# Patient Record
Sex: MALE | Race: WHITE | NOT HISPANIC OR LATINO | Employment: OTHER | URBAN - METROPOLITAN AREA
[De-identification: names, ages, dates, MRNs, and addresses within clinical notes are randomized per-mention and may not be internally consistent; named-entity substitution may affect disease eponyms.]

---

## 2020-02-25 ENCOUNTER — HOSPITAL ENCOUNTER (EMERGENCY)
Facility: HOSPITAL | Age: 65
Discharge: HOME | End: 2020-02-25
Attending: INTERNAL MEDICINE
Payer: MEDICARE

## 2020-02-25 VITALS
WEIGHT: 185 LBS | RESPIRATION RATE: 22 BRPM | SYSTOLIC BLOOD PRESSURE: 208 MMHG | BODY MASS INDEX: 29.73 KG/M2 | DIASTOLIC BLOOD PRESSURE: 103 MMHG | HEART RATE: 84 BPM | OXYGEN SATURATION: 99 % | HEIGHT: 66 IN

## 2020-02-25 DIAGNOSIS — E16.2 HYPOGLYCEMIA: Primary | ICD-10-CM

## 2020-02-25 LAB
POCT GLUCOSE: 111 MG/DL (ref 70–110)
POCT GLUCOSE: 121 MG/DL (ref 70–110)
POCT GLUCOSE: 38 MG/DL (ref 70–110)

## 2020-02-25 PROCEDURE — 99284 EMERGENCY DEPT VISIT MOD MDM: CPT | Mod: 25

## 2020-02-25 PROCEDURE — 25000003 PHARM REV CODE 250: Performed by: INTERNAL MEDICINE

## 2020-02-25 PROCEDURE — 82962 GLUCOSE BLOOD TEST: CPT | Mod: 91

## 2020-02-25 PROCEDURE — 96374 THER/PROPH/DIAG INJ IV PUSH: CPT

## 2020-02-25 RX ORDER — DEXTROSE MONOHYDRATE 25 G/50ML
INJECTION, SOLUTION INTRAVENOUS
Status: DISCONTINUED
Start: 2020-02-25 | End: 2020-02-25 | Stop reason: HOSPADM

## 2020-02-25 RX ORDER — DEXTROSE 50 % IN WATER (D50W) INTRAVENOUS SYRINGE
25
Status: COMPLETED | OUTPATIENT
Start: 2020-02-25 | End: 2020-02-25

## 2020-02-25 RX ADMIN — DEXTROSE MONOHYDRATE 25 G: 25 INJECTION, SOLUTION INTRAVENOUS at 02:02

## 2020-02-25 NOTE — ED NOTES
Patient awakens easily following dextrose injection. Patient is reoriented. Relayes that he has not been having an appetite recently but has continued to take his insulin.

## 2020-02-25 NOTE — DISCHARGE INSTRUCTIONS
Diabetes and Your Child: Low Blood Sugar     If your child has low blood sugar, give him a fast-acting sugar, such as a glass of nonfat milk.   Low blood sugar (hypoglycemia) happens when there is too little glucose (sugar) in your childs blood. It can be caused by skipping meals or snacks, eating too little food, or taking too much insulin or diabetes medicine. A lot of physical activity can also cause low blood sugar, even hours later. In severe cases, low blood sugar can lead to seizures or passing out.  How to recognize a low  Everyones symptoms are different. Your child may feel dizzy, weak, hungry, headachy, or shaky. Your child may seem cranky or confused. Severe hypoglycemia can cause seizures. If lows happen very often over time, your child may no longer be able to sense them. Encourage your child to recognize his or her symptoms and tell you about them right away.  What to do  Actions to take include the following:  · Stay calm so you can better help your child.  · Check your childs blood sugar to make sure that it is low. If youre not able to check, treat for low blood sugar anyway.  · Give your child 15 to 20 grams of fast-acting sugar such as 3 to 4 glucose tablets, a glass of nonfat milk, or 4 ounces (½ cup) of juice or regular soda. Diet soda will not help at all. Chocolate, cookies, and other fatty sweets will not work as quickly.  · If possible, recheck blood sugar in 15 minutes. If it is still low, give your child another 15 to 20 grams of fast-acting sugar.  · Once your childs blood sugar is normal, give your child a snack or meal to eat.  · If your childs blood sugar does not go back up, call your healthcare provider or take your child to the emergency room.  How to prevent low blood sugar  Recommendations to help your child:  · Be sure your child eats meals and snacks on time, and eats before exercising.  · Have your child carry fast-acting sugar.  · Dont inject insulin near a muscle  thats going to be exercised.  · Check your childs blood sugar often, especially after exercise and at bedtime.  Tips  Other recommendations include the following:  · Keep fast-acting sugars handy.  · Check blood sugar often, especially after activity and before bed.  Glucagon injections  For severe low blood sugar, or if your child can not tolerate oral glucose because they are vomiting, too sluggish to swallow or unconscious, your child may need a glucagon injection. This is a medicine that mobilizes the glucose already stored in your child's body. Ask your child's healthcare provider about glucagon emergency kits for home and school.    Date Last Reviewed: 7/1/2016 © 2000-2017 [employer]. [address] [place] [address], [place] [address]. All rights reserved. This information is not intended as a substitute for professional medical care. Always follow your healthcare professional's instructions.          Hypoglycemia, Oral Diabetic Medicine  You have been treated for low blood sugar (hypoglycemia) caused by your diabetes medicine. Oral diabetes medicines include:  · Glyburide  · Glipizide  · Acarbose  · Metformin  · Pioglitazone  · Sitagliptin  · Canigliflozin  · Colesevelam  Low blood sugar can occur when you continue to take your usual dose of diabetes medicine but you skip meals or dont eat the amount of food that your body is used to. It can also result from taking too much diabetes medicine.  Other factors that can lower blood sugar while taking diabetes medicine include intense exercise, strong emotions, alcohol use, tobacco, caffeine, and certain medicines. These medicines include:  · Aspirin  · Haloperidol  · Propoxyphene  · Chlorpromazine  · Propranolol  · Disopyramide  · Lisinopril (and other angiotensin-converting enzyme inhibitors)  In addition, some over-the-counter cough and cold products contain alcohol, which can affect your blood sugar levels.  A class of medicines called beta-blockers  is used for high blood pressure, rapid heart rate, and other conditions. Beta-blockers may prevent the early symptoms of low blood sugar. If you are taking a beta-blocker, you might not realize that your blood sugar is getting low. If you take a beta-blocker, talk to your healthcare provider about switching to a different class. The beta-blocker class includes:  · Propranolol  · Atenolol  · Metoprolol  · Nadolol  · Labetalol  · Carvedilol  Home care  · During the next 24 hours rest and eat frequent small meals. This will help prevent the return of low blood sugar.  · Learn the signals your body gives as your blood sugar drops (see below).  If symptoms of hypoglycemia return  · Keep a source of fast-acting sugar with you. At the first sign of low blood sugar, eat or drink 15 to 20 grams of fast-acting sugar. Examples include:  ¨ 3 to 4 glucose tablets (found at most drugstores)  ¨ 4 ounces of regular soda  ¨ 4 ounces of fruit juice  ¨ 2 tablespoons of raisins  ¨ 1 tablespoon of honey  · For other sources, check the sugar content on the nutrition label to figure out how much you need to eat or drink to get at least 15 grams of sugar.  · Check your blood sugar 15 minutes after treating yourself. If it is still low, take another 15 to 20 grams of fast-acting sugar. Test again in 15 minutes. If it remains low, call your healthcare provider or go to an emergency room.  · Once blood sugar returns to normal, eat a snack or meal to keep your blood sugar in a safe range.  In the future, if you are unable to eat your normal amount due to illness or vomiting, stop taking your diabetes medicine and contact your healthcare provider.  Wear a medical alert bracelet or necklace, or carry a card in your wallet explaining that you have diabetes. If you have a severe hypoglycemic reaction and cannot give this information, it will help medical personnel provide proper care.  Follow-up care  Follow up with your healthcare provider, or as  advised.  If you have the equipment to monitor your blood sugar, do so at least twice a day--before breakfast and before dinner. Do this for the next 5 days. See your healthcare provider during the next week to review these records. This will help determine if you need an adjustment in your diabetes medicine.  For more information about diabetes, contact the American Diabetes Association, at www.diabetes.org.  When to seek medical advice  Call your healthcare provider right away if any of these symptoms of low blood sugar occur.  · Fatigue  · Headache  · Shakes  · Excess sweating  · Hunger  · Feeling anxious or restless  · Vision changes  · Drowsiness  · Weakness  · Confusion  · Personality changes  · Seizure or loss of consciousness  Date Last Reviewed: 6/1/2016  © 0532-0563 [employer]. [address] [place] [address], [place] [address]. All rights reserved. This information is not intended as a substitute for professional medical care. Always follow your healthcare professional's instructions.

## 2020-02-25 NOTE — ED TRIAGE NOTES
Pt here per [facility] with low bx 38 mg/dl combative on arrival to ed security called and is standing by on arrival

## 2020-02-26 NOTE — ED PROVIDER NOTES
Encounter Date: 2/25/2020       History     Chief Complaint   Patient presents with    Hypoglycemia     Patient brought in with altered consciousness.  He adds severe hypoglycemia.  The blood glucose was 30.  [facility] was unable to achieve an IV.  Arrived in the ED.  IV was obtained he was given D50W.  He probably became alert and awake and symptoms resolved.        Review of patient's allergies indicates:  No Known Allergies  Past Medical History:   Diagnosis Date    Diabetes mellitus      No past surgical history on file.  No family history on file.  Social History     Tobacco Use    Smoking status: Not on file   Substance Use Topics    Alcohol use: Not on file    Drug use: Not on file     Review of Systems   Constitutional: Negative for fever.   HENT: Negative for sore throat.    Respiratory: Negative for shortness of breath.    Cardiovascular: Negative for chest pain.   Gastrointestinal: Negative for nausea.   Genitourinary: Negative for dysuria.   Musculoskeletal: Negative for back pain.   Skin: Negative for rash.   Neurological: Negative for weakness.   Hematological: Does not bruise/bleed easily.   All other systems reviewed and are negative.      Physical Exam     Initial Vitals [02/25/20 1432]   BP Pulse Resp Temp SpO2   (!) 200/102 84 16 -- 99 %      MAP       --         Physical Exam    Nursing note and vitals reviewed.  Constitutional: Vital signs are normal. He appears well-developed and well-nourished. He is active and cooperative.   HENT:   Head: Normocephalic and atraumatic.   Eyes: Conjunctivae and lids are normal. Lids are everted and swept, no foreign bodies found.   Neck: Trachea normal, normal range of motion and full passive range of motion without pain. Neck supple.   Cardiovascular: Normal rate, regular rhythm, S1 normal, S2 normal, normal heart sounds, intact distal pulses and normal pulses.  No extrasystoles are present.    Abdominal: Soft. Normal appearance and bowel sounds are normal.    Musculoskeletal: Normal range of motion.   Neurological: He is alert. He has normal reflexes. GCS eye subscore is 4. GCS verbal subscore is 5. GCS motor subscore is 6.   Skin: Skin is warm, dry and intact. Capillary refill takes less than 2 seconds.   Psychiatric: He has a normal mood and affect. His speech is normal and behavior is normal. Cognition and memory are normal.         ED Course   Procedures  Labs Reviewed   POCT GLUCOSE - Abnormal; Notable for the following components:       Result Value    POCT Glucose 38 (*)     All other components within normal limits   POCT GLUCOSE - Abnormal; Notable for the following components:    POCT Glucose 111 (*)     All other components within normal limits   POCT GLUCOSE - Abnormal; Notable for the following components:    POCT Glucose 121 (*)     All other components within normal limits          Imaging Results    None          Medical Decision Making:   Clinical Tests:   Lab Tests: Ordered and Reviewed  The following lab test(s) were unremarkable: CBC and CMP       <> Summary of Lab: Laboratory studies were unremarkable after D50 given  ED Management:  Patient brought in with altered mental consciousness after he was found to be hypoglycemic.  IV was started was given D50 and promptly had resolution of his abnormal findings.  Patient was alert back to baseline.  Family was encountered and discussed observation over the next several hours.  Discharged with family.                                 Clinical Impression:       ICD-10-CM ICD-9-CM   1. Hypoglycemia E16.2 251.2         Disposition:   Disposition: Discharged  Condition: Stable     ED Disposition Condition    Discharge Stable        ED Prescriptions     None        Follow-up Information    None                                    Jeanmarie Etienne MD  02/26/20 0758

## 2020-08-11 ENCOUNTER — LAB VISIT (OUTPATIENT)
Dept: LAB | Facility: HOSPITAL | Age: 65
End: 2020-08-11
Attending: INTERNAL MEDICINE
Payer: MEDICARE

## 2020-08-11 DIAGNOSIS — N18.30 CHRONIC KIDNEY DISEASE, STAGE III (MODERATE): Primary | ICD-10-CM

## 2020-08-11 LAB
ALBUMIN SERPL BCP-MCNC: 3.7 G/DL (ref 3.5–5.2)
ANION GAP SERPL CALC-SCNC: 11 MMOL/L (ref 8–16)
BACTERIA #/AREA URNS HPF: NORMAL /HPF
BILIRUB UR QL STRIP: NEGATIVE
BUN SERPL-MCNC: 22 MG/DL (ref 8–23)
CALCIUM SERPL-MCNC: 8.3 MG/DL (ref 8.7–10.5)
CHLORIDE SERPL-SCNC: 107 MMOL/L (ref 95–110)
CLARITY UR: CLEAR
CO2 SERPL-SCNC: 19 MMOL/L (ref 23–29)
COLOR UR: YELLOW
CREAT SERPL-MCNC: 1.9 MG/DL (ref 0.5–1.4)
CREAT UR-MCNC: 136 MG/DL (ref 23–375)
ERYTHROCYTE [DISTWIDTH] IN BLOOD BY AUTOMATED COUNT: 14 % (ref 11.5–14.5)
EST. GFR  (AFRICAN AMERICAN): 42.7 ML/MIN/1.73 M^2
EST. GFR  (NON AFRICAN AMERICAN): 37 ML/MIN/1.73 M^2
GLUCOSE SERPL-MCNC: 169 MG/DL (ref 70–110)
GLUCOSE UR QL STRIP: NEGATIVE
HCT VFR BLD AUTO: 35.8 % (ref 40–54)
HGB BLD-MCNC: 12 G/DL (ref 14–18)
HGB UR QL STRIP: ABNORMAL
HYALINE CASTS #/AREA URNS LPF: NORMAL /LPF
KETONES UR QL STRIP: NEGATIVE
LEUKOCYTE ESTERASE UR QL STRIP: NEGATIVE
MCH RBC QN AUTO: 27 PG (ref 27–31)
MCHC RBC AUTO-ENTMCNC: 33.5 G/DL (ref 32–36)
MCV RBC AUTO: 81 FL (ref 82–98)
MICROSCOPIC COMMENT: NORMAL
NITRITE UR QL STRIP: NEGATIVE
PH UR STRIP: 6 [PH] (ref 5–8)
PHOSPHATE SERPL-MCNC: 2.9 MG/DL (ref 2.7–4.5)
PLATELET # BLD AUTO: 254 K/UL (ref 150–350)
PMV BLD AUTO: 10 FL (ref 9.2–12.9)
POTASSIUM SERPL-SCNC: 3.5 MMOL/L (ref 3.5–5.1)
PROT UR QL STRIP: ABNORMAL
PROT UR-MCNC: 162 MG/DL (ref 0–15)
PROT/CREAT UR: 1.19 MG/G{CREAT} (ref 0–0.2)
RBC # BLD AUTO: 4.44 M/UL (ref 4.6–6.2)
RBC #/AREA URNS HPF: 1 /HPF (ref 0–4)
SODIUM SERPL-SCNC: 137 MMOL/L (ref 136–145)
SP GR UR STRIP: 1.02 (ref 1–1.03)
SQUAMOUS #/AREA URNS HPF: 1 /HPF
URN SPEC COLLECT METH UR: ABNORMAL
UROBILINOGEN UR STRIP-ACNC: NEGATIVE EU/DL
WBC # BLD AUTO: 4.39 K/UL (ref 3.9–12.7)
WBC #/AREA URNS HPF: 1 /HPF (ref 0–5)

## 2020-08-11 PROCEDURE — 36415 COLL VENOUS BLD VENIPUNCTURE: CPT

## 2020-08-11 PROCEDURE — 81000 URINALYSIS NONAUTO W/SCOPE: CPT

## 2020-08-11 PROCEDURE — 84156 ASSAY OF PROTEIN URINE: CPT

## 2020-08-11 PROCEDURE — 85027 COMPLETE CBC AUTOMATED: CPT

## 2020-08-11 PROCEDURE — 80069 RENAL FUNCTION PANEL: CPT

## 2020-09-02 DIAGNOSIS — Z13.6 ENCOUNTER FOR SCREENING FOR VASCULAR DISEASE: ICD-10-CM

## 2020-09-02 DIAGNOSIS — I69.30 SEQUELAE OF CEREBRAL INFARCTION: Primary | ICD-10-CM

## 2021-04-12 ENCOUNTER — LAB VISIT (OUTPATIENT)
Dept: LAB | Facility: HOSPITAL | Age: 66
End: 2021-04-12
Attending: NURSE PRACTITIONER
Payer: MEDICARE

## 2021-04-12 DIAGNOSIS — E11.9 DIABETES MELLITUS: Primary | ICD-10-CM

## 2021-04-12 DIAGNOSIS — M89.8X9 METABOLIC BONE DISEASE: ICD-10-CM

## 2021-04-12 DIAGNOSIS — N18.30 CKD (CHRONIC KIDNEY DISEASE), STAGE III: ICD-10-CM

## 2021-04-12 DIAGNOSIS — M10.9 GOUT: ICD-10-CM

## 2021-04-12 LAB
25(OH)D3+25(OH)D2 SERPL-MCNC: 33 NG/ML (ref 30–96)
ALBUMIN SERPL BCP-MCNC: 3.3 G/DL (ref 3.5–5.2)
ANION GAP SERPL CALC-SCNC: 12 MMOL/L (ref 8–16)
BACTERIA #/AREA URNS HPF: NORMAL /HPF
BILIRUB UR QL STRIP: NEGATIVE
BUN SERPL-MCNC: 27 MG/DL (ref 8–23)
CALCIUM SERPL-MCNC: 8.1 MG/DL (ref 8.7–10.5)
CHLORIDE SERPL-SCNC: 110 MMOL/L (ref 95–110)
CLARITY UR: CLEAR
CO2 SERPL-SCNC: 23 MMOL/L (ref 23–29)
COLOR UR: YELLOW
CREAT SERPL-MCNC: 2.2 MG/DL (ref 0.5–1.4)
CREAT UR-MCNC: 78 MG/DL (ref 23–375)
ERYTHROCYTE [DISTWIDTH] IN BLOOD BY AUTOMATED COUNT: 13.9 % (ref 11.5–14.5)
EST. GFR  (AFRICAN AMERICAN): 35.5 ML/MIN/1.73 M^2
EST. GFR  (NON AFRICAN AMERICAN): 30.7 ML/MIN/1.73 M^2
ESTIMATED AVG GLUCOSE: 177 MG/DL (ref 68–131)
GLUCOSE SERPL-MCNC: 148 MG/DL (ref 70–110)
GLUCOSE UR QL STRIP: NEGATIVE
HBA1C MFR BLD: 7.8 % (ref 4.5–6.2)
HCT VFR BLD AUTO: 37.6 % (ref 40–54)
HGB BLD-MCNC: 12.5 G/DL (ref 14–18)
HGB UR QL STRIP: ABNORMAL
HYALINE CASTS #/AREA URNS LPF: 0 /LPF
KETONES UR QL STRIP: NEGATIVE
LEUKOCYTE ESTERASE UR QL STRIP: NEGATIVE
MCH RBC QN AUTO: 26.8 PG (ref 27–31)
MCHC RBC AUTO-ENTMCNC: 33.2 G/DL (ref 32–36)
MCV RBC AUTO: 81 FL (ref 82–98)
MICROSCOPIC COMMENT: NORMAL
NITRITE UR QL STRIP: NEGATIVE
PH UR STRIP: 6 [PH] (ref 5–8)
PHOSPHATE SERPL-MCNC: 3.3 MG/DL (ref 2.7–4.5)
PLATELET # BLD AUTO: 339 K/UL (ref 150–450)
PMV BLD AUTO: 10.1 FL (ref 9.2–12.9)
POTASSIUM SERPL-SCNC: 3.5 MMOL/L (ref 3.5–5.1)
PROT UR QL STRIP: ABNORMAL
PROT UR-MCNC: 193 MG/DL (ref 0–15)
PROT/CREAT UR: 2.47 MG/G{CREAT} (ref 0–0.2)
PTH-INTACT SERPL-MCNC: 273 PG/ML (ref 9–77)
RBC # BLD AUTO: 4.66 M/UL (ref 4.6–6.2)
RBC #/AREA URNS HPF: 3 /HPF (ref 0–4)
SODIUM SERPL-SCNC: 145 MMOL/L (ref 136–145)
SP GR UR STRIP: 1.02 (ref 1–1.03)
URATE SERPL-MCNC: 8.5 MG/DL (ref 3.4–7)
URN SPEC COLLECT METH UR: ABNORMAL
UROBILINOGEN UR STRIP-ACNC: NEGATIVE EU/DL
WBC # BLD AUTO: 4.62 K/UL (ref 3.9–12.7)
WBC #/AREA URNS HPF: 3 /HPF (ref 0–5)

## 2021-04-12 PROCEDURE — 83036 HEMOGLOBIN GLYCOSYLATED A1C: CPT | Performed by: NURSE PRACTITIONER

## 2021-04-12 PROCEDURE — 36415 COLL VENOUS BLD VENIPUNCTURE: CPT | Performed by: NURSE PRACTITIONER

## 2021-04-12 PROCEDURE — 80069 RENAL FUNCTION PANEL: CPT | Performed by: NURSE PRACTITIONER

## 2021-04-12 PROCEDURE — 82570 ASSAY OF URINE CREATININE: CPT | Performed by: NURSE PRACTITIONER

## 2021-04-12 PROCEDURE — 81000 URINALYSIS NONAUTO W/SCOPE: CPT | Performed by: NURSE PRACTITIONER

## 2021-04-12 PROCEDURE — 85027 COMPLETE CBC AUTOMATED: CPT | Performed by: NURSE PRACTITIONER

## 2021-04-12 PROCEDURE — 83970 ASSAY OF PARATHORMONE: CPT | Performed by: NURSE PRACTITIONER

## 2021-04-12 PROCEDURE — 84550 ASSAY OF BLOOD/URIC ACID: CPT | Performed by: NURSE PRACTITIONER

## 2021-04-12 PROCEDURE — 82306 VITAMIN D 25 HYDROXY: CPT | Performed by: NURSE PRACTITIONER

## 2022-01-02 ENCOUNTER — HOSPITAL ENCOUNTER (INPATIENT)
Facility: HOSPITAL | Age: 67
LOS: 3 days | Discharge: HOME HEALTH | DRG: 291 | End: 2022-01-07
Attending: EMERGENCY MEDICINE | Admitting: FAMILY MEDICINE
Payer: MEDICARE

## 2022-01-02 DIAGNOSIS — N18.32 TYPE 2 DIABETES MELLITUS WITH STAGE 3B CHRONIC KIDNEY DISEASE, WITHOUT LONG-TERM CURRENT USE OF INSULIN: ICD-10-CM

## 2022-01-02 DIAGNOSIS — R07.9 CHEST PAIN: ICD-10-CM

## 2022-01-02 DIAGNOSIS — I48.91 ATRIAL FIBRILLATION: ICD-10-CM

## 2022-01-02 DIAGNOSIS — I50.9 NEW ONSET OF CONGESTIVE HEART FAILURE: ICD-10-CM

## 2022-01-02 DIAGNOSIS — E11.22 TYPE 2 DIABETES MELLITUS WITH STAGE 3B CHRONIC KIDNEY DISEASE, WITHOUT LONG-TERM CURRENT USE OF INSULIN: ICD-10-CM

## 2022-01-02 DIAGNOSIS — M79.89 LEG SWELLING: ICD-10-CM

## 2022-01-02 DIAGNOSIS — N18.9 CHRONIC RENAL IMPAIRMENT, UNSPECIFIED CKD STAGE: Primary | ICD-10-CM

## 2022-01-02 DIAGNOSIS — R06.02 SOB (SHORTNESS OF BREATH): ICD-10-CM

## 2022-01-02 DIAGNOSIS — I50.9 CONGESTIVE HEART FAILURE, UNSPECIFIED HF CHRONICITY, UNSPECIFIED HEART FAILURE TYPE: ICD-10-CM

## 2022-01-02 PROBLEM — Z86.73 H/O: CVA (CEREBROVASCULAR ACCIDENT): Status: ACTIVE | Noted: 2022-01-02

## 2022-01-02 PROBLEM — I25.10 CORONARY ARTERY DISEASE INVOLVING NATIVE CORONARY ARTERY OF NATIVE HEART: Status: ACTIVE | Noted: 2022-01-02

## 2022-01-02 PROBLEM — E78.5 HYPERLIPIDEMIA: Status: ACTIVE | Noted: 2022-01-02

## 2022-01-02 PROBLEM — I16.0 HYPERTENSIVE URGENCY: Status: ACTIVE | Noted: 2022-01-02

## 2022-01-02 LAB
ALBUMIN SERPL BCP-MCNC: 2.8 G/DL (ref 3.5–5.2)
ALP SERPL-CCNC: 94 U/L (ref 55–135)
ALT SERPL W/O P-5'-P-CCNC: 14 U/L (ref 10–44)
ANION GAP SERPL CALC-SCNC: 18 MMOL/L (ref 8–16)
AST SERPL-CCNC: 17 U/L (ref 10–40)
BASOPHILS # BLD AUTO: 0.02 K/UL (ref 0–0.2)
BASOPHILS NFR BLD: 0.4 % (ref 0–1.9)
BILIRUB SERPL-MCNC: 0.6 MG/DL (ref 0.1–1)
BNP SERPL-MCNC: 1467 PG/ML (ref 0–99)
BUN SERPL-MCNC: 16 MG/DL (ref 8–23)
CALCIUM SERPL-MCNC: 8.3 MG/DL (ref 8.7–10.5)
CHLORIDE SERPL-SCNC: 103 MMOL/L (ref 95–110)
CO2 SERPL-SCNC: 19 MMOL/L (ref 23–29)
CREAT SERPL-MCNC: 2.2 MG/DL (ref 0.5–1.4)
DIFFERENTIAL METHOD: ABNORMAL
EOSINOPHIL # BLD AUTO: 0.1 K/UL (ref 0–0.5)
EOSINOPHIL NFR BLD: 1.1 % (ref 0–8)
ERYTHROCYTE [DISTWIDTH] IN BLOOD BY AUTOMATED COUNT: 14 % (ref 11.5–14.5)
EST. GFR  (AFRICAN AMERICAN): 35.3 ML/MIN/1.73 M^2
EST. GFR  (NON AFRICAN AMERICAN): 30.5 ML/MIN/1.73 M^2
GLUCOSE SERPL-MCNC: 332 MG/DL (ref 70–110)
HCT VFR BLD AUTO: 36.8 % (ref 40–54)
HGB BLD-MCNC: 12.2 G/DL (ref 14–18)
IMM GRANULOCYTES # BLD AUTO: 0.01 K/UL (ref 0–0.04)
IMM GRANULOCYTES NFR BLD AUTO: 0.2 % (ref 0–0.5)
INR PPP: 1 (ref 0.8–1.2)
LYMPHOCYTES # BLD AUTO: 1 K/UL (ref 1–4.8)
LYMPHOCYTES NFR BLD: 17.3 % (ref 18–48)
MAGNESIUM SERPL-MCNC: 1.5 MG/DL (ref 1.6–2.6)
MCH RBC QN AUTO: 27.3 PG (ref 27–31)
MCHC RBC AUTO-ENTMCNC: 33.2 G/DL (ref 32–36)
MCV RBC AUTO: 82 FL (ref 82–98)
MONOCYTES # BLD AUTO: 0.5 K/UL (ref 0.3–1)
MONOCYTES NFR BLD: 9.2 % (ref 4–15)
NEUTROPHILS # BLD AUTO: 4 K/UL (ref 1.8–7.7)
NEUTROPHILS NFR BLD: 71.8 % (ref 38–73)
NRBC BLD-RTO: 0 /100 WBC
PLATELET # BLD AUTO: 305 K/UL (ref 150–450)
PMV BLD AUTO: 10.5 FL (ref 9.2–12.9)
POCT GLUCOSE: 311 MG/DL (ref 70–110)
POTASSIUM SERPL-SCNC: 3.5 MMOL/L (ref 3.5–5.1)
PROT SERPL-MCNC: 6.6 G/DL (ref 6–8.4)
PROTHROMBIN TIME: 10.6 SEC (ref 9–12.5)
RBC # BLD AUTO: 4.47 M/UL (ref 4.6–6.2)
SARS-COV-2 RDRP RESP QL NAA+PROBE: NEGATIVE
SODIUM SERPL-SCNC: 140 MMOL/L (ref 136–145)
TROPONIN I SERPL DL<=0.01 NG/ML-MCNC: 0.09 NG/ML (ref 0–0.03)
TROPONIN I SERPL DL<=0.01 NG/ML-MCNC: 0.12 NG/ML (ref 0–0.03)
WBC # BLD AUTO: 5.56 K/UL (ref 3.9–12.7)

## 2022-01-02 PROCEDURE — 85610 PROTHROMBIN TIME: CPT | Performed by: EMERGENCY MEDICINE

## 2022-01-02 PROCEDURE — G0378 HOSPITAL OBSERVATION PER HR: HCPCS

## 2022-01-02 PROCEDURE — 63600175 PHARM REV CODE 636 W HCPCS: Performed by: FAMILY MEDICINE

## 2022-01-02 PROCEDURE — 25000003 PHARM REV CODE 250: Performed by: FAMILY MEDICINE

## 2022-01-02 PROCEDURE — 84484 ASSAY OF TROPONIN QUANT: CPT | Mod: 91 | Performed by: EMERGENCY MEDICINE

## 2022-01-02 PROCEDURE — 71045 XR CHEST AP PORTABLE: ICD-10-PCS | Mod: 26,,, | Performed by: RADIOLOGY

## 2022-01-02 PROCEDURE — 27000221 HC OXYGEN, UP TO 24 HOURS

## 2022-01-02 PROCEDURE — 94761 N-INVAS EAR/PLS OXIMETRY MLT: CPT

## 2022-01-02 PROCEDURE — 99285 EMERGENCY DEPT VISIT HI MDM: CPT | Mod: 25

## 2022-01-02 PROCEDURE — 83880 ASSAY OF NATRIURETIC PEPTIDE: CPT | Performed by: EMERGENCY MEDICINE

## 2022-01-02 PROCEDURE — 71045 X-RAY EXAM CHEST 1 VIEW: CPT | Mod: 26,,, | Performed by: RADIOLOGY

## 2022-01-02 PROCEDURE — U0002 COVID-19 LAB TEST NON-CDC: HCPCS | Performed by: EMERGENCY MEDICINE

## 2022-01-02 PROCEDURE — 71045 X-RAY EXAM CHEST 1 VIEW: CPT | Mod: TC,FY

## 2022-01-02 PROCEDURE — 93010 EKG 12-LEAD: ICD-10-PCS | Mod: ,,, | Performed by: INTERNAL MEDICINE

## 2022-01-02 PROCEDURE — 96375 TX/PRO/DX INJ NEW DRUG ADDON: CPT

## 2022-01-02 PROCEDURE — 93005 ELECTROCARDIOGRAM TRACING: CPT

## 2022-01-02 PROCEDURE — 96372 THER/PROPH/DIAG INJ SC/IM: CPT | Mod: 59

## 2022-01-02 PROCEDURE — 99220 PR INITIAL OBSERVATION CARE,LEVL III: ICD-10-PCS | Mod: ,,, | Performed by: FAMILY MEDICINE

## 2022-01-02 PROCEDURE — 99220 PR INITIAL OBSERVATION CARE,LEVL III: CPT | Mod: ,,, | Performed by: FAMILY MEDICINE

## 2022-01-02 PROCEDURE — 93010 ELECTROCARDIOGRAM REPORT: CPT | Mod: ,,, | Performed by: INTERNAL MEDICINE

## 2022-01-02 PROCEDURE — 83735 ASSAY OF MAGNESIUM: CPT | Performed by: EMERGENCY MEDICINE

## 2022-01-02 PROCEDURE — 85025 COMPLETE CBC W/AUTO DIFF WBC: CPT | Performed by: EMERGENCY MEDICINE

## 2022-01-02 PROCEDURE — 63600175 PHARM REV CODE 636 W HCPCS: Performed by: EMERGENCY MEDICINE

## 2022-01-02 PROCEDURE — 80053 COMPREHEN METABOLIC PANEL: CPT | Performed by: EMERGENCY MEDICINE

## 2022-01-02 PROCEDURE — 84484 ASSAY OF TROPONIN QUANT: CPT | Performed by: FAMILY MEDICINE

## 2022-01-02 RX ORDER — ONDANSETRON 2 MG/ML
4 INJECTION INTRAMUSCULAR; INTRAVENOUS EVERY 8 HOURS PRN
Status: DISCONTINUED | OUTPATIENT
Start: 2022-01-02 | End: 2022-01-07 | Stop reason: HOSPADM

## 2022-01-02 RX ORDER — ASPIRIN 81 MG/1
81 TABLET ORAL DAILY
Status: DISCONTINUED | OUTPATIENT
Start: 2022-01-03 | End: 2022-01-07 | Stop reason: HOSPADM

## 2022-01-02 RX ORDER — CLOPIDOGREL BISULFATE 75 MG/1
75 TABLET ORAL DAILY
Status: ON HOLD | COMMUNITY
Start: 2021-11-10 | End: 2022-06-03

## 2022-01-02 RX ORDER — HYDRALAZINE HYDROCHLORIDE 20 MG/ML
10 INJECTION INTRAMUSCULAR; INTRAVENOUS EVERY 8 HOURS PRN
Status: DISCONTINUED | OUTPATIENT
Start: 2022-01-02 | End: 2022-01-07 | Stop reason: HOSPADM

## 2022-01-02 RX ORDER — CLOPIDOGREL BISULFATE 75 MG/1
75 TABLET ORAL DAILY
Status: DISCONTINUED | OUTPATIENT
Start: 2022-01-03 | End: 2022-01-07 | Stop reason: HOSPADM

## 2022-01-02 RX ORDER — IBUPROFEN 200 MG
16 TABLET ORAL
Status: DISCONTINUED | OUTPATIENT
Start: 2022-01-02 | End: 2022-01-07 | Stop reason: HOSPADM

## 2022-01-02 RX ORDER — GLUCAGON 1 MG
1 KIT INJECTION
Status: DISCONTINUED | OUTPATIENT
Start: 2022-01-02 | End: 2022-01-07 | Stop reason: HOSPADM

## 2022-01-02 RX ORDER — ATORVASTATIN CALCIUM 40 MG/1
40 TABLET, FILM COATED ORAL NIGHTLY
Status: DISCONTINUED | OUTPATIENT
Start: 2022-01-02 | End: 2022-01-07 | Stop reason: HOSPADM

## 2022-01-02 RX ORDER — LANOLIN ALCOHOL/MO/W.PET/CERES
800 CREAM (GRAM) TOPICAL
Status: DISCONTINUED | OUTPATIENT
Start: 2022-01-02 | End: 2022-01-07 | Stop reason: HOSPADM

## 2022-01-02 RX ORDER — AMLODIPINE BESYLATE 10 MG/1
10 TABLET ORAL
Status: ON HOLD | COMMUNITY
Start: 2021-07-27 | End: 2022-06-03 | Stop reason: HOSPADM

## 2022-01-02 RX ORDER — NALOXONE HCL 0.4 MG/ML
0.02 VIAL (ML) INJECTION
Status: DISCONTINUED | OUTPATIENT
Start: 2022-01-02 | End: 2022-01-07 | Stop reason: HOSPADM

## 2022-01-02 RX ORDER — INSULIN ASPART 100 [IU]/ML
0-5 INJECTION, SOLUTION INTRAVENOUS; SUBCUTANEOUS
Status: DISCONTINUED | OUTPATIENT
Start: 2022-01-02 | End: 2022-01-07 | Stop reason: HOSPADM

## 2022-01-02 RX ORDER — SODIUM,POTASSIUM PHOSPHATES 280-250MG
2 POWDER IN PACKET (EA) ORAL
Status: DISCONTINUED | OUTPATIENT
Start: 2022-01-02 | End: 2022-01-07 | Stop reason: HOSPADM

## 2022-01-02 RX ORDER — CLOPIDOGREL BISULFATE 75 MG/1
TABLET ORAL
Status: ON HOLD | COMMUNITY
Start: 2021-08-24 | End: 2022-01-07 | Stop reason: HOSPADM

## 2022-01-02 RX ORDER — HEPARIN SODIUM 5000 [USP'U]/ML
5000 INJECTION, SOLUTION INTRAVENOUS; SUBCUTANEOUS EVERY 8 HOURS
Status: DISCONTINUED | OUTPATIENT
Start: 2022-01-02 | End: 2022-01-07 | Stop reason: HOSPADM

## 2022-01-02 RX ORDER — HYDROCODONE BITARTRATE AND ACETAMINOPHEN 5; 325 MG/1; MG/1
1 TABLET ORAL EVERY 6 HOURS PRN
Status: DISCONTINUED | OUTPATIENT
Start: 2022-01-02 | End: 2022-01-07 | Stop reason: HOSPADM

## 2022-01-02 RX ORDER — HYDROCHLOROTHIAZIDE 25 MG/1
25 TABLET ORAL
Status: ON HOLD | COMMUNITY
Start: 2021-06-22 | End: 2022-01-07 | Stop reason: HOSPADM

## 2022-01-02 RX ORDER — IPRATROPIUM BROMIDE AND ALBUTEROL SULFATE 2.5; .5 MG/3ML; MG/3ML
3 SOLUTION RESPIRATORY (INHALATION) EVERY 6 HOURS PRN
Status: DISCONTINUED | OUTPATIENT
Start: 2022-01-02 | End: 2022-01-07 | Stop reason: HOSPADM

## 2022-01-02 RX ORDER — ATORVASTATIN CALCIUM 40 MG/1
TABLET, FILM COATED ORAL
Status: ON HOLD | COMMUNITY
Start: 2021-06-22 | End: 2022-06-03 | Stop reason: HOSPADM

## 2022-01-02 RX ORDER — FUROSEMIDE 10 MG/ML
80 INJECTION INTRAMUSCULAR; INTRAVENOUS
Status: COMPLETED | OUTPATIENT
Start: 2022-01-02 | End: 2022-01-02

## 2022-01-02 RX ORDER — SODIUM CHLORIDE 0.9 % (FLUSH) 0.9 %
10 SYRINGE (ML) INJECTION EVERY 8 HOURS PRN
Status: DISCONTINUED | OUTPATIENT
Start: 2022-01-02 | End: 2022-01-07 | Stop reason: HOSPADM

## 2022-01-02 RX ORDER — FUROSEMIDE 10 MG/ML
40 INJECTION INTRAMUSCULAR; INTRAVENOUS DAILY
Status: DISCONTINUED | OUTPATIENT
Start: 2022-01-03 | End: 2022-01-03

## 2022-01-02 RX ORDER — ALLOPURINOL 100 MG/1
TABLET ORAL
Status: ON HOLD | COMMUNITY
Start: 2021-09-07 | End: 2022-06-03

## 2022-01-02 RX ORDER — INSULIN ASPART 100 [IU]/ML
INJECTION, SUSPENSION SUBCUTANEOUS
Status: ON HOLD | COMMUNITY
Start: 2021-10-04 | End: 2022-06-03

## 2022-01-02 RX ORDER — ACETAMINOPHEN 325 MG/1
650 TABLET ORAL EVERY 4 HOURS PRN
Status: DISCONTINUED | OUTPATIENT
Start: 2022-01-02 | End: 2022-01-07 | Stop reason: HOSPADM

## 2022-01-02 RX ORDER — POLYETHYLENE GLYCOL 3350 17 G/17G
17 POWDER, FOR SOLUTION ORAL DAILY PRN
Status: DISCONTINUED | OUTPATIENT
Start: 2022-01-02 | End: 2022-01-07 | Stop reason: HOSPADM

## 2022-01-02 RX ORDER — PROMETHAZINE HYDROCHLORIDE 12.5 MG/1
25 TABLET ORAL EVERY 6 HOURS PRN
Status: DISCONTINUED | OUTPATIENT
Start: 2022-01-02 | End: 2022-01-07 | Stop reason: HOSPADM

## 2022-01-02 RX ORDER — IBUPROFEN 200 MG
24 TABLET ORAL
Status: DISCONTINUED | OUTPATIENT
Start: 2022-01-02 | End: 2022-01-07 | Stop reason: HOSPADM

## 2022-01-02 RX ADMIN — ATORVASTATIN CALCIUM 40 MG: 40 TABLET, FILM COATED ORAL at 08:01

## 2022-01-02 RX ADMIN — INSULIN ASPART 2 UNITS: 100 INJECTION, SOLUTION INTRAVENOUS; SUBCUTANEOUS at 08:01

## 2022-01-02 RX ADMIN — FUROSEMIDE 80 MG: 10 INJECTION, SOLUTION INTRAMUSCULAR; INTRAVENOUS at 05:01

## 2022-01-02 RX ADMIN — HEPARIN SODIUM 5000 UNITS: 5000 INJECTION, SOLUTION INTRAVENOUS; SUBCUTANEOUS at 09:01

## 2022-01-02 RX ADMIN — NITROGLYCERIN 2 INCH: 20 OINTMENT TOPICAL at 06:01

## 2022-01-02 NOTE — ASSESSMENT & PLAN NOTE
Patient is identified as having previously undiagnosed CHF heart failure that is Acute. CHF is currently uncontrolled due to Continued edema of extremities, Dyspnea not returned to baseline after 2 doses of IV diuretic, Rales/crackles on pulmonary exam and Pulmonary edema/pleural effusion on CXR.   IV lasix 80 mg in ER  Admit with the following:  Monitor on telemetry.   Monitor strict Is&Os and daily weights  Place on fluid restriction of 1.5 L.   BNP reviewed- and noted below   Recent Labs   Lab 01/02/22  1622   BNP 1,467*     Echo ordered   IV diuresis  Monitor on continuous pulse ox and telemetry and use supplemental oxygen to maintain sats >94%  Consult Cardiology when available - 24 hours - otherwise if needed transfer for Cardiology consult  Trend troponin given h/o CAD though no chest pain  Strict I/Os, daily weights

## 2022-01-02 NOTE — ASSESSMENT & PLAN NOTE
Elevated sCr however appears to be at patient's baseline  Continue to monitor daily BMP   Expect slight increase with IV lasix

## 2022-01-02 NOTE — ASSESSMENT & PLAN NOTE
Uncontrolled blood pressure   With subsequent pulmonary edema/CHF - LV function may be preserved  Check echo  Blood pressure lowering with goal of 25% of max in 24 hours  IV lasix should improve BP  Would recommend some combination of ACEi, BB, aldactone  Hold ACEi initially due to MERCEDES  Given nitro paste in ER

## 2022-01-02 NOTE — ASSESSMENT & PLAN NOTE
Patient's FSGs are uncontrolled due to hyperglycemia on current medication regimen.  Last A1c reviewed-   Lab Results   Component Value Date    HGBA1C 7.8 (H) 04/12/2021     Most recent fingerstick glucose reviewed- No results for input(s): POCTGLUCOSE in the last 24 hours.  Current correctional scale  Low  Maintain anti-hyperglycemic dose as follows-   Antihyperglycemics (From admission, onward)            None        Hold Oral hypoglycemics while patient is in the hospital.

## 2022-01-02 NOTE — ED PROVIDER NOTES
Encounter Date: 1/2/2022       History     Chief Complaint   Patient presents with    Shortness of Breath     Since last night. Pt states he took albuterol inhaler a few times at home. [facility] administered 1 breathing treatment PTA with some relief. Denies pain. 96% RA sitting, 91% RA lying.       Patient comes in by ambulance for evaluation of shortness of breath.  He started feeling bad last night.  Patient has used his albuterol inhaler at home with no improvement.  He notes severe shortness of breath.  He was found to be somewhat hypoxic on the scene; he does not use oxygen at home.  No history of chest pain.  No vomiting or diarrhea.  No fever at home.  Symptoms are severe, constant, no exacerbating or alleviating factors.        Review of patient's allergies indicates:  No Known Allergies  Past Medical History:   Diagnosis Date    Diabetes mellitus      No past surgical history on file.  No family history on file.     Review of Systems   Constitutional: Negative for fever.   HENT: Negative for sore throat.    Respiratory: Positive for shortness of breath.    Cardiovascular: Negative for chest pain.   Gastrointestinal: Negative for diarrhea, nausea and vomiting.   All other systems reviewed and are negative.      Physical Exam     Initial Vitals [01/02/22 1609]   BP Pulse Resp Temp SpO2   (!) 185/112 (!) 114 (!) 28 97.5 °F (36.4 °C) (!) 91 %      MAP       --         Physical Exam    Nursing note and vitals reviewed.  Constitutional: He appears well-developed and well-nourished. He appears distressed.   HENT:   Head: Normocephalic and atraumatic.   Right Ear: External ear normal.   Left Ear: External ear normal.   Nose: Nose normal.   Mouth/Throat: Oropharynx is clear and moist.   Eyes: Conjunctivae and EOM are normal. Pupils are equal, round, and reactive to light.   Neck: Neck supple.   Normal range of motion.  Cardiovascular:   Tachycardic rate with a regular rhythm   Pulmonary/Chest: He is in respiratory  distress. He has rales.   Patient is tachypneic.  Lung sounds quite congested with rales bilaterally.   Abdominal: Abdomen is soft. He exhibits no distension. There is no abdominal tenderness.   Musculoskeletal:         General: Edema present. Normal range of motion.      Cervical back: Normal range of motion and neck supple.     Neurological: He is alert and oriented to person, place, and time. No cranial nerve deficit.   Skin: Skin is warm and dry.   Psychiatric: He has a normal mood and affect. Thought content normal.         ED Course   Procedures  Labs Reviewed   CBC W/ AUTO DIFFERENTIAL - Abnormal; Notable for the following components:       Result Value    RBC 4.47 (*)     Hemoglobin 12.2 (*)     Hematocrit 36.8 (*)     Lymph % 17.3 (*)     All other components within normal limits   COMPREHENSIVE METABOLIC PANEL - Abnormal; Notable for the following components:    CO2 19 (*)     Glucose 332 (*)     Creatinine 2.2 (*)     Calcium 8.3 (*)     Albumin 2.8 (*)     Anion Gap 18 (*)     eGFR if  35.3 (*)     eGFR if non  30.5 (*)     All other components within normal limits   TROPONIN I - Abnormal; Notable for the following components:    Troponin I 0.090 (*)     All other components within normal limits   B-TYPE NATRIURETIC PEPTIDE - Abnormal; Notable for the following components:    BNP 1,467 (*)     All other components within normal limits   PROTIME-INR   SARS-COV-2 RNA AMPLIFICATION, QUAL    Narrative:     Is the patient symptomatic?->Yes     EKG Readings: (Independently Interpreted)   Sinus rhythm, rate of 112, LVH with strain noted, no ST elevation       Imaging Results          X-Ray Chest AP Portable (Final result)  Result time 01/02/22 17:15:05    Final result by Bhavna Bryant MD (01/02/22 17:15:05)                 Impression:      Interval development of mild bilateral right more so than left lung infiltrate and probable small bilateral pleural effusions suggesting  CHF      Electronically signed by: Bhavna Bryant MD  Date:    01/02/2022  Time:    17:15             Narrative:    EXAMINATION:  XR CHEST AP PORTABLE    CLINICAL HISTORY:  sob;    TECHNIQUE:  Single frontal view of the chest was performed.    COMPARISON:  06/09/2011    FINDINGS:  Interval development of bilateral infiltrates right slightly more so than left in the perihilar and basilar region.  No pleural effusion.  Stable cardiomediastinal silhouette.  Appearance suggest mild CHF with pneumonia include the differential                                 Medications   nitroGLYCERIN 2% TD oint ointment 2 inch (has no administration in time range)   furosemide injection 80 mg (80 mg Intravenous Given 1/2/22 8406)     Medical Decision Making:   Initial Assessment:   Patient presents with shortness of breath.  He is quite volume overloaded.  This appears to be a new problem for him.  He is doing well on nasal by prong oxygen; he does not need BiPAP.  I have started him on nitroglycerin paste to try to bring his blood pressure down and offload him a little bit.  I have given him IV Lasix.  Case discussed with the hospitalist.  He will be admitted to telemetry for continued treatment.  Patient and family are aware of the results of the workup the plan for admission and they are agreeable.                      Clinical Impression:   Final diagnoses:  [R06.02] SOB (shortness of breath)  [I50.9] Congestive heart failure, unspecified HF chronicity, unspecified heart failure type (Primary)  [N18.9] Chronic renal impairment, unspecified CKD stage          ED Disposition Condition    Admit               Noam Krishnamurthy MD  01/02/22 7264

## 2022-01-03 PROBLEM — R79.89 TROPONIN LEVEL ELEVATED: Status: ACTIVE | Noted: 2022-01-03

## 2022-01-03 LAB
ALBUMIN SERPL BCP-MCNC: 2.7 G/DL (ref 3.5–5.2)
ALP SERPL-CCNC: 91 U/L (ref 55–135)
ALT SERPL W/O P-5'-P-CCNC: 14 U/L (ref 10–44)
ANION GAP SERPL CALC-SCNC: 16 MMOL/L (ref 8–16)
AST SERPL-CCNC: 15 U/L (ref 10–40)
BASOPHILS # BLD AUTO: 0.03 K/UL (ref 0–0.2)
BASOPHILS NFR BLD: 0.5 % (ref 0–1.9)
BILIRUB SERPL-MCNC: 0.8 MG/DL (ref 0.1–1)
BUN SERPL-MCNC: 15 MG/DL (ref 8–23)
CALCIUM SERPL-MCNC: 8.4 MG/DL (ref 8.7–10.5)
CHLORIDE SERPL-SCNC: 104 MMOL/L (ref 95–110)
CO2 SERPL-SCNC: 21 MMOL/L (ref 23–29)
CREAT SERPL-MCNC: 2.2 MG/DL (ref 0.5–1.4)
DIFFERENTIAL METHOD: ABNORMAL
EOSINOPHIL # BLD AUTO: 0.1 K/UL (ref 0–0.5)
EOSINOPHIL NFR BLD: 2.1 % (ref 0–8)
ERYTHROCYTE [DISTWIDTH] IN BLOOD BY AUTOMATED COUNT: 13.7 % (ref 11.5–14.5)
EST. GFR  (AFRICAN AMERICAN): 35.3 ML/MIN/1.73 M^2
EST. GFR  (NON AFRICAN AMERICAN): 30.5 ML/MIN/1.73 M^2
GLUCOSE SERPL-MCNC: 265 MG/DL (ref 70–110)
HCT VFR BLD AUTO: 38.2 % (ref 40–54)
HGB BLD-MCNC: 12.7 G/DL (ref 14–18)
IMM GRANULOCYTES # BLD AUTO: 0.02 K/UL (ref 0–0.04)
IMM GRANULOCYTES NFR BLD AUTO: 0.3 % (ref 0–0.5)
LYMPHOCYTES # BLD AUTO: 1.8 K/UL (ref 1–4.8)
LYMPHOCYTES NFR BLD: 30.8 % (ref 18–48)
MAGNESIUM SERPL-MCNC: 1.6 MG/DL (ref 1.6–2.6)
MCH RBC QN AUTO: 27 PG (ref 27–31)
MCHC RBC AUTO-ENTMCNC: 33.2 G/DL (ref 32–36)
MCV RBC AUTO: 81 FL (ref 82–98)
MONOCYTES # BLD AUTO: 0.6 K/UL (ref 0.3–1)
MONOCYTES NFR BLD: 10.3 % (ref 4–15)
NEUTROPHILS # BLD AUTO: 3.2 K/UL (ref 1.8–7.7)
NEUTROPHILS NFR BLD: 56 % (ref 38–73)
NRBC BLD-RTO: 0 /100 WBC
PHOSPHATE SERPL-MCNC: 3.3 MG/DL (ref 2.7–4.5)
PLATELET # BLD AUTO: 281 K/UL (ref 150–450)
PMV BLD AUTO: 10.6 FL (ref 9.2–12.9)
POCT GLUCOSE: 176 MG/DL (ref 70–110)
POCT GLUCOSE: 237 MG/DL (ref 70–110)
POCT GLUCOSE: 273 MG/DL (ref 70–110)
POCT GLUCOSE: 308 MG/DL (ref 70–110)
POTASSIUM SERPL-SCNC: 3.6 MMOL/L (ref 3.5–5.1)
PROT SERPL-MCNC: 6.5 G/DL (ref 6–8.4)
RBC # BLD AUTO: 4.7 M/UL (ref 4.6–6.2)
SODIUM SERPL-SCNC: 141 MMOL/L (ref 136–145)
TROPONIN I SERPL DL<=0.01 NG/ML-MCNC: 0.11 NG/ML (ref 0–0.03)
WBC # BLD AUTO: 5.75 K/UL (ref 3.9–12.7)

## 2022-01-03 PROCEDURE — 80053 COMPREHEN METABOLIC PANEL: CPT | Performed by: FAMILY MEDICINE

## 2022-01-03 PROCEDURE — 94761 N-INVAS EAR/PLS OXIMETRY MLT: CPT

## 2022-01-03 PROCEDURE — 84484 ASSAY OF TROPONIN QUANT: CPT | Performed by: HOSPITALIST

## 2022-01-03 PROCEDURE — 84100 ASSAY OF PHOSPHORUS: CPT | Performed by: FAMILY MEDICINE

## 2022-01-03 PROCEDURE — 96376 TX/PRO/DX INJ SAME DRUG ADON: CPT

## 2022-01-03 PROCEDURE — 36415 COLL VENOUS BLD VENIPUNCTURE: CPT | Performed by: FAMILY MEDICINE

## 2022-01-03 PROCEDURE — 27000221 HC OXYGEN, UP TO 24 HOURS

## 2022-01-03 PROCEDURE — 63600175 PHARM REV CODE 636 W HCPCS: Performed by: FAMILY MEDICINE

## 2022-01-03 PROCEDURE — 96372 THER/PROPH/DIAG INJ SC/IM: CPT | Mod: 59

## 2022-01-03 PROCEDURE — 63600175 PHARM REV CODE 636 W HCPCS: Performed by: HOSPITALIST

## 2022-01-03 PROCEDURE — G0378 HOSPITAL OBSERVATION PER HR: HCPCS

## 2022-01-03 PROCEDURE — 83735 ASSAY OF MAGNESIUM: CPT | Performed by: FAMILY MEDICINE

## 2022-01-03 PROCEDURE — 25000003 PHARM REV CODE 250: Performed by: FAMILY MEDICINE

## 2022-01-03 PROCEDURE — 85025 COMPLETE CBC W/AUTO DIFF WBC: CPT | Performed by: FAMILY MEDICINE

## 2022-01-03 RX ORDER — FUROSEMIDE 10 MG/ML
20 INJECTION INTRAMUSCULAR; INTRAVENOUS EVERY 8 HOURS
Status: DISCONTINUED | OUTPATIENT
Start: 2022-01-03 | End: 2022-01-07 | Stop reason: HOSPADM

## 2022-01-03 RX ADMIN — INSULIN ASPART 3 UNITS: 100 INJECTION, SOLUTION INTRAVENOUS; SUBCUTANEOUS at 08:01

## 2022-01-03 RX ADMIN — FUROSEMIDE 20 MG: 10 INJECTION, SOLUTION INTRAMUSCULAR; INTRAVENOUS at 02:01

## 2022-01-03 RX ADMIN — FUROSEMIDE 20 MG: 10 INJECTION, SOLUTION INTRAMUSCULAR; INTRAVENOUS at 09:01

## 2022-01-03 RX ADMIN — HEPARIN SODIUM 5000 UNITS: 5000 INJECTION, SOLUTION INTRAVENOUS; SUBCUTANEOUS at 05:01

## 2022-01-03 RX ADMIN — ATORVASTATIN CALCIUM 40 MG: 40 TABLET, FILM COATED ORAL at 09:01

## 2022-01-03 RX ADMIN — FUROSEMIDE 40 MG: 10 INJECTION, SOLUTION INTRAMUSCULAR; INTRAVENOUS at 08:01

## 2022-01-03 RX ADMIN — INSULIN ASPART 4 UNITS: 100 INJECTION, SOLUTION INTRAVENOUS; SUBCUTANEOUS at 11:01

## 2022-01-03 RX ADMIN — HEPARIN SODIUM 5000 UNITS: 5000 INJECTION, SOLUTION INTRAVENOUS; SUBCUTANEOUS at 02:01

## 2022-01-03 RX ADMIN — HEPARIN SODIUM 5000 UNITS: 5000 INJECTION, SOLUTION INTRAVENOUS; SUBCUTANEOUS at 09:01

## 2022-01-03 RX ADMIN — INSULIN ASPART 1 UNITS: 100 INJECTION, SOLUTION INTRAVENOUS; SUBCUTANEOUS at 09:01

## 2022-01-03 RX ADMIN — ASPIRIN 81 MG: 81 TABLET, DELAYED RELEASE ORAL at 08:01

## 2022-01-03 RX ADMIN — CLOPIDOGREL 75 MG: 75 TABLET, FILM COATED ORAL at 08:01

## 2022-01-03 NOTE — PLAN OF CARE
01/03/22 1320   FISH Message   Medicare Outpatient and Observation Notification regarding financial responsibility Given to patient/caregiver;Explained to patient/caregiver;Signed/date by patient/caregiver   Date FISH was signed 01/03/22   Time FISH was signed 1320

## 2022-01-03 NOTE — ASSESSMENT & PLAN NOTE
This is probably secondary to his congestive heart failure and chronic renal failure.  No chest, neck or jaw pain.

## 2022-01-03 NOTE — ASSESSMENT & PLAN NOTE
Patient reports prior abnormal stress test and was supposed to have stents placed potentially however did not follow up  Repeat EKG and troponin  Telemetry monitoring  Remains without chest pain/jaw pain/neck pain/indigestion or atypical symptoms

## 2022-01-03 NOTE — NURSING
PT TROPONIN 0.090 AT 1622 THEN 2043 TROP LEVEL 0.125. NOTIFIED DR KRISTINE PASTOR AWAITING ON ORDERS. PT DENIES CHEST PAIN.

## 2022-01-03 NOTE — ASSESSMENT & PLAN NOTE
Elevated sCr however appears to be at patient's baseline  Continue to monitor daily BMP   Expect slight increase with IV lasix   Follow BUN creatinine closely

## 2022-01-03 NOTE — PLAN OF CARE
01/03/22 1320   Discharge Assessment   Assessment Type Discharge Planning Assessment   Confirmed/corrected address, phone number and insurance Yes   Confirmed Demographics Contacted registration to update   Source of Information patient   When was your last doctors appointment?   (several months ago)   Does patient/caregiver understand observation status Yes   Communicated STAR with patient/caregiver Date not available/Unable to determine   Reason For Admission shortness of breath   Lives With child(mónica), adult   Do you expect to return to your current living situation? Yes   Do you have help at home or someone to help you manage your care at home? Yes   Who are your caregiver(s) and their phone number(s)? Jefry kathleen [phone]   Prior to hospitilization cognitive status: Alert/Oriented   Current cognitive status: Alert/Oriented   Walking or Climbing Stairs Difficulty none   Dressing/Bathing Difficulty none   Home Layout Able to live on 1st floor   Equipment Currently Used at Home none   Readmission within 30 days? No   Patient currently being followed by outpatient case management? No   Do you currently have service(s) that help you manage your care at home? No   Do you take prescription medications? Yes   Do you have prescription coverage? Yes   Coverage [employer]   Do you have any problems affording any of your prescribed medications? No   Is the patient taking medications as prescribed? yes   Who is going to help you get home at discharge? his brother   How do you get to doctors appointments? family or friend will provide   Are you on dialysis? No   Do you take coumadin? No   Discharge Plan A Home Health   DME Needed Upon Discharge  nebulizer   Discharge Plan discussed with: Patient;Adult children   Discharge Barriers Identified None   Relationship/Environment   Name(s) of Who Lives With Patient Jefry kathleen [phone]   Patient lives at home with his 2 sons. States they all rent a 3bedroom home.  He is independent at home. He owns a nebulizer but states it is missing parts so unable to use it. He has had it about 6yrs. Will see about getting him a new one. He has problems getting to doctor appointments not in town right now as his vehicle is broken. Would like to get established with cardiology & primary care in [facility]. Will assist with appointments. He is interested in having home health when he is discharged. Has no preference for company. Will let him know who is taking [employer]. Denies any other needs at this time.

## 2022-01-03 NOTE — PLAN OF CARE
Problem: Adult Inpatient Plan of Care  Goal: Plan of Care Review  Outcome: Ongoing, Progressing     Problem: Adult Inpatient Plan of Care  Goal: Patient-Specific Goal (Individualized)  Outcome: Ongoing, Progressing     Problem: Adult Inpatient Plan of Care  Goal: Absence of Hospital-Acquired Illness or Injury  Outcome: Ongoing, Progressing     Problem: Adult Inpatient Plan of Care  Goal: Optimal Comfort and Wellbeing  Outcome: Ongoing, Progressing     Problem: Adult Inpatient Plan of Care  Goal: Readiness for Transition of Care  Outcome: Ongoing, Progressing     Problem: Diabetes Comorbidity  Goal: Blood Glucose Level Within Targeted Range  Outcome: Ongoing, Progressing     Problem: Fall Injury Risk  Goal: Absence of Fall and Fall-Related Injury  Outcome: Ongoing, Progressing

## 2022-01-03 NOTE — SUBJECTIVE & OBJECTIVE
Interval History:  Complaints of shortness of breath mainly on exertion and swelling of lower extremities.  He denies chest pain, palpitations, nausea, vomiting or diarrhea.    Review of Systems   Constitutional: Negative.    HENT: Negative.    Eyes: Negative.    Respiratory: Positive for shortness of breath.    Cardiovascular: Positive for leg swelling. Negative for chest pain and palpitations.   Gastrointestinal: Negative.    Endocrine: Negative.    Genitourinary: Negative.    Musculoskeletal: Negative.    Skin: Negative.    Allergic/Immunologic: Negative.    Neurological: Negative.    Hematological: Negative.    Psychiatric/Behavioral: Negative.      Objective:     Vital Signs (Most Recent):  Temp: 96.8 °F (36 °C) (01/03/22 0732)  Pulse: 92 (01/03/22 0820)  Resp: 18 (01/03/22 0820)  BP: (!) 163/94 (01/03/22 0732)  SpO2: 99 % (01/03/22 0820) Vital Signs (24h Range):  Temp:  [96.5 °F (35.8 °C)-98.6 °F (37 °C)] 96.8 °F (36 °C)  Pulse:  [] 92  Resp:  [18-30] 18  SpO2:  [91 %-100 %] 99 %  BP: (156-185)/() 163/94     Weight: 97.3 kg (214 lb 8.1 oz)  Body mass index is 33.6 kg/m².    Intake/Output Summary (Last 24 hours) at 1/3/2022 1002  Last data filed at 1/3/2022 0800  Gross per 24 hour   Intake 1000 ml   Output 830 ml   Net 170 ml      Physical Exam  Constitutional:       Appearance: He is obese.   HENT:      Head: Normocephalic and atraumatic.      Right Ear: External ear normal.      Left Ear: External ear normal.      Nose: Nose normal.   Eyes:      Extraocular Movements: Extraocular movements intact.   Cardiovascular:      Rate and Rhythm: Normal rate. Rhythm irregular.      Heart sounds: No murmur heard.  No friction rub. Gallop present.    Pulmonary:      Breath sounds: Rales present.      Comments: Bilateral rales up to the mid lung fields  Abdominal:      General: Bowel sounds are normal.      Palpations: Abdomen is soft.      Comments: Obese   Musculoskeletal:      Cervical back: Normal range of  motion and neck supple.      Right lower leg: Edema present.      Left lower leg: Edema present.   Neurological:      General: No focal deficit present.      Mental Status: He is alert and oriented to person, place, and time.   Psychiatric:         Mood and Affect: Mood normal.         Thought Content: Thought content normal.         Significant Labs: All pertinent labs within the past 24 hours have been reviewed.    Significant Imaging: I have reviewed all pertinent imaging results/findings within the past 24 hours.

## 2022-01-03 NOTE — H&P
[facility] Medicine  History & Physical    Patient Name: Marshall Flor  MRN: [id number]  Patient Class: OP- Observation  Admission Date: 1/2/2022  Attending Physician: Brea Daniels MD   Primary Care Provider: Dorie Quan MD         Patient information was obtained from patient, relative(s) and ER records.     Subjective:     Principal Problem:New onset of congestive heart failure    Chief Complaint:   Chief Complaint   Patient presents with    Shortness of Breath     Since last night. Pt states he took albuterol inhaler a few times at home. [facility] administered 1 breathing treatment PTA with some relief. Denies pain. 96% RA sitting, 91% RA lying.          HPI: 65 yo male with past medical history of diabetes, hypertension, dyslipidemia, CAD, history of stroke presents to the ER with complaints of worsening shortness of breath over the last 3 days.  States that he walks frequently with his son who is present in the room and contributes to the history and has no problems until the last 3 days when he noticed increased difficulty walking due to shortness of breath and feeling fatigued.  Also noticed significant increase in swelling in his legs.  He has diabetes and takes insulin at home but does not check his sugar.  Does not check his blood pressure at home and is unsure what his normal range is.  He denies any associated chest pain, nausea, vomiting, diaphoresis but does report feeling very tired and sometimes dizzy.  Denies any new onset numbness or tingling, speech changes or confusion and son confirms this.  In the ER, he was hypertensive in the 180s systolic, BNP elevated to 1467. Chest x-ray with findings consistent with congestive heart failure.  Patient denies a personal history of congestive heart failure but does report that he was seen by cardiologist about a year ago and was told he had an abnormal stress test and needed stents.  Has not seen the doctor since then.  He does continue  to take aspirin and Plavix for stroke several years ago.  Due to findings of new onset CHF versus hypertensive emergency/urgency, hospital team called for admission.        Past Medical History:   Diagnosis Date    Diabetes mellitus        No past surgical history on file.    Review of patient's allergies indicates:  No Known Allergies    No current facility-administered medications on file prior to encounter.     Current Outpatient Medications on File Prior to Encounter   Medication Sig    allopurinoL (ZYLOPRIM) 100 MG tablet   = 2 tab, Oral, Daily, # 60 tab, 5 Refill(s), Pharmacy: [id number], 167, cm, 07/27/21 8:24:00 CDT, Height/Length Measured, 91.5, kg, 12/15/20 11:18:00 CST, Weight Dosing    amLODIPine (NORVASC) 10 MG tablet 10 mg.    atorvastatin (LIPITOR) 40 MG tablet   = 1 tab, Oral, Daily, # 90 tab, 1 Refill(s), Soft Stop, Pharmacy: [id number], 167, cm, 04/07/21 14:01:00 CDT, Height/Length Measured, 91.5, kg, 12/15/20 11:18:00 CST, Weight Dosing    clopidogreL (PLAVIX) 75 mg tablet   = 1 tab, Oral, Daily, Needs appointment, # 30 tab, 0 Refill(s), Maintenance, Pharmacy: [id number], H/O: CVA (cerebrovascular accident), 167, cm, 07/27/21 8:24:00 CDT, Height/Length Measured, 91.5, kg, 12/15/20 11:18:00 CST, Weight Dosing    hydroCHLOROthiazide (HYDRODIURIL) 25 MG tablet 25 mg.    linaGLIPtin (TRADJENTA) 5 mg Tab tablet 5 mg.    clopidogreL (PLAVIX) 75 mg tablet Take 75 mg by mouth once daily.    NOVOLOG MIX 70-30FLEXPEN U-100 100 unit/mL (70-30) InPn pen Inject into the skin.     Family History    Heart disease and stroke in Father       Tobacco Use    Smoking status: Not on file    Smokeless tobacco: Not on file   Substance and Sexual Activity    Alcohol use: Not on file    Drug use: Not on file    Sexual activity: Not on file     Review of Systems   Constitutional: Positive for fatigue. Negative for diaphoresis and fever.   HENT: Negative.    Eyes: Negative for  visual disturbance.   Respiratory: Positive for shortness of breath. Negative for wheezing.    Cardiovascular: Positive for leg swelling. Negative for chest pain and palpitations.   Gastrointestinal: Positive for abdominal distention. Negative for abdominal pain, nausea and vomiting.   Endocrine: Negative.    Genitourinary: Negative.    Musculoskeletal: Negative for back pain and neck pain.   Skin: Negative.    Allergic/Immunologic: Negative.    Neurological: Positive for weakness.   Hematological: Negative.    Psychiatric/Behavioral: Negative.      Objective:     Vital Signs (Most Recent):  Temp: 97.5 °F (36.4 °C) (01/02/22 1609)  Pulse: 96 (01/02/22 1832)  Resp: 19 (01/02/22 1832)  BP: (!) 173/112 (01/02/22 1747)  SpO2: 100 % (01/02/22 1806) Vital Signs (24h Range):  Temp:  [97.5 °F (36.4 °C)] 97.5 °F (36.4 °C)  Pulse:  [] 96  Resp:  [18-30] 19  SpO2:  [91 %-100 %] 100 %  BP: (173-185)/(112) 173/112     Weight: 98.9 kg (218 lb)  Body mass index is 34.14 kg/m².    Physical Exam  Vitals reviewed.   Constitutional:       General: He is not in acute distress.     Appearance: He is obese. He is not toxic-appearing or diaphoretic.      Comments: Nontoxic in appearance but does appear uncomfortable   HENT:      Head: Normocephalic and atraumatic.      Right Ear: External ear normal.      Left Ear: External ear normal.      Nose: Nose normal.      Mouth/Throat:      Comments: Poor dentition  Eyes:      General: No scleral icterus.     Conjunctiva/sclera: Conjunctivae normal.   Cardiovascular:      Rate and Rhythm: Normal rate and regular rhythm.      Pulses: Normal pulses.      Heart sounds: Murmur heard.   No friction rub.   Pulmonary:      Breath sounds: Rales present. No wheezing or rhonchi.      Comments: No tachypnea, nonlabored breathing but does get tired and dyspneic with minimal exertion/conversation. Poor air movement in the bases with bibasilar crackles  Abdominal:      General: Bowel sounds are normal.  There is distension.      Palpations: Abdomen is soft.      Tenderness: There is no abdominal tenderness.   Musculoskeletal:      Cervical back: Normal range of motion. No rigidity.      Right lower leg: Edema present.      Left lower leg: Edema present.      Comments: >3+ pitting edema b/l LE to the mid thigh   Skin:     General: Skin is warm and dry.      Coloration: Skin is not pale.      Findings: No erythema.   Neurological:      Mental Status: Mental status is at baseline.      Comments: Appropriate but conversation/speech is slow. Also rubbing his left hand and shaking it and feeling it as if it is numb however both he and his son who is present in the room state this is normal for him after his prior stroke   Psychiatric:         Mood and Affect: Mood normal.             Significant Labs:   All pertinent labs within the past 24 hours have been reviewed.  CBC:   Recent Labs   Lab 01/02/22  1622   WBC 5.56   HGB 12.2*   HCT 36.8*        CMP:   Recent Labs   Lab 01/02/22  1622      K 3.5      CO2 19*   *   BUN 16   CREATININE 2.2*   CALCIUM 8.3*   PROT 6.6   ALBUMIN 2.8*   BILITOT 0.6   ALKPHOS 94   AST 17   ALT 14   ANIONGAP 18*   EGFRNONAA 30.5*     Cardiac Markers:   Recent Labs   Lab 01/02/22  1622   BNP 1,467*     Troponin:   Recent Labs   Lab 01/02/22  1622   TROPONINI 0.090*       Significant Imaging: I have reviewed all pertinent imaging results/findings within the past 24 hours.   X-Ray Chest AP Portable   Final Result      Interval development of mild bilateral right more so than left lung infiltrate and probable small bilateral pleural effusions suggesting CHF         Electronically signed by: Bhavna Bryant MD   Date:    01/02/2022   Time:    17:15            Assessment/Plan:     * New onset of congestive heart failure  Patient is identified as having previously undiagnosed CHF heart failure that is Acute. CHF is currently uncontrolled due to Continued edema of extremities,  Dyspnea not returned to baseline after 2 doses of IV diuretic, Rales/crackles on pulmonary exam and Pulmonary edema/pleural effusion on CXR.   IV lasix 80 mg in ER  Admit with the following:  Monitor on telemetry.   Monitor strict Is&Os and daily weights  Place on fluid restriction of 1.5 L.   BNP reviewed- and noted below   Recent Labs   Lab 01/02/22  1622   BNP 1,467*     Echo ordered   IV diuresis  Monitor on continuous pulse ox and telemetry and use supplemental oxygen to maintain sats >94%  Consult Cardiology when available - 24 hours - otherwise if needed transfer for Cardiology consult  Trend troponin given h/o CAD though no chest pain  Strict I/Os, daily weights          Coronary artery disease involving native coronary artery of native heart  Patient reports prior abnormal stress test and was supposed to have stents placed potentially however did not follow up  Repeat EKG and troponin  Telemetry monitoring  Remains without chest pain/jaw pain/neck pain/indigestion or atypical symptoms      H/O: CVA (cerebrovascular accident)  Previously on ASA/Plavix  Resume on admission      Hyperlipidemia  Has prescription for lipitor in history but has not been taking  Resume on admission      Hypertensive urgency  Uncontrolled blood pressure   With subsequent pulmonary edema/CHF - LV function may be preserved  Check echo  Blood pressure lowering with goal of 25% of max in 24 hours  IV lasix should improve BP  Would recommend some combination of ACEi, BB, aldactone  Hold ACEi initially due to MERCEDES  Given nitro paste in ER      Type 2 diabetes mellitus with stage 3b chronic kidney disease, without long-term current use of insulin  Patient's FSGs are uncontrolled due to hyperglycemia on current medication regimen.  Last A1c reviewed-   Lab Results   Component Value Date    HGBA1C 7.8 (H) 04/12/2021     Most recent fingerstick glucose reviewed- No results for input(s): POCTGLUCOSE in the last 24 hours.  Current correctional  scale  Low  Maintain anti-hyperglycemic dose as follows-   Antihyperglycemics (From admission, onward)            None        Hold Oral hypoglycemics while patient is in the hospital.        Stage 3b chronic kidney disease  Elevated sCr however appears to be at patient's baseline  Continue to monitor daily BMP   Expect slight increase with IV lasix           VTE Risk Mitigation (From admission, onward)    None             Brea Daniels MD  Department of Hospital Medicine   [facility]

## 2022-01-03 NOTE — PLAN OF CARE
01/03/22 1256   Discharge Assessment   Assessment Type Discharge Planning Assessment   Confirmed/corrected address, phone number and insurance Yes   Confirmed Demographics Correct on Facesheet   Source of Information patient;family   When was your last doctors appointment?   (seen in ER a couple of days ago; hasn't been to a doctor since a kid)   Does patient/caregiver understand observation status Yes   Communicated STAR with patient/caregiver Date not available/Unable to determine   Reason For Admission tooth abscess   Lives With parent(s);sibling(s)   Do you expect to return to your current living situation? Yes   Do you have help at home or someone to help you manage your care at home? Yes   Who are your caregiver(s) and their phone number(s)? Sommer Fofana mother [phone]   Prior to hospitilization cognitive status: Alert/Oriented   Current cognitive status: Alert/Oriented   Walking or Climbing Stairs Difficulty none   Dressing/Bathing Difficulty none   Home Accessibility stairs to enter home   Number of Stairs, Main Entrance three   Home Layout Able to live on 1st floor   Equipment Currently Used at Home none   Readmission within 30 days? No   Patient currently being followed by outpatient case management? No   Do you currently have service(s) that help you manage your care at home? No   Do you take prescription medications? No   Do you have prescription coverage? No   Do you have any problems affording any of your prescribed medications? TBD   Is the patient taking medications as prescribed?   (N/A)   Who is going to help you get home at discharge? Sommer Fofana mother [phone]   How do you get to doctors appointments? car, drives self   Are you on dialysis? No   Do you take coumadin? No   Discharge Plan A Home with family   DME Needed Upon Discharge  none   Discharge Plan discussed with: Patient;Parent(s)   Name(s) and Number(s) Sommer Fofana mother [phone]   Discharge Barriers Identified  Unisured   Relationship/Environment   Name(s) of Who Lives With Patient Sommer Fofana mother [phone]   Patient lives at home with his mom & stepdad & his brother. He is independent at home. He works full time. He has not been to a primary care doctor since he was a child. He does not have insurance at this time. Will get him established with [facility] in [place] for medical & dentist in [place]. Will provide him with voucher for prescriptions. Also provided him with Jerrod's number in case he needs help with his bill. Denies any other needs at this time. Will continue to follow.

## 2022-01-03 NOTE — SUBJECTIVE & OBJECTIVE
Past Medical History:   Diagnosis Date    Diabetes mellitus        No past surgical history on file.    Review of patient's allergies indicates:  No Known Allergies    No current facility-administered medications on file prior to encounter.     Current Outpatient Medications on File Prior to Encounter   Medication Sig    allopurinoL (ZYLOPRIM) 100 MG tablet   = 2 tab, Oral, Daily, # 60 tab, 5 Refill(s), Pharmacy: [id number], 167, cm, 07/27/21 8:24:00 CDT, Height/Length Measured, 91.5, kg, 12/15/20 11:18:00 CST, Weight Dosing    amLODIPine (NORVASC) 10 MG tablet 10 mg.    atorvastatin (LIPITOR) 40 MG tablet   = 1 tab, Oral, Daily, # 90 tab, 1 Refill(s), Soft Stop, Pharmacy: [facility], 167, cm, 04/07/21 14:01:00 CDT, Height/Length Measured, 91.5, kg, 12/15/20 11:18:00 CST, Weight Dosing    clopidogreL (PLAVIX) 75 mg tablet   = 1 tab, Oral, Daily, Needs appointment, # 30 tab, 0 Refill(s), Maintenance, Pharmacy: [facility], H/O: CVA (cerebrovascular accident), 167, cm, 07/27/21 8:24:00 CDT, Height/Length Measured, 91.5, kg, 12/15/20 11:18:00 CST, Weight Dosing    hydroCHLOROthiazide (HYDRODIURIL) 25 MG tablet 25 mg.    linaGLIPtin (TRADJENTA) 5 mg Tab tablet 5 mg.    clopidogreL (PLAVIX) 75 mg tablet Take 75 mg by mouth once daily.    NOVOLOG MIX 70-30FLEXPEN U-100 100 unit/mL (70-30) InPn pen Inject into the skin.     Family History    Heart disease and stroke in Father       Tobacco Use    Smoking status: Not on file    Smokeless tobacco: Not on file   Substance and Sexual Activity    Alcohol use: Not on file    Drug use: Not on file    Sexual activity: Not on file     Review of Systems   Constitutional: Positive for fatigue. Negative for diaphoresis and fever.   HENT: Negative.    Eyes: Negative for visual disturbance.   Respiratory: Positive for shortness of breath. Negative for wheezing.    Cardiovascular: Positive for leg swelling. Negative for chest pain and  palpitations.   Gastrointestinal: Positive for abdominal distention. Negative for abdominal pain, nausea and vomiting.   Endocrine: Negative.    Genitourinary: Negative.    Musculoskeletal: Negative for back pain and neck pain.   Skin: Negative.    Allergic/Immunologic: Negative.    Neurological: Positive for weakness.   Hematological: Negative.    Psychiatric/Behavioral: Negative.      Objective:     Vital Signs (Most Recent):  Temp: 97.5 °F (36.4 °C) (01/02/22 1609)  Pulse: 96 (01/02/22 1832)  Resp: 19 (01/02/22 1832)  BP: (!) 173/112 (01/02/22 1747)  SpO2: 100 % (01/02/22 1806) Vital Signs (24h Range):  Temp:  [97.5 °F (36.4 °C)] 97.5 °F (36.4 °C)  Pulse:  [] 96  Resp:  [18-30] 19  SpO2:  [91 %-100 %] 100 %  BP: (173-185)/(112) 173/112     Weight: 98.9 kg (218 lb)  Body mass index is 34.14 kg/m².    Physical Exam  Vitals reviewed.   Constitutional:       General: He is not in acute distress.     Appearance: He is obese. He is not toxic-appearing or diaphoretic.      Comments: Nontoxic in appearance but does appear uncomfortable   HENT:      Head: Normocephalic and atraumatic.      Right Ear: External ear normal.      Left Ear: External ear normal.      Nose: Nose normal.      Mouth/Throat:      Comments: Poor dentition  Eyes:      General: No scleral icterus.     Conjunctiva/sclera: Conjunctivae normal.   Cardiovascular:      Rate and Rhythm: Normal rate and regular rhythm.      Pulses: Normal pulses.      Heart sounds: Murmur heard.   No friction rub.   Pulmonary:      Breath sounds: Rales present. No wheezing or rhonchi.      Comments: No tachypnea, nonlabored breathing but does get tired and dyspneic with minimal exertion/conversation. Poor air movement in the bases with bibasilar crackles  Abdominal:      General: Bowel sounds are normal. There is distension.      Palpations: Abdomen is soft.      Tenderness: There is no abdominal tenderness.   Musculoskeletal:      Cervical back: Normal range of  motion. No rigidity.      Right lower leg: Edema present.      Left lower leg: Edema present.      Comments: >3+ pitting edema b/l LE to the mid thigh   Skin:     General: Skin is warm and dry.      Coloration: Skin is not pale.      Findings: No erythema.   Neurological:      Mental Status: Mental status is at baseline.      Comments: Appropriate but conversation/speech is slow. Also rubbing his left hand and shaking it and feeling it as if it is numb however both he and his son who is present in the room state this is normal for him after his prior stroke   Psychiatric:         Mood and Affect: Mood normal.             Significant Labs:   All pertinent labs within the past 24 hours have been reviewed.  CBC:   Recent Labs   Lab 01/02/22  1622   WBC 5.56   HGB 12.2*   HCT 36.8*        CMP:   Recent Labs   Lab 01/02/22  1622      K 3.5      CO2 19*   *   BUN 16   CREATININE 2.2*   CALCIUM 8.3*   PROT 6.6   ALBUMIN 2.8*   BILITOT 0.6   ALKPHOS 94   AST 17   ALT 14   ANIONGAP 18*   EGFRNONAA 30.5*     Cardiac Markers:   Recent Labs   Lab 01/02/22  1622   BNP 1,467*     Troponin:   Recent Labs   Lab 01/02/22  1622   TROPONINI 0.090*       Significant Imaging: I have reviewed all pertinent imaging results/findings within the past 24 hours.   X-Ray Chest AP Portable   Final Result      Interval development of mild bilateral right more so than left lung infiltrate and probable small bilateral pleural effusions suggesting CHF         Electronically signed by: Bhavna Bryant MD   Date:    01/02/2022   Time:    17:15           Average

## 2022-01-03 NOTE — PROGRESS NOTES
[facility] Medicine  Progress Note    Patient Name: Marshall Flor  MRN: [id number]  Patient Class: OP- Observation   Admission Date: 1/2/2022  Length of Stay: 0 days  Attending Physician: Brea Daniels MD  Primary Care Provider: Dorie Quan MD        Subjective:     Principal Problem:New onset of congestive heart failure        HPI:  67 yo male with past medical history of diabetes, hypertension, dyslipidemia, CAD, history of stroke presents to the ER with complaints of worsening shortness of breath over the last 3 days.  States that he walks frequently with his son who is present in the room and contributes to the history and has no problems until the last 3 days when he noticed increased difficulty walking due to shortness of breath and feeling fatigued.  Also noticed significant increase in swelling in his legs.  He has diabetes and takes insulin at home but does not check his sugar.  Does not check his blood pressure at home and is unsure what his normal range is.  He denies any associated chest pain, nausea, vomiting, diaphoresis but does report feeling very tired and sometimes dizzy.  Denies any new onset numbness or tingling, speech changes or confusion and son confirms this.  In the ER, he was hypertensive in the 180s systolic, BNP elevated to 1467. Chest x-ray with findings consistent with congestive heart failure.  Patient denies a personal history of congestive heart failure but does report that he was seen by cardiologist about a year ago and was told he had an abnormal stress test and needed stents.  Has not seen the doctor since then.  He does continue to take aspirin and Plavix for stroke several years ago.  Due to findings of new onset CHF versus hypertensive emergency/urgency, hospital team called for admission.        Overview/Hospital Course:  No notes on file    Interval History:  Complaints of shortness of breath mainly on exertion and swelling of lower extremities.  He  denies chest pain, palpitations, nausea, vomiting or diarrhea.    Review of Systems   Constitutional: Negative.    HENT: Negative.    Eyes: Negative.    Respiratory: Positive for shortness of breath.    Cardiovascular: Positive for leg swelling. Negative for chest pain and palpitations.   Gastrointestinal: Negative.    Endocrine: Negative.    Genitourinary: Negative.    Musculoskeletal: Negative.    Skin: Negative.    Allergic/Immunologic: Negative.    Neurological: Negative.    Hematological: Negative.    Psychiatric/Behavioral: Negative.      Objective:     Vital Signs (Most Recent):  Temp: 96.8 °F (36 °C) (01/03/22 0732)  Pulse: 92 (01/03/22 0820)  Resp: 18 (01/03/22 0820)  BP: (!) 163/94 (01/03/22 0732)  SpO2: 99 % (01/03/22 0820) Vital Signs (24h Range):  Temp:  [96.5 °F (35.8 °C)-98.6 °F (37 °C)] 96.8 °F (36 °C)  Pulse:  [] 92  Resp:  [18-30] 18  SpO2:  [91 %-100 %] 99 %  BP: (156-185)/() 163/94     Weight: 97.3 kg (214 lb 8.1 oz)  Body mass index is 33.6 kg/m².    Intake/Output Summary (Last 24 hours) at 1/3/2022 1002  Last data filed at 1/3/2022 0800  Gross per 24 hour   Intake 1000 ml   Output 830 ml   Net 170 ml      Physical Exam  Constitutional:       Appearance: He is obese.   HENT:      Head: Normocephalic and atraumatic.      Right Ear: External ear normal.      Left Ear: External ear normal.      Nose: Nose normal.   Eyes:      Extraocular Movements: Extraocular movements intact.   Cardiovascular:      Rate and Rhythm: Normal rate. Rhythm irregular.      Heart sounds: No murmur heard.  No friction rub. Gallop present.    Pulmonary:      Breath sounds: Rales present.      Comments: Bilateral rales up to the mid lung fields  Abdominal:      General: Bowel sounds are normal.      Palpations: Abdomen is soft.      Comments: Obese   Musculoskeletal:      Cervical back: Normal range of motion and neck supple.      Right lower leg: Edema present.      Left lower leg: Edema present.    Neurological:      General: No focal deficit present.      Mental Status: He is alert and oriented to person, place, and time.   Psychiatric:         Mood and Affect: Mood normal.         Thought Content: Thought content normal.         Significant Labs: All pertinent labs within the past 24 hours have been reviewed.    Significant Imaging: I have reviewed all pertinent imaging results/findings within the past 24 hours.      Assessment/Plan:      * New onset of congestive heart failure  Patient is identified as having previously undiagnosed CHF heart failure that is Acute. CHF is currently uncontrolled due to Continued edema of extremities, Dyspnea not returned to baseline after 2 doses of IV diuretic, Rales/crackles on pulmonary exam and Pulmonary edema/pleural effusion on CXR.   IV lasix 80 mg in ER  Admit with the following:  Monitor on telemetry.   Monitor strict Is&Os and daily weights  Place on fluid restriction of 1.5 L.   BNP reviewed- and noted below   Recent Labs   Lab 01/02/22  1622   BNP 1,467*     Echo ordered   IV diuresis  Monitor on continuous pulse ox and telemetry and use supplemental oxygen to maintain sats >94%  Consult Cardiology when available - 24 hours - otherwise if needed transfer for Cardiology consult  Trend troponin given h/o CAD though no chest pain  Strict I/Os, daily weights  Change Lasix to 20 mg every 8 hours.  Follow BUN and creatinine closely.          Troponin level elevated  This is probably secondary to his congestive heart failure and chronic renal failure.  No chest, neck or jaw pain.      Coronary artery disease involving native coronary artery of native heart  Patient reports prior abnormal stress test and was supposed to have stents placed potentially however did not follow up  Repeat EKG and troponin  Telemetry monitoring  Remains without chest pain/jaw pain/neck pain/indigestion or atypical symptoms      H/O: CVA (cerebrovascular accident)  Previously on  ASA/Plavix  Resume on admission      Hyperlipidemia  Has prescription for lipitor in history but has not been taking  Resume on admission      Hypertensive urgency  Uncontrolled blood pressure   With subsequent pulmonary edema/CHF - LV function may be preserved  Check echo  Blood pressure lowering with goal of 25% of max in 24 hours  IV lasix should improve BP  Would recommend some combination of ACEi, BB, aldactone  Hold ACEi initially due to MERCEDES  Given nitro paste in ER      Type 2 diabetes mellitus with stage 3b chronic kidney disease, without long-term current use of insulin  Patient's FSGs are uncontrolled due to hyperglycemia on current medication regimen.  Last A1c reviewed-   Lab Results   Component Value Date    HGBA1C 7.8 (H) 04/12/2021     Most recent fingerstick glucose reviewed- No results for input(s): POCTGLUCOSE in the last 24 hours.  Current correctional scale  Low  Maintain anti-hyperglycemic dose as follows-   Antihyperglycemics (From admission, onward)            None        Hold Oral hypoglycemics while patient is in the hospital.        Stage 3b chronic kidney disease  Elevated sCr however appears to be at patient's baseline  Continue to monitor daily BMP   Expect slight increase with IV lasix   Follow BUN creatinine closely          VTE Risk Mitigation (From admission, onward)         Ordered     heparin (porcine) injection 5,000 Units  Every 8 hours         01/02/22 1917     IP VTE HIGH RISK PATIENT  Once         01/02/22 1917     Place sequential compression device  Until discontinued         01/02/22 1917                Discharge Planning   STAR:      Code Status: Full Code   Is the patient medically ready for discharge?:     Reason for patient still in hospital (select all that apply): Treatment                     Matt Jiménez MD  Department of Hospital Medicine   [facility]

## 2022-01-03 NOTE — ASSESSMENT & PLAN NOTE
Patient is identified as having previously undiagnosed CHF heart failure that is Acute. CHF is currently uncontrolled due to Continued edema of extremities, Dyspnea not returned to baseline after 2 doses of IV diuretic, Rales/crackles on pulmonary exam and Pulmonary edema/pleural effusion on CXR.   IV lasix 80 mg in ER  Admit with the following:  Monitor on telemetry.   Monitor strict Is&Os and daily weights  Place on fluid restriction of 1.5 L.   BNP reviewed- and noted below   Recent Labs   Lab 01/02/22  1622   BNP 1,467*     Echo ordered   IV diuresis  Monitor on continuous pulse ox and telemetry and use supplemental oxygen to maintain sats >94%  Consult Cardiology when available - 24 hours - otherwise if needed transfer for Cardiology consult  Trend troponin given h/o CAD though no chest pain  Strict I/Os, daily weights  Change Lasix to 20 mg every 8 hours.  Follow BUN and creatinine closely.

## 2022-01-03 NOTE — HPI
67 yo male with past medical history of diabetes, hypertension, dyslipidemia, CAD, history of stroke presents to the ER with complaints of worsening shortness of breath over the last 3 days.  States that he walks frequently with his son who is present in the room and contributes to the history and has no problems until the last 3 days when he noticed increased difficulty walking due to shortness of breath and feeling fatigued.  Also noticed significant increase in swelling in his legs.  He has diabetes and takes insulin at home but does not check his sugar.  Does not check his blood pressure at home and is unsure what his normal range is.  He denies any associated chest pain, nausea, vomiting, diaphoresis but does report feeling very tired and sometimes dizzy.  Denies any new onset numbness or tingling, speech changes or confusion and son confirms this.  In the ER, he was hypertensive in the 180s systolic, BNP elevated to 1467. Chest x-ray with findings consistent with congestive heart failure.  Patient denies a personal history of congestive heart failure but does report that he was seen by cardiologist about a year ago and was told he had an abnormal stress test and needed stents.  Has not seen the doctor since then.  He does continue to take aspirin and Plavix for stroke several years ago.  Due to findings of new onset CHF versus hypertensive emergency/urgency, hospital team called for admission.

## 2022-01-03 NOTE — NURSING
Pt mag level 1.5 per labs. Prn order for 800mg po times 2 doses if cr less than 1.4. cr 2.2. notified dr jacob horta awaiting on instructions.

## 2022-01-04 LAB
ALBUMIN SERPL BCP-MCNC: 2.5 G/DL (ref 3.5–5.2)
ALP SERPL-CCNC: 80 U/L (ref 55–135)
ALT SERPL W/O P-5'-P-CCNC: 12 U/L (ref 10–44)
ANION GAP SERPL CALC-SCNC: 16 MMOL/L (ref 8–16)
AST SERPL-CCNC: 13 U/L (ref 10–40)
BASOPHILS # BLD AUTO: 0.02 K/UL (ref 0–0.2)
BASOPHILS NFR BLD: 0.5 % (ref 0–1.9)
BILIRUB SERPL-MCNC: 0.8 MG/DL (ref 0.1–1)
BNP SERPL-MCNC: 850 PG/ML (ref 0–99)
BUN SERPL-MCNC: 22 MG/DL (ref 8–23)
CALCIUM SERPL-MCNC: 8.3 MG/DL (ref 8.7–10.5)
CHLORIDE SERPL-SCNC: 103 MMOL/L (ref 95–110)
CO2 SERPL-SCNC: 21 MMOL/L (ref 23–29)
CREAT SERPL-MCNC: 2.2 MG/DL (ref 0.5–1.4)
DIFFERENTIAL METHOD: ABNORMAL
EOSINOPHIL # BLD AUTO: 0.2 K/UL (ref 0–0.5)
EOSINOPHIL NFR BLD: 3.6 % (ref 0–8)
ERYTHROCYTE [DISTWIDTH] IN BLOOD BY AUTOMATED COUNT: 13.7 % (ref 11.5–14.5)
EST. GFR  (AFRICAN AMERICAN): 35.3 ML/MIN/1.73 M^2
EST. GFR  (NON AFRICAN AMERICAN): 30.5 ML/MIN/1.73 M^2
GLUCOSE SERPL-MCNC: 212 MG/DL (ref 70–110)
HCT VFR BLD AUTO: 36.5 % (ref 40–54)
HGB BLD-MCNC: 12.1 G/DL (ref 14–18)
IMM GRANULOCYTES # BLD AUTO: 0.01 K/UL (ref 0–0.04)
IMM GRANULOCYTES NFR BLD AUTO: 0.2 % (ref 0–0.5)
LYMPHOCYTES # BLD AUTO: 1.2 K/UL (ref 1–4.8)
LYMPHOCYTES NFR BLD: 27.6 % (ref 18–48)
MAGNESIUM SERPL-MCNC: 1.5 MG/DL (ref 1.6–2.6)
MCH RBC QN AUTO: 26.7 PG (ref 27–31)
MCHC RBC AUTO-ENTMCNC: 33.2 G/DL (ref 32–36)
MCV RBC AUTO: 81 FL (ref 82–98)
MONOCYTES # BLD AUTO: 0.5 K/UL (ref 0.3–1)
MONOCYTES NFR BLD: 12.2 % (ref 4–15)
NEUTROPHILS # BLD AUTO: 2.3 K/UL (ref 1.8–7.7)
NEUTROPHILS NFR BLD: 55.9 % (ref 38–73)
NRBC BLD-RTO: 0 /100 WBC
PHOSPHATE SERPL-MCNC: 3.7 MG/DL (ref 2.7–4.5)
PLATELET # BLD AUTO: 253 K/UL (ref 150–450)
PMV BLD AUTO: 11.2 FL (ref 9.2–12.9)
POCT GLUCOSE: 182 MG/DL (ref 70–110)
POCT GLUCOSE: 225 MG/DL (ref 70–110)
POCT GLUCOSE: 230 MG/DL (ref 70–110)
POCT GLUCOSE: 283 MG/DL (ref 70–110)
POTASSIUM SERPL-SCNC: 3.3 MMOL/L (ref 3.5–5.1)
PROT SERPL-MCNC: 6 G/DL (ref 6–8.4)
RBC # BLD AUTO: 4.53 M/UL (ref 4.6–6.2)
SODIUM SERPL-SCNC: 140 MMOL/L (ref 136–145)
WBC # BLD AUTO: 4.17 K/UL (ref 3.9–12.7)

## 2022-01-04 PROCEDURE — 93010 EKG 12-LEAD: ICD-10-PCS | Mod: ,,, | Performed by: INTERNAL MEDICINE

## 2022-01-04 PROCEDURE — 27000221 HC OXYGEN, UP TO 24 HOURS

## 2022-01-04 PROCEDURE — 99233 PR SUBSEQUENT HOSPITAL CARE,LEVL III: ICD-10-PCS | Mod: ,,, | Performed by: FAMILY MEDICINE

## 2022-01-04 PROCEDURE — 63600175 PHARM REV CODE 636 W HCPCS: Performed by: HOSPITALIST

## 2022-01-04 PROCEDURE — 80053 COMPREHEN METABOLIC PANEL: CPT | Performed by: FAMILY MEDICINE

## 2022-01-04 PROCEDURE — 96372 THER/PROPH/DIAG INJ SC/IM: CPT | Mod: 59

## 2022-01-04 PROCEDURE — 83735 ASSAY OF MAGNESIUM: CPT | Performed by: FAMILY MEDICINE

## 2022-01-04 PROCEDURE — 99233 SBSQ HOSP IP/OBS HIGH 50: CPT | Mod: ,,, | Performed by: FAMILY MEDICINE

## 2022-01-04 PROCEDURE — 85025 COMPLETE CBC W/AUTO DIFF WBC: CPT | Performed by: FAMILY MEDICINE

## 2022-01-04 PROCEDURE — 96375 TX/PRO/DX INJ NEW DRUG ADDON: CPT

## 2022-01-04 PROCEDURE — 36415 COLL VENOUS BLD VENIPUNCTURE: CPT | Performed by: FAMILY MEDICINE

## 2022-01-04 PROCEDURE — 93010 ELECTROCARDIOGRAM REPORT: CPT | Mod: ,,, | Performed by: INTERNAL MEDICINE

## 2022-01-04 PROCEDURE — 84100 ASSAY OF PHOSPHORUS: CPT | Performed by: FAMILY MEDICINE

## 2022-01-04 PROCEDURE — 96376 TX/PRO/DX INJ SAME DRUG ADON: CPT

## 2022-01-04 PROCEDURE — 96365 THER/PROPH/DIAG IV INF INIT: CPT

## 2022-01-04 PROCEDURE — 25000003 PHARM REV CODE 250: Performed by: FAMILY MEDICINE

## 2022-01-04 PROCEDURE — 93005 ELECTROCARDIOGRAM TRACING: CPT

## 2022-01-04 PROCEDURE — 21400001 HC TELEMETRY ROOM

## 2022-01-04 PROCEDURE — 94761 N-INVAS EAR/PLS OXIMETRY MLT: CPT

## 2022-01-04 PROCEDURE — 25000003 PHARM REV CODE 250: Performed by: INTERNAL MEDICINE

## 2022-01-04 PROCEDURE — 63600175 PHARM REV CODE 636 W HCPCS: Performed by: FAMILY MEDICINE

## 2022-01-04 PROCEDURE — 83880 ASSAY OF NATRIURETIC PEPTIDE: CPT | Performed by: FAMILY MEDICINE

## 2022-01-04 RX ORDER — HYDRALAZINE HYDROCHLORIDE 25 MG/1
25 TABLET, FILM COATED ORAL EVERY 8 HOURS
Status: DISCONTINUED | OUTPATIENT
Start: 2022-01-04 | End: 2022-01-07 | Stop reason: HOSPADM

## 2022-01-04 RX ORDER — POTASSIUM CHLORIDE 20 MEQ/1
40 TABLET, EXTENDED RELEASE ORAL ONCE
Status: COMPLETED | OUTPATIENT
Start: 2022-01-04 | End: 2022-01-04

## 2022-01-04 RX ORDER — SODIUM CHLORIDE 9 MG/ML
INJECTION, SOLUTION INTRAVENOUS
Status: DISCONTINUED | OUTPATIENT
Start: 2022-01-04 | End: 2022-01-07 | Stop reason: HOSPADM

## 2022-01-04 RX ORDER — MAGNESIUM SULFATE 1 G/100ML
1 INJECTION INTRAVENOUS ONCE
Status: COMPLETED | OUTPATIENT
Start: 2022-01-04 | End: 2022-01-04

## 2022-01-04 RX ADMIN — HEPARIN SODIUM 5000 UNITS: 5000 INJECTION, SOLUTION INTRAVENOUS; SUBCUTANEOUS at 09:01

## 2022-01-04 RX ADMIN — FUROSEMIDE 20 MG: 10 INJECTION, SOLUTION INTRAMUSCULAR; INTRAVENOUS at 05:01

## 2022-01-04 RX ADMIN — SODIUM CHLORIDE: 0.9 INJECTION, SOLUTION INTRAVENOUS at 10:01

## 2022-01-04 RX ADMIN — INSULIN ASPART 2 UNITS: 100 INJECTION, SOLUTION INTRAVENOUS; SUBCUTANEOUS at 08:01

## 2022-01-04 RX ADMIN — HYDRALAZINE HYDROCHLORIDE 25 MG: 25 TABLET, FILM COATED ORAL at 09:01

## 2022-01-04 RX ADMIN — MAGNESIUM SULFATE 1 G: 1 INJECTION INTRAVENOUS at 10:01

## 2022-01-04 RX ADMIN — INSULIN ASPART 3 UNITS: 100 INJECTION, SOLUTION INTRAVENOUS; SUBCUTANEOUS at 11:01

## 2022-01-04 RX ADMIN — FUROSEMIDE 20 MG: 10 INJECTION, SOLUTION INTRAMUSCULAR; INTRAVENOUS at 02:01

## 2022-01-04 RX ADMIN — CLOPIDOGREL 75 MG: 75 TABLET, FILM COATED ORAL at 08:01

## 2022-01-04 RX ADMIN — HEPARIN SODIUM 5000 UNITS: 5000 INJECTION, SOLUTION INTRAVENOUS; SUBCUTANEOUS at 05:01

## 2022-01-04 RX ADMIN — POTASSIUM CHLORIDE 40 MEQ: 1500 TABLET, EXTENDED RELEASE ORAL at 08:01

## 2022-01-04 RX ADMIN — ASPIRIN 81 MG: 81 TABLET, DELAYED RELEASE ORAL at 08:01

## 2022-01-04 RX ADMIN — FUROSEMIDE 20 MG: 10 INJECTION, SOLUTION INTRAMUSCULAR; INTRAVENOUS at 09:01

## 2022-01-04 RX ADMIN — INSULIN ASPART 2 UNITS: 100 INJECTION, SOLUTION INTRAVENOUS; SUBCUTANEOUS at 04:01

## 2022-01-04 RX ADMIN — HEPARIN SODIUM 5000 UNITS: 5000 INJECTION, SOLUTION INTRAVENOUS; SUBCUTANEOUS at 02:01

## 2022-01-04 RX ADMIN — ATORVASTATIN CALCIUM 40 MG: 40 TABLET, FILM COATED ORAL at 08:01

## 2022-01-04 RX ADMIN — HYDRALAZINE HYDROCHLORIDE 10 MG: 20 INJECTION, SOLUTION INTRAMUSCULAR; INTRAVENOUS at 11:01

## 2022-01-04 NOTE — ASSESSMENT & PLAN NOTE
Patient reports prior abnormal stress test and was supposed to have stents placed potentially however did not follow up  Repeat EKG and troponin - trended up then down, EKG this AM with t wave inversion  Telemetry monitoring  Remains without chest pain/jaw pain/neck pain/indigestion or atypical symptoms

## 2022-01-04 NOTE — ASSESSMENT & PLAN NOTE
This is probably secondary to his congestive heart failure and chronic renal failure.  No chest, neck or jaw pain.  T wave inversion evident on EKG  Cardiology consulted  Patient is a poor historian but does report that he was supposed to have stents placed last year but never did

## 2022-01-04 NOTE — PLAN OF CARE
Problem: Adult Inpatient Plan of Care  Goal: Plan of Care Review  Outcome: Ongoing, Progressing  Flowsheets (Taken 1/4/2022 0112)  Plan of Care Reviewed With: patient  Goal: Patient-Specific Goal (Individualized)  Outcome: Ongoing, Progressing  Goal: Absence of Hospital-Acquired Illness or Injury  Outcome: Ongoing, Progressing  Intervention: Identify and Manage Fall Risk  Flowsheets (Taken 1/4/2022 0112)  Safety Promotion/Fall Prevention:   side rails raised x 2   instructed to call staff for mobility  Intervention: Prevent Skin Injury  Flowsheets (Taken 1/4/2022 0112)  Body Position: (SITTING UP IN CHAIR) other (see comments)  Skin Protection:   adhesive use limited   tubing/devices free from skin contact  Intervention: Prevent and Manage VTE (Venous Thromboembolism) Risk  Flowsheets (Taken 1/4/2022 0112)  Activity Management: Ambulated in room - L4  VTE Prevention/Management: ambulation promoted  Range of Motion: active ROM (range of motion) encouraged  Goal: Optimal Comfort and Wellbeing  Outcome: Ongoing, Progressing  Intervention: Monitor Pain and Promote Comfort  Flowsheets (Taken 1/4/2022 0112)  Pain Management Interventions: position adjusted  Intervention: Provide Person-Centered Care  Flowsheets (Taken 1/4/2022 0112)  Trust Relationship/Rapport:   care explained   choices provided  Goal: Readiness for Transition of Care  Outcome: Ongoing, Progressing  Intervention: Mutually Develop Transition Plan  Flowsheets (Taken 1/4/2022 0112)  Do you expect to return to your current living situation?: Yes  Do you have help at home or someone to help you manage your care at home?: Yes  Readmission within 30 days?: No  Do you currently have service(s) that help you manage your care at home?: No     Problem: Diabetes Comorbidity  Goal: Blood Glucose Level Within Targeted Range  Outcome: Ongoing, Progressing  Intervention: Monitor and Manage Glycemia  Flowsheets (Taken 1/4/2022 0112)  Glycemic Management: blood glucose  monitored     Problem: Fall Injury Risk  Goal: Absence of Fall and Fall-Related Injury  Outcome: Ongoing, Progressing  Intervention: Identify and Manage Contributors  Flowsheets (Taken 1/4/2022 0112)  Self-Care Promotion:   independence encouraged   BADL personal objects within reach  Medication Review/Management: medications reviewed  Intervention: Promote Injury-Free Environment  Flowsheets (Taken 1/4/2022 0112)  Safety Promotion/Fall Prevention:   side rails raised x 2   instructed to call staff for mobility     Problem: Adjustment to Illness (Heart Failure)  Goal: Optimal Coping  Outcome: Ongoing, Progressing  Intervention: Support Psychosocial Response  Flowsheets (Taken 1/4/2022 0112)  Supportive Measures: active listening utilized  Family/Support System Care:   caregiver stress acknowledged   involvement promoted     Problem: Cardiac Output Decreased (Heart Failure)  Goal: Optimal Cardiac Output  Outcome: Ongoing, Progressing  Intervention: Optimize Cardiac Output  Flowsheets (Taken 1/4/2022 0112)  Environmental Support: calm environment promoted     Problem: Dysrhythmia (Heart Failure)  Goal: Stable Heart Rate and Rhythm  Outcome: Ongoing, Progressing     Problem: Fluid Imbalance (Heart Failure)  Goal: Fluid Balance  Outcome: Ongoing, Progressing  Intervention: Monitor and Manage Fluid Balance  Flowsheets (Taken 1/4/2022 0112)  Fluid/Electrolyte Management: oral rehydration therapy initiated     Problem: Functional Ability Impaired (Heart Failure)  Goal: Optimal Functional Ability  Outcome: Ongoing, Progressing  Intervention: Optimize Functional Ability  Flowsheets (Taken 1/4/2022 0112)  Self-Care Promotion:   independence encouraged   BADL personal objects within reach  Activity Management: Ambulated in room - L4     Problem: Oral Intake Inadequate (Heart Failure)  Goal: Optimal Nutrition Intake  Outcome: Ongoing, Progressing  Intervention: Promote and Optimize Nutrition Intake  Flowsheets (Taken 1/4/2022  0112)  Oral Nutrition Promotion: rest periods promoted     Problem: Respiratory Compromise (Heart Failure)  Goal: Effective Oxygenation and Ventilation  Outcome: Ongoing, Progressing  Intervention: Promote Airway Secretion Clearance  Flowsheets (Taken 1/4/2022 0112)  Breathing Techniques/Airway Clearance: deep/controlled cough encouraged  Cough And Deep Breathing: done independently per patient  Intervention: Optimize Oxygenation and Ventilation  Flowsheets (Taken 1/4/2022 0112)  Airway/Ventilation Management: airway patency maintained     Problem: Sleep Disordered Breathing (Heart Failure)  Goal: Effective Breathing Pattern During Sleep  Outcome: Ongoing, Progressing   POC reviewed at bedside. Questions and concerns addressed. VSS. Placed bed in low and locked position. Call light within reach. Side rails up x2. Instructed to call for any needs. Verbalized understanding of all instructions. Frequent rounds.

## 2022-01-04 NOTE — PLAN OF CARE
01/04/22 1704   Post-Acute Status   Post-Acute Authorization Home Health   Home Health Status Referrals Sent   Discharge Delays None known at this time   Discharge Plan   Discharge Plan A Home Health   Preference form signed for [facility] since they are in network. Referral sent to them. Will continue to follow for needs.

## 2022-01-04 NOTE — PLAN OF CARE
01/04/22 1618   Discharge Reassessment   Assessment Type Discharge Planning Reassessment   Did the patient's condition or plan change since previous assessment? No   Discharge Plan discussed with: Patient   Communicated STAR with patient/caregiver Date not available/Unable to determine   Discharge Plan A Home with family;Home Health   DME Needed Upon Discharge  nebulizer   Discharge Barriers Identified None   Post-Acute Status   Post-Acute Authorization Home Health   Discharge Delays None known at this time   Patient sitting up in chair. Still using oxygen. Denies any needs at this time. Will continue to follow.

## 2022-01-04 NOTE — ASSESSMENT & PLAN NOTE
Patient's FSGs are uncontrolled due to hyperglycemia on current medication regimen.  Last A1c reviewed-   Lab Results   Component Value Date    HGBA1C 7.8 (H) 04/12/2021     Most recent fingerstick glucose reviewed-   Recent Labs   Lab 01/03/22  1643 01/03/22  2128 01/04/22  0735 01/04/22  1111   POCTGLUCOSE 176* 237* 225* 283*     Current correctional scale  Low  Maintain anti-hyperglycemic dose as follows-   Antihyperglycemics (From admission, onward)            Start     Stop Route Frequency Ordered    01/02/22 1917  insulin aspart U-100 pen 0-5 Units         -- SubQ Before meals & nightly PRN 01/02/22 1917        Hold Oral hypoglycemics while patient is in the hospital.

## 2022-01-04 NOTE — SUBJECTIVE & OBJECTIVE
Interval History: poor historian, at baseline but difficult to focus, with memory/cognition issues    Review of Systems   Unable to perform ROS: Other   Patient focused on finding his shoes, does not have any complaints  Objective:     Vital Signs (Most Recent):  Temp: 97.2 °F (36.2 °C) (01/04/22 1114)  Pulse: 87 (01/04/22 1202)  Resp: 16 (01/04/22 1114)  BP: (!) 183/92 (01/04/22 1202)  SpO2: 96 % (01/04/22 1114) Vital Signs (24h Range):  Temp:  [97.1 °F (36.2 °C)-98.2 °F (36.8 °C)] 97.2 °F (36.2 °C)  Pulse:  [] 87  Resp:  [16-20] 16  SpO2:  [89 %-100 %] 96 %  BP: (144-184)/() 183/92     Weight: 97.1 kg (214 lb)  Body mass index is 33.52 kg/m².    Intake/Output Summary (Last 24 hours) at 1/4/2022 1210  Last data filed at 1/3/2022 1700  Gross per 24 hour   Intake 360 ml   Output 400 ml   Net -40 ml      Physical Exam  Constitutional:       Appearance: He is obese.   HENT:      Head: Normocephalic and atraumatic.      Right Ear: External ear normal.      Left Ear: External ear normal.      Nose: Nose normal.      Mouth/Throat:      Mouth: Mucous membranes are moist.   Eyes:      Conjunctiva/sclera: Conjunctivae normal.   Cardiovascular:      Rate and Rhythm: Normal rate and regular rhythm.      Heart sounds: No murmur heard.  No friction rub. Gallop present.    Pulmonary:      Breath sounds: Rales present.      Comments: Bibasilar rales  Abdominal:      General: Bowel sounds are normal.      Palpations: Abdomen is soft.      Comments: Obese   Musculoskeletal:      Cervical back: Normal range of motion and neck supple.      Right lower leg: Edema present.      Left lower leg: Edema present.      Comments: 3+ b/l LE   Skin:     General: Skin is warm and dry.   Neurological:      General: No focal deficit present.      Mental Status: He is alert. Mental status is at baseline.      Comments: Poor cognition but this is apparently baseline   Psychiatric:         Mood and Affect: Mood normal.         Significant  Labs:   All pertinent labs within the past 24 hours have been reviewed.  CBC:   Recent Labs   Lab 01/02/22  1622 01/03/22  0834 01/04/22  0718   WBC 5.56 5.75 4.17   HGB 12.2* 12.7* 12.1*   HCT 36.8* 38.2* 36.5*    281 253     CMP:   Recent Labs   Lab 01/02/22  1622 01/03/22  0834 01/04/22  0718    141 140   K 3.5 3.6 3.3*    104 103   CO2 19* 21* 21*   * 265* 212*   BUN 16 15 22   CREATININE 2.2* 2.2* 2.2*   CALCIUM 8.3* 8.4* 8.3*   PROT 6.6 6.5 6.0   ALBUMIN 2.8* 2.7* 2.5*   BILITOT 0.6 0.8 0.8   ALKPHOS 94 91 80   AST 17 15 13   ALT 14 14 12   ANIONGAP 18* 16 16   EGFRNONAA 30.5* 30.5* 30.5*     Cardiac Markers:   Recent Labs   Lab 01/04/22  0719   *       Significant Imaging: I have reviewed all pertinent imaging results/findings within the past 24 hours.   X-Ray Chest AP Portable   Final Result      Interval development of mild bilateral right more so than left lung infiltrate and probable small bilateral pleural effusions suggesting CHF         Electronically signed by: Bhavna Bryant MD   Date:    01/02/2022   Time:    17:15

## 2022-01-04 NOTE — NURSING
"Patient AAOX4, respirations even and unlabored. No distress noted. Patient removed telemetry box and pulse ox monitor.  Patient educated that we are monitoring him closely and he needs to keep the telemetry box and pulse ox on. Patient refused bed alarm, MD and charge nurse notified educated patient on falls and safety and verbalizes understanding states "oh I am not going to fall". MD at bedside requesting the patient gets into a gown and the patient denies states "oh no I don't want to wear a gown", as the patient continues to put his shoes on. Call light within reach, patient sitting up in the chair, wheels yaya, patient verbalizes he will use the call light. Will continue to monitor the patient.   "

## 2022-01-04 NOTE — PROGRESS NOTES
[facility] Medicine  Progress Note    Patient Name: Marshall Flor  MRN: [id number]  Patient Class: OP- Observation   Admission Date: 1/2/2022  Length of Stay: 0 days  Attending Physician: Brea Daniels MD  Primary Care Provider: Dorie Quan MD        Subjective:     Principal Problem:New onset of congestive heart failure        HPI:  67 yo male with past medical history of diabetes, hypertension, dyslipidemia, CAD, history of stroke presents to the ER with complaints of worsening shortness of breath over the last 3 days.  States that he walks frequently with his son who is present in the room and contributes to the history and has no problems until the last 3 days when he noticed increased difficulty walking due to shortness of breath and feeling fatigued.  Also noticed significant increase in swelling in his legs.  He has diabetes and takes insulin at home but does not check his sugar.  Does not check his blood pressure at home and is unsure what his normal range is.  He denies any associated chest pain, nausea, vomiting, diaphoresis but does report feeling very tired and sometimes dizzy.  Denies any new onset numbness or tingling, speech changes or confusion and son confirms this.  In the ER, he was hypertensive in the 180s systolic, BNP elevated to 1467. Chest x-ray with findings consistent with congestive heart failure.  Patient denies a personal history of congestive heart failure but does report that he was seen by cardiologist about a year ago and was told he had an abnormal stress test and needed stents.  Has not seen the doctor since then.  He does continue to take aspirin and Plavix for stroke several years ago.  Due to findings of new onset CHF versus hypertensive emergency/urgency, hospital team called for admission.        Overview/Hospital Course:  No notes on file    Interval History: poor historian, at baseline but difficult to focus, with memory/cognition issues    Review  of Systems   Unable to perform ROS: Other   Patient focused on finding his shoes, does not have any complaints  Objective:     Vital Signs (Most Recent):  Temp: 97.2 °F (36.2 °C) (01/04/22 1114)  Pulse: 87 (01/04/22 1202)  Resp: 16 (01/04/22 1114)  BP: (!) 183/92 (01/04/22 1202)  SpO2: 96 % (01/04/22 1114) Vital Signs (24h Range):  Temp:  [97.1 °F (36.2 °C)-98.2 °F (36.8 °C)] 97.2 °F (36.2 °C)  Pulse:  [] 87  Resp:  [16-20] 16  SpO2:  [89 %-100 %] 96 %  BP: (144-184)/() 183/92     Weight: 97.1 kg (214 lb)  Body mass index is 33.52 kg/m².    Intake/Output Summary (Last 24 hours) at 1/4/2022 1210  Last data filed at 1/3/2022 1700  Gross per 24 hour   Intake 360 ml   Output 400 ml   Net -40 ml      Physical Exam  Constitutional:       Appearance: He is obese.   HENT:      Head: Normocephalic and atraumatic.      Right Ear: External ear normal.      Left Ear: External ear normal.      Nose: Nose normal.      Mouth/Throat:      Mouth: Mucous membranes are moist.   Eyes:      Conjunctiva/sclera: Conjunctivae normal.   Cardiovascular:      Rate and Rhythm: Normal rate and regular rhythm.      Heart sounds: No murmur heard.  No friction rub. Gallop present.    Pulmonary:      Breath sounds: Rales present.      Comments: Bibasilar rales  Abdominal:      General: Bowel sounds are normal.      Palpations: Abdomen is soft.      Comments: Obese   Musculoskeletal:      Cervical back: Normal range of motion and neck supple.      Right lower leg: Edema present.      Left lower leg: Edema present.      Comments: 3+ b/l LE   Skin:     General: Skin is warm and dry.   Neurological:      General: No focal deficit present.      Mental Status: He is alert. Mental status is at baseline.      Comments: Poor cognition but this is apparently baseline   Psychiatric:         Mood and Affect: Mood normal.         Significant Labs:   All pertinent labs within the past 24 hours have been reviewed.  CBC:   Recent Labs   Lab  01/02/22  1622 01/03/22  0834 01/04/22  0718   WBC 5.56 5.75 4.17   HGB 12.2* 12.7* 12.1*   HCT 36.8* 38.2* 36.5*    281 253     CMP:   Recent Labs   Lab 01/02/22  1622 01/03/22  0834 01/04/22  0718    141 140   K 3.5 3.6 3.3*    104 103   CO2 19* 21* 21*   * 265* 212*   BUN 16 15 22   CREATININE 2.2* 2.2* 2.2*   CALCIUM 8.3* 8.4* 8.3*   PROT 6.6 6.5 6.0   ALBUMIN 2.8* 2.7* 2.5*   BILITOT 0.6 0.8 0.8   ALKPHOS 94 91 80   AST 17 15 13   ALT 14 14 12   ANIONGAP 18* 16 16   EGFRNONAA 30.5* 30.5* 30.5*     Cardiac Markers:   Recent Labs   Lab 01/04/22 0719   *       Significant Imaging: I have reviewed all pertinent imaging results/findings within the past 24 hours.   X-Ray Chest AP Portable   Final Result      Interval development of mild bilateral right more so than left lung infiltrate and probable small bilateral pleural effusions suggesting CHF         Electronically signed by: Bhavna Bryant MD   Date:    01/02/2022   Time:    17:15              Assessment/Plan:      * New onset of congestive heart failure  Patient is identified as having previously undiagnosed CHF heart failure that is Acute. CHF is currently uncontrolled due to Continued edema of extremities, Dyspnea not returned to baseline after 2 doses of IV diuretic, Rales/crackles on pulmonary exam and Pulmonary edema/pleural effusion on CXR.   IV lasix 80 mg in ER  Admit with the following:  Monitor on telemetry.   Monitor strict Is&Os and daily weights  Place on fluid restriction of 1.5 L.   BNP reviewed- and noted below   Recent Labs   Lab 01/04/22 0719   *     Echo ordered   Monitor on continuous pulse ox and telemetry and use supplemental oxygen to maintain sats >94%  Consult Cardiology  Trend troponin given h/o CAD though no chest pain - trended up slightly then back down  Strict I/Os, daily weights  Changed Lasix to IV 20 mg every 8 hours.  BNP trending down from admission but still with significant  peripheral edema/volume overload          Troponin level elevated  This is probably secondary to his congestive heart failure and chronic renal failure.  No chest, neck or jaw pain.  T wave inversion evident on EKG  Cardiology consulted  Patient is a poor historian but does report that he was supposed to have stents placed last year but never did      Coronary artery disease involving native coronary artery of native heart  Patient reports prior abnormal stress test and was supposed to have stents placed potentially however did not follow up  Repeat EKG and troponin - trended up then down, EKG this AM with t wave inversion  Telemetry monitoring  Remains without chest pain/jaw pain/neck pain/indigestion or atypical symptoms      H/O: CVA (cerebrovascular accident)  Previously on ASA/Plavix  Resume on admission      Hyperlipidemia  Has prescription for lipitor in history but has not been taking  Resume on admission      Hypertensive urgency  Uncontrolled blood pressure   With subsequent pulmonary edema/CHF - LV function may be preserved  Check echo  Blood pressure lowering with goal of 25% of max in 24 hours  IV lasix should improve BP  Would recommend some combination of ACEi, BB, aldactone  Hold ACEi initially due to MERCEDES  Given nitro paste in ER        Type 2 diabetes mellitus with stage 3b chronic kidney disease, without long-term current use of insulin  Patient's FSGs are uncontrolled due to hyperglycemia on current medication regimen.  Last A1c reviewed-   Lab Results   Component Value Date    HGBA1C 7.8 (H) 04/12/2021     Most recent fingerstick glucose reviewed-   Recent Labs   Lab 01/03/22  1643 01/03/22  2128 01/04/22  0735 01/04/22  1111   POCTGLUCOSE 176* 237* 225* 283*     Current correctional scale  Low  Maintain anti-hyperglycemic dose as follows-   Antihyperglycemics (From admission, onward)            Start     Stop Route Frequency Ordered    01/02/22 1917  insulin aspart U-100 pen 0-5 Units         --  SubQ Before meals & nightly PRN 01/02/22 1917        Hold Oral hypoglycemics while patient is in the hospital.        Stage 3b chronic kidney disease  Elevated sCr however appears to be at patient's baseline  Continue to monitor daily BMP   Expect slight increase with IV lasix though has tolerated well  Follow BUN creatinine closely          VTE Risk Mitigation (From admission, onward)         Ordered     heparin (porcine) injection 5,000 Units  Every 8 hours         01/02/22 1917     IP VTE HIGH RISK PATIENT  Once         01/02/22 1917     Place sequential compression device  Until discontinued         01/02/22 1917                Discharge Planning   STAR:      Code Status: Full Code   Is the patient medically ready for discharge?:     Reason for patient still in hospital (select all that apply): Treatment  Discharge Plan A: Home Health                  Brea Daniels MD  Department of Hospital Medicine   [facility]

## 2022-01-04 NOTE — ASSESSMENT & PLAN NOTE
Elevated sCr however appears to be at patient's baseline  Continue to monitor daily BMP   Expect slight increase with IV lasix though has tolerated well  Follow BUN creatinine closely

## 2022-01-04 NOTE — NURSING
"Patient states "oh I am good on my feet now, I don't need any help walking." son at bedside with the patient. Call light within reach, wheels on chair locked. Educated patient to please call for assistance when needed. Will continue to monitor the patient.   "

## 2022-01-04 NOTE — PLAN OF CARE
01/04/22 1700   Medicare Message   Important Message from Medicare regarding Discharge Appeal Rights Given to patient/caregiver;Explained to patient/caregiver;Signed/date by patient/caregiver   Date IMM was signed 01/04/22   Time IMM was signed 1700

## 2022-01-04 NOTE — ASSESSMENT & PLAN NOTE
Patient is identified as having previously undiagnosed CHF heart failure that is Acute. CHF is currently uncontrolled due to Continued edema of extremities, Dyspnea not returned to baseline after 2 doses of IV diuretic, Rales/crackles on pulmonary exam and Pulmonary edema/pleural effusion on CXR.   IV lasix 80 mg in ER  Admit with the following:  Monitor on telemetry.   Monitor strict Is&Os and daily weights  Place on fluid restriction of 1.5 L.   BNP reviewed- and noted below   Recent Labs   Lab 01/04/22  0719   *     Echo ordered   Monitor on continuous pulse ox and telemetry and use supplemental oxygen to maintain sats >94%  Consult Cardiology  Trend troponin given h/o CAD though no chest pain - trended up slightly then back down  Strict I/Os, daily weights  Changed Lasix to IV 20 mg every 8 hours.  BNP trending down from admission but still with significant peripheral edema/volume overload

## 2022-01-05 LAB
ALBUMIN SERPL BCP-MCNC: 2.4 G/DL (ref 3.5–5.2)
ALP SERPL-CCNC: 75 U/L (ref 55–135)
ALT SERPL W/O P-5'-P-CCNC: 10 U/L (ref 10–44)
ANION GAP SERPL CALC-SCNC: 17 MMOL/L (ref 8–16)
AST SERPL-CCNC: 15 U/L (ref 10–40)
BASOPHILS # BLD AUTO: 0.02 K/UL (ref 0–0.2)
BASOPHILS NFR BLD: 0.5 % (ref 0–1.9)
BILIRUB SERPL-MCNC: 0.7 MG/DL (ref 0.1–1)
BUN SERPL-MCNC: 23 MG/DL (ref 8–23)
CALCIUM SERPL-MCNC: 8.3 MG/DL (ref 8.7–10.5)
CHLORIDE SERPL-SCNC: 103 MMOL/L (ref 95–110)
CO2 SERPL-SCNC: 20 MMOL/L (ref 23–29)
CREAT SERPL-MCNC: 2.2 MG/DL (ref 0.5–1.4)
DIFFERENTIAL METHOD: ABNORMAL
EOSINOPHIL # BLD AUTO: 0.1 K/UL (ref 0–0.5)
EOSINOPHIL NFR BLD: 3.1 % (ref 0–8)
ERYTHROCYTE [DISTWIDTH] IN BLOOD BY AUTOMATED COUNT: 13.5 % (ref 11.5–14.5)
EST. GFR  (AFRICAN AMERICAN): 35.3 ML/MIN/1.73 M^2
EST. GFR  (NON AFRICAN AMERICAN): 30.5 ML/MIN/1.73 M^2
GLUCOSE SERPL-MCNC: 175 MG/DL (ref 70–110)
HCT VFR BLD AUTO: 36.9 % (ref 40–54)
HGB BLD-MCNC: 12.3 G/DL (ref 14–18)
IMM GRANULOCYTES # BLD AUTO: 0.01 K/UL (ref 0–0.04)
IMM GRANULOCYTES NFR BLD AUTO: 0.3 % (ref 0–0.5)
LYMPHOCYTES # BLD AUTO: 1.2 K/UL (ref 1–4.8)
LYMPHOCYTES NFR BLD: 31.9 % (ref 18–48)
MAGNESIUM SERPL-MCNC: 1.6 MG/DL (ref 1.6–2.6)
MCH RBC QN AUTO: 26.6 PG (ref 27–31)
MCHC RBC AUTO-ENTMCNC: 33.3 G/DL (ref 32–36)
MCV RBC AUTO: 80 FL (ref 82–98)
MONOCYTES # BLD AUTO: 0.4 K/UL (ref 0.3–1)
MONOCYTES NFR BLD: 11.3 % (ref 4–15)
NEUTROPHILS # BLD AUTO: 2.1 K/UL (ref 1.8–7.7)
NEUTROPHILS NFR BLD: 52.9 % (ref 38–73)
NRBC BLD-RTO: 0 /100 WBC
PHOSPHATE SERPL-MCNC: 3.7 MG/DL (ref 2.7–4.5)
PLATELET # BLD AUTO: 254 K/UL (ref 150–450)
PMV BLD AUTO: 11.2 FL (ref 9.2–12.9)
POCT GLUCOSE: 188 MG/DL (ref 70–110)
POCT GLUCOSE: 232 MG/DL (ref 70–110)
POCT GLUCOSE: 247 MG/DL (ref 70–110)
POCT GLUCOSE: 285 MG/DL (ref 70–110)
POTASSIUM SERPL-SCNC: 3.5 MMOL/L (ref 3.5–5.1)
PROT SERPL-MCNC: 5.9 G/DL (ref 6–8.4)
RBC # BLD AUTO: 4.63 M/UL (ref 4.6–6.2)
SODIUM SERPL-SCNC: 140 MMOL/L (ref 136–145)
WBC # BLD AUTO: 3.89 K/UL (ref 3.9–12.7)

## 2022-01-05 PROCEDURE — 93005 ELECTROCARDIOGRAM TRACING: CPT

## 2022-01-05 PROCEDURE — 25000003 PHARM REV CODE 250: Performed by: FAMILY MEDICINE

## 2022-01-05 PROCEDURE — 25000003 PHARM REV CODE 250: Performed by: INTERNAL MEDICINE

## 2022-01-05 PROCEDURE — 27000221 HC OXYGEN, UP TO 24 HOURS

## 2022-01-05 PROCEDURE — 99233 PR SUBSEQUENT HOSPITAL CARE,LEVL III: ICD-10-PCS | Mod: ,,, | Performed by: FAMILY MEDICINE

## 2022-01-05 PROCEDURE — 36415 COLL VENOUS BLD VENIPUNCTURE: CPT | Performed by: FAMILY MEDICINE

## 2022-01-05 PROCEDURE — 63600175 PHARM REV CODE 636 W HCPCS: Performed by: HOSPITALIST

## 2022-01-05 PROCEDURE — 21400001 HC TELEMETRY ROOM

## 2022-01-05 PROCEDURE — 85025 COMPLETE CBC W/AUTO DIFF WBC: CPT | Performed by: FAMILY MEDICINE

## 2022-01-05 PROCEDURE — 63600175 PHARM REV CODE 636 W HCPCS: Performed by: INTERNAL MEDICINE

## 2022-01-05 PROCEDURE — 99233 SBSQ HOSP IP/OBS HIGH 50: CPT | Mod: ,,, | Performed by: FAMILY MEDICINE

## 2022-01-05 PROCEDURE — 80053 COMPREHEN METABOLIC PANEL: CPT | Performed by: FAMILY MEDICINE

## 2022-01-05 PROCEDURE — 94761 N-INVAS EAR/PLS OXIMETRY MLT: CPT

## 2022-01-05 PROCEDURE — 84100 ASSAY OF PHOSPHORUS: CPT | Performed by: FAMILY MEDICINE

## 2022-01-05 PROCEDURE — 63600175 PHARM REV CODE 636 W HCPCS: Performed by: FAMILY MEDICINE

## 2022-01-05 PROCEDURE — 83735 ASSAY OF MAGNESIUM: CPT | Performed by: FAMILY MEDICINE

## 2022-01-05 RX ORDER — DOBUTAMINE HYDROCHLORIDE 400 MG/100ML
2 INJECTION INTRAVENOUS CONTINUOUS
Status: DISPENSED | OUTPATIENT
Start: 2022-01-05 | End: 2022-01-05

## 2022-01-05 RX ORDER — FUROSEMIDE 10 MG/ML
20 INJECTION INTRAMUSCULAR; INTRAVENOUS ONCE
Status: COMPLETED | OUTPATIENT
Start: 2022-01-05 | End: 2022-01-05

## 2022-01-05 RX ORDER — POTASSIUM CHLORIDE 20 MEQ/1
40 TABLET, EXTENDED RELEASE ORAL ONCE
Status: COMPLETED | OUTPATIENT
Start: 2022-01-05 | End: 2022-01-05

## 2022-01-05 RX ADMIN — INSULIN ASPART 1 UNITS: 100 INJECTION, SOLUTION INTRAVENOUS; SUBCUTANEOUS at 10:01

## 2022-01-05 RX ADMIN — FUROSEMIDE 20 MG: 10 INJECTION, SOLUTION INTRAMUSCULAR; INTRAVENOUS at 01:01

## 2022-01-05 RX ADMIN — POTASSIUM CHLORIDE 40 MEQ: 1500 TABLET, EXTENDED RELEASE ORAL at 01:01

## 2022-01-05 RX ADMIN — HEPARIN SODIUM 5000 UNITS: 5000 INJECTION, SOLUTION INTRAVENOUS; SUBCUTANEOUS at 01:01

## 2022-01-05 RX ADMIN — ASPIRIN 81 MG: 81 TABLET, DELAYED RELEASE ORAL at 08:01

## 2022-01-05 RX ADMIN — INSULIN ASPART 2 UNITS: 100 INJECTION, SOLUTION INTRAVENOUS; SUBCUTANEOUS at 11:01

## 2022-01-05 RX ADMIN — HEPARIN SODIUM 5000 UNITS: 5000 INJECTION, SOLUTION INTRAVENOUS; SUBCUTANEOUS at 05:01

## 2022-01-05 RX ADMIN — CLOPIDOGREL 75 MG: 75 TABLET, FILM COATED ORAL at 08:01

## 2022-01-05 RX ADMIN — FUROSEMIDE 20 MG: 10 INJECTION, SOLUTION INTRAMUSCULAR; INTRAVENOUS at 09:01

## 2022-01-05 RX ADMIN — HYDRALAZINE HYDROCHLORIDE 25 MG: 25 TABLET, FILM COATED ORAL at 09:01

## 2022-01-05 RX ADMIN — DOBUTAMINE HYDROCHLORIDE 2 MCG/KG/MIN: 400 INJECTION INTRAVENOUS at 03:01

## 2022-01-05 RX ADMIN — ATORVASTATIN CALCIUM 40 MG: 40 TABLET, FILM COATED ORAL at 09:01

## 2022-01-05 RX ADMIN — HYDRALAZINE HYDROCHLORIDE 25 MG: 25 TABLET, FILM COATED ORAL at 05:01

## 2022-01-05 RX ADMIN — FUROSEMIDE 20 MG: 10 INJECTION, SOLUTION INTRAMUSCULAR; INTRAVENOUS at 05:01

## 2022-01-05 RX ADMIN — HYDRALAZINE HYDROCHLORIDE 25 MG: 25 TABLET, FILM COATED ORAL at 01:01

## 2022-01-05 RX ADMIN — HEPARIN SODIUM 5000 UNITS: 5000 INJECTION, SOLUTION INTRAVENOUS; SUBCUTANEOUS at 09:01

## 2022-01-05 RX ADMIN — INSULIN ASPART 2 UNITS: 100 INJECTION, SOLUTION INTRAVENOUS; SUBCUTANEOUS at 05:01

## 2022-01-05 NOTE — CONSULTS
CARDIOLOGY CONSULTATION    Patient Name:  Marshall Flor    :  1955    Medical Record:  [id number]    DATE OF SERVICE: 2022    ATTENDING PHYSICIAN: Brea Daniels MD    REASON FOR CONSULT:     HISTORY OF PRESENT ILLNESS  The patient is a 66 y.o. y/o male who is hospitalized for New onset of congestive heart failure.  Admitted after several days of increasing shortness of breath and leg swelling blood pressure is elevated in ER to 180 states he did not take blood pressure medication that day has a history of diabetes and hypertension states she had abnormal stress test approximately 1 years ago but did not have cardiac catheterization done states he had a CVA approximately 2016 describes weakness at that time with normal Plavix cardiology consult for CHF       PAST MEDICAL HISTORY  Past Medical History:   Diagnosis Date    CHF (congestive heart failure)     Diabetes mellitus     Hypertension     Stroke        PAST SURGICAL HISTORY  Past Surgical History:   Procedure Laterality Date    EYE SURGERY      LEFT EYE       SOCIAL HISTORY         FAMILY HISTORY  No family history on file.      ALLERGIES   Review of patient's allergies indicates:  No Known Allergies    HOME  MEDICATIONS  Prior to Admission medications    Medication Sig Start Date End Date Taking? Authorizing Provider   allopurinoL (ZYLOPRIM) 100 MG tablet   = 2 tab, Oral, Daily, # 60 tab, 5 Refill(s), Pharmacy: [id number], 167, cm, 21 8:24:00 CDT, Height/Length Measured, 91.5, kg, 12/15/20 11:18:00 CST, Weight Dosing 21  Yes Historical Provider   amLODIPine (NORVASC) 10 MG tablet 10 mg. 21 Yes Historical Provider   atorvastatin (LIPITOR) 40 MG tablet   = 1 tab, Oral, Daily, # 90 tab, 1 Refill(s), Soft Stop, Pharmacy: [id number], 167, cm, 21 14:01:00 CDT, Height/Length Measured, 91.5, kg, 12/15/20 11:18:00 CST, Weight Dosing 21  Yes Historical Provider   clopidogreL (PLAVIX) 75 mg  tablet   = 1 tab, Oral, Daily, Needs appointment, # 30 tab, 0 Refill(s), Maintenance, Pharmacy: [id number], H/O: CVA (cerebrovascular accident), 167, cm, 07/27/21 8:24:00 CDT, Height/Length Measured, 91.5, kg, 12/15/20 11:18:00 CST, Weight Dosing 8/24/21  Yes Historical Provider   clopidogreL (PLAVIX) 75 mg tablet Take 75 mg by mouth once daily. 11/10/21  Yes Historical Provider   hydroCHLOROthiazide (HYDRODIURIL) 25 MG tablet 25 mg. 6/22/21  Yes Historical Provider   linaGLIPtin (TRADJENTA) 5 mg Tab tablet 5 mg. 6/24/21  Yes Historical Provider   NOVOLOG MIX 70-30FLEXPEN U-100 100 unit/mL (70-30) InPn pen Inject into the skin. 10/4/21  Yes Historical Provider     CURRENT MEDICATIONS   aspirin  81 mg Oral Daily    atorvastatin  40 mg Oral QHS    clopidogreL  75 mg Oral Daily    furosemide (LASIX) injection  20 mg Intravenous Q8H    heparin (porcine)  5,000 Units Subcutaneous Q8H    hydrALAZINE  25 mg Oral Q8H     sodium chloride 0.9%, acetaminophen, albuterol-ipratropium, dextrose 50%, dextrose 50%, glucagon (human recombinant), glucose, glucose, hydrALAZINE, HYDROcodone-acetaminophen, insulin aspart U-100, magnesium oxide, magnesium oxide, naloxone, ondansetron, polyethylene glycol, potassium, sodium phosphates, potassium, sodium phosphates, potassium, sodium phosphates, promethazine, sodium chloride 0.9%, trazodone      REVIEW OF SYSTEMS  General: no weight loss, no fever, no chills, no anorexia  Skin: no rash  Eyes: no blurry vision  Ears/Nose/Throat: no nasal congestion, no sore throat  Respiratory: no cough, + dyspnea, no wheezing  Cardiovascular: no chest pain, + ankle swelling  Gastrointestinal: no nausea, no vomiting, no diarrhea, no constipation, no abdominal pain. No melena, no bright red blood per rectum  Genitourinary: no dysuria, no hematuria  Musculoskeletal: no joint pain, no myalgia, no muscle weakness  Neurologic: no seizures, no tremors, no fainting  Hematologic/Lymphatic: no  abnormal bleeding, no abnormal bruising  Endocrine: no heat or cold intolerance, no polydipsia, no polyuria  Psychiatric: no anxiety, no depression  Allergy/Immunology: no seasonal allergies, no joint stiffness in morning      PHYSICAL EXAM  Vital Signs:  Temp:  [96.9 °F (36.1 °C)-97.9 °F (36.6 °C)] 96.9 °F (36.1 °C)  Pulse:  [] 94  Resp:  [16-19] 18  SpO2:  [89 %-100 %] 100 %  BP: (159-184)/() 165/94  Constitutional:  Healthy, no distress  HENT:  Normocephalic, Atraumatic, Bilateral external ears normal, Oropharynx moist, No oral exudates, No tenderness, neck supple.  Eyes:  PERRL, EOMI, Conjunctiva normal, No discharge.   Lymphatic:  No cervical or supraclavicular lymphadenopathy noted.   Cardiovascular:  Normal heart rate, Normal rhythm, , No rubs, S3 2/6 systolic ejection murmur left sternal border   trace to 1+ edema  Respiratory:  Normal breath sounds, No respiratory distress, No wheezing, No rhonchi, + bilateral bases rales, No chest tenderness.   GI:  Bowel sounds normal, Soft, No tenderness, No rebound or guarding, No masses.   Integument:  Warm, Dry, No erythema, No rash.   Musculoskeletal/Extremities:  Intact distal pulses, No edema, No tenderness, No cyanosis, No clubbing. Good range of motion in all major joints.   Neurologic:  Alert & oriented x 3, cranial nerves grossly intact, No focal deficits.    EKG sinus rhythm nonspecific T-wave change    LABS    CBC  Recent Labs   Lab 01/02/22  1622 01/03/22  0834 01/04/22  0718   WBC 5.56 5.75 4.17   RBC 4.47* 4.70 4.53*   HGB 12.2* 12.7* 12.1*   HCT 36.8* 38.2* 36.5*   MCV 82 81* 81*   MCH 27.3 27.0 26.7*   MCHC 33.2 33.2 33.2   RDW 14.0 13.7 13.7   MPV 10.5 10.6 11.2    281 253       Chemistry  Recent Labs   Lab 01/02/22  1622 01/03/22  0834 01/04/22  0718    141 140   K 3.5 3.6 3.3*    104 103   CO2 19* 21* 21*   BUN 16 15 22   CREATININE 2.2* 2.2* 2.2*   * 265* 212*   PROT 6.6 6.5 6.0   CALCIUM 8.3* 8.4* 8.3*   BILITOT  0.6 0.8 0.8   AST 17 15 13   ALT 14 14 12       ABG  No results for input(s): PHART, ZMH4GFR, PO2ART, YRR9WMN, F2DYDZAA, BEART, F2ZYJYQK in the last 168 hours.    IMAGING STUDIES & OTHER STUDIES  Reviewed          ASSESSMENT  Active Hospital Problems    Diagnosis  POA    *New onset of congestive heart failure [I50.9]  Yes    Troponin level elevated [R77.8]  Yes    Stage 3b chronic kidney disease [N18.32]  Yes    Type 2 diabetes mellitus with stage 3b chronic kidney disease, without long-term current use of insulin [E11.22, N18.32]  Yes    Hypertensive urgency [I16.0]  Yes    Hyperlipidemia [E78.5]  Yes    H/O: CVA (cerebrovascular accident) [Z86.73]  Not Applicable    Coronary artery disease involving native coronary artery of native heart [I25.10]  Yes      Resolved Hospital Problems   No resolved problems to display.       PLAN      Congestive heart failure   Status post shortness of breath leg swelling x3 days  BNP 14 67 improved to 850  SubQ heparin five thousand Q 8    States abnormal stress 1 year ago  Catheterization not done    History of CVA  Describesd as weakness  2016  Plavix    Hypertension  Monitor blood pressure    Diabetes  Treated medicine    Hypokalemia  K 3.3 1/4    Renal failure  Creatinine 2.2  Monitor renal function    Hypomagnesemia  Magnesium 1.5  Given magnesium  Monitor magnesium      Plan  Echo evaluation LV function valves CHF  Venous leg swelling shortness of breath  May need dobutamine with renal failure      The plan was discussed with the patient and/or family.    Thank you for the Consult.    Electronically signed by: Rickey Tong, 1/4/2022 7:59 PM     Time spent on counseling/coordination of care:   Total time spent with patient:

## 2022-01-05 NOTE — NURSING
Educated importance of using the urinal for accurate measurement of urine output. Verbalizes understanding.

## 2022-01-05 NOTE — ASSESSMENT & PLAN NOTE
Patient's FSGs are uncontrolled due to hyperglycemia on current medication regimen.  Last A1c reviewed-   Lab Results   Component Value Date    HGBA1C 7.8 (H) 04/12/2021     Most recent fingerstick glucose reviewed-   Recent Labs   Lab 01/04/22  1633 01/04/22  2101 01/05/22  0719 01/05/22  1112   POCTGLUCOSE 230* 182* 188* 232*     Current correctional scale  Low  Maintain anti-hyperglycemic dose as follows-   Antihyperglycemics (From admission, onward)            Start     Stop Route Frequency Ordered    01/02/22 1917  insulin aspart U-100 pen 0-5 Units         -- SubQ Before meals & nightly PRN 01/02/22 1917        Hold Oral hypoglycemics while patient is in the hospital.

## 2022-01-05 NOTE — PROGRESS NOTES
"CARDIOLOGY PROGRESS NOTE    Patient Name:  Marshall Flor    :  1955    Medical Record:  [id number]    DATE OF SERVICE  2022        SUBJECTIVE  The patient is being seen for follow-up continued shortness of breath and leg swelling unchanged      REVIEW OF SYSTEMS  General: No chills, No fever, No fatigue  Eyes: No vision changes, No drainage from eyes  ENT: No nasal congestion, no sore throat  Respiratory: + shortness of breath, No cough  Cardiac: No chest pain, palpitations, dyspnea, dizziness, claudication, or syncopal episodes  GI: No abdominal pain, no nausea, no vomiting  : No dysuria  Musculoskeletal: No joint pain + leg swelling  Neuro: No weakness, dizziness, vision changes       OBJECTIVE          PHYSICAL EXAM  Vital Signs:  BP (!) 171/84 (BP Location: Left arm, Patient Position: Sitting)   Pulse 76   Temp 97.8 °F (36.6 °C) (Oral)   Resp 17   Ht 5' 7" (1.702 m)   Wt 96.1 kg (211 lb 13.8 oz)   SpO2 98%   BMI 33.18 kg/m²   Temp:  [96.6 °F (35.9 °C)-98 °F (36.7 °C)] 97.8 °F (36.6 °C)  Pulse:  [] 76  Resp:  [15-19] 17  SpO2:  [96 %-100 %] 98 %  BP: (145-179)/() 171/84      General:   Eyes: Anicteric sclera. Moist conjunctiva. Vision grossly intact.  HENMT: Normocephalic.  Moist mucous membranes. No obvious ulcerations.   Neck: Trachea midline.   Cardiovascular: Heart regular rate and rhythm. S1, S2, S3 2/6 systolic ejection murmur left sternal border  Peripheral pulses palpable. 1+ peripheral edema.   Respiratory: Lungs rales bilateral bases. No increased work of breathing.  Gastrointestinal: Bowel sounds active in all 4 quadrants. Soft, nontender, nondistended.   Genitourinary: Urine clear yellow  Musculoskeletal: Moves all extremities with equal strength.   Skin: Color normal for ethnicity. Skin warm and dry with good turgor. Capillary refill < 3 seconds.   Neurologic: Oriented x 3.  Speech fluent, follows commands.  Psychiatric:  Awake and alert, normal affect mood " good.      LABS    CBC  Recent Labs   Lab 01/03/22  0834 01/04/22  0718 01/05/22  0634   WBC 5.75 4.17 3.89*   RBC 4.70 4.53* 4.63   HGB 12.7* 12.1* 12.3*   HCT 38.2* 36.5* 36.9*   MCV 81* 81* 80*   MCH 27.0 26.7* 26.6*   MCHC 33.2 33.2 33.3   RDW 13.7 13.7 13.5   MPV 10.6 11.2 11.2    253 254       Chemistry  Recent Labs   Lab 01/03/22  0834 01/04/22  0718 01/05/22  0633    140 140   K 3.6 3.3* 3.5    103 103   CO2 21* 21* 20*   BUN 15 22 23   CREATININE 2.2* 2.2* 2.2*   * 212* 175*   PROT 6.5 6.0 5.9*   CALCIUM 8.4* 8.3* 8.3*   BILITOT 0.8 0.8 0.7   AST 15 13 15   ALT 14 12 10       ABG  No results for input(s): PHART, OVD7NIJ, PO2ART, KEM3VGO, L8NZNMET, BEART, T4MHYBMV in the last 168 hours.      MICROBIOLOGY  Reviewed    IMAGING & OTHER STUDIES  Reviewed      Intake/Output last 3 shifts:  I/O last 3 completed shifts:  In: 100.8 [I.V.:100.8]  Out: 500 [Urine:500]    Scheduled meds:   aspirin  81 mg Oral Daily    atorvastatin  40 mg Oral QHS    clopidogreL  75 mg Oral Daily    furosemide (LASIX) injection  20 mg Intravenous Q8H    heparin (porcine)  5,000 Units Subcutaneous Q8H    hydrALAZINE  25 mg Oral Q8H       PRN Meds:  sodium chloride 0.9%, acetaminophen, albuterol-ipratropium, dextrose 50%, dextrose 50%, glucagon (human recombinant), glucose, glucose, hydrALAZINE, HYDROcodone-acetaminophen, insulin aspart U-100, magnesium oxide, magnesium oxide, naloxone, ondansetron, polyethylene glycol, potassium, sodium phosphates, potassium, sodium phosphates, potassium, sodium phosphates, promethazine, sodium chloride 0.9%, trazodone    Continuous Infusions:      Dietary Orders:  [unfilled]     Admit weight: Weight: 98.9 kg (218 lb)   Today weight: Weight: 96.1 kg (211 lb 13.8 oz)      Length of stay in days: 1      ASSESSMENT    Active Hospital Problems    Diagnosis  POA    *New onset of congestive heart failure [I50.9]  Yes    Troponin level elevated [R77.8]  Yes    Stage 3b chronic  kidney disease [N18.32]  Yes    Type 2 diabetes mellitus with stage 3b chronic kidney disease, without long-term current use of insulin [E11.22, N18.32]  Yes    Hypertensive urgency [I16.0]  Yes    Hyperlipidemia [E78.5]  Yes    H/O: CVA (cerebrovascular accident) [Z86.73]  Not Applicable    Coronary artery disease involving native coronary artery of native heart [I25.10]  Yes      Resolved Hospital Problems   No resolved problems to display.          PLAN      Congestive heart failure   Status post shortness of breath leg swelling x3 days  BNP 1467 improved to 850  Heparin 5000 subQ q.8 hours  States abnormal stress 1 year ago  Catheterization not done     History of CVA  Describesd as weakness  2016  Plavix     Hypertension  Monitor blood pressure     Diabetes  Treated medicine     Hypokalemia  K 3.5 1/5     Renal failure  Creatinine 2.2  Monitor renal function     Hypomagnesemia  Magnesium 1.6 1/5  Monitor magnesium        Plan  KCL 40 mg x 1  Dobutamine 2 mics x6 hours  Lasix 20 mg additional x1  CBC BMP BNP in a.m.  Echo evaluation LV function valves CHF  Venous leg swelling shortness of breath     Electronically signed by: Rickey Tong, 1/5/2022 12:50 PM

## 2022-01-05 NOTE — ASSESSMENT & PLAN NOTE
Uncontrolled blood pressure   With subsequent pulmonary edema/CHF   Echo ordered  Would recommend some combination of ACEi, BB, aldactone if renal function stabilized  Hold ACEi initially due to MERCEDES  Given nitro paste in ER  Increase hydralazine  Additional dose of lasix today  Start dobutamine infusion

## 2022-01-05 NOTE — PROGRESS NOTES
[facility] Medicine  Progress Note    Patient Name: Marshall Flor  MRN: [id number]  Patient Class: IP- Inpatient   Admission Date: 1/2/2022  Length of Stay: 1 days  Attending Physician: Brea Daniels MD  Primary Care Provider: Dorie Quan MD        Subjective:     Principal Problem:New onset of congestive heart failure        HPI:  67 yo male with past medical history of diabetes, hypertension, dyslipidemia, CAD, history of stroke presents to the ER with complaints of worsening shortness of breath over the last 3 days.  States that he walks frequently with his son who is present in the room and contributes to the history and has no problems until the last 3 days when he noticed increased difficulty walking due to shortness of breath and feeling fatigued.  Also noticed significant increase in swelling in his legs.  He has diabetes and takes insulin at home but does not check his sugar.  Does not check his blood pressure at home and is unsure what his normal range is.  He denies any associated chest pain, nausea, vomiting, diaphoresis but does report feeling very tired and sometimes dizzy.  Denies any new onset numbness or tingling, speech changes or confusion and son confirms this.  In the ER, he was hypertensive in the 180s systolic, BNP elevated to 1467. Chest x-ray with findings consistent with congestive heart failure.  Patient denies a personal history of congestive heart failure but does report that he was seen by cardiologist about a year ago and was told he had an abnormal stress test and needed stents.  Has not seen the doctor since then.  He does continue to take aspirin and Plavix for stroke several years ago.  Due to findings of new onset CHF versus hypertensive emergency/urgency, hospital team called for admission.        Overview/Hospital Course:  No notes on file    Interval History: no acute events, no complaints    Review of Systems   Unable to perform ROS: Other    Constitutional: Negative for fatigue and fever.   Respiratory: Negative for shortness of breath.    Cardiovascular: Negative for chest pain.   Neurological: Negative.      Objective:     Vital Signs (Most Recent):  Temp: 97.8 °F (36.6 °C) (01/05/22 1114)  Pulse: 76 (01/05/22 1114)  Resp: 17 (01/05/22 1114)  BP: (!) 171/84 (01/05/22 1114)  SpO2: 98 % (01/05/22 1114) Vital Signs (24h Range):  Temp:  [96.6 °F (35.9 °C)-98 °F (36.7 °C)] 97.8 °F (36.6 °C)  Pulse:  [] 76  Resp:  [15-19] 17  SpO2:  [96 %-100 %] 98 %  BP: (145-179)/() 171/84     Weight: 94.9 kg (209 lb 3.5 oz)  Body mass index is 32.77 kg/m².    Intake/Output Summary (Last 24 hours) at 1/5/2022 1503  Last data filed at 1/5/2022 0421  Gross per 24 hour   Intake 0 ml   Output 300 ml   Net -300 ml      Physical Exam  Constitutional:       Appearance: He is obese.   HENT:      Head: Normocephalic and atraumatic.      Right Ear: External ear normal.      Left Ear: External ear normal.      Nose: Nose normal.      Mouth/Throat:      Mouth: Mucous membranes are moist.   Eyes:      Conjunctiva/sclera: Conjunctivae normal.   Cardiovascular:      Rate and Rhythm: Normal rate and regular rhythm.      Heart sounds: No murmur heard.  No friction rub. Gallop present.    Pulmonary:      Breath sounds: Rales present.      Comments: Bibasilar rales persist  Abdominal:      General: Bowel sounds are normal.      Palpations: Abdomen is soft.      Comments: Obese   Musculoskeletal:      Cervical back: Normal range of motion and neck supple.      Right lower leg: Edema present.      Left lower leg: Edema present.      Comments: 3+ b/l LE unchanged from prior exam   Skin:     General: Skin is warm and dry.   Neurological:      General: No focal deficit present.      Mental Status: He is alert. Mental status is at baseline.      Comments: Poor insight but at baseline per family   Psychiatric:         Mood and Affect: Mood normal.         Significant Labs:   All  pertinent labs within the past 24 hours have been reviewed.  CBC:   Recent Labs   Lab 01/04/22  0718 01/05/22  0634   WBC 4.17 3.89*   HGB 12.1* 12.3*   HCT 36.5* 36.9*    254     CMP:   Recent Labs   Lab 01/04/22  0718 01/05/22  0633    140   K 3.3* 3.5    103   CO2 21* 20*   * 175*   BUN 22 23   CREATININE 2.2* 2.2*   CALCIUM 8.3* 8.3*   PROT 6.0 5.9*   ALBUMIN 2.5* 2.4*   BILITOT 0.8 0.7   ALKPHOS 80 75   AST 13 15   ALT 12 10   ANIONGAP 16 17*   EGFRNONAA 30.5* 30.5*     Cardiac Markers:   Recent Labs   Lab 01/04/22 0719   *       Significant Imaging: I have reviewed all pertinent imaging results/findings within the past 24 hours.   X-Ray Chest AP Portable   Final Result      Interval development of mild bilateral right more so than left lung infiltrate and probable small bilateral pleural effusions suggesting CHF         Electronically signed by: Bhavna Bryant MD   Date:    01/02/2022   Time:    17:15              Assessment/Plan:      * New onset of congestive heart failure  Patient is identified as having previously undiagnosed CHF heart failure that is Acute. CHF is currently uncontrolled due to Continued edema of extremities, Dyspnea not returned to baseline after 2 doses of IV diuretic, Rales/crackles on pulmonary exam and Pulmonary edema/pleural effusion on CXR.   IV lasix 80 mg in ER  Admit with the following:  Monitor on telemetry.   Monitor strict Is&Os and daily weights  Place on fluid restriction of 1.5 L.   BNP reviewed- and noted below   Recent Labs   Lab 01/04/22 0719   *     Echo ordered   Monitor on continuous pulse ox and telemetry and use supplemental oxygen to maintain sats >94%  Consult Cardiology, appreciate their recommendations  Trend troponin given h/o CAD  Strict I/Os, daily weights  Additional dose of lasix today  BNP trending down from admission but still with significant peripheral edema/volume overload  Dobutrex infusion  1/5          Troponin level elevated  This is probably secondary to his congestive heart failure and chronic renal failure.  Cardiology consulted  Patient is a poor historian but does report that he was supposed to have stents placed last year but never did      Coronary artery disease involving native coronary artery of native heart  Patient reports prior abnormal stress test and was supposed to have stents placed potentially however did not follow up  Repeat EKG and troponin   Telemetry monitoring  Remains without chest pain/jaw pain/neck pain/indigestion or atypical symptoms  Cardiology consulted      H/O: CVA (cerebrovascular accident)  Previously on ASA/Plavix  Resume on admission      Hyperlipidemia  Has prescription for lipitor in history but has not been taking  Resume on admission      Hypertensive urgency  Uncontrolled blood pressure   With subsequent pulmonary edema/CHF   Echo ordered  Would recommend some combination of ACEi, BB, aldactone if renal function stabilized  Hold ACEi initially due to MERCEDES  Given nitro paste in ER  Increase hydralazine  Additional dose of lasix today  Start dobutamine infusion        Type 2 diabetes mellitus with stage 3b chronic kidney disease, without long-term current use of insulin  Patient's FSGs are uncontrolled due to hyperglycemia on current medication regimen.  Last A1c reviewed-   Lab Results   Component Value Date    HGBA1C 7.8 (H) 04/12/2021     Most recent fingerstick glucose reviewed-   Recent Labs   Lab 01/04/22  1633 01/04/22  2101 01/05/22  0719 01/05/22  1112   POCTGLUCOSE 230* 182* 188* 232*     Current correctional scale  Low  Maintain anti-hyperglycemic dose as follows-   Antihyperglycemics (From admission, onward)            Start     Stop Route Frequency Ordered    01/02/22 1917  insulin aspart U-100 pen 0-5 Units         -- SubQ Before meals & nightly PRN 01/02/22 1917        Hold Oral hypoglycemics while patient is in the hospital.        Stage 3b  chronic kidney disease  Elevated sCr however appears to be at patient's baseline  Continue to monitor daily BMP   Expect slight increase with IV lasix though has tolerated well  Follow BUN creatinine closely          VTE Risk Mitigation (From admission, onward)         Ordered     heparin (porcine) injection 5,000 Units  Every 8 hours         01/02/22 1917     IP VTE HIGH RISK PATIENT  Once         01/02/22 1917     Place sequential compression device  Until discontinued         01/02/22 1917                Discharge Planning   STAR:      Code Status: Full Code   Is the patient medically ready for discharge?:     Reason for patient still in hospital (select all that apply): Treatment  Discharge Plan A: (P) Home Health   Discharge Delays: (P) None known at this time              Brea Daniels MD  Department of Hospital Medicine   [facility]

## 2022-01-05 NOTE — NURSING
Plan of care reviewed with pt. TAMERA throughout shift. Cardiac diet with 1.5L fluid restriction. BS monitored and insulin given per MAR. Continue to Monitor Labs. VSS. Continuous telemetry monitor maintained. Safety maintained. Will continue to monitor. No complaints at this time.

## 2022-01-05 NOTE — ASSESSMENT & PLAN NOTE
Patient reports prior abnormal stress test and was supposed to have stents placed potentially however did not follow up  Repeat EKG and troponin   Telemetry monitoring  Remains without chest pain/jaw pain/neck pain/indigestion or atypical symptoms  Cardiology consulted

## 2022-01-05 NOTE — ASSESSMENT & PLAN NOTE
This is probably secondary to his congestive heart failure and chronic renal failure.  Cardiology consulted  Patient is a poor historian but does report that he was supposed to have stents placed last year but never did

## 2022-01-05 NOTE — PLAN OF CARE
Problem: Adult Inpatient Plan of Care  Goal: Plan of Care Review  Outcome: Ongoing, Progressing  Flowsheets (Taken 1/5/2022 0239)  Plan of Care Reviewed With: patient  Goal: Patient-Specific Goal (Individualized)  Outcome: Ongoing, Progressing  Goal: Absence of Hospital-Acquired Illness or Injury  Outcome: Ongoing, Progressing  Intervention: Identify and Manage Fall Risk  Flowsheets (Taken 1/5/2022 0239)  Safety Promotion/Fall Prevention:   bed alarm set   Fall Risk reviewed with patient/family   Fall Risk signage in place   nonskid shoes/socks when out of bed   pulse ox   instructed to call staff for mobility   side rails raised x 2  Intervention: Prevent Skin Injury  Flowsheets (Taken 1/5/2022 0239)  Body Position:   side-lying   right  Skin Protection:   adhesive use limited   tubing/devices free from skin contact  Intervention: Prevent and Manage VTE (Venous Thromboembolism) Risk  Flowsheets (Taken 1/5/2022 0239)  Activity Management: Ambulated to bathroom - L4  VTE Prevention/Management: ambulation promoted  Range of Motion: active ROM (range of motion) encouraged  Intervention: Prevent Infection  Flowsheets (Taken 1/5/2022 0239)  Infection Prevention: hand hygiene promoted  Goal: Optimal Comfort and Wellbeing  Outcome: Ongoing, Progressing  Intervention: Monitor Pain and Promote Comfort  Flowsheets (Taken 1/5/2022 0239)  Pain Management Interventions:   pillow support provided   quiet environment facilitated   position adjusted  Intervention: Provide Person-Centered Care  Flowsheets (Taken 1/5/2022 0239)  Trust Relationship/Rapport:   care explained   choices provided  Goal: Readiness for Transition of Care  Outcome: Ongoing, Progressing     Problem: Diabetes Comorbidity  Goal: Blood Glucose Level Within Targeted Range  Outcome: Ongoing, Progressing  Intervention: Monitor and Manage Glycemia  Flowsheets (Taken 1/5/2022 0239)  Glycemic Management: blood glucose monitored     Problem: Fall Injury Risk  Goal:  Absence of Fall and Fall-Related Injury  Outcome: Ongoing, Progressing  Intervention: Identify and Manage Contributors  Flowsheets (Taken 1/5/2022 0239)  Self-Care Promotion: independence encouraged  Medication Review/Management: medications reviewed  Intervention: Promote Injury-Free Environment  Flowsheets (Taken 1/5/2022 0239)  Safety Promotion/Fall Prevention:   bed alarm set   Fall Risk reviewed with patient/family   Fall Risk signage in place   nonskid shoes/socks when out of bed   pulse ox   instructed to call staff for mobility   side rails raised x 2     Problem: Adjustment to Illness (Heart Failure)  Goal: Optimal Coping  Outcome: Ongoing, Progressing  Intervention: Support Psychosocial Response  Flowsheets (Taken 1/5/2022 0239)  Supportive Measures: active listening utilized  Family/Support System Care: involvement promoted     Problem: Cardiac Output Decreased (Heart Failure)  Goal: Optimal Cardiac Output  Outcome: Ongoing, Progressing  Intervention: Optimize Cardiac Output  Flowsheets (Taken 1/5/2022 0239)  Environmental Support: calm environment promoted     Problem: Dysrhythmia (Heart Failure)  Goal: Stable Heart Rate and Rhythm  Outcome: Ongoing, Progressing     Problem: Fluid Imbalance (Heart Failure)  Goal: Fluid Balance  Outcome: Ongoing, Progressing  Intervention: Monitor and Manage Fluid Balance  Flowsheets (Taken 1/5/2022 0239)  Fluid/Electrolyte Management:   fluids provided   fluids restricted     Problem: Functional Ability Impaired (Heart Failure)  Goal: Optimal Functional Ability  Outcome: Ongoing, Progressing  Intervention: Optimize Functional Ability  Flowsheets (Taken 1/5/2022 0239)  Self-Care Promotion: independence encouraged  Activity Management: Ambulated to bathroom - L4     Problem: Oral Intake Inadequate (Heart Failure)  Goal: Optimal Nutrition Intake  Outcome: Ongoing, Progressing  Intervention: Promote and Optimize Nutrition Intake  Flowsheets (Taken 1/5/2022 0239)  Oral  Nutrition Promotion:   rest periods promoted   social interaction promoted     Problem: Respiratory Compromise (Heart Failure)  Goal: Effective Oxygenation and Ventilation  Outcome: Ongoing, Progressing  Intervention: Promote Airway Secretion Clearance  Flowsheets (Taken 1/5/2022 0239)  Breathing Techniques/Airway Clearance: deep/controlled cough encouraged  Cough And Deep Breathing: done independently per patient  Intervention: Optimize Oxygenation and Ventilation  Flowsheets (Taken 1/5/2022 0239)  Airway/Ventilation Management: airway patency maintained     Problem: Sleep Disordered Breathing (Heart Failure)  Goal: Effective Breathing Pattern During Sleep  Outcome: Ongoing, Progressing   POC reviewed at bedside. Questions and concerns addressed. VSS. Placed bed in low and locked position. Call light within reach. Side rails up x2. Instructed to call for any needs. Verbalized understanding of all instructions. Frequent rounds.

## 2022-01-05 NOTE — ASSESSMENT & PLAN NOTE
Patient is identified as having previously undiagnosed CHF heart failure that is Acute. CHF is currently uncontrolled due to Continued edema of extremities, Dyspnea not returned to baseline after 2 doses of IV diuretic, Rales/crackles on pulmonary exam and Pulmonary edema/pleural effusion on CXR.   IV lasix 80 mg in ER  Admit with the following:  Monitor on telemetry.   Monitor strict Is&Os and daily weights  Place on fluid restriction of 1.5 L.   BNP reviewed- and noted below   Recent Labs   Lab 01/04/22  0719   *     Echo ordered   Monitor on continuous pulse ox and telemetry and use supplemental oxygen to maintain sats >94%  Consult Cardiology, appreciate their recommendations  Trend troponin given h/o CAD  Strict I/Os, daily weights  Additional dose of lasix today  BNP trending down from admission but still with significant peripheral edema/volume overload  Dobutrex infusion 1/5

## 2022-01-05 NOTE — SUBJECTIVE & OBJECTIVE
Interval History: no acute events, no complaints    Review of Systems   Unable to perform ROS: Other   Constitutional: Negative for fatigue and fever.   Respiratory: Negative for shortness of breath.    Cardiovascular: Negative for chest pain.   Neurological: Negative.      Objective:     Vital Signs (Most Recent):  Temp: 97.8 °F (36.6 °C) (01/05/22 1114)  Pulse: 76 (01/05/22 1114)  Resp: 17 (01/05/22 1114)  BP: (!) 171/84 (01/05/22 1114)  SpO2: 98 % (01/05/22 1114) Vital Signs (24h Range):  Temp:  [96.6 °F (35.9 °C)-98 °F (36.7 °C)] 97.8 °F (36.6 °C)  Pulse:  [] 76  Resp:  [15-19] 17  SpO2:  [96 %-100 %] 98 %  BP: (145-179)/() 171/84     Weight: 94.9 kg (209 lb 3.5 oz)  Body mass index is 32.77 kg/m².    Intake/Output Summary (Last 24 hours) at 1/5/2022 1503  Last data filed at 1/5/2022 0421  Gross per 24 hour   Intake 0 ml   Output 300 ml   Net -300 ml      Physical Exam  Constitutional:       Appearance: He is obese.   HENT:      Head: Normocephalic and atraumatic.      Right Ear: External ear normal.      Left Ear: External ear normal.      Nose: Nose normal.      Mouth/Throat:      Mouth: Mucous membranes are moist.   Eyes:      Conjunctiva/sclera: Conjunctivae normal.   Cardiovascular:      Rate and Rhythm: Normal rate and regular rhythm.      Heart sounds: No murmur heard.  No friction rub. Gallop present.    Pulmonary:      Breath sounds: Rales present.      Comments: Bibasilar rales persist  Abdominal:      General: Bowel sounds are normal.      Palpations: Abdomen is soft.      Comments: Obese   Musculoskeletal:      Cervical back: Normal range of motion and neck supple.      Right lower leg: Edema present.      Left lower leg: Edema present.      Comments: 3+ b/l LE unchanged from prior exam   Skin:     General: Skin is warm and dry.   Neurological:      General: No focal deficit present.      Mental Status: He is alert. Mental status is at baseline.      Comments: Poor insight but at baseline  per family   Psychiatric:         Mood and Affect: Mood normal.         Significant Labs:   All pertinent labs within the past 24 hours have been reviewed.  CBC:   Recent Labs   Lab 01/04/22  0718 01/05/22  0634   WBC 4.17 3.89*   HGB 12.1* 12.3*   HCT 36.5* 36.9*    254     CMP:   Recent Labs   Lab 01/04/22  0718 01/05/22  0633    140   K 3.3* 3.5    103   CO2 21* 20*   * 175*   BUN 22 23   CREATININE 2.2* 2.2*   CALCIUM 8.3* 8.3*   PROT 6.0 5.9*   ALBUMIN 2.5* 2.4*   BILITOT 0.8 0.7   ALKPHOS 80 75   AST 13 15   ALT 12 10   ANIONGAP 16 17*   EGFRNONAA 30.5* 30.5*     Cardiac Markers:   Recent Labs   Lab 01/04/22  0719   *       Significant Imaging: I have reviewed all pertinent imaging results/findings within the past 24 hours.   X-Ray Chest AP Portable   Final Result      Interval development of mild bilateral right more so than left lung infiltrate and probable small bilateral pleural effusions suggesting CHF         Electronically signed by: Bhavna Bryant MD   Date:    01/02/2022   Time:    17:15

## 2022-01-06 LAB
ANION GAP SERPL CALC-SCNC: 15 MMOL/L (ref 8–16)
ANION GAP SERPL CALC-SCNC: 15 MMOL/L (ref 8–16)
AORTIC ROOT ANNULUS: 3.09 CM
AORTIC VALVE CUSP SEPERATION: 1.78 CM
AV INDEX (PROSTH): 0.65
AV MEAN GRADIENT: 4 MMHG
AV PEAK GRADIENT: 4 MMHG
AV VALVE AREA: 1.91 CM2
AV VELOCITY RATIO: 0.63
BASOPHILS # BLD AUTO: 0.02 K/UL (ref 0–0.2)
BASOPHILS # BLD AUTO: 0.02 K/UL (ref 0–0.2)
BASOPHILS NFR BLD: 0.6 % (ref 0–1.9)
BASOPHILS NFR BLD: 0.6 % (ref 0–1.9)
BNP SERPL-MCNC: 579 PG/ML (ref 0–99)
BSA FOR ECHO PROCEDURE: 2.14 M2
BUN SERPL-MCNC: 25 MG/DL (ref 8–23)
BUN SERPL-MCNC: 25 MG/DL (ref 8–23)
CALCIUM SERPL-MCNC: 8.8 MG/DL (ref 8.7–10.5)
CALCIUM SERPL-MCNC: 8.8 MG/DL (ref 8.7–10.5)
CHLORIDE SERPL-SCNC: 103 MMOL/L (ref 95–110)
CHLORIDE SERPL-SCNC: 103 MMOL/L (ref 95–110)
CO2 SERPL-SCNC: 22 MMOL/L (ref 23–29)
CO2 SERPL-SCNC: 22 MMOL/L (ref 23–29)
CREAT SERPL-MCNC: 2.1 MG/DL (ref 0.5–1.4)
CREAT SERPL-MCNC: 2.1 MG/DL (ref 0.5–1.4)
CV ECHO LV RWT: 0.43 CM
DIFFERENTIAL METHOD: ABNORMAL
DIFFERENTIAL METHOD: ABNORMAL
DOP CALC AO PEAK VEL: 1.05 M/S
DOP CALC AO VTI: 16.54 CM
DOP CALC LVOT AREA: 2.9 CM2
DOP CALC LVOT DIAMETER: 1.93 CM
DOP CALC LVOT PEAK VEL: 0.66 M/S
DOP CALC LVOT STROKE VOLUME: 31.64 CM3
DOP CALC MV VTI: 21.42 CM
DOP CALCLVOT PEAK VEL VTI: 10.82 CM
E WAVE DECELERATION TIME: 196.96 MSEC
E/A RATIO: 3.38
E/E' RATIO: 19.64 M/S
ECHO LV POSTERIOR WALL: 1.19 CM (ref 0.6–1.1)
EJECTION FRACTION: 39 %
EOSINOPHIL # BLD AUTO: 0.1 K/UL (ref 0–0.5)
EOSINOPHIL # BLD AUTO: 0.1 K/UL (ref 0–0.5)
EOSINOPHIL NFR BLD: 3.9 % (ref 0–8)
EOSINOPHIL NFR BLD: 3.9 % (ref 0–8)
ERYTHROCYTE [DISTWIDTH] IN BLOOD BY AUTOMATED COUNT: 13.3 % (ref 11.5–14.5)
ERYTHROCYTE [DISTWIDTH] IN BLOOD BY AUTOMATED COUNT: 13.3 % (ref 11.5–14.5)
EST. GFR  (AFRICAN AMERICAN): 37.3 ML/MIN/1.73 M^2
EST. GFR  (AFRICAN AMERICAN): 37.3 ML/MIN/1.73 M^2
EST. GFR  (NON AFRICAN AMERICAN): 32.3 ML/MIN/1.73 M^2
EST. GFR  (NON AFRICAN AMERICAN): 32.3 ML/MIN/1.73 M^2
FRACTIONAL SHORTENING: 14 % (ref 28–44)
GLUCOSE SERPL-MCNC: 201 MG/DL (ref 70–110)
GLUCOSE SERPL-MCNC: 201 MG/DL (ref 70–110)
HCT VFR BLD AUTO: 33.6 % (ref 40–54)
HCT VFR BLD AUTO: 33.6 % (ref 40–54)
HGB BLD-MCNC: 11.5 G/DL (ref 14–18)
HGB BLD-MCNC: 11.5 G/DL (ref 14–18)
IMM GRANULOCYTES # BLD AUTO: 0 K/UL (ref 0–0.04)
IMM GRANULOCYTES # BLD AUTO: 0 K/UL (ref 0–0.04)
IMM GRANULOCYTES NFR BLD AUTO: 0 % (ref 0–0.5)
IMM GRANULOCYTES NFR BLD AUTO: 0 % (ref 0–0.5)
INTERVENTRICULAR SEPTUM: 1.29 CM (ref 0.6–1.1)
IVRT: 22.84 MSEC
LA MAJOR: 4.83 CM
LA MINOR: 4.11 CM
LEFT ATRIUM SIZE: 4.56 CM
LEFT ATRIUM VOLUME INDEX MOD: 13.4 ML/M2
LEFT ATRIUM VOLUME MOD: 27.8 CM3
LEFT INTERNAL DIMENSION IN SYSTOLE: 4.81 CM (ref 2.1–4)
LEFT VENTRICLE DIASTOLIC VOLUME INDEX: 73.37 ML/M2
LEFT VENTRICLE DIASTOLIC VOLUME: 152.61 ML
LEFT VENTRICLE MASS INDEX: 140 G/M2
LEFT VENTRICLE SYSTOLIC VOLUME INDEX: 51.9 ML/M2
LEFT VENTRICLE SYSTOLIC VOLUME: 107.98 ML
LEFT VENTRICULAR INTERNAL DIMENSION IN DIASTOLE: 5.58 CM (ref 3.5–6)
LEFT VENTRICULAR MASS: 291.69 G
LV LATERAL E/E' RATIO: 15.43 M/S
LV SEPTAL E/E' RATIO: 27 M/S
LYMPHOCYTES # BLD AUTO: 1 K/UL (ref 1–4.8)
LYMPHOCYTES # BLD AUTO: 1 K/UL (ref 1–4.8)
LYMPHOCYTES NFR BLD: 28 % (ref 18–48)
LYMPHOCYTES NFR BLD: 28 % (ref 18–48)
MAGNESIUM SERPL-MCNC: 1.5 MG/DL (ref 1.6–2.6)
MCH RBC QN AUTO: 26.9 PG (ref 27–31)
MCH RBC QN AUTO: 26.9 PG (ref 27–31)
MCHC RBC AUTO-ENTMCNC: 34.2 G/DL (ref 32–36)
MCHC RBC AUTO-ENTMCNC: 34.2 G/DL (ref 32–36)
MCV RBC AUTO: 79 FL (ref 82–98)
MCV RBC AUTO: 79 FL (ref 82–98)
MONOCYTES # BLD AUTO: 0.4 K/UL (ref 0.3–1)
MONOCYTES # BLD AUTO: 0.4 K/UL (ref 0.3–1)
MONOCYTES NFR BLD: 11.9 % (ref 4–15)
MONOCYTES NFR BLD: 11.9 % (ref 4–15)
MV MEAN GRADIENT: 1 MMHG
MV PEAK A VEL: 0.32 M/S
MV PEAK E VEL: 1.08 M/S
MV PEAK GRADIENT: 4 MMHG
MV STENOSIS PRESSURE HALF TIME: 57.12 MS
MV VALVE AREA BY CONTINUITY EQUATION: 1.48 CM2
MV VALVE AREA P 1/2 METHOD: 3.85 CM2
NEUTROPHILS # BLD AUTO: 2 K/UL (ref 1.8–7.7)
NEUTROPHILS # BLD AUTO: 2 K/UL (ref 1.8–7.7)
NEUTROPHILS NFR BLD: 55.6 % (ref 38–73)
NEUTROPHILS NFR BLD: 55.6 % (ref 38–73)
NRBC BLD-RTO: 0 /100 WBC
NRBC BLD-RTO: 0 /100 WBC
PISA MRMAX VEL: 0.04 M/S
PISA TR MAX VEL: 2.54 M/S
PLATELET # BLD AUTO: 264 K/UL (ref 150–450)
PLATELET # BLD AUTO: 264 K/UL (ref 150–450)
PMV BLD AUTO: 10.9 FL (ref 9.2–12.9)
PMV BLD AUTO: 10.9 FL (ref 9.2–12.9)
POCT GLUCOSE: 200 MG/DL (ref 70–110)
POCT GLUCOSE: 232 MG/DL (ref 70–110)
POCT GLUCOSE: 235 MG/DL (ref 70–110)
POTASSIUM SERPL-SCNC: 3.4 MMOL/L (ref 3.5–5.1)
POTASSIUM SERPL-SCNC: 3.4 MMOL/L (ref 3.5–5.1)
PV MEAN GRADIENT: 2 MMHG
PV PEAK VELOCITY: 0.86 CM/S
RA MAJOR: 4.96 CM
RA WIDTH: 2.9 CM
RBC # BLD AUTO: 4.27 M/UL (ref 4.6–6.2)
RBC # BLD AUTO: 4.27 M/UL (ref 4.6–6.2)
RIGHT VENTRICULAR END-DIASTOLIC DIMENSION: 3.17 CM
SODIUM SERPL-SCNC: 140 MMOL/L (ref 136–145)
SODIUM SERPL-SCNC: 140 MMOL/L (ref 136–145)
TDI LATERAL: 0.07 M/S
TDI SEPTAL: 0.04 M/S
TDI: 0.06 M/S
TR MAX PG: 26 MMHG
TRICUSPID ANNULAR PLANE SYSTOLIC EXCURSION: 1.12 CM
WBC # BLD AUTO: 3.61 K/UL (ref 3.9–12.7)
WBC # BLD AUTO: 3.61 K/UL (ref 3.9–12.7)

## 2022-01-06 PROCEDURE — 21400001 HC TELEMETRY ROOM

## 2022-01-06 PROCEDURE — 83735 ASSAY OF MAGNESIUM: CPT | Performed by: FAMILY MEDICINE

## 2022-01-06 PROCEDURE — 85025 COMPLETE CBC W/AUTO DIFF WBC: CPT | Performed by: FAMILY MEDICINE

## 2022-01-06 PROCEDURE — 83880 ASSAY OF NATRIURETIC PEPTIDE: CPT | Performed by: INTERNAL MEDICINE

## 2022-01-06 PROCEDURE — 25000003 PHARM REV CODE 250: Performed by: INTERNAL MEDICINE

## 2022-01-06 PROCEDURE — 36415 COLL VENOUS BLD VENIPUNCTURE: CPT | Performed by: FAMILY MEDICINE

## 2022-01-06 PROCEDURE — 63600175 PHARM REV CODE 636 W HCPCS: Performed by: INTERNAL MEDICINE

## 2022-01-06 PROCEDURE — 63600175 PHARM REV CODE 636 W HCPCS: Performed by: HOSPITALIST

## 2022-01-06 PROCEDURE — 94761 N-INVAS EAR/PLS OXIMETRY MLT: CPT

## 2022-01-06 PROCEDURE — 80048 BASIC METABOLIC PNL TOTAL CA: CPT | Performed by: FAMILY MEDICINE

## 2022-01-06 PROCEDURE — 25000003 PHARM REV CODE 250: Performed by: FAMILY MEDICINE

## 2022-01-06 PROCEDURE — 63600175 PHARM REV CODE 636 W HCPCS: Performed by: FAMILY MEDICINE

## 2022-01-06 RX ORDER — DOBUTAMINE HYDROCHLORIDE 400 MG/100ML
2.5 INJECTION INTRAVENOUS CONTINUOUS
Status: DISCONTINUED | OUTPATIENT
Start: 2022-01-06 | End: 2022-01-07

## 2022-01-06 RX ORDER — FUROSEMIDE 10 MG/ML
20 INJECTION INTRAMUSCULAR; INTRAVENOUS ONCE
Status: COMPLETED | OUTPATIENT
Start: 2022-01-06 | End: 2022-01-06

## 2022-01-06 RX ORDER — POTASSIUM CHLORIDE 20 MEQ/1
40 TABLET, EXTENDED RELEASE ORAL EVERY 6 HOURS
Status: COMPLETED | OUTPATIENT
Start: 2022-01-06 | End: 2022-01-06

## 2022-01-06 RX ORDER — MAGNESIUM SULFATE HEPTAHYDRATE 40 MG/ML
2 INJECTION, SOLUTION INTRAVENOUS ONCE
Status: COMPLETED | OUTPATIENT
Start: 2022-01-06 | End: 2022-01-06

## 2022-01-06 RX ADMIN — FUROSEMIDE 20 MG: 10 INJECTION, SOLUTION INTRAMUSCULAR; INTRAVENOUS at 05:01

## 2022-01-06 RX ADMIN — HYDRALAZINE HYDROCHLORIDE 25 MG: 25 TABLET, FILM COATED ORAL at 05:01

## 2022-01-06 RX ADMIN — HYDRALAZINE HYDROCHLORIDE 25 MG: 25 TABLET, FILM COATED ORAL at 09:01

## 2022-01-06 RX ADMIN — INSULIN ASPART 2 UNITS: 100 INJECTION, SOLUTION INTRAVENOUS; SUBCUTANEOUS at 11:01

## 2022-01-06 RX ADMIN — HYDRALAZINE HYDROCHLORIDE 25 MG: 25 TABLET, FILM COATED ORAL at 03:01

## 2022-01-06 RX ADMIN — HEPARIN SODIUM 5000 UNITS: 5000 INJECTION, SOLUTION INTRAVENOUS; SUBCUTANEOUS at 03:01

## 2022-01-06 RX ADMIN — MAGNESIUM SULFATE IN WATER 2 G: 40 INJECTION, SOLUTION INTRAVENOUS at 02:01

## 2022-01-06 RX ADMIN — HEPARIN SODIUM 5000 UNITS: 5000 INJECTION, SOLUTION INTRAVENOUS; SUBCUTANEOUS at 09:01

## 2022-01-06 RX ADMIN — DOBUTAMINE IN DEXTROSE 2.5 MCG/KG/MIN: 400 INJECTION, SOLUTION INTRAVENOUS at 02:01

## 2022-01-06 RX ADMIN — INSULIN ASPART 2 UNITS: 100 INJECTION, SOLUTION INTRAVENOUS; SUBCUTANEOUS at 04:01

## 2022-01-06 RX ADMIN — CLOPIDOGREL 75 MG: 75 TABLET, FILM COATED ORAL at 09:01

## 2022-01-06 RX ADMIN — POTASSIUM CHLORIDE 40 MEQ: 1500 TABLET, EXTENDED RELEASE ORAL at 03:01

## 2022-01-06 RX ADMIN — FUROSEMIDE 20 MG: 10 INJECTION, SOLUTION INTRAMUSCULAR; INTRAVENOUS at 03:01

## 2022-01-06 RX ADMIN — FUROSEMIDE 20 MG: 10 INJECTION, SOLUTION INTRAMUSCULAR; INTRAVENOUS at 09:01

## 2022-01-06 RX ADMIN — HEPARIN SODIUM 5000 UNITS: 5000 INJECTION, SOLUTION INTRAVENOUS; SUBCUTANEOUS at 05:01

## 2022-01-06 RX ADMIN — INSULIN ASPART 1 UNITS: 100 INJECTION, SOLUTION INTRAVENOUS; SUBCUTANEOUS at 09:01

## 2022-01-06 RX ADMIN — ASPIRIN 81 MG: 81 TABLET, DELAYED RELEASE ORAL at 09:01

## 2022-01-06 RX ADMIN — ATORVASTATIN CALCIUM 40 MG: 40 TABLET, FILM COATED ORAL at 09:01

## 2022-01-06 RX ADMIN — POTASSIUM CHLORIDE 40 MEQ: 1500 TABLET, EXTENDED RELEASE ORAL at 09:01

## 2022-01-06 NOTE — ASSESSMENT & PLAN NOTE
Patient's FSGs are uncontrolled due to hyperglycemia on current medication regimen.  Last A1c reviewed-   Lab Results   Component Value Date    HGBA1C 7.8 (H) 04/12/2021     Most recent fingerstick glucose reviewed-   Recent Labs   Lab 01/05/22  1607 01/05/22  2200   POCTGLUCOSE 247* 285*     Current correctional scale  Low  Maintain anti-hyperglycemic dose as follows-   Antihyperglycemics (From admission, onward)            Start     Stop Route Frequency Ordered    01/02/22 1917  insulin aspart U-100 pen 0-5 Units         -- SubQ Before meals & nightly PRN 01/02/22 1917        Hold Oral hypoglycemics while patient is in the hospital.

## 2022-01-06 NOTE — ASSESSMENT & PLAN NOTE
Patient is identified as having previously undiagnosed CHF heart failure that is Acute. CHF is currently uncontrolled due to Continued edema of extremities, Dyspnea not returned to baseline after 2 doses of IV diuretic, Rales/crackles on pulmonary exam and Pulmonary edema/pleural effusion on CXR.   IV lasix 80 mg in ER  Admit with the following:  Monitor on telemetry.   Monitor strict Is&Os and daily weights  Place on fluid restriction of 1.5 L.   BNP reviewed- and noted below   Recent Labs   Lab 01/06/22  0622   *     Echo ordered   Monitor on continuous pulse ox and telemetry and use supplemental oxygen to maintain sats >94%  Consult Cardiology, appreciate their recommendations  Trend troponin given h/o CAD  Strict I/Os, daily weights  Additional dose of lasix today  BNP trending down from admission but still with significant peripheral edema/volume overload  Dobutrex infusion 1/5

## 2022-01-06 NOTE — NURSING
Plan of care reviewed with pt. TAMERA throughout shift. Cardiac, diabetic diet. BS monitored and insulin given per MAR. Continue to Monitor Labs. VSS. Continuous telemetry monitor and pulse ox maintained. Safety maintained. Will continue to monitor. No complaints at this time.

## 2022-01-06 NOTE — PLAN OF CARE
Problem: Adult Inpatient Plan of Care  Goal: Plan of Care Review  Outcome: Ongoing, Progressing  Goal: Patient-Specific Goal (Individualized)  Outcome: Ongoing, Progressing  Goal: Absence of Hospital-Acquired Illness or Injury  Outcome: Ongoing, Progressing  Goal: Optimal Comfort and Wellbeing  Outcome: Ongoing, Progressing  Goal: Readiness for Transition of Care  Outcome: Ongoing, Progressing     Problem: Diabetes Comorbidity  Goal: Blood Glucose Level Within Targeted Range  Outcome: Ongoing, Progressing     Problem: Fall Injury Risk  Goal: Absence of Fall and Fall-Related Injury  Outcome: Ongoing, Progressing     Problem: Adjustment to Illness (Heart Failure)  Goal: Optimal Coping  Outcome: Ongoing, Progressing

## 2022-01-06 NOTE — SUBJECTIVE & OBJECTIVE
Interval History:  Has swelling of the lower extremities and shortness of breath when he moves around.  Denies chest pain, palpitations, nausea, vomiting or diarrhea.  He has an occasional cough which is nonproductive.    Review of Systems   Constitutional: Negative.    HENT: Negative.    Eyes: Negative.    Respiratory: Positive for cough and shortness of breath.    Cardiovascular: Negative.    Gastrointestinal: Negative.    Endocrine: Negative.    Genitourinary: Negative.    Musculoskeletal: Negative.    Skin: Negative.    Allergic/Immunologic: Negative.    Neurological: Negative.    Psychiatric/Behavioral: Negative.      Objective:     Vital Signs (Most Recent):  Temp: 98.7 °F (37.1 °C) (01/06/22 1152)  Pulse: 89 (01/06/22 1152)  Resp: 15 (01/06/22 1152)  BP: (!) 168/77 (01/06/22 1152)  SpO2: 98 % (01/06/22 1152) Vital Signs (24h Range):  Temp:  [95.6 °F (35.3 °C)-98.7 °F (37.1 °C)] 98.7 °F (37.1 °C)  Pulse:  [85-96] 89  Resp:  [14-18] 15  SpO2:  [95 %-98 %] 98 %  BP: (136-177)/(72-97) 168/77     Weight: 94.9 kg (209 lb 3.5 oz)  Body mass index is 32.77 kg/m².    Intake/Output Summary (Last 24 hours) at 1/6/2022 1528  Last data filed at 1/6/2022 1300  Gross per 24 hour   Intake 1398 ml   Output 1150 ml   Net 248 ml      Physical Exam  Constitutional:       Appearance: Normal appearance.   HENT:      Head: Normocephalic.      Right Ear: External ear normal.      Left Ear: External ear normal.      Nose: Nose normal.      Mouth/Throat:      Mouth: Mucous membranes are moist.   Eyes:      Extraocular Movements: Extraocular movements intact.   Cardiovascular:      Rate and Rhythm: Normal rate and regular rhythm.      Pulses: Normal pulses.      Heart sounds: Gallop present.       Comments: S4 present  Pulmonary:      Breath sounds: Rales present.   Abdominal:      General: Bowel sounds are normal.      Palpations: Abdomen is soft.      Comments: Obese   Musculoskeletal:         General: Normal range of motion.       Cervical back: Normal range of motion and neck supple.      Right lower leg: Edema present.      Left lower leg: Edema present.   Skin:     General: Skin is warm.      Capillary Refill: Capillary refill takes 2 to 3 seconds.   Neurological:      General: No focal deficit present.      Mental Status: He is alert and oriented to person, place, and time. Mental status is at baseline.   Psychiatric:         Mood and Affect: Mood normal.         Behavior: Behavior normal.         Significant Labs: All pertinent labs within the past 24 hours have been reviewed.    Significant Imaging: I have reviewed all pertinent imaging results/findings within the past 24 hours.

## 2022-01-06 NOTE — PROGRESS NOTES
"CARDIOLOGY PROGRESS NOTE    Patient Name:  Marshall Flor    :  1955    Medical Record:  [id number]    DATE OF SERVICE  2022        SUBJECTIVE  The patient is being seen for follow-up decreased shortness of breath and leg swelling      REVIEW OF SYSTEMS  General: No chills, No fever, No fatigue  Eyes: No vision changes, No drainage from eyes  ENT: No nasal congestion, no sore throat  Respiratory: + shortness of breath, No cough  Cardiac: No chest pain, palpitations, dyspnea, dizziness, claudication, or syncopal episodes  GI: No abdominal pain, no nausea, no vomiting  : No dysuria  Musculoskeletal: No joint pain  Neuro: No weakness, dizziness, vision changes       OBJECTIVE          PHYSICAL EXAM  Vital Signs:  BP (!) 168/77   Pulse 89   Temp 98.7 °F (37.1 °C)   Resp 15   Ht 5' 7" (1.702 m)   Wt 94.9 kg (209 lb 3.5 oz)   SpO2 98%   BMI 32.77 kg/m²   Temp:  [95.6 °F (35.3 °C)-98.7 °F (37.1 °C)] 98.7 °F (37.1 °C)  Pulse:  [85-96] 89  Resp:  [14-18] 15  SpO2:  [95 %-98 %] 98 %  BP: (136-177)/(72-97) 168/77      General:   Eyes: Anicteric sclera. Moist conjunctiva. Vision grossly intact.  HENMT: Normocephalic.  Moist mucous membranes. No obvious ulcerations.   Neck: Trachea midline.   Cardiovascular: Heart regular rate and rhythm. S1, S2, S3 2/6 systolic ejection murmur left sternal border.  Peripheral pulses palpable. 1-2+ peripheral edema.   Respiratory: Lungs clear to auscultation bilaterally. No increased work of breathing.  Gastrointestinal: Bowel sounds active in all 4 quadrants. Soft, nontender, nondistended.   Genitourinary: Urine clear yellow  Musculoskeletal: Moves all extremities with equal strength.   Skin: Color normal for ethnicity. Skin warm and dry with good turgor. Capillary refill < 3 seconds.   Neurologic: Oriented x 3.  Speech fluent, follows commands.  Psychiatric:  Awake and alert, normal affect mood good.      LABS    CBC  Recent Labs   Lab 22  0718 22  0634 " 01/06/22 0622   WBC 4.17 3.89* 3.61*  3.61*   RBC 4.53* 4.63 4.27*  4.27*   HGB 12.1* 12.3* 11.5*  11.5*   HCT 36.5* 36.9* 33.6*  33.6*   MCV 81* 80* 79*  79*   MCH 26.7* 26.6* 26.9*  26.9*   MCHC 33.2 33.3 34.2  34.2   RDW 13.7 13.5 13.3  13.3   MPV 11.2 11.2 10.9  10.9    254 264  264       Chemistry  Recent Labs   Lab 01/03/22  0834 01/03/22  0834 01/04/22  0718 01/05/22 0633 01/06/22 0622      < > 140 140 140  140   K 3.6   < > 3.3* 3.5 3.4*  3.4*      < > 103 103 103  103   CO2 21*   < > 21* 20* 22*  22*   BUN 15   < > 22 23 25*  25*   CREATININE 2.2*   < > 2.2* 2.2* 2.1*  2.1*   *   < > 212* 175* 201*  201*   PROT 6.5  --  6.0 5.9*  --    CALCIUM 8.4*   < > 8.3* 8.3* 8.8  8.8   BILITOT 0.8  --  0.8 0.7  --    AST 15  --  13 15  --    ALT 14  --  12 10  --     < > = values in this interval not displayed.       ABG  No results for input(s): PHART, TLV2CON, PO2ART, OLJ6XOR, P0LUNIUL, BEART, V0PRBVEM in the last 168 hours.      MICROBIOLOGY  Reviewed    IMAGING & OTHER STUDIES  Reviewed      Intake/Output last 3 shifts:  I/O last 3 completed shifts:  In: 1638 [P.O.:1620; I.V.:18]  Out: 1450 [Urine:1450]    Scheduled meds:   aspirin  81 mg Oral Daily    atorvastatin  40 mg Oral QHS    clopidogreL  75 mg Oral Daily    furosemide (LASIX) injection  20 mg Intravenous Q8H    heparin (porcine)  5,000 Units Subcutaneous Q8H    hydrALAZINE  25 mg Oral Q8H       PRN Meds:  sodium chloride 0.9%, acetaminophen, albuterol-ipratropium, dextrose 50%, dextrose 50%, glucagon (human recombinant), glucose, glucose, hydrALAZINE, HYDROcodone-acetaminophen, insulin aspart U-100, magnesium oxide, magnesium oxide, naloxone, ondansetron, polyethylene glycol, potassium, sodium phosphates, potassium, sodium phosphates, potassium, sodium phosphates, promethazine, sodium chloride 0.9%, trazodone    Continuous Infusions:      Dietary Orders:  [unfilled]     Admit weight: Weight: 98.9 kg (218  lb)   Today weight: Weight: 94.9 kg (209 lb 3.5 oz)      Length of stay in days: 2      ASSESSMENT    Active Hospital Problems    Diagnosis  POA    *New onset of congestive heart failure [I50.9]  Yes    Troponin level elevated [R77.8]  Yes    Stage 3b chronic kidney disease [N18.32]  Yes    Type 2 diabetes mellitus with stage 3b chronic kidney disease, without long-term current use of insulin [E11.22, N18.32]  Yes    Hypertensive urgency [I16.0]  Yes    Hyperlipidemia [E78.5]  Yes    H/O: CVA (cerebrovascular accident) [Z86.73]  Not Applicable    Coronary artery disease involving native coronary artery of native heart [I25.10]  Yes      Resolved Hospital Problems   No resolved problems to display.          PLAN      Congestive heart failure   Status post shortness of breath leg swelling x3 days  BNP 1467 improved to 850  Heparin 5000 subQ q.8 hours  States abnormal stress 1 year ago  Catheterization not done     History of CVA  Describesd as weakness  2016  Plavix     Hypertension  Monitor blood pressure     Diabetes  Treated medicine     Hypokalemia  K 3.4 1/5     Renal failure  Creatinine 2.1  1/6  Monitor renal function     Hypomagnesemia  Magnesium 1.6 1/5  Monitor magnesium        Plan  KCL 40 mg x2   Dobutamine 2 mics x6 hours  Lasix 20 mg additional x1   BMP Mg  in a.m.  Echo evaluation LV function valves CHF  Venous leg swelling shortness of breath          Electronically signed by: Rickey Tong, 1/6/2022 12:44 PM

## 2022-01-06 NOTE — PROGRESS NOTES
[facility] Medicine  Progress Note    Patient Name: Marshall Flor  MRN: [id number]  Patient Class: IP- Inpatient   Admission Date: 1/2/2022  Length of Stay: 2 days  Attending Physician: Brea Daniels MD  Primary Care Provider: Dorie Quan MD        Subjective:     Principal Problem:New onset of congestive heart failure        HPI:  65 yo male with past medical history of diabetes, hypertension, dyslipidemia, CAD, history of stroke presents to the ER with complaints of worsening shortness of breath over the last 3 days.  States that he walks frequently with his son who is present in the room and contributes to the history and has no problems until the last 3 days when he noticed increased difficulty walking due to shortness of breath and feeling fatigued.  Also noticed significant increase in swelling in his legs.  He has diabetes and takes insulin at home but does not check his sugar.  Does not check his blood pressure at home and is unsure what his normal range is.  He denies any associated chest pain, nausea, vomiting, diaphoresis but does report feeling very tired and sometimes dizzy.  Denies any new onset numbness or tingling, speech changes or confusion and son confirms this.  In the ER, he was hypertensive in the 180s systolic, BNP elevated to 1467. Chest x-ray with findings consistent with congestive heart failure.  Patient denies a personal history of congestive heart failure but does report that he was seen by cardiologist about a year ago and was told he had an abnormal stress test and needed stents.  Has not seen the doctor since then.  He does continue to take aspirin and Plavix for stroke several years ago.  Due to findings of new onset CHF versus hypertensive emergency/urgency, hospital team called for admission.        Overview/Hospital Course:  No notes on file    Interval History:  Has swelling of the lower extremities and shortness of breath when he moves around.  Denies  chest pain, palpitations, nausea, vomiting or diarrhea.  He has an occasional cough which is nonproductive.    Review of Systems   Constitutional: Negative.    HENT: Negative.    Eyes: Negative.    Respiratory: Positive for cough and shortness of breath.    Cardiovascular: Negative.    Gastrointestinal: Negative.    Endocrine: Negative.    Genitourinary: Negative.    Musculoskeletal: Negative.    Skin: Negative.    Allergic/Immunologic: Negative.    Neurological: Negative.    Psychiatric/Behavioral: Negative.      Objective:     Vital Signs (Most Recent):  Temp: 98.7 °F (37.1 °C) (01/06/22 1152)  Pulse: 89 (01/06/22 1152)  Resp: 15 (01/06/22 1152)  BP: (!) 168/77 (01/06/22 1152)  SpO2: 98 % (01/06/22 1152) Vital Signs (24h Range):  Temp:  [95.6 °F (35.3 °C)-98.7 °F (37.1 °C)] 98.7 °F (37.1 °C)  Pulse:  [85-96] 89  Resp:  [14-18] 15  SpO2:  [95 %-98 %] 98 %  BP: (136-177)/(72-97) 168/77     Weight: 94.9 kg (209 lb 3.5 oz)  Body mass index is 32.77 kg/m².    Intake/Output Summary (Last 24 hours) at 1/6/2022 1528  Last data filed at 1/6/2022 1300  Gross per 24 hour   Intake 1398 ml   Output 1150 ml   Net 248 ml      Physical Exam  Constitutional:       Appearance: Normal appearance.   HENT:      Head: Normocephalic.      Right Ear: External ear normal.      Left Ear: External ear normal.      Nose: Nose normal.      Mouth/Throat:      Mouth: Mucous membranes are moist.   Eyes:      Extraocular Movements: Extraocular movements intact.   Cardiovascular:      Rate and Rhythm: Normal rate and regular rhythm.      Pulses: Normal pulses.      Heart sounds: Gallop present.       Comments: S4 present  Pulmonary:      Breath sounds: Rales present.   Abdominal:      General: Bowel sounds are normal.      Palpations: Abdomen is soft.      Comments: Obese   Musculoskeletal:         General: Normal range of motion.      Cervical back: Normal range of motion and neck supple.      Right lower leg: Edema present.      Left lower leg:  Edema present.   Skin:     General: Skin is warm.      Capillary Refill: Capillary refill takes 2 to 3 seconds.   Neurological:      General: No focal deficit present.      Mental Status: He is alert and oriented to person, place, and time. Mental status is at baseline.   Psychiatric:         Mood and Affect: Mood normal.         Behavior: Behavior normal.         Significant Labs: All pertinent labs within the past 24 hours have been reviewed.    Significant Imaging: I have reviewed all pertinent imaging results/findings within the past 24 hours.      Assessment/Plan:      * New onset of congestive heart failure  Patient is identified as having previously undiagnosed CHF heart failure that is Acute. CHF is currently uncontrolled due to Continued edema of extremities, Dyspnea not returned to baseline after 2 doses of IV diuretic, Rales/crackles on pulmonary exam and Pulmonary edema/pleural effusion on CXR.   IV lasix 80 mg in ER  Admit with the following:  Monitor on telemetry.   Monitor strict Is&Os and daily weights  Place on fluid restriction of 1.5 L.   BNP reviewed- and noted below   Recent Labs   Lab 01/06/22  0622   *     Echo ordered   Monitor on continuous pulse ox and telemetry and use supplemental oxygen to maintain sats >94%  Consult Cardiology, appreciate their recommendations  Trend troponin given h/o CAD  Strict I/Os, daily weights  Additional dose of lasix today  BNP trending down from admission but still with significant peripheral edema/volume overload  Dobutrex infusion 1/5          Troponin level elevated  This is probably secondary to his congestive heart failure and chronic renal failure.  Cardiology consulted  Patient is a poor historian but does report that he was supposed to have stents placed last year but never did      Coronary artery disease involving native coronary artery of native heart  Patient reports prior abnormal stress test and was supposed to have stents placed  potentially however did not follow up  Repeat EKG and troponin   Telemetry monitoring  Remains without chest pain/jaw pain/neck pain/indigestion or atypical symptoms  Cardiology consulted      H/O: CVA (cerebrovascular accident)  Previously on ASA/Plavix  Resume on admission      Hyperlipidemia  Has prescription for lipitor in history but has not been taking  Resume on admission      Hypertensive urgency  Uncontrolled blood pressure   With subsequent pulmonary edema/CHF   Echo ordered  Would recommend some combination of ACEi, BB, aldactone if renal function stabilized  Hold ACEi initially due to MERCEDES  Given nitro paste in ER  Increase hydralazine  Additional dose of lasix today  Start dobutamine infusion        Type 2 diabetes mellitus with stage 3b chronic kidney disease, without long-term current use of insulin  Patient's FSGs are uncontrolled due to hyperglycemia on current medication regimen.  Last A1c reviewed-   Lab Results   Component Value Date    HGBA1C 7.8 (H) 04/12/2021     Most recent fingerstick glucose reviewed-   Recent Labs   Lab 01/05/22  1607 01/05/22  2200   POCTGLUCOSE 247* 285*     Current correctional scale  Low  Maintain anti-hyperglycemic dose as follows-   Antihyperglycemics (From admission, onward)            Start     Stop Route Frequency Ordered    01/02/22 1917  insulin aspart U-100 pen 0-5 Units         -- SubQ Before meals & nightly PRN 01/02/22 1917        Hold Oral hypoglycemics while patient is in the hospital.        Stage 3b chronic kidney disease  Elevated sCr however appears to be at patient's baseline  Continue to monitor daily BMP   Expect slight increase with IV lasix though has tolerated well  Follow BUN creatinine closely          VTE Risk Mitigation (From admission, onward)         Ordered     heparin (porcine) injection 5,000 Units  Every 8 hours         01/02/22 1917     IP VTE HIGH RISK PATIENT  Once         01/02/22 1917     Place sequential compression device  Until  discontinued         01/02/22 1917                Discharge Planning   STAR:      Code Status: Full Code   Is the patient medically ready for discharge?:     Reason for patient still in hospital (select all that apply): Treatment  Discharge Plan A: (P) Home Health   Discharge Delays: (P) None known at this time              Matt Jiménez MD  Department of Hospital Medicine   [facility]

## 2022-01-06 NOTE — PHYSICIAN QUERY
PT Name: Marshall Flor  MR #: [id number]     DOCUMENTATION CLARIFICATION     CDS/: Maria M Walker RN             Contact information: [email or web address]  This form is a permanent document in the medical record.    Query Date: January 6, 2022    By submitting this query, we are merely seeking further clarification of documentation.  Please utilize your independent clinical judgment when addressing the question(s) below.    The Medical Record contains the following:   Indicator Supporting Clinical Findings Location in Medical Record    Kidney (Renal) Insufficiency     x Kidney (Renal) Failure/  Injury Would recommend some combination of ACEi, BB, aldactone  Hold ACEi initially due to MERCEDES    Stage 3b chronic kidney disease  Elevated sCr however appears to be at patient's baseline  Continue to monitor daily BMP  Expect slight increase with IV lasix   H&P 1/2       Hospital Medicine      H&P 1/2       Hospital Medicine    Nephrotoxic Agents     x BUN/Creatinine           GFR BUN 16 15 22 23 25 (H)   Cr 2.2 (H) 2.2 (H) 2.2 (H) 2.2 (H) 2.1 (H)   GFR  30.5 (A) 30.5 (A) 30.5 (A) 30.5 (A) 32.3 (A)        4/12/2021 09:52   BUN 27 (H)   Cr 2.2 (H)   GFR  30.7 (A)       Lab 1/2- 1/6            Previous Encounter 4/12/21    Urine: Casts         Eosinophils      Dehydration      Nausea/Vomiting      Dialysis/CRRT      Treatment      Other         [facility] approved diagnostic criteria for acute kidney injury is based on KDIGO criteria:    An increase in serum creatinine > 0.3mg/dl within 48 hours  OR  Increase in serum creatinine to > 1.5x baseline, which is known or presumed to have occurred within the prior 7 days  OR  Urine volume <0.5 ml/kg/hr for 6 hours       The clinical guidelines noted above are only a system guideline. It does not replace the providers clinical judgment.     Provider, please clarify the renal diagnosis or diagnoses associated with above clinical findings.     [ x   ]  Acute Kidney  Failure/Injury on Chronic Kidney Disease (CKD) stage 3b - (Moderately to severely decreased eGFR 30-44)     [    ] Chronic Kidney Disease (CKD) stage 3b -( Moderately to severely decreased eGFR 30-44) only, without MERCEDES     [    ] Other (please specify): _______________________________     [  ] Clinically Undetermined         Please document in your progress notes daily for the duration of treatment until resolved and include in your discharge summary.    References:   [employer] Clinical Practice Guideline for Acute Kidney Injury. (2012, March). Retrieved October 21, 2020, from https://kdigo.org/wp-content/uploads/2016/10/DCRQU-1881-TWC-Guideline-English.pdf    LEAH Ortiz MD, TAMMY Cooley MD, & UNRULY Mittal MD. (1960). Renal medullary necrosis [Abstract]. The American Journal of Medicine, 29(1), 132-156. Doi:https://www.sciencedirect.com/science/article/abs/pii/6171266748776899    UNRULY Elliott MD, & ALYSSIA Mosquera MD, MS. (2020, June 18). Definition and staging of chronic kidney disease in adults ([id number] TAMMY Iglesias MD, ScD & [id number] KHALIDA Caceres MD, MSc, Eds.). Retrieved October 21, 2020, from https://[email or web address]/contents/definition-and-staging-of-chronic-kidney-disease-in-adults?search=ckd%20staging&source=search_result&selectedTitle=1~150&usage_type=default&display_rank=1     JUANCARLOS Callahan MD, FACP. (2015, Lucy 15). Acute kidney injury revisited. Retrieved October 21, 2020, from https://acphospitalist.org/archives/2015/06/coding-acute-kidney-injury.htm    MELISSA Leigh MD. (2019, July). Renal Cortical Necrosis. Retrieved October 21, 2020, from [email or web address]-disorders/renal-cortical-necrosis    Form No. [id number]

## 2022-01-07 VITALS
DIASTOLIC BLOOD PRESSURE: 85 MMHG | OXYGEN SATURATION: 99 % | WEIGHT: 203.5 LBS | RESPIRATION RATE: 17 BRPM | BODY MASS INDEX: 31.94 KG/M2 | HEART RATE: 92 BPM | HEIGHT: 67 IN | SYSTOLIC BLOOD PRESSURE: 155 MMHG | TEMPERATURE: 97 F

## 2022-01-07 PROBLEM — I16.0 HYPERTENSIVE URGENCY: Status: RESOLVED | Noted: 2022-01-02 | Resolved: 2022-01-07

## 2022-01-07 LAB
ANION GAP SERPL CALC-SCNC: 17 MMOL/L (ref 8–16)
BUN SERPL-MCNC: 24 MG/DL (ref 8–23)
CALCIUM SERPL-MCNC: 8.8 MG/DL (ref 8.7–10.5)
CHLORIDE SERPL-SCNC: 106 MMOL/L (ref 95–110)
CO2 SERPL-SCNC: 17 MMOL/L (ref 23–29)
CREAT SERPL-MCNC: 2.2 MG/DL (ref 0.5–1.4)
EST. GFR  (AFRICAN AMERICAN): 35.3 ML/MIN/1.73 M^2
EST. GFR  (NON AFRICAN AMERICAN): 30.5 ML/MIN/1.73 M^2
GLUCOSE SERPL-MCNC: 193 MG/DL (ref 70–110)
MAGNESIUM SERPL-MCNC: 1.8 MG/DL (ref 1.6–2.6)
POCT GLUCOSE: 171 MG/DL (ref 70–110)
POCT GLUCOSE: 186 MG/DL (ref 70–110)
POTASSIUM SERPL-SCNC: 4.1 MMOL/L (ref 3.5–5.1)
SODIUM SERPL-SCNC: 140 MMOL/L (ref 136–145)

## 2022-01-07 PROCEDURE — 83735 ASSAY OF MAGNESIUM: CPT | Performed by: INTERNAL MEDICINE

## 2022-01-07 PROCEDURE — 94761 N-INVAS EAR/PLS OXIMETRY MLT: CPT

## 2022-01-07 PROCEDURE — 63600175 PHARM REV CODE 636 W HCPCS: Performed by: HOSPITALIST

## 2022-01-07 PROCEDURE — 25000003 PHARM REV CODE 250: Performed by: FAMILY MEDICINE

## 2022-01-07 PROCEDURE — 36415 COLL VENOUS BLD VENIPUNCTURE: CPT | Performed by: INTERNAL MEDICINE

## 2022-01-07 PROCEDURE — 80048 BASIC METABOLIC PNL TOTAL CA: CPT | Performed by: INTERNAL MEDICINE

## 2022-01-07 PROCEDURE — 63600175 PHARM REV CODE 636 W HCPCS: Performed by: FAMILY MEDICINE

## 2022-01-07 RX ORDER — SODIUM BICARBONATE 650 MG/1
650 TABLET ORAL 2 TIMES DAILY
Status: DISCONTINUED | OUTPATIENT
Start: 2022-01-07 | End: 2022-01-07 | Stop reason: HOSPADM

## 2022-01-07 RX ORDER — LISINOPRIL 2.5 MG/1
2.5 TABLET ORAL DAILY
Qty: 90 TABLET | Refills: 3 | Status: ON HOLD | OUTPATIENT
Start: 2022-01-07 | End: 2022-02-02 | Stop reason: HOSPADM

## 2022-01-07 RX ORDER — SODIUM BICARBONATE 650 MG/1
650 TABLET ORAL 2 TIMES DAILY
Qty: 60 TABLET | Refills: 11 | Status: ON HOLD | OUTPATIENT
Start: 2022-01-07 | End: 2022-06-03 | Stop reason: HOSPADM

## 2022-01-07 RX ORDER — FUROSEMIDE 40 MG/1
20 TABLET ORAL 2 TIMES DAILY
Qty: 30 TABLET | Refills: 11 | Status: ON HOLD | OUTPATIENT
Start: 2022-01-07 | End: 2022-02-02 | Stop reason: SDUPTHER

## 2022-01-07 RX ORDER — ASPIRIN 81 MG/1
81 TABLET ORAL DAILY
Qty: 30 TABLET | Refills: 3 | Status: ON HOLD | OUTPATIENT
Start: 2022-01-08 | End: 2022-06-03

## 2022-01-07 RX ADMIN — CLOPIDOGREL 75 MG: 75 TABLET, FILM COATED ORAL at 08:01

## 2022-01-07 RX ADMIN — HEPARIN SODIUM 5000 UNITS: 5000 INJECTION, SOLUTION INTRAVENOUS; SUBCUTANEOUS at 06:01

## 2022-01-07 RX ADMIN — ASPIRIN 81 MG: 81 TABLET, DELAYED RELEASE ORAL at 08:01

## 2022-01-07 RX ADMIN — HYDRALAZINE HYDROCHLORIDE 25 MG: 25 TABLET, FILM COATED ORAL at 06:01

## 2022-01-07 RX ADMIN — FUROSEMIDE 20 MG: 10 INJECTION, SOLUTION INTRAMUSCULAR; INTRAVENOUS at 06:01

## 2022-01-07 NOTE — PHYSICIAN QUERY
PT Name: Marshall Flor  MR #: [id number]     DOCUMENTATION CLARIFICATION     CDS/: Maria M Walker RN              Contact information: [email or web address]  This form is a permanent document in the medical record.     Query Date: January 7, 2022    By submitting this query, we are merely seeking further clarification of documentation.  Please utilize your independent clinical judgment when addressing the question(s) below.    The Medical Record contains the following   Indicators Supporting Clinical Findings Location in Medical Record   x Heart Failure documented New onset of congestive heart failure  Patient is identified as having previously undiagnosed CHF heart failure that is Acute. CHF is currently uncontrolled due to Continued edema of extremities, Dyspnea not returned to baseline after 2 doses of IV diuretic, Rales/crackles on pulmonary exam and Pulmonary edema/pleural effusion on CXR.   IV lasix 80 mg in ER   Progress Note 1/6       Hospital Medicine   x BNP BNP 1,467 (H) 850 (H) 579 (H)       Lab 1/2, 1/4, 1/6   x EF/Echo · The left ventricle is mildly enlarged with mild concentric hypertrophy and moderately decreased systolic function.  · The estimated ejection fraction is 39%.  · Mild right ventricular enlargement.  · Moderate left atrial enlargement.  · Moderate mitral regurgitation.  · Mild pulmonic regurgitation.  · Small pericardial effusion.     Echo 1/3   x Radiology findings Interval development of mild bilateral right more so than left lung infiltrate and probable small bilateral pleural effusions suggesting CHF    CXR 1/2   x Subjective/Objective Respiratory Conditions presents to the ER with complaints of worsening shortness of breath over the last 3 days.  Progress Note 1/6       Hospital Medicine    Recent/Current MI      Heart Transplant, LVAD     x Edema, JVD Has swelling of the lower extremities and shortness of breath when he moves around. Progress Note 1/6       Hospital Medicine     Ascites     x Diuretics/Meds Furosemide injection MAR  1/2- present      Other Treatment      Other       Heart failure is a clinical diagnosis which includes symptomatic fluid retention, elevated intracardiac pressures, and/or the inability of the heart to deliver adequate blood flow.     Heart Failure with reduced Ejection Fraction (HFrEF) or Systolic Heart Failure (loses ability to contract normally, EF is <40%)     Heart Failure with preserved Ejection Fraction (HFpEF) or Diastolic Heart Failure (stiff ventricles, does not relax properly, EF is >50%)      Heart Failure with Combined Systolic and Diastolic Failure (stiff ventricles, does not relax properly and EF is <50%)     Mid-range or mildly reduced ejection fraction (HFmrEF) is classified as systolic heart failure.   Common clues to acute exacerbation:  Rapidly progressive symptoms (w/in 2 weeks of presentation), using IV diuretics, using supplemental O2, pulmonary edema on Xray, new or worsening pleural effusion, +JVD or other signs of volume overload, MI w/in 4 weeks, and/or BNP >500  The clinical guidelines noted are only system guidelines, and do not replace the providers clinical judgment.    Provider, please specify the Congestive Heart Failure diagnosis associated with the above clinical findings.    [ x  ]  Acute Systolic Heart Failure (HFrEF or HFmrEF) - new diagnosis     [   ]  Other (please specify): ___________________________________     [  ]  Clinically Undetermined       Please document in your progress notes daily for the duration of treatment until resolved and include in your discharge summary.    References:  American Heart Association editorial staff. (2017, May). Ejection Fraction Heart Failure Measurement. American Heart Association.  https://www.heart.org/en/health-topics/heart-failure/diagnosing-heart-failure/ejection-fraction-heart-failure-measurement#:~:text=Ejection%20fraction%20(EF)%20is%20a,pushed%20out%20with%20each%20heartbeat  LEONARDO Medina (2020, December 15). Heart failure with preserved ejection fraction: Clinical manifestations and diagnosis. idiagte. https://www.[employer].Termii webtech limited/contents/heart-failure-with-preserved-ejection-fraction-clinical-manifestations-and-diagnosis.  ICD-10-CM/PCS Coding Clinic Third Quarter ICD-10, Effective with discharges: September 8, 2020 [facility] § Heart failure with mid-range or mildly reduced ejection fraction (2020).  Form No. 93935

## 2022-01-07 NOTE — PLAN OF CARE
01/07/22 0815   Medicare Message   Important Message from Medicare regarding Discharge Appeal Rights Explained to patient/caregiver;Given to patient/caregiver;Signed/date by patient/caregiver   Date IMM was signed 01/07/22   Time IMM was signed 0815

## 2022-01-07 NOTE — NURSING
Patient AAOX4, respirations even and unlabored. Discharge instructions reviewed with the patient and son and verbalizes understanding. New prescriptions reviewed with the patient and verbalizes understanding. Patient transported to personal vehicle via wheel chair to be discharged home with family. All personal belongings sent with the patient. Patient denies any chest pains.

## 2022-01-07 NOTE — DISCHARGE SUMMARY
[facility] Medicine  Discharge Summary      Patient Name: Marshall Flor  MRN: [id number]  Admission Date: 1/2/2022  Hospital Length of Stay: 3 days  Discharge Date and Time:  01/07/2022 1:42 PM  Attending Physician: Brea Daniels MD   Discharging Provider: Matt Jiménez MD  Primary Care Provider: Dorie Quan MD    Admitting diagnosis:    1. New onset congestive heart failure    2. Hypertensive urgency    Final diagnosis:     1. New onset acute congestive heart failure    2. Hypertensive urgency    3. Elevated troponin    4. Coronary artery disease    5. Type 2 diabetes mellitus    6. Chronic kidney disease    7. Hyperlipidemia    Reason for hospitalization:  To treat new onset congestive heart failure with renal disease.        HPI:  66-year-old diabetic male with a history of hypertension, coronary artery disease, type 2 diabetes in chronic renal failure started having shortness of breath 3 days prior to admission.  He also noticed some swelling of his lower extremities.  He was seen and evaluated in emergency room and was noted to have a BNP of 1467.  His chest x-ray was consistent with congestive heart failure.  His systolic blood pressure was in the high 180s.  He had no previous history of congestive heart failure.  Hospitalist service was asked to admit and treat but new onset congestive heart failure.  Patient also had elevated troponin but no chest pain    * No surgery found *      Hospital Course:  He placed on telemetry.  His congestive heart failure was treated with IV Lasix.  Cardiology consult was not obtained.  His echocardiogram reveal ejection pressure about 38%.  His felt that the patient had new onset acute systolic congestive heart failure.  Care was taken not to over diurese this patient because of renal failure and his creatinine was monitor closely.  He did not have any chest pain during his hospital stay.  Every night any acute changes on his EKG.  It was felt  that his elevation in troponin was secondary to renal disease and congestive heart failure.  He continue to improve want to be discharged.  He had less swelling of lower extremities and he could walk around without being short of breath.  He was seen by the cardiologist yesterday recommended discharged today if no deterioration his cardiac status    Consults:   Consults (From admission, onward)        Status Ordering Provider     Inpatient consult to Cardiology  Once        Provider:  MD Lida Laird JESSICA L.          Final Active Diagnoses:    Diagnosis Date Noted POA    Troponin level elevated [R77.8] 01/03/2022 Yes    New onset of congestive heart failure [I50.9] 01/02/2022 Yes    Stage 3b chronic kidney disease [N18.32] 01/02/2022 Yes    Type 2 diabetes mellitus with stage 3b chronic kidney disease, without long-term current use of insulin [E11.22, N18.32] 01/02/2022 Yes    Hyperlipidemia [E78.5] 01/02/2022 Yes    H/O: CVA (cerebrovascular accident) [Z86.73] 01/02/2022 Not Applicable    Coronary artery disease involving native coronary artery of native heart [I25.10] 01/02/2022 Yes      Problems Resolved During this Admission:    Diagnosis Date Noted Date Resolved POA    PRINCIPAL PROBLEM:  Hypertensive urgency [I16.0] 01/02/2022 01/07/2022 Yes      Discharged Condition: fair    [place] [facility] [place]    Follow Up:   Follow-up Information     Allie Valdez NP for Dr Quan. Go on 1/13/2022.    Why: appointment Thursday Jan 13th at 3:30pm for hospital follow up  Contact information:  [address] [place] [address]  [phone]           Dr Rickey Tong. Go on 1/20/2022.    Why: appointment Thursday Jan 20th at 2:45pm for cardiology follow up  Contact information:  [address]  [place] [address] [phone]                     Patient Instructions:      HOME HEALTH ORDERS   Order Comments: Subsequent Home Health Orders    Current Medications:  Current  Facility-Administered Medications:  0.9%  NaCl infusion, , Intravenous, PRN, Brea Daniels MD, Stopped at 01/04/22 1202  acetaminophen tablet 650 mg, 650 mg, Oral, Q4H PRN, Brea Daniels MD  albuterol-ipratropium 2.5 mg-0.5 mg/3 mL nebulizer solution 3 mL, 3 mL, Nebulization, Q6H PRN, Brea Daniels MD  aspirin EC tablet 81 mg, 81 mg, Oral, Daily, Brea Daniels MD, 81 mg at 01/07/22 0807  atorvastatin tablet 40 mg, 40 mg, Oral, QHS, Brea Daniels MD, 40 mg at 01/06/22 2134  clopidogreL tablet 75 mg, 75 mg, Oral, Daily, Brea Daniels MD, 75 mg at 01/07/22 0807  dextrose 50% injection 12.5 g, 12.5 g, Intravenous, PRN, Brea Daniels MD  dextrose 50% injection 25 g, 25 g, Intravenous, PRN, Brea Daniels MD  furosemide injection 20 mg, 20 mg, Intravenous, Q8H, Matt Jiménez MD, 20 mg at 01/07/22 0602  glucagon (human recombinant) injection 1 mg, 1 mg, Intramuscular, PRN, Brea Daniels MD  glucose chewable tablet 16 g, 16 g, Oral, PRN, Brea Daniels MD  glucose chewable tablet 24 g, 24 g, Oral, PRN, Brea Daniels MD  heparin (porcine) injection 5,000 Units, 5,000 Units, Subcutaneous, Q8H, Brea Daniels MD, 5,000 Units at 01/07/22 0602  hydrALAZINE injection 10 mg, 10 mg, Intravenous, Q8H PRN, Mono Henriquez MD, 10 mg at 01/04/22 1126  hydrALAZINE tablet 25 mg, 25 mg, Oral, Q8H, Brea Daniels MD, 25 mg at 01/07/22 0602  HYDROcodone-acetaminophen 5-325 mg per tablet 1 tablet, 1 tablet, Oral, Q6H PRN, Brea Daniels MD  insulin aspart U-100 pen 0-5 Units, 0-5 Units, Subcutaneous, QID (AC + HS) PRN, Brea Daniels MD, 1 Units at 01/06/22 2138  magnesium oxide tablet 800 mg, 800 mg, Oral, PRN, Brea Daniels MD  magnesium oxide tablet 800 mg, 800 mg, Oral, PRN, Brea Daniels MD  naloxone 0.4 mg/mL injection 0.02 mg, 0.02 mg, Intravenous, PRN, Brea Daniels MD  ondansetron injection 4 mg, 4 mg, Intravenous, Q8H PRN,  Brea Daniels MD  polyethylene glycol packet 17 g, 17 g, Oral, Daily PRN, Brea Daniels MD  potassium, sodium phosphates 280-160-250 mg packet 2 packet, 2 packet, Oral, PRN, Brea Daniels MD  potassium, sodium phosphates 280-160-250 mg packet 2 packet, 2 packet, Oral, PRN, Brea Daniels MD  potassium, sodium phosphates 280-160-250 mg packet 2 packet, 2 packet, Oral, PRN, Brea Daniels MD  promethazine tablet 25 mg, 25 mg, Oral, Q6H PRN, Brea Daniels MD  sodium bicarbonate tablet 650 mg, 650 mg, Oral, BID, Matt Jiménez MD  sodium chloride 0.9% flush 10 mL, 10 mL, Intravenous, Q8H PRN, Brea Daniels MD  trazodone split tablet 25 mg, 25 mg, Oral, Nightly PRN, Brea Daniels MD        Nursing:   Diabetic Care:   SN to perform and educate Diabetic management with blood glucose monitoring:, Fingerstick blood sugar a.m. and p.m., and Report CBG < 60 or > 350 to physician.    Diet:   cardiac diet and diabetic diet 1800 calorie    Activities:   activity as tolerated    Labs:  SN to perform labs:  CBC: Biweekly; 4 week(s) and BMP: Biweekly; 4week(s) and Report Lab results to PCP.    Referrals/ Consults  Physical Therapy to evaluate and treat. Evaluate for home safety and equipment needs; Establish/upgrade home exercise program. Perform / instruct on therapeutic exercises, gait training, transfer training, and Range of Motion.  Occupational Therapy to evaluate and treat. Evaluate home environment for safety and equipment needs. Perform/Instruct on transfers, ADL training, ROM, and therapeutic exercises.  Aide to provide assistance with personal care, ADLs, and vital signs.    Home Health Aide:  Nursing Three times weekly, Physical Therapy Three times weekly, Occupational Therapy Three times weekly, and Home Health Aide Three times weekly     Order Specific Question Answer Comments   What Home Health Agency is the patient currently using? Other/External       Medications:  Reconciled Home Medications:      Medication List      START taking these medications    aspirin 81 MG EC tablet  Commonly known as: ECOTRIN  Take 1 tablet (81 mg total) by mouth once daily.  Start taking on: January 8, 2022     furosemide 40 MG tablet  Commonly known as: LASIX  Take 0.5 tablets (20 mg total) by mouth 2 (two) times daily.     lisinopriL 2.5 MG tablet  Commonly known as: PRINIVIL,ZESTRIL  Take 1 tablet (2.5 mg total) by mouth once daily.     sodium bicarbonate 650 MG tablet  Take 1 tablet (650 mg total) by mouth 2 (two) times daily.        CHANGE how you take these medications    clopidogreL 75 mg tablet  Commonly known as: PLAVIX  Take 75 mg by mouth once daily.  What changed: Another medication with the same name was removed. Continue taking this medication, and follow the directions you see here.        CONTINUE taking these medications    allopurinoL 100 MG tablet  Commonly known as: ZYLOPRIM  = 2 tab, Oral, Daily, # 60 tab, 5 Refill(s), Pharmacy: [id number], 167, cm, 07/27/21 8:24:00 CDT, Height/Length Measured, 91.5, kg, 12/15/20 11:18:00 CST, Weight Dosing     amLODIPine 10 MG tablet  Commonly known as: NORVASC  10 mg.     atorvastatin 40 MG tablet  Commonly known as: LIPITOR  = 1 tab, Oral, Daily, # 90 tab, 1 Refill(s), Soft Stop, Pharmacy: [id number], 167, cm, 04/07/21 14:01:00 CDT, Height/Length Measured, 91.5, kg, 12/15/20 11:18:00 CST, Weight Dosing     linaGLIPtin 5 mg Tab tablet  Commonly known as: TRADJENTA  5 mg.     NovoLOG Mix 70-30FlexPen U-100 100 unit/mL (70-30) Inpn pen  Generic drug: insulin aspart protamine-insulin aspart  Inject into the skin.        STOP taking these medications    hydroCHLOROthiazide 25 MG tablet  Commonly known as: HYDRODIURIL            Significant Diagnostic Studies: Radiology: X-Ray: CXR: X-Ray Chest 1 View (CXR): No results found for this visit on 01/02/22.    Pending Diagnostic Studies:     Procedure Component  Value Units Date/Time    EKG 12-lead [[id number]] Collected: 01/05/22 1343    Order Status: Sent Lab Status: In process Updated: 01/05/22 1522    Narrative:      Test Reason : I48.91,    Vent. Rate : 082 BPM     Atrial Rate : 082 BPM     P-R Int : 148 ms          QRS Dur : 092 ms      QT Int : 470 ms       P-R-T Axes : 051 053 207 degrees     QTc Int : 549 ms    Normal sinus rhythm  Possible Left atrial enlargement  LVH with repolarization abnormality  Prolonged QT  Abnormal ECG  When compared with ECG of 04-JAN-2022 10:18,  No significant change was found    Referred By: [email or web address]   SELF           Confirmed By:         Indwelling Lines/Drains at time of discharge:   Lines/Drains/Airways     None                 Time spent on the discharge of patient: 23 minutes         Matt Jiménez MD  Department of Hospital Medicine  [facility]

## 2022-01-07 NOTE — PLAN OF CARE
Problem: Adult Inpatient Plan of Care  Goal: Plan of Care Review  Outcome: Met     Problem: Adult Inpatient Plan of Care  Goal: Patient-Specific Goal (Individualized)  Outcome: Met     Problem: Adult Inpatient Plan of Care  Goal: Absence of Hospital-Acquired Illness or Injury  Outcome: Met     Problem: Adult Inpatient Plan of Care  Goal: Optimal Comfort and Wellbeing  Outcome: Met     Problem: Adult Inpatient Plan of Care  Goal: Readiness for Transition of Care  Outcome: Met     Problem: Diabetes Comorbidity  Goal: Blood Glucose Level Within Targeted Range  Outcome: Met

## 2022-01-07 NOTE — NURSING
Patient AAOX4, respirations even and unlabored. No distress noted. Patient refused bed alarm, educated on falls and safety and verbalizes understanding. MD and charge nurse notified. Call light within reach, bed in lowest position, wheels locked. Patient denies any needs at this time.

## 2022-01-07 NOTE — PLAN OF CARE
01/07/22 1400   Final Note   Assessment Type Final Discharge Note   Anticipated Discharge Disposition Home-Health   What phone number can be called within the next 1-3 days to see how you are doing after discharge? [phone]   Hospital Resources/Appts/Education Provided Appointments scheduled and added to AVS   Post-Acute Status   Post-Acute Authorization Home Health   Home Health Status Set-up Complete/Auth obtained   Verbal & written follow up appointments with Dr Carter & Dr Tong provided to patient. Home Health services set up with [facility]. They will see patient tomorrow. Demonstrated understanding by verbal feedback. Denies any other needs at this time.

## 2022-01-24 ENCOUNTER — HOSPITAL ENCOUNTER (INPATIENT)
Facility: HOSPITAL | Age: 67
LOS: 9 days | Discharge: HOME HEALTH | DRG: 304 | End: 2022-02-02
Attending: FAMILY MEDICINE | Admitting: HOSPITALIST
Payer: MEDICARE

## 2022-01-24 DIAGNOSIS — I50.9 CHF (CONGESTIVE HEART FAILURE), NYHA CLASS I: ICD-10-CM

## 2022-01-24 DIAGNOSIS — I16.9 HYPERTENSIVE CRISIS: Primary | ICD-10-CM

## 2022-01-24 DIAGNOSIS — N18.9 CHRONIC KIDNEY DISEASE, UNSPECIFIED CKD STAGE: ICD-10-CM

## 2022-01-24 DIAGNOSIS — E16.2 HYPOGLYCEMIA: ICD-10-CM

## 2022-01-24 DIAGNOSIS — R07.9 CHEST PAIN: ICD-10-CM

## 2022-01-24 DIAGNOSIS — U07.1 COVID-19 VIRUS INFECTION: ICD-10-CM

## 2022-01-24 DIAGNOSIS — I48.91 ATRIAL FIBRILLATION: ICD-10-CM

## 2022-01-24 DIAGNOSIS — R06.02 SOB (SHORTNESS OF BREATH): ICD-10-CM

## 2022-01-24 DIAGNOSIS — R06.03 RESPIRATORY DISTRESS: ICD-10-CM

## 2022-01-24 LAB
ALBUMIN SERPL BCP-MCNC: 3.3 G/DL (ref 3.5–5.2)
ALP SERPL-CCNC: 121 U/L (ref 55–135)
ALT SERPL W/O P-5'-P-CCNC: 14 U/L (ref 10–44)
ANION GAP SERPL CALC-SCNC: 20 MMOL/L (ref 8–16)
AST SERPL-CCNC: 16 U/L (ref 10–40)
BACTERIA #/AREA URNS HPF: NORMAL /HPF
BASOPHILS # BLD AUTO: 0.01 K/UL (ref 0–0.2)
BASOPHILS NFR BLD: 0.2 % (ref 0–1.9)
BILIRUB SERPL-MCNC: 0.6 MG/DL (ref 0.1–1)
BILIRUB UR QL STRIP: NEGATIVE
BUN SERPL-MCNC: 13 MG/DL (ref 8–23)
CALCIUM SERPL-MCNC: 9.2 MG/DL (ref 8.7–10.5)
CHLORIDE SERPL-SCNC: 105 MMOL/L (ref 95–110)
CLARITY UR: CLEAR
CO2 SERPL-SCNC: 17 MMOL/L (ref 23–29)
COLOR UR: YELLOW
CREAT SERPL-MCNC: 2 MG/DL (ref 0.5–1.4)
DIFFERENTIAL METHOD: ABNORMAL
EOSINOPHIL # BLD AUTO: 0 K/UL (ref 0–0.5)
EOSINOPHIL NFR BLD: 0.6 % (ref 0–8)
ERYTHROCYTE [DISTWIDTH] IN BLOOD BY AUTOMATED COUNT: 14.4 % (ref 11.5–14.5)
EST. GFR  (AFRICAN AMERICAN): 39.6 ML/MIN/1.73 M^2
EST. GFR  (NON AFRICAN AMERICAN): 34.3 ML/MIN/1.73 M^2
GLUCOSE SERPL-MCNC: 399 MG/DL (ref 70–110)
GLUCOSE UR QL STRIP: NEGATIVE
HCT VFR BLD AUTO: 44.3 % (ref 40–54)
HGB BLD-MCNC: 14.1 G/DL (ref 14–18)
HGB UR QL STRIP: ABNORMAL
HYALINE CASTS #/AREA URNS LPF: 0 /LPF
IMM GRANULOCYTES # BLD AUTO: 0.02 K/UL (ref 0–0.04)
IMM GRANULOCYTES NFR BLD AUTO: 0.4 % (ref 0–0.5)
KETONES UR QL STRIP: NEGATIVE
LEUKOCYTE ESTERASE UR QL STRIP: NEGATIVE
LYMPHOCYTES # BLD AUTO: 0.9 K/UL (ref 1–4.8)
LYMPHOCYTES NFR BLD: 17 % (ref 18–48)
MCH RBC QN AUTO: 25.8 PG (ref 27–31)
MCHC RBC AUTO-ENTMCNC: 31.8 G/DL (ref 32–36)
MCV RBC AUTO: 81 FL (ref 82–98)
MICROSCOPIC COMMENT: NORMAL
MONOCYTES # BLD AUTO: 0.3 K/UL (ref 0.3–1)
MONOCYTES NFR BLD: 6.3 % (ref 4–15)
NEUTROPHILS # BLD AUTO: 4.1 K/UL (ref 1.8–7.7)
NEUTROPHILS NFR BLD: 75.5 % (ref 38–73)
NITRITE UR QL STRIP: NEGATIVE
NRBC BLD-RTO: 0 /100 WBC
PH UR STRIP: 7 [PH] (ref 5–8)
PLATELET # BLD AUTO: 443 K/UL (ref 150–450)
PMV BLD AUTO: 10.3 FL (ref 9.2–12.9)
POCT GLUCOSE: 115 MG/DL (ref 70–110)
POCT GLUCOSE: 150 MG/DL (ref 70–110)
POCT GLUCOSE: 201 MG/DL (ref 70–110)
POCT GLUCOSE: 65 MG/DL (ref 70–110)
POTASSIUM SERPL-SCNC: 3.4 MMOL/L (ref 3.5–5.1)
PROT SERPL-MCNC: 8.6 G/DL (ref 6–8.4)
PROT UR QL STRIP: ABNORMAL
RBC # BLD AUTO: 5.46 M/UL (ref 4.6–6.2)
RBC #/AREA URNS HPF: 1 /HPF (ref 0–4)
SARS-COV-2 RDRP RESP QL NAA+PROBE: NEGATIVE
SODIUM SERPL-SCNC: 142 MMOL/L (ref 136–145)
SP GR UR STRIP: 1.02 (ref 1–1.03)
SQUAMOUS #/AREA URNS HPF: 0 /HPF
TROPONIN I SERPL DL<=0.01 NG/ML-MCNC: 0.08 NG/ML (ref 0–0.03)
TROPONIN I SERPL DL<=0.01 NG/ML-MCNC: 0.08 NG/ML (ref 0–0.03)
URN SPEC COLLECT METH UR: ABNORMAL
UROBILINOGEN UR STRIP-ACNC: NEGATIVE EU/DL
WBC # BLD AUTO: 5.36 K/UL (ref 3.9–12.7)
WBC #/AREA URNS HPF: 0 /HPF (ref 0–5)

## 2022-01-24 PROCEDURE — 71045 X-RAY EXAM CHEST 1 VIEW: CPT | Mod: 26,,, | Performed by: RADIOLOGY

## 2022-01-24 PROCEDURE — 80053 COMPREHEN METABOLIC PANEL: CPT | Performed by: FAMILY MEDICINE

## 2022-01-24 PROCEDURE — 94640 AIRWAY INHALATION TREATMENT: CPT

## 2022-01-24 PROCEDURE — 99223 1ST HOSP IP/OBS HIGH 75: CPT | Mod: AI,,, | Performed by: HOSPITALIST

## 2022-01-24 PROCEDURE — 99223 PR INITIAL HOSPITAL CARE,LEVL III: ICD-10-PCS | Mod: AI,,, | Performed by: HOSPITALIST

## 2022-01-24 PROCEDURE — 93010 ELECTROCARDIOGRAM REPORT: CPT | Mod: ,,, | Performed by: INTERNAL MEDICINE

## 2022-01-24 PROCEDURE — 93005 ELECTROCARDIOGRAM TRACING: CPT

## 2022-01-24 PROCEDURE — 27000221 HC OXYGEN, UP TO 24 HOURS

## 2022-01-24 PROCEDURE — 36415 COLL VENOUS BLD VENIPUNCTURE: CPT | Performed by: FAMILY MEDICINE

## 2022-01-24 PROCEDURE — 99900031 HC PATIENT EDUCATION (STAT)

## 2022-01-24 PROCEDURE — 96375 TX/PRO/DX INJ NEW DRUG ADDON: CPT

## 2022-01-24 PROCEDURE — 94660 CPAP INITIATION&MGMT: CPT

## 2022-01-24 PROCEDURE — 85025 COMPLETE CBC W/AUTO DIFF WBC: CPT | Performed by: FAMILY MEDICINE

## 2022-01-24 PROCEDURE — 63600175 PHARM REV CODE 636 W HCPCS: Performed by: FAMILY MEDICINE

## 2022-01-24 PROCEDURE — 93010 EKG 12-LEAD: ICD-10-PCS | Mod: ,,, | Performed by: INTERNAL MEDICINE

## 2022-01-24 PROCEDURE — 94760 N-INVAS EAR/PLS OXIMETRY 1: CPT

## 2022-01-24 PROCEDURE — 25000003 PHARM REV CODE 250: Performed by: HOSPITALIST

## 2022-01-24 PROCEDURE — 99285 EMERGENCY DEPT VISIT HI MDM: CPT | Mod: 25

## 2022-01-24 PROCEDURE — 71045 XR CHEST AP PORTABLE: ICD-10-PCS | Mod: 26,,, | Performed by: RADIOLOGY

## 2022-01-24 PROCEDURE — 84484 ASSAY OF TROPONIN QUANT: CPT | Performed by: HOSPITALIST

## 2022-01-24 PROCEDURE — 25000242 PHARM REV CODE 250 ALT 637 W/ HCPCS

## 2022-01-24 PROCEDURE — 63600175 PHARM REV CODE 636 W HCPCS: Performed by: HOSPITALIST

## 2022-01-24 PROCEDURE — 81000 URINALYSIS NONAUTO W/SCOPE: CPT | Performed by: HOSPITALIST

## 2022-01-24 PROCEDURE — 96374 THER/PROPH/DIAG INJ IV PUSH: CPT

## 2022-01-24 PROCEDURE — 36415 COLL VENOUS BLD VENIPUNCTURE: CPT | Performed by: HOSPITALIST

## 2022-01-24 PROCEDURE — U0002 COVID-19 LAB TEST NON-CDC: HCPCS | Performed by: FAMILY MEDICINE

## 2022-01-24 PROCEDURE — 20000000 HC ICU ROOM

## 2022-01-24 PROCEDURE — 71045 X-RAY EXAM CHEST 1 VIEW: CPT | Mod: TC,FY

## 2022-01-24 PROCEDURE — 25000003 PHARM REV CODE 250: Performed by: FAMILY MEDICINE

## 2022-01-24 PROCEDURE — 27000190 HC CPAP FULL FACE MASK W/VALVE

## 2022-01-24 PROCEDURE — 82962 GLUCOSE BLOOD TEST: CPT

## 2022-01-24 PROCEDURE — 25000242 PHARM REV CODE 250 ALT 637 W/ HCPCS: Performed by: FAMILY MEDICINE

## 2022-01-24 PROCEDURE — 99900035 HC TECH TIME PER 15 MIN (STAT)

## 2022-01-24 RX ORDER — CLONIDINE HYDROCHLORIDE 0.1 MG/1
0.2 TABLET ORAL 3 TIMES DAILY
Status: DISCONTINUED | OUTPATIENT
Start: 2022-01-24 | End: 2022-02-02 | Stop reason: HOSPADM

## 2022-01-24 RX ORDER — SODIUM BICARBONATE 650 MG/1
650 TABLET ORAL 2 TIMES DAILY
Status: DISCONTINUED | OUTPATIENT
Start: 2022-01-24 | End: 2022-02-02 | Stop reason: HOSPADM

## 2022-01-24 RX ORDER — SODIUM CHLORIDE 9 MG/ML
INJECTION, SOLUTION INTRAVENOUS
Status: DISCONTINUED | OUTPATIENT
Start: 2022-01-24 | End: 2022-02-02 | Stop reason: HOSPADM

## 2022-01-24 RX ORDER — CLOPIDOGREL BISULFATE 75 MG/1
75 TABLET ORAL DAILY
Status: DISCONTINUED | OUTPATIENT
Start: 2022-01-25 | End: 2022-02-02 | Stop reason: HOSPADM

## 2022-01-24 RX ORDER — GLUCAGON 1 MG
1 KIT INJECTION
Status: DISCONTINUED | OUTPATIENT
Start: 2022-01-24 | End: 2022-02-02 | Stop reason: HOSPADM

## 2022-01-24 RX ORDER — IPRATROPIUM BROMIDE AND ALBUTEROL SULFATE 2.5; .5 MG/3ML; MG/3ML
3 SOLUTION RESPIRATORY (INHALATION)
Status: COMPLETED | OUTPATIENT
Start: 2022-01-24 | End: 2022-01-24

## 2022-01-24 RX ORDER — NITROGLYCERIN 0.4 MG/1
0.4 TABLET SUBLINGUAL
Status: COMPLETED | OUTPATIENT
Start: 2022-01-24 | End: 2022-01-24

## 2022-01-24 RX ORDER — NITROGLYCERIN 20 MG/100ML
5 INJECTION INTRAVENOUS CONTINUOUS
Status: DISCONTINUED | OUTPATIENT
Start: 2022-01-24 | End: 2022-01-25

## 2022-01-24 RX ORDER — HYDROCODONE BITARTRATE AND ACETAMINOPHEN 5; 325 MG/1; MG/1
1 TABLET ORAL EVERY 6 HOURS PRN
Status: DISCONTINUED | OUTPATIENT
Start: 2022-01-24 | End: 2022-02-02 | Stop reason: HOSPADM

## 2022-01-24 RX ORDER — ATORVASTATIN CALCIUM 10 MG/1
20 TABLET, FILM COATED ORAL NIGHTLY
Status: DISCONTINUED | OUTPATIENT
Start: 2022-01-24 | End: 2022-02-02 | Stop reason: HOSPADM

## 2022-01-24 RX ORDER — HYDRALAZINE HYDROCHLORIDE 20 MG/ML
10 INJECTION INTRAMUSCULAR; INTRAVENOUS EVERY 8 HOURS PRN
Status: DISCONTINUED | OUTPATIENT
Start: 2022-01-24 | End: 2022-02-02 | Stop reason: HOSPADM

## 2022-01-24 RX ORDER — CLOPIDOGREL BISULFATE 75 MG/1
75 TABLET ORAL DAILY
Status: DISCONTINUED | OUTPATIENT
Start: 2022-01-25 | End: 2022-01-24 | Stop reason: SDUPTHER

## 2022-01-24 RX ORDER — ASPIRIN 81 MG/1
81 TABLET ORAL DAILY
Status: DISCONTINUED | OUTPATIENT
Start: 2022-01-25 | End: 2022-01-26

## 2022-01-24 RX ORDER — HEPARIN SODIUM 5000 [USP'U]/ML
5000 INJECTION, SOLUTION INTRAVENOUS; SUBCUTANEOUS EVERY 8 HOURS
Status: DISCONTINUED | OUTPATIENT
Start: 2022-01-24 | End: 2022-01-24

## 2022-01-24 RX ORDER — NITROGLYCERIN 0.4 MG/1
TABLET SUBLINGUAL
Status: COMPLETED
Start: 2022-01-24 | End: 2022-01-24

## 2022-01-24 RX ORDER — FUROSEMIDE 10 MG/ML
60 INJECTION INTRAMUSCULAR; INTRAVENOUS
Status: COMPLETED | OUTPATIENT
Start: 2022-01-24 | End: 2022-01-24

## 2022-01-24 RX ORDER — HEPARIN SODIUM 5000 [USP'U]/ML
5000 INJECTION, SOLUTION INTRAVENOUS; SUBCUTANEOUS EVERY 8 HOURS
Status: DISCONTINUED | OUTPATIENT
Start: 2022-01-24 | End: 2022-02-02 | Stop reason: HOSPADM

## 2022-01-24 RX ORDER — LANOLIN ALCOHOL/MO/W.PET/CERES
800 CREAM (GRAM) TOPICAL
Status: DISCONTINUED | OUTPATIENT
Start: 2022-01-24 | End: 2022-02-02 | Stop reason: HOSPADM

## 2022-01-24 RX ORDER — SODIUM,POTASSIUM PHOSPHATES 280-250MG
2 POWDER IN PACKET (EA) ORAL
Status: DISCONTINUED | OUTPATIENT
Start: 2022-01-24 | End: 2022-02-02 | Stop reason: HOSPADM

## 2022-01-24 RX ORDER — FUROSEMIDE 20 MG/1
20 TABLET ORAL 2 TIMES DAILY
Status: DISCONTINUED | OUTPATIENT
Start: 2022-01-24 | End: 2022-01-24

## 2022-01-24 RX ORDER — POLYETHYLENE GLYCOL 3350 17 G/17G
17 POWDER, FOR SOLUTION ORAL DAILY PRN
Status: DISCONTINUED | OUTPATIENT
Start: 2022-01-24 | End: 2022-02-02 | Stop reason: HOSPADM

## 2022-01-24 RX ORDER — SODIUM CHLORIDE 0.9 % (FLUSH) 0.9 %
10 SYRINGE (ML) INJECTION EVERY 12 HOURS PRN
Status: DISCONTINUED | OUTPATIENT
Start: 2022-01-24 | End: 2022-02-02 | Stop reason: HOSPADM

## 2022-01-24 RX ORDER — DEXTROSE 50 % IN WATER (D50W) INTRAVENOUS SYRINGE
25
Status: COMPLETED | OUTPATIENT
Start: 2022-01-24 | End: 2022-01-24

## 2022-01-24 RX ORDER — SODIUM BICARBONATE 650 MG/1
650 TABLET ORAL 2 TIMES DAILY
Status: DISCONTINUED | OUTPATIENT
Start: 2022-01-24 | End: 2022-01-24 | Stop reason: SDUPTHER

## 2022-01-24 RX ORDER — LISINOPRIL 2.5 MG/1
2.5 TABLET ORAL DAILY
Status: DISCONTINUED | OUTPATIENT
Start: 2022-01-25 | End: 2022-01-26

## 2022-01-24 RX ORDER — PROMETHAZINE HYDROCHLORIDE 12.5 MG/1
25 TABLET ORAL EVERY 6 HOURS PRN
Status: DISCONTINUED | OUTPATIENT
Start: 2022-01-24 | End: 2022-02-02 | Stop reason: HOSPADM

## 2022-01-24 RX ORDER — NALOXONE HCL 0.4 MG/ML
0.02 VIAL (ML) INJECTION
Status: DISCONTINUED | OUTPATIENT
Start: 2022-01-24 | End: 2022-01-24 | Stop reason: SDUPTHER

## 2022-01-24 RX ORDER — HYDRALAZINE HYDROCHLORIDE 25 MG/1
25 TABLET, FILM COATED ORAL EVERY 8 HOURS
Status: DISCONTINUED | OUTPATIENT
Start: 2022-01-24 | End: 2022-01-30

## 2022-01-24 RX ORDER — ATORVASTATIN CALCIUM 40 MG/1
40 TABLET, FILM COATED ORAL NIGHTLY
Status: DISCONTINUED | OUTPATIENT
Start: 2022-01-24 | End: 2022-01-24

## 2022-01-24 RX ORDER — IBUPROFEN 200 MG
16 TABLET ORAL
Status: DISCONTINUED | OUTPATIENT
Start: 2022-01-24 | End: 2022-02-02 | Stop reason: HOSPADM

## 2022-01-24 RX ORDER — SODIUM CHLORIDE 0.9 % (FLUSH) 0.9 %
10 SYRINGE (ML) INJECTION
Status: DISCONTINUED | OUTPATIENT
Start: 2022-01-24 | End: 2022-01-26

## 2022-01-24 RX ORDER — ACETAMINOPHEN 325 MG/1
650 TABLET ORAL EVERY 4 HOURS PRN
Status: DISCONTINUED | OUTPATIENT
Start: 2022-01-24 | End: 2022-02-02 | Stop reason: HOSPADM

## 2022-01-24 RX ORDER — SODIUM CHLORIDE 0.9 % (FLUSH) 0.9 %
10 SYRINGE (ML) INJECTION EVERY 8 HOURS PRN
Status: DISCONTINUED | OUTPATIENT
Start: 2022-01-24 | End: 2022-01-26

## 2022-01-24 RX ORDER — AMLODIPINE BESYLATE 5 MG/1
10 TABLET ORAL DAILY
Status: DISCONTINUED | OUTPATIENT
Start: 2022-01-25 | End: 2022-02-02 | Stop reason: HOSPADM

## 2022-01-24 RX ORDER — IBUPROFEN 200 MG
24 TABLET ORAL
Status: DISCONTINUED | OUTPATIENT
Start: 2022-01-24 | End: 2022-02-02 | Stop reason: HOSPADM

## 2022-01-24 RX ORDER — INSULIN ASPART 100 [IU]/ML
0-5 INJECTION, SOLUTION INTRAVENOUS; SUBCUTANEOUS
Status: DISCONTINUED | OUTPATIENT
Start: 2022-01-24 | End: 2022-02-02 | Stop reason: HOSPADM

## 2022-01-24 RX ORDER — IPRATROPIUM BROMIDE AND ALBUTEROL SULFATE 2.5; .5 MG/3ML; MG/3ML
3 SOLUTION RESPIRATORY (INHALATION) EVERY 6 HOURS PRN
Status: DISCONTINUED | OUTPATIENT
Start: 2022-01-24 | End: 2022-02-02 | Stop reason: HOSPADM

## 2022-01-24 RX ORDER — HYDRALAZINE HYDROCHLORIDE 20 MG/ML
10 INJECTION INTRAMUSCULAR; INTRAVENOUS
Status: COMPLETED | OUTPATIENT
Start: 2022-01-24 | End: 2022-01-24

## 2022-01-24 RX ORDER — NALOXONE HCL 0.4 MG/ML
0.02 VIAL (ML) INJECTION
Status: DISCONTINUED | OUTPATIENT
Start: 2022-01-24 | End: 2022-02-02 | Stop reason: HOSPADM

## 2022-01-24 RX ORDER — ONDANSETRON 2 MG/ML
4 INJECTION INTRAMUSCULAR; INTRAVENOUS EVERY 8 HOURS PRN
Status: DISCONTINUED | OUTPATIENT
Start: 2022-01-24 | End: 2022-02-02 | Stop reason: HOSPADM

## 2022-01-24 RX ORDER — FUROSEMIDE 10 MG/ML
20 INJECTION INTRAMUSCULAR; INTRAVENOUS EVERY 8 HOURS
Status: DISCONTINUED | OUTPATIENT
Start: 2022-01-24 | End: 2022-01-26

## 2022-01-24 RX ADMIN — CLONIDINE HYDROCHLORIDE 0.2 MG: 0.1 TABLET ORAL at 08:01

## 2022-01-24 RX ADMIN — FUROSEMIDE 20 MG: 10 INJECTION, SOLUTION INTRAVENOUS at 10:01

## 2022-01-24 RX ADMIN — HEPARIN SODIUM 5000 UNITS: 5000 INJECTION, SOLUTION INTRAVENOUS; SUBCUTANEOUS at 10:01

## 2022-01-24 RX ADMIN — HYDRALAZINE HYDROCHLORIDE 25 MG: 25 TABLET, FILM COATED ORAL at 10:01

## 2022-01-24 RX ADMIN — NITROGLYCERIN 0.4 MG: 0.4 TABLET SUBLINGUAL at 04:01

## 2022-01-24 RX ADMIN — PROMETHAZINE HYDROCHLORIDE 25 MG: 12.5 TABLET ORAL at 08:01

## 2022-01-24 RX ADMIN — IPRATROPIUM BROMIDE AND ALBUTEROL SULFATE 3 ML: 2.5; .5 SOLUTION RESPIRATORY (INHALATION) at 04:01

## 2022-01-24 RX ADMIN — SODIUM CHLORIDE 10 ML/HR: 0.9 INJECTION, SOLUTION INTRAVENOUS at 10:01

## 2022-01-24 RX ADMIN — ATORVASTATIN CALCIUM 20 MG: 10 TABLET, FILM COATED ORAL at 08:01

## 2022-01-24 RX ADMIN — DEXTROSE MONOHYDRATE 25 G: 500 INJECTION PARENTERAL at 03:01

## 2022-01-24 RX ADMIN — HYDRALAZINE HYDROCHLORIDE 10 MG: 20 INJECTION INTRAMUSCULAR; INTRAVENOUS at 03:01

## 2022-01-24 RX ADMIN — NITROGLYCERIN 5 MCG/MIN: 20 INJECTION INTRAVENOUS at 05:01

## 2022-01-24 RX ADMIN — FUROSEMIDE 60 MG: 10 INJECTION, SOLUTION INTRAVENOUS at 04:01

## 2022-01-25 PROBLEM — I16.9 HYPERTENSIVE CRISIS: Status: RESOLVED | Noted: 2022-01-02 | Resolved: 2022-01-25

## 2022-01-25 PROBLEM — I10 ESSENTIAL HYPERTENSION: Status: ACTIVE | Noted: 2022-01-25

## 2022-01-25 PROBLEM — I50.23 CHF (CONGESTIVE HEART FAILURE), NYHA CLASS I, ACUTE ON CHRONIC, SYSTOLIC: Status: ACTIVE | Noted: 2022-01-25

## 2022-01-25 LAB
ALBUMIN SERPL BCP-MCNC: 2.3 G/DL (ref 3.5–5.2)
ALP SERPL-CCNC: 79 U/L (ref 55–135)
ALT SERPL W/O P-5'-P-CCNC: 11 U/L (ref 10–44)
ANION GAP SERPL CALC-SCNC: 15 MMOL/L (ref 8–16)
AST SERPL-CCNC: 9 U/L (ref 10–40)
BASOPHILS # BLD AUTO: 0.02 K/UL (ref 0–0.2)
BASOPHILS NFR BLD: 0.5 % (ref 0–1.9)
BILIRUB SERPL-MCNC: 0.6 MG/DL (ref 0.1–1)
BUN SERPL-MCNC: 14 MG/DL (ref 8–23)
CALCIUM SERPL-MCNC: 7.8 MG/DL (ref 8.7–10.5)
CHLORIDE SERPL-SCNC: 107 MMOL/L (ref 95–110)
CK SERPL-CCNC: 67 U/L (ref 20–200)
CO2 SERPL-SCNC: 22 MMOL/L (ref 23–29)
CREAT SERPL-MCNC: 1.9 MG/DL (ref 0.5–1.4)
DIFFERENTIAL METHOD: ABNORMAL
EOSINOPHIL # BLD AUTO: 0 K/UL (ref 0–0.5)
EOSINOPHIL NFR BLD: 0.9 % (ref 0–8)
ERYTHROCYTE [DISTWIDTH] IN BLOOD BY AUTOMATED COUNT: 14.2 % (ref 11.5–14.5)
EST. GFR  (AFRICAN AMERICAN): 42.1 ML/MIN/1.73 M^2
EST. GFR  (NON AFRICAN AMERICAN): 36.4 ML/MIN/1.73 M^2
GLUCOSE SERPL-MCNC: 69 MG/DL (ref 70–110)
HCT VFR BLD AUTO: 33.1 % (ref 40–54)
HGB BLD-MCNC: 10.7 G/DL (ref 14–18)
IMM GRANULOCYTES # BLD AUTO: 0.01 K/UL (ref 0–0.04)
IMM GRANULOCYTES NFR BLD AUTO: 0.2 % (ref 0–0.5)
LYMPHOCYTES # BLD AUTO: 1 K/UL (ref 1–4.8)
LYMPHOCYTES NFR BLD: 22.6 % (ref 18–48)
MCH RBC QN AUTO: 25.7 PG (ref 27–31)
MCHC RBC AUTO-ENTMCNC: 32.3 G/DL (ref 32–36)
MCV RBC AUTO: 79 FL (ref 82–98)
MONOCYTES # BLD AUTO: 0.4 K/UL (ref 0.3–1)
MONOCYTES NFR BLD: 8.4 % (ref 4–15)
NEUTROPHILS # BLD AUTO: 3 K/UL (ref 1.8–7.7)
NEUTROPHILS NFR BLD: 67.4 % (ref 38–73)
NRBC BLD-RTO: 0 /100 WBC
PLATELET # BLD AUTO: 343 K/UL (ref 150–450)
PMV BLD AUTO: 9.9 FL (ref 9.2–12.9)
POCT GLUCOSE: 129 MG/DL (ref 70–110)
POCT GLUCOSE: 37 MG/DL (ref 70–110)
POTASSIUM SERPL-SCNC: 3.2 MMOL/L (ref 3.5–5.1)
PROT SERPL-MCNC: 5.8 G/DL (ref 6–8.4)
RBC # BLD AUTO: 4.17 M/UL (ref 4.6–6.2)
SODIUM SERPL-SCNC: 144 MMOL/L (ref 136–145)
WBC # BLD AUTO: 4.38 K/UL (ref 3.9–12.7)

## 2022-01-25 PROCEDURE — 36415 COLL VENOUS BLD VENIPUNCTURE: CPT | Performed by: HOSPITALIST

## 2022-01-25 PROCEDURE — 21400001 HC TELEMETRY ROOM

## 2022-01-25 PROCEDURE — 93010 EKG 12-LEAD: ICD-10-PCS | Mod: ,,, | Performed by: INTERNAL MEDICINE

## 2022-01-25 PROCEDURE — 25000003 PHARM REV CODE 250: Performed by: HOSPITALIST

## 2022-01-25 PROCEDURE — 93005 ELECTROCARDIOGRAM TRACING: CPT

## 2022-01-25 PROCEDURE — 63600175 PHARM REV CODE 636 W HCPCS: Performed by: HOSPITALIST

## 2022-01-25 PROCEDURE — 27000221 HC OXYGEN, UP TO 24 HOURS

## 2022-01-25 PROCEDURE — 85025 COMPLETE CBC W/AUTO DIFF WBC: CPT | Performed by: HOSPITALIST

## 2022-01-25 PROCEDURE — 99233 PR SUBSEQUENT HOSPITAL CARE,LEVL III: ICD-10-PCS | Mod: ,,, | Performed by: HOSPITALIST

## 2022-01-25 PROCEDURE — 94761 N-INVAS EAR/PLS OXIMETRY MLT: CPT

## 2022-01-25 PROCEDURE — 25000003 PHARM REV CODE 250: Performed by: INTERNAL MEDICINE

## 2022-01-25 PROCEDURE — 99233 SBSQ HOSP IP/OBS HIGH 50: CPT | Mod: ,,, | Performed by: HOSPITALIST

## 2022-01-25 PROCEDURE — 36415 COLL VENOUS BLD VENIPUNCTURE: CPT | Performed by: INTERNAL MEDICINE

## 2022-01-25 PROCEDURE — 99900035 HC TECH TIME PER 15 MIN (STAT)

## 2022-01-25 PROCEDURE — 93010 ELECTROCARDIOGRAM REPORT: CPT | Mod: ,,, | Performed by: INTERNAL MEDICINE

## 2022-01-25 PROCEDURE — 80053 COMPREHEN METABOLIC PANEL: CPT | Performed by: HOSPITALIST

## 2022-01-25 PROCEDURE — 82550 ASSAY OF CK (CPK): CPT | Performed by: INTERNAL MEDICINE

## 2022-01-25 PROCEDURE — 94660 CPAP INITIATION&MGMT: CPT

## 2022-01-25 RX ORDER — POTASSIUM CHLORIDE 20 MEQ/1
40 TABLET, EXTENDED RELEASE ORAL ONCE
Status: COMPLETED | OUTPATIENT
Start: 2022-01-25 | End: 2022-01-25

## 2022-01-25 RX ADMIN — HYDRALAZINE HYDROCHLORIDE 25 MG: 25 TABLET, FILM COATED ORAL at 05:01

## 2022-01-25 RX ADMIN — FUROSEMIDE 20 MG: 10 INJECTION, SOLUTION INTRAVENOUS at 02:01

## 2022-01-25 RX ADMIN — HYDRALAZINE HYDROCHLORIDE 25 MG: 25 TABLET, FILM COATED ORAL at 02:01

## 2022-01-25 RX ADMIN — CLONIDINE HYDROCHLORIDE 0.2 MG: 0.1 TABLET ORAL at 08:01

## 2022-01-25 RX ADMIN — HEPARIN SODIUM 5000 UNITS: 5000 INJECTION, SOLUTION INTRAVENOUS; SUBCUTANEOUS at 09:01

## 2022-01-25 RX ADMIN — CLOPIDOGREL 75 MG: 75 TABLET, FILM COATED ORAL at 08:01

## 2022-01-25 RX ADMIN — FUROSEMIDE 20 MG: 10 INJECTION, SOLUTION INTRAVENOUS at 09:01

## 2022-01-25 RX ADMIN — HEPARIN SODIUM 5000 UNITS: 5000 INJECTION, SOLUTION INTRAVENOUS; SUBCUTANEOUS at 05:01

## 2022-01-25 RX ADMIN — SODIUM BICARBONATE 650 MG TABLET 650 MG: at 09:01

## 2022-01-25 RX ADMIN — DEXTROSE MONOHYDRATE 25 G: 25 INJECTION, SOLUTION INTRAVENOUS at 03:01

## 2022-01-25 RX ADMIN — HEPARIN SODIUM 5000 UNITS: 5000 INJECTION, SOLUTION INTRAVENOUS; SUBCUTANEOUS at 02:01

## 2022-01-25 RX ADMIN — ASPIRIN 81 MG: 81 TABLET, DELAYED RELEASE ORAL at 08:01

## 2022-01-25 RX ADMIN — FUROSEMIDE 20 MG: 10 INJECTION, SOLUTION INTRAVENOUS at 05:01

## 2022-01-25 RX ADMIN — POTASSIUM CHLORIDE 40 MEQ: 1500 TABLET, EXTENDED RELEASE ORAL at 02:01

## 2022-01-25 RX ADMIN — HYDRALAZINE HYDROCHLORIDE 25 MG: 25 TABLET, FILM COATED ORAL at 09:01

## 2022-01-25 RX ADMIN — CLONIDINE HYDROCHLORIDE 0.2 MG: 0.1 TABLET ORAL at 09:01

## 2022-01-25 RX ADMIN — AMLODIPINE BESYLATE 10 MG: 5 TABLET ORAL at 08:01

## 2022-01-25 RX ADMIN — ATORVASTATIN CALCIUM 20 MG: 10 TABLET, FILM COATED ORAL at 09:01

## 2022-01-25 RX ADMIN — CLONIDINE HYDROCHLORIDE 0.2 MG: 0.1 TABLET ORAL at 02:01

## 2022-01-25 RX ADMIN — LISINOPRIL 2.5 MG: 2.5 TABLET ORAL at 08:01

## 2022-01-25 RX ADMIN — SODIUM BICARBONATE 650 MG TABLET 650 MG: at 08:01

## 2022-01-25 NOTE — PROGRESS NOTES
[facility] Medicine  Progress Note    Patient Name: Marshall Flor  MRN: [id number]  Patient Class: IP- Inpatient   Admission Date: 1/24/2022  Length of Stay: 1 days  Attending Physician: Matt Jiménez MD  Primary Care Provider: Dorie Quan MD        Subjective:     Principal Problem:<principal problem not specified>        HPI:  [age] year old diabetic hypertensive male with a history of congestive heart failure and renal disease was found unconscious on the floor at home.  EMS checked his blood sugar and was 31. He was given an amp of D50 and his blood sugar came up to 68. His blood sugar was recheck again and range from 75 to 77. He developed acute pulmonary edema in emergency room was given Lasix.  He became hypertensive and had to be placed on a nitroglycerin drip for his blood pressure.  He continued to be short of breath was placed on BiPAP.  He denies chest pain, nausea, vomiting , headaches, blurred or double vision.      Overview/Hospital Course:  No notes on file    Interval History:  S SHORT OF BREATH THIS MORNING.  HIS NITROGLYCERIN DRIP HAS BEEN DISCONTINUED.  HE HAS BEEN TAKEN OFF BIPAP.  DENIES CHEST PAIN, NAUSEA, VOMITING, COUGH ,FEVER OR CHILLS.  HE HAD AN ECHOCARDIOGRAM IN January,20 22 THAT SHOWED EJECTION FRACTION ABOUT 39%.    Review of Systems   Constitutional: Negative.    HENT: Negative.    Eyes: Negative.    Respiratory: Positive for shortness of breath. Negative for cough and wheezing.    Cardiovascular: Negative.    Gastrointestinal: Negative.    Endocrine: Negative.    Genitourinary: Negative.    Musculoskeletal: Negative.    Skin: Negative.    Allergic/Immunologic: Negative.    Neurological: Negative.    Hematological: Negative.    Psychiatric/Behavioral: Negative.      Objective:     Vital Signs (Most Recent):  Temp: 98.7 °F (37.1 °C) (01/25/22 0720)  Pulse: 80 (01/25/22 0800)  Resp: 16 (01/25/22 0800)  BP: 137/82 (01/25/22 0800)  SpO2: 100 %  (01/25/22 0800) Vital Signs (24h Range):  Temp:  [98 °F (36.7 °C)-98.7 °F (37.1 °C)] 98.7 °F (37.1 °C)  Pulse:  [] 80  Resp:  [14-41] 16  SpO2:  [94 %-100 %] 100 %  BP: (105-218)/() 137/82     Weight: 95.1 kg (209 lb 10.5 oz)  Body mass index is 32.84 kg/m².    Intake/Output Summary (Last 24 hours) at 1/25/2022 0838  Last data filed at 1/25/2022 0720  Gross per 24 hour   Intake 73.5 ml   Output 1600 ml   Net -1526.5 ml      Physical Exam  Constitutional:       Appearance: He is obese.   HENT:      Head: Normocephalic and atraumatic.      Nose: Nose normal.      Mouth/Throat:      Mouth: Mucous membranes are moist.   Eyes:      Extraocular Movements: Extraocular movements intact.   Cardiovascular:      Rate and Rhythm: Normal rate. Rhythm irregular.      Pulses: Normal pulses.      Heart sounds: Gallop (S4 AT THE APEX) present.    Pulmonary:      Effort: Pulmonary effort is normal.      Breath sounds: Normal breath sounds.   Abdominal:      General: Bowel sounds are normal.      Palpations: Abdomen is soft.      Comments: OBESE   Musculoskeletal:         General: Normal range of motion.      Cervical back: Normal range of motion.      Right lower leg: Edema ( 2+ PITTING EDEMA BOTH LOWER EXTREMITIES) present.      Left lower leg: Edema present.   Skin:     Capillary Refill: Capillary refill takes 2 to 3 seconds.   Neurological:      General: No focal deficit present.      Mental Status: He is alert.   Psychiatric:         Mood and Affect: Mood normal.         Significant Labs: All pertinent labs within the past 24 hours have been reviewed.    Significant Imaging: I have reviewed all pertinent imaging results/findings within the past 24 hours.      Assessment/Plan:      Hypertensive crisis  Admit to ICU  Continue nitroglycerin drip  Norvasc  Apresoline  Lasix  Catapres  Troponin  Echocardiogram  Cardiology consult  Off NTG drip  Continue p.o. meds  Echocardiogram done in January showed ejection fraction  39%  Off BiPAP  Transfer to the medical floor        VTE Risk Mitigation (From admission, onward)         Ordered     heparin (porcine) injection 5,000 Units  Every 8 hours         01/24/22 1822     IP VTE HIGH RISK PATIENT  Once         01/24/22 1822     Place sequential compression device  Until discontinued         01/24/22 1822     IP VTE HIGH RISK PATIENT  Once         01/24/22 1822     Place sequential compression device  Until discontinued         01/24/22 1822     Place sequential compression device  Until discontinued         01/24/22 1822                Discharge Planning   STAR:      Code Status: Full Code   Is the patient medically ready for discharge?:     Reason for patient still in hospital (select all that apply): Treatment                     Matt Jiménez MD  Department of Hospital Medicine   [facility] - [facility]ensive [facility] [place]

## 2022-01-25 NOTE — ASSESSMENT & PLAN NOTE
Admit to ICU  Continue nitroglycerin drip  Norvasc  Apresoline  Lasix  Catapres  Troponin  Echocardiogram  Cardiology consult

## 2022-01-25 NOTE — PLAN OF CARE
01/25/22 1209   Medicare Message   Important Message from Medicare regarding Discharge Appeal Rights Given to patient/caregiver;Explained to patient/caregiver;Signed/date by patient/caregiver   Date IMM was signed 01/25/22   Time IMM was signed 1208

## 2022-01-25 NOTE — ED PROVIDER NOTES
Encounter Date: 1/24/2022       History     Chief Complaint   Patient presents with    Hypoglycemia     [facility] arrive at residence, pt son called 911 when he went to go check on his father and found him unresponsive, lying on floor. [facility] initial CBG 31 and CBG 5, administered amp of D50, 15 min later CBG 68, 15 min after that 77. Upon arrival to ED CBG 75.      56 year old male presents per EMS after being found down the floor unresponsive with a low blood sugar EMS noted the CBG to be 31 was given D50 in the field with some improvement to the mid 60s some improvement of his mental status past medical history is positive for CHF diabetes hypertension chronic kidney disease and previous CVA, the patient denies any history of stroke he has had an no nausea vomiting states he has been eating normally has been followed in the past by Nephrology in [facility], he is equivocal as to whether he is taking blood pressure meds any has no known exposure to  COVID case recently        Review of patient's allergies indicates:  No Known Allergies  Past Medical History:   Diagnosis Date    CHF (congestive heart failure)     Diabetes mellitus     Hypertension     Stroke      Past Surgical History:   Procedure Laterality Date    EYE SURGERY      LEFT EYE     No family history on file.     Review of Systems   Constitutional: Positive for fatigue. Negative for fever.   HENT: Negative for sore throat.    Respiratory: Positive for cough and shortness of breath.    Cardiovascular: Negative for chest pain.   Gastrointestinal: Negative for nausea.   Genitourinary: Negative for dysuria.   Musculoskeletal: Negative for back pain.   Skin: Negative for rash.   Neurological: Negative for weakness.   Hematological: Does not bruise/bleed easily.   Psychiatric/Behavioral: The patient is nervous/anxious.        Physical Exam     Initial Vitals   BP Pulse Resp Temp SpO2   01/24/22 1428 01/24/22 1428 01/24/22 1428 01/24/22 1500 01/24/22  1428   (!) 218/137 109 19 98 °F (36.7 °C) 96 %      MAP       --                Physical Exam    Nursing note and vitals reviewed.  Constitutional: He appears well-developed and well-nourished. He is not diaphoretic. No distress.   HENT:   Head: Normocephalic and atraumatic.   Nose: Nose normal.   Mouth/Throat: Oropharynx is clear and moist. No oropharyngeal exudate.   Eyes: EOM are normal.   Neck: Neck supple. No tracheal deviation present.   Normal range of motion.  Cardiovascular: Normal rate and regular rhythm.   No murmur heard.  Pulmonary/Chest: Breath sounds normal. No stridor. He is in respiratory distress. He has no rales.   Abdominal: Abdomen is soft. He exhibits no distension and no mass. There is no abdominal tenderness. There is no rebound.   Musculoskeletal:         General: No edema. Normal range of motion.      Cervical back: Normal range of motion and neck supple.     Lymphadenopathy:     He has no cervical adenopathy.   Neurological: He is alert and oriented to person, place, and time. He has normal strength.   Skin: Skin is warm and dry. Capillary refill takes less than 2 seconds. No pallor.   Psychiatric:   Patient appears anxious         ED Course   Procedures  Labs Reviewed   CBC W/ AUTO DIFFERENTIAL - Abnormal; Notable for the following components:       Result Value    MCV 81 (*)     MCH 25.8 (*)     MCHC 31.8 (*)     Lymph # 0.9 (*)     Gran % 75.5 (*)     Lymph % 17.0 (*)     All other components within normal limits   COMPREHENSIVE METABOLIC PANEL - Abnormal; Notable for the following components:    Potassium 3.4 (*)     CO2 17 (*)     Glucose 399 (*)     Creatinine 2.0 (*)     Total Protein 8.6 (*)     Albumin 3.3 (*)     Anion Gap 20 (*)     eGFR if  39.6 (*)     eGFR if non  34.3 (*)     All other components within normal limits   POCT GLUCOSE - Abnormal; Notable for the following components:    POCT Glucose 65 (*)     All other components within normal  limits   SARS-COV-2 RNA AMPLIFICATION, QUAL    Narrative:     Is the patient symptomatic?->No   B-TYPE NATRIURETIC PEPTIDE   POCT GLUCOSE MONITORING CONTINUOUS     EKG Readings: (Independently Interpreted)   Initial Reading: No STEMI. Rhythm: Normal Sinus Rhythm. ST Segments: Non-Specific ST Segment Depression. ST Segment Depression: V3, V4, V5 and V6.       Imaging Results           X-Ray Chest AP Portable (Final result)  Result time 01/24/22 15:12:44    Final result by Darnell Ascencio MD (01/24/22 15:12:44)                 Impression:      Findings consistent with congestive heart failure.    Close interval follow-up is recommended.    This report was flagged in Epic as abnormal.      Electronically signed by: Darnell Ascencio  Date:    01/24/2022  Time:    15:12             Narrative:    EXAMINATION:  XR CHEST AP PORTABLE    CLINICAL HISTORY:  sob;    TECHNIQUE:  Single frontal view of the chest was performed.    COMPARISON:  01/02/2022.    FINDINGS:  There are diffuse bilateral interstitial infiltrates suspicious for pulmonary edema.  Questionable small bilateral pleural effusions.    Heart size is enlarged.  Calcified aortic plaque.  Trachea midline.    Bony thorax intact.                                 Medications   nitroGLYCERIN 50 mg in dextrose 5 % 250 mL infusion (TITRATING) (5 mcg/min Intravenous New Bag 1/24/22 1748)   dextrose 50 % in water (D50W) injection 25 g (25 g Intravenous Given 1/24/22 1531)   hydrALAZINE injection 10 mg (10 mg Intravenous Given 1/24/22 1546)   furosemide injection 60 mg (60 mg Intravenous Given 1/24/22 1616)   nitroGLYCERIN SL tablet 0.4 mg (0.4 mg Sublingual Given 1/24/22 1620)   albuterol-ipratropium 2.5 mg-0.5 mg/3 mL nebulizer solution 3 mL (3 mLs Nebulization Given 1/24/22 1608)     Medical Decision Making:   ED Management:  Patient initially improved in the ED then seemed to worsen becoming more agitated and breathless he was placed on BiPAP and given Lasix and attempt  at blood pressure control with Apresoline IV, overall the patient improved however his persistently elevated blood pressure required IV nitroglycerin  Case was discussed with Dr. Parsons, the plan is to place the patient in ICU bed as he is in a more critical state than a another patient here in the ED who should be able to be managed safely on the floor with supplemental oxygen, this was our last available ICU bed             ED Course as of 01/26/22 0225 Mon Jan 24, 2022 1841 X-Ray Chest AP Portable(!) [PW]   1841 SARS-CoV-2 RNA, Amplification, Qual: Negative  Patient has been admitted by Dr. CRUZ to ICU for decompensated congestive heart failure.  He also has a hypertensive emergency with malignant hypertension.  He is on a nitroglycerin drip and has been accepted by Dr. Jiménez. [PW]      ED Course User Index  [PW] Fabián Parsons MD           The evaluation, management and treatment of this patient involved Critical Care services  amounting to  110  minutes of direct involvement.  This time was exclusive of any billable procedures.  Clinical Impression:   Final diagnoses:  [R06.02] SOB (shortness of breath)  [I16.9] Hypertensive crisis (Primary)  [R06.03] Respiratory distress  [E16.2] Hypoglycemia  [N18.9] Chronic kidney disease, unspecified CKD stage          ED Disposition Condition    Admit               Eric Cruz MD  01/24/22 1814       Eric Cruz MD  01/26/22 0225

## 2022-01-25 NOTE — ED PROVIDER NOTES
Encounter Date: 1/24/2022       History     Chief Complaint   Patient presents with    Hypoglycemia     [facility] arrive at residence, pt son called 911 when he went to go check on his father and found him unresponsive, lying on floor. [facility] initial CBG 31 and CBG 5, administered amp of D50, 15 min later CBG 68, 15 min after that 77. Upon arrival to ED CBG 75.      HPI  Review of patient's allergies indicates:  No Known Allergies  Past Medical History:   Diagnosis Date    CHF (congestive heart failure)     Diabetes mellitus     Hypertension     Stroke      Past Surgical History:   Procedure Laterality Date    EYE SURGERY      LEFT EYE     History reviewed. No pertinent family history.     Review of Systems    Physical Exam     Initial Vitals   BP Pulse Resp Temp SpO2   01/24/22 1428 01/24/22 1428 01/24/22 1428 01/24/22 1500 01/24/22 1428   (!) 218/137 109 19 98 °F (36.7 °C) 96 %      MAP       --                Physical Exam    ED Course   Procedures  Labs Reviewed   CBC W/ AUTO DIFFERENTIAL - Abnormal; Notable for the following components:       Result Value    MCV 81 (*)     MCH 25.8 (*)     MCHC 31.8 (*)     Lymph # 0.9 (*)     Gran % 75.5 (*)     Lymph % 17.0 (*)     All other components within normal limits   COMPREHENSIVE METABOLIC PANEL - Abnormal; Notable for the following components:    Potassium 3.4 (*)     CO2 17 (*)     Glucose 399 (*)     Creatinine 2.0 (*)     Total Protein 8.6 (*)     Albumin 3.3 (*)     Anion Gap 20 (*)     eGFR if  39.6 (*)     eGFR if non  34.3 (*)     All other components within normal limits   TROPONIN I - Abnormal; Notable for the following components:    Troponin I 0.082 (*)     All other components within normal limits   TROPONIN I - Abnormal; Notable for the following components:    Troponin I 0.082 (*)     All other components within normal limits   POCT GLUCOSE - Abnormal; Notable for the following components:    POCT Glucose 65 (*)     All  other components within normal limits   POCT GLUCOSE - Abnormal; Notable for the following components:    POCT Glucose 201 (*)     All other components within normal limits   POCT GLUCOSE - Abnormal; Notable for the following components:    POCT Glucose 150 (*)     All other components within normal limits   SARS-COV-2 RNA AMPLIFICATION, QUAL    Narrative:     Is the patient symptomatic?->No   B-TYPE NATRIURETIC PEPTIDE   POCT GLUCOSE MONITORING CONTINUOUS          Imaging Results           X-Ray Chest AP Portable (Final result)  Result time 01/24/22 15:12:44    Final result by Darnell Ascencio MD (01/24/22 15:12:44)                 Impression:      Findings consistent with congestive heart failure.    Close interval follow-up is recommended.    This report was flagged in Epic as abnormal.      Electronically signed by: Darnell Ascencio  Date:    01/24/2022  Time:    15:12             Narrative:    EXAMINATION:  XR CHEST AP PORTABLE    CLINICAL HISTORY:  sob;    TECHNIQUE:  Single frontal view of the chest was performed.    COMPARISON:  01/02/2022.    FINDINGS:  There are diffuse bilateral interstitial infiltrates suspicious for pulmonary edema.  Questionable small bilateral pleural effusions.    Heart size is enlarged.  Calcified aortic plaque.  Trachea midline.    Bony thorax intact.                                 Medications   nitroGLYCERIN 50 mg in dextrose 5 % 250 mL infusion (TITRATING) (0 mcg/min Intravenous Stopped 1/24/22 2200)   sodium chloride 0.9% flush 10 mL (has no administration in time range)   aspirin EC tablet 81 mg (has no administration in time range)   hydrALAZINE tablet 25 mg (25 mg Oral Given 1/24/22 2239)   furosemide injection 20 mg (20 mg Intravenous Given 1/24/22 2241)   sodium bicarbonate tablet 650 mg (650 mg Oral Not Given 1/24/22 2100)   0.9%  NaCl infusion (10 mL/hr Intravenous New Bag 1/24/22 2258)   acetaminophen tablet 650 mg (has no administration in time range)    albuterol-ipratropium 2.5 mg-0.5 mg/3 mL nebulizer solution 3 mL (has no administration in time range)   dextrose 50% injection 12.5 g (has no administration in time range)   dextrose 50% injection 25 g (has no administration in time range)   glucagon (human recombinant) injection 1 mg (has no administration in time range)   glucose chewable tablet 16 g (has no administration in time range)   glucose chewable tablet 24 g (has no administration in time range)   hydrALAZINE injection 10 mg (has no administration in time range)   HYDROcodone-acetaminophen 5-325 mg per tablet 1 tablet (has no administration in time range)   insulin aspart U-100 pen 0-5 Units (has no administration in time range)   magnesium oxide tablet 800 mg (has no administration in time range)   magnesium oxide tablet 800 mg (has no administration in time range)   naloxone 0.4 mg/mL injection 0.02 mg (has no administration in time range)   ondansetron injection 4 mg (has no administration in time range)   polyethylene glycol packet 17 g (has no administration in time range)   potassium, sodium phosphates 280-160-250 mg packet 2 packet (has no administration in time range)   potassium, sodium phosphates 280-160-250 mg packet 2 packet (has no administration in time range)   potassium, sodium phosphates 280-160-250 mg packet 2 packet (has no administration in time range)   promethazine tablet 25 mg (25 mg Oral Given 1/24/22 2038)   sodium chloride 0.9% flush 10 mL (has no administration in time range)   trazodone split tablet 25 mg (has no administration in time range)   amLODIPine tablet 10 mg (has no administration in time range)   atorvastatin tablet 20 mg (20 mg Oral Given 1/24/22 2039)   clopidogreL tablet 75 mg (has no administration in time range)   lisinopriL tablet 2.5 mg (has no administration in time range)   sodium chloride 0.9% flush 10 mL (has no administration in time range)   dextrose 50% injection 12.5 g (has no administration in  time range)   dextrose 50% injection 25 g (25 g Intravenous Given 1/25/22 0326)   glucagon (human recombinant) injection 1 mg (has no administration in time range)   heparin (porcine) injection 5,000 Units (has no administration in time range)   cloNIDine tablet 0.2 mg (0.2 mg Oral Given 1/24/22 2039)   dextrose 50 % in water (D50W) injection 25 g (25 g Intravenous Given 1/24/22 1531)   hydrALAZINE injection 10 mg (10 mg Intravenous Given 1/24/22 1546)   furosemide injection 60 mg (60 mg Intravenous Given 1/24/22 1616)   nitroGLYCERIN SL tablet 0.4 mg (0.4 mg Sublingual Given 1/24/22 1620)   albuterol-ipratropium 2.5 mg-0.5 mg/3 mL nebulizer solution 3 mL (3 mLs Nebulization Given 1/24/22 1608)                 ED Course as of 01/25/22 0347 Mon Jan 24, 2022   1841 X-Ray Chest AP Portable(!) [PW]   1841 SARS-CoV-2 RNA, Amplification, Qual: Negative  Patient has been admitted by Dr. UNGER to ICU for decompensated congestive heart failure.  He also has a hypertensive emergency with malignant hypertension.  He is on a nitroglycerin drip and has been accepted by Dr. Jiménez. [PW]      ED Course User Index  [PW] Fabián Parsons MD             Clinical Impression:   Final diagnoses:  [R06.02] SOB (shortness of breath)  [I16.9] Hypertensive crisis (Primary)  [R06.03] Respiratory distress  [E16.2] Hypoglycemia  [N18.9] Chronic kidney disease, unspecified CKD stage          ED Disposition Condition    Admit               Fabián Parsons MD  01/25/22 4867

## 2022-01-25 NOTE — PLAN OF CARE
01/25/22 1218   Medicare Message   Important Message from Medicare regarding Discharge Appeal Rights Given to patient/caregiver;Explained to patient/caregiver;Signed/date by patient/caregiver   Date IMM was signed 01/25/22   Time IMM was signed 1206

## 2022-01-25 NOTE — SUBJECTIVE & OBJECTIVE
Past Medical History:   Diagnosis Date    CHF (congestive heart failure)     Diabetes mellitus     Hypertension     Stroke        Past Surgical History:   Procedure Laterality Date    EYE SURGERY      LEFT EYE       Review of patient's allergies indicates:  No Known Allergies    No current facility-administered medications on file prior to encounter.     Current Outpatient Medications on File Prior to Encounter   Medication Sig    allopurinoL (ZYLOPRIM) 100 MG tablet   = 2 tab, Oral, Daily, # 60 tab, 5 Refill(s), Pharmacy: [id number], 167, cm, 07/27/21 8:24:00 CDT, Height/Length Measured, 91.5, kg, 12/15/20 11:18:00 CST, Weight Dosing    amLODIPine (NORVASC) 10 MG tablet 10 mg.    aspirin (ECOTRIN) 81 MG EC tablet Take 1 tablet (81 mg total) by mouth once daily.    atorvastatin (LIPITOR) 40 MG tablet   = 1 tab, Oral, Daily, # 90 tab, 1 Refill(s), Soft Stop, Pharmacy: [id number], 167, cm, 04/07/21 14:01:00 CDT, Height/Length Measured, 91.5, kg, 12/15/20 11:18:00 CST, Weight Dosing    clopidogreL (PLAVIX) 75 mg tablet Take 75 mg by mouth once daily.    furosemide (LASIX) 40 MG tablet Take 0.5 tablets (20 mg total) by mouth 2 (two) times daily.    linaGLIPtin (TRADJENTA) 5 mg Tab tablet 5 mg.    lisinopriL (PRINIVIL,ZESTRIL) 2.5 MG tablet Take 1 tablet (2.5 mg total) by mouth once daily.    NOVOLOG MIX 70-30FLEXPEN U-100 100 unit/mL (70-30) InPn pen Inject into the skin.    sodium bicarbonate 650 MG tablet Take 1 tablet (650 mg total) by mouth 2 (two) times daily.     Family History    None       Tobacco Use    Smoking status: Not on file    Smokeless tobacco: Not on file   Substance and Sexual Activity    Alcohol use: Not on file    Drug use: Not on file    Sexual activity: Not on file     Review of Systems   Constitutional: Negative.    HENT: Negative.    Eyes: Negative.    Respiratory: Positive for shortness of breath.    Cardiovascular: Positive for leg swelling. Negative  for chest pain and palpitations.   Gastrointestinal: Negative.    Endocrine: Negative.    Genitourinary: Negative.    Musculoskeletal: Negative.    Skin: Negative.    Allergic/Immunologic: Negative.    Neurological: Negative.    Hematological: Negative.    Psychiatric/Behavioral: Negative.      Objective:     Vital Signs (Most Recent):  Temp: 98 °F (36.7 °C) (01/24/22 1500)  Pulse: 109 (01/24/22 1824)  Resp: (!) 28 (01/24/22 1824)  BP: (!) 202/117 (01/24/22 1824)  SpO2: 100 % (01/24/22 1824) Vital Signs (24h Range):  Temp:  [98 °F (36.7 °C)] 98 °F (36.7 °C)  Pulse:  [104-123] 109  Resp:  [19-41] 28  SpO2:  [94 %-100 %] 100 %  BP: (191-218)/(107-147) 202/117     Weight: 100.7 kg (222 lb)  Body mass index is 34.77 kg/m².    Physical Exam  Vitals and nursing note reviewed.   Constitutional:       Appearance: Normal appearance.   HENT:      Head: Normocephalic and atraumatic.      Right Ear: External ear normal.      Left Ear: External ear normal.      Nose: Nose normal.      Mouth/Throat:      Mouth: Mucous membranes are moist.   Eyes:      Extraocular Movements: Extraocular movements intact.   Cardiovascular:      Rate and Rhythm: Normal rate. Rhythm irregular.      Pulses: Normal pulses.      Heart sounds: Gallop (S 4 at apex) present.    Pulmonary:      Effort: Respiratory distress present.      Breath sounds: Rales present.   Abdominal:      General: Bowel sounds are normal.      Palpations: Abdomen is soft.      Comments: Obese   Musculoskeletal:      Cervical back: Normal range of motion and neck supple.      Right lower leg: Edema present.      Left lower leg: Edema present.   Skin:     General: Skin is warm.      Capillary Refill: Capillary refill takes 2 to 3 seconds.   Neurological:      General: No focal deficit present.      Mental Status: He is alert and oriented to person, place, and time.   Psychiatric:         Mood and Affect: Mood normal.         Behavior: Behavior normal.             Significant Labs:  All pertinent labs within the past 24 hours have been reviewed.    Significant Imaging: I have reviewed all pertinent imaging results/findings within the past 24 hours.

## 2022-01-25 NOTE — HPI
[age] year old diabetic hypertensive male with a history of congestive heart failure and renal disease was found unconscious on the floor at home.  EMS checked his blood sugar and was 31. He was given an amp of D50 and his blood sugar came up to 68. His blood sugar was recheck again and range from 75 to 77. He developed acute pulmonary edema in emergency room was given Lasix.  He became hypertensive and had to be placed on a nitroglycerin drip for his blood pressure.  He continued to be short of breath was placed on BiPAP.  He denies chest pain, nausea, vomiting , headaches, blurred or double vision.

## 2022-01-25 NOTE — PLAN OF CARE
01/25/22 1210   Discharge Assessment   Assessment Type Discharge Planning Assessment   Confirmed/corrected address, phone number and insurance Yes   Confirmed Demographics Correct on Facesheet   Source of Information patient   When was your last doctors appointment? 01/17/22   Communicated STAR with patient/caregiver Date not available/Unable to determine   Reason For Admission low blood sugar and high blood pressure   Lives With child(mónica), adult   Facility Arrived From: Home   Do you expect to return to your current living situation? Yes   Do you have help at home or someone to help you manage your care at home? Yes   Who are your caregiver(s) and their phone number(s)? Sons : Zaid Flor [phone] and Jefry Flor [phone]   Prior to hospitilization cognitive status: Alert/Oriented   Current cognitive status: Alert/Oriented   Walking or Climbing Stairs Difficulty none   Dressing/Bathing Difficulty none   Home Accessibility wheelchair accessible   Home Layout Able to live on 1st floor   Equipment Currently Used at Home nebulizer   Readmission within 30 days? Yes   Patient currently being followed by outpatient case management? No   Do you currently have service(s) that help you manage your care at home? Yes   Name and Contact number of agency [facility], but could not remember the name   Is the pt/caregiver preference to resume services with current agency Yes   Do you take prescription medications? Yes   Do you have prescription coverage? Yes   Coverage Medicaid   Do you have any problems affording any of your prescribed medications? TBD   If yes, what medications? He does not normally have issues with affording medications   Is the patient taking medications as prescribed? yes   Who is going to help you get home at discharge? Son Zaid Flor [phone]   How do you get to doctors appointments? family or friend will provide;health plan transportation   Are you on dialysis? No   Do you take coumadin?  No   Discharge Plan A Home with family   DME Needed Upon Discharge  none   Discharge Plan discussed with: Patient;Adult children   Discharge Barriers Identified None   Relationship/Environment   Name(s) of Who Lives With Patient Zaid Flor [phone] and Jefry Flor [phone]     Patient awake, alert and very pleasant with son at bedside during assessment. Patient lives at home with two sons: Zaid Flor [phone] and Jefry Flor [phone]. Patient states he has a nurse that comes to visit but doesn't know the name of the company, will find out. Patient states the only DME he has at home is a nebulizer.  Patient denies any other needs at this time. Case Management will continue to follow for further needs.

## 2022-01-25 NOTE — ASSESSMENT & PLAN NOTE
Admit to ICU  Continue nitroglycerin drip  Norvasc  Apresoline  Lasix  Catapres  Troponin  Echocardiogram  Cardiology consult  Off NTG drip  Continue p.o. meds  Echocardiogram done in January showed ejection fraction 39%  Off BiPAP  Transfer to the medical floor

## 2022-01-25 NOTE — CONSULTS
CARDIOLOGY CONSULTATION    Patient Name:  Marshall Flor    :  1955    Medical Record:  [id number]    DATE OF SERVICE: 2022    ATTENDING PHYSICIAN: Matt Jiménez MD    REASON FOR CONSULT:     HISTORY OF PRESENT ILLNESS  The patient is a 66 y.o. y/o male who is hospitalized for found down increased blood pressure 218/137 EKG DECREASED ST INFERIOR LATERAL had not taken some of his medication concern for glucose 30 EF 39% noted  no shortness of breath previous CVA in 2016 has been on Plavix blood pressure improved with starting home medication now transfer from ICU to floor no chest pain or shortness of breath cardiology consult for hypertension cardiomyopathy and recent CHF      PAST MEDICAL HISTORY  Past Medical History:   Diagnosis Date    CHF (congestive heart failure)     Diabetes mellitus     Hypertension     Stroke        PAST SURGICAL HISTORY  Past Surgical History:   Procedure Laterality Date    EYE SURGERY      LEFT EYE       SOCIAL HISTORY         FAMILY HISTORY  History reviewed. No pertinent family history.      ALLERGIES   Review of patient's allergies indicates:  No Known Allergies    HOME  MEDICATIONS  Prior to Admission medications    Medication Sig Start Date End Date Taking? Authorizing Provider   allopurinoL (ZYLOPRIM) 100 MG tablet   = 2 tab, Oral, Daily, # 60 tab, 5 Refill(s), Pharmacy: [id number], 167, cm, 21 8:24:00 CDT, Height/Length Measured, 91.5, kg, 12/15/20 11:18:00 CST, Weight Dosing 21   Historical Provider   amLODIPine (NORVASC) 10 MG tablet 10 mg. 21  Historical Provider   aspirin (ECOTRIN) 81 MG EC tablet Take 1 tablet (81 mg total) by mouth once daily. 22  Matt Jiménez MD   atorvastatin (LIPITOR) 40 MG tablet   = 1 tab, Oral, Daily, # 90 tab, 1 Refill(s), Soft Stop, Pharmacy: [id number], 167, cm, 21 14:01:00 CDT, Height/Length Measured, 91.5, kg, 12/15/20 11:18:00 CST, Weight Dosing 21    Historical Provider   clopidogreL (PLAVIX) 75 mg tablet Take 75 mg by mouth once daily. 11/10/21   Historical Provider   furosemide (LASIX) 40 MG tablet Take 0.5 tablets (20 mg total) by mouth 2 (two) times daily. 1/7/22 1/7/23  Matt Jiménez MD   linaGLIPtin (TRADJENTA) 5 mg Tab tablet 5 mg. 6/24/21   Historical Provider   lisinopriL (PRINIVIL,ZESTRIL) 2.5 MG tablet Take 1 tablet (2.5 mg total) by mouth once daily. 1/7/22 1/7/23  Matt Jiménez MD   NOVOLOG MIX 70-30FLEXPEN U-100 100 unit/mL (70-30) InPn pen Inject into the skin. 10/4/21   Historical Provider   sodium bicarbonate 650 MG tablet Take 1 tablet (650 mg total) by mouth 2 (two) times daily. 1/7/22 1/7/23  Matt Jiménez MD     CURRENT MEDICATIONS   amLODIPine  10 mg Oral Daily    aspirin  81 mg Oral Daily    atorvastatin  20 mg Oral QHS    cloNIDine  0.2 mg Oral TID    clopidogreL  75 mg Oral Daily    furosemide (LASIX) injection  20 mg Intravenous Q8H    heparin (porcine)  5,000 Units Subcutaneous Q8H    hydrALAZINE  25 mg Oral Q8H    lisinopriL  2.5 mg Oral Daily    sodium bicarbonate  650 mg Oral BID     sodium chloride 0.9%, acetaminophen, albuterol-ipratropium, dextrose 50%, dextrose 50%, dextrose 50%, dextrose 50%, glucagon (human recombinant), glucagon (human recombinant), glucose, glucose, hydrALAZINE, HYDROcodone-acetaminophen, insulin aspart U-100, magnesium oxide, magnesium oxide, naloxone, ondansetron, polyethylene glycol, potassium, sodium phosphates, potassium, sodium phosphates, potassium, sodium phosphates, promethazine, sodium chloride 0.9%, sodium chloride 0.9%, sodium chloride 0.9%, trazodone      REVIEW OF SYSTEMS  General: no weight loss, no fever, no chills, no anorexia  Skin: no rash  Eyes: no blurry vision  Ears/Nose/Throat: no nasal congestion, no sore throat  Respiratory: no cough, no dyspnea, no wheezing  Cardiovascular: no chest pain, no ankle swelling  Gastrointestinal: no nausea, no vomiting, no  diarrhea, no constipation, no abdominal pain. No melena, no bright red blood per rectum  Genitourinary: no dysuria, no hematuria  Musculoskeletal: no joint pain, no myalgia, no muscle weakness  Neurologic: no seizures, no tremors, no fainting  Hematologic/Lymphatic: no abnormal bleeding, no abnormal bruising  Endocrine: no heat or cold intolerance, no polydipsia, no polyuria  Psychiatric: no anxiety, no depression  Allergy/Immunology: no seasonal allergies, no joint stiffness in morning      PHYSICAL EXAM  Vital Signs:  Temp:  [98 °F (36.7 °C)-98.7 °F (37.1 °C)] 98.7 °F (37.1 °C)  Pulse:  [] 80  Resp:  [14-41] 20  SpO2:  [94 %-100 %] 100 %  BP: (105-218)/() 130/82  Constitutional:  Healthy, no distress  HENT:  Normocephalic, Atraumatic, Bilateral external ears normal, Oropharynx moist, No oral exudates, No tenderness, neck supple.  Eyes:  PERRL, EOMI, Conjunctiva normal, No discharge.   Lymphatic:  No cervical or supraclavicular lymphadenopathy noted.   Cardiovascular:  Normal heart rate, Normal rhythm, No murmurs, No rubs, No gallops.   Respiratory:  Normal breath sounds, No respiratory distress, No wheezing, No rhonchi, No rales, No chest tenderness.   GI:  Bowel sounds normal, Soft, No tenderness, No rebound or guarding, No masses.   Integument:  Warm, Dry, No erythema, No rash.   Musculoskeletal/Extremities:  Intact distal pulses, No edema, No tenderness, No cyanosis, No clubbing. Good range of motion in all major joints.   Neurologic:  Alert & oriented x 3, cranial nerves grossly intact, No focal deficits.    LABS    CBC  Recent Labs   Lab 01/24/22  1631 01/25/22  0809   WBC 5.36 4.38   RBC 5.46 4.17*   HGB 14.1 10.7*   HCT 44.3 33.1*   MCV 81* 79*   MCH 25.8* 25.7*   MCHC 31.8* 32.3   RDW 14.4 14.2   MPV 10.3 9.9    343       Chemistry  Recent Labs   Lab 01/24/22  1631 01/25/22  0809    144   K 3.4* 3.2*    107   CO2 17* 22*   BUN 13 14   CREATININE 2.0* 1.9*   * 69*    PROT 8.6* 5.8*   CALCIUM 9.2 7.8*   BILITOT 0.6 0.6   AST 16 9*   ALT 14 11       ABG  No results for input(s): PHART, ZRI4AOT, PO2ART, FOS6AGU, K1AWEPGX, BEART, R4KMKIQE in the last 168 hours.    IMAGING STUDIES & OTHER STUDIES  Reviewed          ASSESSMENT  Active Hospital Problems    Diagnosis  POA    CHF (congestive heart failure), NYHA class I, acute on chronic, systolic [I50.23]  Yes    Essential hypertension [I10]  Yes      Resolved Hospital Problems    Diagnosis Date Resolved POA    Hypertensive crisis [I16.9] 01/25/2022 Yes       PLAN     Hypertensive urgency  Status post 218/137  Transient nitro drip  130/80 on home medications  History noncompliance      Chronic  HFrEF   No shortness of breath  BNP 1467 improved to 850 1/21  EKG DECREASED ST INFERIOR LATERAL  Heparin 5000 subQ q.8 hours  States abnormal stress 1 year ago  Catheterization not done     History of CVA  No weakness  Describesd as weakness  2016  Plavix  Venous no DVT        Diabetes  Status post decreased glucose  Treated medicine     Hypokalemia  K 3.4 1/25     Renal failure  Creatinine 1.9   1/25  Monitor renal function     Hypomagnesemia  Magnesium 1.6 1/5  Monitor magnesium        Plan  KCL 40 mg x 1  CK  CBC BMP BNP in a.m.    The plan was discussed with the patient and/or family.    Thank you for the Consult.    Electronically signed by: Rickey Tong, 1/25/2022 12:46 PM     Time spent on counseling/coordination of care:   Total time spent with patient:

## 2022-01-25 NOTE — NURSING
PT RECEIVED FROM ICU ON BED/O2NC IN PLACE @ 2L/M/HOB^x35 DEGREES/PT IS AAOx3/CALM AND COOPERATIVE WITH STAFF. SKIN IS WARM/DRY/INTACT/BLE'S EDEMATOUS. PT EXPLAINED TV CONTROLS/CALL LIGHT FOR ASSIST. RN INSTRUCTED PT NOT TO GET OUT OF BED UNLESS STAFF IS PRESENT/PT VERBALIZED UNDERSTANDING. SON @ BEDSIDE/LUNCH TRAY SERVED.

## 2022-01-25 NOTE — H&P
[facility] Medicine  History & Physical    Patient Name: Marshall Flor  MRN: [id number]  Patient Class: IP- Inpatient  Admission Date: 1/24/2022  Attending Physician: Matt Jmiénez MD  Primary Care Provider: Dorie Quan MD           Patient information was obtained from patient and ER records.     Subjective:     Principal Problem:<principal problem not specified>    Chief Complaint:   Chief Complaint   Patient presents with    Hypoglycemia     [facility] arrive at residence, pt son called 911 when he went to go check on his father and found him unresponsive, lying on floor. [facility] initial CBG 31 and CBG 5, administered amp of D50, 15 min later CBG 68, 15 min after that 77. Upon arrival to ED CBG 75.         HPI: [age] year old diabetic hypertensive male with a history of congestive heart failure and renal disease was found unconscious on the floor at home.  EMS checked his blood sugar and was 31. He was given an amp of D50 and his blood sugar came up to 68. His blood sugar was recheck again and range from 75 to 77. He developed acute pulmonary edema in emergency room was given Lasix.  He became hypertensive and had to be placed on a nitroglycerin drip for his blood pressure.  He continued to be short of breath was placed on BiPAP.  He denies chest pain, nausea, vomiting , headaches, blurred or double vision.      Past Medical History:   Diagnosis Date    CHF (congestive heart failure)     Diabetes mellitus     Hypertension     Stroke        Past Surgical History:   Procedure Laterality Date    EYE SURGERY      LEFT EYE       Review of patient's allergies indicates:  No Known Allergies    No current facility-administered medications on file prior to encounter.     Current Outpatient Medications on File Prior to Encounter   Medication Sig    allopurinoL (ZYLOPRIM) 100 MG tablet   = 2 tab, Oral, Daily, # 60 tab, 5 Refill(s), Pharmacy: [id number], 147, cm, 07/27/21 8:24:00  CDT, Height/Length Measured, 91.5, kg, 12/15/20 11:18:00 CST, Weight Dosing    amLODIPine (NORVASC) 10 MG tablet 10 mg.    aspirin (ECOTRIN) 81 MG EC tablet Take 1 tablet (81 mg total) by mouth once daily.    atorvastatin (LIPITOR) 40 MG tablet   = 1 tab, Oral, Daily, # 90 tab, 1 Refill(s), Soft Stop, Pharmacy: [id number], 167, cm, 04/07/21 14:01:00 CDT, Height/Length Measured, 91.5, kg, 12/15/20 11:18:00 CST, Weight Dosing    clopidogreL (PLAVIX) 75 mg tablet Take 75 mg by mouth once daily.    furosemide (LASIX) 40 MG tablet Take 0.5 tablets (20 mg total) by mouth 2 (two) times daily.    linaGLIPtin (TRADJENTA) 5 mg Tab tablet 5 mg.    lisinopriL (PRINIVIL,ZESTRIL) 2.5 MG tablet Take 1 tablet (2.5 mg total) by mouth once daily.    NOVOLOG MIX 70-30FLEXPEN U-100 100 unit/mL (70-30) InPn pen Inject into the skin.    sodium bicarbonate 650 MG tablet Take 1 tablet (650 mg total) by mouth 2 (two) times daily.     Family History    None       Tobacco Use    Smoking status: Not on file    Smokeless tobacco: Not on file   Substance and Sexual Activity    Alcohol use: Not on file    Drug use: Not on file    Sexual activity: Not on file     Review of Systems   Constitutional: Negative.    HENT: Negative.    Eyes: Negative.    Respiratory: Positive for shortness of breath.    Cardiovascular: Positive for leg swelling. Negative for chest pain and palpitations.   Gastrointestinal: Negative.    Endocrine: Negative.    Genitourinary: Negative.    Musculoskeletal: Negative.    Skin: Negative.    Allergic/Immunologic: Negative.    Neurological: Negative.    Hematological: Negative.    Psychiatric/Behavioral: Negative.      Objective:     Vital Signs (Most Recent):  Temp: 98 °F (36.7 °C) (01/24/22 1500)  Pulse: 109 (01/24/22 1824)  Resp: (!) 28 (01/24/22 1824)  BP: (!) 202/117 (01/24/22 1824)  SpO2: 100 % (01/24/22 1824) Vital Signs (24h Range):  Temp:  [98 °F (36.7 °C)] 98 °F (36.7 °C)  Pulse:  [104-123]  109  Resp:  [19-41] 28  SpO2:  [94 %-100 %] 100 %  BP: (191-218)/(107-147) 202/117     Weight: 100.7 kg (222 lb)  Body mass index is 34.77 kg/m².    Physical Exam  Vitals and nursing note reviewed.   Constitutional:       Appearance: Normal appearance.   HENT:      Head: Normocephalic and atraumatic.      Right Ear: External ear normal.      Left Ear: External ear normal.      Nose: Nose normal.      Mouth/Throat:      Mouth: Mucous membranes are moist.   Eyes:      Extraocular Movements: Extraocular movements intact.   Cardiovascular:      Rate and Rhythm: Normal rate. Rhythm irregular.      Pulses: Normal pulses.      Heart sounds: Gallop (S 4 at apex) present.    Pulmonary:      Effort: Respiratory distress present.      Breath sounds: Rales present.   Abdominal:      General: Bowel sounds are normal.      Palpations: Abdomen is soft.      Comments: Obese   Musculoskeletal:      Cervical back: Normal range of motion and neck supple.      Right lower leg: Edema present.      Left lower leg: Edema present.   Skin:     General: Skin is warm.      Capillary Refill: Capillary refill takes 2 to 3 seconds.   Neurological:      General: No focal deficit present.      Mental Status: He is alert and oriented to person, place, and time.   Psychiatric:         Mood and Affect: Mood normal.         Behavior: Behavior normal.             Significant Labs: All pertinent labs within the past 24 hours have been reviewed.    Significant Imaging: I have reviewed all pertinent imaging results/findings within the past 24 hours.    Assessment/Plan:     Hypertensive crisis  Admit to ICU  Continue nitroglycerin drip  Norvasc  Apresoline  Lasix  Catapres  Troponin  Echocardiogram  Cardiology consult        VTE Risk Mitigation (From admission, onward)         Ordered     heparin (porcine) injection 5,000 Units  Every 8 hours         01/24/22 1822     heparin (porcine) injection 5,000 Units  Every 8 hours         01/24/22 1822     IP VTE  HIGH RISK PATIENT  Once         01/24/22 1822     Place sequential compression device  Until discontinued         01/24/22 1822     IP VTE HIGH RISK PATIENT  Once         01/24/22 1822     Place sequential compression device  Until discontinued         01/24/22 1822     Place sequential compression device  Until discontinued         01/24/22 1822                   Matt Jiménez MD  Department of Hospital Medicine   [place] - Emergency Dept

## 2022-01-25 NOTE — NURSING
Pt received from ER . Connected to cardiac monitor, cont pulse ox, B/P cuff placed. Pt placed on Bi pap. Reassurance provided, oriented to room and poc. Call light with in reach.

## 2022-01-25 NOTE — SUBJECTIVE & OBJECTIVE
Interval History:  S SHORT OF BREATH THIS MORNING.  HIS NITROGLYCERIN DRIP HAS BEEN DISCONTINUED.  HE HAS BEEN TAKEN OFF BIPAP.  DENIES CHEST PAIN, NAUSEA, VOMITING, COUGH ,FEVER OR CHILLS.  HE HAD AN ECHOCARDIOGRAM IN January,20 22 THAT SHOWED EJECTION FRACTION ABOUT 39%.    Review of Systems   Constitutional: Negative.    HENT: Negative.    Eyes: Negative.    Respiratory: Positive for shortness of breath. Negative for cough and wheezing.    Cardiovascular: Negative.    Gastrointestinal: Negative.    Endocrine: Negative.    Genitourinary: Negative.    Musculoskeletal: Negative.    Skin: Negative.    Allergic/Immunologic: Negative.    Neurological: Negative.    Hematological: Negative.    Psychiatric/Behavioral: Negative.      Objective:     Vital Signs (Most Recent):  Temp: 98.7 °F (37.1 °C) (01/25/22 0720)  Pulse: 80 (01/25/22 0800)  Resp: 16 (01/25/22 0800)  BP: 137/82 (01/25/22 0800)  SpO2: 100 % (01/25/22 0800) Vital Signs (24h Range):  Temp:  [98 °F (36.7 °C)-98.7 °F (37.1 °C)] 98.7 °F (37.1 °C)  Pulse:  [] 80  Resp:  [14-41] 16  SpO2:  [94 %-100 %] 100 %  BP: (105-218)/() 137/82     Weight: 95.1 kg (209 lb 10.5 oz)  Body mass index is 32.84 kg/m².    Intake/Output Summary (Last 24 hours) at 1/25/2022 0838  Last data filed at 1/25/2022 0720  Gross per 24 hour   Intake 73.5 ml   Output 1600 ml   Net -1526.5 ml      Physical Exam  Constitutional:       Appearance: He is obese.   HENT:      Head: Normocephalic and atraumatic.      Nose: Nose normal.      Mouth/Throat:      Mouth: Mucous membranes are moist.   Eyes:      Extraocular Movements: Extraocular movements intact.   Cardiovascular:      Rate and Rhythm: Normal rate. Rhythm irregular.      Pulses: Normal pulses.      Heart sounds: Gallop (S4 AT THE APEX) present.    Pulmonary:      Effort: Pulmonary effort is normal.      Breath sounds: Normal breath sounds.   Abdominal:      General: Bowel sounds are normal.      Palpations: Abdomen is soft.       Comments: OBESE   Musculoskeletal:         General: Normal range of motion.      Cervical back: Normal range of motion.      Right lower leg: Edema ( 2+ PITTING EDEMA BOTH LOWER EXTREMITIES) present.      Left lower leg: Edema present.   Skin:     Capillary Refill: Capillary refill takes 2 to 3 seconds.   Neurological:      General: No focal deficit present.      Mental Status: He is alert.   Psychiatric:         Mood and Affect: Mood normal.         Significant Labs: All pertinent labs within the past 24 hours have been reviewed.    Significant Imaging: I have reviewed all pertinent imaging results/findings within the past 24 hours.

## 2022-01-25 NOTE — PLAN OF CARE
Problem: Adult Inpatient Plan of Care  Goal: Plan of Care Review  Outcome: Ongoing, Progressing     Problem: Adult Inpatient Plan of Care  Goal: Absence of Hospital-Acquired Illness or Injury  Outcome: Ongoing, Progressing     Problem: Diabetes Comorbidity  Goal: Blood Glucose Level Within Targeted Range  Outcome: Ongoing, Progressing     Problem: Infection  Goal: Absence of Infection Signs and Symptoms  Outcome: Ongoing, Progressing     Problem: Hypertension Acute  Goal: Blood Pressure Within Desired Range  Outcome: Ongoing, Progressing     Problem: Adjustment to Illness (Heart Failure)  Goal: Optimal Coping  Outcome: Ongoing, Progressing     Problem: Respiratory Compromise (Heart Failure)  Goal: Effective Oxygenation and Ventilation  Outcome: Ongoing, Progressing     Problem: Pain (Chronic Kidney Disease)  Goal: Acceptable Pain Control  Outcome: Ongoing, Progressing     Problem: Mood Impairment (Anxiety Signs/Symptoms)  Goal: Improved Mood Symptoms (Anxiety Signs/Symptoms)  Outcome: Ongoing, Progressing

## 2022-01-26 LAB
ALBUMIN SERPL BCP-MCNC: 2.3 G/DL (ref 3.5–5.2)
ALP SERPL-CCNC: 93 U/L (ref 55–135)
ALT SERPL W/O P-5'-P-CCNC: 9 U/L (ref 10–44)
ANION GAP SERPL CALC-SCNC: 13 MMOL/L (ref 8–16)
ANION GAP SERPL CALC-SCNC: 13 MMOL/L (ref 8–16)
AST SERPL-CCNC: 8 U/L (ref 10–40)
BACTERIA #/AREA URNS HPF: ABNORMAL /HPF
BASOPHILS # BLD AUTO: 0.01 K/UL (ref 0–0.2)
BASOPHILS # BLD AUTO: 0.01 K/UL (ref 0–0.2)
BASOPHILS NFR BLD: 0.3 % (ref 0–1.9)
BASOPHILS NFR BLD: 0.3 % (ref 0–1.9)
BILIRUB SERPL-MCNC: 0.5 MG/DL (ref 0.1–1)
BILIRUB UR QL STRIP: NEGATIVE
BNP SERPL-MCNC: 821 PG/ML (ref 0–99)
BUN SERPL-MCNC: 20 MG/DL (ref 8–23)
BUN SERPL-MCNC: 20 MG/DL (ref 8–23)
CALCIUM SERPL-MCNC: 7.9 MG/DL (ref 8.7–10.5)
CALCIUM SERPL-MCNC: 7.9 MG/DL (ref 8.7–10.5)
CHLORIDE SERPL-SCNC: 105 MMOL/L (ref 95–110)
CHLORIDE SERPL-SCNC: 105 MMOL/L (ref 95–110)
CLARITY UR: ABNORMAL
CO2 SERPL-SCNC: 22 MMOL/L (ref 23–29)
CO2 SERPL-SCNC: 22 MMOL/L (ref 23–29)
COLOR UR: YELLOW
CREAT SERPL-MCNC: 2.6 MG/DL (ref 0.5–1.4)
CREAT SERPL-MCNC: 2.6 MG/DL (ref 0.5–1.4)
DIFFERENTIAL METHOD: ABNORMAL
DIFFERENTIAL METHOD: ABNORMAL
EOSINOPHIL # BLD AUTO: 0 K/UL (ref 0–0.5)
EOSINOPHIL # BLD AUTO: 0 K/UL (ref 0–0.5)
EOSINOPHIL NFR BLD: 1 % (ref 0–8)
EOSINOPHIL NFR BLD: 1 % (ref 0–8)
ERYTHROCYTE [DISTWIDTH] IN BLOOD BY AUTOMATED COUNT: 14.2 % (ref 11.5–14.5)
ERYTHROCYTE [DISTWIDTH] IN BLOOD BY AUTOMATED COUNT: 14.2 % (ref 11.5–14.5)
EST. GFR  (AFRICAN AMERICAN): 28.8 ML/MIN/1.73 M^2
EST. GFR  (AFRICAN AMERICAN): 28.8 ML/MIN/1.73 M^2
EST. GFR  (NON AFRICAN AMERICAN): 24.9 ML/MIN/1.73 M^2
EST. GFR  (NON AFRICAN AMERICAN): 24.9 ML/MIN/1.73 M^2
GLUCOSE SERPL-MCNC: 183 MG/DL (ref 70–110)
GLUCOSE SERPL-MCNC: 183 MG/DL (ref 70–110)
GLUCOSE UR QL STRIP: NEGATIVE
HCT VFR BLD AUTO: 31.3 % (ref 40–54)
HCT VFR BLD AUTO: 31.3 % (ref 40–54)
HGB BLD-MCNC: 10.4 G/DL (ref 14–18)
HGB BLD-MCNC: 10.4 G/DL (ref 14–18)
HGB UR QL STRIP: ABNORMAL
HYALINE CASTS #/AREA URNS LPF: 3 /LPF
IMM GRANULOCYTES # BLD AUTO: 0.01 K/UL (ref 0–0.04)
IMM GRANULOCYTES # BLD AUTO: 0.01 K/UL (ref 0–0.04)
IMM GRANULOCYTES NFR BLD AUTO: 0.3 % (ref 0–0.5)
IMM GRANULOCYTES NFR BLD AUTO: 0.3 % (ref 0–0.5)
KETONES UR QL STRIP: NEGATIVE
LEUKOCYTE ESTERASE UR QL STRIP: ABNORMAL
LYMPHOCYTES # BLD AUTO: 0.7 K/UL (ref 1–4.8)
LYMPHOCYTES # BLD AUTO: 0.7 K/UL (ref 1–4.8)
LYMPHOCYTES NFR BLD: 24.1 % (ref 18–48)
LYMPHOCYTES NFR BLD: 24.1 % (ref 18–48)
MAGNESIUM SERPL-MCNC: 1.4 MG/DL (ref 1.6–2.6)
MCH RBC QN AUTO: 26.1 PG (ref 27–31)
MCH RBC QN AUTO: 26.1 PG (ref 27–31)
MCHC RBC AUTO-ENTMCNC: 33.2 G/DL (ref 32–36)
MCHC RBC AUTO-ENTMCNC: 33.2 G/DL (ref 32–36)
MCV RBC AUTO: 79 FL (ref 82–98)
MCV RBC AUTO: 79 FL (ref 82–98)
MICROSCOPIC COMMENT: ABNORMAL
MONOCYTES # BLD AUTO: 0.4 K/UL (ref 0.3–1)
MONOCYTES # BLD AUTO: 0.4 K/UL (ref 0.3–1)
MONOCYTES NFR BLD: 13.1 % (ref 4–15)
MONOCYTES NFR BLD: 13.1 % (ref 4–15)
NEUTROPHILS # BLD AUTO: 1.8 K/UL (ref 1.8–7.7)
NEUTROPHILS # BLD AUTO: 1.8 K/UL (ref 1.8–7.7)
NEUTROPHILS NFR BLD: 61.2 % (ref 38–73)
NEUTROPHILS NFR BLD: 61.2 % (ref 38–73)
NITRITE UR QL STRIP: NEGATIVE
NRBC BLD-RTO: 0 /100 WBC
NRBC BLD-RTO: 0 /100 WBC
PH UR STRIP: 5 [PH] (ref 5–8)
PLATELET # BLD AUTO: 259 K/UL (ref 150–450)
PLATELET # BLD AUTO: 259 K/UL (ref 150–450)
PMV BLD AUTO: 10.2 FL (ref 9.2–12.9)
PMV BLD AUTO: 10.2 FL (ref 9.2–12.9)
POCT GLUCOSE: 177 MG/DL (ref 70–110)
POCT GLUCOSE: 200 MG/DL (ref 70–110)
POCT GLUCOSE: 202 MG/DL (ref 70–110)
POCT GLUCOSE: 204 MG/DL (ref 70–110)
POCT GLUCOSE: 206 MG/DL (ref 70–110)
POTASSIUM SERPL-SCNC: 3.7 MMOL/L (ref 3.5–5.1)
POTASSIUM SERPL-SCNC: 3.7 MMOL/L (ref 3.5–5.1)
PROT SERPL-MCNC: 5.7 G/DL (ref 6–8.4)
PROT UR QL STRIP: ABNORMAL
RBC # BLD AUTO: 3.98 M/UL (ref 4.6–6.2)
RBC # BLD AUTO: 3.98 M/UL (ref 4.6–6.2)
RBC #/AREA URNS HPF: 33 /HPF (ref 0–4)
SODIUM SERPL-SCNC: 140 MMOL/L (ref 136–145)
SODIUM SERPL-SCNC: 140 MMOL/L (ref 136–145)
SP GR UR STRIP: 1.02 (ref 1–1.03)
SQUAMOUS #/AREA URNS HPF: 1 /HPF
URN SPEC COLLECT METH UR: ABNORMAL
UROBILINOGEN UR STRIP-ACNC: 1 EU/DL
WBC # BLD AUTO: 2.9 K/UL (ref 3.9–12.7)
WBC # BLD AUTO: 2.9 K/UL (ref 3.9–12.7)
WBC #/AREA URNS HPF: 55 /HPF (ref 0–5)

## 2022-01-26 PROCEDURE — 25000003 PHARM REV CODE 250: Performed by: HOSPITALIST

## 2022-01-26 PROCEDURE — 87088 URINE BACTERIA CULTURE: CPT | Performed by: HOSPITALIST

## 2022-01-26 PROCEDURE — 85025 COMPLETE CBC W/AUTO DIFF WBC: CPT | Performed by: HOSPITALIST

## 2022-01-26 PROCEDURE — 83880 ASSAY OF NATRIURETIC PEPTIDE: CPT | Performed by: HOSPITALIST

## 2022-01-26 PROCEDURE — 21400001 HC TELEMETRY ROOM

## 2022-01-26 PROCEDURE — 99232 PR SUBSEQUENT HOSPITAL CARE,LEVL II: ICD-10-PCS | Mod: ,,, | Performed by: HOSPITALIST

## 2022-01-26 PROCEDURE — 27000221 HC OXYGEN, UP TO 24 HOURS

## 2022-01-26 PROCEDURE — 87086 URINE CULTURE/COLONY COUNT: CPT | Performed by: HOSPITALIST

## 2022-01-26 PROCEDURE — 87186 SC STD MICRODIL/AGAR DIL: CPT | Performed by: HOSPITALIST

## 2022-01-26 PROCEDURE — 80053 COMPREHEN METABOLIC PANEL: CPT | Performed by: HOSPITALIST

## 2022-01-26 PROCEDURE — 83735 ASSAY OF MAGNESIUM: CPT | Performed by: HOSPITALIST

## 2022-01-26 PROCEDURE — 99900035 HC TECH TIME PER 15 MIN (STAT)

## 2022-01-26 PROCEDURE — 63600175 PHARM REV CODE 636 W HCPCS: Performed by: HOSPITALIST

## 2022-01-26 PROCEDURE — 87077 CULTURE AEROBIC IDENTIFY: CPT | Performed by: HOSPITALIST

## 2022-01-26 PROCEDURE — 25000003 PHARM REV CODE 250: Performed by: INTERNAL MEDICINE

## 2022-01-26 PROCEDURE — 87040 BLOOD CULTURE FOR BACTERIA: CPT | Mod: 59 | Performed by: HOSPITALIST

## 2022-01-26 PROCEDURE — 99232 SBSQ HOSP IP/OBS MODERATE 35: CPT | Mod: ,,, | Performed by: HOSPITALIST

## 2022-01-26 PROCEDURE — 81000 URINALYSIS NONAUTO W/SCOPE: CPT | Performed by: HOSPITALIST

## 2022-01-26 PROCEDURE — 94761 N-INVAS EAR/PLS OXIMETRY MLT: CPT

## 2022-01-26 PROCEDURE — 36415 COLL VENOUS BLD VENIPUNCTURE: CPT | Performed by: HOSPITALIST

## 2022-01-26 RX ORDER — FAMOTIDINE 20 MG/1
20 TABLET, FILM COATED ORAL DAILY
Status: DISCONTINUED | OUTPATIENT
Start: 2022-01-26 | End: 2022-02-02 | Stop reason: HOSPADM

## 2022-01-26 RX ORDER — MAGNESIUM SULFATE 1 G/100ML
1 INJECTION INTRAVENOUS
Status: COMPLETED | OUTPATIENT
Start: 2022-01-26 | End: 2022-01-26

## 2022-01-26 RX ORDER — MAGNESIUM SULFATE HEPTAHYDRATE 40 MG/ML
2 INJECTION, SOLUTION INTRAVENOUS ONCE
Status: DISCONTINUED | OUTPATIENT
Start: 2022-01-26 | End: 2022-01-26 | Stop reason: CLARIF

## 2022-01-26 RX ADMIN — INSULIN ASPART 2 UNITS: 100 INJECTION, SOLUTION INTRAVENOUS; SUBCUTANEOUS at 07:01

## 2022-01-26 RX ADMIN — FAMOTIDINE 20 MG: 20 TABLET ORAL at 03:01

## 2022-01-26 RX ADMIN — HYDRALAZINE HYDROCHLORIDE 25 MG: 25 TABLET, FILM COATED ORAL at 03:01

## 2022-01-26 RX ADMIN — LISINOPRIL 2.5 MG: 2.5 TABLET ORAL at 08:01

## 2022-01-26 RX ADMIN — MAGNESIUM SULFATE 1 G: 1 INJECTION INTRAVENOUS at 04:01

## 2022-01-26 RX ADMIN — HEPARIN SODIUM 5000 UNITS: 5000 INJECTION, SOLUTION INTRAVENOUS; SUBCUTANEOUS at 05:01

## 2022-01-26 RX ADMIN — TRAZODONE HYDROCHLORIDE 25 MG: 50 TABLET ORAL at 10:01

## 2022-01-26 RX ADMIN — PROMETHAZINE HYDROCHLORIDE 25 MG: 12.5 TABLET ORAL at 11:01

## 2022-01-26 RX ADMIN — ASPIRIN 81 MG: 81 TABLET, DELAYED RELEASE ORAL at 08:01

## 2022-01-26 RX ADMIN — MAGNESIUM SULFATE 1 G: 1 INJECTION INTRAVENOUS at 05:01

## 2022-01-26 RX ADMIN — CLONIDINE HYDROCHLORIDE 0.2 MG: 0.1 TABLET ORAL at 08:01

## 2022-01-26 RX ADMIN — INSULIN ASPART 2 UNITS: 100 INJECTION, SOLUTION INTRAVENOUS; SUBCUTANEOUS at 12:01

## 2022-01-26 RX ADMIN — ATORVASTATIN CALCIUM 20 MG: 10 TABLET, FILM COATED ORAL at 10:01

## 2022-01-26 RX ADMIN — CLONIDINE HYDROCHLORIDE 0.2 MG: 0.1 TABLET ORAL at 03:01

## 2022-01-26 RX ADMIN — INSULIN ASPART 2 UNITS: 100 INJECTION, SOLUTION INTRAVENOUS; SUBCUTANEOUS at 04:01

## 2022-01-26 RX ADMIN — CLOPIDOGREL 75 MG: 75 TABLET, FILM COATED ORAL at 08:01

## 2022-01-26 RX ADMIN — HEPARIN SODIUM 5000 UNITS: 5000 INJECTION, SOLUTION INTRAVENOUS; SUBCUTANEOUS at 03:01

## 2022-01-26 RX ADMIN — AMLODIPINE BESYLATE 10 MG: 5 TABLET ORAL at 08:01

## 2022-01-26 RX ADMIN — HYDRALAZINE HYDROCHLORIDE 25 MG: 25 TABLET, FILM COATED ORAL at 11:01

## 2022-01-26 RX ADMIN — SODIUM BICARBONATE 650 MG TABLET 650 MG: at 08:01

## 2022-01-26 RX ADMIN — HYDRALAZINE HYDROCHLORIDE 25 MG: 25 TABLET, FILM COATED ORAL at 05:01

## 2022-01-26 RX ADMIN — ACETAMINOPHEN 650 MG: 325 TABLET ORAL at 01:01

## 2022-01-26 RX ADMIN — HEPARIN SODIUM 5000 UNITS: 5000 INJECTION, SOLUTION INTRAVENOUS; SUBCUTANEOUS at 11:01

## 2022-01-26 RX ADMIN — CLONIDINE HYDROCHLORIDE 0.2 MG: 0.1 TABLET ORAL at 10:01

## 2022-01-26 RX ADMIN — FUROSEMIDE 20 MG: 10 INJECTION, SOLUTION INTRAVENOUS at 05:01

## 2022-01-26 RX ADMIN — SODIUM CHLORIDE: 0.9 INJECTION, SOLUTION INTRAVENOUS at 04:01

## 2022-01-26 NOTE — PLAN OF CARE
Problem: Adult Inpatient Plan of Care  Goal: Plan of Care Review  Outcome: Ongoing, Progressing  Goal: Patient-Specific Goal (Individualized)  Outcome: Ongoing, Progressing  Goal: Absence of Hospital-Acquired Illness or Injury  Outcome: Ongoing, Progressing  Goal: Optimal Comfort and Wellbeing  Outcome: Ongoing, Progressing  Goal: Readiness for Transition of Care  Outcome: Ongoing, Progressing     Problem: Diabetes Comorbidity  Goal: Blood Glucose Level Within Targeted Range  Outcome: Ongoing, Progressing     Problem: Skin Injury Risk Increased  Goal: Skin Health and Integrity  Outcome: Ongoing, Progressing     Problem: Infection  Goal: Absence of Infection Signs and Symptoms  Outcome: Ongoing, Progressing     Problem: Hypertension Acute  Goal: Blood Pressure Within Desired Range  Outcome: Ongoing, Progressing

## 2022-01-26 NOTE — PLAN OF CARE
Problem: Adult Inpatient Plan of Care  Goal: Plan of Care Review  Outcome: Ongoing, Progressing     Problem: Adult Inpatient Plan of Care  Goal: Patient-Specific Goal (Individualized)  Outcome: Ongoing, Progressing     Problem: Adult Inpatient Plan of Care  Goal: Absence of Hospital-Acquired Illness or Injury  Outcome: Ongoing, Progressing     Problem: Adult Inpatient Plan of Care  Goal: Optimal Comfort and Wellbeing  Outcome: Ongoing, Progressing     Problem: Adult Inpatient Plan of Care  Goal: Readiness for Transition of Care  Outcome: Ongoing, Progressing     Problem: Diabetes Comorbidity  Goal: Blood Glucose Level Within Targeted Range  Outcome: Ongoing, Progressing     Problem: Skin Injury Risk Increased  Goal: Skin Health and Integrity  Outcome: Ongoing, Progressing     Problem: Infection  Goal: Absence of Infection Signs and Symptoms  Outcome: Ongoing, Progressing

## 2022-01-26 NOTE — NURSING
PT HAS NOT VOIDED SINCE ON REMOVED AT 1100 THIS MORNING. PT HAS HAD 3 CUPS OF WATER AND A GLASS OF TEA SINCE ARRIVING ON [facility]. BLADDER AREA PALPATED/PT DENIES DISCOMFORT OR FEELING LIKE HE HAS TO URINATE AT THIS TIME. BLADDER SCAN DONE, 181CC AND 185CC  WAS FIRST TWO RESULTS. PT DENIES HAVING ANY PROBLEMS PRIOR IN HIS LIFE WITH VOIDING. URINAL IS AT BEDSIDE. VIANEY FIGUEREDO RN, WAS IN ROOM DURING BLADDER SCAN. PT INSTRUCTED TO CALL STAFF FOR ASSISTANCE AS NEEDED WHEN HE DOES FEEL THE URGE TO VOID.

## 2022-01-26 NOTE — PLAN OF CARE
"   01/26/22 1300   Discharge Reassessment   Assessment Type Discharge Planning Reassessment   Did the patient's condition or plan change since previous assessment? No   Discharge Plan discussed with: Patient   Communicated STAR with patient/caregiver Date not available/Unable to determine   Discharge Plan A Home with family;Home Health   DME Needed Upon Discharge  none   Discharge Barriers Identified None   Post-Acute Status   Discharge Delays None known at this time   Patient reports that he is "feeling much better" than he was upon admission.  Discharge plan remains for patient to return home with family.  Patient still unsure of name of home health agency.  TAYE checked local agency and found that patient is active is [facility] ([phone]).  Shania confirmed this and was advised of patient's hospitalization.  Shania standing by to resume services for patient upon his discharge and she will need new home health order.    TAYE will continue to follow.  "

## 2022-01-26 NOTE — PROGRESS NOTES
"CARDIOLOGY PROGRESS NOTE    Patient Name:  Marshall Flor    :  1955    Medical Record:  [id number]    DATE OF SERVICE  2022        SUBJECTIVE  The patient is being seen for follow-up occasional shortness of breath and leg swelling blood pressure improved      REVIEW OF SYSTEMS  General: No chills, No fever, No fatigue  Eyes: No vision changes, No drainage from eyes  ENT: No nasal congestion, no sore throat  Respiratory: + shortness of breath, No cough  Cardiac: No chest pain, palpitations, dyspnea, dizziness, claudication, or syncopal episodes  GI: No abdominal pain, no nausea, no vomiting  : No dysuria  Musculoskeletal: No joint pain  Neuro: No weakness, dizziness, vision changes       OBJECTIVE          PHYSICAL EXAM  Vital Signs:  /80 (BP Location: Right arm, Patient Position: Lying)   Pulse 77   Temp 98.1 °F (36.7 °C) (Oral)   Resp 18   Ht 5' 7" (1.702 m)   Wt 96.8 kg (213 lb 6.5 oz)   SpO2 100%   BMI 33.42 kg/m²   Temp:  [97.7 °F (36.5 °C)-101.3 °F (38.5 °C)] 98.1 °F (36.7 °C)  Pulse:  [] 77  Resp:  [18-20] 18  SpO2:  [97 %-100 %] 100 %  BP: (114-143)/(60-84) 124/80      General:   Eyes: Anicteric sclera. Moist conjunctiva. Vision grossly intact.  HENMT: Normocephalic.  Moist mucous membranes. No obvious ulcerations.   Neck: Trachea midline.   Cardiovascular: Heart regular rate and rhythm. S1, S2, S3 2/6 systolic ejection murmur left sternal border.  Peripheral pulses palpable.  Trace to 1+ bilateral lower extremity peripheral edema.   Respiratory: Lungs clear to auscultation bilaterally. No increased work of breathing.  Gastrointestinal: Bowel sounds active in all 4 quadrants. Soft, nontender, nondistended.   Genitourinary: Urine clear yellow  Musculoskeletal: Moves all extremities with equal strength.   Skin: Color normal for ethnicity. Skin warm and dry with good turgor. Capillary refill < 3 seconds.   Neurologic: Oriented x 3.  Speech fluent, follows commands.  Psychiatric:  " Awake and alert, normal affect mood good.      LABS    CBC  Recent Labs   Lab 01/24/22  1631 01/25/22  0809 01/26/22  0733   WBC 5.36 4.38 2.90*  2.90*   RBC 5.46 4.17* 3.98*  3.98*   HGB 14.1 10.7* 10.4*  10.4*   HCT 44.3 33.1* 31.3*  31.3*   MCV 81* 79* 79*  79*   MCH 25.8* 25.7* 26.1*  26.1*   MCHC 31.8* 32.3 33.2  33.2   RDW 14.4 14.2 14.2  14.2   MPV 10.3 9.9 10.2  10.2    343 259  259       Chemistry  Recent Labs   Lab 01/24/22  1631 01/25/22  0809 01/26/22  0733    144 140  140   K 3.4* 3.2* 3.7  3.7    107 105  105   CO2 17* 22* 22*  22*   BUN 13 14 20  20   CREATININE 2.0* 1.9* 2.6*  2.6*   * 69* 183*  183*   PROT 8.6* 5.8* 5.7*   CALCIUM 9.2 7.8* 7.9*  7.9*   BILITOT 0.6 0.6 0.5   AST 16 9* 8*   ALT 14 11 9*       ABG  No results for input(s): PHART, MMN6VZA, PO2ART, MKT1XVN, N5SAOERD, BEART, F7HOWPMK in the last 168 hours.      MICROBIOLOGY  Reviewed    IMAGING & OTHER STUDIES  Reviewed      Intake/Output last 3 shifts:  I/O last 3 completed shifts:  In: 1166.3 [P.O.:1080; I.V.:86.3]  Out: 2250 [Urine:2250]    Scheduled meds:   amLODIPine  10 mg Oral Daily    aspirin  81 mg Oral Daily    atorvastatin  20 mg Oral QHS    cloNIDine  0.2 mg Oral TID    clopidogreL  75 mg Oral Daily    furosemide (LASIX) injection  20 mg Intravenous Q8H    heparin (porcine)  5,000 Units Subcutaneous Q8H    hydrALAZINE  25 mg Oral Q8H    lisinopriL  2.5 mg Oral Daily    sodium bicarbonate  650 mg Oral BID       PRN Meds:  sodium chloride 0.9%, acetaminophen, albuterol-ipratropium, dextrose 50%, dextrose 50%, dextrose 50%, dextrose 50%, glucagon (human recombinant), glucagon (human recombinant), glucose, glucose, hydrALAZINE, HYDROcodone-acetaminophen, insulin aspart U-100, magnesium oxide, magnesium oxide, naloxone, ondansetron, polyethylene glycol, potassium, sodium phosphates, potassium, sodium phosphates, potassium, sodium phosphates, promethazine, sodium chloride 0.9%,  trazodone    Continuous Infusions:      Dietary Orders:  [unfilled]     Admit weight: Weight: 100.7 kg (222 lb)   Today weight: Weight: 96.8 kg (213 lb 6.5 oz)      Length of stay in days: 2      ASSESSMENT    Active Hospital Problems    Diagnosis  POA    CHF (congestive heart failure), NYHA class I, acute on chronic, systolic [I50.23]  Yes    Essential hypertension [I10]  Yes      Resolved Hospital Problems    Diagnosis Date Resolved POA    Hypertensive crisis [I16.9] 01/25/2022 Yes          PLAN    Hypertensive urgency  Status post 218/137  Transient nitro drip  130/80 on home medications  History noncompliance        Chronic  HFrEF   No shortness of breath  BNP 1467 improved to 850 1/21  EKG DECREASED ST INFERIOR LATERAL  Heparin 5000 subQ q.8 hours  States abnormal stress 1 year ago  Catheterization not done     History of CVA  No weakness  Describesd as weakness  2016  Plavix  Venous no DVT   CK 67 1/25        Diabetes  Status post decreased glucose  Treated medicine     Hypokalemia  K 3.7 1/26    Hypo magnesium  Magnesium 1.4 1/26     Renal failure  Creatinine2.6   1/26  Monitor renal function     Hypomagnesemia  Magnesium 1.6 1/5  Monitor magnesium    Anemia  Hemoglobin 10.4         Plan  KCL 20 mg x 1  Renal ultrasound renal failure  DC Lasix  DC lisinopril  DC ASA  Pepcid 20 mg p.o. daily  CBC BMP Mg in a.m.  Mag sulfate 2 g IV     Electronically signed by: Rickey Tong, 1/26/2022 1:19 PM

## 2022-01-27 LAB
ALBUMIN SERPL BCP-MCNC: 2.3 G/DL (ref 3.5–5.2)
ALP SERPL-CCNC: 66 U/L (ref 55–135)
ALT SERPL W/O P-5'-P-CCNC: 10 U/L (ref 10–44)
ANION GAP SERPL CALC-SCNC: 15 MMOL/L (ref 8–16)
AST SERPL-CCNC: 11 U/L (ref 10–40)
BASOPHILS # BLD AUTO: 0.01 K/UL (ref 0–0.2)
BASOPHILS NFR BLD: 0.3 % (ref 0–1.9)
BILIRUB SERPL-MCNC: 0.4 MG/DL (ref 0.1–1)
BUN SERPL-MCNC: 31 MG/DL (ref 8–23)
CALCIUM SERPL-MCNC: 7.8 MG/DL (ref 8.7–10.5)
CHLORIDE SERPL-SCNC: 104 MMOL/L (ref 95–110)
CO2 SERPL-SCNC: 19 MMOL/L (ref 23–29)
CREAT SERPL-MCNC: 3.2 MG/DL (ref 0.5–1.4)
DIFFERENTIAL METHOD: ABNORMAL
EOSINOPHIL # BLD AUTO: 0 K/UL (ref 0–0.5)
EOSINOPHIL NFR BLD: 0.3 % (ref 0–8)
ERYTHROCYTE [DISTWIDTH] IN BLOOD BY AUTOMATED COUNT: 14.2 % (ref 11.5–14.5)
EST. GFR  (AFRICAN AMERICAN): 22.4 ML/MIN/1.73 M^2
EST. GFR  (NON AFRICAN AMERICAN): 19.4 ML/MIN/1.73 M^2
GLUCOSE SERPL-MCNC: 147 MG/DL (ref 70–110)
HCT VFR BLD AUTO: 29.4 % (ref 40–54)
HGB BLD-MCNC: 9.8 G/DL (ref 14–18)
IMM GRANULOCYTES # BLD AUTO: 0 K/UL (ref 0–0.04)
IMM GRANULOCYTES NFR BLD AUTO: 0 % (ref 0–0.5)
LYMPHOCYTES # BLD AUTO: 1 K/UL (ref 1–4.8)
LYMPHOCYTES NFR BLD: 33.2 % (ref 18–48)
MAGNESIUM SERPL-MCNC: 1.8 MG/DL (ref 1.6–2.6)
MCH RBC QN AUTO: 26.1 PG (ref 27–31)
MCHC RBC AUTO-ENTMCNC: 33.3 G/DL (ref 32–36)
MCV RBC AUTO: 78 FL (ref 82–98)
MONOCYTES # BLD AUTO: 0.5 K/UL (ref 0.3–1)
MONOCYTES NFR BLD: 15.5 % (ref 4–15)
NEUTROPHILS # BLD AUTO: 1.5 K/UL (ref 1.8–7.7)
NEUTROPHILS NFR BLD: 50.7 % (ref 38–73)
NRBC BLD-RTO: 0 /100 WBC
PLATELET # BLD AUTO: 224 K/UL (ref 150–450)
PMV BLD AUTO: 10 FL (ref 9.2–12.9)
POCT GLUCOSE: 147 MG/DL (ref 70–110)
POCT GLUCOSE: 163 MG/DL (ref 70–110)
POCT GLUCOSE: 164 MG/DL (ref 70–110)
POCT GLUCOSE: 234 MG/DL (ref 70–110)
POTASSIUM SERPL-SCNC: 3.6 MMOL/L (ref 3.5–5.1)
PROT SERPL-MCNC: 5.6 G/DL (ref 6–8.4)
RBC # BLD AUTO: 3.76 M/UL (ref 4.6–6.2)
SODIUM SERPL-SCNC: 138 MMOL/L (ref 136–145)
WBC # BLD AUTO: 3.04 K/UL (ref 3.9–12.7)

## 2022-01-27 PROCEDURE — 99232 PR SUBSEQUENT HOSPITAL CARE,LEVL II: ICD-10-PCS | Mod: ,,, | Performed by: HOSPITALIST

## 2022-01-27 PROCEDURE — 27000221 HC OXYGEN, UP TO 24 HOURS

## 2022-01-27 PROCEDURE — 83735 ASSAY OF MAGNESIUM: CPT | Performed by: INTERNAL MEDICINE

## 2022-01-27 PROCEDURE — 25000003 PHARM REV CODE 250: Performed by: INTERNAL MEDICINE

## 2022-01-27 PROCEDURE — 99900035 HC TECH TIME PER 15 MIN (STAT)

## 2022-01-27 PROCEDURE — 25000003 PHARM REV CODE 250: Performed by: HOSPITALIST

## 2022-01-27 PROCEDURE — 99232 SBSQ HOSP IP/OBS MODERATE 35: CPT | Mod: ,,, | Performed by: HOSPITALIST

## 2022-01-27 PROCEDURE — 36415 COLL VENOUS BLD VENIPUNCTURE: CPT | Performed by: HOSPITALIST

## 2022-01-27 PROCEDURE — 94761 N-INVAS EAR/PLS OXIMETRY MLT: CPT

## 2022-01-27 PROCEDURE — 63600175 PHARM REV CODE 636 W HCPCS: Performed by: HOSPITALIST

## 2022-01-27 PROCEDURE — 85025 COMPLETE CBC W/AUTO DIFF WBC: CPT | Performed by: INTERNAL MEDICINE

## 2022-01-27 PROCEDURE — 80053 COMPREHEN METABOLIC PANEL: CPT | Performed by: HOSPITALIST

## 2022-01-27 PROCEDURE — 21400001 HC TELEMETRY ROOM

## 2022-01-27 RX ORDER — POTASSIUM CHLORIDE 20 MEQ/1
20 TABLET, EXTENDED RELEASE ORAL ONCE
Status: COMPLETED | OUTPATIENT
Start: 2022-01-27 | End: 2022-01-27

## 2022-01-27 RX ORDER — DOBUTAMINE HYDROCHLORIDE 400 MG/100ML
2 INJECTION INTRAVENOUS CONTINUOUS
Status: DISCONTINUED | OUTPATIENT
Start: 2022-01-27 | End: 2022-01-27 | Stop reason: CLARIF

## 2022-01-27 RX ORDER — DOBUTAMINE HYDROCHLORIDE 400 MG/100ML
1 INJECTION, SOLUTION INTRAVENOUS CONTINUOUS
Status: DISCONTINUED | OUTPATIENT
Start: 2022-01-27 | End: 2022-02-02

## 2022-01-27 RX ADMIN — HEPARIN SODIUM 5000 UNITS: 5000 INJECTION, SOLUTION INTRAVENOUS; SUBCUTANEOUS at 07:01

## 2022-01-27 RX ADMIN — HYDRALAZINE HYDROCHLORIDE 25 MG: 25 TABLET, FILM COATED ORAL at 07:01

## 2022-01-27 RX ADMIN — POTASSIUM CHLORIDE 20 MEQ: 1500 TABLET, EXTENDED RELEASE ORAL at 03:01

## 2022-01-27 RX ADMIN — SODIUM BICARBONATE 650 MG TABLET 650 MG: at 10:01

## 2022-01-27 RX ADMIN — SODIUM BICARBONATE 650 MG TABLET 650 MG: at 08:01

## 2022-01-27 RX ADMIN — AMLODIPINE BESYLATE 10 MG: 5 TABLET ORAL at 08:01

## 2022-01-27 RX ADMIN — CLONIDINE HYDROCHLORIDE 0.2 MG: 0.1 TABLET ORAL at 08:01

## 2022-01-27 RX ADMIN — ATORVASTATIN CALCIUM 20 MG: 10 TABLET, FILM COATED ORAL at 10:01

## 2022-01-27 RX ADMIN — CLONIDINE HYDROCHLORIDE 0.2 MG: 0.1 TABLET ORAL at 03:01

## 2022-01-27 RX ADMIN — HEPARIN SODIUM 5000 UNITS: 5000 INJECTION, SOLUTION INTRAVENOUS; SUBCUTANEOUS at 03:01

## 2022-01-27 RX ADMIN — CLOPIDOGREL 75 MG: 75 TABLET, FILM COATED ORAL at 08:01

## 2022-01-27 RX ADMIN — CLONIDINE HYDROCHLORIDE 0.2 MG: 0.1 TABLET ORAL at 10:01

## 2022-01-27 RX ADMIN — HYDRALAZINE HYDROCHLORIDE 25 MG: 25 TABLET, FILM COATED ORAL at 03:01

## 2022-01-27 RX ADMIN — DOBUTAMINE IN DEXTROSE 2 MCG/KG/MIN: 200 INJECTION, SOLUTION INTRAVENOUS at 02:01

## 2022-01-27 RX ADMIN — FAMOTIDINE 20 MG: 20 TABLET ORAL at 08:01

## 2022-01-27 RX ADMIN — HEPARIN SODIUM 5000 UNITS: 5000 INJECTION, SOLUTION INTRAVENOUS; SUBCUTANEOUS at 10:01

## 2022-01-27 RX ADMIN — HYDRALAZINE HYDROCHLORIDE 25 MG: 25 TABLET, FILM COATED ORAL at 10:01

## 2022-01-27 RX ADMIN — TRAZODONE HYDROCHLORIDE 25 MG: 50 TABLET ORAL at 10:01

## 2022-01-27 NOTE — NURSING
Colmenares inserted using sterile technique per MD order. Second nurse present. Patient tolerated well. 5ML of urine return. Will continue to monitor the patient. Call light within reach, bed in lowest position, wheels locked, bed alarm on and audible. Patient denies any needs at this time.

## 2022-01-27 NOTE — ASSESSMENT & PLAN NOTE
Patient is identified as having Systolic (HFrEF) heart failure that is Acute on chronic. CHF is currently controlled. Latest ECHO performed and demonstrates- Results for orders placed during the hospital encounter of 01/02/22    Echo    Interpretation Summary  · The left ventricle is mildly enlarged with mild concentric hypertrophy and moderately decreased systolic function.  · The estimated ejection fraction is 39%.  · Mild right ventricular enlargement.  · Moderate left atrial enlargement.  · Moderate mitral regurgitation.  · Mild pulmonic regurgitation.  · Small pericardial effusion.  . Continue Furosemide and monitor clinical status closely. Monitor on telemetry. Patient is on CHF pathway.  Monitor strict Is&Os and daily weights.  Place on fluid restriction of 1.5 L. Continue to stress to patient importance of self efficacy and  on diet for CHF. Last BNP reviewed- and noted below   Recent Labs   Lab 01/26/22  0733   *   .

## 2022-01-27 NOTE — PROGRESS NOTES
[facility]  Progress Note    Patient Name: Marshall Flor  MRN: [id number]  Patient Class: IP- Inpatient   Admission Date: 1/24/2022  Length of Stay: 2 days  Attending Physician: Matt Jiménez MD  Primary Care Provider: Dorie Quan MD        Subjective:     Principal Problem:Essential hypertension        HPI:  [age] year old diabetic hypertensive male with a history of congestive heart failure and renal disease was found unconscious on the floor at home.  EMS checked his blood sugar and was 31. He was given an amp of D50 and his blood sugar came up to 68. His blood sugar was recheck again and range from 75 to 77. He developed acute pulmonary edema in emergency room was given Lasix.  He became hypertensive and had to be placed on a nitroglycerin drip for his blood pressure.  He continued to be short of breath was placed on BiPAP.  He denies chest pain, nausea, vomiting , headaches, blurred or double vision.      Overview/Hospital Course:  No notes on file    Interval History:  He has less swelling of his legs.  His shortness has improved.  He denies chest pain palpitations or cough.    Review of Systems   Constitutional: Negative.    HENT: Negative.    Eyes: Negative.    Respiratory: Positive for shortness of breath.    Cardiovascular: Negative.    Gastrointestinal: Negative.    Endocrine: Negative.    Genitourinary: Negative.    Musculoskeletal: Negative.    Skin: Negative.    Hematological: Negative.    Psychiatric/Behavioral: Negative.      Objective:     Vital Signs (Most Recent):  Temp: 98.7 °F (37.1 °C) (01/26/22 1510)  Pulse: 82 (01/26/22 1510)  Resp: 20 (01/26/22 1510)  BP: 120/75 (01/26/22 1510)  SpO2: 100 % (01/26/22 1510) Vital Signs (24h Range):  Temp:  [97.7 °F (36.5 °C)-101.3 °F (38.5 °C)] 98.7 °F (37.1 °C)  Pulse:  [] 82  Resp:  [18-20] 20  SpO2:  [97 %-100 %] 100 %  BP: (114-124)/(60-80) 120/75     Weight: 96.8 kg (213 lb 6.5 oz)  Body mass index is 33.42  kg/m².    Intake/Output Summary (Last 24 hours) at 1/26/2022 1934  Last data filed at 1/26/2022 1755  Gross per 24 hour   Intake 1120.52 ml   Output 725 ml   Net 395.52 ml      Physical Exam  Constitutional:       Appearance: Normal appearance.   HENT:      Head: Normocephalic and atraumatic.      Nose: Nose normal.      Mouth/Throat:      Mouth: Mucous membranes are moist.   Cardiovascular:      Rate and Rhythm: Normal rate and regular rhythm.      Pulses: Normal pulses.   Pulmonary:      Effort: Pulmonary effort is normal.      Breath sounds: Rales present.   Abdominal:      General: Abdomen is flat.   Musculoskeletal:         General: Swelling present.      Cervical back: Normal range of motion and neck supple.      Right lower leg: Edema present.      Left lower leg: Edema present.   Skin:     General: Skin is warm.      Capillary Refill: Capillary refill takes 2 to 3 seconds.   Neurological:      General: No focal deficit present.      Mental Status: He is alert.   Psychiatric:         Mood and Affect: Mood normal.         Significant Labs: All pertinent labs within the past 24 hours have been reviewed.    Significant Imaging: I have reviewed all pertinent imaging results/findings within the past 24 hours.      Assessment/Plan:      * Essential hypertension  As above      CHF (congestive heart failure), NYHA class I, acute on chronic, systolic  Patient is identified as having Systolic (HFrEF) heart failure that is Acute on chronic. CHF is currently controlled. Latest ECHO performed and demonstrates- Results for orders placed during the hospital encounter of 01/02/22    Echo    Interpretation Summary  · The left ventricle is mildly enlarged with mild concentric hypertrophy and moderately decreased systolic function.  · The estimated ejection fraction is 39%.  · Mild right ventricular enlargement.  · Moderate left atrial enlargement.  · Moderate mitral regurgitation.  · Mild pulmonic regurgitation.  · Small  pericardial effusion.  . Continue Furosemide and monitor clinical status closely. Monitor on telemetry. Patient is on CHF pathway.  Monitor strict Is&Os and daily weights.  Place on fluid restriction of 1.5 L. Continue to stress to patient importance of self efficacy and  on diet for CHF. Last BNP reviewed- and noted below   Recent Labs   Lab 01/26/22  0733   *   .      Hypertensive urgency  Admit to ICU  Continue nitroglycerin drip  Norvasc  Apresoline  Lasix  Catapres  Troponin  Echocardiogram  Cardiology consult  Off NTG drip  Continue p.o. meds  Echocardiogram done in January showed ejection fraction 39%  Off BiPAP  Transfer to the medical floor        VTE Risk Mitigation (From admission, onward)         Ordered     heparin (porcine) injection 5,000 Units  Every 8 hours         01/24/22 1822     IP VTE HIGH RISK PATIENT  Once         01/24/22 1822     Place sequential compression device  Until discontinued         01/24/22 1822                Discharge Planning   STAR:      Code Status: Full Code   Is the patient medically ready for discharge?:     Reason for patient still in hospital (select all that apply): Treatment  Discharge Plan A: Home with family,Home Health   Discharge Delays: None known at this time              Matt Jiménez MD  Department of Hospital Medicine   [facility]

## 2022-01-27 NOTE — PROGRESS NOTES
"CARDIOLOGY PROGRESS NOTE    Patient Name:  Marshall Flor    :  1955    Medical Record:  [id number]    DATE OF SERVICE  2022        SUBJECTIVE  The patient is being seen for follow-up complain of constipation no chest pain or shortness of breath increased creatinine 3.2      REVIEW OF SYSTEMS  General: No chills, No fever, No fatigue  Eyes: No vision changes, No drainage from eyes  ENT: No nasal congestion, no sore throat  Respiratory: No shortness of breath, No cough  Cardiac: No chest pain, palpitations, dyspnea, dizziness, claudication, or syncopal episodes  GI: No abdominal pain, no nausea, no vomiting  : No dysuria  Musculoskeletal: No joint pain  Neuro: No weakness, dizziness, vision changes       OBJECTIVE          PHYSICAL EXAM  Vital Signs:  BP (!) 105/49 (BP Location: Right arm, Patient Position: Lying)   Pulse 61   Temp 98.1 °F (36.7 °C) (Oral)   Resp 18   Ht 5' 7" (1.702 m)   Wt 97.2 kg (214 lb 4.6 oz)   SpO2 (!) 93%   BMI 33.56 kg/m²   Temp:  [98.1 °F (36.7 °C)-99.5 °F (37.5 °C)] 98.1 °F (36.7 °C)  Pulse:  [61-82] 61  Resp:  [18-20] 18  SpO2:  [93 %-100 %] 93 %  BP: (105-130)/(49-75) 105/49      General:   Eyes: Anicteric sclera. Moist conjunctiva. Vision grossly intact.  HENMT: Normocephalic.  Moist mucous membranes. No obvious ulcerations.   Neck: Trachea midline.   Cardiovascular: Heart regular rate and rhythm. S1, S2, S3 2/6 systolic ejection murmur left sternal border Peripheral pulses palpable. No peripheral edema.   Respiratory: Lungs clear to auscultation bilaterally. No increased work of breathing.  Gastrointestinal: Bowel sounds active in all 4 quadrants. Soft, nontender, nondistended.   Genitourinary: Urine clear yellow  Musculoskeletal: Moves all extremities with equal strength.   Skin: Color normal for ethnicity. Skin warm and dry with good turgor. Capillary refill < 3 seconds.   Neurologic: Oriented x 3.  Speech fluent, follows commands.  Psychiatric:  Awake and alert, " normal affect mood good.      LABS    CBC  Recent Labs   Lab 01/25/22  0809 01/26/22  0733 01/27/22  0745   WBC 4.38 2.90*  2.90* 3.04*   RBC 4.17* 3.98*  3.98* 3.76*   HGB 10.7* 10.4*  10.4* 9.8*   HCT 33.1* 31.3*  31.3* 29.4*   MCV 79* 79*  79* 78*   MCH 25.7* 26.1*  26.1* 26.1*   MCHC 32.3 33.2  33.2 33.3   RDW 14.2 14.2  14.2 14.2   MPV 9.9 10.2  10.2 10.0    259  259 224       Chemistry  Recent Labs   Lab 01/25/22  0809 01/26/22  0733 01/27/22  0745    140  140 138   K 3.2* 3.7  3.7 3.6    105  105 104   CO2 22* 22*  22* 19*   BUN 14 20  20 31*   CREATININE 1.9* 2.6*  2.6* 3.2*   GLU 69* 183*  183* 147*   PROT 5.8* 5.7* 5.6*   CALCIUM 7.8* 7.9*  7.9* 7.8*   BILITOT 0.6 0.5 0.4   AST 9* 8* 11   ALT 11 9* 10       ABG  No results for input(s): PHART, RWP7GSF, PO2ART, AOJ0PLU, C3SLJUMJ, BEART, Z0MFROAY in the last 168 hours.      MICROBIOLOGY  Reviewed    IMAGING & OTHER STUDIES  Reviewed      Intake/Output last 3 shifts:  I/O last 3 completed shifts:  In: 1133.3 [P.O.:1020; I.V.:113.3]  Out: 925 [Urine:925]    Scheduled meds:   amLODIPine  10 mg Oral Daily    atorvastatin  20 mg Oral QHS    cloNIDine  0.2 mg Oral TID    clopidogreL  75 mg Oral Daily    famotidine  20 mg Oral Daily    heparin (porcine)  5,000 Units Subcutaneous Q8H    hydrALAZINE  25 mg Oral Q8H    sodium bicarbonate  650 mg Oral BID       PRN Meds:  sodium chloride 0.9%, acetaminophen, albuterol-ipratropium, dextrose 50%, dextrose 50%, dextrose 50%, dextrose 50%, glucagon (human recombinant), glucagon (human recombinant), glucose, glucose, hydrALAZINE, HYDROcodone-acetaminophen, insulin aspart U-100, magnesium oxide, magnesium oxide, naloxone, ondansetron, polyethylene glycol, potassium, sodium phosphates, potassium, sodium phosphates, potassium, sodium phosphates, promethazine, sodium chloride 0.9%, trazodone    Continuous Infusions:      Dietary Orders:  [unfilled]     Admit weight: Weight: 100.7 kg  (222 lb)   Today weight: Weight: 97.2 kg (214 lb 4.6 oz)      Length of stay in days: 3      ASSESSMENT    Active Hospital Problems    Diagnosis  POA    *Essential hypertension [I10]  Yes    CHF (congestive heart failure), NYHA class I, acute on chronic, systolic [I50.23]  Yes    Hypertensive urgency [I16.0]  Unknown      Resolved Hospital Problems   No resolved problems to display.          PLAN    Hypertensive urgency  Status post 218/137  Transient nitro drip  130/80 on home medications  History noncompliance        Chronic  HFrEF   No shortness of breath  BNP 1467 improved to 850 1/21  EKG DECREASED ST INFERIOR LATERAL  Heparin 5000 subQ q.8 hours  States abnormal stress 1 year ago  Catheterization not done     History of CVA  No weakness  Describesd as weakness  2016  Plavix  Venous no DVT   CK 67 1/25        Diabetes  Status post decreased glucose  Treated medicine     Hypokalemia  K 3.7 1/26     Hypo magnesium  Magnesium 1.4 1/26     Acute Renal failure  Creatinine 3.2   1/27  Renal ultrasound no hydronephrosis  Monitor renal function  Off Lasix  Off lisinopril     Hypomagnesemia  Magnesium 1.6 1/5  Monitor magnesium     Anemia  Hemoglobin 9.8 1/27  Pepcid 20 mg p.o. daily    Plan  Hemoccult stool  Colmenares catheter  Dobutamine 2 mics per kg per minute  KCL 20 mg x 1          Plan  KCL 20 mg x 1            Electronically signed by: Rickey Tong, 1/27/2022 1:10 PM

## 2022-01-28 LAB
ANION GAP SERPL CALC-SCNC: 13 MMOL/L (ref 8–16)
BACTERIA UR CULT: ABNORMAL
BASOPHILS # BLD AUTO: 0.01 K/UL (ref 0–0.2)
BASOPHILS NFR BLD: 0.4 % (ref 0–1.9)
BNP SERPL-MCNC: 497 PG/ML (ref 0–99)
BUN SERPL-MCNC: 34 MG/DL (ref 8–23)
CALCIUM SERPL-MCNC: 7.7 MG/DL (ref 8.7–10.5)
CHLORIDE SERPL-SCNC: 104 MMOL/L (ref 95–110)
CO2 SERPL-SCNC: 21 MMOL/L (ref 23–29)
CREAT SERPL-MCNC: 2.8 MG/DL (ref 0.5–1.4)
DIFFERENTIAL METHOD: ABNORMAL
EOSINOPHIL # BLD AUTO: 0.1 K/UL (ref 0–0.5)
EOSINOPHIL NFR BLD: 2 % (ref 0–8)
ERYTHROCYTE [DISTWIDTH] IN BLOOD BY AUTOMATED COUNT: 14.1 % (ref 11.5–14.5)
EST. GFR  (AFRICAN AMERICAN): 26.4 ML/MIN/1.73 M^2
EST. GFR  (NON AFRICAN AMERICAN): 22.8 ML/MIN/1.73 M^2
GLUCOSE SERPL-MCNC: 171 MG/DL (ref 70–110)
HCT VFR BLD AUTO: 30 % (ref 40–54)
HGB BLD-MCNC: 9.9 G/DL (ref 14–18)
IMM GRANULOCYTES # BLD AUTO: 0.01 K/UL (ref 0–0.04)
IMM GRANULOCYTES NFR BLD AUTO: 0.4 % (ref 0–0.5)
LYMPHOCYTES # BLD AUTO: 0.7 K/UL (ref 1–4.8)
LYMPHOCYTES NFR BLD: 28.5 % (ref 18–48)
MCH RBC QN AUTO: 25.8 PG (ref 27–31)
MCHC RBC AUTO-ENTMCNC: 33 G/DL (ref 32–36)
MCV RBC AUTO: 78 FL (ref 82–98)
MONOCYTES # BLD AUTO: 0.3 K/UL (ref 0.3–1)
MONOCYTES NFR BLD: 11.4 % (ref 4–15)
NEUTROPHILS # BLD AUTO: 1.4 K/UL (ref 1.8–7.7)
NEUTROPHILS NFR BLD: 57.3 % (ref 38–73)
NRBC BLD-RTO: 0 /100 WBC
PLATELET # BLD AUTO: 233 K/UL (ref 150–450)
PMV BLD AUTO: 9.7 FL (ref 9.2–12.9)
POCT GLUCOSE: 164 MG/DL (ref 70–110)
POCT GLUCOSE: 179 MG/DL (ref 70–110)
POCT GLUCOSE: 209 MG/DL (ref 70–110)
POCT GLUCOSE: 211 MG/DL (ref 70–110)
POTASSIUM SERPL-SCNC: 3.6 MMOL/L (ref 3.5–5.1)
RBC # BLD AUTO: 3.84 M/UL (ref 4.6–6.2)
SODIUM SERPL-SCNC: 138 MMOL/L (ref 136–145)
WBC # BLD AUTO: 2.46 K/UL (ref 3.9–12.7)

## 2022-01-28 PROCEDURE — 63600175 PHARM REV CODE 636 W HCPCS: Performed by: HOSPITALIST

## 2022-01-28 PROCEDURE — 94760 N-INVAS EAR/PLS OXIMETRY 1: CPT

## 2022-01-28 PROCEDURE — 99233 SBSQ HOSP IP/OBS HIGH 50: CPT | Mod: ,,, | Performed by: FAMILY MEDICINE

## 2022-01-28 PROCEDURE — 83880 ASSAY OF NATRIURETIC PEPTIDE: CPT | Performed by: INTERNAL MEDICINE

## 2022-01-28 PROCEDURE — 36415 COLL VENOUS BLD VENIPUNCTURE: CPT | Performed by: INTERNAL MEDICINE

## 2022-01-28 PROCEDURE — 27000221 HC OXYGEN, UP TO 24 HOURS

## 2022-01-28 PROCEDURE — 25000003 PHARM REV CODE 250: Performed by: HOSPITALIST

## 2022-01-28 PROCEDURE — 85025 COMPLETE CBC W/AUTO DIFF WBC: CPT | Performed by: INTERNAL MEDICINE

## 2022-01-28 PROCEDURE — 21400001 HC TELEMETRY ROOM

## 2022-01-28 PROCEDURE — 99233 PR SUBSEQUENT HOSPITAL CARE,LEVL III: ICD-10-PCS | Mod: ,,, | Performed by: FAMILY MEDICINE

## 2022-01-28 PROCEDURE — 25000003 PHARM REV CODE 250: Performed by: INTERNAL MEDICINE

## 2022-01-28 PROCEDURE — 80048 BASIC METABOLIC PNL TOTAL CA: CPT | Performed by: INTERNAL MEDICINE

## 2022-01-28 RX ORDER — POTASSIUM CHLORIDE 20 MEQ/1
20 TABLET, EXTENDED RELEASE ORAL ONCE
Status: COMPLETED | OUTPATIENT
Start: 2022-01-28 | End: 2022-01-28

## 2022-01-28 RX ADMIN — FAMOTIDINE 20 MG: 20 TABLET ORAL at 08:01

## 2022-01-28 RX ADMIN — HYDRALAZINE HYDROCHLORIDE 25 MG: 25 TABLET, FILM COATED ORAL at 08:01

## 2022-01-28 RX ADMIN — CLOPIDOGREL 75 MG: 75 TABLET, FILM COATED ORAL at 09:01

## 2022-01-28 RX ADMIN — HYDRALAZINE HYDROCHLORIDE 25 MG: 25 TABLET, FILM COATED ORAL at 03:01

## 2022-01-28 RX ADMIN — CLONIDINE HYDROCHLORIDE 0.2 MG: 0.1 TABLET ORAL at 09:01

## 2022-01-28 RX ADMIN — CLONIDINE HYDROCHLORIDE 0.2 MG: 0.1 TABLET ORAL at 03:01

## 2022-01-28 RX ADMIN — SODIUM BICARBONATE 650 MG TABLET 650 MG: at 08:01

## 2022-01-28 RX ADMIN — SODIUM BICARBONATE 650 MG TABLET 650 MG: at 09:01

## 2022-01-28 RX ADMIN — ATORVASTATIN CALCIUM 20 MG: 10 TABLET, FILM COATED ORAL at 08:01

## 2022-01-28 RX ADMIN — HYDRALAZINE HYDROCHLORIDE 25 MG: 25 TABLET, FILM COATED ORAL at 05:01

## 2022-01-28 RX ADMIN — CLONIDINE HYDROCHLORIDE 0.2 MG: 0.1 TABLET ORAL at 08:01

## 2022-01-28 RX ADMIN — POTASSIUM CHLORIDE 20 MEQ: 1500 TABLET, EXTENDED RELEASE ORAL at 08:01

## 2022-01-28 RX ADMIN — HEPARIN SODIUM 5000 UNITS: 5000 INJECTION, SOLUTION INTRAVENOUS; SUBCUTANEOUS at 05:01

## 2022-01-28 RX ADMIN — AMLODIPINE BESYLATE 10 MG: 5 TABLET ORAL at 09:01

## 2022-01-28 RX ADMIN — HEPARIN SODIUM 5000 UNITS: 5000 INJECTION, SOLUTION INTRAVENOUS; SUBCUTANEOUS at 08:01

## 2022-01-28 RX ADMIN — HEPARIN SODIUM 5000 UNITS: 5000 INJECTION, SOLUTION INTRAVENOUS; SUBCUTANEOUS at 03:01

## 2022-01-28 NOTE — ASSESSMENT & PLAN NOTE
Acute on chronic kidney disease  Slight worsening in sCr initially likely due to CHF exacerbation/intravascular depletion   Improving with dobutamine  Renal ultrasound with medical renal disease

## 2022-01-28 NOTE — PROGRESS NOTES
[facility] Medicine  Progress Note    Patient Name: Marshall Flor  MRN: [id number]  Patient Class: IP- Inpatient   Admission Date: 1/24/2022  Length of Stay: 3 days  Attending Physician: Matt Jiménez MD  Primary Care Provider: Dorie Quan MD        Subjective:     Principal Problem:Essential hypertension        HPI:  [age] year old diabetic hypertensive male with a history of congestive heart failure and renal disease was found unconscious on the floor at home.  EMS checked his blood sugar and was 31. He was given an amp of D50 and his blood sugar came up to 68. His blood sugar was recheck again and range from 75 to 77. He developed acute pulmonary edema in emergency room was given Lasix.  He became hypertensive and had to be placed on a nitroglycerin drip for his blood pressure.  He continued to be short of breath was placed on BiPAP.  He denies chest pain, nausea, vomiting , headaches, blurred or double vision.      Overview/Hospital Course:  No notes on file    Interval History:  Less shortness of breath but he still has some swelling of his lower extremities.  Denies chest pain, palpitations, headaches and dizziness.  His BUN creatinine have gone up and he has been started on a dobutamine drip.  He knows that he he may be discharged tomorrow instead of today       Review of Systems   Constitutional: Negative.    HENT: Negative.    Eyes: Negative.    Respiratory: Positive for shortness of breath.    Cardiovascular: Negative.    Gastrointestinal: Negative.    Endocrine: Negative.    Genitourinary: Negative.    Musculoskeletal: Negative.    Neurological: Negative.    Hematological: Negative.    Psychiatric/Behavioral: Negative.      Objective:     Vital Signs (Most Recent):  Temp: 97.5 °F (36.4 °C) (01/27/22 1501)  Pulse: 75 (01/27/22 1501)  Resp: 17 (01/27/22 1501)  BP: (!) 163/77 (01/27/22 1501)  SpO2: 100 % (01/27/22 1501) Vital Signs (24h Range):  Temp:  [97.5 °F (36.4 °C)-99.5  °F (37.5 °C)] 97.5 °F (36.4 °C)  Pulse:  [61-78] 75  Resp:  [17-18] 17  SpO2:  [93 %-100 %] 100 %  BP: (105-163)/(49-77) 163/77     Weight: 97.2 kg (214 lb 4.6 oz)  Body mass index is 33.56 kg/m².    Intake/Output Summary (Last 24 hours) at 1/27/2022 1830  Last data filed at 1/27/2022 1200  Gross per 24 hour   Intake 998.75 ml   Output 200 ml   Net 798.75 ml      Physical Exam  Constitutional:       Appearance: Normal appearance.   HENT:      Head: Normocephalic and atraumatic.      Nose: Nose normal.      Mouth/Throat:      Mouth: Mucous membranes are moist.   Eyes:      Extraocular Movements: Extraocular movements intact.   Cardiovascular:      Rate and Rhythm: Normal rate and regular rhythm.      Pulses: Normal pulses.      Heart sounds: Normal heart sounds.   Pulmonary:      Effort: Pulmonary effort is normal.      Breath sounds: Rales present.   Abdominal:      General: Abdomen is flat.      Palpations: Abdomen is soft.   Musculoskeletal:      Cervical back: Normal range of motion and neck supple.      Right lower leg: Edema present.      Left lower leg: Edema present.   Skin:     Capillary Refill: Capillary refill takes 2 to 3 seconds.   Neurological:      General: No focal deficit present.      Mental Status: He is alert.   Psychiatric:         Behavior: Behavior normal.         Significant Labs: All pertinent labs within the past 24 hours have been reviewed.    Significant Imaging: I have reviewed all pertinent imaging results/findings within the past 24 hours.      Assessment/Plan:      * Essential hypertension  As above      CHF (congestive heart failure), NYHA class I, acute on chronic, systolic  Patient is identified as having Systolic (HFrEF) heart failure that is Acute on chronic. CHF is currently controlled. Latest ECHO performed and demonstrates- Results for orders placed during the hospital encounter of 01/02/22    Echo    Interpretation Summary  · The left ventricle is mildly enlarged with mild  concentric hypertrophy and moderately decreased systolic function.  · The estimated ejection fraction is 39%.  · Mild right ventricular enlargement.  · Moderate left atrial enlargement.  · Moderate mitral regurgitation.  · Mild pulmonic regurgitation.  · Small pericardial effusion.  . Continue Furosemide and monitor clinical status closely. Monitor on telemetry. Patient is on CHF pathway.  Monitor strict Is&Os and daily weights.  Place on fluid restriction of 1.5 L. Continue to stress to patient importance of self efficacy and  on diet for CHF. Last BNP reviewed- and noted below   Recent Labs   Lab 01/26/22  0733   *   .  Dobutamine drip  Continue management by cardiologist      Hypertensive urgency  Admit to ICU  Continue nitroglycerin drip  Norvasc  Apresoline  Lasix  Catapres  Troponin  Echocardiogram  Cardiology consult  Off NTG drip  Continue p.o. meds  Echocardiogram done in January showed ejection fraction 39%  Off BiPAP  Transfer to the medical floor        VTE Risk Mitigation (From admission, onward)         Ordered     heparin (porcine) injection 5,000 Units  Every 8 hours         01/24/22 1822     IP VTE HIGH RISK PATIENT  Once         01/24/22 1822     Place sequential compression device  Until discontinued         01/24/22 1822                Discharge Planning   STAR:      Code Status: Full Code   Is the patient medically ready for discharge?:     Reason for patient still in hospital (select all that apply): Treatment  Discharge Plan A: Home with family,Home Health   Discharge Delays: None known at this time              Matt Jiménez MD  Department of Hospital Medicine   [facility]

## 2022-01-28 NOTE — ASSESSMENT & PLAN NOTE
Admitted to ICU, now transferred to floor  Started and now weaned off nitroglycerin drip  Norvasc  Apresoline  Lasix  Catapres  Troponin  Echocardiogram as above  Cardiology consulted  Off BiPAP  Transferred to the medical floor

## 2022-01-28 NOTE — ASSESSMENT & PLAN NOTE
Patient is identified as having Systolic (HFrEF) heart failure that is Acute on chronic. CHF is currently controlled. Latest ECHO performed and demonstrates- Results for orders placed during the hospital encounter of 01/02/22    Echo    Interpretation Summary  · The left ventricle is mildly enlarged with mild concentric hypertrophy and moderately decreased systolic function.  · The estimated ejection fraction is 39%.  · Mild right ventricular enlargement.  · Moderate left atrial enlargement.  · Moderate mitral regurgitation.  · Mild pulmonic regurgitation.  · Small pericardial effusion.  . Continue Furosemide and monitor clinical status closely. Monitor on telemetry. Patient is on CHF pathway.  Monitor strict Is&Os and daily weights.  Place on fluid restriction of 1.5 L. Continue to stress to patient importance of self efficacy and  on diet for CHF. Last BNP reviewed- and noted below   Recent Labs   Lab 01/26/22  0733   *   .  Dobutamine drip  Continue management by cardiologist

## 2022-01-28 NOTE — PLAN OF CARE
Problem: Adult Inpatient Plan of Care  Goal: Plan of Care Review  Outcome: Ongoing, Progressing     Problem: Adult Inpatient Plan of Care  Goal: Patient-Specific Goal (Individualized)  Outcome: Ongoing, Progressing     Problem: Diabetes Comorbidity  Goal: Blood Glucose Level Within Targeted Range  Outcome: Ongoing, Progressing     Problem: Infection  Goal: Absence of Infection Signs and Symptoms  Outcome: Ongoing, Progressing     Problem: Hypertension Acute  Goal: Blood Pressure Within Desired Range  Outcome: Ongoing, Progressing     Problem: Cardiac Output Decreased (Heart Failure)  Goal: Optimal Cardiac Output  Outcome: Ongoing, Progressing     Problem: Respiratory Compromise (Heart Failure)  Goal: Effective Oxygenation and Ventilation  Outcome: Ongoing, Progressing     Problem: Functional Ability Impaired (Heart Failure)  Goal: Optimal Functional Ability  Outcome: Ongoing, Progressing

## 2022-01-28 NOTE — SUBJECTIVE & OBJECTIVE
Interval History:  Less shortness of breath but he still has some swelling of his lower extremities.  Denies chest pain, palpitations, headaches and dizziness.  His BUN creatinine have gone up and he has been started on a dobutamine drip.  He knows that he he may be discharged tomorrow instead of today       Review of Systems   Constitutional: Negative.    HENT: Negative.    Eyes: Negative.    Respiratory: Positive for shortness of breath.    Cardiovascular: Negative.    Gastrointestinal: Negative.    Endocrine: Negative.    Genitourinary: Negative.    Musculoskeletal: Negative.    Neurological: Negative.    Hematological: Negative.    Psychiatric/Behavioral: Negative.      Objective:     Vital Signs (Most Recent):  Temp: 97.5 °F (36.4 °C) (01/27/22 1501)  Pulse: 75 (01/27/22 1501)  Resp: 17 (01/27/22 1501)  BP: (!) 163/77 (01/27/22 1501)  SpO2: 100 % (01/27/22 1501) Vital Signs (24h Range):  Temp:  [97.5 °F (36.4 °C)-99.5 °F (37.5 °C)] 97.5 °F (36.4 °C)  Pulse:  [61-78] 75  Resp:  [17-18] 17  SpO2:  [93 %-100 %] 100 %  BP: (105-163)/(49-77) 163/77     Weight: 97.2 kg (214 lb 4.6 oz)  Body mass index is 33.56 kg/m².    Intake/Output Summary (Last 24 hours) at 1/27/2022 1830  Last data filed at 1/27/2022 1200  Gross per 24 hour   Intake 998.75 ml   Output 200 ml   Net 798.75 ml      Physical Exam  Constitutional:       Appearance: Normal appearance.   HENT:      Head: Normocephalic and atraumatic.      Nose: Nose normal.      Mouth/Throat:      Mouth: Mucous membranes are moist.   Eyes:      Extraocular Movements: Extraocular movements intact.   Cardiovascular:      Rate and Rhythm: Normal rate and regular rhythm.      Pulses: Normal pulses.      Heart sounds: Normal heart sounds.   Pulmonary:      Effort: Pulmonary effort is normal.      Breath sounds: Rales present.   Abdominal:      General: Abdomen is flat.      Palpations: Abdomen is soft.   Musculoskeletal:      Cervical back: Normal range of motion and neck  supple.      Right lower leg: Edema present.      Left lower leg: Edema present.   Skin:     Capillary Refill: Capillary refill takes 2 to 3 seconds.   Neurological:      General: No focal deficit present.      Mental Status: He is alert.   Psychiatric:         Behavior: Behavior normal.         Significant Labs: All pertinent labs within the past 24 hours have been reviewed.    Significant Imaging: I have reviewed all pertinent imaging results/findings within the past 24 hours.

## 2022-01-28 NOTE — ASSESSMENT & PLAN NOTE
Patient is identified as having Systolic (HFrEF) heart failure that is Acute on chronic. CHF is currently controlled. Latest ECHO performed and demonstrates- Results for orders placed during the hospital encounter of 01/02/22    Echo    Interpretation Summary  · The left ventricle is mildly enlarged with mild concentric hypertrophy and moderately decreased systolic function.  · The estimated ejection fraction is 39%.  · Mild right ventricular enlargement.  · Moderate left atrial enlargement.  · Moderate mitral regurgitation.  · Mild pulmonic regurgitation.  · Small pericardial effusion.  . Continue Furosemide and monitor clinical status closely. Monitor on telemetry. Patient is on CHF pathway.  Monitor strict Is&Os and daily weights.  Place on fluid restriction of 1.5 L. Continue to stress to patient importance of self efficacy and  on diet for CHF. Last BNP reviewed- and noted below   Recent Labs   Lab 01/28/22  0717   *   .  Continuing dobutamine drip  Improving clinically but will still need to continue the gtt. Renal function is improving.   Continue management by cardiologist

## 2022-01-28 NOTE — SUBJECTIVE & OBJECTIVE
Interval History: still with lower extremity edema, improved breathing    Review of Systems   Constitutional: Negative for fatigue.   HENT: Negative.    Eyes: Negative.    Respiratory: Positive for shortness of breath.    Cardiovascular: Positive for leg swelling.   Gastrointestinal: Negative.    Endocrine: Negative.    Genitourinary: Negative.    Musculoskeletal: Negative.    Neurological: Negative.    Hematological: Negative.    Psychiatric/Behavioral: Negative.      Objective:     Vital Signs (Most Recent):  Temp: 97.2 °F (36.2 °C) (01/28/22 1148)  Pulse: 75 (01/28/22 1148)  Resp: 20 (01/28/22 1148)  BP: (!) 140/67 (01/28/22 1148)  SpO2: 98 % (01/28/22 1148) Vital Signs (24h Range):  Temp:  [97.2 °F (36.2 °C)-98.4 °F (36.9 °C)] 97.2 °F (36.2 °C)  Pulse:  [68-88] 75  Resp:  [16-20] 20  SpO2:  [96 %-100 %] 98 %  BP: (137-163)/(67-77) 140/67     Weight: 97.1 kg (214 lb 1.1 oz)  Body mass index is 33.53 kg/m².    Intake/Output Summary (Last 24 hours) at 1/28/2022 1256  Last data filed at 1/28/2022 0539  Gross per 24 hour   Intake 415.76 ml   Output 700 ml   Net -284.24 ml      Physical Exam  Constitutional:       General: He is not in acute distress.     Appearance: He is not toxic-appearing or diaphoretic.   HENT:      Head: Normocephalic and atraumatic.      Right Ear: External ear normal.      Left Ear: External ear normal.      Nose: Nose normal.      Mouth/Throat:      Mouth: Mucous membranes are moist.   Eyes:      Conjunctiva/sclera: Conjunctivae normal.   Cardiovascular:      Rate and Rhythm: Normal rate and regular rhythm.      Pulses: Normal pulses.      Heart sounds: Murmur heard.       Pulmonary:      Effort: No respiratory distress.      Breath sounds: Rales present.      Comments: Poor air movement in the bases, no wheezes, no rhonchi  Abdominal:      General: Abdomen is flat.      Palpations: Abdomen is soft.   Musculoskeletal:      Cervical back: Normal range of motion and neck supple.      Right  lower leg: Edema present.      Left lower leg: Edema present.      Comments: 3+ pitting edema on the left, 2+ on the right   Neurological:      Mental Status: He is alert. Mental status is at baseline.   Psychiatric:         Behavior: Behavior normal.         Significant Labs:   All pertinent labs within the past 24 hours have been reviewed.  CBC:   Recent Labs   Lab 01/27/22 0745 01/28/22  0717   WBC 3.04* 2.46*   HGB 9.8* 9.9*   HCT 29.4* 30.0*    233     CMP:   Recent Labs   Lab 01/27/22 0745 01/28/22  0717    138   K 3.6 3.6    104   CO2 19* 21*   * 171*   BUN 31* 34*   CREATININE 3.2* 2.8*   CALCIUM 7.8* 7.7*   PROT 5.6*  --    ALBUMIN 2.3*  --    BILITOT 0.4  --    ALKPHOS 66  --    AST 11  --    ALT 10  --    ANIONGAP 15 13   EGFRNONAA 19.4* 22.8*     Cardiac Markers:   Recent Labs   Lab 01/28/22 0717   *       Significant Imaging: I have reviewed all pertinent imaging results/findings within the past 24 hours.   US Kidney   Final Result      No hydronephrosis.  Elevated resistive indices may indicate medical renal disease.      Cyst at the upper pole of the left kidney, which may be slightly complex.  The patient is unable to have a contrast enhanced CT or MRI for further evaluation, due to abnormal renal function studies; therefore, follow-up ultrasound is recommended in 6 months to document stability.         Electronically signed by: Chari Weaver   Date:    01/26/2022   Time:    16:48      X-Ray Chest AP Portable   Final Result   Abnormal      Findings consistent with congestive heart failure.      Close interval follow-up is recommended.      This report was flagged in Epic as abnormal.         Electronically signed by: Darnell Ascencio   Date:    01/24/2022   Time:    15:12

## 2022-01-28 NOTE — PROGRESS NOTES
[facility] Medicine  Progress Note    Patient Name: Marshall Flor  MRN: [id number]  Patient Class: IP- Inpatient   Admission Date: 1/24/2022  Length of Stay: 4 days  Attending Physician: Matt Jiménez MD  Primary Care Provider: Dorie Quan MD        Subjective:     Principal Problem:Essential hypertension        HPI:  [age] year old diabetic hypertensive male with a history of congestive heart failure and renal disease was found unconscious on the floor at home.  EMS checked his blood sugar and was 31. He was given an amp of D50 and his blood sugar came up to 68. His blood sugar was recheck again and range from 75 to 77. He developed acute pulmonary edema in emergency room was given Lasix.  He became hypertensive and had to be placed on a nitroglycerin drip for his blood pressure.  He continued to be short of breath was placed on BiPAP.  He denies chest pain, nausea, vomiting , headaches, blurred or double vision.      Overview/Hospital Course:  No notes on file    Interval History: still with lower extremity edema, improved breathing    Review of Systems   Constitutional: Negative for fatigue.   HENT: Negative.    Eyes: Negative.    Respiratory: Positive for shortness of breath.    Cardiovascular: Positive for leg swelling.   Gastrointestinal: Negative.    Endocrine: Negative.    Genitourinary: Negative.    Musculoskeletal: Negative.    Neurological: Negative.    Hematological: Negative.    Psychiatric/Behavioral: Negative.      Objective:     Vital Signs (Most Recent):  Temp: 97.2 °F (36.2 °C) (01/28/22 1148)  Pulse: 75 (01/28/22 1148)  Resp: 20 (01/28/22 1148)  BP: (!) 140/67 (01/28/22 1148)  SpO2: 98 % (01/28/22 1148) Vital Signs (24h Range):  Temp:  [97.2 °F (36.2 °C)-98.4 °F (36.9 °C)] 97.2 °F (36.2 °C)  Pulse:  [68-88] 75  Resp:  [16-20] 20  SpO2:  [96 %-100 %] 98 %  BP: (137-163)/(67-77) 140/67     Weight: 97.1 kg (214 lb 1.1 oz)  Body mass index is 33.53  kg/m².    Intake/Output Summary (Last 24 hours) at 1/28/2022 1256  Last data filed at 1/28/2022 0539  Gross per 24 hour   Intake 415.76 ml   Output 700 ml   Net -284.24 ml      Physical Exam  Constitutional:       General: He is not in acute distress.     Appearance: He is not toxic-appearing or diaphoretic.   HENT:      Head: Normocephalic and atraumatic.      Right Ear: External ear normal.      Left Ear: External ear normal.      Nose: Nose normal.      Mouth/Throat:      Mouth: Mucous membranes are moist.   Eyes:      Conjunctiva/sclera: Conjunctivae normal.   Cardiovascular:      Rate and Rhythm: Normal rate and regular rhythm.      Pulses: Normal pulses.      Heart sounds: Murmur heard.       Pulmonary:      Effort: No respiratory distress.      Breath sounds: Rales present.      Comments: Poor air movement in the bases, no wheezes, no rhonchi  Abdominal:      General: Abdomen is flat.      Palpations: Abdomen is soft.   Musculoskeletal:      Cervical back: Normal range of motion and neck supple.      Right lower leg: Edema present.      Left lower leg: Edema present.      Comments: 3+ pitting edema on the left, 2+ on the right   Neurological:      Mental Status: He is alert. Mental status is at baseline.   Psychiatric:         Behavior: Behavior normal.         Significant Labs:   All pertinent labs within the past 24 hours have been reviewed.  CBC:   Recent Labs   Lab 01/27/22  0745 01/28/22  0717   WBC 3.04* 2.46*   HGB 9.8* 9.9*   HCT 29.4* 30.0*    233     CMP:   Recent Labs   Lab 01/27/22  0745 01/28/22  0717    138   K 3.6 3.6    104   CO2 19* 21*   * 171*   BUN 31* 34*   CREATININE 3.2* 2.8*   CALCIUM 7.8* 7.7*   PROT 5.6*  --    ALBUMIN 2.3*  --    BILITOT 0.4  --    ALKPHOS 66  --    AST 11  --    ALT 10  --    ANIONGAP 15 13   EGFRNONAA 19.4* 22.8*     Cardiac Markers:   Recent Labs   Lab 01/28/22  0717   *       Significant Imaging: I have reviewed all pertinent  imaging results/findings within the past 24 hours.   US Kidney   Final Result      No hydronephrosis.  Elevated resistive indices may indicate medical renal disease.      Cyst at the upper pole of the left kidney, which may be slightly complex.  The patient is unable to have a contrast enhanced CT or MRI for further evaluation, due to abnormal renal function studies; therefore, follow-up ultrasound is recommended in 6 months to document stability.         Electronically signed by: Chari Weaver   Date:    01/26/2022   Time:    16:48      X-Ray Chest AP Portable   Final Result   Abnormal      Findings consistent with congestive heart failure.      Close interval follow-up is recommended.      This report was flagged in Epic as abnormal.         Electronically signed by: Darnell Ascencio   Date:    01/24/2022   Time:    15:12              Assessment/Plan:      * Essential hypertension  As above      CHF (congestive heart failure), NYHA class I, acute on chronic, systolic  Patient is identified as having Systolic (HFrEF) heart failure that is Acute on chronic. CHF is currently controlled. Latest ECHO performed and demonstrates- Results for orders placed during the hospital encounter of 01/02/22    Echo    Interpretation Summary  · The left ventricle is mildly enlarged with mild concentric hypertrophy and moderately decreased systolic function.  · The estimated ejection fraction is 39%.  · Mild right ventricular enlargement.  · Moderate left atrial enlargement.  · Moderate mitral regurgitation.  · Mild pulmonic regurgitation.  · Small pericardial effusion.  . Continue Furosemide and monitor clinical status closely. Monitor on telemetry. Patient is on CHF pathway.  Monitor strict Is&Os and daily weights.  Place on fluid restriction of 1.5 L. Continue to stress to patient importance of self efficacy and  on diet for CHF. Last BNP reviewed- and noted below   Recent Labs   Lab 01/28/22  0657   *    .  Continuing dobutamine drip  Improving clinically but will still need to continue the gtt. Renal function is improving.   Continue management by cardiologist      Hypertensive urgency  Admitted to ICU, now transferred to floor  Started and now weaned off nitroglycerin drip  Norvasc  Apresoline  Lasix  Catapres  Troponin  Echocardiogram as above  Cardiology consulted  Off BiPAP  Transferred to the medical floor      Stage 3b chronic kidney disease  Acute on chronic kidney disease  Slight worsening in sCr initially likely due to CHF exacerbation/intravascular depletion   Improving with dobutamine  Renal ultrasound with medical renal disease      VTE Risk Mitigation (From admission, onward)         Ordered     heparin (porcine) injection 5,000 Units  Every 8 hours         01/24/22 1822     IP VTE HIGH RISK PATIENT  Once         01/24/22 1822     Place sequential compression device  Until discontinued         01/24/22 1822                Discharge Planning   STAR:      Code Status: Full Code   Is the patient medically ready for discharge?:     Reason for patient still in hospital (select all that apply): Treatment  Discharge Plan A: Home with family,Home Health   Discharge Delays: None known at this time              Brea Daniels MD  Department of Hospital Medicine   [facility]

## 2022-01-29 PROBLEM — N30.00 ACUTE CYSTITIS WITHOUT HEMATURIA: Status: ACTIVE | Noted: 2022-01-29

## 2022-01-29 LAB
ANION GAP SERPL CALC-SCNC: 14 MMOL/L (ref 8–16)
BASOPHILS # BLD AUTO: 0.02 K/UL (ref 0–0.2)
BASOPHILS NFR BLD: 0.8 % (ref 0–1.9)
BUN SERPL-MCNC: 31 MG/DL (ref 8–23)
CALCIUM SERPL-MCNC: 7.8 MG/DL (ref 8.7–10.5)
CHLORIDE SERPL-SCNC: 104 MMOL/L (ref 95–110)
CO2 SERPL-SCNC: 21 MMOL/L (ref 23–29)
CREAT SERPL-MCNC: 2.5 MG/DL (ref 0.5–1.4)
DIFFERENTIAL METHOD: ABNORMAL
EOSINOPHIL # BLD AUTO: 0 K/UL (ref 0–0.5)
EOSINOPHIL NFR BLD: 1.6 % (ref 0–8)
ERYTHROCYTE [DISTWIDTH] IN BLOOD BY AUTOMATED COUNT: 14.4 % (ref 11.5–14.5)
EST. GFR  (AFRICAN AMERICAN): 30.2 ML/MIN/1.73 M^2
EST. GFR  (NON AFRICAN AMERICAN): 26.2 ML/MIN/1.73 M^2
GLUCOSE SERPL-MCNC: 168 MG/DL (ref 70–110)
HCT VFR BLD AUTO: 32.4 % (ref 40–54)
HGB BLD-MCNC: 10.4 G/DL (ref 14–18)
IMM GRANULOCYTES # BLD AUTO: 0 K/UL (ref 0–0.04)
IMM GRANULOCYTES NFR BLD AUTO: 0 % (ref 0–0.5)
LYMPHOCYTES # BLD AUTO: 0.5 K/UL (ref 1–4.8)
LYMPHOCYTES NFR BLD: 20.2 % (ref 18–48)
MCH RBC QN AUTO: 25.5 PG (ref 27–31)
MCHC RBC AUTO-ENTMCNC: 32.1 G/DL (ref 32–36)
MCV RBC AUTO: 79 FL (ref 82–98)
MONOCYTES # BLD AUTO: 0.2 K/UL (ref 0.3–1)
MONOCYTES NFR BLD: 8.9 % (ref 4–15)
NEUTROPHILS # BLD AUTO: 1.7 K/UL (ref 1.8–7.7)
NEUTROPHILS NFR BLD: 68.5 % (ref 38–73)
NRBC BLD-RTO: 0 /100 WBC
PLATELET # BLD AUTO: 234 K/UL (ref 150–450)
PMV BLD AUTO: 10.1 FL (ref 9.2–12.9)
POCT GLUCOSE: 160 MG/DL (ref 70–110)
POCT GLUCOSE: 172 MG/DL (ref 70–110)
POCT GLUCOSE: 221 MG/DL (ref 70–110)
POCT GLUCOSE: 255 MG/DL (ref 70–110)
POTASSIUM SERPL-SCNC: 3.8 MMOL/L (ref 3.5–5.1)
RBC # BLD AUTO: 4.08 M/UL (ref 4.6–6.2)
SODIUM SERPL-SCNC: 139 MMOL/L (ref 136–145)
WBC # BLD AUTO: 2.47 K/UL (ref 3.9–12.7)

## 2022-01-29 PROCEDURE — 99233 SBSQ HOSP IP/OBS HIGH 50: CPT | Mod: ,,, | Performed by: FAMILY MEDICINE

## 2022-01-29 PROCEDURE — 63600175 PHARM REV CODE 636 W HCPCS: Performed by: HOSPITALIST

## 2022-01-29 PROCEDURE — 94760 N-INVAS EAR/PLS OXIMETRY 1: CPT

## 2022-01-29 PROCEDURE — 25000003 PHARM REV CODE 250: Performed by: HOSPITALIST

## 2022-01-29 PROCEDURE — 63600175 PHARM REV CODE 636 W HCPCS: Performed by: FAMILY MEDICINE

## 2022-01-29 PROCEDURE — 99900035 HC TECH TIME PER 15 MIN (STAT)

## 2022-01-29 PROCEDURE — 21400001 HC TELEMETRY ROOM

## 2022-01-29 PROCEDURE — 99233 PR SUBSEQUENT HOSPITAL CARE,LEVL III: ICD-10-PCS | Mod: ,,, | Performed by: FAMILY MEDICINE

## 2022-01-29 PROCEDURE — 25000003 PHARM REV CODE 250: Performed by: INTERNAL MEDICINE

## 2022-01-29 PROCEDURE — 36415 COLL VENOUS BLD VENIPUNCTURE: CPT | Performed by: HOSPITALIST

## 2022-01-29 PROCEDURE — 85025 COMPLETE CBC W/AUTO DIFF WBC: CPT | Performed by: HOSPITALIST

## 2022-01-29 PROCEDURE — 80048 BASIC METABOLIC PNL TOTAL CA: CPT | Performed by: HOSPITALIST

## 2022-01-29 RX ORDER — HYDRALAZINE HYDROCHLORIDE 25 MG/1
25 TABLET, FILM COATED ORAL ONCE
Status: COMPLETED | OUTPATIENT
Start: 2022-01-29 | End: 2022-01-29

## 2022-01-29 RX ADMIN — DOBUTAMINE IN DEXTROSE 2 MCG/KG/MIN: 200 INJECTION, SOLUTION INTRAVENOUS at 03:01

## 2022-01-29 RX ADMIN — HEPARIN SODIUM 5000 UNITS: 5000 INJECTION, SOLUTION INTRAVENOUS; SUBCUTANEOUS at 09:01

## 2022-01-29 RX ADMIN — CLONIDINE HYDROCHLORIDE 0.2 MG: 0.1 TABLET ORAL at 03:01

## 2022-01-29 RX ADMIN — SODIUM BICARBONATE 650 MG TABLET 650 MG: at 09:01

## 2022-01-29 RX ADMIN — HYDRALAZINE HYDROCHLORIDE 25 MG: 25 TABLET, FILM COATED ORAL at 09:01

## 2022-01-29 RX ADMIN — HYDRALAZINE HYDROCHLORIDE 25 MG: 25 TABLET, FILM COATED ORAL at 12:01

## 2022-01-29 RX ADMIN — ATORVASTATIN CALCIUM 20 MG: 10 TABLET, FILM COATED ORAL at 09:01

## 2022-01-29 RX ADMIN — AMLODIPINE BESYLATE 10 MG: 5 TABLET ORAL at 09:01

## 2022-01-29 RX ADMIN — HEPARIN SODIUM 5000 UNITS: 5000 INJECTION, SOLUTION INTRAVENOUS; SUBCUTANEOUS at 05:01

## 2022-01-29 RX ADMIN — CLONIDINE HYDROCHLORIDE 0.2 MG: 0.1 TABLET ORAL at 09:01

## 2022-01-29 RX ADMIN — FAMOTIDINE 20 MG: 20 TABLET ORAL at 09:01

## 2022-01-29 RX ADMIN — INSULIN ASPART 2 UNITS: 100 INJECTION, SOLUTION INTRAVENOUS; SUBCUTANEOUS at 09:01

## 2022-01-29 RX ADMIN — CLOPIDOGREL 75 MG: 75 TABLET, FILM COATED ORAL at 09:01

## 2022-01-29 RX ADMIN — CEFTRIAXONE 1 G: 1 INJECTION, SOLUTION INTRAVENOUS at 09:01

## 2022-01-29 RX ADMIN — HYDRALAZINE HYDROCHLORIDE 25 MG: 25 TABLET, FILM COATED ORAL at 05:01

## 2022-01-29 RX ADMIN — HEPARIN SODIUM 5000 UNITS: 5000 INJECTION, SOLUTION INTRAVENOUS; SUBCUTANEOUS at 03:01

## 2022-01-29 RX ADMIN — HYDRALAZINE HYDROCHLORIDE 25 MG: 25 TABLET, FILM COATED ORAL at 03:01

## 2022-01-29 RX ADMIN — INSULIN ASPART 3 UNITS: 100 INJECTION, SOLUTION INTRAVENOUS; SUBCUTANEOUS at 12:01

## 2022-01-29 NOTE — PLAN OF CARE
"   01/29/22 1209   Discharge Reassessment   Assessment Type Discharge Planning Reassessment   Did the patient's condition or plan change since previous assessment? No   Discharge Plan discussed with: Patient   Communicated STAR with patient/caregiver Date not available/Unable to determine   Discharge Plan A Home with family;Home Health   DME Needed Upon Discharge  none   Why the patient remains in the hospital Requires continued medical care   Post-Acute Status   Discharge Delays None known at this time   SW met with patient to reassess for potential discharge planning needs.  Patient stated that he was feeling "a little better".  He is no longer on oxygen.  Per patient, "I'm okay if I have to stay.  I'd rather get all the way better than to have to turn around and come back here."      Patient's plan continues to be to return home with sons.  [facility] services are active and patient would like to resume these services at discharge.  No other needs identified at this time.    SW will continue to follow.     "

## 2022-01-29 NOTE — PROGRESS NOTES
"CARDIOLOGY PROGRESS NOTE    Patient Name:  Marshall Flor    :  1955    Medical Record:  [id number]    DATE OF SERVICE  2022        SUBJECTIVE  The patient is being seen for follow-up out of bed in chair no chest pain or shortness of breath      REVIEW OF SYSTEMS  General: No chills, No fever, No fatigue  Eyes: No vision changes, No drainage from eyes  ENT: No nasal congestion, no sore throat  Respiratory: No shortness of breath, No cough  Cardiac: No chest pain, palpitations, dyspnea, dizziness, claudication, or syncopal episodes  GI: No abdominal pain, no nausea, no vomiting  : No dysuria  Musculoskeletal: No joint pain  Neuro: No weakness, dizziness, vision changes       OBJECTIVE          PHYSICAL EXAM  Vital Signs:  BP (!) 158/74 (BP Location: Right arm, Patient Position: Sitting)   Pulse 86   Temp 98.1 °F (36.7 °C) (Oral)   Resp 18   Ht 5' 7" (1.702 m)   Wt 97.1 kg (214 lb 1.1 oz)   SpO2 95%   BMI 33.53 kg/m²   Temp:  [96.7 °F (35.9 °C)-98.1 °F (36.7 °C)] 98.1 °F (36.7 °C)  Pulse:  [73-88] 86  Resp:  [16-20] 18  SpO2:  [92 %-99 %] 95 %  BP: (137-166)/(67-78) 158/74      General:   Eyes: Anicteric sclera. Moist conjunctiva. Vision grossly intact.  HENMT: Normocephalic.  Moist mucous membranes. No obvious ulcerations.   Neck: Trachea midline.   Cardiovascular: Heart regular rate and rhythm. S1, S2 S3 2/6 systolic ejection murmur left sternal border.  Peripheral pulses palpable. No peripheral edema.   Respiratory: Lungs clear to auscultation bilaterally. No increased work of breathing.  Gastrointestinal: Bowel sounds active in all 4 quadrants. Soft, nontender, nondistended.   Genitourinary: Urine clear yellow  Musculoskeletal: Moves all extremities with equal strength.   Skin: Color normal for ethnicity. Skin warm and dry with good turgor. Capillary refill < 3 seconds.   Neurologic: Oriented x 3.  Speech fluent, follows commands.  Psychiatric:  Awake and alert, normal affect mood " good.      LABS    CBC  Recent Labs   Lab 01/27/22  0745 01/28/22  0717 01/29/22  0625   WBC 3.04* 2.46* 2.47*   RBC 3.76* 3.84* 4.08*   HGB 9.8* 9.9* 10.4*   HCT 29.4* 30.0* 32.4*   MCV 78* 78* 79*   MCH 26.1* 25.8* 25.5*   MCHC 33.3 33.0 32.1   RDW 14.2 14.1 14.4   MPV 10.0 9.7 10.1    233 234       Chemistry  Recent Labs   Lab 01/25/22  0809 01/25/22  0809 01/26/22  0733 01/26/22  0733 01/27/22  0745 01/28/22  0717 01/29/22  0625      < > 140  140   < > 138 138 139   K 3.2*   < > 3.7  3.7   < > 3.6 3.6 3.8      < > 105  105   < > 104 104 104   CO2 22*   < > 22*  22*   < > 19* 21* 21*   BUN 14   < > 20  20   < > 31* 34* 31*   CREATININE 1.9*   < > 2.6*  2.6*   < > 3.2* 2.8* 2.5*   GLU 69*   < > 183*  183*   < > 147* 171* 168*   PROT 5.8*  --  5.7*  --  5.6*  --   --    CALCIUM 7.8*   < > 7.9*  7.9*   < > 7.8* 7.7* 7.8*   BILITOT 0.6  --  0.5  --  0.4  --   --    AST 9*  --  8*  --  11  --   --    ALT 11  --  9*  --  10  --   --     < > = values in this interval not displayed.       ABG  No results for input(s): PHART, ECZ0MSZ, PO2ART, RNW6OIA, A3FJHMJH, BEART, I2KMLQYZ in the last 168 hours.      MICROBIOLOGY  Reviewed    IMAGING & OTHER STUDIES  Reviewed      Intake/Output last 3 shifts:  I/O last 3 completed shifts:  In: 415.8 [P.O.:340; I.V.:75.8]  Out: 1550 [Urine:1550]    Scheduled meds:   amLODIPine  10 mg Oral Daily    atorvastatin  20 mg Oral QHS    cefTRIAXone (ROCEPHIN) IVPB  1 g Intravenous Q24H    cloNIDine  0.2 mg Oral TID    clopidogreL  75 mg Oral Daily    famotidine  20 mg Oral Daily    heparin (porcine)  5,000 Units Subcutaneous Q8H    hydrALAZINE  25 mg Oral Q8H    sodium bicarbonate  650 mg Oral BID       PRN Meds:  sodium chloride 0.9%, acetaminophen, albuterol-ipratropium, dextrose 50%, dextrose 50%, dextrose 50%, dextrose 50%, glucagon (human recombinant), glucagon (human recombinant), glucose, glucose, hydrALAZINE, HYDROcodone-acetaminophen, insulin aspart  U-100, magnesium oxide, magnesium oxide, naloxone, ondansetron, polyethylene glycol, potassium, sodium phosphates, potassium, sodium phosphates, potassium, sodium phosphates, promethazine, sodium chloride 0.9%, trazodone    Continuous Infusions:   DOBUTamine 2 mcg/kg/min (01/29/22 0325)       Dietary Orders:  [unfilled]     Admit weight: Weight: 100.7 kg (222 lb)   Today weight: Weight: 97.1 kg (214 lb 1.1 oz)      Length of stay in days: 5      ASSESSMENT    Active Hospital Problems    Diagnosis  POA    *Essential hypertension [I10]  Yes    CHF (congestive heart failure), NYHA class I, acute on chronic, systolic [I50.23]  Yes    Hypertensive urgency [I16.0]  Yes    Stage 3b chronic kidney disease [N18.32]  Yes      Resolved Hospital Problems   No resolved problems to display.          PLAN      Hypertensive urgency  Status post 218/137  Transient nitro drip  130/80 on home medications  History noncompliance        Chronic  HFrEF   No shortness of breath  BNP 1467 improved to 850 1/21  EKG DECREASED ST INFERIOR LATERAL  Dobutamine 2 milligram/kilogram per minute  Heparin 5000 subQ q.8 hours  States abnormal stress 1 year ago  Catheterization not done     History of CVA  No weakness  Describesd as weakness  2016  Plavix  Venous no DVT   CK 67 1/25        Diabetes  Status post decreased glucose  Treated medicine     Hypokalemia  K 3.8 1/29     Hypo magnesium  Magnesium 1.4 1/26     Acute Renal failure  Creatinine 2.5    1/29 improved  Renal ultrasound no hydronephrosis  Monitor renal function  Off Lasix  Off lisinopril     Hypomagnesemia  Magnesium 1.8 1/27  Monitor magnesium     Anemia  Hemoglobin 10.4  1/29 improved  Pepcid 20 mg p.o. daily  Hemoccult stool     Plan   BMP BNP in a.m.  Hydralazine 25 mg x 1 today additional  Continue dobutamine closely monitor renal function                Electronically signed by: Rickey Tong, 1/29/2022 11:19 AM

## 2022-01-29 NOTE — PLAN OF CARE
Problem: Adult Inpatient Plan of Care  Goal: Plan of Care Review  Outcome: Ongoing, Progressing  Goal: Patient-Specific Goal (Individualized)  Outcome: Ongoing, Progressing  Goal: Absence of Hospital-Acquired Illness or Injury  Outcome: Ongoing, Progressing  Goal: Optimal Comfort and Wellbeing  Outcome: Ongoing, Progressing  Goal: Readiness for Transition of Care  Outcome: Ongoing, Progressing     Problem: Diabetes Comorbidity  Goal: Blood Glucose Level Within Targeted Range  Outcome: Ongoing, Progressing     Problem: Skin Injury Risk Increased  Goal: Skin Health and Integrity  Outcome: Ongoing, Progressing     Problem: Infection  Goal: Absence of Infection Signs and Symptoms  Outcome: Ongoing, Progressing     Problem: Hypertension Acute  Goal: Blood Pressure Within Desired Range  Outcome: Ongoing, Progressing     Problem: Adjustment to Illness (Heart Failure)  Goal: Optimal Coping  Outcome: Ongoing, Progressing     Problem: Cardiac Output Decreased (Heart Failure)  Goal: Optimal Cardiac Output  Outcome: Ongoing, Progressing     Problem: Dysrhythmia (Heart Failure)  Goal: Stable Heart Rate and Rhythm  Outcome: Ongoing, Progressing     Problem: Fluid Imbalance (Heart Failure)  Goal: Fluid Balance  Outcome: Ongoing, Progressing     Problem: Functional Ability Impaired (Heart Failure)  Goal: Optimal Functional Ability  Outcome: Ongoing, Progressing     Problem: Oral Intake Inadequate (Heart Failure)  Goal: Optimal Nutrition Intake  Outcome: Ongoing, Progressing     Problem: Respiratory Compromise (Heart Failure)  Goal: Effective Oxygenation and Ventilation  Outcome: Ongoing, Progressing     Problem: Sleep Disordered Breathing (Heart Failure)  Goal: Effective Breathing Pattern During Sleep  Outcome: Ongoing, Progressing     Problem: Adjustment to Illness (Chronic Kidney Disease)  Goal: Optimal Coping with Chronic Illness  Outcome: Ongoing, Progressing     Problem: Electrolyte Imbalance (Chronic Kidney Disease)  Goal:  Electrolyte Balance  Outcome: Ongoing, Progressing     Problem: Fluid Volume Excess (Chronic Kidney Disease)  Goal: Fluid Balance  Outcome: Ongoing, Progressing     Problem: Functional Decline (Chronic Kidney Disease)  Goal: Optimal Functional Ability  Outcome: Ongoing, Progressing     Problem: Hematologic Alteration (Chronic Kidney Disease)  Goal: Absence of Anemia Signs and Symptoms  Outcome: Ongoing, Progressing     Problem: Oral Intake Inadequate (Chronic Kidney Disease)  Goal: Optimal Oral Intake  Outcome: Ongoing, Progressing     Problem: Pain (Chronic Kidney Disease)  Goal: Acceptable Pain Control  Outcome: Ongoing, Progressing     Problem: Activity and Energy Impairment (Anxiety Signs/Symptoms)  Goal: Optimized Energy Level (Anxiety Signs/Symptoms)  Outcome: Ongoing, Progressing     Problem: Cognitive Impairment (Anxiety Signs/Symptoms)  Goal: Optimized Cognitive Function (Anxiety Signs/Symptoms)  Outcome: Ongoing, Progressing     Problem: Mood Impairment (Anxiety Signs/Symptoms)  Goal: Improved Mood Symptoms (Anxiety Signs/Symptoms)  Outcome: Ongoing, Progressing     Problem: Sleep Impairment (Anxiety Signs/Symptoms)  Goal: Improved Sleep (Anxiety Signs/Symptoms)  Outcome: Ongoing, Progressing     Problem: Social, Occupational or Functional Impairment (Anxiety Signs/Symptoms)  Goal: Enhanced Social, Occupational or Functional Skills (Anxiety Signs/Symptoms)  Outcome: Ongoing, Progressing     Problem: Somatic Disturbance (Anxiety Signs/Symptoms)  Goal: Improved Somatic Symptoms (Anxiety Signs/Symptoms)  Outcome: Ongoing, Progressing     Problem: Fall Injury Risk  Goal: Absence of Fall and Fall-Related Injury  Outcome: Ongoing, Progressing     Problem: Renal Function Impairment (Chronic Kidney Disease)  Goal: Minimize Renal Failure Effects  Outcome: Ongoing, Not Progressing

## 2022-01-29 NOTE — SUBJECTIVE & OBJECTIVE
Interval History: clinically stable    Review of Systems   Constitutional: Negative for fatigue.   HENT: Negative.    Eyes: Negative.    Respiratory: Negative for shortness of breath.    Cardiovascular: Positive for leg swelling.   Gastrointestinal: Negative.    Endocrine: Negative.    Genitourinary: Negative.    Musculoskeletal: Negative.    Neurological: Negative.    Hematological: Negative.    Psychiatric/Behavioral: Positive for confusion.     Objective:     Vital Signs (Most Recent):  Temp: 98.1 °F (36.7 °C) (01/29/22 0955)  Pulse: 86 (01/29/22 0955)  Resp: 18 (01/29/22 0955)  BP: (!) 158/74 (01/29/22 0955)  SpO2: 95 % (01/29/22 0955) Vital Signs (24h Range):  Temp:  [96.7 °F (35.9 °C)-98.1 °F (36.7 °C)] 98.1 °F (36.7 °C)  Pulse:  [73-88] 86  Resp:  [16-20] 18  SpO2:  [92 %-99 %] 95 %  BP: (137-166)/(71-78) 158/74     Weight: 97.1 kg (214 lb 1.1 oz)  Body mass index is 33.53 kg/m².    Intake/Output Summary (Last 24 hours) at 1/29/2022 1341  Last data filed at 1/29/2022 0000  Gross per 24 hour   Intake --   Output 850 ml   Net -850 ml      Physical Exam  Constitutional:       General: He is not in acute distress.     Appearance: He is not toxic-appearing or diaphoretic.   HENT:      Head: Normocephalic and atraumatic.      Right Ear: External ear normal.      Left Ear: External ear normal.      Nose: Nose normal.      Mouth/Throat:      Mouth: Mucous membranes are moist.   Eyes:      Conjunctiva/sclera: Conjunctivae normal.   Cardiovascular:      Rate and Rhythm: Normal rate and regular rhythm.      Pulses: Normal pulses.      Heart sounds: Murmur heard.       Pulmonary:      Effort: Pulmonary effort is normal. No respiratory distress.      Breath sounds: Rales present.      Comments: End expiratory wheezing bilateral lower lobes, poor air movement generally  Abdominal:      General: Abdomen is flat.      Palpations: Abdomen is soft.   Musculoskeletal:      Cervical back: Normal range of motion and neck supple.       Right lower leg: Edema present.      Left lower leg: Edema present.      Comments: 3+ pitting edema b/l LE minimally changed   Neurological:      Mental Status: He is alert. Mental status is at baseline.   Psychiatric:         Behavior: Behavior normal.         Significant Labs:   All pertinent labs within the past 24 hours have been reviewed.  CBC:   Recent Labs   Lab 01/28/22  0717 01/29/22  0625   WBC 2.46* 2.47*   HGB 9.9* 10.4*   HCT 30.0* 32.4*    234     CMP:   Recent Labs   Lab 01/28/22  0717 01/29/22  0625    139   K 3.6 3.8    104   CO2 21* 21*   * 168*   BUN 34* 31*   CREATININE 2.8* 2.5*   CALCIUM 7.7* 7.8*   ANIONGAP 13 14   EGFRNONAA 22.8* 26.2*       Significant Imaging: I have reviewed all pertinent imaging results/findings within the past 24 hours.   US Kidney   Final Result      No hydronephrosis.  Elevated resistive indices may indicate medical renal disease.      Cyst at the upper pole of the left kidney, which may be slightly complex.  The patient is unable to have a contrast enhanced CT or MRI for further evaluation, due to abnormal renal function studies; therefore, follow-up ultrasound is recommended in 6 months to document stability.         Electronically signed by: Chari Weaver   Date:    01/26/2022   Time:    16:48      X-Ray Chest AP Portable   Final Result   Abnormal      Findings consistent with congestive heart failure.      Close interval follow-up is recommended.      This report was flagged in Epic as abnormal.         Electronically signed by: Darnell Ascencio   Date:    01/24/2022   Time:    15:12

## 2022-01-29 NOTE — PROGRESS NOTES
"CARDIOLOGY PROGRESS NOTE    Patient Name:  Marshall Flor    :  1955    Medical Record:  [id number]    DATE OF SERVICE  2022        SUBJECTIVE  The patient is being seen for follow-up no shortness of breath      REVIEW OF SYSTEMS  General: No chills, No fever, No fatigue  Eyes: No vision changes, No drainage from eyes  ENT: No nasal congestion, no sore throat  Respiratory: No shortness of breath, No cough  Cardiac: No chest pain, palpitations, dyspnea, dizziness, claudication, or syncopal episodes  GI: No abdominal pain, no nausea, no vomiting  : No dysuria  Musculoskeletal: No joint pain  Neuro: No weakness, dizziness, vision changes       OBJECTIVE          PHYSICAL EXAM  Vital Signs:  /74 (Patient Position: Lying)   Pulse 73   Temp 97.1 °F (36.2 °C) (Oral)   Resp 20   Ht 5' 7" (1.702 m)   Wt 97.1 kg (214 lb 1.1 oz)   SpO2 96%   BMI 33.53 kg/m²   Temp:  [97.1 °F (36.2 °C)-98.4 °F (36.9 °C)] 97.1 °F (36.2 °C)  Pulse:  [68-88] 73  Resp:  [16-20] 20  SpO2:  [96 %-100 %] 96 %  BP: (137-147)/(67-74) 137/74      General:   Eyes: Anicteric sclera. Moist conjunctiva. Vision grossly intact.  HENMT: Normocephalic.  Moist mucous membranes. No obvious ulcerations.   Neck: Trachea midline.   Cardiovascular: Heart regular rate and rhythm. S1, S2, S3-1 to 2/6 systolic ejection murmur left sternal border.  Peripheral pulses palpable. No peripheral edema.   Respiratory: Lungs clear to auscultation bilaterally. No increased work of breathing.  Gastrointestinal: Bowel sounds active in all 4 quadrants. Soft, nontender, nondistended.   Genitourinary: Urine clear yellow  Musculoskeletal: Moves all extremities with equal strength.   Skin: Color normal for ethnicity. Skin warm and dry with good turgor. Capillary refill < 3 seconds.   Neurologic: Oriented x 3.  Speech fluent, follows commands.  Psychiatric:  Awake and alert, normal affect mood good.      LABS    CBC  Recent Labs   Lab 22  0733 22  0745 " 01/28/22  0717   WBC 2.90*  2.90* 3.04* 2.46*   RBC 3.98*  3.98* 3.76* 3.84*   HGB 10.4*  10.4* 9.8* 9.9*   HCT 31.3*  31.3* 29.4* 30.0*   MCV 79*  79* 78* 78*   MCH 26.1*  26.1* 26.1* 25.8*   MCHC 33.2  33.2 33.3 33.0   RDW 14.2  14.2 14.2 14.1   MPV 10.2  10.2 10.0 9.7     259 224 233       Chemistry  Recent Labs   Lab 01/25/22  0809 01/25/22  0809 01/26/22  0733 01/27/22  0745 01/28/22  0717      < > 140  140 138 138   K 3.2*   < > 3.7  3.7 3.6 3.6      < > 105  105 104 104   CO2 22*   < > 22*  22* 19* 21*   BUN 14   < > 20  20 31* 34*   CREATININE 1.9*   < > 2.6*  2.6* 3.2* 2.8*   GLU 69*   < > 183*  183* 147* 171*   PROT 5.8*  --  5.7* 5.6*  --    CALCIUM 7.8*   < > 7.9*  7.9* 7.8* 7.7*   BILITOT 0.6  --  0.5 0.4  --    AST 9*  --  8* 11  --    ALT 11  --  9* 10  --     < > = values in this interval not displayed.       ABG  No results for input(s): PHART, NTA8CZI, PO2ART, BQG5VSB, J6BRDLVX, BEART, D4ZLIHLI in the last 168 hours.      MICROBIOLOGY  Reviewed    IMAGING & OTHER STUDIES  Reviewed      Intake/Output last 3 shifts:  I/O last 3 completed shifts:  In: 1414.5 [P.O.:1090; I.V.:324.5]  Out: 700 [Urine:700]    Scheduled meds:   amLODIPine  10 mg Oral Daily    atorvastatin  20 mg Oral QHS    cloNIDine  0.2 mg Oral TID    clopidogreL  75 mg Oral Daily    famotidine  20 mg Oral Daily    heparin (porcine)  5,000 Units Subcutaneous Q8H    hydrALAZINE  25 mg Oral Q8H    sodium bicarbonate  650 mg Oral BID       PRN Meds:  sodium chloride 0.9%, acetaminophen, albuterol-ipratropium, dextrose 50%, dextrose 50%, dextrose 50%, dextrose 50%, glucagon (human recombinant), glucagon (human recombinant), glucose, glucose, hydrALAZINE, HYDROcodone-acetaminophen, insulin aspart U-100, magnesium oxide, magnesium oxide, naloxone, ondansetron, polyethylene glycol, potassium, sodium phosphates, potassium, sodium phosphates, potassium, sodium phosphates, promethazine, sodium chloride  0.9%, trazodone    Continuous Infusions:   DOBUTamine 2 mcg/kg/min (01/28/22 0329)       Dietary Orders:  [unfilled]     Admit weight: Weight: 100.7 kg (222 lb)   Today weight: Weight: 97.1 kg (214 lb 1.1 oz)      Length of stay in days: 4      ASSESSMENT    Active Hospital Problems    Diagnosis  POA    *Essential hypertension [I10]  Yes    CHF (congestive heart failure), NYHA class I, acute on chronic, systolic [I50.23]  Yes    Hypertensive urgency [I16.0]  Yes    Stage 3b chronic kidney disease [N18.32]  Yes      Resolved Hospital Problems   No resolved problems to display.          PLAN    Hypertensive urgency  Status post 218/137  Transient nitro drip  130/80 on home medications  History noncompliance        Chronic  HFrEF   No shortness of breath  BNP 1467 improved to 850 1/21  EKG DECREASED ST INFERIOR LATERAL  Dobutamine 2 milligram/kilogram per minute  Heparin 5000 subQ q.8 hours  States abnormal stress 1 year ago  Catheterization not done     History of CVA  No weakness  Describesd as weakness  2016  Plavix  Venous no DVT   CK 67 1/25        Diabetes  Status post decreased glucose  Treated medicine     Hypokalemia  K 3.7 1/26     Hypo magnesium  Magnesium 1.4 1/26     Acute Renal failure  Creatinine 2.8    1/2 improved  Renal ultrasound no hydronephrosis  Monitor renal function  Off Lasix  Off lisinopril     Hypomagnesemia  Magnesium 1.8 1/27  Monitor magnesium     Anemia  Hemoglobin 9.9 1/28 improved  Pepcid 20 mg p.o. daily  Hemoccult stool     Plan  CBC BMP BNP in a.m.  KCL 20 mg x 1                  Electronically signed by: Rickey Tong, 1/28/2022 6:38 PM

## 2022-01-29 NOTE — PROGRESS NOTES
[facility] Medicine  Progress Note    Patient Name: Marshall Flor  MRN: [id number]  Patient Class: IP- Inpatient   Admission Date: 1/24/2022  Length of Stay: 5 days  Attending Physician: Brea Daniels MD  Primary Care Provider: Dorie Quan MD        Subjective:     Principal Problem:Essential hypertension        HPI:  [age] year old diabetic hypertensive male with a history of congestive heart failure and renal disease was found unconscious on the floor at home.  EMS checked his blood sugar and was 31. He was given an amp of D50 and his blood sugar came up to 68. His blood sugar was recheck again and range from 75 to 77. He developed acute pulmonary edema in emergency room was given Lasix.  He became hypertensive and had to be placed on a nitroglycerin drip for his blood pressure.  He continued to be short of breath was placed on BiPAP.  He denies chest pain, nausea, vomiting , headaches, blurred or double vision.      Overview/Hospital Course:  No notes on file    Interval History: clinically stable    Review of Systems   Constitutional: Negative for fatigue.   HENT: Negative.    Eyes: Negative.    Respiratory: Negative for shortness of breath.    Cardiovascular: Positive for leg swelling.   Gastrointestinal: Negative.    Endocrine: Negative.    Genitourinary: Negative.    Musculoskeletal: Negative.    Neurological: Negative.    Hematological: Negative.    Psychiatric/Behavioral: Positive for confusion.     Objective:     Vital Signs (Most Recent):  Temp: 98.1 °F (36.7 °C) (01/29/22 0955)  Pulse: 86 (01/29/22 0955)  Resp: 18 (01/29/22 0955)  BP: (!) 158/74 (01/29/22 0955)  SpO2: 95 % (01/29/22 0955) Vital Signs (24h Range):  Temp:  [96.7 °F (35.9 °C)-98.1 °F (36.7 °C)] 98.1 °F (36.7 °C)  Pulse:  [73-88] 86  Resp:  [16-20] 18  SpO2:  [92 %-99 %] 95 %  BP: (137-166)/(71-78) 158/74     Weight: 97.1 kg (214 lb 1.1 oz)  Body mass index is 33.53 kg/m².    Intake/Output Summary (Last 24  hours) at 1/29/2022 1341  Last data filed at 1/29/2022 0000  Gross per 24 hour   Intake --   Output 850 ml   Net -850 ml      Physical Exam  Constitutional:       General: He is not in acute distress.     Appearance: He is not toxic-appearing or diaphoretic.   HENT:      Head: Normocephalic and atraumatic.      Right Ear: External ear normal.      Left Ear: External ear normal.      Nose: Nose normal.      Mouth/Throat:      Mouth: Mucous membranes are moist.   Eyes:      Conjunctiva/sclera: Conjunctivae normal.   Cardiovascular:      Rate and Rhythm: Normal rate and regular rhythm.      Pulses: Normal pulses.      Heart sounds: Murmur heard.       Pulmonary:      Effort: Pulmonary effort is normal. No respiratory distress.      Breath sounds: Rales present.      Comments: End expiratory wheezing bilateral lower lobes, poor air movement generally  Abdominal:      General: Abdomen is flat.      Palpations: Abdomen is soft.   Musculoskeletal:      Cervical back: Normal range of motion and neck supple.      Right lower leg: Edema present.      Left lower leg: Edema present.      Comments: 3+ pitting edema b/l LE minimally changed   Neurological:      Mental Status: He is alert. Mental status is at baseline.   Psychiatric:         Behavior: Behavior normal.         Significant Labs:   All pertinent labs within the past 24 hours have been reviewed.  CBC:   Recent Labs   Lab 01/28/22  0717 01/29/22  0625   WBC 2.46* 2.47*   HGB 9.9* 10.4*   HCT 30.0* 32.4*    234     CMP:   Recent Labs   Lab 01/28/22  0717 01/29/22  0625    139   K 3.6 3.8    104   CO2 21* 21*   * 168*   BUN 34* 31*   CREATININE 2.8* 2.5*   CALCIUM 7.7* 7.8*   ANIONGAP 13 14   EGFRNONAA 22.8* 26.2*       Significant Imaging: I have reviewed all pertinent imaging results/findings within the past 24 hours.   US Kidney   Final Result      No hydronephrosis.  Elevated resistive indices may indicate medical renal disease.      Cyst at  the upper pole of the left kidney, which may be slightly complex.  The patient is unable to have a contrast enhanced CT or MRI for further evaluation, due to abnormal renal function studies; therefore, follow-up ultrasound is recommended in 6 months to document stability.         Electronically signed by: Chari Weaver   Date:    01/26/2022   Time:    16:48      X-Ray Chest AP Portable   Final Result   Abnormal      Findings consistent with congestive heart failure.      Close interval follow-up is recommended.      This report was flagged in Epic as abnormal.         Electronically signed by: Darnell Ascencio   Date:    01/24/2022   Time:    15:12              Assessment/Plan:      * Essential hypertension  As above      Acute cystitis without hematuria  Urine cx with citrobacter pansensitive  Started on IV Rocephin 1g q24h        CHF (congestive heart failure), NYHA class I, acute on chronic, systolic  Patient is identified as having Systolic (HFrEF) heart failure that is Acute on chronic. CHF is currently controlled. Latest ECHO performed and demonstrates- Results for orders placed during the hospital encounter of 01/02/22    Echo    Interpretation Summary  · The left ventricle is mildly enlarged with mild concentric hypertrophy and moderately decreased systolic function.  · The estimated ejection fraction is 39%.  · Mild right ventricular enlargement.  · Moderate left atrial enlargement.  · Moderate mitral regurgitation.  · Mild pulmonic regurgitation.  · Small pericardial effusion.  . Continue Furosemide and monitor clinical status closely. Monitor on telemetry. Patient is on CHF pathway.  Monitor strict Is&Os and daily weights.  Place on fluid restriction of 1.5 L. Continue to stress to patient importance of self efficacy and  on diet for CHF. Last BNP reviewed- and noted below   Recent Labs   Lab 01/28/22  0717   *   .  Continuing dobutamine drip  Improving clinically but will still need  to continue the gtt. Renal function is improving.   Continue management by cardiologist      Hypertensive urgency  Admitted to ICU, now transferred to floor  Started and now weaned off nitroglycerin drip  Norvasc  Apresoline  Lasix  Catapres  Troponin  Echocardiogram as above  Cardiology consulted  Off BiPAP  Transferred to the medical floor      Stage 3b chronic kidney disease  Acute on chronic kidney disease  Slight worsening in sCr initially likely due to CHF exacerbation/intravascular depletion   Improving with dobutamine  Renal ultrasound with medical renal disease      VTE Risk Mitigation (From admission, onward)         Ordered     heparin (porcine) injection 5,000 Units  Every 8 hours         01/24/22 1822     IP VTE HIGH RISK PATIENT  Once         01/24/22 1822     Place sequential compression device  Until discontinued         01/24/22 1822                Discharge Planning   STAR:      Code Status: Full Code   Is the patient medically ready for discharge?:     Reason for patient still in hospital (select all that apply): Treatment  Discharge Plan A: Home with family,Home Health   Discharge Delays: None known at this time              Brea Daniels MD  Department of Hospital Medicine   [facility]

## 2022-01-30 LAB
ANION GAP SERPL CALC-SCNC: 13 MMOL/L (ref 8–16)
BNP SERPL-MCNC: 906 PG/ML (ref 0–99)
BUN SERPL-MCNC: 27 MG/DL (ref 8–23)
CALCIUM SERPL-MCNC: 7.6 MG/DL (ref 8.7–10.5)
CHLORIDE SERPL-SCNC: 106 MMOL/L (ref 95–110)
CO2 SERPL-SCNC: 19 MMOL/L (ref 23–29)
CREAT SERPL-MCNC: 2.4 MG/DL (ref 0.5–1.4)
EST. GFR  (AFRICAN AMERICAN): 31.8 ML/MIN/1.73 M^2
EST. GFR  (NON AFRICAN AMERICAN): 27.5 ML/MIN/1.73 M^2
GLUCOSE SERPL-MCNC: 162 MG/DL (ref 70–110)
POCT GLUCOSE: 164 MG/DL (ref 70–110)
POCT GLUCOSE: 169 MG/DL (ref 70–110)
POCT GLUCOSE: 207 MG/DL (ref 70–110)
POCT GLUCOSE: 209 MG/DL (ref 70–110)
POTASSIUM SERPL-SCNC: 3.8 MMOL/L (ref 3.5–5.1)
SODIUM SERPL-SCNC: 138 MMOL/L (ref 136–145)

## 2022-01-30 PROCEDURE — 83880 ASSAY OF NATRIURETIC PEPTIDE: CPT | Performed by: INTERNAL MEDICINE

## 2022-01-30 PROCEDURE — 99233 PR SUBSEQUENT HOSPITAL CARE,LEVL III: ICD-10-PCS | Mod: ,,, | Performed by: FAMILY MEDICINE

## 2022-01-30 PROCEDURE — 36415 COLL VENOUS BLD VENIPUNCTURE: CPT | Performed by: INTERNAL MEDICINE

## 2022-01-30 PROCEDURE — 63600175 PHARM REV CODE 636 W HCPCS: Performed by: HOSPITALIST

## 2022-01-30 PROCEDURE — 80048 BASIC METABOLIC PNL TOTAL CA: CPT | Performed by: INTERNAL MEDICINE

## 2022-01-30 PROCEDURE — 94760 N-INVAS EAR/PLS OXIMETRY 1: CPT

## 2022-01-30 PROCEDURE — 21400001 HC TELEMETRY ROOM

## 2022-01-30 PROCEDURE — 25000003 PHARM REV CODE 250: Performed by: FAMILY MEDICINE

## 2022-01-30 PROCEDURE — 25000003 PHARM REV CODE 250: Performed by: HOSPITALIST

## 2022-01-30 PROCEDURE — 99233 SBSQ HOSP IP/OBS HIGH 50: CPT | Mod: ,,, | Performed by: FAMILY MEDICINE

## 2022-01-30 PROCEDURE — 99900035 HC TECH TIME PER 15 MIN (STAT)

## 2022-01-30 PROCEDURE — 25000003 PHARM REV CODE 250: Performed by: INTERNAL MEDICINE

## 2022-01-30 PROCEDURE — 63600175 PHARM REV CODE 636 W HCPCS: Performed by: FAMILY MEDICINE

## 2022-01-30 RX ORDER — MUPIROCIN 20 MG/G
OINTMENT TOPICAL 2 TIMES DAILY
Status: DISCONTINUED | OUTPATIENT
Start: 2022-01-30 | End: 2022-02-02 | Stop reason: HOSPADM

## 2022-01-30 RX ORDER — FUROSEMIDE 20 MG/1
20 TABLET ORAL ONCE
Status: COMPLETED | OUTPATIENT
Start: 2022-01-30 | End: 2022-01-30

## 2022-01-30 RX ORDER — HYDRALAZINE HYDROCHLORIDE 25 MG/1
50 TABLET, FILM COATED ORAL EVERY 12 HOURS
Status: DISCONTINUED | OUTPATIENT
Start: 2022-01-30 | End: 2022-02-02 | Stop reason: HOSPADM

## 2022-01-30 RX ADMIN — FUROSEMIDE 20 MG: 20 TABLET ORAL at 12:01

## 2022-01-30 RX ADMIN — ATORVASTATIN CALCIUM 20 MG: 10 TABLET, FILM COATED ORAL at 09:01

## 2022-01-30 RX ADMIN — HYDRALAZINE HYDROCHLORIDE 50 MG: 25 TABLET, FILM COATED ORAL at 09:01

## 2022-01-30 RX ADMIN — INSULIN ASPART 2 UNITS: 100 INJECTION, SOLUTION INTRAVENOUS; SUBCUTANEOUS at 12:01

## 2022-01-30 RX ADMIN — HEPARIN SODIUM 5000 UNITS: 5000 INJECTION, SOLUTION INTRAVENOUS; SUBCUTANEOUS at 03:01

## 2022-01-30 RX ADMIN — SODIUM BICARBONATE 650 MG TABLET 650 MG: at 08:01

## 2022-01-30 RX ADMIN — MUPIROCIN: 20 OINTMENT TOPICAL at 12:01

## 2022-01-30 RX ADMIN — HYDRALAZINE HYDROCHLORIDE 25 MG: 25 TABLET, FILM COATED ORAL at 05:01

## 2022-01-30 RX ADMIN — HEPARIN SODIUM 5000 UNITS: 5000 INJECTION, SOLUTION INTRAVENOUS; SUBCUTANEOUS at 05:01

## 2022-01-30 RX ADMIN — AMLODIPINE BESYLATE 10 MG: 5 TABLET ORAL at 08:01

## 2022-01-30 RX ADMIN — CLOPIDOGREL 75 MG: 75 TABLET, FILM COATED ORAL at 08:01

## 2022-01-30 RX ADMIN — CLONIDINE HYDROCHLORIDE 0.2 MG: 0.1 TABLET ORAL at 03:01

## 2022-01-30 RX ADMIN — INSULIN ASPART 2 UNITS: 100 INJECTION, SOLUTION INTRAVENOUS; SUBCUTANEOUS at 05:01

## 2022-01-30 RX ADMIN — CEFTRIAXONE 1 G: 1 INJECTION, SOLUTION INTRAVENOUS at 08:01

## 2022-01-30 RX ADMIN — CLONIDINE HYDROCHLORIDE 0.2 MG: 0.1 TABLET ORAL at 09:01

## 2022-01-30 RX ADMIN — HEPARIN SODIUM 5000 UNITS: 5000 INJECTION, SOLUTION INTRAVENOUS; SUBCUTANEOUS at 09:01

## 2022-01-30 RX ADMIN — FAMOTIDINE 20 MG: 20 TABLET ORAL at 08:01

## 2022-01-30 RX ADMIN — SODIUM BICARBONATE 650 MG TABLET 650 MG: at 09:01

## 2022-01-30 RX ADMIN — CLONIDINE HYDROCHLORIDE 0.2 MG: 0.1 TABLET ORAL at 08:01

## 2022-01-30 RX ADMIN — MUPIROCIN: 20 OINTMENT TOPICAL at 09:01

## 2022-01-30 RX ADMIN — DOBUTAMINE IN DEXTROSE 2 MCG/KG/MIN: 200 INJECTION, SOLUTION INTRAVENOUS at 10:01

## 2022-01-30 NOTE — ASSESSMENT & PLAN NOTE
Admitted to ICU, now transferred to floor  Started and now weaned off nitroglycerin drip  Norvasc  Apresoline  Lasix  Catapres  Troponin  Echocardiogram as above  Cardiology consulted  Off BiPAP  Transferred to the medical floor  Giving an additional dose of lasix today, monitor blood pressure response

## 2022-01-30 NOTE — PROGRESS NOTES
"CARDIOLOGY PROGRESS NOTE    Patient Name:  Marshall Flor    :  1955    Medical Record:  [id number]    DATE OF SERVICE  2022        SUBJECTIVE  The patient is being seen for follow-up no chest pain or shortness of breath at a bed chair      REVIEW OF SYSTEMS  General: No chills, No fever, No fatigue  Eyes: No vision changes, No drainage from eyes  ENT: No nasal congestion, no sore throat  Respiratory: No shortness of breath, No cough  Cardiac: No chest pain, palpitations, dyspnea, dizziness, claudication, or syncopal episodes  GI: No abdominal pain, no nausea, no vomiting  : No dysuria  Musculoskeletal: No joint pain  Neuro: No weakness, dizziness, vision changes       OBJECTIVE          PHYSICAL EXAM  Vital Signs:  BP (!) 156/72 (Patient Position: Lying)   Pulse 81   Temp 98.7 °F (37.1 °C) (Oral)   Resp 20   Ht 5' 7" (1.702 m)   Wt 93.6 kg (206 lb 5.6 oz)   SpO2 97%   BMI 32.32 kg/m²   Temp:  [97.9 °F (36.6 °C)-99 °F (37.2 °C)] 98.7 °F (37.1 °C)  Pulse:  [81-98] 81  Resp:  [18-20] 20  SpO2:  [93 %-99 %] 97 %  BP: (131-172)/(60-83) 156/72      General:   Eyes: Anicteric sclera. Moist conjunctiva. Vision grossly intact.  HENMT: Normocephalic.  Moist mucous membranes. No obvious ulcerations.   Neck: Trachea midline.   Cardiovascular: Heart regular rate and rhythm. S1, S2, S3 2/6 systolic ejection murmur left sternal border.  Peripheral pulses palpable. No peripheral edema.   Respiratory: Lungs clear to auscultation bilaterally. No increased work of breathing.  Gastrointestinal: Bowel sounds active in all 4 quadrants. Soft, nontender, nondistended.   Genitourinary: Urine clear yellow  Musculoskeletal: Moves all extremities with equal strength.   Skin: Color normal for ethnicity. Skin warm and dry with good turgor. Capillary refill < 3 seconds.   Neurologic: Oriented x 3.  Speech fluent, follows commands.  Psychiatric:  Awake and alert, normal affect mood good.      LABS    CBC  Recent Labs   Lab " 01/27/22  0745 01/28/22 0717 01/29/22 0625   WBC 3.04* 2.46* 2.47*   RBC 3.76* 3.84* 4.08*   HGB 9.8* 9.9* 10.4*   HCT 29.4* 30.0* 32.4*   MCV 78* 78* 79*   MCH 26.1* 25.8* 25.5*   MCHC 33.3 33.0 32.1   RDW 14.2 14.1 14.4   MPV 10.0 9.7 10.1    233 234       Chemistry  Recent Labs   Lab 01/25/22  0809 01/25/22  0809 01/26/22  0733 01/26/22  0733 01/27/22  0745 01/27/22 0745 01/28/22 0717 01/29/22 0625 01/30/22  0613      < > 140  140   < > 138   < > 138 139 138   K 3.2*   < > 3.7  3.7   < > 3.6   < > 3.6 3.8 3.8      < > 105  105   < > 104   < > 104 104 106   CO2 22*   < > 22*  22*   < > 19*   < > 21* 21* 19*   BUN 14   < > 20  20   < > 31*   < > 34* 31* 27*   CREATININE 1.9*   < > 2.6*  2.6*   < > 3.2*   < > 2.8* 2.5* 2.4*   GLU 69*   < > 183*  183*   < > 147*   < > 171* 168* 162*   PROT 5.8*  --  5.7*  --  5.6*  --   --   --   --    CALCIUM 7.8*   < > 7.9*  7.9*   < > 7.8*   < > 7.7* 7.8* 7.6*   BILITOT 0.6  --  0.5  --  0.4  --   --   --   --    AST 9*  --  8*  --  11  --   --   --   --    ALT 11  --  9*  --  10  --   --   --   --     < > = values in this interval not displayed.       ABG  No results for input(s): PHART, EOF2INI, PO2ART, MAB3VMB, P3YWKTHG, BEART, P7OHIKUH in the last 168 hours.      MICROBIOLOGY  Reviewed    IMAGING & OTHER STUDIES  Reviewed      Intake/Output last 3 shifts:  I/O last 3 completed shifts:  In: 1010 [P.O.:960; IV Piggyback:50]  Out: 1600 [Urine:1600]    Scheduled meds:   amLODIPine  10 mg Oral Daily    atorvastatin  20 mg Oral QHS    cefTRIAXone (ROCEPHIN) IVPB  1 g Intravenous Q24H    cloNIDine  0.2 mg Oral TID    clopidogreL  75 mg Oral Daily    famotidine  20 mg Oral Daily    heparin (porcine)  5,000 Units Subcutaneous Q8H    hydrALAZINE  25 mg Oral Q8H    sodium bicarbonate  650 mg Oral BID       PRN Meds:  sodium chloride 0.9%, acetaminophen, albuterol-ipratropium, dextrose 50%, dextrose 50%, dextrose 50%, dextrose 50%, glucagon (human  recombinant), glucagon (human recombinant), glucose, glucose, hydrALAZINE, HYDROcodone-acetaminophen, insulin aspart U-100, magnesium oxide, magnesium oxide, naloxone, ondansetron, polyethylene glycol, potassium, sodium phosphates, potassium, sodium phosphates, potassium, sodium phosphates, promethazine, sodium chloride 0.9%, trazodone    Continuous Infusions:   DOBUTamine 2 mcg/kg/min (01/29/22 0325)       Dietary Orders:  [unfilled]     Admit weight: Weight: 100.7 kg (222 lb)   Today weight: Weight: 93.6 kg (206 lb 5.6 oz)      Length of stay in days: 6      ASSESSMENT    Active Hospital Problems    Diagnosis  POA    *Essential hypertension [I10]  Yes    Acute cystitis without hematuria [N30.00]  Yes    CHF (congestive heart failure), NYHA class I, acute on chronic, systolic [I50.23]  Yes    Hypertensive urgency [I16.0]  Yes    Stage 3b chronic kidney disease [N18.32]  Yes      Resolved Hospital Problems   No resolved problems to display.          PLAN      Hypertensive urgency  156/72 1/30  Status post 218/137  Transient nitro drip  130/80 on home medications  History noncompliance        Chronic  HFrEF   No shortness of breath   1/30 inc   EKG DECREASED ST INFERIOR LATERAL  Dobutamine 2 milligram/kilogram per minute  Heparin 5000 subQ q.8 hours  States abnormal stress 1 year ago  Catheterization not done     History of CVA  No weakness  Describesd as weakness  2016  Plavix  Venous no DVT   CK 67 1/25        Diabetes  Status post decreased glucose  Treated medicine     Hypokalemia  K 3.8 1/29     Hypo magnesium  Magnesium 1.4 1/26     Acute Renal failure  Creatinine 2.4    1/30 improved  Renal ultrasound no hydronephrosis  Monitor renal function  Off Lasix  Off lisinopril     Hypomagnesemia  Magnesium 1.8 1/27  Monitor magnesium     Anemia  Hemoglobin 10.4  1/29 improved  Pepcid 20 mg p.o. daily  Hemoccult stool     Plan   BMP in a.m..  Lasix 20 mg p.o. x1 today  Increase hydralazine 50 mg  b.i.d.  Continue dobutamine closely monitor renal function                         Electronically signed by: Rickey Tong, 1/30/2022 10:04 AM

## 2022-01-30 NOTE — PROGRESS NOTES
[facility] Medicine  Progress Note    Patient Name: Marshall Flor  MRN: [id number]  Patient Class: IP- Inpatient   Admission Date: 1/24/2022  Length of Stay: 6 days  Attending Physician: Brea Daniels MD  Primary Care Provider: Dorie Quan MD        Subjective:     Principal Problem:Essential hypertension        HPI:  [age] year old diabetic hypertensive male with a history of congestive heart failure and renal disease was found unconscious on the floor at home.  EMS checked his blood sugar and was 31. He was given an amp of D50 and his blood sugar came up to 68. His blood sugar was recheck again and range from 75 to 77. He developed acute pulmonary edema in emergency room was given Lasix.  He became hypertensive and had to be placed on a nitroglycerin drip for his blood pressure.  He continued to be short of breath was placed on BiPAP.  He denies chest pain, nausea, vomiting , headaches, blurred or double vision.      Overview/Hospital Course:  No notes on file    Interval History: no acute events overnight    Review of Systems   Constitutional: Negative for fatigue.   HENT: Negative.    Eyes: Negative.    Respiratory: Negative for shortness of breath.    Cardiovascular: Positive for leg swelling.   Gastrointestinal: Negative.    Endocrine: Negative.    Genitourinary: Negative.    Musculoskeletal: Negative.    Neurological: Negative.    Hematological: Negative.    Psychiatric/Behavioral: Positive for confusion (chronic).     Objective:     Vital Signs (Most Recent):  Temp: 98.7 °F (37.1 °C) (01/30/22 0714)  Pulse: 81 (01/30/22 0714)  Resp: 20 (01/30/22 0714)  BP: (!) 156/72 (01/30/22 0714)  SpO2: 97 % (01/30/22 0816) Vital Signs (24h Range):  Temp:  [97.9 °F (36.6 °C)-99 °F (37.2 °C)] 98.7 °F (37.1 °C)  Pulse:  [81-98] 81  Resp:  [18-20] 20  SpO2:  [93 %-99 %] 97 %  BP: (131-172)/(60-83) 156/72     Weight: 93.6 kg (206 lb 5.6 oz)  Body mass index is 32.32 kg/m².    Intake/Output  Summary (Last 24 hours) at 1/30/2022 1149  Last data filed at 1/30/2022 0500  Gross per 24 hour   Intake 770 ml   Output 1300 ml   Net -530 ml      Physical Exam  Constitutional:       General: He is not in acute distress.     Appearance: He is not toxic-appearing or diaphoretic.   HENT:      Head: Normocephalic and atraumatic.      Right Ear: External ear normal.      Left Ear: External ear normal.      Nose: Nose normal.      Mouth/Throat:      Mouth: Mucous membranes are moist.   Eyes:      Conjunctiva/sclera: Conjunctivae normal.   Cardiovascular:      Rate and Rhythm: Normal rate and regular rhythm.      Pulses: Normal pulses.      Heart sounds: Murmur heard.       Pulmonary:      Effort: Pulmonary effort is normal. No respiratory distress.      Breath sounds: Rales (basilar present) present.      Comments: Minimally changed exam  Abdominal:      General: Abdomen is flat.      Palpations: Abdomen is soft.   Musculoskeletal:      Cervical back: Normal range of motion and neck supple.      Right lower leg: Edema present.      Left lower leg: Edema present.      Comments: 3+ pitting edema b/l LE minimally changed   Neurological:      Mental Status: He is alert. Mental status is at baseline.   Psychiatric:         Behavior: Behavior normal.         Significant Labs:   All pertinent labs within the past 24 hours have been reviewed.  CBC:   Recent Labs   Lab 01/29/22  0625   WBC 2.47*   HGB 10.4*   HCT 32.4*        CMP:   Recent Labs   Lab 01/29/22  0625 01/30/22  0613    138   K 3.8 3.8    106   CO2 21* 19*   * 162*   BUN 31* 27*   CREATININE 2.5* 2.4*   CALCIUM 7.8* 7.6*   ANIONGAP 14 13   EGFRNONAA 26.2* 27.5*       Significant Imaging: I have reviewed all pertinent imaging results/findings within the past 24 hours.   US Kidney   Final Result      No hydronephrosis.  Elevated resistive indices may indicate medical renal disease.      Cyst at the upper pole of the left kidney, which may be  slightly complex.  The patient is unable to have a contrast enhanced CT or MRI for further evaluation, due to abnormal renal function studies; therefore, follow-up ultrasound is recommended in 6 months to document stability.         Electronically signed by: Nicole Lobrano   Date:    01/26/2022   Time:    16:48      X-Ray Chest AP Portable   Final Result   Abnormal      Findings consistent with congestive heart failure.      Close interval follow-up is recommended.      This report was flagged in Epic as abnormal.         Electronically signed by: Darnell Ascencio   Date:    01/24/2022   Time:    15:12              Assessment/Plan:      * Essential hypertension  As above      Acute cystitis without hematuria  Urine cx with citrobacter pansensitive  Continue on IV Rocephin 1g q24h        CHF (congestive heart failure), NYHA class I, acute on chronic, systolic  Patient is identified as having Systolic (HFrEF) heart failure that is Acute on chronic. CHF is currently controlled. Latest ECHO performed and demonstrates- Results for orders placed during the hospital encounter of 01/02/22    Echo    Interpretation Summary  · The left ventricle is mildly enlarged with mild concentric hypertrophy and moderately decreased systolic function.  · The estimated ejection fraction is 39%.  · Mild right ventricular enlargement.  · Moderate left atrial enlargement.  · Moderate mitral regurgitation.  · Mild pulmonic regurgitation.  · Small pericardial effusion.  . Continue Furosemide and monitor clinical status closely. Monitor on telemetry. Patient is on CHF pathway.  Monitor strict Is&Os and daily weights.  Place on fluid restriction of 1.5 L. Continue to stress to patient importance of self efficacy and  on diet for CHF. Last BNP reviewed- and noted below   Recent Labs   Lab 01/30/22  0613   *   .  Continuing dobutamine drip  Renal function is improving.   Continue management by cardiologist      Hypertensive  urgency  Admitted to ICU, now transferred to floor  Started and now weaned off nitroglycerin drip  Norvasc  Apresoline  Lasix  Catapres  Troponin  Echocardiogram as above  Cardiology consulted  Off BiPAP  Transferred to the medical floor  Giving an additional dose of lasix today, monitor blood pressure response      Stage 3b chronic kidney disease  Acute on chronic kidney disease  Slight worsening in sCr initially likely due to CHF exacerbation/intravascular depletion   Improving with dobutamine  Renal ultrasound with medical renal disease      VTE Risk Mitigation (From admission, onward)         Ordered     heparin (porcine) injection 5,000 Units  Every 8 hours         01/24/22 1822     IP VTE HIGH RISK PATIENT  Once         01/24/22 1822     Place sequential compression device  Until discontinued         01/24/22 1822                Discharge Planning   STAR:      Code Status: Full Code   Is the patient medically ready for discharge?:     Reason for patient still in hospital (select all that apply): Treatment  Discharge Plan A: Home with family,Home Health   Discharge Delays: None known at this time              Brea Daniels MD  Department of Hospital Medicine   [facility]

## 2022-01-30 NOTE — ASSESSMENT & PLAN NOTE
Patient is identified as having Systolic (HFrEF) heart failure that is Acute on chronic. CHF is currently controlled. Latest ECHO performed and demonstrates- Results for orders placed during the hospital encounter of 01/02/22    Echo    Interpretation Summary  · The left ventricle is mildly enlarged with mild concentric hypertrophy and moderately decreased systolic function.  · The estimated ejection fraction is 39%.  · Mild right ventricular enlargement.  · Moderate left atrial enlargement.  · Moderate mitral regurgitation.  · Mild pulmonic regurgitation.  · Small pericardial effusion.  . Continue Furosemide and monitor clinical status closely. Monitor on telemetry. Patient is on CHF pathway.  Monitor strict Is&Os and daily weights.  Place on fluid restriction of 1.5 L. Continue to stress to patient importance of self efficacy and  on diet for CHF. Last BNP reviewed- and noted below   Recent Labs   Lab 01/30/22  0613   *   .  Continuing dobutamine drip  Renal function is improving.   Continue management by cardiologist

## 2022-01-30 NOTE — SUBJECTIVE & OBJECTIVE
Interval History: no acute events overnight    Review of Systems   Constitutional: Negative for fatigue.   HENT: Negative.    Eyes: Negative.    Respiratory: Negative for shortness of breath.    Cardiovascular: Positive for leg swelling.   Gastrointestinal: Negative.    Endocrine: Negative.    Genitourinary: Negative.    Musculoskeletal: Negative.    Neurological: Negative.    Hematological: Negative.    Psychiatric/Behavioral: Positive for confusion (chronic).     Objective:     Vital Signs (Most Recent):  Temp: 98.7 °F (37.1 °C) (01/30/22 0714)  Pulse: 81 (01/30/22 0714)  Resp: 20 (01/30/22 0714)  BP: (!) 156/72 (01/30/22 0714)  SpO2: 97 % (01/30/22 0816) Vital Signs (24h Range):  Temp:  [97.9 °F (36.6 °C)-99 °F (37.2 °C)] 98.7 °F (37.1 °C)  Pulse:  [81-98] 81  Resp:  [18-20] 20  SpO2:  [93 %-99 %] 97 %  BP: (131-172)/(60-83) 156/72     Weight: 93.6 kg (206 lb 5.6 oz)  Body mass index is 32.32 kg/m².    Intake/Output Summary (Last 24 hours) at 1/30/2022 1149  Last data filed at 1/30/2022 0500  Gross per 24 hour   Intake 770 ml   Output 1300 ml   Net -530 ml      Physical Exam  Constitutional:       General: He is not in acute distress.     Appearance: He is not toxic-appearing or diaphoretic.   HENT:      Head: Normocephalic and atraumatic.      Right Ear: External ear normal.      Left Ear: External ear normal.      Nose: Nose normal.      Mouth/Throat:      Mouth: Mucous membranes are moist.   Eyes:      Conjunctiva/sclera: Conjunctivae normal.   Cardiovascular:      Rate and Rhythm: Normal rate and regular rhythm.      Pulses: Normal pulses.      Heart sounds: Murmur heard.       Pulmonary:      Effort: Pulmonary effort is normal. No respiratory distress.      Breath sounds: Rales (basilar present) present.      Comments: Minimally changed exam  Abdominal:      General: Abdomen is flat.      Palpations: Abdomen is soft.   Musculoskeletal:      Cervical back: Normal range of motion and neck supple.      Right  lower leg: Edema present.      Left lower leg: Edema present.      Comments: 3+ pitting edema b/l LE minimally changed   Neurological:      Mental Status: He is alert. Mental status is at baseline.   Psychiatric:         Behavior: Behavior normal.         Significant Labs:   All pertinent labs within the past 24 hours have been reviewed.  CBC:   Recent Labs   Lab 01/29/22  0625   WBC 2.47*   HGB 10.4*   HCT 32.4*        CMP:   Recent Labs   Lab 01/29/22  0625 01/30/22  0613    138   K 3.8 3.8    106   CO2 21* 19*   * 162*   BUN 31* 27*   CREATININE 2.5* 2.4*   CALCIUM 7.8* 7.6*   ANIONGAP 14 13   EGFRNONAA 26.2* 27.5*       Significant Imaging: I have reviewed all pertinent imaging results/findings within the past 24 hours.   US Kidney   Final Result      No hydronephrosis.  Elevated resistive indices may indicate medical renal disease.      Cyst at the upper pole of the left kidney, which may be slightly complex.  The patient is unable to have a contrast enhanced CT or MRI for further evaluation, due to abnormal renal function studies; therefore, follow-up ultrasound is recommended in 6 months to document stability.         Electronically signed by: Chari Weaver   Date:    01/26/2022   Time:    16:48      X-Ray Chest AP Portable   Final Result   Abnormal      Findings consistent with congestive heart failure.      Close interval follow-up is recommended.      This report was flagged in Epic as abnormal.         Electronically signed by: Darnell Ascencio   Date:    01/24/2022   Time:    15:12

## 2022-01-30 NOTE — PLAN OF CARE
Problem: Adult Inpatient Plan of Care  Goal: Plan of Care Review  Outcome: Ongoing, Progressing  Goal: Patient-Specific Goal (Individualized)  Outcome: Ongoing, Progressing  Goal: Absence of Hospital-Acquired Illness or Injury  Outcome: Ongoing, Progressing  Goal: Optimal Comfort and Wellbeing  Outcome: Ongoing, Progressing  Goal: Readiness for Transition of Care  Outcome: Ongoing, Progressing     Problem: Diabetes Comorbidity  Goal: Blood Glucose Level Within Targeted Range  Outcome: Ongoing, Progressing     Problem: Skin Injury Risk Increased  Goal: Skin Health and Integrity  Outcome: Ongoing, Progressing     Problem: Infection  Goal: Absence of Infection Signs and Symptoms  Outcome: Ongoing, Progressing     Problem: Hypertension Acute  Goal: Blood Pressure Within Desired Range  Outcome: Ongoing, Progressing     Problem: Adjustment to Illness (Heart Failure)  Goal: Optimal Coping  Outcome: Ongoing, Progressing     Problem: Cardiac Output Decreased (Heart Failure)  Goal: Optimal Cardiac Output  Outcome: Ongoing, Progressing     Problem: Dysrhythmia (Heart Failure)  Goal: Stable Heart Rate and Rhythm  Outcome: Ongoing, Progressing     Problem: Fluid Imbalance (Heart Failure)  Goal: Fluid Balance  Outcome: Ongoing, Progressing     Problem: Functional Ability Impaired (Heart Failure)  Goal: Optimal Functional Ability  Outcome: Ongoing, Progressing     Problem: Oral Intake Inadequate (Heart Failure)  Goal: Optimal Nutrition Intake  Outcome: Ongoing, Progressing     Problem: Respiratory Compromise (Heart Failure)  Goal: Effective Oxygenation and Ventilation  Outcome: Ongoing, Progressing     Problem: Sleep Disordered Breathing (Heart Failure)  Goal: Effective Breathing Pattern During Sleep  Outcome: Ongoing, Progressing     Problem: Adjustment to Illness (Chronic Kidney Disease)  Goal: Optimal Coping with Chronic Illness  Outcome: Ongoing, Progressing     Problem: Electrolyte Imbalance (Chronic Kidney Disease)  Goal:  Electrolyte Balance  Outcome: Ongoing, Progressing     Problem: Fluid Volume Excess (Chronic Kidney Disease)  Goal: Fluid Balance  Outcome: Ongoing, Progressing     Problem: Functional Decline (Chronic Kidney Disease)  Goal: Optimal Functional Ability  Outcome: Ongoing, Progressing     Problem: Hematologic Alteration (Chronic Kidney Disease)  Goal: Absence of Anemia Signs and Symptoms  Outcome: Ongoing, Progressing     Problem: Oral Intake Inadequate (Chronic Kidney Disease)  Goal: Optimal Oral Intake  Outcome: Ongoing, Progressing     Problem: Pain (Chronic Kidney Disease)  Goal: Acceptable Pain Control  Outcome: Ongoing, Progressing     Problem: Renal Function Impairment (Chronic Kidney Disease)  Goal: Minimize Renal Failure Effects  Outcome: Ongoing, Progressing     Problem: Activity and Energy Impairment (Anxiety Signs/Symptoms)  Goal: Optimized Energy Level (Anxiety Signs/Symptoms)  Outcome: Ongoing, Progressing     Problem: Cognitive Impairment (Anxiety Signs/Symptoms)  Goal: Optimized Cognitive Function (Anxiety Signs/Symptoms)  Outcome: Ongoing, Progressing     Problem: Mood Impairment (Anxiety Signs/Symptoms)  Goal: Improved Mood Symptoms (Anxiety Signs/Symptoms)  Outcome: Ongoing, Progressing     Problem: Sleep Impairment (Anxiety Signs/Symptoms)  Goal: Improved Sleep (Anxiety Signs/Symptoms)  Outcome: Ongoing, Progressing     Problem: Social, Occupational or Functional Impairment (Anxiety Signs/Symptoms)  Goal: Enhanced Social, Occupational or Functional Skills (Anxiety Signs/Symptoms)  Outcome: Ongoing, Progressing     Problem: Somatic Disturbance (Anxiety Signs/Symptoms)  Goal: Improved Somatic Symptoms (Anxiety Signs/Symptoms)  Outcome: Ongoing, Progressing     Problem: Fall Injury Risk  Goal: Absence of Fall and Fall-Related Injury  Outcome: Ongoing, Progressing

## 2022-01-31 LAB
ANION GAP SERPL CALC-SCNC: 15 MMOL/L (ref 8–16)
BACTERIA BLD CULT: NORMAL
BACTERIA BLD CULT: NORMAL
BASOPHILS # BLD AUTO: 0.01 K/UL (ref 0–0.2)
BASOPHILS NFR BLD: 0.5 % (ref 0–1.9)
BUN SERPL-MCNC: 27 MG/DL (ref 8–23)
CALCIUM SERPL-MCNC: 8 MG/DL (ref 8.7–10.5)
CHLORIDE SERPL-SCNC: 106 MMOL/L (ref 95–110)
CO2 SERPL-SCNC: 17 MMOL/L (ref 23–29)
CREAT SERPL-MCNC: 2.3 MG/DL (ref 0.5–1.4)
DIFFERENTIAL METHOD: ABNORMAL
EOSINOPHIL # BLD AUTO: 0 K/UL (ref 0–0.5)
EOSINOPHIL NFR BLD: 1.9 % (ref 0–8)
ERYTHROCYTE [DISTWIDTH] IN BLOOD BY AUTOMATED COUNT: 14.3 % (ref 11.5–14.5)
EST. GFR  (AFRICAN AMERICAN): 33.4 ML/MIN/1.73 M^2
EST. GFR  (NON AFRICAN AMERICAN): 28.9 ML/MIN/1.73 M^2
GLUCOSE SERPL-MCNC: 159 MG/DL (ref 70–110)
HCT VFR BLD AUTO: 33.9 % (ref 40–54)
HGB BLD-MCNC: 11.4 G/DL (ref 14–18)
IMM GRANULOCYTES # BLD AUTO: 0 K/UL (ref 0–0.04)
IMM GRANULOCYTES NFR BLD AUTO: 0 % (ref 0–0.5)
LYMPHOCYTES # BLD AUTO: 0.8 K/UL (ref 1–4.8)
LYMPHOCYTES NFR BLD: 37.2 % (ref 18–48)
MCH RBC QN AUTO: 25.9 PG (ref 27–31)
MCHC RBC AUTO-ENTMCNC: 33.6 G/DL (ref 32–36)
MCV RBC AUTO: 77 FL (ref 82–98)
MONOCYTES # BLD AUTO: 0.2 K/UL (ref 0.3–1)
MONOCYTES NFR BLD: 7.7 % (ref 4–15)
NEUTROPHILS # BLD AUTO: 1.1 K/UL (ref 1.8–7.7)
NEUTROPHILS NFR BLD: 52.7 % (ref 38–73)
NRBC BLD-RTO: 0 /100 WBC
PLATELET # BLD AUTO: 204 K/UL (ref 150–450)
PMV BLD AUTO: 10.5 FL (ref 9.2–12.9)
POCT GLUCOSE: 160 MG/DL (ref 70–110)
POCT GLUCOSE: 163 MG/DL (ref 70–110)
POCT GLUCOSE: 175 MG/DL (ref 70–110)
POCT GLUCOSE: 206 MG/DL (ref 70–110)
POTASSIUM SERPL-SCNC: 3.9 MMOL/L (ref 3.5–5.1)
RBC # BLD AUTO: 4.41 M/UL (ref 4.6–6.2)
SODIUM SERPL-SCNC: 138 MMOL/L (ref 136–145)
WBC # BLD AUTO: 2.07 K/UL (ref 3.9–12.7)

## 2022-01-31 PROCEDURE — 25000003 PHARM REV CODE 250: Performed by: INTERNAL MEDICINE

## 2022-01-31 PROCEDURE — 85025 COMPLETE CBC W/AUTO DIFF WBC: CPT | Performed by: FAMILY MEDICINE

## 2022-01-31 PROCEDURE — 99233 SBSQ HOSP IP/OBS HIGH 50: CPT | Mod: ,,, | Performed by: FAMILY MEDICINE

## 2022-01-31 PROCEDURE — 80048 BASIC METABOLIC PNL TOTAL CA: CPT | Performed by: INTERNAL MEDICINE

## 2022-01-31 PROCEDURE — 21400001 HC TELEMETRY ROOM

## 2022-01-31 PROCEDURE — 63600175 PHARM REV CODE 636 W HCPCS: Performed by: HOSPITALIST

## 2022-01-31 PROCEDURE — 36415 COLL VENOUS BLD VENIPUNCTURE: CPT | Performed by: INTERNAL MEDICINE

## 2022-01-31 PROCEDURE — 63600175 PHARM REV CODE 636 W HCPCS: Performed by: FAMILY MEDICINE

## 2022-01-31 PROCEDURE — 99233 PR SUBSEQUENT HOSPITAL CARE,LEVL III: ICD-10-PCS | Mod: ,,, | Performed by: FAMILY MEDICINE

## 2022-01-31 PROCEDURE — 25000003 PHARM REV CODE 250: Performed by: HOSPITALIST

## 2022-01-31 RX ADMIN — CLONIDINE HYDROCHLORIDE 0.2 MG: 0.1 TABLET ORAL at 08:01

## 2022-01-31 RX ADMIN — HEPARIN SODIUM 5000 UNITS: 5000 INJECTION, SOLUTION INTRAVENOUS; SUBCUTANEOUS at 02:01

## 2022-01-31 RX ADMIN — ATORVASTATIN CALCIUM 20 MG: 10 TABLET, FILM COATED ORAL at 09:01

## 2022-01-31 RX ADMIN — SODIUM BICARBONATE 650 MG TABLET 650 MG: at 09:01

## 2022-01-31 RX ADMIN — HYDRALAZINE HYDROCHLORIDE 50 MG: 25 TABLET, FILM COATED ORAL at 09:01

## 2022-01-31 RX ADMIN — MUPIROCIN: 20 OINTMENT TOPICAL at 09:01

## 2022-01-31 RX ADMIN — CEFTRIAXONE 1 G: 1 INJECTION, SOLUTION INTRAVENOUS at 11:01

## 2022-01-31 RX ADMIN — HEPARIN SODIUM 5000 UNITS: 5000 INJECTION, SOLUTION INTRAVENOUS; SUBCUTANEOUS at 05:01

## 2022-01-31 RX ADMIN — FAMOTIDINE 20 MG: 20 TABLET ORAL at 08:01

## 2022-01-31 RX ADMIN — CLONIDINE HYDROCHLORIDE 0.2 MG: 0.1 TABLET ORAL at 02:01

## 2022-01-31 RX ADMIN — HEPARIN SODIUM 5000 UNITS: 5000 INJECTION, SOLUTION INTRAVENOUS; SUBCUTANEOUS at 09:01

## 2022-01-31 RX ADMIN — CLONIDINE HYDROCHLORIDE 0.2 MG: 0.1 TABLET ORAL at 09:01

## 2022-01-31 RX ADMIN — HYDRALAZINE HYDROCHLORIDE 50 MG: 25 TABLET, FILM COATED ORAL at 08:01

## 2022-01-31 RX ADMIN — SODIUM BICARBONATE 650 MG TABLET 650 MG: at 08:01

## 2022-01-31 RX ADMIN — CLOPIDOGREL 75 MG: 75 TABLET, FILM COATED ORAL at 08:01

## 2022-01-31 RX ADMIN — AMLODIPINE BESYLATE 10 MG: 5 TABLET ORAL at 08:01

## 2022-01-31 NOTE — SUBJECTIVE & OBJECTIVE
Interval History: no acute events    Review of Systems   Constitutional: Negative for fatigue.   HENT: Negative.    Eyes: Negative.    Respiratory: Negative for shortness of breath.    Cardiovascular: Positive for leg swelling.   Gastrointestinal: Negative.    Endocrine: Negative.    Genitourinary: Negative.    Musculoskeletal: Negative.    Neurological: Negative.    Hematological: Negative.    Psychiatric/Behavioral: Positive for confusion (chronic).     Objective:     Vital Signs (Most Recent):  Temp: 96.6 °F (35.9 °C) (01/31/22 1010)  Pulse: 75 (01/31/22 1010)  Resp: 18 (01/31/22 1010)  BP: (!) 146/68 (01/31/22 1010)  SpO2: 97 % (01/31/22 1010) Vital Signs (24h Range):  Temp:  [96.3 °F (35.7 °C)-99.1 °F (37.3 °C)] 96.6 °F (35.9 °C)  Pulse:  [74-84] 75  Resp:  [16-22] 18  SpO2:  [93 %-97 %] 97 %  BP: (133-158)/(67-74) 146/68     Weight: 93.6 kg (206 lb 5.6 oz)  Body mass index is 32.32 kg/m².    Intake/Output Summary (Last 24 hours) at 1/31/2022 1518  Last data filed at 1/31/2022 1144  Gross per 24 hour   Intake 1057.99 ml   Output 950 ml   Net 107.99 ml      Physical Exam  Constitutional:       General: He is not in acute distress.     Appearance: He is not toxic-appearing or diaphoretic.   HENT:      Head: Normocephalic and atraumatic.      Right Ear: External ear normal.      Left Ear: External ear normal.      Nose: Nose normal.      Mouth/Throat:      Mouth: Mucous membranes are moist.   Eyes:      Conjunctiva/sclera: Conjunctivae normal.   Cardiovascular:      Rate and Rhythm: Normal rate and regular rhythm.      Pulses: Normal pulses.      Heart sounds: No friction rub.   Pulmonary:      Effort: Pulmonary effort is normal. No respiratory distress.      Breath sounds: Rales (basilar present) present. No wheezing or rhonchi.   Abdominal:      General: Abdomen is flat.      Palpations: Abdomen is soft.   Musculoskeletal:      Cervical back: Normal range of motion and neck supple.      Right lower leg: Edema  present.      Left lower leg: Edema present.      Comments: 3+ pitting edema b/l LE minimally changed   Neurological:      Mental Status: He is alert. Mental status is at baseline.   Psychiatric:         Behavior: Behavior normal.         Significant Labs:   All pertinent labs within the past 24 hours have been reviewed.  CBC:   Recent Labs   Lab 01/31/22  0804   WBC 2.07*   HGB 11.4*   HCT 33.9*        CMP:   Recent Labs   Lab 01/30/22  0613 01/31/22  0804    138   K 3.8 3.9    106   CO2 19* 17*   * 159*   BUN 27* 27*   CREATININE 2.4* 2.3*   CALCIUM 7.6* 8.0*   ANIONGAP 13 15   EGFRNONAA 27.5* 28.9*       Significant Imaging: I have reviewed all pertinent imaging results/findings within the past 24 hours.   US Kidney   Final Result      No hydronephrosis.  Elevated resistive indices may indicate medical renal disease.      Cyst at the upper pole of the left kidney, which may be slightly complex.  The patient is unable to have a contrast enhanced CT or MRI for further evaluation, due to abnormal renal function studies; therefore, follow-up ultrasound is recommended in 6 months to document stability.         Electronically signed by: Chari Weaver   Date:    01/26/2022   Time:    16:48      X-Ray Chest AP Portable   Final Result   Abnormal      Findings consistent with congestive heart failure.      Close interval follow-up is recommended.      This report was flagged in Epic as abnormal.         Electronically signed by: Darnell Ascencio   Date:    01/24/2022   Time:    15:12

## 2022-01-31 NOTE — PROGRESS NOTES
[facility]  Progress Note    Patient Name: Marshall Flor  MRN: [id number]  Patient Class: IP- Inpatient   Admission Date: 1/24/2022  Length of Stay: 7 days  Attending Physician: Brea Daniels MD  Primary Care Provider: Dorie Quan MD        Subjective:     Principal Problem:Essential hypertension        HPI:  [age] year old diabetic hypertensive male with a history of congestive heart failure and renal disease was found unconscious on the floor at home.  EMS checked his blood sugar and was 31. He was given an amp of D50 and his blood sugar came up to 68. His blood sugar was recheck again and range from 75 to 77. He developed acute pulmonary edema in emergency room was given Lasix.  He became hypertensive and had to be placed on a nitroglycerin drip for his blood pressure.  He continued to be short of breath was placed on BiPAP.  He denies chest pain, nausea, vomiting , headaches, blurred or double vision.      Overview/Hospital Course:  No notes on file    Interval History: no acute events    Review of Systems   Constitutional: Negative for fatigue.   HENT: Negative.    Eyes: Negative.    Respiratory: Negative for shortness of breath.    Cardiovascular: Positive for leg swelling.   Gastrointestinal: Negative.    Endocrine: Negative.    Genitourinary: Negative.    Musculoskeletal: Negative.    Neurological: Negative.    Hematological: Negative.    Psychiatric/Behavioral: Positive for confusion (chronic).     Objective:     Vital Signs (Most Recent):  Temp: 96.6 °F (35.9 °C) (01/31/22 1010)  Pulse: 75 (01/31/22 1010)  Resp: 18 (01/31/22 1010)  BP: (!) 146/68 (01/31/22 1010)  SpO2: 97 % (01/31/22 1010) Vital Signs (24h Range):  Temp:  [96.3 °F (35.7 °C)-99.1 °F (37.3 °C)] 96.6 °F (35.9 °C)  Pulse:  [74-84] 75  Resp:  [16-22] 18  SpO2:  [93 %-97 %] 97 %  BP: (133-158)/(67-74) 146/68     Weight: 93.6 kg (206 lb 5.6 oz)  Body mass index is 32.32 kg/m².    Intake/Output Summary (Last  24 hours) at 1/31/2022 1518  Last data filed at 1/31/2022 1144  Gross per 24 hour   Intake 1057.99 ml   Output 950 ml   Net 107.99 ml      Physical Exam  Constitutional:       General: He is not in acute distress.     Appearance: He is not toxic-appearing or diaphoretic.   HENT:      Head: Normocephalic and atraumatic.      Right Ear: External ear normal.      Left Ear: External ear normal.      Nose: Nose normal.      Mouth/Throat:      Mouth: Mucous membranes are moist.   Eyes:      Conjunctiva/sclera: Conjunctivae normal.   Cardiovascular:      Rate and Rhythm: Normal rate and regular rhythm.      Pulses: Normal pulses.      Heart sounds: No friction rub.   Pulmonary:      Effort: Pulmonary effort is normal. No respiratory distress.      Breath sounds: Rales (basilar present) present. No wheezing or rhonchi.   Abdominal:      General: Abdomen is flat.      Palpations: Abdomen is soft.   Musculoskeletal:      Cervical back: Normal range of motion and neck supple.      Right lower leg: Edema present.      Left lower leg: Edema present.      Comments: 3+ pitting edema b/l LE minimally changed   Neurological:      Mental Status: He is alert. Mental status is at baseline.   Psychiatric:         Behavior: Behavior normal.         Significant Labs:   All pertinent labs within the past 24 hours have been reviewed.  CBC:   Recent Labs   Lab 01/31/22  0804   WBC 2.07*   HGB 11.4*   HCT 33.9*        CMP:   Recent Labs   Lab 01/30/22  0613 01/31/22  0804    138   K 3.8 3.9    106   CO2 19* 17*   * 159*   BUN 27* 27*   CREATININE 2.4* 2.3*   CALCIUM 7.6* 8.0*   ANIONGAP 13 15   EGFRNONAA 27.5* 28.9*       Significant Imaging: I have reviewed all pertinent imaging results/findings within the past 24 hours.   US Kidney   Final Result      No hydronephrosis.  Elevated resistive indices may indicate medical renal disease.      Cyst at the upper pole of the left kidney, which may be slightly complex.  The  patient is unable to have a contrast enhanced CT or MRI for further evaluation, due to abnormal renal function studies; therefore, follow-up ultrasound is recommended in 6 months to document stability.         Electronically signed by: Chari Weaver   Date:    01/26/2022   Time:    16:48      X-Ray Chest AP Portable   Final Result   Abnormal      Findings consistent with congestive heart failure.      Close interval follow-up is recommended.      This report was flagged in Epic as abnormal.         Electronically signed by: Darnell Ascencio   Date:    01/24/2022   Time:    15:12              Assessment/Plan:      * Essential hypertension  As above      Acute cystitis without hematuria  Urine cx with citrobacter pansensitive  Continue on IV Rocephin 1g q24h  Leukopenia noted         CHF (congestive heart failure), NYHA class I, acute on chronic, systolic  Patient is identified as having Systolic (HFrEF) heart failure that is Acute on chronic. CHF is currently controlled. Latest ECHO performed and demonstrates- Results for orders placed during the hospital encounter of 01/02/22    Echo    Interpretation Summary  · The left ventricle is mildly enlarged with mild concentric hypertrophy and moderately decreased systolic function.  · The estimated ejection fraction is 39%.  · Mild right ventricular enlargement.  · Moderate left atrial enlargement.  · Moderate mitral regurgitation.  · Mild pulmonic regurgitation.  · Small pericardial effusion.  . Continue Furosemide and monitor clinical status closely. Monitor on telemetry. Patient is on CHF pathway.  Monitor strict Is&Os and daily weights.  Place on fluid restriction of 1.5 L. Continue to stress to patient importance of self efficacy and  on diet for CHF. Last BNP reviewed- and noted below   Recent Labs   Lab 01/30/22  0613   *   .  Continuing dobutamine drip  Renal function is improving.   Continue management by cardiologist - weaning over next 1-2  days      Hypertensive urgency  Admitted to ICU, now transferred to floor  Started and now weaned off nitroglycerin drip  Norvasc  Apresoline  Lasix  Catapres  Troponin  Echocardiogram as above  Cardiology consulted  Off BiPAP  Transferred to the medical floor  Giving an additional dose of lasix as needed, monitor blood pressure response. Weaning dobutamine      Stage 3b chronic kidney disease  Acute on chronic kidney disease  Slight worsening in sCr initially likely due to CHF exacerbation/intravascular depletion   Improving with dobutamine  Renal ultrasound with medical renal disease      VTE Risk Mitigation (From admission, onward)         Ordered     heparin (porcine) injection 5,000 Units  Every 8 hours         01/24/22 1822     IP VTE HIGH RISK PATIENT  Once         01/24/22 1822     Place sequential compression device  Until discontinued         01/24/22 1822                Discharge Planning   STAR:      Code Status: Full Code   Is the patient medically ready for discharge?:     Reason for patient still in hospital (select all that apply): Treatment  Discharge Plan A: Home with family,Home Health   Discharge Delays: None known at this time              Brea Daniels MD  Department of Hospital Medicine   [facility]

## 2022-01-31 NOTE — ASSESSMENT & PLAN NOTE
Admitted to ICU, now transferred to floor  Started and now weaned off nitroglycerin drip  Norvasc  Apresoline  Lasix  Catapres  Troponin  Echocardiogram as above  Cardiology consulted  Off BiPAP  Transferred to the medical floor  Giving an additional dose of lasix as needed, monitor blood pressure response. Weaning dobutamine

## 2022-01-31 NOTE — PROGRESS NOTES
"CARDIOLOGY PROGRESS NOTE    Patient Name:  Marshall Flor    :  1955    Medical Record:  [id number]    DATE OF SERVICE  2022        SUBJECTIVE  The patient is being seen for follow-up   No chest pain or shortness of breath    REVIEW OF SYSTEMS  General: No chills, No fever, No fatigue  Eyes: No vision changes, No drainage from eyes  ENT: No nasal congestion, no sore throat  Respiratory: No shortness of breath, No cough  Cardiac: No chest pain, palpitations, dyspnea, dizziness, claudication, or syncopal episodes  GI: No abdominal pain, no nausea, no vomiting  : No dysuria  Musculoskeletal: No joint pain  Neuro: No weakness, dizziness, vision changes       OBJECTIVE          PHYSICAL EXAM  Vital Signs:  BP (!) 146/68 (BP Location: Right arm, Patient Position: Sitting)   Pulse 75   Temp 96.6 °F (35.9 °C) (Oral)   Resp 18   Ht 5' 7" (1.702 m)   Wt 93.6 kg (206 lb 5.6 oz)   SpO2 97%   BMI 32.32 kg/m²   Temp:  [96.3 °F (35.7 °C)-99.1 °F (37.3 °C)] 96.6 °F (35.9 °C)  Pulse:  [74-84] 75  Resp:  [16-22] 18  SpO2:  [93 %-97 %] 97 %  BP: (133-158)/(67-74) 146/68      General:   Eyes: Anicteric sclera. Moist conjunctiva. Vision grossly intact.  HENMT: Normocephalic.  Moist mucous membranes. No obvious ulcerations.   Neck: Trachea midline.   Cardiovascular: Heart regular rate and rhythm. S1, S2, S3 2/6 systolic ejection murmur sternal border.  Peripheral pulses palpable. No peripheral edema.   Respiratory: Lungs clear to auscultation bilaterally. No increased work of breathing.  Gastrointestinal: Bowel sounds active in all 4 quadrants. Soft, nontender, nondistended.   Genitourinary: Urine clear yellow  Musculoskeletal: Moves all extremities with equal strength.   Skin: Color normal for ethnicity. Skin warm and dry with good turgor. Capillary refill < 3 seconds.   Neurologic: Oriented x 3.  Speech fluent, follows commands.  Psychiatric:  Awake and alert, normal affect mood good.      LABS    CBC  Recent Labs "   Lab 01/28/22  0717 01/29/22  0625 01/31/22  0804   WBC 2.46* 2.47* 2.07*   RBC 3.84* 4.08* 4.41*   HGB 9.9* 10.4* 11.4*   HCT 30.0* 32.4* 33.9*   MCV 78* 79* 77*   MCH 25.8* 25.5* 25.9*   MCHC 33.0 32.1 33.6   RDW 14.1 14.4 14.3   MPV 9.7 10.1 10.5    234 204       Chemistry  Recent Labs   Lab 01/25/22  0809 01/25/22  0809 01/26/22  0733 01/26/22  0733 01/27/22  0745 01/28/22 0717 01/29/22  0625 01/30/22  0613 01/31/22  0804      < > 140  140   < > 138   < > 139 138 138   K 3.2*   < > 3.7  3.7   < > 3.6   < > 3.8 3.8 3.9      < > 105  105   < > 104   < > 104 106 106   CO2 22*   < > 22*  22*   < > 19*   < > 21* 19* 17*   BUN 14   < > 20  20   < > 31*   < > 31* 27* 27*   CREATININE 1.9*   < > 2.6*  2.6*   < > 3.2*   < > 2.5* 2.4* 2.3*   GLU 69*   < > 183*  183*   < > 147*   < > 168* 162* 159*   PROT 5.8*  --  5.7*  --  5.6*  --   --   --   --    CALCIUM 7.8*   < > 7.9*  7.9*   < > 7.8*   < > 7.8* 7.6* 8.0*   BILITOT 0.6  --  0.5  --  0.4  --   --   --   --    AST 9*  --  8*  --  11  --   --   --   --    ALT 11  --  9*  --  10  --   --   --   --     < > = values in this interval not displayed.       ABG  No results for input(s): PHART, QVZ1FIO, PO2ART, ZQB4AIM, Y3NKSEVO, BEART, W5AYCFRJ in the last 168 hours.      MICROBIOLOGY  Reviewed    IMAGING & OTHER STUDIES  Reviewed      Intake/Output last 3 shifts:  I/O last 3 completed shifts:  In: 1418 [P.O.:960; I.V.:409.5; IV Piggyback:48.5]  Out: 1750 [Urine:1750]    Scheduled meds:   amLODIPine  10 mg Oral Daily    atorvastatin  20 mg Oral QHS    cefTRIAXone (ROCEPHIN) IVPB  1 g Intravenous Q24H    cloNIDine  0.2 mg Oral TID    clopidogreL  75 mg Oral Daily    famotidine  20 mg Oral Daily    heparin (porcine)  5,000 Units Subcutaneous Q8H    hydrALAZINE  50 mg Oral Q12H    mupirocin   Nasal BID    sodium bicarbonate  650 mg Oral BID       PRN Meds:  sodium chloride 0.9%, acetaminophen, albuterol-ipratropium, dextrose 50%, dextrose  50%, dextrose 50%, dextrose 50%, glucagon (human recombinant), glucagon (human recombinant), glucose, glucose, hydrALAZINE, HYDROcodone-acetaminophen, insulin aspart U-100, magnesium oxide, magnesium oxide, naloxone, ondansetron, polyethylene glycol, potassium, sodium phosphates, potassium, sodium phosphates, potassium, sodium phosphates, promethazine, sodium chloride 0.9%, trazodone    Continuous Infusions:   DOBUTamine 2 mcg/kg/min (01/30/22 2243)       Dietary Orders:  [unfilled]     Admit weight: Weight: 100.7 kg (222 lb)   Today weight: Weight: 93.6 kg (206 lb 5.6 oz)      Length of stay in days: 7      ASSESSMENT    Active Hospital Problems    Diagnosis  POA    *Essential hypertension [I10]  Yes    Acute cystitis without hematuria [N30.00]  Yes    CHF (congestive heart failure), NYHA class I, acute on chronic, systolic [I50.23]  Yes    Hypertensive urgency [I16.0]  Yes    Stage 3b chronic kidney disease [N18.32]  Yes      Resolved Hospital Problems   No resolved problems to display.          PLAN      Hypertensive urgency  146/68 1/30 improved  Status post 218/137  Transient nitro drip  Hydralazine 50 mg b.i.d.  130/80 on home medications  History noncompliance        Chronic  HFrEF   No shortness of breath   1/30 inc   EKG DECREASED ST INFERIOR LATERAL  Dobutamine 2 milligram/kilogram per minute  Heparin 5000 subQ q.8 hours  States abnormal stress 1 year ago  Catheterization not done     History of CVA  No weakness  Describesd as weakness  2016  Plavix  Venous no DVT   CK 67 1/25        Diabetes  Status post decreased glucose  Treated medicine     Hypokalemia  K 3.8 1/29     Hypo magnesium  Magnesium 1.4 1/26     Acute Renal failure  Creatinine 2.3    1/31 improved  Renal ultrasound no hydronephrosis  Monitor renal function  Off Lasix  Off lisinopril     Hypomagnesemia  Magnesium 1.8 1/27  Monitor magnesium     Anemia  Hemoglobin 11.4  1/31 improved  Pepcid 20 mg p.o. daily  Hemoccult stool    Dec  WBC   WBC  2.07 1/31  Tx Medicine      Plan   BMP in a.m..  Decrease Dobutamine  1 mcg kg per minute  Continue dobutamine closely monitor renal function                               Electronically signed by: Rickey Tong, 1/31/2022 12:21 PM

## 2022-01-31 NOTE — ASSESSMENT & PLAN NOTE
Patient is identified as having Systolic (HFrEF) heart failure that is Acute on chronic. CHF is currently controlled. Latest ECHO performed and demonstrates- Results for orders placed during the hospital encounter of 01/02/22    Echo    Interpretation Summary  · The left ventricle is mildly enlarged with mild concentric hypertrophy and moderately decreased systolic function.  · The estimated ejection fraction is 39%.  · Mild right ventricular enlargement.  · Moderate left atrial enlargement.  · Moderate mitral regurgitation.  · Mild pulmonic regurgitation.  · Small pericardial effusion.  . Continue Furosemide and monitor clinical status closely. Monitor on telemetry. Patient is on CHF pathway.  Monitor strict Is&Os and daily weights.  Place on fluid restriction of 1.5 L. Continue to stress to patient importance of self efficacy and  on diet for CHF. Last BNP reviewed- and noted below   Recent Labs   Lab 01/30/22  0613   *   .  Continuing dobutamine drip  Renal function is improving.   Continue management by cardiologist - weaning over next 1-2 days

## 2022-01-31 NOTE — PLAN OF CARE
Problem: Adult Inpatient Plan of Care  Goal: Plan of Care Review  Outcome: Ongoing, Progressing  Goal: Patient-Specific Goal (Individualized)  Outcome: Ongoing, Progressing  Goal: Absence of Hospital-Acquired Illness or Injury  Outcome: Ongoing, Progressing  Goal: Optimal Comfort and Wellbeing  Outcome: Ongoing, Progressing  Goal: Readiness for Transition of Care  Outcome: Ongoing, Progressing     Problem: Diabetes Comorbidity  Goal: Blood Glucose Level Within Targeted Range  Outcome: Ongoing, Progressing     Problem: Skin Injury Risk Increased  Goal: Skin Health and Integrity  Outcome: Ongoing, Progressing     Problem: Infection  Goal: Absence of Infection Signs and Symptoms  Outcome: Ongoing, Progressing     Problem: Hypertension Acute  Goal: Blood Pressure Within Desired Range  Outcome: Ongoing, Progressing     Problem: Adjustment to Illness (Heart Failure)  Goal: Optimal Coping  Outcome: Ongoing, Progressing     Problem: Functional Ability Impaired (Heart Failure)  Goal: Optimal Functional Ability  Outcome: Ongoing, Progressing     Problem: Oral Intake Inadequate (Heart Failure)  Goal: Optimal Nutrition Intake  Outcome: Ongoing, Progressing     Problem: Respiratory Compromise (Heart Failure)  Goal: Effective Oxygenation and Ventilation  Outcome: Ongoing, Progressing     Problem: Sleep Disordered Breathing (Heart Failure)  Goal: Effective Breathing Pattern During Sleep  Outcome: Ongoing, Progressing     Problem: Adjustment to Illness (Chronic Kidney Disease)  Goal: Optimal Coping with Chronic Illness  Outcome: Ongoing, Progressing     Problem: Electrolyte Imbalance (Chronic Kidney Disease)  Goal: Electrolyte Balance  Outcome: Ongoing, Progressing     Problem: Fluid Volume Excess (Chronic Kidney Disease)  Goal: Fluid Balance  Outcome: Ongoing, Progressing     Problem: Functional Decline (Chronic Kidney Disease)  Goal: Optimal Functional Ability  Outcome: Ongoing, Progressing     Problem: Hematologic Alteration  (Chronic Kidney Disease)  Goal: Absence of Anemia Signs and Symptoms  Outcome: Ongoing, Progressing     Problem: Oral Intake Inadequate (Chronic Kidney Disease)  Goal: Optimal Oral Intake  Outcome: Ongoing, Progressing     Problem: Pain (Chronic Kidney Disease)  Goal: Acceptable Pain Control  Outcome: Ongoing, Progressing     Problem: Renal Function Impairment (Chronic Kidney Disease)  Goal: Minimize Renal Failure Effects  Outcome: Ongoing, Progressing     Problem: Activity and Energy Impairment (Anxiety Signs/Symptoms)  Goal: Optimized Energy Level (Anxiety Signs/Symptoms)  Outcome: Ongoing, Progressing     Problem: Cognitive Impairment (Anxiety Signs/Symptoms)  Goal: Optimized Cognitive Function (Anxiety Signs/Symptoms)  Outcome: Ongoing, Progressing     Problem: Mood Impairment (Anxiety Signs/Symptoms)  Goal: Improved Mood Symptoms (Anxiety Signs/Symptoms)  Outcome: Ongoing, Progressing     Problem: Sleep Impairment (Anxiety Signs/Symptoms)  Goal: Improved Sleep (Anxiety Signs/Symptoms)  Outcome: Ongoing, Progressing     Problem: Social, Occupational or Functional Impairment (Anxiety Signs/Symptoms)  Goal: Enhanced Social, Occupational or Functional Skills (Anxiety Signs/Symptoms)  Outcome: Ongoing, Progressing     Problem: Somatic Disturbance (Anxiety Signs/Symptoms)  Goal: Improved Somatic Symptoms (Anxiety Signs/Symptoms)  Outcome: Ongoing, Progressing     Problem: Fall Injury Risk  Goal: Absence of Fall and Fall-Related Injury  Outcome: Ongoing, Progressing

## 2022-01-31 NOTE — PLAN OF CARE
Problem: Adult Inpatient Plan of Care  Goal: Plan of Care Review  Outcome: Ongoing, Progressing  Goal: Patient-Specific Goal (Individualized)  Outcome: Ongoing, Progressing  Goal: Absence of Hospital-Acquired Illness or Injury  Outcome: Ongoing, Progressing  Goal: Optimal Comfort and Wellbeing  Outcome: Ongoing, Progressing  Goal: Readiness for Transition of Care  Outcome: Ongoing, Progressing     Problem: Diabetes Comorbidity  Goal: Blood Glucose Level Within Targeted Range  Outcome: Ongoing, Progressing     Problem: Skin Injury Risk Increased  Goal: Skin Health and Integrity  Outcome: Ongoing, Progressing

## 2022-02-01 PROBLEM — U07.1 COVID-19 VIRUS INFECTION: Status: ACTIVE | Noted: 2022-02-01

## 2022-02-01 LAB
ANION GAP SERPL CALC-SCNC: 13 MMOL/L (ref 8–16)
ANISOCYTOSIS BLD QL SMEAR: SLIGHT
BASOPHILS # BLD AUTO: ABNORMAL K/UL (ref 0–0.2)
BASOPHILS NFR BLD: 4 % (ref 0–1.9)
BUN SERPL-MCNC: 25 MG/DL (ref 8–23)
CALCIUM SERPL-MCNC: 7.9 MG/DL (ref 8.7–10.5)
CHLORIDE SERPL-SCNC: 105 MMOL/L (ref 95–110)
CO2 SERPL-SCNC: 19 MMOL/L (ref 23–29)
CREAT SERPL-MCNC: 2.2 MG/DL (ref 0.5–1.4)
DIFFERENTIAL METHOD: ABNORMAL
EOSINOPHIL # BLD AUTO: ABNORMAL K/UL (ref 0–0.5)
EOSINOPHIL NFR BLD: 0 % (ref 0–8)
ERYTHROCYTE [DISTWIDTH] IN BLOOD BY AUTOMATED COUNT: 14.4 % (ref 11.5–14.5)
EST. GFR  (AFRICAN AMERICAN): 35.3 ML/MIN/1.73 M^2
EST. GFR  (NON AFRICAN AMERICAN): 30.5 ML/MIN/1.73 M^2
GLUCOSE SERPL-MCNC: 153 MG/DL (ref 70–110)
HCT VFR BLD AUTO: 32.7 % (ref 40–54)
HGB BLD-MCNC: 10.9 G/DL (ref 14–18)
IMM GRANULOCYTES # BLD AUTO: ABNORMAL K/UL (ref 0–0.04)
IMM GRANULOCYTES NFR BLD AUTO: ABNORMAL % (ref 0–0.5)
LYMPHOCYTES # BLD AUTO: ABNORMAL K/UL (ref 1–4.8)
LYMPHOCYTES NFR BLD: 44 % (ref 18–48)
MCH RBC QN AUTO: 25.7 PG (ref 27–31)
MCHC RBC AUTO-ENTMCNC: 33.3 G/DL (ref 32–36)
MCV RBC AUTO: 77 FL (ref 82–98)
MONOCYTES # BLD AUTO: ABNORMAL K/UL (ref 0.3–1)
MONOCYTES NFR BLD: 0 % (ref 4–15)
NEUTROPHILS # BLD AUTO: ABNORMAL K/UL (ref 1.8–7.7)
NEUTROPHILS NFR BLD: 52 % (ref 38–73)
NRBC BLD-RTO: 0 /100 WBC
PLATELET # BLD AUTO: 178 K/UL (ref 150–450)
PMV BLD AUTO: 10 FL (ref 9.2–12.9)
POCT GLUCOSE: 171 MG/DL (ref 70–110)
POCT GLUCOSE: 185 MG/DL (ref 70–110)
POCT GLUCOSE: 185 MG/DL (ref 70–110)
POCT GLUCOSE: 187 MG/DL (ref 70–110)
POIKILOCYTOSIS BLD QL SMEAR: SLIGHT
POTASSIUM SERPL-SCNC: 3.8 MMOL/L (ref 3.5–5.1)
RBC # BLD AUTO: 4.24 M/UL (ref 4.6–6.2)
SARS-COV-2 RDRP RESP QL NAA+PROBE: POSITIVE
SODIUM SERPL-SCNC: 137 MMOL/L (ref 136–145)
WBC # BLD AUTO: 1.71 K/UL (ref 3.9–12.7)

## 2022-02-01 PROCEDURE — 99233 SBSQ HOSP IP/OBS HIGH 50: CPT | Mod: ,,, | Performed by: FAMILY MEDICINE

## 2022-02-01 PROCEDURE — 25000003 PHARM REV CODE 250: Performed by: FAMILY MEDICINE

## 2022-02-01 PROCEDURE — 25000003 PHARM REV CODE 250: Performed by: INTERNAL MEDICINE

## 2022-02-01 PROCEDURE — 27000207 HC ISOLATION

## 2022-02-01 PROCEDURE — 80048 BASIC METABOLIC PNL TOTAL CA: CPT | Performed by: INTERNAL MEDICINE

## 2022-02-01 PROCEDURE — 21400001 HC TELEMETRY ROOM

## 2022-02-01 PROCEDURE — 63600175 PHARM REV CODE 636 W HCPCS: Performed by: HOSPITALIST

## 2022-02-01 PROCEDURE — 85027 COMPLETE CBC AUTOMATED: CPT | Performed by: FAMILY MEDICINE

## 2022-02-01 PROCEDURE — 94760 N-INVAS EAR/PLS OXIMETRY 1: CPT

## 2022-02-01 PROCEDURE — 36415 COLL VENOUS BLD VENIPUNCTURE: CPT | Performed by: INTERNAL MEDICINE

## 2022-02-01 PROCEDURE — 25000003 PHARM REV CODE 250: Performed by: HOSPITALIST

## 2022-02-01 PROCEDURE — U0002 COVID-19 LAB TEST NON-CDC: HCPCS | Performed by: FAMILY MEDICINE

## 2022-02-01 PROCEDURE — 85007 BL SMEAR W/DIFF WBC COUNT: CPT | Performed by: FAMILY MEDICINE

## 2022-02-01 PROCEDURE — 99233 PR SUBSEQUENT HOSPITAL CARE,LEVL III: ICD-10-PCS | Mod: ,,, | Performed by: FAMILY MEDICINE

## 2022-02-01 RX ORDER — CIPROFLOXACIN 250 MG/1
500 TABLET, FILM COATED ORAL EVERY 12 HOURS
Status: DISCONTINUED | OUTPATIENT
Start: 2022-02-01 | End: 2022-02-02 | Stop reason: HOSPADM

## 2022-02-01 RX ORDER — CIPROFLOXACIN 250 MG/1
500 TABLET, FILM COATED ORAL EVERY 12 HOURS
Status: DISCONTINUED | OUTPATIENT
Start: 2022-02-01 | End: 2022-02-01

## 2022-02-01 RX ADMIN — CLOPIDOGREL 75 MG: 75 TABLET, FILM COATED ORAL at 09:02

## 2022-02-01 RX ADMIN — SODIUM BICARBONATE 650 MG TABLET 650 MG: at 09:02

## 2022-02-01 RX ADMIN — AMLODIPINE BESYLATE 10 MG: 5 TABLET ORAL at 09:02

## 2022-02-01 RX ADMIN — MUPIROCIN: 20 OINTMENT TOPICAL at 09:02

## 2022-02-01 RX ADMIN — HYDRALAZINE HYDROCHLORIDE 50 MG: 25 TABLET, FILM COATED ORAL at 09:02

## 2022-02-01 RX ADMIN — HEPARIN SODIUM 5000 UNITS: 5000 INJECTION, SOLUTION INTRAVENOUS; SUBCUTANEOUS at 03:02

## 2022-02-01 RX ADMIN — HEPARIN SODIUM 5000 UNITS: 5000 INJECTION, SOLUTION INTRAVENOUS; SUBCUTANEOUS at 05:02

## 2022-02-01 RX ADMIN — CLONIDINE HYDROCHLORIDE 0.2 MG: 0.1 TABLET ORAL at 09:02

## 2022-02-01 RX ADMIN — ATORVASTATIN CALCIUM 20 MG: 10 TABLET, FILM COATED ORAL at 09:02

## 2022-02-01 RX ADMIN — HEPARIN SODIUM 5000 UNITS: 5000 INJECTION, SOLUTION INTRAVENOUS; SUBCUTANEOUS at 09:02

## 2022-02-01 RX ADMIN — CIPROFLOXACIN HYDROCHLORIDE 500 MG: 250 TABLET, FILM COATED ORAL at 03:02

## 2022-02-01 RX ADMIN — CIPROFLOXACIN HYDROCHLORIDE 500 MG: 250 TABLET, FILM COATED ORAL at 09:02

## 2022-02-01 RX ADMIN — CLONIDINE HYDROCHLORIDE 0.2 MG: 0.1 TABLET ORAL at 03:02

## 2022-02-01 RX ADMIN — FAMOTIDINE 20 MG: 20 TABLET ORAL at 09:02

## 2022-02-01 NOTE — SUBJECTIVE & OBJECTIVE
Interval History: covid+    Review of Systems   Constitutional: Negative for fatigue.   HENT: Negative.    Eyes: Negative.    Respiratory: Negative for cough and shortness of breath.    Cardiovascular: Positive for leg swelling.   Gastrointestinal: Negative.    Endocrine: Negative.    Genitourinary: Negative.    Musculoskeletal: Negative.    Neurological: Negative.    Hematological: Negative.    Psychiatric/Behavioral: Positive for confusion (chronic).     Objective:     Vital Signs (Most Recent):  Temp: 98.2 °F (36.8 °C) (02/01/22 1500)  Pulse: 85 (02/01/22 1500)  Resp: 18 (02/01/22 1500)  BP: (!) 153/59 (02/01/22 1500)  SpO2: 98 % (02/01/22 1500) Vital Signs (24h Range):  Temp:  [96.1 °F (35.6 °C)-101.2 °F (38.4 °C)] 98.2 °F (36.8 °C)  Pulse:  [67-89] 85  Resp:  [18-19] 18  SpO2:  [96 %-99 %] 98 %  BP: (132-158)/(59-86) 153/59     Weight: 93.2 kg (205 lb 7.5 oz)  Body mass index is 32.18 kg/m².    Intake/Output Summary (Last 24 hours) at 2/1/2022 1647  Last data filed at 2/1/2022 1500  Gross per 24 hour   Intake 755 ml   Output 1000 ml   Net -245 ml      Physical Exam  Constitutional:       General: He is not in acute distress.     Appearance: He is not toxic-appearing or diaphoretic.   HENT:      Head: Normocephalic and atraumatic.      Right Ear: External ear normal.      Left Ear: External ear normal.      Nose: Nose normal.      Mouth/Throat:      Mouth: Mucous membranes are moist.   Eyes:      Conjunctiva/sclera: Conjunctivae normal.   Cardiovascular:      Rate and Rhythm: Normal rate and regular rhythm.      Pulses: Normal pulses.      Heart sounds: No friction rub.   Pulmonary:      Effort: Pulmonary effort is normal. No respiratory distress.      Breath sounds: Rales (bibasilar present ) present. No wheezing or rhonchi.   Abdominal:      General: Abdomen is flat.      Palpations: Abdomen is soft.   Musculoskeletal:      Cervical back: Normal range of motion and neck supple.      Right lower leg: Edema  present.      Left lower leg: Edema present.      Comments: 3+ pitting edema b/l LE minimally changed   Neurological:      Mental Status: He is alert. Mental status is at baseline.   Psychiatric:         Behavior: Behavior normal.         Significant Labs:   All pertinent labs within the past 24 hours have been reviewed.  CBC:   Recent Labs   Lab 01/31/22 0804 02/01/22  0725   WBC 2.07* 1.71*   HGB 11.4* 10.9*   HCT 33.9* 32.7*    178     CMP:   Recent Labs   Lab 01/31/22 0804 02/01/22  0725    137   K 3.9 3.8    105   CO2 17* 19*   * 153*   BUN 27* 25*   CREATININE 2.3* 2.2*   CALCIUM 8.0* 7.9*   ANIONGAP 15 13   EGFRNONAA 28.9* 30.5*       Significant Imaging: I have reviewed all pertinent imaging results/findings within the past 24 hours.   US Kidney   Final Result      No hydronephrosis.  Elevated resistive indices may indicate medical renal disease.      Cyst at the upper pole of the left kidney, which may be slightly complex.  The patient is unable to have a contrast enhanced CT or MRI for further evaluation, due to abnormal renal function studies; therefore, follow-up ultrasound is recommended in 6 months to document stability.         Electronically signed by: Chari Weaver   Date:    01/26/2022   Time:    16:48      X-Ray Chest AP Portable   Final Result   Abnormal      Findings consistent with congestive heart failure.      Close interval follow-up is recommended.      This report was flagged in Epic as abnormal.         Electronically signed by: Darnell Ascencio   Date:    01/24/2022   Time:    15:12

## 2022-02-01 NOTE — ASSESSMENT & PLAN NOTE
With worsening leukopenia, high suspicion raised for COVID 19 virus infection  Rapid test positive   Currently asymptomatic from a respiratory standpoint  Monitor for signs of covid pneumonia

## 2022-02-01 NOTE — PROGRESS NOTES
[facility] Medicine  Progress Note    Patient Name: Marshall Flor  MRN: [id number]  Patient Class: IP- Inpatient   Admission Date: 1/24/2022  Length of Stay: 8 days  Attending Physician: Brea Daniels MD  Primary Care Provider: Dorie Quan MD        Subjective:     Principal Problem:Essential hypertension        HPI:  [age] year old diabetic hypertensive male with a history of congestive heart failure and renal disease was found unconscious on the floor at home.  EMS checked his blood sugar and was 31. He was given an amp of D50 and his blood sugar came up to 68. His blood sugar was recheck again and range from 75 to 77. He developed acute pulmonary edema in emergency room was given Lasix.  He became hypertensive and had to be placed on a nitroglycerin drip for his blood pressure.  He continued to be short of breath was placed on BiPAP.  He denies chest pain, nausea, vomiting , headaches, blurred or double vision.      Overview/Hospital Course:  No notes on file    Interval History: covid+    Review of Systems   Constitutional: Negative for fatigue.   HENT: Negative.    Eyes: Negative.    Respiratory: Negative for cough and shortness of breath.    Cardiovascular: Positive for leg swelling.   Gastrointestinal: Negative.    Endocrine: Negative.    Genitourinary: Negative.    Musculoskeletal: Negative.    Neurological: Negative.    Hematological: Negative.    Psychiatric/Behavioral: Positive for confusion (chronic).     Objective:     Vital Signs (Most Recent):  Temp: 98.2 °F (36.8 °C) (02/01/22 1500)  Pulse: 85 (02/01/22 1500)  Resp: 18 (02/01/22 1500)  BP: (!) 153/59 (02/01/22 1500)  SpO2: 98 % (02/01/22 1500) Vital Signs (24h Range):  Temp:  [96.1 °F (35.6 °C)-101.2 °F (38.4 °C)] 98.2 °F (36.8 °C)  Pulse:  [67-89] 85  Resp:  [18-19] 18  SpO2:  [96 %-99 %] 98 %  BP: (132-158)/(59-86) 153/59     Weight: 93.2 kg (205 lb 7.5 oz)  Body mass index is 32.18 kg/m².    Intake/Output Summary  (Last 24 hours) at 2/1/2022 1647  Last data filed at 2/1/2022 1500  Gross per 24 hour   Intake 755 ml   Output 1000 ml   Net -245 ml      Physical Exam  Constitutional:       General: He is not in acute distress.     Appearance: He is not toxic-appearing or diaphoretic.   HENT:      Head: Normocephalic and atraumatic.      Right Ear: External ear normal.      Left Ear: External ear normal.      Nose: Nose normal.      Mouth/Throat:      Mouth: Mucous membranes are moist.   Eyes:      Conjunctiva/sclera: Conjunctivae normal.   Cardiovascular:      Rate and Rhythm: Normal rate and regular rhythm.      Pulses: Normal pulses.      Heart sounds: No friction rub.   Pulmonary:      Effort: Pulmonary effort is normal. No respiratory distress.      Breath sounds: Rales (bibasilar present ) present. No wheezing or rhonchi.   Abdominal:      General: Abdomen is flat.      Palpations: Abdomen is soft.   Musculoskeletal:      Cervical back: Normal range of motion and neck supple.      Right lower leg: Edema present.      Left lower leg: Edema present.      Comments: 3+ pitting edema b/l LE minimally changed   Neurological:      Mental Status: He is alert. Mental status is at baseline.   Psychiatric:         Behavior: Behavior normal.         Significant Labs:   All pertinent labs within the past 24 hours have been reviewed.  CBC:   Recent Labs   Lab 01/31/22  0804 02/01/22  0725   WBC 2.07* 1.71*   HGB 11.4* 10.9*   HCT 33.9* 32.7*    178     CMP:   Recent Labs   Lab 01/31/22  0804 02/01/22  0725    137   K 3.9 3.8    105   CO2 17* 19*   * 153*   BUN 27* 25*   CREATININE 2.3* 2.2*   CALCIUM 8.0* 7.9*   ANIONGAP 15 13   EGFRNONAA 28.9* 30.5*       Significant Imaging: I have reviewed all pertinent imaging results/findings within the past 24 hours.   US Kidney   Final Result      No hydronephrosis.  Elevated resistive indices may indicate medical renal disease.      Cyst at the upper pole of the left  kidney, which may be slightly complex.  The patient is unable to have a contrast enhanced CT or MRI for further evaluation, due to abnormal renal function studies; therefore, follow-up ultrasound is recommended in 6 months to document stability.         Electronically signed by: Chari Weaver   Date:    01/26/2022   Time:    16:48      X-Ray Chest AP Portable   Final Result   Abnormal      Findings consistent with congestive heart failure.      Close interval follow-up is recommended.      This report was flagged in Epic as abnormal.         Electronically signed by: Darnell Ascencio   Date:    01/24/2022   Time:    15:12              Assessment/Plan:      * Essential hypertension  As above      COVID-19 virus infection  With worsening leukopenia, high suspicion raised for COVID 19 virus infection  Rapid test positive   Currently asymptomatic from a respiratory standpoint  Monitor for signs of covid pneumonia      Acute cystitis without hematuria  Urine cx with citrobacter pansensitive  IV Rocephin 1g q24h, changed to Cipro in preparation for possible discharge in the next 24-48 hours however now with newly diagnosed covid 19 infection  Leukopenia noted         CHF (congestive heart failure), NYHA class I, acute on chronic, systolic  Patient is identified as having Systolic (HFrEF) heart failure that is Acute on chronic. CHF is currently controlled. Latest ECHO performed and demonstrates- Results for orders placed during the hospital encounter of 01/02/22    Echo    Interpretation Summary  · The left ventricle is mildly enlarged with mild concentric hypertrophy and moderately decreased systolic function.  · The estimated ejection fraction is 39%.  · Mild right ventricular enlargement.  · Moderate left atrial enlargement.  · Moderate mitral regurgitation.  · Mild pulmonic regurgitation.  · Small pericardial effusion.  . Continue Furosemide and monitor clinical status closely. Monitor on telemetry. Patient is  on CHF pathway.  Monitor strict Is&Os and daily weights.  Place on fluid restriction of 1.5 L. Continue to stress to patient importance of self efficacy and  on diet for CHF. Last BNP reviewed- and noted below   Recent Labs   Lab 01/30/22  0613   *   .  Continuing dobutamine drip  Renal function is improving.   Continue management by cardiologist - continuing to wean, possibly discontinue infusion over next 1-2 days      Hypertensive urgency  Admitted to ICU, now transferred to floor  Started and now weaned off nitroglycerin drip  Norvasc  Apresoline  Lasix  Catapres  Troponin  Echocardiogram as above  Cardiology consulted  Off BiPAP  Transferred to the medical floor  Giving an additional dose of lasix as needed, monitor blood pressure response. Weaning dobutamine      Stage 3b chronic kidney disease  Acute on chronic kidney disease  Slight worsening in sCr initially likely due to CHF exacerbation/intravascular depletion   Improving with dobutamine  Renal ultrasound with medical renal disease      VTE Risk Mitigation (From admission, onward)         Ordered     heparin (porcine) injection 5,000 Units  Every 8 hours         01/24/22 1822     IP VTE HIGH RISK PATIENT  Once         01/24/22 1822     Place sequential compression device  Until discontinued         01/24/22 1822                Discharge Planning   STAR:      Code Status: Full Code   Is the patient medically ready for discharge?:     Reason for patient still in hospital (select all that apply): Treatment  Discharge Plan A: Home with family,Home Health   Discharge Delays: None known at this time              Brea Daniels MD  Department of Hospital Medicine   [facility]

## 2022-02-01 NOTE — ASSESSMENT & PLAN NOTE
Patient is identified as having Systolic (HFrEF) heart failure that is Acute on chronic. CHF is currently controlled. Latest ECHO performed and demonstrates- Results for orders placed during the hospital encounter of 01/02/22    Echo    Interpretation Summary  · The left ventricle is mildly enlarged with mild concentric hypertrophy and moderately decreased systolic function.  · The estimated ejection fraction is 39%.  · Mild right ventricular enlargement.  · Moderate left atrial enlargement.  · Moderate mitral regurgitation.  · Mild pulmonic regurgitation.  · Small pericardial effusion.  . Continue Furosemide and monitor clinical status closely. Monitor on telemetry. Patient is on CHF pathway.  Monitor strict Is&Os and daily weights.  Place on fluid restriction of 1.5 L. Continue to stress to patient importance of self efficacy and  on diet for CHF. Last BNP reviewed- and noted below   Recent Labs   Lab 01/30/22  0613   *   .  Continuing dobutamine drip  Renal function is improving.   Continue management by cardiologist - continuing to wean, possibly discontinue infusion over next 1-2 days

## 2022-02-01 NOTE — ASSESSMENT & PLAN NOTE
Urine cx with citrobacter pansensitive  IV Rocephin 1g q24h, changed to Cipro in preparation for possible discharge in the next 24-48 hours however now with newly diagnosed covid 19 infection  Leukopenia noted

## 2022-02-01 NOTE — ASSESSMENT & PLAN NOTE
Admitted to ICU, now transferred to floor  Started and now weaned off nitroglycerin drip  Norvasc  Apresoline  Lasix  Catapres  Troponin  Echocardiogram as above  Cardiology consulted  Off BiPAP  Transferred to the medical floor  Giving an additional dose of lasix as needed, monitor blood pressure response. Weaning dobutamine     Implemented All Universal Safety Interventions:  [place] to call system. Call bell, personal items and telephone within reach. Instruct patient to call for assistance. Room bathroom lighting operational. Non-slip footwear when patient is off stretcher. Physically safe environment: no spills, clutter or unnecessary equipment. Stretcher in lowest position, wheels locked, appropriate side rails in place.

## 2022-02-02 VITALS
SYSTOLIC BLOOD PRESSURE: 132 MMHG | DIASTOLIC BLOOD PRESSURE: 66 MMHG | WEIGHT: 200.81 LBS | HEIGHT: 67 IN | HEART RATE: 75 BPM | OXYGEN SATURATION: 97 % | TEMPERATURE: 98 F | RESPIRATION RATE: 20 BRPM | BODY MASS INDEX: 31.52 KG/M2

## 2022-02-02 DIAGNOSIS — U07.1 COVID-19 VIRUS DETECTED: ICD-10-CM

## 2022-02-02 LAB
ANION GAP SERPL CALC-SCNC: 16 MMOL/L (ref 8–16)
BASOPHILS # BLD AUTO: 0.01 K/UL (ref 0–0.2)
BASOPHILS # BLD AUTO: 0.01 K/UL (ref 0–0.2)
BASOPHILS NFR BLD: 0.5 % (ref 0–1.9)
BASOPHILS NFR BLD: 0.5 % (ref 0–1.9)
BUN SERPL-MCNC: 24 MG/DL (ref 8–23)
CALCIUM SERPL-MCNC: 7.7 MG/DL (ref 8.7–10.5)
CHLORIDE SERPL-SCNC: 106 MMOL/L (ref 95–110)
CO2 SERPL-SCNC: 17 MMOL/L (ref 23–29)
CREAT SERPL-MCNC: 2.3 MG/DL (ref 0.5–1.4)
DIFFERENTIAL METHOD: ABNORMAL
DIFFERENTIAL METHOD: ABNORMAL
EOSINOPHIL # BLD AUTO: 0 K/UL (ref 0–0.5)
EOSINOPHIL # BLD AUTO: 0 K/UL (ref 0–0.5)
EOSINOPHIL NFR BLD: 1.4 % (ref 0–8)
EOSINOPHIL NFR BLD: 1.4 % (ref 0–8)
ERYTHROCYTE [DISTWIDTH] IN BLOOD BY AUTOMATED COUNT: 14.3 % (ref 11.5–14.5)
ERYTHROCYTE [DISTWIDTH] IN BLOOD BY AUTOMATED COUNT: 14.3 % (ref 11.5–14.5)
EST. GFR  (AFRICAN AMERICAN): 33.4 ML/MIN/1.73 M^2
EST. GFR  (NON AFRICAN AMERICAN): 28.9 ML/MIN/1.73 M^2
GLUCOSE SERPL-MCNC: 155 MG/DL (ref 70–110)
HCT VFR BLD AUTO: 32.6 % (ref 40–54)
HCT VFR BLD AUTO: 32.6 % (ref 40–54)
HGB BLD-MCNC: 10.9 G/DL (ref 14–18)
HGB BLD-MCNC: 10.9 G/DL (ref 14–18)
IMM GRANULOCYTES # BLD AUTO: 0.01 K/UL (ref 0–0.04)
IMM GRANULOCYTES # BLD AUTO: 0.01 K/UL (ref 0–0.04)
IMM GRANULOCYTES NFR BLD AUTO: 0.5 % (ref 0–0.5)
IMM GRANULOCYTES NFR BLD AUTO: 0.5 % (ref 0–0.5)
LYMPHOCYTES # BLD AUTO: 0.7 K/UL (ref 1–4.8)
LYMPHOCYTES # BLD AUTO: 0.7 K/UL (ref 1–4.8)
LYMPHOCYTES NFR BLD: 31 % (ref 18–48)
LYMPHOCYTES NFR BLD: 31 % (ref 18–48)
MCH RBC QN AUTO: 25.8 PG (ref 27–31)
MCH RBC QN AUTO: 25.8 PG (ref 27–31)
MCHC RBC AUTO-ENTMCNC: 33.4 G/DL (ref 32–36)
MCHC RBC AUTO-ENTMCNC: 33.4 G/DL (ref 32–36)
MCV RBC AUTO: 77 FL (ref 82–98)
MCV RBC AUTO: 77 FL (ref 82–98)
MONOCYTES # BLD AUTO: 0.2 K/UL (ref 0.3–1)
MONOCYTES # BLD AUTO: 0.2 K/UL (ref 0.3–1)
MONOCYTES NFR BLD: 7.9 % (ref 4–15)
MONOCYTES NFR BLD: 7.9 % (ref 4–15)
NEUTROPHILS # BLD AUTO: 1.3 K/UL (ref 1.8–7.7)
NEUTROPHILS # BLD AUTO: 1.3 K/UL (ref 1.8–7.7)
NEUTROPHILS NFR BLD: 58.7 % (ref 38–73)
NEUTROPHILS NFR BLD: 58.7 % (ref 38–73)
NRBC BLD-RTO: 0 /100 WBC
NRBC BLD-RTO: 0 /100 WBC
PLATELET # BLD AUTO: 208 K/UL (ref 150–450)
PLATELET # BLD AUTO: 208 K/UL (ref 150–450)
PMV BLD AUTO: 11.3 FL (ref 9.2–12.9)
PMV BLD AUTO: 11.3 FL (ref 9.2–12.9)
POCT GLUCOSE: 157 MG/DL (ref 70–110)
POCT GLUCOSE: 178 MG/DL (ref 70–110)
POTASSIUM SERPL-SCNC: 3.9 MMOL/L (ref 3.5–5.1)
RBC # BLD AUTO: 4.23 M/UL (ref 4.6–6.2)
RBC # BLD AUTO: 4.23 M/UL (ref 4.6–6.2)
SODIUM SERPL-SCNC: 139 MMOL/L (ref 136–145)
WBC # BLD AUTO: 2.16 K/UL (ref 3.9–12.7)
WBC # BLD AUTO: 2.16 K/UL (ref 3.9–12.7)

## 2022-02-02 PROCEDURE — 36415 COLL VENOUS BLD VENIPUNCTURE: CPT | Performed by: INTERNAL MEDICINE

## 2022-02-02 PROCEDURE — 99239 HOSP IP/OBS DSCHRG MGMT >30: CPT | Mod: ,,, | Performed by: FAMILY MEDICINE

## 2022-02-02 PROCEDURE — 63600175 PHARM REV CODE 636 W HCPCS: Performed by: HOSPITALIST

## 2022-02-02 PROCEDURE — 99239 PR HOSPITAL DISCHARGE DAY,>30 MIN: ICD-10-PCS | Mod: ,,, | Performed by: FAMILY MEDICINE

## 2022-02-02 PROCEDURE — 99900035 HC TECH TIME PER 15 MIN (STAT)

## 2022-02-02 PROCEDURE — 25000003 PHARM REV CODE 250: Performed by: HOSPITALIST

## 2022-02-02 PROCEDURE — 85025 COMPLETE CBC W/AUTO DIFF WBC: CPT | Performed by: FAMILY MEDICINE

## 2022-02-02 PROCEDURE — 1111F DSCHRG MED/CURRENT MED MERGE: CPT | Mod: CPTII,,, | Performed by: FAMILY MEDICINE

## 2022-02-02 PROCEDURE — 25000003 PHARM REV CODE 250: Performed by: FAMILY MEDICINE

## 2022-02-02 PROCEDURE — 80048 BASIC METABOLIC PNL TOTAL CA: CPT | Performed by: INTERNAL MEDICINE

## 2022-02-02 PROCEDURE — 25000003 PHARM REV CODE 250: Performed by: INTERNAL MEDICINE

## 2022-02-02 PROCEDURE — 1111F PR DISCHARGE MEDS RECONCILED W/ CURRENT OUTPATIENT MED LIST: ICD-10-PCS | Mod: CPTII,,, | Performed by: FAMILY MEDICINE

## 2022-02-02 RX ORDER — CARVEDILOL 3.12 MG/1
3.12 TABLET ORAL DAILY
Qty: 30 TABLET | Refills: 1 | Status: SHIPPED | OUTPATIENT
Start: 2022-02-02 | End: 2023-02-02

## 2022-02-02 RX ORDER — FUROSEMIDE 20 MG/1
20 TABLET ORAL DAILY
Qty: 30 TABLET | Refills: 11 | Status: SHIPPED | OUTPATIENT
Start: 2022-02-02 | End: 2022-04-26 | Stop reason: SDUPTHER

## 2022-02-02 RX ORDER — CARVEDILOL 3.12 MG/1
3.12 TABLET ORAL DAILY
Status: DISCONTINUED | OUTPATIENT
Start: 2022-02-02 | End: 2022-02-02 | Stop reason: HOSPADM

## 2022-02-02 RX ORDER — CLONIDINE HYDROCHLORIDE 0.1 MG/1
0.2 TABLET ORAL 3 TIMES DAILY
Qty: 180 TABLET | Refills: 11 | Status: ON HOLD | OUTPATIENT
Start: 2022-02-02 | End: 2022-06-03

## 2022-02-02 RX ORDER — CIPROFLOXACIN 500 MG/1
500 TABLET ORAL EVERY 12 HOURS
Qty: 10 TABLET | Refills: 0 | Status: SHIPPED | OUTPATIENT
Start: 2022-02-02 | End: 2022-02-07

## 2022-02-02 RX ORDER — HYDRALAZINE HYDROCHLORIDE 50 MG/1
50 TABLET, FILM COATED ORAL EVERY 12 HOURS
Qty: 60 TABLET | Refills: 0 | Status: ON HOLD | OUTPATIENT
Start: 2022-02-02 | End: 2022-06-03 | Stop reason: HOSPADM

## 2022-02-02 RX ADMIN — MUPIROCIN: 20 OINTMENT TOPICAL at 08:02

## 2022-02-02 RX ADMIN — CLONIDINE HYDROCHLORIDE 0.2 MG: 0.1 TABLET ORAL at 02:02

## 2022-02-02 RX ADMIN — AMLODIPINE BESYLATE 10 MG: 5 TABLET ORAL at 08:02

## 2022-02-02 RX ADMIN — HEPARIN SODIUM 5000 UNITS: 5000 INJECTION, SOLUTION INTRAVENOUS; SUBCUTANEOUS at 05:02

## 2022-02-02 RX ADMIN — CARVEDILOL 3.12 MG: 3.12 TABLET, FILM COATED ORAL at 02:02

## 2022-02-02 RX ADMIN — CIPROFLOXACIN HYDROCHLORIDE 500 MG: 250 TABLET, FILM COATED ORAL at 08:02

## 2022-02-02 RX ADMIN — SODIUM BICARBONATE 650 MG TABLET 650 MG: at 08:02

## 2022-02-02 RX ADMIN — CLOPIDOGREL 75 MG: 75 TABLET, FILM COATED ORAL at 08:02

## 2022-02-02 RX ADMIN — CLONIDINE HYDROCHLORIDE 0.2 MG: 0.1 TABLET ORAL at 08:02

## 2022-02-02 RX ADMIN — HYDRALAZINE HYDROCHLORIDE 50 MG: 25 TABLET, FILM COATED ORAL at 08:02

## 2022-02-02 RX ADMIN — FAMOTIDINE 20 MG: 20 TABLET ORAL at 08:02

## 2022-02-02 NOTE — PLAN OF CARE
"   02/02/22 1447   Final Note   Assessment Type Final Discharge Note   Anticipated Discharge Disposition Home-Health   What phone number can be called within the next 1-3 days to see how you are doing after discharge? [phone]   Hospital Resources/Appts/Education Provided Appointments scheduled and added to AVS;Community resources provided   Post-Acute Status   Post-Acute Authorization Home Health   Home Health Status Set-up Complete/Auth obtained   Coverage [facility] [employer]   Discharge Delays None known at this time   Patient discharging to home this afternoon.  Hospital follow up appointment scheduled and provided to patient verbally and in writing.  [facility] services set up to resume as [facility]  Patient denies any other needs at this time.  He expressed great happiness that "I finally get to go home!"  SW had attempted multiple times to reach patient's son, Jefry, with whom patient lives with.  When unsuccessful, patient asked that his brother, Zaid, be contacted.  Zaid was agreeable to providing patient with transportation home.    No unmet needs identified.     "

## 2022-02-02 NOTE — PROGRESS NOTES
"CARDIOLOGY PROGRESS NOTE    Patient Name:  Marshall Flor    :  1955    Medical Record:  [id number]    DATE OF SERVICE  2022        SUBJECTIVE  The patient is being seen for follow-up no chest pain or shortness of breath, COVID positive      REVIEW OF SYSTEMS  General: No chills, No fever, No fatigue  Eyes: No vision changes, No drainage from eyes  ENT: No nasal congestion, no sore throat  Respiratory: No shortness of breath, No cough  Cardiac: No chest pain, palpitations, dyspnea, dizziness, claudication, or syncopal episodes  GI: No abdominal pain, no nausea, no vomiting  : No dysuria  Musculoskeletal: No joint pain  Neuro: No weakness, dizziness, vision changes       OBJECTIVE          PHYSICAL EXAM  Vital Signs:  BP (!) 153/59   Pulse 85   Temp 98.2 °F (36.8 °C) (Oral)   Resp 18   Ht 5' 7" (1.702 m)   Wt 93.2 kg (205 lb 7.5 oz)   SpO2 98%   BMI 32.18 kg/m²   Temp:  [96.1 °F (35.6 °C)-101.2 °F (38.4 °C)] 98.2 °F (36.8 °C)  Pulse:  [67-89] 85  Resp:  [18-19] 18  SpO2:  [96 %-99 %] 98 %  BP: (132-158)/(59-86) 153/59      General:   Eyes: Anicteric sclera. Moist conjunctiva. Vision grossly intact.  HENMT: Normocephalic.  Moist mucous membranes. No obvious ulcerations.   Neck: Trachea midline.   Cardiovascular: Heart regular rate and rhythm.  No abnormal chest moved  Respiratory: . No increased work of breathing.  Gastrointestinal:  nondistended.   Genitourinary: Urine clear yellow  Musculoskeletal: Moves all extremities with equal strength.   Skin: Color normal for ethnicity. Skin warm and dry with good turgor.   Neurologic: Oriented x 3.  Speech fluent, follows commands.  Psychiatric:  Awake and alert, normal affect mood good.      LABS    CBC  Recent Labs   Lab 22  0625 22  0804 22  0725   WBC 2.47* 2.07* 1.71*   RBC 4.08* 4.41* 4.24*   HGB 10.4* 11.4* 10.9*   HCT 32.4* 33.9* 32.7*   MCV 79* 77* 77*   MCH 25.5* 25.9* 25.7*   MCHC 32.1 33.6 33.3   RDW 14.4 14.3 14.4   MPV 10.1 " 10.5 10.0    204 178       Chemistry  Recent Labs   Lab 01/26/22  0733 01/26/22  0733 01/27/22  0745 01/28/22  0717 01/30/22  0613 01/31/22  0804 02/01/22  0725     140   < > 138   < > 138 138 137   K 3.7  3.7   < > 3.6   < > 3.8 3.9 3.8     105   < > 104   < > 106 106 105   CO2 22*  22*   < > 19*   < > 19* 17* 19*   BUN 20  20   < > 31*   < > 27* 27* 25*   CREATININE 2.6*  2.6*   < > 3.2*   < > 2.4* 2.3* 2.2*   *  183*   < > 147*   < > 162* 159* 153*   PROT 5.7*  --  5.6*  --   --   --   --    CALCIUM 7.9*  7.9*   < > 7.8*   < > 7.6* 8.0* 7.9*   BILITOT 0.5  --  0.4  --   --   --   --    AST 8*  --  11  --   --   --   --    ALT 9*  --  10  --   --   --   --     < > = values in this interval not displayed.       ABG  No results for input(s): PHART, VCN3QSL, PO2ART, TEF0AZL, K7NYSMOP, BEART, B8DCDEXY in the last 168 hours.      MICROBIOLOGY  Reviewed    IMAGING & OTHER STUDIES  Reviewed      Intake/Output last 3 shifts:  I/O last 3 completed shifts:  In: 1535 [P.O.:1535]  Out: 1500 [Urine:1500]    Scheduled meds:   amLODIPine  10 mg Oral Daily    atorvastatin  20 mg Oral QHS    ciprofloxacin HCl  500 mg Oral Q12H    cloNIDine  0.2 mg Oral TID    clopidogreL  75 mg Oral Daily    famotidine  20 mg Oral Daily    heparin (porcine)  5,000 Units Subcutaneous Q8H    hydrALAZINE  50 mg Oral Q12H    mupirocin   Nasal BID    sodium bicarbonate  650 mg Oral BID       PRN Meds:  sodium chloride 0.9%, acetaminophen, albuterol-ipratropium, dextrose 50%, dextrose 50%, dextrose 50%, dextrose 50%, glucagon (human recombinant), glucagon (human recombinant), glucose, glucose, hydrALAZINE, HYDROcodone-acetaminophen, insulin aspart U-100, magnesium oxide, magnesium oxide, naloxone, ondansetron, polyethylene glycol, potassium, sodium phosphates, potassium, sodium phosphates, potassium, sodium phosphates, promethazine, sodium chloride 0.9%, trazodone    Continuous Infusions:   DOBUTamine 1  mcg/kg/min (01/31/22 1230)       Dietary Orders:  [unfilled]     Admit weight: Weight: 100.7 kg (222 lb)   Today weight: Weight: 93.2 kg (205 lb 7.5 oz)      Length of stay in days: 8      ASSESSMENT    Active Hospital Problems    Diagnosis  POA    *Essential hypertension [I10]  Yes    COVID-19 virus infection [U07.1]  No    Acute cystitis without hematuria [N30.00]  Yes    CHF (congestive heart failure), NYHA class I, acute on chronic, systolic [I50.23]  Yes    Hypertensive urgency [I16.0]  Yes    Stage 3b chronic kidney disease [N18.32]  Yes      Resolved Hospital Problems   No resolved problems to display.          PLAN      Hypertensive urgency  146/68 1/30 improved  Status post 218/137  Transient nitro drip  Hydralazine 50 mg b.i.d.  130/80 on home medications  History noncompliance        Chronic  HFrEF   No shortness of breath  BNP 906 1/30 inc   EKG DECREASED ST INFERIOR LATERAL  Dobutamine 1 milligram/kilogram per minute  Heparin 5000 subQ q.8 hours  States abnormal stress 1 year ago  Catheterization not done     History of CVA  No weakness  Describesd as weakness  2016  Plavix  Venous no DVT   CK 67 1/25        Diabetes  Status post decreased glucose  Treated medicine     Hypokalemia  K 3.8 1/29          Acute Renal failure  Creatinine 2.2    2/1 decreased  Renal ultrasound no hydronephrosis  Monitor renal function  Off Lasix  Off lisinopril     Hypomagnesemia  Magnesium 1.8 1/27  Monitor magnesium     Anemia  Hemoglobin 10.9   2/1 decrease  Pepcid 20 mg p.o. daily  Hemoccult stool     COVID positive  Treated medicine    Dec WBC   WBC  1.71 2/1   Tx Medicine      Plan   CBC BMP in a.m.  Continue dobutamine       Electronically signed by: Rickey Tong, 2/1/2022 7:25 PM

## 2022-02-02 NOTE — NURSING
Discharged and f/u instructions explained and reviewed with pt and his brother. Both pt and brother verbalized understanding. Pt escorted via wheelchair to private vehicle. NAD noted on departure.

## 2022-02-02 NOTE — PROGRESS NOTES
"CARDIOLOGY PROGRESS NOTE    Patient Name:  Marshall Flor    :  1955    Medical Record:  [id number]    DATE OF SERVICE  2022        SUBJECTIVE  The patient is being seen for follow-up no chest pain or shortness of breath      REVIEW OF SYSTEMS  General: No chills, No fever, No fatigue  Eyes: No vision changes, No drainage from eyes  ENT: No nasal congestion, no sore throat  Respiratory: No shortness of breath, No cough  Cardiac: No chest pain, palpitations, dyspnea, dizziness, claudication, or syncopal episodes  GI: No abdominal pain, no nausea, no vomiting  : No dysuria  Musculoskeletal: No joint pain  Neuro: No weakness, dizziness, vision changes       OBJECTIVE          PHYSICAL EXAM  Vital Signs:  BP (!) 165/76 (Patient Position: Lying)   Pulse 79   Temp 99.6 °F (37.6 °C) (Oral)   Resp 20   Ht 5' 7" (1.702 m)   Wt 91.1 kg (200 lb 13.4 oz)   SpO2 95%   BMI 31.46 kg/m²   Temp:  [96.1 °F (35.6 °C)-99.6 °F (37.6 °C)] 99.6 °F (37.6 °C)  Pulse:  [71-88] 79  Resp:  [18-20] 20  SpO2:  [95 %-99 %] 95 %  BP: (141-165)/(59-76) 165/76      General:   Eyes: Anicteric sclera. Moist conjunctiva. Vision grossly intact.  HENMT: Normocephalic.  Moist mucous membranes. No obvious ulcerations.   Neck: Trachea midline.   Cardiovascular: Heart regular rate and rhythm. S1, S2, S3-1 to 2/6 systolic ejection murmur left sternal border.  Peripheral pulses palpable. No peripheral edema.   Respiratory: Lungs clear to auscultation bilaterally. No increased work of breathing.  Gastrointestinal: Bowel sounds active in all 4 quadrants. Soft, nontender, nondistended.   Genitourinary: Urine clear yellow  Musculoskeletal: Moves all extremities with equal strength.   Skin: Color normal for ethnicity. Skin warm and dry with good turgor. Capillary refill < 3 seconds.   Neurologic: Oriented x 3.  Speech fluent, follows commands.  Psychiatric:  Awake and alert, normal affect mood good.      LABS    CBC  Recent Labs   Lab " 01/31/22  0804 02/01/22  0725 02/02/22  0752   WBC 2.07* 1.71* 2.16*  2.16*   RBC 4.41* 4.24* 4.23*  4.23*   HGB 11.4* 10.9* 10.9*  10.9*   HCT 33.9* 32.7* 32.6*  32.6*   MCV 77* 77* 77*  77*   MCH 25.9* 25.7* 25.8*  25.8*   MCHC 33.6 33.3 33.4  33.4   RDW 14.3 14.4 14.3  14.3   MPV 10.5 10.0 11.3  11.3    178 208  208       Chemistry  Recent Labs   Lab 01/27/22  0745 01/28/22 0717 01/31/22 0804 02/01/22 0725 02/02/22 0752      < > 138 137 139   K 3.6   < > 3.9 3.8 3.9      < > 106 105 106   CO2 19*   < > 17* 19* 17*   BUN 31*   < > 27* 25* 24*   CREATININE 3.2*   < > 2.3* 2.2* 2.3*   *   < > 159* 153* 155*   PROT 5.6*  --   --   --   --    CALCIUM 7.8*   < > 8.0* 7.9* 7.7*   BILITOT 0.4  --   --   --   --    AST 11  --   --   --   --    ALT 10  --   --   --   --     < > = values in this interval not displayed.       ABG  No results for input(s): PHART, MIX2OBH, PO2ART, AXI6UCC, V9LWPEVG, BEART, G8YONPLM in the last 168 hours.      MICROBIOLOGY  Reviewed    IMAGING & OTHER STUDIES  Reviewed      Intake/Output last 3 shifts:  I/O last 3 completed shifts:  In: 680 [P.O.:680]  Out: 1400 [Urine:1400]    Scheduled meds:   amLODIPine  10 mg Oral Daily    atorvastatin  20 mg Oral QHS    ciprofloxacin HCl  500 mg Oral Q12H    cloNIDine  0.2 mg Oral TID    clopidogreL  75 mg Oral Daily    famotidine  20 mg Oral Daily    heparin (porcine)  5,000 Units Subcutaneous Q8H    hydrALAZINE  50 mg Oral Q12H    mupirocin   Nasal BID    sodium bicarbonate  650 mg Oral BID       PRN Meds:  sodium chloride 0.9%, acetaminophen, albuterol-ipratropium, dextrose 50%, dextrose 50%, dextrose 50%, dextrose 50%, glucagon (human recombinant), glucagon (human recombinant), glucose, glucose, hydrALAZINE, HYDROcodone-acetaminophen, insulin aspart U-100, magnesium oxide, magnesium oxide, naloxone, ondansetron, polyethylene glycol, potassium, sodium phosphates, potassium, sodium phosphates, potassium,  sodium phosphates, promethazine, sodium chloride 0.9%, trazodone    Continuous Infusions:      Dietary Orders:  [unfilled]     Admit weight: Weight: 100.7 kg (222 lb)   Today weight: Weight: 91.1 kg (200 lb 13.4 oz)      Length of stay in days: 9      ASSESSMENT    Active Hospital Problems    Diagnosis  POA    *Essential hypertension [I10]  Yes    COVID-19 virus infection [U07.1]  No    Acute cystitis without hematuria [N30.00]  Yes    CHF (congestive heart failure), NYHA class I, acute on chronic, systolic [I50.23]  Yes    Hypertensive urgency [I16.0]  Yes    Stage 3b chronic kidney disease [N18.32]  Yes      Resolved Hospital Problems   No resolved problems to display.          PLAN    Hypertensive urgency  165/76 2/2  Status post 218/137  Transient nitro drip  Hydralazine 50 mg b.i.d.  130/80 on home medications  History noncompliance        Chronic  HFrEF   No shortness of breath  BNP 906 1/30 inc   EKG DECREASED ST INFERIOR LATERAL  EF 39% 1/22  Dobutamine 1 milligram/kilogram per minute  Heparin 5000 subQ q.8 hours  States abnormal stress 1 year ago  Catheterization not done     History of CVA  No weakness  Describesd as weakness  2016  Plavix  Venous no DVT   CK 67 1/25        Diabetes  Status post decreased glucose  Treated medicine     Hypokalemia  K 3.8 1/29           Acute Renal failure  Creatinine 2.3    2/2  mildly increased  Renal ultrasound no hydronephrosis  Monitor renal function  Off Lasix  Off lisinopril     Hypomagnesemia  Magnesium 1.8 1/27  Monitor magnesium     Anemia  Hemoglobin 10.9   2/2 stable  Pepcid 20 mg p.o. daily  Hemoccult stool     COVID positive  Treated medicine     Dec WBC   WBC  1.71 2/1   Tx Medicine      Plan  Add Coreg 3.125 mg daily cardiomyopathy increased blood pressure  BMP in a.m.  DC  dobutamine               Electronically signed by: Rickey Tong, 2/2/2022 10:07 AM

## 2022-02-02 NOTE — PLAN OF CARE
Problem: Adult Inpatient Plan of Care  Goal: Plan of Care Review  2/1/2022 2032 by Tiana Chow RN  Outcome: Ongoing, Progressing  2/1/2022 2031 by Tiana Chow RN  Outcome: Ongoing, Progressing  Goal: Patient-Specific Goal (Individualized)  2/1/2022 2032 by Tiana Chow RN  Outcome: Ongoing, Progressing  2/1/2022 2031 by Tiana Chow RN  Outcome: Ongoing, Progressing  Goal: Absence of Hospital-Acquired Illness or Injury  2/1/2022 2032 by Tiana Chow RN  Outcome: Ongoing, Progressing  2/1/2022 2031 by Taina Chow RN  Outcome: Ongoing, Progressing  Goal: Optimal Comfort and Wellbeing  2/1/2022 2032 by Tiana Chow RN  Outcome: Ongoing, Progressing  2/1/2022 2031 by Tiana Chow RN  Outcome: Ongoing, Progressing  Goal: Readiness for Transition of Care  2/1/2022 2032 by Tiana Chow RN  Outcome: Ongoing, Progressing  2/1/2022 2031 by Tiana Chow RN  Outcome: Ongoing, Progressing     Problem: Diabetes Comorbidity  Goal: Blood Glucose Level Within Targeted Range  2/1/2022 2032 by Tiana Chow RN  Outcome: Ongoing, Progressing  2/1/2022 2031 by Tiana Chow RN  Outcome: Ongoing, Progressing     Problem: Skin Injury Risk Increased  Goal: Skin Health and Integrity  2/1/2022 2032 by Tiana Chow RN  Outcome: Ongoing, Progressing  2/1/2022 2031 by Tiana Chow RN  Outcome: Ongoing, Progressing     Problem: Infection  Goal: Absence of Infection Signs and Symptoms  2/1/2022 2032 by Tiana Chow RN  Outcome: Ongoing, Progressing  2/1/2022 2031 by Tiana Chow RN  Outcome: Ongoing, Progressing     Problem: Hypertension Acute  Goal: Blood Pressure Within Desired Range  2/1/2022 2032 by Tiana Chow RN  Outcome: Ongoing, Progressing  2/1/2022 2031 by Tiana Chow, RN  Outcome: Ongoing, Progressing     Problem: Adjustment to Illness (Heart Failure)  Goal: Optimal  Coping  2/1/2022 2032 by Tiana Chow RN  Outcome: Ongoing, Progressing  2/1/2022 2031 by Tiana Chow RN  Outcome: Ongoing, Progressing     Problem: Cardiac Output Decreased (Heart Failure)  Goal: Optimal Cardiac Output  2/1/2022 2032 by Tiana Chow RN  Outcome: Ongoing, Progressing  2/1/2022 2031 by Tiana Chow RN  Outcome: Ongoing, Progressing     Problem: Dysrhythmia (Heart Failure)  Goal: Stable Heart Rate and Rhythm  2/1/2022 2032 by Tiana Chow RN  Outcome: Ongoing, Progressing  2/1/2022 2031 by Tiana Chow RN  Outcome: Ongoing, Progressing     Problem: Fluid Imbalance (Heart Failure)  Goal: Fluid Balance  2/1/2022 2032 by Tiana Chow RN  Outcome: Ongoing, Progressing  2/1/2022 2031 by Tiana Chow RN  Outcome: Ongoing, Progressing     Problem: Functional Ability Impaired (Heart Failure)  Goal: Optimal Functional Ability  2/1/2022 2032 by Tiana Chow RN  Outcome: Ongoing, Progressing  2/1/2022 2031 by Tiana Chow RN  Outcome: Ongoing, Progressing     Problem: Oral Intake Inadequate (Heart Failure)  Goal: Optimal Nutrition Intake  2/1/2022 2032 by Tiana Chow RN  Outcome: Ongoing, Progressing  2/1/2022 2031 by Tiana Chow RN  Outcome: Ongoing, Progressing     Problem: Respiratory Compromise (Heart Failure)  Goal: Effective Oxygenation and Ventilation  2/1/2022 2032 by Tiana Chow RN  Outcome: Ongoing, Progressing  2/1/2022 2031 by Tiana Chow RN  Outcome: Ongoing, Progressing     Problem: Sleep Disordered Breathing (Heart Failure)  Goal: Effective Breathing Pattern During Sleep  2/1/2022 2032 by Tiana Chow RN  Outcome: Ongoing, Progressing  2/1/2022 2031 by Tiana Chow RN  Outcome: Ongoing, Progressing     Problem: Adjustment to Illness (Chronic Kidney Disease)  Goal: Optimal Coping with Chronic Illness  2/1/2022 2032 by Tiana Chow, RN  Outcome:  Ongoing, Progressing  2/1/2022 2031 by Tiana Chow RN  Outcome: Ongoing, Progressing     Problem: Electrolyte Imbalance (Chronic Kidney Disease)  Goal: Electrolyte Balance  2/1/2022 2032 by Tiana Chow RN  Outcome: Ongoing, Progressing  2/1/2022 2031 by Tiana Chow RN  Outcome: Ongoing, Progressing     Problem: Fluid Volume Excess (Chronic Kidney Disease)  Goal: Fluid Balance  2/1/2022 2032 by Tiana Chow RN  Outcome: Ongoing, Progressing  2/1/2022 2031 by Tiana Chow RN  Outcome: Ongoing, Progressing     Problem: Functional Decline (Chronic Kidney Disease)  Goal: Optimal Functional Ability  2/1/2022 2032 by Tiana Chow RN  Outcome: Ongoing, Progressing  2/1/2022 2031 by Tiana Chow RN  Outcome: Ongoing, Progressing     Problem: Hematologic Alteration (Chronic Kidney Disease)  Goal: Absence of Anemia Signs and Symptoms  2/1/2022 2032 by Tiana Chow RN  Outcome: Ongoing, Progressing  2/1/2022 2031 by Tiana Chow RN  Outcome: Ongoing, Progressing     Problem: Oral Intake Inadequate (Chronic Kidney Disease)  Goal: Optimal Oral Intake  2/1/2022 2032 by Tiana Chow RN  Outcome: Ongoing, Progressing  2/1/2022 2031 by Tiana Chow RN  Outcome: Ongoing, Progressing     Problem: Pain (Chronic Kidney Disease)  Goal: Acceptable Pain Control  2/1/2022 2032 by Tiana Chow RN  Outcome: Ongoing, Progressing  2/1/2022 2031 by Tiana Chow RN  Outcome: Ongoing, Progressing     Problem: Renal Function Impairment (Chronic Kidney Disease)  Goal: Minimize Renal Failure Effects  2/1/2022 2032 by Tiana Chow RN  Outcome: Ongoing, Progressing  2/1/2022 2031 by Tiana Chow RN  Outcome: Ongoing, Progressing     Problem: Activity and Energy Impairment (Anxiety Signs/Symptoms)  Goal: Optimized Energy Level (Anxiety Signs/Symptoms)  2/1/2022 2032 by Tiana Chow RN  Outcome: Ongoing,  Progressing  2/1/2022 2031 by Tiana Chow RN  Outcome: Ongoing, Progressing     Problem: Cognitive Impairment (Anxiety Signs/Symptoms)  Goal: Optimized Cognitive Function (Anxiety Signs/Symptoms)  2/1/2022 2032 by Tiana Chow RN  Outcome: Ongoing, Progressing  2/1/2022 2031 by Tiana Chow RN  Outcome: Ongoing, Progressing     Problem: Mood Impairment (Anxiety Signs/Symptoms)  Goal: Improved Mood Symptoms (Anxiety Signs/Symptoms)  2/1/2022 2032 by Tiana Chow RN  Outcome: Ongoing, Progressing  2/1/2022 2031 by Tiana Chow RN  Outcome: Ongoing, Progressing     Problem: Sleep Impairment (Anxiety Signs/Symptoms)  Goal: Improved Sleep (Anxiety Signs/Symptoms)  2/1/2022 2032 by Tiana Chow RN  Outcome: Ongoing, Progressing  2/1/2022 2031 by Tiana Chow RN  Outcome: Ongoing, Progressing     Problem: Social, Occupational or Functional Impairment (Anxiety Signs/Symptoms)  Goal: Enhanced Social, Occupational or Functional Skills (Anxiety Signs/Symptoms)  2/1/2022 2032 by Tiana Chow RN  Outcome: Ongoing, Progressing  2/1/2022 2031 by Tiana Chow RN  Outcome: Ongoing, Progressing     Problem: Somatic Disturbance (Anxiety Signs/Symptoms)  Goal: Improved Somatic Symptoms (Anxiety Signs/Symptoms)  2/1/2022 2032 by Tiana Chow RN  Outcome: Ongoing, Progressing  2/1/2022 2031 by Tiana Chow RN  Outcome: Ongoing, Progressing     Problem: Fall Injury Risk  Goal: Absence of Fall and Fall-Related Injury  2/1/2022 2032 by Tiana Cohw RN  Outcome: Ongoing, Progressing  2/1/2022 2031 by Tiana Chow RN  Outcome: Ongoing, Progressing

## 2022-02-02 NOTE — PLAN OF CARE
Problem: Adult Inpatient Plan of Care  Goal: Plan of Care Review  Outcome: Ongoing, Progressing     Problem: Adult Inpatient Plan of Care  Goal: Patient-Specific Goal (Individualized)  Outcome: Ongoing, Progressing     Problem: Adult Inpatient Plan of Care  Goal: Absence of Hospital-Acquired Illness or Injury  Outcome: Ongoing, Progressing     Problem: Adult Inpatient Plan of Care  Goal: Optimal Comfort and Wellbeing  Outcome: Ongoing, Progressing     Problem: Diabetes Comorbidity  Goal: Blood Glucose Level Within Targeted Range  Outcome: Ongoing, Progressing     Problem: Skin Injury Risk Increased  Goal: Skin Health and Integrity  Outcome: Ongoing, Progressing

## 2022-02-03 ENCOUNTER — PATIENT OUTREACH (OUTPATIENT)
Dept: ADMINISTRATIVE | Facility: CLINIC | Age: 67
End: 2022-02-03
Payer: MEDICARE

## 2022-02-03 NOTE — PROGRESS NOTES
C3 nurse spoke with Marshall Flor for a TCC post hospital discharge follow up call. The patient has a scheduled HOSFU appointment with Dr. Quan on 2/10/2022 @ 1:00 pm.

## 2022-02-03 NOTE — PATIENT INSTRUCTIONS
"Patient Education       High Blood Pressure Emergencies   The Basics   Written by the doctors and editors at [facility]   What is a high blood pressure emergency? -- A high blood pressure emergency is a serious - and even life-threatening - condition that can happen when a person's blood pressure gets much higher than normal. When a person's blood pressure gets very high, it can lead to problems in one or more of the following organs:  · Eyes - Problems can include bleeding in the back of the eye, or swelling of the nerve that runs from the eye to the brain.  · Brain - Problems can include swelling or bleeding in the brain, or a stroke. A stroke is when a part of the brain is damaged because of a problem with blood flow.  · Kidneys - Very high blood pressure can lead to kidney failure, which is when the kidneys stop working.  · Heart - Heart problems can include a heart attack, heart failure, or damage to a major blood vessel.  When your doctor or nurse tells you your blood pressure, they say 2 numbers. For example, your doctor might say that your blood pressure is "140 over 90." When people have a high blood pressure emergency, their blood pressure is usually "180 over 120" or higher.  Other terms doctors might use for a high blood pressure emergency are "hypertensive emergency" or "malignant hypertension."  Sometimes, a person's blood pressure is much higher than normal, but it hasn't damaged any organs. Doctors call this "hypertensive urgency." Hypertensive urgency is not usually treated the same as a high blood pressure emergency.  What are the symptoms of a high blood pressure emergency? -- The symptoms depend on the organ or organs affected. They can include:  · Blurry vision or other vision changes  · Headache  · Nausea or vomiting  · Confusion  · Passing out or seizures - Seizures are waves of abnormal electrical activity in the brain that can make people move or behave strangely.  · Weakness or numbness on " "one side of the body, or in one arm or leg  · Difficulty talking  · Trouble breathing  · Chest pain  · Pain in the upper back or between the shoulders   · Urine that is brown or bloody  · Pain in the lower back or on the side of the body  Should I see a doctor or nurse? -- Yes. Call your doctor or nurse right away if you have any of the symptoms listed above, especially if you know that you have high blood pressure.  Will I need tests? -- Yes. Your doctor or nurse will ask about your symptoms, do an exam, and check your blood pressure. They might use a special light to look in the back of your eyes.  Your doctor will also do tests to check how serious your condition is. Tests can include:  · Blood tests  · Urine tests  · A chest X-ray  · A CT scan or other imaging test of your brain - Imaging tests create pictures of the inside of the body.  · A CT scan or other imaging test of your chest  · An ECG (also called an "electrocardiogram") - This test measures the electrical activity in your heart (figure 1).  How is a high blood pressure emergency treated? -- A high blood pressure emergency is treated in the hospital. Your doctor will give you medicines to lower your blood pressure quickly. These medicines are usually given through a thin tube that goes into your vein, called an "IV."  Your doctor will also treat any problems caused by your very high blood pressure, if they can be treated.  People who have a high blood pressure emergency usually need long-term treatment to keep their blood pressure under control. This usually includes:  · Taking medicines  · Following a low-salt diet that includes a lot of fruits and vegetables  · Losing weight (if you are overweight)  · Getting regular exercise  All topics are updated as new evidence becomes available and our peer review process is complete.  This topic retrieved from [employer] on: Sep 21, 2021.  Topic 02520 Version 7.0  Release: 29.4.2 - C29.263  © 2021 [employer] " "and/or its affiliates. All rights reserved.  figure 1: Person having an ECG     This drawing shows a man having an ECG (also called an electrocardiogram or EKG). He has patches, called "electrodes," stuck onto his chest, arms, and legs. Wires run from the electrodes to the ECG machine. An ECG measures the electrical activity in the heart.  Graphic 85940 Version 2.0    Consumer Information Use and Disclaimer   This information is not specific medical advice and does not replace information you receive from your health care provider. This is only a brief summary of general information. It does NOT include all information about conditions, illnesses, injuries, tests, procedures, treatments, therapies, discharge instructions or life-style choices that may apply to you. You must talk with your health care provider for complete information about your health and treatment options. This information should not be used to decide whether or not to accept your health care provider's advice, instructions or recommendations. Only your health care provider has the knowledge and training to provide advice that is right for you. The use of this information is governed by the [employer] End User License Agreement, available at https://www.[employer]/en/solutions/[employer]/about/melida.The use of [employer] content is governed by the [employer] Terms of Use. ©2021 [employer] All rights reserved.  Copyright   © 2021 UpToDate, Inc. and/or its affiliates. All rights reserved.    Ca teaching reviewed with Marshall Flor . He verbalized understanding.    Education was provided based on the patient's discharge diagnosis using the attached [facility] patient education as a reference.      " [place]

## 2022-02-03 NOTE — PLAN OF CARE
02/01/22 0830   Medicare Message   Important Message from Medicare regarding Discharge Appeal Rights Explained to patient/caregiver;Signed/date by patient/caregiver   Date IMM was signed 02/01/22   Time IMM was signed 0830

## 2022-03-07 NOTE — DISCHARGE SUMMARY
[facility] Medicine  Discharge Summary      Patient Name: Marshall Flor  MRN: [id number]  Patient Class: IP- Inpatient  Admission Date: 1/24/2022  Hospital Length of Stay: 9 days  Discharge Date and Time: 2/2/2022  2:40 PM  Attending Physician: Brea Daniels MD  Discharging Provider: Brea Daniels MD  Primary Care Provider: Dorie Quan MD      HPI:   [age] year old diabetic hypertensive male with a history of congestive heart failure and renal disease was found unconscious on the floor at home.  EMS checked his blood sugar and was 31. He was given an amp of D50 and his blood sugar came up to 68. His blood sugar was recheck again and range from 75 to 77. He developed acute pulmonary edema in emergency room was given Lasix.  He became hypertensive and had to be placed on a nitroglycerin drip for his blood pressure.  He continued to be short of breath was placed on BiPAP.  He denies chest pain, nausea, vomiting , headaches, blurred or double vision.        * No surgery found *        Goals of Care Treatment Preferences:  Code Status: Full Code      Consults:   Consults (From admission, onward)        Status Ordering Provider     Inpatient consult to Cardiology  Once        Provider:  Rickey Tong MD    Completed DONELL KLEIN          Hospital Course:   Patient with significantly decreased LV function. Required long course of dobutamine infusion due to poor renal function and fluid overload. He is high risk for deterioration given poor overall status of chronic conditions. Discharged with home health to family.  * Essential hypertension  As above      COVID-19 virus infection  With worsening leukopenia, high suspicion raised for COVID 19 virus infection  Rapid test positive   Currently asymptomatic from a respiratory standpoint  Monitor for signs of covid pneumonia      Acute cystitis without hematuria  Urine cx with citrobacter pansensitive  IV  Rocephin 1g q24h, changed to Cipro in preparation for possible discharge in the next 24-48 hours however now with newly diagnosed covid 19 infection  Leukopenia noted         CHF (congestive heart failure), NYHA class I, acute on chronic, systolic  Patient is identified as having Systolic (HFrEF) heart failure that is Acute on chronic. CHF is currently controlled. Latest ECHO performed and demonstrates- Results for orders placed during the hospital encounter of 01/02/22    Echo    Interpretation Summary  · The left ventricle is mildly enlarged with mild concentric hypertrophy and moderately decreased systolic function.  · The estimated ejection fraction is 39%.  · Mild right ventricular enlargement.  · Moderate left atrial enlargement.  · Moderate mitral regurgitation.  · Mild pulmonic regurgitation.  · Small pericardial effusion.  . Continue Furosemide and monitor clinical status closely. Monitor on telemetry. Patient is on CHF pathway.  Monitor strict Is&Os and daily weights.  Place on fluid restriction of 1.5 L. Continue to stress to patient importance of self efficacy and  on diet for CHF. Last BNP reviewed- and noted below   Recent Labs   Lab 01/30/22  0613   *   .  Continuing dobutamine drip  Renal function is improving.   Continue management by cardiologist - continuing to wean, possibly discontinue infusion over next 1-2 days      Hypertensive urgency  Admitted to ICU, now transferred to floor  Started and now weaned off nitroglycerin drip  Norvasc  Apresoline  Lasix  Catapres  Troponin  Echocardiogram as above  Cardiology consulted  Off BiPAP  Transferred to the medical floor  Giving an additional dose of lasix as needed, monitor blood pressure response. Weaning dobutamine      Stage 3b chronic kidney disease  Acute on chronic kidney disease  Slight worsening in sCr initially likely due to CHF exacerbation/intravascular depletion   Improving with dobutamine  Renal ultrasound with medical renal  disease          Final Active Diagnoses:    Diagnosis Date Noted POA    PRINCIPAL PROBLEM:  Essential hypertension [I10] 01/25/2022 Yes    COVID-19 virus infection [U07.1] 02/01/2022 No    Acute cystitis without hematuria [N30.00] 01/29/2022 Yes    CHF (congestive heart failure), NYHA class I, acute on chronic, systolic [I50.23] 01/25/2022 Yes    Hypertensive urgency [I16.0] 01/02/2022 Yes    Stage 3b chronic kidney disease [N18.32] 01/02/2022 Yes      Problems Resolved During this Admission:       Discharged Condition: stable    Disposition: Home or Self Care    Follow Up:   Follow-up Information     Dorie Quan MD On 2/10/2022.    Specialty: Family Medicine  Why: Hospital follow up appointment scheduled for Thursday, 2/10/2022, at 10:00.  Contact information:  [address] [place] [id number]  [phone]                       Patient Instructions:      Diet Cardiac     SUBSEQUENT HOME HEALTH ORDERS   Order Comments: Subsequent Home Health Orders    Current Medications:  Current Facility-Administered Medications:  0.9%  NaCl infusion, , Intravenous, PRN, Matt Jiménez MD, Stopped at 01/30/22 1216  acetaminophen tablet 650 mg, 650 mg, Oral, Q4H PRN, Matt Jiménez MD, 650 mg at 01/26/22 0141  albuterol-ipratropium 2.5 mg-0.5 mg/3 mL nebulizer solution 3 mL, 3 mL, Nebulization, Q6H PRN, Matt Jiménez MD  amLODIPine tablet 10 mg, 10 mg, Oral, Daily, Matt Jiménez MD, 10 mg at 02/02/22 0828  atorvastatin tablet 20 mg, 20 mg, Oral, QHS, Matt Jiménez MD, 20 mg at 02/01/22 2145  carvediloL tablet 3.125 mg, 3.125 mg, Oral, Daily, Rickey Tong MD  ciprofloxacin HCl tablet 500 mg, 500 mg, Oral, Q12H, Brea Daniels MD, 500 mg at 02/02/22 0828  cloNIDine tablet 0.2 mg, 0.2 mg, Oral, TID, Matt Jiménez MD, 0.2 mg at 02/02/22 0828  clopidogreL tablet 75 mg, 75 mg, Oral, Daily, Matt Jiménez MD, 75 mg at 02/02/22  0828  dextrose 50% injection 12.5 g, 12.5 g, Intravenous, PRN, Matt Jiménez MD  dextrose 50% injection 12.5 g, 12.5 g, Intravenous, PRN, Matt Jiménez MD  dextrose 50% injection 25 g, 25 g, Intravenous, PRN, Matt Jiménez MD  dextrose 50% injection 25 g, 25 g, Intravenous, PRN, Matt Jiménez MD, 25 g at 01/25/22 0326  famotidine tablet 20 mg, 20 mg, Oral, Daily, Rickey Tong MD, 20 mg at 02/02/22 0828  glucagon (human recombinant) injection 1 mg, 1 mg, Intramuscular, PRN, Matt Jiménez MD  glucagon (human recombinant) injection 1 mg, 1 mg, Intramuscular, PRN, Matt Jiménez MD  glucose chewable tablet 16 g, 16 g, Oral, PRN, Matt Jiménez MD  glucose chewable tablet 24 g, 24 g, Oral, PRN, Matt Jiménez MD  heparin (porcine) injection 5,000 Units, 5,000 Units, Subcutaneous, Q8H, Matt Jiménez MD, 5,000 Units at 02/02/22 0543  hydrALAZINE injection 10 mg, 10 mg, Intravenous, Q8H PRN, Matt Jiménez MD  hydrALAZINE tablet 50 mg, 50 mg, Oral, Q12H, Rickey Tong MD, 50 mg at 02/02/22 0828  HYDROcodone-acetaminophen 5-325 mg per tablet 1 tablet, 1 tablet, Oral, Q6H PRN, Matt Jiménez MD  insulin aspart U-100 pen 0-5 Units, 0-5 Units, Subcutaneous, QID (AC + HS) PRN, Matt Jiménez MD, 2 Units at 01/30/22 1742  magnesium oxide tablet 800 mg, 800 mg, Oral, PRN, Matt Jiménez MD  magnesium oxide tablet 800 mg, 800 mg, Oral, PRN, Matt Jiménez MD  mupirocin 2 % ointment, , Nasal, BID, Brea Daniels MD, Given at 02/02/22 0830  naloxone 0.4 mg/mL injection 0.02 mg, 0.02 mg, Intravenous, PRN, Matt Jiménez MD  ondansetron injection 4 mg, 4 mg, Intravenous, Q8H PRN, Matt Jiménez MD  polyethylene glycol packet 17 g, 17 g, Oral, Daily PRN, Matt Jiménez MD  potassium, sodium phosphates 280-160-250 mg packet 2 packet, 2 packet, Oral, PRN, Matt Jiménez MD  potassium, sodium phosphates 280-160-250 mg packet 2 packet, 2  packet, Oral, PRN, Matt Jiménez MD  potassium, sodium phosphates 280-160-250 mg packet 2 packet, 2 packet, Oral, PRN, Matt Jiménez MD  promethazine tablet 25 mg, 25 mg, Oral, Q6H PRN, Matt Jiménez MD, 25 mg at 01/26/22 2300  sodium bicarbonate tablet 650 mg, 650 mg, Oral, BID, Matt Jiménez MD, 650 mg at 02/02/22 0828  sodium chloride 0.9% flush 10 mL, 10 mL, Intravenous, Q12H PRN, Matt Jiménez MD  trazodone split tablet 25 mg, 25 mg, Oral, Nightly PRN, Matt Jiménez MD, 25 mg at 01/27/22 2200        Nursing:   Heart Failure:      SN to instruct on the following:    Instruct on the definition of CHF.   Instruct on the signs/sympoms of CHF to be reported.   Instruct on and monitor daily weights.   Instruct on factors that cause exacerbation.   Instruct on action, dose, schedule, and side effects of medications.   Instruct on diet as prescribed.   Instruct on activity allowed.   Instruct on life-style modifications for life long management of CHF   SN to assess compliance with daily weights, diet, medications, fluid retention,     safety precautions, activities permitted and life-style modifications.   Additional 1-2 SN visits per week as needed for signs and symptoms     of CHF exacerbation.      For Weight Gain > 2-3 lbs in 1 day or 4-6 lbs over 1 week notify PCP:  Increase lasix (oral diuretic) dose to 40 mg daily for 5 days temporarily  Increase KDur (oral potassium supplement) to 20 mEq for 5 days temporarily    Diet:   2 gram sodium diet    Activities:   activity as tolerated    Labs:  N/a    Referrals/ Consults  Physical Therapy to evaluate and treat. Evaluate for home safety and equipment needs; Establish/upgrade home exercise program. Perform / instruct on therapeutic exercises, gait training, transfer training, and Range of Motion.  Occupational Therapy to evaluate and treat. Evaluate home environment for safety and equipment needs. Perform/Instruct on transfers, ADL  training, ROM, and therapeutic exercises.  Aide to provide assistance with personal care, ADLs, and vital signs.    Home Health Aide:  Nursing Three times weekly, Physical Therapy Three times weekly, and Occupational Therapy Three times weekly     Order Specific Question Answer Comments   What Home Health Agency is the patient currently using? Other/External      Notify your health care provider if you experience any of the following:  temperature >100.4     Notify your health care provider if you experience any of the following:  difficulty breathing or increased cough     Activity as tolerated       Significant Diagnostic Studies:   Results for orders placed or performed during the hospital encounter of 01/24/22   Blood culture    Specimen: Peripheral, Hand, Right; Blood   Result Value Ref Range    Blood Culture, Routine No growth after 5 days.    Blood culture    Specimen: Peripheral, Hand, Right; Blood   Result Value Ref Range    Blood Culture, Routine No growth after 5 days.    Urine culture    Specimen: Urine   Result Value Ref Range    Urine Culture, Routine CITROBACTER KOSERI  >100,000 cfu/ml   (A)        Susceptibility    Citrobacter koseri - CULTURE, URINE     Amp/Sulbactam <=8/4 Sensitive mcg/mL     Amox/K Clav'ate <=8/4 Sensitive mcg/mL     Ceftriaxone <=1 Sensitive mcg/mL     Cefazolin <=2 Sensitive mcg/mL     Ciprofloxacin <=1 Sensitive mcg/mL     Cefepime <=2 Sensitive mcg/mL     Ertapenem <=0.5 Sensitive mcg/mL     Nitrofurantoin <=32 Sensitive mcg/mL     Gentamicin <=4 Sensitive mcg/mL     Levofloxacin <=2 Sensitive mcg/mL     Meropenem <=1 Sensitive mcg/mL     Piperacillin/Tazo <=16 Sensitive mcg/mL     Trimeth/Sulfa <=2/38 Sensitive mcg/mL     Tobramycin <=4 Sensitive mcg/mL   Complete Blood Count (CBC)   Result Value Ref Range    WBC 5.36 3.90 - 12.70 K/uL    RBC 5.46 4.60 - 6.20 M/uL    Hemoglobin 14.1 14.0 - 18.0 g/dL    Hematocrit 44.3 40.0 - 54.0 %    MCV 81 (L) 82 - 98 fL    MCH 25.8 (L)  27.0 - 31.0 pg    MCHC 31.8 (L) 32.0 - 36.0 g/dL    RDW 14.4 11.5 - 14.5 %    Platelets 443 150 - 450 K/uL    MPV 10.3 9.2 - 12.9 fL    Immature Granulocytes 0.4 0.0 - 0.5 %    Gran # (ANC) 4.1 1.8 - 7.7 K/uL    Immature Grans (Abs) 0.02 0.00 - 0.04 K/uL    Lymph # 0.9 (L) 1.0 - 4.8 K/uL    Mono # 0.3 0.3 - 1.0 K/uL    Eos # 0.0 0.0 - 0.5 K/uL    Baso # 0.01 0.00 - 0.20 K/uL    nRBC 0 0 /100 WBC    Gran % 75.5 (H) 38.0 - 73.0 %    Lymph % 17.0 (L) 18.0 - 48.0 %    Mono % 6.3 4.0 - 15.0 %    Eosinophil % 0.6 0.0 - 8.0 %    Basophil % 0.2 0.0 - 1.9 %    Differential Method Automated    Comprehensive Metabolic Panel (CMP)   Result Value Ref Range    Sodium 142 136 - 145 mmol/L    Potassium 3.4 (L) 3.5 - 5.1 mmol/L    Chloride 105 95 - 110 mmol/L    CO2 17 (L) 23 - 29 mmol/L    Glucose 399 (H) 70 - 110 mg/dL    BUN 13 8 - 23 mg/dL    Creatinine 2.0 (H) 0.5 - 1.4 mg/dL    Calcium 9.2 8.7 - 10.5 mg/dL    Total Protein 8.6 (H) 6.0 - 8.4 g/dL    Albumin 3.3 (L) 3.5 - 5.2 g/dL    Total Bilirubin 0.6 0.1 - 1.0 mg/dL    Alkaline Phosphatase 121 55 - 135 U/L    AST 16 10 - 40 U/L    ALT 14 10 - 44 U/L    Anion Gap 20 (H) 8 - 16 mmol/L    eGFR if African American 39.6 (A) >60 mL/min/1.73 m^2    eGFR if non  34.3 (A) >60 mL/min/1.73 m^2   COVID-19 Rapid Screening   Result Value Ref Range    SARS-CoV-2 RNA, Amplification, Qual Negative Negative   Troponin I   Result Value Ref Range    Troponin I 0.082 (H) 0.000 - 0.026 ng/mL   Troponin I   Result Value Ref Range    Troponin I 0.082 (H) 0.000 - 0.026 ng/mL   Urinalysis, Reflex to Urine Culture Urine, Clean Catch    Specimen: Urine   Result Value Ref Range    Specimen UA Urine, Clean Catch     Color, UA Yellow Yellow, Straw, Nelsy    Appearance, UA Clear Clear    pH, UA 7.0 5.0 - 8.0    Specific Gravity, UA 1.020 1.005 - 1.030    Protein, UA 2+ (A) Negative    Glucose, UA Negative Negative    Ketones, UA Negative Negative    Bilirubin (UA) Negative Negative    Occult  Blood UA Trace (A) Negative    Nitrite, UA Negative Negative    Urobilinogen, UA Negative Negative EU/dL    Leukocytes, UA Negative Negative   Urinalysis Microscopic   Result Value Ref Range    RBC, UA 1 0 - 4 /hpf    WBC, UA 0 0 - 5 /hpf    Bacteria Rare None-Occ /hpf    Squam Epithel, UA 0 /hpf    Hyaline Casts, UA 0 0-1/lpf /lpf    Microscopic Comment SEE COMMENT    Comprehensive Metabolic Panel (CMP)   Result Value Ref Range    Sodium 144 136 - 145 mmol/L    Potassium 3.2 (L) 3.5 - 5.1 mmol/L    Chloride 107 95 - 110 mmol/L    CO2 22 (L) 23 - 29 mmol/L    Glucose 69 (L) 70 - 110 mg/dL    BUN 14 8 - 23 mg/dL    Creatinine 1.9 (H) 0.5 - 1.4 mg/dL    Calcium 7.8 (L) 8.7 - 10.5 mg/dL    Total Protein 5.8 (L) 6.0 - 8.4 g/dL    Albumin 2.3 (L) 3.5 - 5.2 g/dL    Total Bilirubin 0.6 0.1 - 1.0 mg/dL    Alkaline Phosphatase 79 55 - 135 U/L    AST 9 (L) 10 - 40 U/L    ALT 11 10 - 44 U/L    Anion Gap 15 8 - 16 mmol/L    eGFR if African American 42.1 (A) >60 mL/min/1.73 m^2    eGFR if non  36.4 (A) >60 mL/min/1.73 m^2   CBC with Automated Differential   Result Value Ref Range    WBC 4.38 3.90 - 12.70 K/uL    RBC 4.17 (L) 4.60 - 6.20 M/uL    Hemoglobin 10.7 (L) 14.0 - 18.0 g/dL    Hematocrit 33.1 (L) 40.0 - 54.0 %    MCV 79 (L) 82 - 98 fL    MCH 25.7 (L) 27.0 - 31.0 pg    MCHC 32.3 32.0 - 36.0 g/dL    RDW 14.2 11.5 - 14.5 %    Platelets 343 150 - 450 K/uL    MPV 9.9 9.2 - 12.9 fL    Immature Granulocytes 0.2 0.0 - 0.5 %    Gran # (ANC) 3.0 1.8 - 7.7 K/uL    Immature Grans (Abs) 0.01 0.00 - 0.04 K/uL    Lymph # 1.0 1.0 - 4.8 K/uL    Mono # 0.4 0.3 - 1.0 K/uL    Eos # 0.0 0.0 - 0.5 K/uL    Baso # 0.02 0.00 - 0.20 K/uL    nRBC 0 0 /100 WBC    Gran % 67.4 38.0 - 73.0 %    Lymph % 22.6 18.0 - 48.0 %    Mono % 8.4 4.0 - 15.0 %    Eosinophil % 0.9 0.0 - 8.0 %    Basophil % 0.5 0.0 - 1.9 %    Differential Method Automated    CK   Result Value Ref Range    CPK 67 20 - 200 U/L   Comprehensive Metabolic Panel (CMP)    Result Value Ref Range    Sodium 140 136 - 145 mmol/L    Potassium 3.7 3.5 - 5.1 mmol/L    Chloride 105 95 - 110 mmol/L    CO2 22 (L) 23 - 29 mmol/L    Glucose 183 (H) 70 - 110 mg/dL    BUN 20 8 - 23 mg/dL    Creatinine 2.6 (H) 0.5 - 1.4 mg/dL    Calcium 7.9 (L) 8.7 - 10.5 mg/dL    Total Protein 5.7 (L) 6.0 - 8.4 g/dL    Albumin 2.3 (L) 3.5 - 5.2 g/dL    Total Bilirubin 0.5 0.1 - 1.0 mg/dL    Alkaline Phosphatase 93 55 - 135 U/L    AST 8 (L) 10 - 40 U/L    ALT 9 (L) 10 - 44 U/L    Anion Gap 13 8 - 16 mmol/L    eGFR if African American 28.8 (A) >60 mL/min/1.73 m^2    eGFR if non African American 24.9 (A) >60 mL/min/1.73 m^2   Basic Metabolic Panel   Result Value Ref Range    Sodium 140 136 - 145 mmol/L    Potassium 3.7 3.5 - 5.1 mmol/L    Chloride 105 95 - 110 mmol/L    CO2 22 (L) 23 - 29 mmol/L    Glucose 183 (H) 70 - 110 mg/dL    BUN 20 8 - 23 mg/dL    Creatinine 2.6 (H) 0.5 - 1.4 mg/dL    Calcium 7.9 (L) 8.7 - 10.5 mg/dL    Anion Gap 13 8 - 16 mmol/L    eGFR if African American 28.8 (A) >60 mL/min/1.73 m^2    eGFR if non African American 24.9 (A) >60 mL/min/1.73 m^2   BNP   Result Value Ref Range     (H) 0 - 99 pg/mL   CBC Auto Differential   Result Value Ref Range    WBC 2.90 (L) 3.90 - 12.70 K/uL    RBC 3.98 (L) 4.60 - 6.20 M/uL    Hemoglobin 10.4 (L) 14.0 - 18.0 g/dL    Hematocrit 31.3 (L) 40.0 - 54.0 %    MCV 79 (L) 82 - 98 fL    MCH 26.1 (L) 27.0 - 31.0 pg    MCHC 33.2 32.0 - 36.0 g/dL    RDW 14.2 11.5 - 14.5 %    Platelets 259 150 - 450 K/uL    MPV 10.2 9.2 - 12.9 fL    Immature Granulocytes 0.3 0.0 - 0.5 %    Gran # (ANC) 1.8 1.8 - 7.7 K/uL    Immature Grans (Abs) 0.01 0.00 - 0.04 K/uL    Lymph # 0.7 (L) 1.0 - 4.8 K/uL    Mono # 0.4 0.3 - 1.0 K/uL    Eos # 0.0 0.0 - 0.5 K/uL    Baso # 0.01 0.00 - 0.20 K/uL    nRBC 0 0 /100 WBC    Gran % 61.2 38.0 - 73.0 %    Lymph % 24.1 18.0 - 48.0 %    Mono % 13.1 4.0 - 15.0 %    Eosinophil % 1.0 0.0 - 8.0 %    Basophil % 0.3 0.0 - 1.9 %    Differential Method  Automated    CBC with Automated Differential   Result Value Ref Range    WBC 2.90 (L) 3.90 - 12.70 K/uL    RBC 3.98 (L) 4.60 - 6.20 M/uL    Hemoglobin 10.4 (L) 14.0 - 18.0 g/dL    Hematocrit 31.3 (L) 40.0 - 54.0 %    MCV 79 (L) 82 - 98 fL    MCH 26.1 (L) 27.0 - 31.0 pg    MCHC 33.2 32.0 - 36.0 g/dL    RDW 14.2 11.5 - 14.5 %    Platelets 259 150 - 450 K/uL    MPV 10.2 9.2 - 12.9 fL    Immature Granulocytes 0.3 0.0 - 0.5 %    Gran # (ANC) 1.8 1.8 - 7.7 K/uL    Immature Grans (Abs) 0.01 0.00 - 0.04 K/uL    Lymph # 0.7 (L) 1.0 - 4.8 K/uL    Mono # 0.4 0.3 - 1.0 K/uL    Eos # 0.0 0.0 - 0.5 K/uL    Baso # 0.01 0.00 - 0.20 K/uL    nRBC 0 0 /100 WBC    Gran % 61.2 38.0 - 73.0 %    Lymph % 24.1 18.0 - 48.0 %    Mono % 13.1 4.0 - 15.0 %    Eosinophil % 1.0 0.0 - 8.0 %    Basophil % 0.3 0.0 - 1.9 %    Differential Method Automated    Magnesium   Result Value Ref Range    Magnesium 1.4 (L) 1.6 - 2.6 mg/dL   Urinalysis, Reflex to Urine Culture Urine, Clean Catch    Specimen: Urine, Clean Catch   Result Value Ref Range    Specimen UA Urine, Unspecified     Color, UA Yellow Yellow, Straw, Nelsy    Appearance, UA Cloudy (A) Clear    pH, UA 5.0 5.0 - 8.0    Specific Gravity, UA 1.020 1.005 - 1.030    Protein, UA 2+ (A) Negative    Glucose, UA Negative Negative    Ketones, UA Negative Negative    Bilirubin (UA) Negative Negative    Occult Blood UA 2+ (A) Negative    Nitrite, UA Negative Negative    Urobilinogen, UA 1.0 Negative EU/dL    Leukocytes, UA 2+ (A) Negative   Urinalysis Microscopic   Result Value Ref Range    RBC, UA 33 (H) 0 - 4 /hpf    WBC, UA 55 (H) 0 - 5 /hpf    Bacteria Moderate (A) None-Occ /hpf    Squam Epithel, UA 1 /hpf    Hyaline Casts, UA 3 (A) 0-1/lpf /lpf    Microscopic Comment SEE COMMENT    Comprehensive Metabolic Panel (CMP)   Result Value Ref Range    Sodium 138 136 - 145 mmol/L    Potassium 3.6 3.5 - 5.1 mmol/L    Chloride 104 95 - 110 mmol/L    CO2 19 (L) 23 - 29 mmol/L    Glucose 147 (H) 70 - 110 mg/dL     BUN 31 (H) 8 - 23 mg/dL    Creatinine 3.2 (H) 0.5 - 1.4 mg/dL    Calcium 7.8 (L) 8.7 - 10.5 mg/dL    Total Protein 5.6 (L) 6.0 - 8.4 g/dL    Albumin 2.3 (L) 3.5 - 5.2 g/dL    Total Bilirubin 0.4 0.1 - 1.0 mg/dL    Alkaline Phosphatase 66 55 - 135 U/L    AST 11 10 - 40 U/L    ALT 10 10 - 44 U/L    Anion Gap 15 8 - 16 mmol/L    eGFR if African American 22.4 (A) >60 mL/min/1.73 m^2    eGFR if non African American 19.4 (A) >60 mL/min/1.73 m^2   CBC Auto Differential   Result Value Ref Range    WBC 3.04 (L) 3.90 - 12.70 K/uL    RBC 3.76 (L) 4.60 - 6.20 M/uL    Hemoglobin 9.8 (L) 14.0 - 18.0 g/dL    Hematocrit 29.4 (L) 40.0 - 54.0 %    MCV 78 (L) 82 - 98 fL    MCH 26.1 (L) 27.0 - 31.0 pg    MCHC 33.3 32.0 - 36.0 g/dL    RDW 14.2 11.5 - 14.5 %    Platelets 224 150 - 450 K/uL    MPV 10.0 9.2 - 12.9 fL    Immature Granulocytes 0.0 0.0 - 0.5 %    Gran # (ANC) 1.5 (L) 1.8 - 7.7 K/uL    Immature Grans (Abs) 0.00 0.00 - 0.04 K/uL    Lymph # 1.0 1.0 - 4.8 K/uL    Mono # 0.5 0.3 - 1.0 K/uL    Eos # 0.0 0.0 - 0.5 K/uL    Baso # 0.01 0.00 - 0.20 K/uL    nRBC 0 0 /100 WBC    Gran % 50.7 38.0 - 73.0 %    Lymph % 33.2 18.0 - 48.0 %    Mono % 15.5 (H) 4.0 - 15.0 %    Eosinophil % 0.3 0.0 - 8.0 %    Basophil % 0.3 0.0 - 1.9 %    Differential Method Automated    Magnesium   Result Value Ref Range    Magnesium 1.8 1.6 - 2.6 mg/dL   CBC Auto Differential   Result Value Ref Range    WBC 2.46 (L) 3.90 - 12.70 K/uL    RBC 3.84 (L) 4.60 - 6.20 M/uL    Hemoglobin 9.9 (L) 14.0 - 18.0 g/dL    Hematocrit 30.0 (L) 40.0 - 54.0 %    MCV 78 (L) 82 - 98 fL    MCH 25.8 (L) 27.0 - 31.0 pg    MCHC 33.0 32.0 - 36.0 g/dL    RDW 14.1 11.5 - 14.5 %    Platelets 233 150 - 450 K/uL    MPV 9.7 9.2 - 12.9 fL    Immature Granulocytes 0.4 0.0 - 0.5 %    Gran # (ANC) 1.4 (L) 1.8 - 7.7 K/uL    Immature Grans (Abs) 0.01 0.00 - 0.04 K/uL    Lymph # 0.7 (L) 1.0 - 4.8 K/uL    Mono # 0.3 0.3 - 1.0 K/uL    Eos # 0.1 0.0 - 0.5 K/uL    Baso # 0.01 0.00 - 0.20 K/uL    nRBC  0 0 /100 WBC    Gran % 57.3 38.0 - 73.0 %    Lymph % 28.5 18.0 - 48.0 %    Mono % 11.4 4.0 - 15.0 %    Eosinophil % 2.0 0.0 - 8.0 %    Basophil % 0.4 0.0 - 1.9 %    Differential Method Automated    Basic Metabolic Panel   Result Value Ref Range    Sodium 138 136 - 145 mmol/L    Potassium 3.6 3.5 - 5.1 mmol/L    Chloride 104 95 - 110 mmol/L    CO2 21 (L) 23 - 29 mmol/L    Glucose 171 (H) 70 - 110 mg/dL    BUN 34 (H) 8 - 23 mg/dL    Creatinine 2.8 (H) 0.5 - 1.4 mg/dL    Calcium 7.7 (L) 8.7 - 10.5 mg/dL    Anion Gap 13 8 - 16 mmol/L    eGFR if African American 26.4 (A) >60 mL/min/1.73 m^2    eGFR if non  22.8 (A) >60 mL/min/1.73 m^2   BNP   Result Value Ref Range     (H) 0 - 99 pg/mL   Basic Metabolic Panel   Result Value Ref Range    Sodium 139 136 - 145 mmol/L    Potassium 3.8 3.5 - 5.1 mmol/L    Chloride 104 95 - 110 mmol/L    CO2 21 (L) 23 - 29 mmol/L    Glucose 168 (H) 70 - 110 mg/dL    BUN 31 (H) 8 - 23 mg/dL    Creatinine 2.5 (H) 0.5 - 1.4 mg/dL    Calcium 7.8 (L) 8.7 - 10.5 mg/dL    Anion Gap 14 8 - 16 mmol/L    eGFR if African American 30.2 (A) >60 mL/min/1.73 m^2    eGFR if non  26.2 (A) >60 mL/min/1.73 m^2   CBC Auto Differential   Result Value Ref Range    WBC 2.47 (L) 3.90 - 12.70 K/uL    RBC 4.08 (L) 4.60 - 6.20 M/uL    Hemoglobin 10.4 (L) 14.0 - 18.0 g/dL    Hematocrit 32.4 (L) 40.0 - 54.0 %    MCV 79 (L) 82 - 98 fL    MCH 25.5 (L) 27.0 - 31.0 pg    MCHC 32.1 32.0 - 36.0 g/dL    RDW 14.4 11.5 - 14.5 %    Platelets 234 150 - 450 K/uL    MPV 10.1 9.2 - 12.9 fL    Immature Granulocytes 0.0 0.0 - 0.5 %    Gran # (ANC) 1.7 (L) 1.8 - 7.7 K/uL    Immature Grans (Abs) 0.00 0.00 - 0.04 K/uL    Lymph # 0.5 (L) 1.0 - 4.8 K/uL    Mono # 0.2 (L) 0.3 - 1.0 K/uL    Eos # 0.0 0.0 - 0.5 K/uL    Baso # 0.02 0.00 - 0.20 K/uL    nRBC 0 0 /100 WBC    Gran % 68.5 38.0 - 73.0 %    Lymph % 20.2 18.0 - 48.0 %    Mono % 8.9 4.0 - 15.0 %    Eosinophil % 1.6 0.0 - 8.0 %    Basophil % 0.8 0.0 -  1.9 %    Differential Method Automated    BNP   Result Value Ref Range     (H) 0 - 99 pg/mL   Basic Metabolic Panel   Result Value Ref Range    Sodium 138 136 - 145 mmol/L    Potassium 3.8 3.5 - 5.1 mmol/L    Chloride 106 95 - 110 mmol/L    CO2 19 (L) 23 - 29 mmol/L    Glucose 162 (H) 70 - 110 mg/dL    BUN 27 (H) 8 - 23 mg/dL    Creatinine 2.4 (H) 0.5 - 1.4 mg/dL    Calcium 7.6 (L) 8.7 - 10.5 mg/dL    Anion Gap 13 8 - 16 mmol/L    eGFR if African American 31.8 (A) >60 mL/min/1.73 m^2    eGFR if non African American 27.5 (A) >60 mL/min/1.73 m^2   CBC auto differential   Result Value Ref Range    WBC 2.07 (L) 3.90 - 12.70 K/uL    RBC 4.41 (L) 4.60 - 6.20 M/uL    Hemoglobin 11.4 (L) 14.0 - 18.0 g/dL    Hematocrit 33.9 (L) 40.0 - 54.0 %    MCV 77 (L) 82 - 98 fL    MCH 25.9 (L) 27.0 - 31.0 pg    MCHC 33.6 32.0 - 36.0 g/dL    RDW 14.3 11.5 - 14.5 %    Platelets 204 150 - 450 K/uL    MPV 10.5 9.2 - 12.9 fL    Immature Granulocytes 0.0 0.0 - 0.5 %    Gran # (ANC) 1.1 (L) 1.8 - 7.7 K/uL    Immature Grans (Abs) 0.00 0.00 - 0.04 K/uL    Lymph # 0.8 (L) 1.0 - 4.8 K/uL    Mono # 0.2 (L) 0.3 - 1.0 K/uL    Eos # 0.0 0.0 - 0.5 K/uL    Baso # 0.01 0.00 - 0.20 K/uL    nRBC 0 0 /100 WBC    Gran % 52.7 38.0 - 73.0 %    Lymph % 37.2 18.0 - 48.0 %    Mono % 7.7 4.0 - 15.0 %    Eosinophil % 1.9 0.0 - 8.0 %    Basophil % 0.5 0.0 - 1.9 %    Differential Method Automated    Basic Metabolic Panel   Result Value Ref Range    Sodium 138 136 - 145 mmol/L    Potassium 3.9 3.5 - 5.1 mmol/L    Chloride 106 95 - 110 mmol/L    CO2 17 (L) 23 - 29 mmol/L    Glucose 159 (H) 70 - 110 mg/dL    BUN 27 (H) 8 - 23 mg/dL    Creatinine 2.3 (H) 0.5 - 1.4 mg/dL    Calcium 8.0 (L) 8.7 - 10.5 mg/dL    Anion Gap 15 8 - 16 mmol/L    eGFR if African American 33.4 (A) >60 mL/min/1.73 m^2    eGFR if non  28.9 (A) >60 mL/min/1.73 m^2   CBC auto differential   Result Value Ref Range    WBC 1.71 (LL) 3.90 - 12.70 K/uL    RBC 4.24 (L) 4.60 - 6.20  M/uL    Hemoglobin 10.9 (L) 14.0 - 18.0 g/dL    Hematocrit 32.7 (L) 40.0 - 54.0 %    MCV 77 (L) 82 - 98 fL    MCH 25.7 (L) 27.0 - 31.0 pg    MCHC 33.3 32.0 - 36.0 g/dL    RDW 14.4 11.5 - 14.5 %    Platelets 178 150 - 450 K/uL    MPV 10.0 9.2 - 12.9 fL    Immature Granulocytes Test Not Performed 0.0 - 0.5 %    Gran # (ANC) Test Not Performed 1.8 - 7.7 K/uL    Immature Grans (Abs) Test Not Performed 0.00 - 0.04 K/uL    Lymph # Test Not Performed 1.0 - 4.8 K/uL    Mono # Test Not Performed 0.3 - 1.0 K/uL    Eos # Test Not Performed 0.0 - 0.5 K/uL    Baso # Test Not Performed 0.00 - 0.20 K/uL    nRBC 0 0 /100 WBC    Gran % 52.0 38.0 - 73.0 %    Lymph % 44.0 18.0 - 48.0 %    Mono % 0.0 (L) 4.0 - 15.0 %    Eosinophil % 0.0 0.0 - 8.0 %    Basophil % 4.0 (H) 0.0 - 1.9 %    Aniso Slight     Poik Slight     Differential Method Manual    Basic Metabolic Panel   Result Value Ref Range    Sodium 137 136 - 145 mmol/L    Potassium 3.8 3.5 - 5.1 mmol/L    Chloride 105 95 - 110 mmol/L    CO2 19 (L) 23 - 29 mmol/L    Glucose 153 (H) 70 - 110 mg/dL    BUN 25 (H) 8 - 23 mg/dL    Creatinine 2.2 (H) 0.5 - 1.4 mg/dL    Calcium 7.9 (L) 8.7 - 10.5 mg/dL    Anion Gap 13 8 - 16 mmol/L    eGFR if African American 35.3 (A) >60 mL/min/1.73 m^2    eGFR if non African American 30.5 (A) >60 mL/min/1.73 m^2   COVID-19 Rapid Screening   Result Value Ref Range    SARS-CoV-2 RNA, Amplification, Qual Positive (A) Negative   CBC auto differential   Result Value Ref Range    WBC 2.16 (L) 3.90 - 12.70 K/uL    RBC 4.23 (L) 4.60 - 6.20 M/uL    Hemoglobin 10.9 (L) 14.0 - 18.0 g/dL    Hematocrit 32.6 (L) 40.0 - 54.0 %    MCV 77 (L) 82 - 98 fL    MCH 25.8 (L) 27.0 - 31.0 pg    MCHC 33.4 32.0 - 36.0 g/dL    RDW 14.3 11.5 - 14.5 %    Platelets 208 150 - 450 K/uL    MPV 11.3 9.2 - 12.9 fL    Immature Granulocytes 0.5 0.0 - 0.5 %    Gran # (ANC) 1.3 (L) 1.8 - 7.7 K/uL    Immature Grans (Abs) 0.01 0.00 - 0.04 K/uL    Lymph # 0.7 (L) 1.0 - 4.8 K/uL    Mono # 0.2 (L)  0.3 - 1.0 K/uL    Eos # 0.0 0.0 - 0.5 K/uL    Baso # 0.01 0.00 - 0.20 K/uL    nRBC 0 0 /100 WBC    Gran % 58.7 38.0 - 73.0 %    Lymph % 31.0 18.0 - 48.0 %    Mono % 7.9 4.0 - 15.0 %    Eosinophil % 1.4 0.0 - 8.0 %    Basophil % 0.5 0.0 - 1.9 %    Differential Method Automated    CBC Auto Differential   Result Value Ref Range    WBC 2.16 (L) 3.90 - 12.70 K/uL    RBC 4.23 (L) 4.60 - 6.20 M/uL    Hemoglobin 10.9 (L) 14.0 - 18.0 g/dL    Hematocrit 32.6 (L) 40.0 - 54.0 %    MCV 77 (L) 82 - 98 fL    MCH 25.8 (L) 27.0 - 31.0 pg    MCHC 33.4 32.0 - 36.0 g/dL    RDW 14.3 11.5 - 14.5 %    Platelets 208 150 - 450 K/uL    MPV 11.3 9.2 - 12.9 fL    Immature Granulocytes 0.5 0.0 - 0.5 %    Gran # (ANC) 1.3 (L) 1.8 - 7.7 K/uL    Immature Grans (Abs) 0.01 0.00 - 0.04 K/uL    Lymph # 0.7 (L) 1.0 - 4.8 K/uL    Mono # 0.2 (L) 0.3 - 1.0 K/uL    Eos # 0.0 0.0 - 0.5 K/uL    Baso # 0.01 0.00 - 0.20 K/uL    nRBC 0 0 /100 WBC    Gran % 58.7 38.0 - 73.0 %    Lymph % 31.0 18.0 - 48.0 %    Mono % 7.9 4.0 - 15.0 %    Eosinophil % 1.4 0.0 - 8.0 %    Basophil % 0.5 0.0 - 1.9 %    Differential Method Automated    Basic Metabolic Panel   Result Value Ref Range    Sodium 139 136 - 145 mmol/L    Potassium 3.9 3.5 - 5.1 mmol/L    Chloride 106 95 - 110 mmol/L    CO2 17 (L) 23 - 29 mmol/L    Glucose 155 (H) 70 - 110 mg/dL    BUN 24 (H) 8 - 23 mg/dL    Creatinine 2.3 (H) 0.5 - 1.4 mg/dL    Calcium 7.7 (L) 8.7 - 10.5 mg/dL    Anion Gap 16 8 - 16 mmol/L    eGFR if African American 33.4 (A) >60 mL/min/1.73 m^2    eGFR if non  28.9 (A) >60 mL/min/1.73 m^2   POCT glucose   Result Value Ref Range    POCT Glucose 65 (L) 70 - 110 mg/dL   POCT glucose   Result Value Ref Range    POCT Glucose 201 (H) 70 - 110 mg/dL   POCT glucose   Result Value Ref Range    POCT Glucose 150 (H) 70 - 110 mg/dL   POCT glucose   Result Value Ref Range    POCT Glucose 115 (H) 70 - 110 mg/dL   POCT glucose   Result Value Ref Range    POCT Glucose 37 (LL) 70 - 110 mg/dL    POCT glucose   Result Value Ref Range    POCT Glucose 129 (H) 70 - 110 mg/dL   POCT glucose   Result Value Ref Range    POCT Glucose 200 (H) 70 - 110 mg/dL   POCT glucose   Result Value Ref Range    POCT Glucose 206 (H) 70 - 110 mg/dL   POCT glucose   Result Value Ref Range    POCT Glucose 202 (H) 70 - 110 mg/dL   POCT glucose   Result Value Ref Range    POCT Glucose 204 (H) 70 - 110 mg/dL   POCT glucose   Result Value Ref Range    POCT Glucose 177 (H) 70 - 110 mg/dL   POCT glucose   Result Value Ref Range    POCT Glucose 147 (H) 70 - 110 mg/dL   POCT glucose   Result Value Ref Range    POCT Glucose 164 (H) 70 - 110 mg/dL   POCT glucose   Result Value Ref Range    POCT Glucose 163 (H) 70 - 110 mg/dL   POCT glucose   Result Value Ref Range    POCT Glucose 234 (H) 70 - 110 mg/dL   POCT glucose   Result Value Ref Range    POCT Glucose 164 (H) 70 - 110 mg/dL   POCT glucose   Result Value Ref Range    POCT Glucose 209 (H) 70 - 110 mg/dL   POCT glucose   Result Value Ref Range    POCT Glucose 179 (H) 70 - 110 mg/dL   POCT glucose   Result Value Ref Range    POCT Glucose 211 (H) 70 - 110 mg/dL   POCT glucose   Result Value Ref Range    POCT Glucose 221 (H) 70 - 110 mg/dL   POCT glucose   Result Value Ref Range    POCT Glucose 255 (H) 70 - 110 mg/dL   POCT glucose   Result Value Ref Range    POCT Glucose 160 (H) 70 - 110 mg/dL   POCT glucose   Result Value Ref Range    POCT Glucose 172 (H) 70 - 110 mg/dL   POCT glucose   Result Value Ref Range    POCT Glucose 164 (H) 70 - 110 mg/dL   POCT glucose   Result Value Ref Range    POCT Glucose 207 (H) 70 - 110 mg/dL   POCT glucose   Result Value Ref Range    POCT Glucose 209 (H) 70 - 110 mg/dL   POCT glucose   Result Value Ref Range    POCT Glucose 169 (H) 70 - 110 mg/dL   POCT glucose   Result Value Ref Range    POCT Glucose 163 (H) 70 - 110 mg/dL   POCT glucose   Result Value Ref Range    POCT Glucose 160 (H) 70 - 110 mg/dL   POCT glucose   Result Value Ref Range    POCT  Glucose 206 (H) 70 - 110 mg/dL   POCT glucose   Result Value Ref Range    POCT Glucose 175 (H) 70 - 110 mg/dL   POCT glucose   Result Value Ref Range    POCT Glucose 171 (H) 70 - 110 mg/dL   POCT glucose   Result Value Ref Range    POCT Glucose 185 (H) 70 - 110 mg/dL   POCT glucose   Result Value Ref Range    POCT Glucose 185 (H) 70 - 110 mg/dL   POCT glucose   Result Value Ref Range    POCT Glucose 187 (H) 70 - 110 mg/dL   POCT glucose   Result Value Ref Range    POCT Glucose 157 (H) 70 - 110 mg/dL   POCT glucose   Result Value Ref Range    POCT Glucose 178 (H) 70 - 110 mg/dL     US Kidney   Final Result      No hydronephrosis.  Elevated resistive indices may indicate medical renal disease.      Cyst at the upper pole of the left kidney, which may be slightly complex.  The patient is unable to have a contrast enhanced CT or MRI for further evaluation, due to abnormal renal function studies; therefore, follow-up ultrasound is recommended in 6 months to document stability.         Electronically signed by: Chari Weaver   Date:    01/26/2022   Time:    16:48      X-Ray Chest AP Portable   Final Result   Abnormal      Findings consistent with congestive heart failure.      Close interval follow-up is recommended.      This report was flagged in Epic as abnormal.         Electronically signed by: Darnell Ascencio   Date:    01/24/2022   Time:    15:12            Pending Diagnostic Studies:     None         Medications:  Reconciled Home Medications:      Medication List      START taking these medications    carvediloL 3.125 MG tablet  Commonly known as: COREG  Take 1 tablet (3.125 mg total) by mouth once daily.     cloNIDine 0.1 MG tablet  Commonly known as: CATAPRES  Take 2 tablets (0.2 mg total) by mouth 3 (three) times daily.     hydrALAZINE 50 MG tablet  Commonly known as: APRESOLINE  Take 1 tablet (50 mg total) by mouth every 12 (twelve) hours.        CHANGE how you take these medications    furosemide 20  MG tablet  Commonly known as: LASIX  Take 1 tablet (20 mg total) by mouth once daily.  What changed:   · medication strength  · when to take this        CONTINUE taking these medications    allopurinoL 100 MG tablet  Commonly known as: ZYLOPRIM  = 2 tab, Oral, Daily, # 60 tab, 5 Refill(s), Pharmacy: [id number], 167, cm, 07/27/21 8:24:00 CDT, Height/Length Measured, 91.5, kg, 12/15/20 11:18:00 CST, Weight Dosing     amLODIPine 10 MG tablet  Commonly known as: NORVASC  10 mg.     aspirin 81 MG EC tablet  Commonly known as: ECOTRIN  Take 1 tablet (81 mg total) by mouth once daily.     atorvastatin 40 MG tablet  Commonly known as: LIPITOR  = 1 tab, Oral, Daily, # 90 tab, 1 Refill(s), Soft Stop, Pharmacy: [id number], 167, cm, 04/07/21 14:01:00 CDT, Height/Length Measured, 91.5, kg, 12/15/20 11:18:00 CST, Weight Dosing     clopidogreL 75 mg tablet  Commonly known as: PLAVIX  Take 75 mg by mouth once daily.     linaGLIPtin 5 mg Tab tablet  Commonly known as: TRADJENTA  5 mg.     NovoLOG Mix 70-30FlexPen U-100 100 unit/mL (70-30) Inpn pen  Generic drug: insulin aspart protamine-insulin aspart  Inject into the skin.     sodium bicarbonate 650 MG tablet  Take 1 tablet (650 mg total) by mouth 2 (two) times daily.        STOP taking these medications    lisinopriL 2.5 MG tablet  Commonly known as: PRINIVIL,ZESTRIL        ASK your doctor about these medications    ciprofloxacin HCl 500 MG tablet  Commonly known as: CIPRO  Take 1 tablet (500 mg total) by mouth every 12 (twelve) hours. for 5 days  Ask about: Should I take this medication?            Indwelling Lines/Drains at time of discharge:   Lines/Drains/Airways       None                   Time spent on the discharge of patient: 55 minutes         Brea Daniels MD  Department of Hospital Medicine  [facility] - Med Surg

## 2022-03-10 NOTE — PHYSICIAN QUERY
PT Name: Marshall Flor  MR #: [id number]     DOCUMENTATION CLARIFICATION     CDS/: Joleen Mariee RN CDIS             Contact information: [email or web address]    This form is a permanent document in the medical record.     Query Date: March 10, 2022    By submitting this query, we are merely seeking further clarification of documentation to reflect the severity of illness of your patient. Please utilize your independent clinical judgment when addressing the question(s) below.    The Medical Record contains the following:   Indicators   Supporting Clinical Findings Location in Medical Record   x Hypertension or hypertensive documented   Hypertensive urgency    Hypertensive crisis   Discharge summary     ED provider note    x Acute/Chronic condition(s)   COVID-19 virus infection    CHF (congestive heart failure), NYHA class I, acute on chronic, systolic    Acute on chronic kidney disease Discharge summary    x Vital signs Vital Signs (24h Range):  BP: (191-218)/(107-147) 202/117 H&P     Lab findings      Radiology findings     x Treatment/Medication hydrALAZINE injection 10 mg  Dose: 10 mg  Freq: ED 1 Time Route: IV  Start: 01/24/22 1545 End: 01/24/22 1546    nitroGLYCERIN SL tablet 0.4 mg  Dose: 0.4 mg  Freq: ED 1 Time Route: SL  Start: 01/24/22 1600 End: 01/24/22 1620    amLODIPine tablet 10 mg  Dose: 10 mg  Freq: Daily Route: Oral  Start: 01/25/22 0900 End: 02/02/22 1702    carvediloL tablet 3.125 mg  Dose: 3.125 mg  Freq: Daily Route: Oral  Start: 02/02/22 1015 End: 02/02/22 1702    cloNIDine tablet 0.2 mg  Dose: 0.2 mg  Freq: 3 times daily Route: Oral  Start: 01/24/22 2100 End: 02/02/22 1702    hydrALAZINE tablet 50 mg  Dose: 50 mg  Freq: Every 12 hours Route: Oral  Start: 01/30/22 2100 End: 02/02/22 1702    nitroGLYCERIN 50 mg in dextrose 5 % 250 mL infusion (TITRATING)  Rate: 1.5 mL/hr Dose: 5 mcg/min  Freq: Continuous Route: IV  Start: 01/24/22 1830 End: 01/25/22 1452 MAR x 1          MAR x 1          MAR            MAR           MAR           MAR           MAR       Other       The clinical guidelines noted are only a system guideline. It does not replace the provider's clinical judgment.  Provider, please specify the diagnosis or diagnoses that correspond(s) to the above indicators:  [   ] Hypertensive crisis, unspecified   [  x ] Hypertensive emergency - Severe hypertension (SBP>180 and/or DBP>120mmHg) with signs or symptoms of new or worsening end-organ damage (i.e. hypertensive encephalopathy, retinal hemorrhage, papilledema, acute kidney injury, stroke, heart attack, heart failure, kidney failure)   [   ] Hypertensive urgency - Severe asymptomatic hypertension (SBP>180 and/or DBP>120mmHg) without signs or symptoms of acute end-organ damage. Can have a mild headache.   [   ] Other cardiovascular condition (please specify): __________   [   ]  Clinically Undetermined         Please document in your progress notes daily for the duration of treatment until resolved, and include in your discharge summary.    References:    MARYSOL Ojeda MD, PhD, & KHALIDA Giron MD, FACP, FCCP, FCCM, FRSM. (2020, June 25). Evaluation and treatment of hypertensive emergencies in adults (TAMMY Miller MD, MARYSOL Murrieta MD, & KHALIDA Caceres MD, MSc, Eds.). Retrieved October 22, 2020, from [email or web address]-and-treatment-pb-gyrwgbbzzxlh-rjoxielneoz-in-adults?search=hypertensive%20emergency&source=search_result&selectedTitle=1~150&usage_type=default&display_rank=1    KHALIDA Giron MD, FACP, FCCP, FCCM, FRSM, & MARYSOL Ojeda MD, PhD. (2020, October 14). Management of severe asymptomatic hypertension (hypertensive urgencies) in adults (TAMMY Miller MD, MARYSOL Murrieta MD, & KHALIDA Caceres MD, MSc, Eds.). Retrieved October 22, 2020, from  [email or web address]://[email or web address]/contents/management-of-severe-asymptomatic-hypertension-hypertensive-urgencies-in-adults?search=hypertensive%20urgency&source=search_result&selectedTitle=1~41&usage_type=default&display_rank=1    Form No. 96642

## 2022-03-22 ENCOUNTER — DOCUMENT SCAN (OUTPATIENT)
Dept: HOME HEALTH SERVICES | Facility: HOSPITAL | Age: 67
End: 2022-03-22
Payer: MEDICARE

## 2022-04-26 ENCOUNTER — HOSPITAL ENCOUNTER (EMERGENCY)
Facility: HOSPITAL | Age: 67
Discharge: HOME | End: 2022-04-26
Attending: FAMILY MEDICINE
Payer: MEDICARE

## 2022-04-26 VITALS
TEMPERATURE: 99 F | BODY MASS INDEX: 32.96 KG/M2 | SYSTOLIC BLOOD PRESSURE: 154 MMHG | OXYGEN SATURATION: 100 % | DIASTOLIC BLOOD PRESSURE: 90 MMHG | WEIGHT: 210 LBS | RESPIRATION RATE: 17 BRPM | HEART RATE: 78 BPM | HEIGHT: 67 IN

## 2022-04-26 DIAGNOSIS — R06.02 SOB (SHORTNESS OF BREATH): ICD-10-CM

## 2022-04-26 LAB
ALBUMIN SERPL BCP-MCNC: 2.9 G/DL (ref 3.5–5.2)
ALP SERPL-CCNC: 111 U/L (ref 55–135)
ALT SERPL W/O P-5'-P-CCNC: 27 U/L (ref 10–44)
ANION GAP SERPL CALC-SCNC: 15 MMOL/L (ref 8–16)
AST SERPL-CCNC: 16 U/L (ref 10–40)
BASOPHILS # BLD AUTO: 0.04 K/UL (ref 0–0.2)
BASOPHILS NFR BLD: 0.9 % (ref 0–1.9)
BILIRUB SERPL-MCNC: 1 MG/DL (ref 0.1–1)
BNP SERPL-MCNC: 3360 PG/ML (ref 0–99)
BUN SERPL-MCNC: 20 MG/DL (ref 8–23)
CALCIUM SERPL-MCNC: 8.3 MG/DL (ref 8.7–10.5)
CHLORIDE SERPL-SCNC: 106 MMOL/L (ref 95–110)
CO2 SERPL-SCNC: 18 MMOL/L (ref 23–29)
CREAT SERPL-MCNC: 2.2 MG/DL (ref 0.5–1.4)
DIFFERENTIAL METHOD: ABNORMAL
EOSINOPHIL # BLD AUTO: 0.2 K/UL (ref 0–0.5)
EOSINOPHIL NFR BLD: 3.5 % (ref 0–8)
ERYTHROCYTE [DISTWIDTH] IN BLOOD BY AUTOMATED COUNT: 19 % (ref 11.5–14.5)
EST. GFR  (AFRICAN AMERICAN): 35.3 ML/MIN/1.73 M^2
EST. GFR  (NON AFRICAN AMERICAN): 30.5 ML/MIN/1.73 M^2
GLUCOSE SERPL-MCNC: 220 MG/DL (ref 70–110)
HCT VFR BLD AUTO: 34.5 % (ref 40–54)
HGB BLD-MCNC: 11.3 G/DL (ref 14–18)
IMM GRANULOCYTES # BLD AUTO: 0.01 K/UL (ref 0–0.04)
IMM GRANULOCYTES NFR BLD AUTO: 0.2 % (ref 0–0.5)
LYMPHOCYTES # BLD AUTO: 1.5 K/UL (ref 1–4.8)
LYMPHOCYTES NFR BLD: 33.9 % (ref 18–48)
MCH RBC QN AUTO: 24.6 PG (ref 27–31)
MCHC RBC AUTO-ENTMCNC: 32.8 G/DL (ref 32–36)
MCV RBC AUTO: 75 FL (ref 82–98)
MONOCYTES # BLD AUTO: 0.5 K/UL (ref 0.3–1)
MONOCYTES NFR BLD: 11.1 % (ref 4–15)
NEUTROPHILS # BLD AUTO: 2.2 K/UL (ref 1.8–7.7)
NEUTROPHILS NFR BLD: 50.4 % (ref 38–73)
NRBC BLD-RTO: 0 /100 WBC
PLATELET # BLD AUTO: 434 K/UL (ref 150–450)
PMV BLD AUTO: 9.6 FL (ref 9.2–12.9)
POCT GLUCOSE: 220 MG/DL (ref 70–110)
POTASSIUM SERPL-SCNC: 3.4 MMOL/L (ref 3.5–5.1)
PROT SERPL-MCNC: 6.9 G/DL (ref 6–8.4)
RBC # BLD AUTO: 4.59 M/UL (ref 4.6–6.2)
SODIUM SERPL-SCNC: 139 MMOL/L (ref 136–145)
WBC # BLD AUTO: 4.33 K/UL (ref 3.9–12.7)

## 2022-04-26 PROCEDURE — 85025 COMPLETE CBC W/AUTO DIFF WBC: CPT | Performed by: FAMILY MEDICINE

## 2022-04-26 PROCEDURE — 83880 ASSAY OF NATRIURETIC PEPTIDE: CPT | Performed by: FAMILY MEDICINE

## 2022-04-26 PROCEDURE — 71045 X-RAY EXAM CHEST 1 VIEW: CPT | Mod: 26,,, | Performed by: RADIOLOGY

## 2022-04-26 PROCEDURE — 93005 ELECTROCARDIOGRAM TRACING: CPT

## 2022-04-26 PROCEDURE — 93010 ELECTROCARDIOGRAM REPORT: CPT | Mod: ,,, | Performed by: INTERNAL MEDICINE

## 2022-04-26 PROCEDURE — 99285 EMERGENCY DEPT VISIT HI MDM: CPT | Mod: 25

## 2022-04-26 PROCEDURE — 96374 THER/PROPH/DIAG INJ IV PUSH: CPT

## 2022-04-26 PROCEDURE — 71045 XR CHEST AP PORTABLE: ICD-10-PCS | Mod: 26,,, | Performed by: RADIOLOGY

## 2022-04-26 PROCEDURE — 96375 TX/PRO/DX INJ NEW DRUG ADDON: CPT

## 2022-04-26 PROCEDURE — 93010 EKG 12-LEAD: ICD-10-PCS | Mod: ,,, | Performed by: INTERNAL MEDICINE

## 2022-04-26 PROCEDURE — 71045 X-RAY EXAM CHEST 1 VIEW: CPT | Mod: TC,FY

## 2022-04-26 PROCEDURE — 80053 COMPREHEN METABOLIC PANEL: CPT | Performed by: FAMILY MEDICINE

## 2022-04-26 PROCEDURE — 63600175 PHARM REV CODE 636 W HCPCS: Performed by: FAMILY MEDICINE

## 2022-04-26 RX ORDER — FUROSEMIDE 10 MG/ML
60 INJECTION INTRAMUSCULAR; INTRAVENOUS
Status: COMPLETED | OUTPATIENT
Start: 2022-04-26 | End: 2022-04-26

## 2022-04-26 RX ORDER — FUROSEMIDE 20 MG/1
20 TABLET ORAL DAILY
Qty: 30 TABLET | Refills: 1 | Status: ON HOLD | OUTPATIENT
Start: 2022-04-26 | End: 2022-06-03

## 2022-04-26 RX ORDER — HYDRALAZINE HYDROCHLORIDE 20 MG/ML
10 INJECTION INTRAMUSCULAR; INTRAVENOUS
Status: COMPLETED | OUTPATIENT
Start: 2022-04-26 | End: 2022-04-26

## 2022-04-26 RX ADMIN — FUROSEMIDE 60 MG: 10 INJECTION, SOLUTION INTRAMUSCULAR; INTRAVENOUS at 01:04

## 2022-04-26 RX ADMIN — HYDRALAZINE HYDROCHLORIDE 10 MG: 20 INJECTION, SOLUTION INTRAMUSCULAR; INTRAVENOUS at 03:04

## 2022-04-27 NOTE — ED PROVIDER NOTES
"Encounter Date: 4/26/2022       History     Chief Complaint   Patient presents with    Shortness of Breath     Pt looks to family member and asks what he is here for. He states that he is SOB. "Must have happened around 10, 1030, 11, 12, I don't know." He states that he feels like he can't get a full breath.      66-year-old male presents to the ED ambulatory complaining of shortness of breath he states that he has been out of to of his medications for hypertension including his Lasix he has a history of CHF diabetes hypertension and stroke, his son who is accompanying him is the main historian he denies any chest pain        Review of patient's allergies indicates:  No Known Allergies  Past Medical History:   Diagnosis Date    CHF (congestive heart failure)     Diabetes mellitus     Hypertension     Stroke      Past Surgical History:   Procedure Laterality Date    EYE SURGERY      LEFT EYE     No family history on file.     Review of Systems   Constitutional: Positive for fatigue. Negative for diaphoresis and fever.   HENT: Negative for sore throat.    Respiratory: Positive for shortness of breath.    Cardiovascular: Negative for chest pain.   Gastrointestinal: Negative for nausea.   Genitourinary: Negative for dysuria.   Musculoskeletal: Negative for back pain.   Skin: Negative for rash.   Neurological: Positive for light-headedness. Negative for dizziness, seizures, syncope, speech difficulty and weakness.   Hematological: Does not bruise/bleed easily.       Physical Exam     Initial Vitals [04/26/22 1330]   BP Pulse Resp Temp SpO2   (!) 176/126 100 (!) 26 98.9 °F (37.2 °C) 100 %      MAP       --         Physical Exam    Nursing note and vitals reviewed.  Constitutional: He appears well-developed and well-nourished. He is not diaphoretic. No distress.   HENT:   Head: Normocephalic and atraumatic.   Right Ear: External ear normal.   Left Ear: External ear normal.   Nose: Nose normal.   Mouth/Throat: " Oropharynx is clear and moist. No oropharyngeal exudate.   Eyes: EOM are normal.   Neck: Neck supple. No tracheal deviation present.   Normal range of motion.  Cardiovascular: Normal rate and regular rhythm.   No murmur heard.  Pulmonary/Chest: No stridor. He is in respiratory distress. He has wheezes. He has no rales.   Abdominal: Abdomen is soft. He exhibits no distension and no mass. There is no abdominal tenderness. There is no rebound.   Musculoskeletal:         General: No edema. Normal range of motion.      Cervical back: Normal range of motion and neck supple.     Lymphadenopathy:     He has no cervical adenopathy.   Neurological: He is alert and oriented to person, place, and time. He has normal strength.   Skin: Skin is warm and dry. Capillary refill takes less than 2 seconds. No pallor.   Psychiatric: He has a normal mood and affect.         ED Course   Procedures  Labs Reviewed   CBC W/ AUTO DIFFERENTIAL - Abnormal; Notable for the following components:       Result Value    RBC 4.59 (*)     Hemoglobin 11.3 (*)     Hematocrit 34.5 (*)     MCV 75 (*)     MCH 24.6 (*)     RDW 19.0 (*)     All other components within normal limits   COMPREHENSIVE METABOLIC PANEL - Abnormal; Notable for the following components:    Potassium 3.4 (*)     CO2 18 (*)     Glucose 220 (*)     Creatinine 2.2 (*)     Calcium 8.3 (*)     Albumin 2.9 (*)     eGFR if  35.3 (*)     eGFR if non  30.5 (*)     All other components within normal limits   B-TYPE NATRIURETIC PEPTIDE - Abnormal; Notable for the following components:    BNP 3,360 (*)     All other components within normal limits   POCT GLUCOSE - Abnormal; Notable for the following components:    POCT Glucose 220 (*)     All other components within normal limits     EKG Readings: (Independently Interpreted)   Initial Reading: No STEMI. Rhythm: Sinus Tachycardia. Heart Rate: 104. Ectopy: No Ectopy. Conduction: Normal. ST Segments: Non-Specific ST  Segment Depression.     ECG Results          EKG 12-lead (Final result)  Result time 04/26/22 14:06:55    Final result by Interface, Lab In [facility] (04/26/22 14:06:55)                 Narrative:    Test Reason : R06.02,    Vent. Rate : 104 BPM     Atrial Rate : 104 BPM     P-R Int : 176 ms          QRS Dur : 100 ms      QT Int : 384 ms       P-R-T Axes : 060 083 -57 degrees     QTc Int : 504 ms    Sinus tachycardia  Left atrial enlargement  Nonspecific ST and T wave abnormality  Abnormal ECG  When compared with ECG of 25-JAN-2022 13:48,  ST no longer depressed in Anterior leads  Nonspecific T wave abnormality has replaced inverted T waves in Inferior  leads  T wave inversion less evident in Anterior-lateral leads  Confirmed by Ja Springer MD ([id number]) on 4/26/2022 2:06:48 PM    Referred By:             Confirmed By:Ja Springer MD                            Imaging Results          X-Ray Chest AP Portable (Final result)  Result time 04/26/22 14:14:08    Final result by Kyrie Damon Jr., MD (04/26/22 14:14:08)                 Impression:      Cardiomegaly with prominence of the pulmonary vasculature consistent with borderline congestive heart failure.  Pulmonary edema is not identified today      Electronically signed by: Kyrie Damon MD  Date:    04/26/2022  Time:    14:14             Narrative:    EXAMINATION:  XR CHEST AP PORTABLE    CLINICAL HISTORY:  sob;    TECHNIQUE:  Single frontal view of the chest was performed.    COMPARISON:  Chest of January 24, 2022    FINDINGS:  There is mild cardiomegaly.  There is prominence of the pulmonary vasculature and mild clearing of the faint pulmonary edema seen on the prior study.  Consolidated infiltrate or solid mass is not seen.  No pneumothorax or pleural effusion is noted.                                 Medications   furosemide injection 60 mg (60 mg Intravenous Given 4/26/22 1355)   hydrALAZINE injection 10 mg (10 mg Intravenous Given 4/26/22 1536)                           Clinical Impression:   Final diagnoses:  [R06.02] SOB (shortness of breath)          ED Disposition Condition    Discharge Stable        ED Prescriptions     Medication Sig Dispense Start Date End Date Auth. Provider    furosemide (LASIX) 20 MG tablet Take 1 tablet (20 mg total) by mouth once daily. 30 tablet 4/26/2022 4/26/2023 Eric Cruz MD        Follow-up Information    None          Eric Cruz MD  04/27/22 0634       Eric Cruz MD  04/27/22 1629       Eric Cruz MD  05/05/22 035

## 2022-05-11 ENCOUNTER — HOSPITAL ENCOUNTER (EMERGENCY)
Facility: HOSPITAL | Age: 67
Discharge: ANOTHER HOSPITAL | End: 2022-05-12
Attending: EMERGENCY MEDICINE
Payer: MEDICARE

## 2022-05-11 DIAGNOSIS — R41.82 ALTERED MENTAL STATUS, UNSPECIFIED: ICD-10-CM

## 2022-05-11 LAB
ALBUMIN SERPL BCP-MCNC: 2.8 G/DL (ref 3.5–5.2)
ALP SERPL-CCNC: 105 U/L (ref 55–135)
ALT SERPL W/O P-5'-P-CCNC: 8 U/L (ref 10–44)
AMMONIA PLAS-SCNC: 39 UMOL/L (ref 10–50)
AMPHET+METHAMPHET UR QL: NEGATIVE
ANION GAP SERPL CALC-SCNC: 17 MMOL/L (ref 8–16)
AST SERPL-CCNC: 9 U/L (ref 10–40)
BACTERIA #/AREA URNS HPF: ABNORMAL /HPF
BARBITURATES UR QL SCN>200 NG/ML: NEGATIVE
BASOPHILS # BLD AUTO: 0.03 K/UL (ref 0–0.2)
BASOPHILS NFR BLD: 0.4 % (ref 0–1.9)
BENZODIAZ UR QL SCN>200 NG/ML: NEGATIVE
BILIRUB SERPL-MCNC: 0.9 MG/DL (ref 0.1–1)
BILIRUB UR QL STRIP: NEGATIVE
BUN SERPL-MCNC: 17 MG/DL (ref 8–23)
BZE UR QL SCN: NEGATIVE
CALCIUM SERPL-MCNC: 8.5 MG/DL (ref 8.7–10.5)
CANNABINOIDS UR QL SCN: NEGATIVE
CHLORIDE SERPL-SCNC: 105 MMOL/L (ref 95–110)
CLARITY UR: CLEAR
CO2 SERPL-SCNC: 19 MMOL/L (ref 23–29)
COLOR UR: YELLOW
CREAT SERPL-MCNC: 2.5 MG/DL (ref 0.5–1.4)
CREAT UR-MCNC: 145.9 MG/DL (ref 23–375)
DIFFERENTIAL METHOD: ABNORMAL
EOSINOPHIL # BLD AUTO: 0 K/UL (ref 0–0.5)
EOSINOPHIL NFR BLD: 0.6 % (ref 0–8)
ERYTHROCYTE [DISTWIDTH] IN BLOOD BY AUTOMATED COUNT: 18.1 % (ref 11.5–14.5)
EST. GFR  (AFRICAN AMERICAN): 30.2 ML/MIN/1.73 M^2
EST. GFR  (NON AFRICAN AMERICAN): 26.2 ML/MIN/1.73 M^2
ETHANOL SERPL-MCNC: <10 MG/DL (ref 0–10)
GLUCOSE SERPL-MCNC: 382 MG/DL (ref 70–110)
GLUCOSE UR QL STRIP: ABNORMAL
HCT VFR BLD AUTO: 40.3 % (ref 40–54)
HGB BLD-MCNC: 13.3 G/DL (ref 14–18)
HGB UR QL STRIP: ABNORMAL
HYALINE CASTS #/AREA URNS LPF: 1 /LPF
IMM GRANULOCYTES # BLD AUTO: 0.02 K/UL (ref 0–0.04)
IMM GRANULOCYTES NFR BLD AUTO: 0.3 % (ref 0–0.5)
KETONES UR QL STRIP: NEGATIVE
LEUKOCYTE ESTERASE UR QL STRIP: NEGATIVE
LYMPHOCYTES # BLD AUTO: 0.7 K/UL (ref 1–4.8)
LYMPHOCYTES NFR BLD: 9.8 % (ref 18–48)
MCH RBC QN AUTO: 24.6 PG (ref 27–31)
MCHC RBC AUTO-ENTMCNC: 33 G/DL (ref 32–36)
MCV RBC AUTO: 75 FL (ref 82–98)
METHADONE UR QL SCN>300 NG/ML: NEGATIVE
MICROSCOPIC COMMENT: ABNORMAL
MONOCYTES # BLD AUTO: 0.3 K/UL (ref 0.3–1)
MONOCYTES NFR BLD: 4.4 % (ref 4–15)
NEUTROPHILS # BLD AUTO: 5.8 K/UL (ref 1.8–7.7)
NEUTROPHILS NFR BLD: 84.5 % (ref 38–73)
NITRITE UR QL STRIP: NEGATIVE
NRBC BLD-RTO: 0 /100 WBC
OPIATES UR QL SCN: NEGATIVE
PCP UR QL SCN>25 NG/ML: NEGATIVE
PH UR STRIP: 6 [PH] (ref 5–8)
PLATELET # BLD AUTO: 311 K/UL (ref 150–450)
PMV BLD AUTO: 10.8 FL (ref 9.2–12.9)
POCT GLUCOSE: 371 MG/DL (ref 70–110)
POTASSIUM SERPL-SCNC: 3.5 MMOL/L (ref 3.5–5.1)
PROT SERPL-MCNC: 7.1 G/DL (ref 6–8.4)
PROT UR QL STRIP: ABNORMAL
RBC # BLD AUTO: 5.4 M/UL (ref 4.6–6.2)
RBC #/AREA URNS HPF: 10 /HPF (ref 0–4)
SODIUM SERPL-SCNC: 141 MMOL/L (ref 136–145)
SP GR UR STRIP: 1.02 (ref 1–1.03)
SQUAMOUS #/AREA URNS HPF: 5 /HPF
TOXICOLOGY INFORMATION: NORMAL
URN SPEC COLLECT METH UR: ABNORMAL
UROBILINOGEN UR STRIP-ACNC: NEGATIVE EU/DL
WBC # BLD AUTO: 6.81 K/UL (ref 3.9–12.7)
WBC #/AREA URNS HPF: 3 /HPF (ref 0–5)

## 2022-05-11 PROCEDURE — 80307 DRUG TEST PRSMV CHEM ANLYZR: CPT | Performed by: EMERGENCY MEDICINE

## 2022-05-11 PROCEDURE — 87040 BLOOD CULTURE FOR BACTERIA: CPT | Mod: 59 | Performed by: EMERGENCY MEDICINE

## 2022-05-11 PROCEDURE — 71045 XR CHEST AP PORTABLE: ICD-10-PCS | Mod: 26,,, | Performed by: RADIOLOGY

## 2022-05-11 PROCEDURE — 96365 THER/PROPH/DIAG IV INF INIT: CPT

## 2022-05-11 PROCEDURE — 81000 URINALYSIS NONAUTO W/SCOPE: CPT | Mod: 59 | Performed by: EMERGENCY MEDICINE

## 2022-05-11 PROCEDURE — 70450 CT HEAD/BRAIN W/O DYE: CPT | Mod: TC

## 2022-05-11 PROCEDURE — 80053 COMPREHEN METABOLIC PANEL: CPT | Performed by: EMERGENCY MEDICINE

## 2022-05-11 PROCEDURE — 25000003 PHARM REV CODE 250: Performed by: EMERGENCY MEDICINE

## 2022-05-11 PROCEDURE — 71045 X-RAY EXAM CHEST 1 VIEW: CPT | Mod: TC,FY

## 2022-05-11 PROCEDURE — 85025 COMPLETE CBC W/AUTO DIFF WBC: CPT | Performed by: EMERGENCY MEDICINE

## 2022-05-11 PROCEDURE — 82077 ASSAY SPEC XCP UR&BREATH IA: CPT | Performed by: EMERGENCY MEDICINE

## 2022-05-11 PROCEDURE — 82962 GLUCOSE BLOOD TEST: CPT

## 2022-05-11 PROCEDURE — P9612 CATHETERIZE FOR URINE SPEC: HCPCS

## 2022-05-11 PROCEDURE — 36415 COLL VENOUS BLD VENIPUNCTURE: CPT | Performed by: EMERGENCY MEDICINE

## 2022-05-11 PROCEDURE — 99285 EMERGENCY DEPT VISIT HI MDM: CPT | Mod: 25

## 2022-05-11 PROCEDURE — 82140 ASSAY OF AMMONIA: CPT | Performed by: EMERGENCY MEDICINE

## 2022-05-11 PROCEDURE — 63600175 PHARM REV CODE 636 W HCPCS: Performed by: EMERGENCY MEDICINE

## 2022-05-11 PROCEDURE — 70450 CT HEAD/BRAIN W/O DYE: CPT | Mod: 26,,, | Performed by: RADIOLOGY

## 2022-05-11 PROCEDURE — 96375 TX/PRO/DX INJ NEW DRUG ADDON: CPT

## 2022-05-11 PROCEDURE — 70450 CT HEAD WITHOUT CONTRAST: ICD-10-PCS | Mod: 26,,, | Performed by: RADIOLOGY

## 2022-05-11 PROCEDURE — 71045 X-RAY EXAM CHEST 1 VIEW: CPT | Mod: 26,,, | Performed by: RADIOLOGY

## 2022-05-11 PROCEDURE — 83880 ASSAY OF NATRIURETIC PEPTIDE: CPT | Performed by: EMERGENCY MEDICINE

## 2022-05-11 RX ORDER — LABETALOL HYDROCHLORIDE 5 MG/ML
20 INJECTION, SOLUTION INTRAVENOUS
Status: COMPLETED | OUTPATIENT
Start: 2022-05-11 | End: 2022-05-11

## 2022-05-11 RX ADMIN — CEFTRIAXONE 1 G: 1 INJECTION, SOLUTION INTRAVENOUS at 11:05

## 2022-05-11 RX ADMIN — LABETALOL HYDROCHLORIDE 20 MG: 5 INJECTION, SOLUTION INTRAVENOUS at 11:05

## 2022-05-12 ENCOUNTER — HOSPITAL ENCOUNTER (INPATIENT)
Facility: HOSPITAL | Age: 67
LOS: 14 days | Discharge: HOSPICE | DRG: 064 | End: 2022-05-26
Attending: INTERNAL MEDICINE | Admitting: INTERNAL MEDICINE
Payer: MEDICARE

## 2022-05-12 VITALS
RESPIRATION RATE: 12 BRPM | SYSTOLIC BLOOD PRESSURE: 173 MMHG | HEIGHT: 67 IN | DIASTOLIC BLOOD PRESSURE: 93 MMHG | TEMPERATURE: 98 F | OXYGEN SATURATION: 100 % | BODY MASS INDEX: 30.29 KG/M2 | WEIGHT: 193 LBS | HEART RATE: 92 BPM

## 2022-05-12 DIAGNOSIS — I63.9 CEREBROVASCULAR ACCIDENT (CVA), UNSPECIFIED MECHANISM: ICD-10-CM

## 2022-05-12 DIAGNOSIS — S00.31XA ABRASION, NOSE W/O INFECTION: ICD-10-CM

## 2022-05-12 DIAGNOSIS — I63.9 CVA (CEREBRAL VASCULAR ACCIDENT): ICD-10-CM

## 2022-05-12 DIAGNOSIS — I10 ESSENTIAL HYPERTENSION: ICD-10-CM

## 2022-05-12 DIAGNOSIS — I50.42 CHRONIC COMBINED SYSTOLIC AND DIASTOLIC CHF (CONGESTIVE HEART FAILURE): ICD-10-CM

## 2022-05-12 DIAGNOSIS — I63.9 STROKE: ICD-10-CM

## 2022-05-12 DIAGNOSIS — E11.22 TYPE 2 DIABETES MELLITUS WITH STAGE 4 CHRONIC KIDNEY DISEASE, WITHOUT LONG-TERM CURRENT USE OF INSULIN: ICD-10-CM

## 2022-05-12 DIAGNOSIS — G93.40 ENCEPHALOPATHY: ICD-10-CM

## 2022-05-12 DIAGNOSIS — I50.9 CHF (CONGESTIVE HEART FAILURE): ICD-10-CM

## 2022-05-12 DIAGNOSIS — N18.4 TYPE 2 DIABETES MELLITUS WITH STAGE 4 CHRONIC KIDNEY DISEASE, WITHOUT LONG-TERM CURRENT USE OF INSULIN: ICD-10-CM

## 2022-05-12 DIAGNOSIS — N17.9 AKI (ACUTE KIDNEY INJURY): ICD-10-CM

## 2022-05-12 DIAGNOSIS — Z51.5 END OF LIFE CARE: ICD-10-CM

## 2022-05-12 PROBLEM — R41.0 DISORIENTATION: Status: ACTIVE | Noted: 2022-05-12

## 2022-05-12 LAB
ALBUMIN SERPL BCP-MCNC: 2.7 G/DL (ref 3.5–5.2)
ALLENS TEST: ABNORMAL
ALLENS TEST: ABNORMAL
ALP SERPL-CCNC: 139 U/L (ref 55–135)
ALT SERPL W/O P-5'-P-CCNC: 58 U/L (ref 10–44)
ANION GAP SERPL CALC-SCNC: 18 MMOL/L (ref 8–16)
AORTIC ROOT ANNULUS: 2.88 CM
AORTIC VALVE CUSP SEPERATION: 1.7 CM
APAP SERPL-MCNC: <3 UG/ML (ref 10–20)
APTT BLDCRRT: 31.1 SEC (ref 21–32)
AST SERPL-CCNC: 83 U/L (ref 10–40)
AV INDEX (PROSTH): 0.98
AV MEAN GRADIENT: 2 MMHG
AV PEAK GRADIENT: 3 MMHG
AV VALVE AREA: 3.55 CM2
AV VELOCITY RATIO: 0.86
BACTERIA #/AREA URNS HPF: ABNORMAL /HPF
BASOPHILS # BLD AUTO: 0.01 K/UL (ref 0–0.2)
BASOPHILS NFR BLD: 0.1 % (ref 0–1.9)
BILIRUB SERPL-MCNC: 0.7 MG/DL (ref 0.1–1)
BILIRUB UR QL STRIP: NEGATIVE
BNP SERPL-MCNC: 3064 PG/ML (ref 0–99)
BSA FOR ECHO PROCEDURE: 1.9 M2
BUN SERPL-MCNC: 27 MG/DL (ref 8–23)
CALCIUM SERPL-MCNC: 8.3 MG/DL (ref 8.7–10.5)
CHLORIDE SERPL-SCNC: 107 MMOL/L (ref 95–110)
CLARITY UR: CLEAR
CO2 SERPL-SCNC: 16 MMOL/L (ref 23–29)
COLOR UR: YELLOW
CREAT SERPL-MCNC: 3.3 MG/DL (ref 0.5–1.4)
CV ECHO LV RWT: 0.36 CM
DELSYS: ABNORMAL
DELSYS: ABNORMAL
DIFFERENTIAL METHOD: ABNORMAL
DOP CALC AO PEAK VEL: 0.87 M/S
DOP CALC AO VTI: 12.41 CM
DOP CALC LVOT AREA: 3.6 CM2
DOP CALC LVOT DIAMETER: 2.15 CM
DOP CALC LVOT PEAK VEL: 0.75 M/S
DOP CALC LVOT STROKE VOLUME: 44.09 CM3
DOP CALC MV VTI: 16.88 CM
DOP CALCLVOT PEAK VEL VTI: 12.15 CM
E WAVE DECELERATION TIME: 119.92 MSEC
E/A RATIO: 1.9
E/E' RATIO: 16.5 M/S
ECHO LV POSTERIOR WALL: 1.05 CM (ref 0.6–1.1)
EJECTION FRACTION: 20 %
EOSINOPHIL # BLD AUTO: 0 K/UL (ref 0–0.5)
EOSINOPHIL NFR BLD: 0.1 % (ref 0–8)
ERYTHROCYTE [DISTWIDTH] IN BLOOD BY AUTOMATED COUNT: 19.2 % (ref 11.5–14.5)
ERYTHROCYTE [SEDIMENTATION RATE] IN BLOOD BY WESTERGREN METHOD: 34 MM/H
EST. GFR  (AFRICAN AMERICAN): 22 ML/MIN/1.73 M^2
EST. GFR  (NON AFRICAN AMERICAN): 19 ML/MIN/1.73 M^2
FIO2: 28
FLOW: 1.5
FRACTIONAL SHORTENING: 12 % (ref 28–44)
GLUCOSE SERPL-MCNC: 353 MG/DL (ref 70–110)
GLUCOSE UR QL STRIP: ABNORMAL
HCO3 UR-SCNC: 17.9 MMOL/L (ref 24–28)
HCO3 UR-SCNC: 20 MMOL/L (ref 24–28)
HCT VFR BLD AUTO: 44.1 % (ref 40–54)
HGB BLD-MCNC: 14.4 G/DL (ref 14–18)
HGB UR QL STRIP: ABNORMAL
HYALINE CASTS #/AREA URNS LPF: 0 /LPF
IMM GRANULOCYTES # BLD AUTO: 0.03 K/UL (ref 0–0.04)
IMM GRANULOCYTES NFR BLD AUTO: 0.4 % (ref 0–0.5)
INR PPP: 1.2 (ref 0.8–1.2)
INTERVENTRICULAR SEPTUM: 1.12 CM (ref 0.6–1.1)
IVRT: 91.34 MSEC
KETONES UR QL STRIP: NEGATIVE
LA MAJOR: 5.06 CM
LA MINOR: 5.71 CM
LA WIDTH: 3.6 CM
LACTATE SERPL-SCNC: 3.4 MMOL/L (ref 0.5–2.2)
LACTATE SERPL-SCNC: 3.5 MMOL/L (ref 0.5–2.2)
LEFT ATRIUM SIZE: 4.79 CM
LEFT ATRIUM VOLUME INDEX MOD: 25.8 ML/M2
LEFT ATRIUM VOLUME INDEX: 41.8 ML/M2
LEFT ATRIUM VOLUME MOD: 48.48 CM3
LEFT ATRIUM VOLUME: 78.64 CM3
LEFT INTERNAL DIMENSION IN SYSTOLE: 5.1 CM (ref 2.1–4)
LEFT VENTRICLE DIASTOLIC VOLUME INDEX: 88.21 ML/M2
LEFT VENTRICLE DIASTOLIC VOLUME: 165.84 ML
LEFT VENTRICLE MASS INDEX: 138 G/M2
LEFT VENTRICLE SYSTOLIC VOLUME INDEX: 65.8 ML/M2
LEFT VENTRICLE SYSTOLIC VOLUME: 123.69 ML
LEFT VENTRICULAR INTERNAL DIMENSION IN DIASTOLE: 5.79 CM (ref 3.5–6)
LEFT VENTRICULAR MASS: 258.73 G
LEUKOCYTE ESTERASE UR QL STRIP: NEGATIVE
LV LATERAL E/E' RATIO: 12.38 M/S
LV SEPTAL E/E' RATIO: 24.75 M/S
LYMPHOCYTES # BLD AUTO: 0.8 K/UL (ref 1–4.8)
LYMPHOCYTES NFR BLD: 9.2 % (ref 18–48)
MAGNESIUM SERPL-MCNC: 1.6 MG/DL (ref 1.6–2.6)
MCH RBC QN AUTO: 24.9 PG (ref 27–31)
MCHC RBC AUTO-ENTMCNC: 32.7 G/DL (ref 32–36)
MCV RBC AUTO: 76 FL (ref 82–98)
MICROSCOPIC COMMENT: ABNORMAL
MODE: ABNORMAL
MONOCYTES # BLD AUTO: 0.4 K/UL (ref 0.3–1)
MONOCYTES NFR BLD: 4.9 % (ref 4–15)
MV MEAN GRADIENT: 1 MMHG
MV PEAK A VEL: 0.52 M/S
MV PEAK E VEL: 0.99 M/S
MV PEAK GRADIENT: 6 MMHG
MV STENOSIS PRESSURE HALF TIME: 34.78 MS
MV VALVE AREA BY CONTINUITY EQUATION: 2.61 CM2
MV VALVE AREA P 1/2 METHOD: 6.33 CM2
NEUTROPHILS # BLD AUTO: 7 K/UL (ref 1.8–7.7)
NEUTROPHILS NFR BLD: 85.3 % (ref 38–73)
NITRITE UR QL STRIP: NEGATIVE
NRBC BLD-RTO: 0 /100 WBC
PCO2 BLDA: 29.3 MMHG (ref 35–45)
PCO2 BLDA: 31.1 MMHG (ref 35–45)
PH SMN: 7.39 [PH] (ref 7.35–7.45)
PH SMN: 7.42 [PH] (ref 7.35–7.45)
PH UR STRIP: 6 [PH] (ref 5–8)
PHOSPHATE SERPL-MCNC: 4.1 MG/DL (ref 2.7–4.5)
PISA MRMAX VEL: 0.05 M/S
PISA TR MAX VEL: 3.57 M/S
PLATELET # BLD AUTO: 233 K/UL (ref 150–450)
PLATELET BLD QL SMEAR: ABNORMAL
PMV BLD AUTO: 11.8 FL (ref 9.2–12.9)
PO2 BLDA: 41 MMHG (ref 80–100)
PO2 BLDA: 84 MMHG (ref 80–100)
POC BE: -5 MMOL/L
POC BE: -7 MMOL/L
POC SATURATED O2: 77 % (ref 95–100)
POC SATURATED O2: 97 % (ref 95–100)
POC TCO2: 19 MMOL/L (ref 23–27)
POC TCO2: 21 MMOL/L (ref 23–27)
POCT GLUCOSE: 328 MG/DL (ref 70–110)
POCT GLUCOSE: 381 MG/DL (ref 70–110)
POCT GLUCOSE: 388 MG/DL (ref 70–110)
POTASSIUM SERPL-SCNC: 4.6 MMOL/L (ref 3.5–5.1)
PROCALCITONIN SERPL IA-MCNC: 6.91 NG/ML
PROT SERPL-MCNC: 7.2 G/DL (ref 6–8.4)
PROT UR QL STRIP: ABNORMAL
PROTHROMBIN TIME: 12.6 SEC (ref 9–12.5)
PV PEAK VELOCITY: 0.64 CM/S
RA MAJOR: 5.06 CM
RA PRESSURE: 8 MMHG
RA WIDTH: 3.12 CM
RBC # BLD AUTO: 5.79 M/UL (ref 4.6–6.2)
RBC #/AREA URNS HPF: 20 /HPF (ref 0–4)
RIGHT VENTRICULAR END-DIASTOLIC DIMENSION: 3.46 CM
RV TISSUE DOPPLER FREE WALL SYSTOLIC VELOCITY 1 (APICAL 4 CHAMBER VIEW): 8.49 CM/S
SALICYLATES SERPL-MCNC: <5 MG/DL (ref 15–30)
SAMPLE: ABNORMAL
SAMPLE: ABNORMAL
SARS-COV-2 RDRP RESP QL NAA+PROBE: NEGATIVE
SITE: ABNORMAL
SITE: ABNORMAL
SODIUM SERPL-SCNC: 141 MMOL/L (ref 136–145)
SP GR UR STRIP: 1.02 (ref 1–1.03)
SP02: 96
TARGETS BLD QL SMEAR: ABNORMAL
TDI LATERAL: 0.08 M/S
TDI SEPTAL: 0.04 M/S
TDI: 0.06 M/S
TR MAX PG: 51 MMHG
TRICUSPID ANNULAR PLANE SYSTOLIC EXCURSION: 1.9 CM
TV REST PULMONARY ARTERY PRESSURE: 59 MMHG
URN SPEC COLLECT METH UR: ABNORMAL
UROBILINOGEN UR STRIP-ACNC: NEGATIVE EU/DL
WBC # BLD AUTO: 8.22 K/UL (ref 3.9–12.7)
WBC #/AREA URNS HPF: 6 /HPF (ref 0–5)
YEAST URNS QL MICRO: ABNORMAL

## 2022-05-12 PROCEDURE — 36600 WITHDRAWAL OF ARTERIAL BLOOD: CPT

## 2022-05-12 PROCEDURE — 80179 DRUG ASSAY SALICYLATE: CPT | Performed by: FAMILY MEDICINE

## 2022-05-12 PROCEDURE — 63600175 PHARM REV CODE 636 W HCPCS: Performed by: FAMILY MEDICINE

## 2022-05-12 PROCEDURE — 94660 CPAP INITIATION&MGMT: CPT

## 2022-05-12 PROCEDURE — 27000190 HC CPAP FULL FACE MASK W/VALVE

## 2022-05-12 PROCEDURE — 85610 PROTHROMBIN TIME: CPT | Performed by: INTERNAL MEDICINE

## 2022-05-12 PROCEDURE — 84100 ASSAY OF PHOSPHORUS: CPT | Performed by: INTERNAL MEDICINE

## 2022-05-12 PROCEDURE — 96375 TX/PRO/DX INJ NEW DRUG ADDON: CPT

## 2022-05-12 PROCEDURE — 25500020 PHARM REV CODE 255: Performed by: EMERGENCY MEDICINE

## 2022-05-12 PROCEDURE — 85025 COMPLETE CBC W/AUTO DIFF WBC: CPT | Performed by: INTERNAL MEDICINE

## 2022-05-12 PROCEDURE — 80053 COMPREHEN METABOLIC PANEL: CPT | Performed by: INTERNAL MEDICINE

## 2022-05-12 PROCEDURE — 83605 ASSAY OF LACTIC ACID: CPT | Mod: 91 | Performed by: INTERNAL MEDICINE

## 2022-05-12 PROCEDURE — 82803 BLOOD GASES ANY COMBINATION: CPT

## 2022-05-12 PROCEDURE — 25000003 PHARM REV CODE 250: Performed by: EMERGENCY MEDICINE

## 2022-05-12 PROCEDURE — U0002 COVID-19 LAB TEST NON-CDC: HCPCS | Performed by: EMERGENCY MEDICINE

## 2022-05-12 PROCEDURE — 81000 URINALYSIS NONAUTO W/SCOPE: CPT | Performed by: INTERNAL MEDICINE

## 2022-05-12 PROCEDURE — 96376 TX/PRO/DX INJ SAME DRUG ADON: CPT | Mod: 59

## 2022-05-12 PROCEDURE — 99900035 HC TECH TIME PER 15 MIN (STAT)

## 2022-05-12 PROCEDURE — 95819 EEG AWAKE AND ASLEEP: CPT | Mod: 26,,, | Performed by: PSYCHIATRY & NEUROLOGY

## 2022-05-12 PROCEDURE — 27000221 HC OXYGEN, UP TO 24 HOURS

## 2022-05-12 PROCEDURE — G0425 PR INPT TELEHEALTH CONSULT 30M: ICD-10-PCS | Mod: 95,,, | Performed by: STUDENT IN AN ORGANIZED HEALTH CARE EDUCATION/TRAINING PROGRAM

## 2022-05-12 PROCEDURE — 80143 DRUG ASSAY ACETAMINOPHEN: CPT | Performed by: FAMILY MEDICINE

## 2022-05-12 PROCEDURE — 94761 N-INVAS EAR/PLS OXIMETRY MLT: CPT

## 2022-05-12 PROCEDURE — 84145 PROCALCITONIN (PCT): CPT | Performed by: STUDENT IN AN ORGANIZED HEALTH CARE EDUCATION/TRAINING PROGRAM

## 2022-05-12 PROCEDURE — 25000003 PHARM REV CODE 250: Performed by: INTERNAL MEDICINE

## 2022-05-12 PROCEDURE — 20000000 HC ICU ROOM

## 2022-05-12 PROCEDURE — 96365 THER/PROPH/DIAG IV INF INIT: CPT | Mod: 59

## 2022-05-12 PROCEDURE — 83735 ASSAY OF MAGNESIUM: CPT | Performed by: INTERNAL MEDICINE

## 2022-05-12 PROCEDURE — 87899 AGENT NOS ASSAY W/OPTIC: CPT | Performed by: INTERNAL MEDICINE

## 2022-05-12 PROCEDURE — 85730 THROMBOPLASTIN TIME PARTIAL: CPT | Performed by: INTERNAL MEDICINE

## 2022-05-12 PROCEDURE — 95819 PR EEG,W/AWAKE & ASLEEP RECORD: ICD-10-PCS | Mod: 26,,, | Performed by: PSYCHIATRY & NEUROLOGY

## 2022-05-12 PROCEDURE — 36415 COLL VENOUS BLD VENIPUNCTURE: CPT | Performed by: INTERNAL MEDICINE

## 2022-05-12 PROCEDURE — 63600175 PHARM REV CODE 636 W HCPCS: Performed by: INTERNAL MEDICINE

## 2022-05-12 PROCEDURE — G0425 INPT/ED TELECONSULT30: HCPCS | Mod: 95,,, | Performed by: STUDENT IN AN ORGANIZED HEALTH CARE EDUCATION/TRAINING PROGRAM

## 2022-05-12 PROCEDURE — C9399 UNCLASSIFIED DRUGS OR BIOLOG: HCPCS | Performed by: INTERNAL MEDICINE

## 2022-05-12 PROCEDURE — 63600175 PHARM REV CODE 636 W HCPCS: Performed by: EMERGENCY MEDICINE

## 2022-05-12 PROCEDURE — 83036 HEMOGLOBIN GLYCOSYLATED A1C: CPT | Performed by: INTERNAL MEDICINE

## 2022-05-12 RX ORDER — LABETALOL HYDROCHLORIDE 5 MG/ML
20 INJECTION, SOLUTION INTRAVENOUS
Status: COMPLETED | OUTPATIENT
Start: 2022-05-12 | End: 2022-05-12

## 2022-05-12 RX ORDER — SODIUM CHLORIDE, SODIUM LACTATE, POTASSIUM CHLORIDE, CALCIUM CHLORIDE 600; 310; 30; 20 MG/100ML; MG/100ML; MG/100ML; MG/100ML
INJECTION, SOLUTION INTRAVENOUS CONTINUOUS
Status: DISCONTINUED | OUTPATIENT
Start: 2022-05-12 | End: 2022-05-14

## 2022-05-12 RX ORDER — ASPIRIN 325 MG
325 TABLET ORAL
Status: DISCONTINUED | OUTPATIENT
Start: 2022-05-12 | End: 2022-05-12

## 2022-05-12 RX ORDER — NALOXONE HCL 0.4 MG/ML
0.02 VIAL (ML) INJECTION
Status: DISCONTINUED | OUTPATIENT
Start: 2022-05-12 | End: 2022-05-26 | Stop reason: HOSPADM

## 2022-05-12 RX ORDER — ASPIRIN 300 MG/1
300 SUPPOSITORY RECTAL ONCE
Status: COMPLETED | OUTPATIENT
Start: 2022-05-12 | End: 2022-05-12

## 2022-05-12 RX ORDER — MORPHINE SULFATE 4 MG/ML
3 INJECTION, SOLUTION INTRAMUSCULAR; INTRAVENOUS EVERY 4 HOURS PRN
Status: DISCONTINUED | OUTPATIENT
Start: 2022-05-12 | End: 2022-05-26 | Stop reason: HOSPADM

## 2022-05-12 RX ORDER — HYDRALAZINE HYDROCHLORIDE 20 MG/ML
10 INJECTION INTRAMUSCULAR; INTRAVENOUS EVERY 4 HOURS PRN
Status: DISCONTINUED | OUTPATIENT
Start: 2022-05-12 | End: 2022-05-13

## 2022-05-12 RX ORDER — ROCURONIUM BROMIDE 10 MG/ML
INJECTION, SOLUTION INTRAVENOUS
Status: COMPLETED
Start: 2022-05-12 | End: 2022-05-12

## 2022-05-12 RX ORDER — INSULIN ASPART 100 [IU]/ML
0-5 INJECTION, SOLUTION INTRAVENOUS; SUBCUTANEOUS EVERY 4 HOURS
Status: DISCONTINUED | OUTPATIENT
Start: 2022-05-12 | End: 2022-05-13

## 2022-05-12 RX ORDER — OXYCODONE HYDROCHLORIDE 5 MG/1
5 TABLET ORAL EVERY 4 HOURS PRN
Status: DISCONTINUED | OUTPATIENT
Start: 2022-05-12 | End: 2022-05-26 | Stop reason: HOSPADM

## 2022-05-12 RX ORDER — MUPIROCIN 20 MG/G
OINTMENT TOPICAL 2 TIMES DAILY
Status: DISPENSED | OUTPATIENT
Start: 2022-05-12 | End: 2022-05-17

## 2022-05-12 RX ORDER — POLYETHYLENE GLYCOL 3350 17 G/17G
17 POWDER, FOR SOLUTION ORAL 2 TIMES DAILY PRN
Status: DISCONTINUED | OUTPATIENT
Start: 2022-05-12 | End: 2022-05-26 | Stop reason: HOSPADM

## 2022-05-12 RX ORDER — ACETAMINOPHEN 325 MG/1
650 TABLET ORAL EVERY 4 HOURS PRN
Status: DISCONTINUED | OUTPATIENT
Start: 2022-05-12 | End: 2022-05-22

## 2022-05-12 RX ORDER — SODIUM CHLORIDE 0.9 % (FLUSH) 0.9 %
10 SYRINGE (ML) INJECTION
Status: DISCONTINUED | OUTPATIENT
Start: 2022-05-12 | End: 2022-05-26 | Stop reason: HOSPADM

## 2022-05-12 RX ORDER — ONDANSETRON 2 MG/ML
4 INJECTION INTRAMUSCULAR; INTRAVENOUS EVERY 8 HOURS PRN
Status: DISCONTINUED | OUTPATIENT
Start: 2022-05-12 | End: 2022-05-26 | Stop reason: HOSPADM

## 2022-05-12 RX ORDER — HYDRALAZINE HYDROCHLORIDE 20 MG/ML
10 INJECTION INTRAMUSCULAR; INTRAVENOUS
Status: COMPLETED | OUTPATIENT
Start: 2022-05-12 | End: 2022-05-12

## 2022-05-12 RX ORDER — ETOMIDATE 2 MG/ML
INJECTION INTRAVENOUS
Status: DISCONTINUED
Start: 2022-05-12 | End: 2022-05-12 | Stop reason: WASHOUT

## 2022-05-12 RX ORDER — NICARDIPINE HYDROCHLORIDE 0.2 MG/ML
0-15 INJECTION INTRAVENOUS CONTINUOUS
Status: DISCONTINUED | OUTPATIENT
Start: 2022-05-12 | End: 2022-05-17

## 2022-05-12 RX ORDER — SUCCINYLCHOLINE CHLORIDE 20 MG/ML
INJECTION INTRAMUSCULAR; INTRAVENOUS
Status: DISCONTINUED
Start: 2022-05-12 | End: 2022-05-12 | Stop reason: WASHOUT

## 2022-05-12 RX ORDER — IBUPROFEN 200 MG
16 TABLET ORAL
Status: DISCONTINUED | OUTPATIENT
Start: 2022-05-12 | End: 2022-05-26 | Stop reason: HOSPADM

## 2022-05-12 RX ORDER — IBUPROFEN 200 MG
24 TABLET ORAL
Status: DISCONTINUED | OUTPATIENT
Start: 2022-05-12 | End: 2022-05-26 | Stop reason: HOSPADM

## 2022-05-12 RX ORDER — PROPOFOL 10 MG/ML
INJECTION, EMULSION INTRAVENOUS
Status: DISCONTINUED
Start: 2022-05-12 | End: 2022-05-12 | Stop reason: WASHOUT

## 2022-05-12 RX ORDER — FUROSEMIDE 10 MG/ML
40 INJECTION INTRAMUSCULAR; INTRAVENOUS
Status: COMPLETED | OUTPATIENT
Start: 2022-05-12 | End: 2022-05-12

## 2022-05-12 RX ORDER — GLUCAGON 1 MG
1 KIT INJECTION
Status: DISCONTINUED | OUTPATIENT
Start: 2022-05-12 | End: 2022-05-26 | Stop reason: HOSPADM

## 2022-05-12 RX ORDER — ASPIRIN 300 MG/1
150 SUPPOSITORY RECTAL DAILY
Status: DISCONTINUED | OUTPATIENT
Start: 2022-05-13 | End: 2022-05-18

## 2022-05-12 RX ADMIN — INSULIN ASPART 4 UNITS: 100 INJECTION, SOLUTION INTRAVENOUS; SUBCUTANEOUS at 01:05

## 2022-05-12 RX ADMIN — IOHEXOL 75 ML: 350 INJECTION, SOLUTION INTRAVENOUS at 04:05

## 2022-05-12 RX ADMIN — FUROSEMIDE 40 MG: 10 INJECTION, SOLUTION INTRAVENOUS at 07:05

## 2022-05-12 RX ADMIN — MUPIROCIN: 20 OINTMENT TOPICAL at 09:05

## 2022-05-12 RX ADMIN — SODIUM CHLORIDE, SODIUM LACTATE, POTASSIUM CHLORIDE, AND CALCIUM CHLORIDE: .6; .31; .03; .02 INJECTION, SOLUTION INTRAVENOUS at 01:05

## 2022-05-12 RX ADMIN — CEFTRIAXONE 2 G: 2 INJECTION, SOLUTION INTRAVENOUS at 02:05

## 2022-05-12 RX ADMIN — HYDRALAZINE HYDROCHLORIDE 10 MG: 20 INJECTION, SOLUTION INTRAMUSCULAR; INTRAVENOUS at 07:05

## 2022-05-12 RX ADMIN — VANCOMYCIN HYDROCHLORIDE 1250 MG: 1.25 INJECTION, POWDER, LYOPHILIZED, FOR SOLUTION INTRAVENOUS at 04:05

## 2022-05-12 RX ADMIN — AMPICILLIN SODIUM 2 G: 2 INJECTION, POWDER, FOR SOLUTION INTRAMUSCULAR; INTRAVENOUS at 09:05

## 2022-05-12 RX ADMIN — LABETALOL HYDROCHLORIDE 20 MG: 5 INJECTION, SOLUTION INTRAVENOUS at 02:05

## 2022-05-12 RX ADMIN — CEFTRIAXONE 1 G: 1 INJECTION, SOLUTION INTRAVENOUS at 06:05

## 2022-05-12 RX ADMIN — AMPICILLIN SODIUM 2 G: 2 INJECTION, POWDER, FOR SOLUTION INTRAMUSCULAR; INTRAVENOUS at 03:05

## 2022-05-12 RX ADMIN — INSULIN DETEMIR 8 UNITS: 100 INJECTION, SOLUTION SUBCUTANEOUS at 01:05

## 2022-05-12 RX ADMIN — INSULIN ASPART 4 UNITS: 100 INJECTION, SOLUTION INTRAVENOUS; SUBCUTANEOUS at 05:05

## 2022-05-12 RX ADMIN — INSULIN ASPART 1 UNITS: 100 INJECTION, SOLUTION INTRAVENOUS; SUBCUTANEOUS at 11:05

## 2022-05-12 RX ADMIN — ACYCLOVIR SODIUM 660 MG: 1000 INJECTION, SOLUTION INTRAVENOUS at 10:05

## 2022-05-12 RX ADMIN — ACYCLOVIR SODIUM 660 MG: 1000 INJECTION, SOLUTION INTRAVENOUS at 02:05

## 2022-05-12 RX ADMIN — ASPIRIN 300 MG: 300 SUPPOSITORY RECTAL at 02:05

## 2022-05-12 NOTE — HPI
Pt was brought to the ED by family after noticing change in mental status and difficulty breathing. LKN was 2 pm yesterday. Pt is not able to follow any commands.

## 2022-05-12 NOTE — HOSPITAL COURSE
"Neurology Consult: Pt seen and examined with Dr Rudy Segundo. POC discussed. Pt is minimally responsive to tactile stimuli. His CT head is negative for acute infarct or hemorrhage.  He was transferred from [facility] for further work up due to fever, AMS, and left gaze. Last seen normal unknown. No family at bedside.   Per [place]: "Patient is a 66-year-old male brought from home via EMS for evaluation of altered mental status as reported by family members.  Family member stated that at around 1:00 p.m. today they noted that the patient was not exhibiting his normal behaviors.  Upon arrival here, the patient is not speaking.  He has some residual weakness on the right side secondary to a previous stroke.  Patient does not speak, but he does respond to commands.  He is exhibiting an abnormal breathing pattern of several short, chopped breaths followed by periods of apnea.  His vital signs show a temperature of 98°, pulse 98,, respiratory rate 28 per minute, O2 saturations 95% on room air, blood pressure is significantly elevated at 183/101.  Patient's baseline is unknown, but patient's family state that he has not at baseline."  "

## 2022-05-12 NOTE — CONSULTS
[facility]  Vascular Neurology  Comprehensive Stroke Center  TeleVascular Neurology Acute Consultation Note      Consults    Consulting Provider: MICHELLE CURTIS  Current Providers  No providers found    Patient Location:  [facility] EMERGENCY DEPARTMENT Emergency Department  Spoke hospital nurse at bedside with patient assisting consultant.     Patient information was obtained from patient.         Assessment/Plan:       Diagnoses:   Disorientation  - reviewed CTH and CTA without evidence of acute ischemic stroke  - No tPA and CTA - no LVO   - rec MRI brain wo to r/o stroke   - supportive mgmt         STROKE DOCUMENTATION     Acute Stroke Times:   Acute Stroke Times   Last Known Normal Date: 05/11/22  Last Known Normal Time: 1400  Unknown Symptom Onset Date: Unknown Date  Symptom Onset Time: 1600  Stroke Team Called Date: 05/12/22  Stroke Team Called Time: 0138  Stroke Team Arrival Date: 05/12/22  Stroke Team Arrival Time: 0142  CT Interpretation Time: 0148  Alteplase Recommended: No    NIH Scale:  Interval: baseline  1a. Level of Consciousness: 1-->Not alert, but arousable by minor stimulation to obey, answer, or respond  1b. LOC Questions: 2-->Answers neither question correctly  1c. LOC Commands: 2-->Performs neither task correctly  2. Best Gaze: 0-->Normal  3. Visual: 0-->No visual loss  4. Facial Palsy: 0-->Normal symmetrical movements  5a. Motor Arm, Left: 2-->Some effort against gravity, limb cannot get to or maintain (if cued) 90 (or 45) degrees, drifts down to bed, but has some effort against gravity  5b. Motor Arm, Right: 0-->No drift, limb holds 90 (or 45) degrees for full 10 secs  6a. Motor Leg, Left: 3-->No effort against gravity, leg falls to bed immediately  6b. Motor Leg, Right: 3-->No effort against gravity, leg falls to bed immediately  7. Limb Ataxia: 0-->Absent  8. Sensory: 0-->Normal, no sensory loss  9. Best Language: 3-->Mute, global aphasia, no usable speech or  "auditory comprehension  10. Dysarthria: 0-->Normal  11. Extinction and Inattention (formerly Neglect): 0-->No abnormality  Total (NIH Stroke Scale): 16     Modified Mora    Naun Coma Scale:    ABCD2 Score:    [id number] Score:   HAS -BLED Score:   ICH Score:   Hunt & Wadsworth Classification:       Blood pressure (!) 173/93, pulse 92, temperature 98 °F (36.7 °C), temperature source Oral, resp. rate 12, height 5' 7" (1.702 m), weight 87.5 kg (193 lb), SpO2 100 %.  Alteplase Eligible?: No  Alteplase Recommendation: Alteplase not recommended due to Outside of treatment window  and Suspected stroke mimic   Possible Interventional Revascularization Candidate? No; No large vessel occlusion identified on imaging     Disposition Recommendation: transfer to nearest appropriate facility     Subjective:     History of Present Illness:  Pt was brought to the ED by family after noticing change in mental status and difficulty breathing. LKN was 2 pm yesterday. Pt is not able to follow any commands.         Woke up with symptoms?: no    Recent bleeding noted: unknown  Does the patient take any Blood Thinners? unknown  Medications: Unknown      Past Medical History: hypertension    Past Surgical History: no major surgeries within the last 2 weeks    Family History: hypertension    Social History: unable to obtain    Allergies: No Known Allergies No relevant allergies    Review of Systems  Objective:   Vitals: Blood pressure (!) 181/113, pulse 103, temperature 98 °F (36.7 °C), temperature source Oral, resp. rate 13, height 5' 7" (1.702 m), weight 87.5 kg (193 lb), SpO2 95 %. BP: chart reviewed     CT READ: Yes  No hemmorhage. No mass effect. No early infarct signs.     Physical Exam    Mute   Disoriented   Bilateral arm and leg weakness       Recommended the emergency room physician to have a brief discussion with the patient and/or family if available regarding the  risks and benefits of treatment, and to briefly document the " occurrence of that discussion in his clinical encounter note.     The attending portion of this evaluation, treatment, and documentation was performed per Jordan Cruz MD via audiovisual.    Billing code:  (non-intervention mild to moderate stroke, TIA, some mimics)    · This patient has a critical neurological condition/illness, with some potential for high morbidity and mortality.  · There is a moderate probability for acute neurological change leading to clinical and possibly life-threatening deterioration requiring highest level of physician preparedness for urgent intervention.  · Care was coordinated with other physicians involved in the patient's care.  · Radiologic studies and laboratory data were reviewed and interpreted, and plan of care was re-assessed based on the results.  · Diagnosis, treatment options and prognosis may have been discussed with the patient and/or family members or caregiver.      In your opinion, this was a: Tier 2 Van Negative    Consult End Time: 11:10 AM     Jordan Cruz MD  Comprehensive Stroke Center  Vascular Neurology   [facility]

## 2022-05-12 NOTE — PROGRESS NOTES
Pharmacokinetic Initial Assessment: IV Vancomycin    Assessment/Plan:    Initiate intravenous vancomycin with loading dose of 1250 mg once   Desired empiric serum trough concentration is 15 to 20 mcg/mL  Draw vancomycin random level on 5/13 at 0930.  Pharmacy will continue to follow and monitor vancomycin.      Please contact pharmacy at extension [phone] with any questions regarding this assessment.     Thank you for the consult,   Clarissa Sebastian       Patient brief summary:  Marshall Flor is a 66 y.o. male initiated on antimicrobial therapy with IV Vancomycin for treatment of suspected meningitis    Drug Allergies:   Review of patient's allergies indicates:  No Known Allergies    Actual Body Weight:   76.7 kg    Renal Function:   Estimated Creatinine Clearance: 21.1 mL/min (A) (based on SCr of 3.3 mg/dL (H)).,     Dialysis Method (if applicable):  N/A    CBC (last 72 hours):  Recent Labs   Lab Result Units 05/11/22  2303 05/12/22  1210   WBC K/uL 6.81 8.22   Hemoglobin g/dL 13.3* 14.4   Hematocrit % 40.3 44.1   Platelets K/uL 311 233   Gran % % 84.5* 85.3*   Lymph % % 9.8* 9.2*   Mono % % 4.4 4.9   Eosinophil % % 0.6 0.1   Basophil % % 0.4 0.1   Differential Method  Automated Automated       Metabolic Panel (last 72 hours):  Recent Labs   Lab Result Units 05/11/22  2300 05/11/22  2303 05/12/22  1257 05/12/22  1400   Sodium mmol/L  --  141 141  --    Potassium mmol/L  --  3.5 4.6  --    Chloride mmol/L  --  105 107  --    CO2 mmol/L  --  19* 16*  --    Glucose mg/dL  --  382* 353*  --    Glucose, UA  2+*  --   --  3+*   BUN mg/dL  --  17 27*  --    Creatinine mg/dL  --  2.5* 3.3*  --    Creatinine, Urine mg/dL 145.9  --   --   --    Albumin g/dL  --  2.8* 2.7*  --    Total Bilirubin mg/dL  --  0.9 0.7  --    Alkaline Phosphatase U/L  --  105 139*  --    AST U/L  --  9* 83*  --    ALT U/L  --  8* 58*  --    Magnesium mg/dL  --   --  1.6  --    Phosphorus mg/dL  --   --  4.1  --        Drug levels (last 3 results):  No  results for input(s): VANCOMYCINRA, VANCOMYCINPE, VANCOMYCINTR in the last 72 hours.    Microbiologic Results:  Microbiology Results (last 7 days)       Procedure Component Value Units Date/Time    Gram stain [[id number]]     Order Status: No result Specimen: CSF (Spinal Fluid)     Direct AFB stain [[id number]]     Order Status: No result Specimen: CSF (Spinal Fluid)     AFB Culture & Smear [[id number]]     Order Status: No result Specimen: CSF (Spinal Fluid)     Cryptococcal antigen, CSF [[id number]]     Order Status: No result Specimen: CSF (Spinal Fluid)     CSF culture [[id number]]     Order Status: No result Specimen: CSF (Spinal Fluid)

## 2022-05-12 NOTE — SUBJECTIVE & OBJECTIVE
"  Woke up with symptoms?: no    Recent bleeding noted: unknown  Does the patient take any Blood Thinners? unknown  Medications: Unknown      Past Medical History: hypertension    Past Surgical History: no major surgeries within the last 2 weeks    Family History: hypertension    Social History: unable to obtain    Allergies: No Known Allergies No relevant allergies    Review of Systems  Objective:   Vitals: Blood pressure (!) 181/113, pulse 103, temperature 98 °F (36.7 °C), temperature source Oral, resp. rate 13, height 5' 7" (1.702 m), weight 87.5 kg (193 lb), SpO2 95 %. BP: chart reviewed     CT READ: Yes  No hemmorhage. No mass effect. No early infarct signs.     Physical Exam    Mute   Disoriented   Bilateral arm and leg weakness   "

## 2022-05-12 NOTE — H&P
History and Physical for Admission  [facility]    Marshall Flor (MRN: [id number])  Admitting Service: Hospital Medicine  Date of Admission: 5/12/2022   Primary Care Provider: Dorie Quan MD    ?  Chief complaint: Decreased responsiveness   ?  History of Present Illness:       66-year-old male with history of CHF, diabetes presents to [place] for decreased responsiveness found to have a left gaze presents with fever upon arrival to [facility].  Family is not at bedside and patient is not able to communicate in current state.  ?  ?  Review of Systems:   Unable to obtain given current cognitive status  ?  Medical History    PAST MEDICAL HISTORY:  has a past medical history of CHF (congestive heart failure), Diabetes mellitus, Hypertension, and Stroke.    PAST SURGICAL HISTORY:  has a past surgical history that includes Eye surgery.  ?  PAST SOCIAL HISTORY:  reports that he has quit smoking. He has quit using smokeless tobacco. He reports previous alcohol use. He reports previous drug use.    FAMILY HISTORY:  Unable to obtain given current cognitive status  ?  CURRENT OUTPATIENT MEDS  Allergies: Review of patient's allergies indicates:  No Known Allergies      ?  PHYSICAL EXAM  Vitals: BP (!) 179/109   Pulse 99   Temp (!) 101.2 °F (38.4 °C) (Axillary)   Resp (!) 35   SpO2 97%  There is no height or weight on file to calculate BMI.    General:  Tachypneic, Alert but not following commands   HEENT: PERRL, Left gaze preference'  Nuchal rigidity present    Cardiovascular: RRR  Respiratory: lungs CTAB. Tachypnea   GI: abdomen S/NT/ND, +BS  Extremities: 1+ edeam. Cool extremities. Pulses present by doppler  MSK: no gross joint abnormalities   Neuro/Psych: A&OX3. CNII-XII grossly intact.      EKG and radiographs from the past 24h: reviewed and personally interpreted if available.      LABS    Recent Labs     05/11/22 2303   WBC 6.81   HGB 13.3*        ?  Recent Labs     05/11/22 2303   NA  141   K 3.5   CO2 19*   BUN 17   CREATININE 2.5*     ?  Recent Labs     05/11/22  2303   BILITOT 0.9   AST 9*   ALT 8*   ALKPHOS 105   PROT 7.1     ?  No results for input(s): INR, PT, PTT in the last 72 hours.  ?    ASSESSMENT & PLAN    66-year-old male with history of CHF, diabetes presents to [place] for decreased responsiveness found to have a fever, left gaze preference with nuchal rigidity found to have meningitis in addition to being found to have an acute CVA. Beyond this he has an MERCEDES.      1.  meningitis   -LP not able to be performed as on plavix at home  -MRI with acute CVA  -EEG with diffuse slowing; no focal epileptiform activity  -CTX, amp, Vanco, acyclovir  -ID, Neuro consult     2. Acute CVA  -rectal aspirin  -neurology consult    3. Severe sepsis 2/2 meningitis 2/2 unknown organism c/b metabolic encephalopathy   -IVF restricted 2/2 CHF history  -Lactate ordered, Blood cultures ordered   -Abx as above    2. CHF  -repeat echo  -high risk of hypotension in setting of active infection. Holding diuretic    3. HTN  -holding BP meds  -prn hydral     4. DMII  -unknown home regimen  -Levemir 8 + ISS q4h.  -A1C    5. CKD III  -renal consult; midline?     Chemical dvt ppx held 2/2 procedure. SCD ordered   Code Status/Dispo: Prior.   ?  Dimitry Correa    Date: 5/12/2022  Time: 10:34 AM

## 2022-05-12 NOTE — CONSULTS
[facility]  Neurology  Consult Note    Patient Name: Marshall Flor  MRN: [id number]  Admission Date: 5/12/2022  Hospital Length of Stay: 0 days  Code Status: Full Code   Attending Provider: Dimitry Correa MD   Consulting Provider: Hanh Esquivel DNP  Primary Care Physician: Dorie Quan MD  Principal Problem:<principal problem not specified>    Consults   Subjective:     Chief Complaint:  unrepsonsive     HPI:   Per EMR: History of Present Illness:    66-year-old male with history of CHF, diabetes presents to [place] for decreased responsiveness found to have a left gaze presents with fever upon arrival to [facility].  Family is not at bedside and patient is not able to communicate in current state.  ?       Past Medical History:   Diagnosis Date    CHF (congestive heart failure)     Diabetes mellitus     Hypertension     Stroke        Past Surgical History:   Procedure Laterality Date    EYE SURGERY      LEFT EYE       Review of patient's allergies indicates:  No Known Allergies    Current Neurological Medications: asa, statin, plavix    Current Facility-Administered Medications on File Prior to Encounter   Medication    [COMPLETED] cefTRIAXone (ROCEPHIN) 1 g/50 mL D5W IVPB    [COMPLETED] cefTRIAXone (ROCEPHIN) 1 g/50 mL D5W IVPB    [COMPLETED] furosemide injection 40 mg    [COMPLETED] hydrALAZINE injection 10 mg    [COMPLETED] iohexoL (OMNIPAQUE 350) injection 75 mL    [COMPLETED] labetaloL injection 20 mg    [COMPLETED] labetaloL injection 20 mg    [DISCONTINUED] aspirin tablet 325 mg     Current Outpatient Medications on File Prior to Encounter   Medication Sig    allopurinoL (ZYLOPRIM) 100 MG tablet   = 2 tab, Oral, Daily, # 60 tab, 5 Refill(s), Pharmacy: [id number], 167, cm, 07/27/21 8:24:00 CDT, Height/Length Measured, 91.5, kg, 12/15/20 11:18:00 CST, Weight Dosing    amLODIPine (NORVASC) 10 MG tablet 10 mg.    aspirin (ECOTRIN) 81 MG EC tablet  Take 1 tablet (81 mg total) by mouth once daily.    atorvastatin (LIPITOR) 40 MG tablet   = 1 tab, Oral, Daily, # 90 tab, 1 Refill(s), Soft Stop, Pharmacy: [id number], 167, cm, 04/07/21 14:01:00 CDT, Height/Length Measured, 91.5, kg, 12/15/20 11:18:00 CST, Weight Dosing    carvediloL (COREG) 3.125 MG tablet Take 1 tablet (3.125 mg total) by mouth once daily.    cloNIDine (CATAPRES) 0.1 MG tablet Take 2 tablets (0.2 mg total) by mouth 3 (three) times daily.    clopidogreL (PLAVIX) 75 mg tablet Take 75 mg by mouth once daily.    furosemide (LASIX) 20 MG tablet Take 1 tablet (20 mg total) by mouth once daily.    hydrALAZINE (APRESOLINE) 50 MG tablet Take 1 tablet (50 mg total) by mouth every 12 (twelve) hours.    linaGLIPtin (TRADJENTA) 5 mg Tab tablet 5 mg.    NOVOLOG MIX 70-30FLEXPEN U-100 100 unit/mL (70-30) InPn pen Inject into the skin.    sodium bicarbonate 650 MG tablet Take 1 tablet (650 mg total) by mouth 2 (two) times daily.     Family History    None       Tobacco Use    Smoking status: Former Smoker    Smokeless tobacco: Former User   Substance and Sexual Activity    Alcohol use: Not Currently    Drug use: Not Currently    Sexual activity: Not Currently     Review of Systems   Unable to perform ROS: Patient nonverbal   Objective:     Vital Signs (Most Recent):  Temp: 98.8 °F (37.1 °C) (05/12/22 1600)  Pulse: 81 (05/12/22 1718)  Resp: 19 (05/12/22 1718)  BP: (!) 152/66 (05/12/22 1600)  SpO2: 100 % (05/12/22 1718)   Vital Signs (24h Range):  Temp:  [98 °F (36.7 °C)-101.2 °F (38.4 °C)] 98.8 °F (37.1 °C)  Pulse:  [] 81  Resp:  [10-58] 19  SpO2:  [94 %-100 %] 100 %  BP: (134-206)/() 152/66     Weight: 76.7 kg (169 lb)  Body mass index is 26.47 kg/m².    Physical Exam  Vitals reviewed.   Constitutional:       General: He is not in acute distress.     Appearance: Normal appearance.      Comments: Responds to noxious tactile stimuli, nonverbal, withdraws from pain.    HENT:       Head: Normocephalic and atraumatic.      Mouth/Throat:      Mouth: Mucous membranes are moist.      Pharynx: Oropharynx is clear. No posterior oropharyngeal erythema.   Eyes:      Comments: Left eye gaze   Cardiovascular:      Rate and Rhythm: Normal rate and regular rhythm.      Pulses: Normal pulses.      Heart sounds: No murmur heard.  Pulmonary:      Effort: Pulmonary effort is normal. No respiratory distress.      Breath sounds: No wheezing.      Comments: On nasal canula  Abdominal:      General: Bowel sounds are normal. There is no distension.      Palpations: Abdomen is soft.      Tenderness: There is no abdominal tenderness. There is no guarding.   Musculoskeletal:         General: No swelling, tenderness or signs of injury.      Cervical back: Normal range of motion and neck supple. No rigidity or tenderness.      Comments: Minimla movement; withdraws from noxious stimuli   Skin:     General: Skin is warm.   Neurological:      Cranial Nerves: Cranial nerve deficit present.      Sensory: Sensory deficit present.      Motor: No weakness.            Significant Labs: Hemoglobin A1c: No results for input(s): HGBA1C in the last 720 hours.  Blood Culture: No results for input(s): LABBLOO in the last 48 hours.  CBC:   Recent Labs   Lab 05/11/22 2303 05/12/22  1210   WBC 6.81 8.22   HGB 13.3* 14.4   HCT 40.3 44.1    233     CMP:   Recent Labs   Lab 05/11/22 2303 05/12/22  1257   * 353*    141   K 3.5 4.6    107   CO2 19* 16*   BUN 17 27*   CREATININE 2.5* 3.3*   CALCIUM 8.5* 8.3*   MG  --  1.6   PROT 7.1 7.2   ALBUMIN 2.8* 2.7*   BILITOT 0.9 0.7   ALKPHOS 105 139*   AST 9* 83*   ALT 8* 58*   ANIONGAP 17* 18*   EGFRNONAA 26.2* 19*       Significant Imaging: I have reviewed all pertinent imaging results/findings within the past 24 hours.  MRI Brain Without Contrast  Narrative: EXAM:  MRI BRAIN WITHOUT CONTRAST    CLINICAL HISTORY:  Fever, left gaze; .    COMPARISON:  Head CT dated  05/11/2022    FINDINGS:  Intracranial contents:This is a very limited evaluation as the patient became apneic during the scan and was rushed back to the ICU.  The study is however abnormal.  There is restricted diffusion in the anterior lateral right frontal lobe consistent with acute infarction.  In retrospect, very subtle hypodensity was present at this site on yesterday's head CT but the findings are much more apparent on MRI.  There is no hydrocephalus or midline shift.  There is encephalomalacia and gliosis in the medial right occipital lobe from remote infarction.  Also, there are remote lacunar infarctions in both cerebellar hemispheres as well as in the right thalamus.  There is generalized volume loss.    Extracranial contents, calvarium, soft tissues:The paranasal sinuses and mastoid air cells are grossly clear.  Impression: 1. This is a very limited evaluation but the study is abnormal.  There is a nonhemorrhagic infarction in the anterior lateral left frontal lobe.  2. There is a remote infarction with encephalomalacia and gliosis in the medial right occipital lobe.  There are also remote infarctions in the cerebellar hemispheres as well as in the right thalamus.  This report was flagged in Epic as abnormal.    Electronically signed by: Nicanor Roger MD  Date:    05/12/2022  Time:    12:09  CTA Head and Neck (xpd)  Narrative: EXAMINATION:  CTA HEAD AND NECK (XPD)    CLINICAL HISTORY:  Neuro deficit, acute, stroke suspected;    TECHNIQUE:  CT angiogram was performed from the level of the mary to the top of the head following the IV administration of 75mL of Omnipaque 350.   Sagittal and coronal reconstructions and maximum intensity projection reconstructions were performed. Arterial stenosis percentages are based on NASCET measurement criteria.    COMPARISON:  Noncontrast head CT the previous day    FINDINGS:  The study is significantly limited by motion.    Neck: There is a bovine arch with common  origin for the innominate and left common carotid arteries.  The origins of the great vessels are widely patent.    There is no significant stenosis in the right common carotid artery.    Calcific and noncalcific plaque narrows the origin of the right ICA which has a diameter of 3.4 mm proximally compared to 6.2 mm distally.  This is consistent with a less than 50% stenosis.    The left common carotid artery is widely patent.  There is a combination of calcific and noncalcific plaque at the origin of the left ICA.  The lumen at its narrowest measures 3.9 mm compared to 6.6 mm distally.  This is a less than 50% stenosis.    The vertebral arteries are patent.  There is calcific plaque in the distal left vertebral artery but no stenosis is identified.    Source images demonstrate abnormal ground-glass opacities and patchy consolidation in the lung apices, right greater than left. There are layering bilateral pleural effusions. Enlarged Jocelyn aortic/pre-vascular lymph nodes are noted with short axis diameters of up to 12 mm.    Head: There is normal flow in the basilar artery.  Both posterior cerebral arteries originate from the basilar and fill normally.  A left posterior communicating artery is present.    There is extensive calcific plaque in the cavernous portions of the internal carotid arteries bilaterally and significant stenoses are present.  There is normal distal flow in the intracranial ICAs, with normal filling of the anterior and middle cerebral arteries and their branches.  No aneurysm identified.    The appearance of the brain is unchanged compared to the noncontrast images obtained the previous day.  No abnormal intracranial enhancement is appreciated.  There is encephalomalacia in the right occipital lobe and in the left frontal lobe.  Generalized atrophy and periventricular low densities of chronic microvascular ischemia are noted.  There is a chronic lacunar infarct in the right thalamus.  Impression:  Less than 50% stenoses of the bilateral proximal internal carotid arteries.  Significant stenoses of the bilateral intracranial ICAs in the cavernous sinuses.  Normal flow in the [id number].    Findings in the lung apices as noted above, consistent with findings noted on chest x-ray obtained the prior day.    Electronically signed by: Chari Weaver  Date:    05/12/2022  Time:    08:33  X-Ray Chest AP Portable  Narrative: EXAMINATION:  XR CHEST AP PORTABLE    CLINICAL HISTORY:  Altered mental status, unspecified    TECHNIQUE:  Single frontal view of the chest was performed.    COMPARISON:  04/26/2022.    FINDINGS:  Mild pulmonary hypoinflation with crowding of the bronchopulmonary vessels.  Mild increased markings within the right perihilar lung extending into the right upper lobe suspicious for developing pulmonary infiltrate.  No significant pleural effusion    Heart size is enlarged.  Trachea midline.  Minimal calcified aortic plaque    Bony thorax intact.  Impression: 1. Increased markings within the right upper lobe suspicious for developing pulmonary infiltrate.  Correlate clinically with possible fever and/or elevated white count.  2. Cardiomegaly.    Electronically signed by: Darnell Ascencio  Date:    05/12/2022  Time:    07:02  CT Head Without Contrast  Narrative: EXAMINATION:  CT HEAD WITHOUT CONTRAST    CLINICAL HISTORY:  Mental status change, unknown cause;    TECHNIQUE:  Low dose axial images were obtained through the head.  Coronal and sagittal reformations were also performed. Contrast was not administered.    COMPARISON:  CT 05/28/2015.    FINDINGS:  There is no acute hemorrhage or infarction.  There is cortical atrophy.  There are periventricular deep white matter changes consistent with chronic small vessel ischemic disease.  Focal encephalomalacia of the right occipital lobe from remote ischemia.  Small remote lacunar infarcts of the bilateral basal ganglia.    No extra-axial fluid  collections.  Ventricles are normal in size, shape and configuration.  The basal cisterns are patent.    The imaged paranasal sinuses and ethmoid air cells are well aerated.    The mastoid air cells and middle ears are normally pneumatized.  Impression: 1. Cortical atrophy with periventricular deep white matter change consistent with chronic small vessel ischemic disease.  2. Focal right occipital encephalomalacia.  3. Small remote lacunar infarcts of the basal ganglia.  The preliminary and final reports are concordant.    Electronically signed by: Darnell Ascencio  Date:    05/12/2022  Time:    06:35        Assessment and Plan:     CVA (cerebral vascular accident)    Pt with h/o RS weakness from previous CVA, now presents with decreased responsiveness    CVA confirmed on MRI brain  Etiology workup pending  CT head: negative acute  MRI brain: nonhemorrhagic infarction in the anterior lateral left frontal lobe and a remote infarction with encephalomalacia and gliosis in the medial right occipital lobe.  There are also remote infarctions in the cerebellar hemispheres as well as in the right thalamus.  CTA head and neck: Less than 50% stenoses of the bilateral proximal internal carotid arteries.  Significant stenoses of the bilateral intracranial ICAs in the cavernous sinuses.  Normal flow in the [id number]  ECHO: pending  LDL: pending  A1c: pending  Secondary stroke prevention: aspirin, atorvastatin, and Plavix at home. Continue aspirin and statin at this time, pending further work up.    From neurology's standpoint, LP is not necessary at this time. Complete stoke work, evaluate for endocarditis, obtain an EEG.           VTE Risk Mitigation (From admission, onward)         Ordered     IP VTE HIGH RISK PATIENT  Once         05/12/22 1351     Place sequential compression device  Until discontinued         05/12/22 1351     Place sequential compression device  Until discontinued         05/12/22 1052                 Thank you for your consult. I will follow-up with patient. Please contact us if you have any additional questions.    Hanh Esquivel, BYRON  Neurology  [facility] I, Dr. Rudy Segundo, have personally seen and examined the patient with my advanced provider and agree with above. I personally did a focused exam, and reviewed all necessary clinical information. I discussed my management plan with my NP and agree with above.   All side effects of medications were discussed with the patient and/or next of kin including severe mood changes,  organ failure, lab abnormalities, rash, passing out, low blood pressure, glaucoma, cognitive changes, life threatening bleeding, passing out, glaucoma, rash, fatigue, weight gain and or weight loss, and death.  No driving until follow up at [facility].  Follow up at [facility] 3 days post discharge. Telehealth available.   Stroke (Including any acute neurological symptoms to return to the ER immediately and call 911, like acute weakness, severe headaches, sensory, visual or speech changes)  and seizure education provided.  No neck stiffness. No headache. Awake.  Stable. If echo negative, blood thinners. Probable seizures but unclear. No history of depression.  F/u Dr. Edgar for carotid stenosis within 5 days of DC.   F/u EEG.      Toro Segundo MD. FAAN.    [facility]  [phone]

## 2022-05-12 NOTE — ED PROVIDER NOTES
Encounter Date: 5/11/2022       History     Chief Complaint   Patient presents with    Altered Mental Status     EMS reports pt has AMS with unknown normal. Pt responds to voice when calling his name, unable to answer questions at this time.      Patient is a 66-year-old male brought from home via EMS for evaluation of altered mental status as reported by family members.  Family member stated that at around 1:00 p.m. today they noted that the patient was not exhibiting his normal behaviors.  Upon arrival here, the patient is not speaking.  He has some residual weakness on the right side secondary to a previous stroke.  Patient does not speak, but he does respond to commands.  He is exhibiting an abnormal breathing pattern of several short, chopped breaths followed by periods of apnea.  His vital signs show a temperature of 98°, pulse 98,, respiratory rate 28 per minute, O2 saturations 95% on room air, blood pressure is significantly elevated at 183/101.  Patient's baseline is unknown, but patient's family state that he has not at baseline.        Review of patient's allergies indicates:  No Known Allergies  Past Medical History:   Diagnosis Date    CHF (congestive heart failure)     Diabetes mellitus     Hypertension     Stroke      Past Surgical History:   Procedure Laterality Date    EYE SURGERY      LEFT EYE     History reviewed. No pertinent family history.  Social History     Tobacco Use    Smoking status: Former Smoker    Smokeless tobacco: Former User   Substance Use Topics    Alcohol use: Not Currently    Drug use: Not Currently     Review of Systems   Unable to perform ROS: Mental status change       Physical Exam     Initial Vitals   BP Pulse Resp Temp SpO2   05/11/22 2225 05/11/22 2225 05/11/22 2225 05/11/22 2227 05/11/22 2225   (!) 183/101 (!) 120 (!) 34 98 °F (36.7 °C) 100 %      MAP       --                Physical Exam    Nursing note and vitals reviewed.  Constitutional: He appears  well-developed and well-nourished. He is not diaphoretic. No distress.   HENT:   Head: Normocephalic and atraumatic.   Right Ear: External ear normal.   Left Ear: External ear normal.   Nose: Nose normal.   Mouth/Throat: Oropharynx is clear and moist. No oropharyngeal exudate.   Eyes: Conjunctivae and EOM are normal. Pupils are equal, round, and reactive to light. No scleral icterus.   Neck: Neck supple. No JVD present.   Normal range of motion.  Cardiovascular: Normal rate, regular rhythm, normal heart sounds and intact distal pulses.   No murmur heard.  Pulmonary/Chest: Breath sounds normal. No stridor. No respiratory distress. He has no wheezes. He has no rhonchi. He has no rales.   Abdominal: Abdomen is soft. Bowel sounds are normal. He exhibits no distension. There is no abdominal tenderness.   Musculoskeletal:         General: Edema (Both lower extremity slightly edematous) present. No tenderness. Normal range of motion.      Cervical back: Normal range of motion and neck supple.     Neurological:   Patient is awaken responses simple commands.  He does not speak or answer questions.  Seems to move all extremities, although he is weaker on the right side.   Skin: Skin is warm and dry. Capillary refill takes less than 2 seconds. No rash noted. No erythema.   Psychiatric: He has a normal mood and affect. His behavior is normal.         ED Course   Procedures  Labs Reviewed   CBC W/ AUTO DIFFERENTIAL - Abnormal; Notable for the following components:       Result Value    Hemoglobin 13.3 (*)     MCV 75 (*)     MCH 24.6 (*)     RDW 18.1 (*)     Lymph # 0.7 (*)     Gran % 84.5 (*)     Lymph % 9.8 (*)     All other components within normal limits   COMPREHENSIVE METABOLIC PANEL - Abnormal; Notable for the following components:    CO2 19 (*)     Glucose 382 (*)     Creatinine 2.5 (*)     Calcium 8.5 (*)     Albumin 2.8 (*)     AST 9 (*)     ALT 8 (*)     Anion Gap 17 (*)     eGFR if  30.2 (*)     eGFR  if non  26.2 (*)     All other components within normal limits   URINALYSIS, REFLEX TO URINE CULTURE - Abnormal; Notable for the following components:    Protein, UA 3+ (*)     Glucose, UA 2+ (*)     Occult Blood UA 2+ (*)     All other components within normal limits    Narrative:     Preferred Collection Type->Urine, Clean Catch  Specimen Source->Urine   URINALYSIS MICROSCOPIC - Abnormal; Notable for the following components:    RBC, UA 10 (*)     Bacteria Moderate (*)     All other components within normal limits    Narrative:     Preferred Collection Type->Urine, Clean Catch  Specimen Source->Urine   POCT GLUCOSE - Abnormal; Notable for the following components:    POCT Glucose 371 (*)     All other components within normal limits   ISTAT PROCEDURE - Abnormal; Notable for the following components:    POC PCO2 29.3 (*)     POC PO2 41 (*)     POC HCO3 17.9 (*)     POC SATURATED O2 77 (*)     POC TCO2 19 (*)     All other components within normal limits   CULTURE, BLOOD   CULTURE, BLOOD   DRUG SCREEN PANEL, URINE EMERGENCY    Narrative:     Preferred Collection Type->Urine, Clean Catch  Specimen Source->Urine   ALCOHOL,MEDICAL (ETHANOL)   AMMONIA   SARS-COV-2 RNA AMPLIFICATION, QUAL    Narrative:     Is the patient symptomatic?->No     EKG Readings: (Independently Interpreted)   EKG personally reviewed by me shows sinus tachycardia at 121 beats per minute.  NH interval 160,  T. left atrial enlargement and left ventricular hypertrophy, no obvious ST elevations.         Imaging Results          CTA Head and Neck (xpd) (In process)                X-Ray Chest AP Portable (In process)                CT Head Without Contrast (In process)               X-Rays:   Independently Interpreted Readings:   Other Readings:  CT brain shows evidence of an old infarct, no acute hemorrhage, no obvious acute infarct.  No mass, no mass effect.  No skull fracture.    Chest x-ray personally reviewed by me shows  mildly enlarged cardiac silhouette.  No pneumothorax.  There does appear to be slight haziness in the right lung in general when compared to the left, possibly artifact.  Normal skeletal structures.    CT angiogram, head and neck, shows no evidence of large vessel occlusion.  Radiologist did note pleural effusions, mediastinal adenopathy and ground-glass opacities in the lung apices, indicating possible infectious process versus inflammation versus edema.    Medications   cefTRIAXone (ROCEPHIN) 1 g/50 mL D5W IVPB (has no administration in time range)   labetaloL injection 20 mg (20 mg Intravenous Given 5/11/22 2322)   cefTRIAXone (ROCEPHIN) 1 g/50 mL D5W IVPB (0 g Intravenous Stopped 5/12/22 0026)   iohexoL (OMNIPAQUE 350) injection 75 mL (75 mLs Intravenous Given 5/12/22 0408)   labetaloL injection 20 mg (20 mg Intravenous Given 5/12/22 0257)     Medical Decision Making:   Differential Diagnosis:   Hypertensive urgency, metabolic encephalopathy, drug intoxication, CVA, mental disorder, etc..  ED Management:  Patient was taken CT almost immediately.  This CT did not show any evidence of acute stroke.  Patient arrived here out of the window for tPA, so initially a stroke code was not called.  With normal CT, it was thought that perhaps is elevated blood pressure was causing his altered mental status.  He was given labetalol 20 mg, and now his blood pressure is 153/97, but patient's status has not improved.  His lab work is remarkable for moderate level of bacteria in the urine, no nitrites, and urine drug screen is negative.  1 g Rocephin given as a precaution.  BUN and creatinine are elevated.  An ABG is currently being drawn, and I have ordered a tele neurology consult.  I am concerned about a cerebellar stroke or brain stem stroke as evidenced by the patient's altered mental status and his breathing pattern.    Dr. Cruz performed a Neurology exam via telemetry and suggested a CT angiogram of the head and  neck.  Patient's GFR was below recommended limit for this study, but clearance from Radiology was given in the study was performed.  This did not show any evidence of large vessel occlusion.  There was evidence of bilateral pleural effusions and mediastinal adenopathy, with a ground glass opacity in the lung apices.  This may represent an infectious process versus inflammatory change, or possibly edema.  Will withhold Lasix secondary to the patient's renal status.  Patient will need to be transferred to another facility for neurology care.  Transfer center was notified of this need and patient has been accepted at [facility], , accepting.                       Clinical Impression:   Final diagnoses:  [R41.82] Altered mental status, unspecified          ED Disposition Condition    Transfer to Another Facility Stable              Arnold Mesa MD  05/12/22 7321

## 2022-05-12 NOTE — PROCEDURES
Routine EEG Report    Marshall Flor  [id number]  1955    DATE OF SERVICE: 5/12/2022  REASON FOR CONSULT:  66-year-old man admitted with a left gaze preference and decreased responsiveness.  Evaluate for evidence of epileptiform activity.    METHODOLOGY   Electroencephalographic (EEG) recording is with electrodes placed according to the International 10-20 placement system.  Thirty two (32) channels of digital signal (sampling rate of 512/sec) including T1 and T2 was simultaneously recorded from the scalp and may include  EKG, EMG, and/or eye monitors.  Recording band pass was 0.1 to 512 hz.  Digital video recording of the patient is simultaneously recorded with the EEG.  The patient is instructed report clinical symptoms which may occur during the recording session.  EEG and video recording is stored and archived in digital format. Activation procedures which include photic stimulation, hyperventilation and instructing patients to perform simple task are done in selected patients.    The EEG is displayed on a monitor screen and can be reviewed using different montages.  Computer assisted analysis is employed to detect spike and electrographic seizure activity.   The entire record is submitted for computer analysis.  The entire recording is visually reviewed and the times identified by computer analysis as being spikes or seizures are reviewed again.  Compresses spectral analysis (CSA) is also performed on the activity recorded from each individual channel.  This is displayed as a power display of frequencies from 0 to 30 Hz over time.   The CSA is reviewed looking for asymmetries in power between homologous areas of the scalp and then compared with the original EEG recording.     [employer] software is also utilized in the review of this study.  This software suite analyzes the EEG recording in multiple domains.  Coherence and rhythmicity is computed to identify EEG sections which may contain organized seizures.   Each channel undergoes analysis to detect presence of spike and sharp waves which have special and morphological characteristic of epileptic activity.  The routine EEG recording is converted from spacial into frequency domain.  This is then displayed comparing homologous areas to identify areas of significant asymmetry.  Algorithm to identify non-cortically generated artifact is used to separate eye movement, EMG and other artifact from the EEG.      EEG FINDINGS  Background activity:   The background is continuous, relatively symmetric moderate voltage theta activity with admixed delta frequencies.  There is a 7 hz maximal posterior dominant rhythm seen bilaterally.    Sleep:  There are periods with poorly formed sleep architecture.    Activation procedures:   The patient is awake on nasal cannula but does not follow commands or answer orientation questions.  Photic stimulation is performed with no activation of the record.    Cardiac Monitor:   Heart rate appears generally regular on a single lead EKG.    Impression:   This is an abnormal awake and asleep routine EEG because of generalized background slowing consistent with diffuse cortical dysfunction and a moderate encephalopathy.  This finding is nonspecific with regards to etiology but can be seen in the setting of toxic/metabolic derangements, anoxia, infection, and as a medication effect.  There are no prominent focal findings however encephalopathy obscures focal findings.  There are no pushbutton activations, no epileptiform discharges, and no electrographic seizures.    Joleen Méndez MD PhD  Neurology-Epilepsy  [facility].

## 2022-05-12 NOTE — PLAN OF CARE
RT called to ED for This patient to perform ABG. Pt is alert but Apneic, Altered Mental status and Hyperventilating. A Venturi mask was placed on PT with FI02 40% to help with his desaturation.  Blood gas was done and oxygen was given .     Mary  CRT

## 2022-05-12 NOTE — CARE UPDATE
Upon my exam at 6:10 a.m. the patient was intermittently painting the x-ray was reviewed showing evidence of cardiomegaly and some pulmonary edema, salicylate and acetaminophen levels were drawn were normal however the BNP was elevated 3000 the patient remained with an elevated blood pressure which was addressed with hydralazine 10 mg IV patient did produce increased urine in the Colmenares after Lasix 40 mg IV was given patient was discharged per transfer in stable condition

## 2022-05-12 NOTE — HOSPITAL COURSE
66-year-old male with history of CHF, diabetes presents to [place] for decreased responsiveness ultimately becoming unresponsive, fever and left gaze preference found to have an acute CVA in addition to an MERCEDES.  The patient was started on aspirin rectally.  Over the 1st week the patient remained nonresponsive and would only withdrawal to pain.  One point he was found to have an intracranial hemorrhage which demonstrated stability thereafter on follow-up CT.  Neurology ultimately recommended resumption of aspirin.  Briefly the patient is neurologic status improved such that he was alert and following simple commands inconsistently.  On the morning of 05/21/2022 he was again unresponsive and so repeat CT was performed which showed mild worsening of the hemorrhage.  Follow-up CT the evening of 5/21 according to the neurologist's read was stable.  Beyond this initially there was concern the patient had meningitis.  He was started on broad-spectrum antibiotics.  Ultimately lumbar puncture was able to be performed and at that point meningitis was ruled out. Course was complicated by the development of a pneumonia which has been fully treated. He presented also with an acute kidney injury; nephrology is following.  The patient has intermittently required dialysis with specific note that uremia would leave to cognitive depression. Palliative care team has been consulted.  Family has initially wished to transition the patient to comfort focus care at this point in time.  Patient was having intermittent fevers, but no other signs or symptoms of sepsis.  He was discharged from inpatient hospital admission and admitted for inpatient hospice care.

## 2022-05-12 NOTE — PLAN OF CARE
[facility]  Initial Discharge Assessment       Primary Care Provider: Dorie Quan MD    Admission Diagnosis: stroke    Admission Date: 5/12/2022  Expected Discharge Date:    Assessment completed with pts adult son Jefry Flor [phone]. He confirmed pts address and that pt lives with him and one other adult son. He denied any 30 day admits and HH. He has a home cane. Pts pharmacy of choice is [facility] in [place]. His PCP is Dr. Quan and he has [employer] and [place] medicaid. Pts discharge plan is home with family and his brother Zaid Flor [phone] will provide transport home. CM following.     Discharge Barriers Identified: None    Payor: [employer] MANAGED MEDICARE / Plan: [employer] SNP (SPECIAL NEEDS PLAN) / Product Type: Medicare Advantage /     Extended Emergency Contact Information  Primary Emergency Contact: Jefry Flor  Mobile Phone: [phone]  Relation: Son   needed? No  Secondary Emergency Contact: Zaid Flor  Mobile Phone: [phone]  Relation: Brother  Preferred language: English   needed? No    Discharge Plan A: Home with family  Discharge Plan B: Home with family, Home Health      [id number] - [place], [place] - [address]  [address]  [place] [address]  Phone: [phone] Fax: [phone]      Initial Assessment (most recent)     Adult Discharge Assessment - 05/12/22 0957        Discharge Assessment    Assessment Type Discharge Planning Assessment     Confirmed/corrected address, phone number and insurance Yes     Confirmed Demographics Correct on Facesheet     Source of Information patient;family     Communicated STAR with patient/caregiver Yes     Reason For Admission Stroke     Lives With child(mónica), adult     Facility Arrived From: home     Do you expect to return to your current living situation? Yes     Do you have help at home or someone to help you manage your care at home? Yes     Who are your caregiver(s) and their phone  number(s)? two sons Jefry Flor [phone]     Prior to hospitilization cognitive status: Unable to Assess     Current cognitive status: Unable to Assess     Walking or Climbing Stairs Difficulty none     Dressing/Bathing Difficulty none     Home Layout Able to live on 1st floor     Equipment Currently Used at Home none     Readmission within 30 days? No     Patient currently being followed by outpatient case management? No     Do you currently have service(s) that help you manage your care at home? No     Do you take prescription medications? Yes     Do you have prescription coverage? Yes     Do you have any problems affording any of your prescribed medications? No     Is the patient taking medications as prescribed? yes     Who is going to help you get home at discharge? pts frannie Flor [phone]     How do you get to doctors appointments? family or friend will provide     Are you on dialysis? No     Do you take coumadin? No     Discharge Plan A Home with family     Discharge Plan B Home with family;Home Health     DME Needed Upon Discharge  none     Discharge Plan discussed with: Adult children     Discharge Barriers Identified None        Relationship/Environment    Name(s) of Who Lives With Patient pts son Jefry Flor [phone]

## 2022-05-12 NOTE — HPI
Per EMR: History of Present Illness:    66-year-old male with history of CHF, diabetes presents to [place] for decreased responsiveness found to have a left gaze presents with fever upon arrival to [facility].  Family is not at bedside and patient is not able to communicate in current state.  ?

## 2022-05-12 NOTE — CONSULTS
Consult Note  Infectious Disease    Reason for Consult:  Rule out meningitis     HPI: Marshall Flor is a 66 y.o. male with past medical history of HTN, diabetes, CHF, prior stroke presented to [facility] for altered mental status and fever.  No family at bedside.  Patient seen and examined in ICU.  Unable to obtain further history, patient breathing heavily, nonverbal, staring, febrile.    In the ER, hypertensive 179/109, febrile 101.2  Labs on admission reviewed, white count 6.8, PMN 84.5%, H&H 13.3/40.3, platelet count 311  Creatinine 2.5, MERCEDES 3.3 today  AST 83/ALT 58  BNP 3064  Lactic acid 3.5, procalcitonin 6.9  U tox negative  UA 3+ protein, 2+ glucose, WBC 3, moderate bacteria  Chest x-ray suggestive of pneumonia right upper lobe  MRI brain nonhemorrhagic infarction in the right anterolateral frontal lobe.    Admitted for severe sepsis with altered mental status, found to have acute stroke, rule out meningitis.    ID consult for meningitis.    Review of patient's allergies indicates:  No Known Allergies  Past Medical History:   Diagnosis Date    CHF (congestive heart failure)     Diabetes mellitus     Hypertension     Stroke      Past Surgical History:   Procedure Laterality Date    EYE SURGERY      LEFT EYE     Social History     Tobacco Use    Smoking status: Former Smoker    Smokeless tobacco: Former User   Substance Use Topics    Alcohol use: Not Currently        No family history on file.    Pertinent medications noted:     Review of Systems:   Unable to obtain, patient altered, not following commands     Antibiotic History: Ceftriaxone    EXAM & DIAGNOSTICS REVIEWED:   Vitals:     Temp:  [98 °F (36.7 °C)-101.2 °F (38.4 °C)]   Temp: (!) 101.2 °F (38.4 °C) (05/12/22 0928)  Pulse: 99 (05/12/22 0928)  Resp: (!) 35 (05/12/22 0928)  BP: (!) 179/109 (05/12/22 0928)  SpO2: 97 % (05/12/22 0928)  No intake or output data in the 24 hours ending 05/12/22 1129    General:  Lethargic, tachypneic,  not making eye contact, not following commands, on O2 by NC 2L  Eyes:  Anicteric, PERRL  ENT:  Limited although able to open mouth, dry oral mucosa, no thrush   Neck:  Neck rigidity  Lungs: Rhonchi R  Heart:  Tachycardic, S1/S2+, regular rhythm, no murmurs  Abd:  +BS, soft, non tender, non distended, no rebound  :  Colmenares, urine clear, no flank tenderness  Musc:  Joints without effusion, swelling,  erythema, synovitis  Skin:  Warm, no rash  Wound:   Neuro: Staring to the wall, not following commands, plegia lower extermities   Psych:  Calm  Lymphatic:     No cervical, supraclavicular nodes  Extrem: No LE edema b/l  VAD:  Peripheral IVs       Isolation: None      General Labs reviewed:  Recent Labs   Lab 05/11/22  2303   WBC 6.81   HGB 13.3*   HCT 40.3          Recent Labs   Lab 05/11/22  2303      K 3.5      CO2 19*   BUN 17   CREATININE 2.5*   CALCIUM 8.5*   PROT 7.1   BILITOT 0.9   ALKPHOS 105   ALT 8*   AST 9*     No results for input(s): CRP in the last 168 hours.  No results for input(s): SEDRATE in the last 168 hours.    CrCl cannot be calculated (Unknown ideal weight.).     Micro:  Microbiology Results (last 7 days)     Procedure Component Value Units Date/Time    Gram stain [[id number]]     Order Status: No result Specimen: CSF (Spinal Fluid)     Direct AFB stain [[id number]]     Order Status: No result Specimen: CSF (Spinal Fluid)     AFB Culture & Smear [[id number]]     Order Status: No result Specimen: CSF (Spinal Fluid)     Cryptococcal antigen, CSF [[id number]]     Order Status: No result Specimen: CSF (Spinal Fluid)     CSF culture [[id number]]     Order Status: No result Specimen: CSF (Spinal Fluid)           Imaging Reviewed:  CXR  CT and CTA head negative for acute ischemic stroke   MRI brain - acute ischemic stroke    Cardiology: Follow up ECHO        IMPRESSION & PLAN     1. Sepsis cannot exclude meningitis vs pneumonia    Blood cultures x 2 in process   Procal 6.9     2. Acute  stroke  3. Kym, cr 3.3  3. PMHx, diabetes, HTN, prior stroke    Recommendations:  Anesthesia for LP  Please send CSF for cell count, Gram stain, protein, glucose and CSF PCR   Vancomycin IV, keep level 15-20  Ceftriaxone 2g IV q12h  Ampicillin 2g IV q6h for Listeria, dose as per crcl  Acyclovir 660mg IV q8h   Neurology recommendations appreciated   Follow cultures  Aspiration precautions   Will follow     D/w nursing, Dr Correa     I have spent > 35 minutes providing critical care services for this pt for the above diagnoses. These services have included pt evaluation, pt exam, discussions with staff, chart review, data review, note preparation and . The patient has life threatening illness with a high risk of decompensation and/or death.        Medical Decision Making during this encounter was  [_] Low Complexity  [_] Moderate Complexity  [xx] High Complexity

## 2022-05-12 NOTE — PLAN OF CARE
Problem: Adult Inpatient Plan of Care  Goal: Plan of Care Review  Outcome: Ongoing, Progressing  Goal: Patient-Specific Goal (Individualized)  Outcome: Ongoing, Progressing  Goal: Absence of Hospital-Acquired Illness or Injury  Outcome: Ongoing, Progressing  Goal: Optimal Comfort and Wellbeing  Outcome: Ongoing, Progressing  Goal: Readiness for Transition of Care  Outcome: Ongoing, Progressing     Problem: Communication Impairment (Stroke, Ischemic/Transient Ischemic Attack)  Goal: Improved Communication Skills  Outcome: Ongoing, Progressing     Problem: Cognitive Impairment (Stroke, Ischemic/Transient Ischemic Attack)  Goal: Optimal Cognitive Function  Outcome: Ongoing, Progressing     Problem: Cerebral Tissue Perfusion (Stroke, Ischemic/Transient Ischemic Attack)  Goal: Optimal Cerebral Tissue Perfusion  Outcome: Ongoing, Progressing

## 2022-05-12 NOTE — ASSESSMENT & PLAN NOTE
Pt with h/o RS weakness from previous CVA, now presents with decreased responsiveness    CVA confirmed on MRI brain  Etiology workup pending  CT head: negative acute  MRI brain: nonhemorrhagic infarction in the anterior lateral left frontal lobe and a remote infarction with encephalomalacia and gliosis in the medial right occipital lobe.  There are also remote infarctions in the cerebellar hemispheres as well as in the right thalamus.  CTA head and neck: Less than 50% stenoses of the bilateral proximal internal carotid arteries.  Significant stenoses of the bilateral intracranial ICAs in the cavernous sinuses.  Normal flow in the [id number]  ECHO: pending  LDL: pending  A1c: pending  Secondary stroke prevention: aspirin, atorvastatin, and Plavix at home. Continue aspirin and statin at this time, pending further work up.    From neurology's standpoint, LP is not necessary at this time. Complete stoke work, evaluate for endocarditis, obtain an EEG.

## 2022-05-12 NOTE — PLAN OF CARE
(Physician in Lead of Transfers)   Outside Transfer Acceptance Note / Regional Referral Center    Referring facility: [facility]   Referring provider: MICHELLE CURTIS  Accepting facility: [facility]  Reason for transfer:  Higher level of care  Transfer diagnosis: AMS  Transfer specialty requested: Vascular Neurology  Transfer specialty notified: Yes  Transfer level: 2  Isolation status: No active isolations   Admission class or status: IP- Inpatient      Narrative     Patient is a 66 y.o. male who has a past medical history of CHF (congestive heart failure), Diabetes mellitus, Hypertension, and Stroke presented with to [facility] ED altered mental status. Family member stated that at around 1:00 p.m. today they noted that the patient was not exhibiting his normal behaviors. On arrival to ED patient was awake and responding to simple commands. He has some residual weakness on the right side secondary to a previous stroke. CT brain shows evidence of an old infarct, no acute hemorrhage, no obvious acute infarct. CTA ordered was done which was negative. See below labs and imaging. Tele neurology was consulted and recommended transfer for further neurologic evaluation. Patient stable for transfer.     Objective     Vitals: Temp: 98 °F (36.7 °C) (05/11/22 2227)  Pulse: 84 (05/12/22 0348)  Resp: (!) 54 (05/12/22 0348)  BP: (!) 157/102 (05/12/22 0348)  SpO2: (!) 94 % (05/12/22 0348)    Recent Labs: see EPIC  CBC:  Recent Labs   Lab 05/11/22  2303   WBC 6.81   HGB 13.3*   HCT 40.3        CMP:  Recent Labs   Lab 05/11/22  2303   CALCIUM 8.5*   ALBUMIN 2.8*   PROT 7.1      K 3.5   CO2 19*      BUN 17   CREATININE 2.5*   ALKPHOS 105   ALT 8*   AST 9*   BILITOT 0.9     No results for input(s): LACTATE in the last 72 hours.  No results for input(s): CPK, CPKMB, TROPONINI, MB in the last 168 hours.  BNP  No results for input(s): BNP, BNPTRIAGEBLO in the last 168  hours.  ABG  Recent Labs   Lab 05/12/22  0140   PH 7.393   PO2 41*   PCO2 29.3*   HCO3 17.9*   BE -7      Lab Results   Component Value Date    INR 1.0 01/02/2022       Recent imaging:   X-Ray Chest AP Portable  Narrative: EXAMINATION:  XR CHEST AP PORTABLE    CLINICAL HISTORY:  sob;    TECHNIQUE:  Single frontal view of the chest was performed.    COMPARISON:  Chest of January 24, 2022    FINDINGS:  There is mild cardiomegaly.  There is prominence of the pulmonary vasculature and mild clearing of the faint pulmonary edema seen on the prior study.  Consolidated infiltrate or solid mass is not seen.  No pneumothorax or pleural effusion is noted.  Impression: Cardiomegaly with prominence of the pulmonary vasculature consistent with borderline congestive heart failure.  Pulmonary edema is not identified today    Electronically signed by: Kyrie Damon MD  Date:    04/26/2022  Time:    14:14      Airway:     Vent settings:     IV access:        Peripheral IV - Single Lumen 05/11/22 2300 Posterior;Right Hand (Active)            Peripheral IV - Single Lumen 05/11/22 2357 20 G Left Hand (Active)   Site Assessment Clean;Dry;Intact;No redness;No swelling 05/11/22 2357            Peripheral IV - Single Lumen 05/12/22 0330 Right Antecubital (Active)     Allergies: Review of patient's allergies indicates:  No Known Allergies   NPO: No      Anticoagulation:   Anticoagulants     None           Instructions      [facility]  Admit to Hospital Medicine  Upon patient arrival to floor, please contact Hospital Medicine on call.

## 2022-05-12 NOTE — SUBJECTIVE & OBJECTIVE
Past Medical History:   Diagnosis Date    CHF (congestive heart failure)     Diabetes mellitus     Hypertension     Stroke        Past Surgical History:   Procedure Laterality Date    EYE SURGERY      LEFT EYE       Review of patient's allergies indicates:  No Known Allergies    Current Neurological Medications: asa, statin, plavix    Current Facility-Administered Medications on File Prior to Encounter   Medication    [COMPLETED] cefTRIAXone (ROCEPHIN) 1 g/50 mL D5W IVPB    [COMPLETED] cefTRIAXone (ROCEPHIN) 1 g/50 mL D5W IVPB    [COMPLETED] furosemide injection 40 mg    [COMPLETED] hydrALAZINE injection 10 mg    [COMPLETED] iohexoL (OMNIPAQUE 350) injection 75 mL    [COMPLETED] labetaloL injection 20 mg    [COMPLETED] labetaloL injection 20 mg    [DISCONTINUED] aspirin tablet 325 mg     Current Outpatient Medications on File Prior to Encounter   Medication Sig    allopurinoL (ZYLOPRIM) 100 MG tablet   = 2 tab, Oral, Daily, # 60 tab, 5 Refill(s), Pharmacy: [id number], 167, cm, 07/27/21 8:24:00 CDT, Height/Length Measured, 91.5, kg, 12/15/20 11:18:00 CST, Weight Dosing    amLODIPine (NORVASC) 10 MG tablet 10 mg.    aspirin (ECOTRIN) 81 MG EC tablet Take 1 tablet (81 mg total) by mouth once daily.    atorvastatin (LIPITOR) 40 MG tablet   = 1 tab, Oral, Daily, # 90 tab, 1 Refill(s), Soft Stop, Pharmacy: [id number], 167, cm, 04/07/21 14:01:00 CDT, Height/Length Measured, 91.5, kg, 12/15/20 11:18:00 CST, Weight Dosing    carvediloL (COREG) 3.125 MG tablet Take 1 tablet (3.125 mg total) by mouth once daily.    cloNIDine (CATAPRES) 0.1 MG tablet Take 2 tablets (0.2 mg total) by mouth 3 (three) times daily.    clopidogreL (PLAVIX) 75 mg tablet Take 75 mg by mouth once daily.    furosemide (LASIX) 20 MG tablet Take 1 tablet (20 mg total) by mouth once daily.    hydrALAZINE (APRESOLINE) 50 MG tablet Take 1 tablet (50 mg total) by mouth every 12 (twelve) hours.    linaGLIPtin (TRADJENTA) 5 mg Tab tablet 5  mg.    NOVOLOG MIX 70-30FLEXPEN U-100 100 unit/mL (70-30) InPn pen Inject into the skin.    sodium bicarbonate 650 MG tablet Take 1 tablet (650 mg total) by mouth 2 (two) times daily.     Family History    None       Tobacco Use    Smoking status: Former Smoker    Smokeless tobacco: Former User   Substance and Sexual Activity    Alcohol use: Not Currently    Drug use: Not Currently    Sexual activity: Not Currently     Review of Systems   Unable to perform ROS: Patient nonverbal   Objective:     Vital Signs (Most Recent):  Temp: 98.8 °F (37.1 °C) (05/12/22 1600)  Pulse: 81 (05/12/22 1718)  Resp: 19 (05/12/22 1718)  BP: (!) 152/66 (05/12/22 1600)  SpO2: 100 % (05/12/22 1718)   Vital Signs (24h Range):  Temp:  [98 °F (36.7 °C)-101.2 °F (38.4 °C)] 98.8 °F (37.1 °C)  Pulse:  [] 81  Resp:  [10-58] 19  SpO2:  [94 %-100 %] 100 %  BP: (134-206)/() 152/66     Weight: 76.7 kg (169 lb)  Body mass index is 26.47 kg/m².    Physical Exam  Vitals reviewed.   Constitutional:       General: He is not in acute distress.     Appearance: Normal appearance.      Comments: Responds to noxious tactile stimuli, nonverbal, withdraws from pain.    HENT:      Head: Normocephalic and atraumatic.      Mouth/Throat:      Mouth: Mucous membranes are moist.      Pharynx: Oropharynx is clear. No posterior oropharyngeal erythema.   Eyes:      Comments: Left eye gaze   Cardiovascular:      Rate and Rhythm: Normal rate and regular rhythm.      Pulses: Normal pulses.      Heart sounds: No murmur heard.  Pulmonary:      Effort: Pulmonary effort is normal. No respiratory distress.      Breath sounds: No wheezing.      Comments: On nasal canula  Abdominal:      General: Bowel sounds are normal. There is no distension.      Palpations: Abdomen is soft.      Tenderness: There is no abdominal tenderness. There is no guarding.   Musculoskeletal:         General: No swelling, tenderness or signs of injury.      Cervical back: Normal range of motion  and neck supple. No rigidity or tenderness.      Comments: Minimla movement; withdraws from noxious stimuli   Skin:     General: Skin is warm.   Neurological:      Cranial Nerves: Cranial nerve deficit present.      Sensory: Sensory deficit present.      Motor: No weakness.            Significant Labs: Hemoglobin A1c: No results for input(s): HGBA1C in the last 720 hours.  Blood Culture: No results for input(s): LABBLOO in the last 48 hours.  CBC:   Recent Labs   Lab 05/11/22 2303 05/12/22  1210   WBC 6.81 8.22   HGB 13.3* 14.4   HCT 40.3 44.1    233     CMP:   Recent Labs   Lab 05/11/22 2303 05/12/22  1257   * 353*    141   K 3.5 4.6    107   CO2 19* 16*   BUN 17 27*   CREATININE 2.5* 3.3*   CALCIUM 8.5* 8.3*   MG  --  1.6   PROT 7.1 7.2   ALBUMIN 2.8* 2.7*   BILITOT 0.9 0.7   ALKPHOS 105 139*   AST 9* 83*   ALT 8* 58*   ANIONGAP 17* 18*   EGFRNONAA 26.2* 19*       Significant Imaging: I have reviewed all pertinent imaging results/findings within the past 24 hours.  MRI Brain Without Contrast  Narrative: EXAM:  MRI BRAIN WITHOUT CONTRAST    CLINICAL HISTORY:  Fever, left gaze; .    COMPARISON:  Head CT dated 05/11/2022    FINDINGS:  Intracranial contents:This is a very limited evaluation as the patient became apneic during the scan and was rushed back to the ICU.  The study is however abnormal.  There is restricted diffusion in the anterior lateral right frontal lobe consistent with acute infarction.  In retrospect, very subtle hypodensity was present at this site on yesterday's head CT but the findings are much more apparent on MRI.  There is no hydrocephalus or midline shift.  There is encephalomalacia and gliosis in the medial right occipital lobe from remote infarction.  Also, there are remote lacunar infarctions in both cerebellar hemispheres as well as in the right thalamus.  There is generalized volume loss.    Extracranial contents, calvarium, soft tissues:The paranasal sinuses  and mastoid air cells are grossly clear.  Impression: 1. This is a very limited evaluation but the study is abnormal.  There is a nonhemorrhagic infarction in the anterior lateral left frontal lobe.  2. There is a remote infarction with encephalomalacia and gliosis in the medial right occipital lobe.  There are also remote infarctions in the cerebellar hemispheres as well as in the right thalamus.  This report was flagged in Epic as abnormal.    Electronically signed by: Nicanor Roger MD  Date:    05/12/2022  Time:    12:09  CTA Head and Neck (xpd)  Narrative: EXAMINATION:  CTA HEAD AND NECK (XPD)    CLINICAL HISTORY:  Neuro deficit, acute, stroke suspected;    TECHNIQUE:  CT angiogram was performed from the level of the amry to the top of the head following the IV administration of 75mL of Omnipaque 350.   Sagittal and coronal reconstructions and maximum intensity projection reconstructions were performed. Arterial stenosis percentages are based on NASCET measurement criteria.    COMPARISON:  Noncontrast head CT the previous day    FINDINGS:  The study is significantly limited by motion.    Neck: There is a bovine arch with common origin for the innominate and left common carotid arteries.  The origins of the great vessels are widely patent.    There is no significant stenosis in the right common carotid artery.    Calcific and noncalcific plaque narrows the origin of the right ICA which has a diameter of 3.4 mm proximally compared to 6.2 mm distally.  This is consistent with a less than 50% stenosis.    The left common carotid artery is widely patent.  There is a combination of calcific and noncalcific plaque at the origin of the left ICA.  The lumen at its narrowest measures 3.9 mm compared to 6.6 mm distally.  This is a less than 50% stenosis.    The vertebral arteries are patent.  There is calcific plaque in the distal left vertebral artery but no stenosis is identified.    Source images demonstrate  abnormal ground-glass opacities and patchy consolidation in the lung apices, right greater than left. There are layering bilateral pleural effusions. Enlarged Jocelyn aortic/pre-vascular lymph nodes are noted with short axis diameters of up to 12 mm.    Head: There is normal flow in the basilar artery.  Both posterior cerebral arteries originate from the basilar and fill normally.  A left posterior communicating artery is present.    There is extensive calcific plaque in the cavernous portions of the internal carotid arteries bilaterally and significant stenoses are present.  There is normal distal flow in the intracranial ICAs, with normal filling of the anterior and middle cerebral arteries and their branches.  No aneurysm identified.    The appearance of the brain is unchanged compared to the noncontrast images obtained the previous day.  No abnormal intracranial enhancement is appreciated.  There is encephalomalacia in the right occipital lobe and in the left frontal lobe.  Generalized atrophy and periventricular low densities of chronic microvascular ischemia are noted.  There is a chronic lacunar infarct in the right thalamus.  Impression: Less than 50% stenoses of the bilateral proximal internal carotid arteries.  Significant stenoses of the bilateral intracranial ICAs in the cavernous sinuses.  Normal flow in the dvlpmc-al-Pfhmty.    Findings in the lung apices as noted above, consistent with findings noted on chest x-ray obtained the prior day.    Electronically signed by: Chari Weaver  Date:    05/12/2022  Time:    08:33  X-Ray Chest AP Portable  Narrative: EXAMINATION:  XR CHEST AP PORTABLE    CLINICAL HISTORY:  Altered mental status, unspecified    TECHNIQUE:  Single frontal view of the chest was performed.    COMPARISON:  04/26/2022.    FINDINGS:  Mild pulmonary hypoinflation with crowding of the bronchopulmonary vessels.  Mild increased markings within the right perihilar lung extending into the  right upper lobe suspicious for developing pulmonary infiltrate.  No significant pleural effusion    Heart size is enlarged.  Trachea midline.  Minimal calcified aortic plaque    Bony thorax intact.  Impression: 1. Increased markings within the right upper lobe suspicious for developing pulmonary infiltrate.  Correlate clinically with possible fever and/or elevated white count.  2. Cardiomegaly.    Electronically signed by: Darnell Ascencio  Date:    05/12/2022  Time:    07:02  CT Head Without Contrast  Narrative: EXAMINATION:  CT HEAD WITHOUT CONTRAST    CLINICAL HISTORY:  Mental status change, unknown cause;    TECHNIQUE:  Low dose axial images were obtained through the head.  Coronal and sagittal reformations were also performed. Contrast was not administered.    COMPARISON:  CT 05/28/2015.    FINDINGS:  There is no acute hemorrhage or infarction.  There is cortical atrophy.  There are periventricular deep white matter changes consistent with chronic small vessel ischemic disease.  Focal encephalomalacia of the right occipital lobe from remote ischemia.  Small remote lacunar infarcts of the bilateral basal ganglia.    No extra-axial fluid collections.  Ventricles are normal in size, shape and configuration.  The basal cisterns are patent.    The imaged paranasal sinuses and ethmoid air cells are well aerated.    The mastoid air cells and middle ears are normally pneumatized.  Impression: 1. Cortical atrophy with periventricular deep white matter change consistent with chronic small vessel ischemic disease.  2. Focal right occipital encephalomalacia.  3. Small remote lacunar infarcts of the basal ganglia.  The preliminary and final reports are concordant.    Electronically signed by: Darnell Ascencio  Date:    05/12/2022  Time:    06:35

## 2022-05-12 NOTE — ED NOTES
Pt is having periods of holding his breath. Pt can not  with either hand at this time. Pt looked at his hands acknowledging he understands the command. Pt will lift arms at elbows but not from shoulders. Pt is unkempt, clothing is soiled and dirty.

## 2022-05-12 NOTE — CONSULTS
Nephrology Consult Note        Patient Name: Marshall Flor  MRN: [id number]    Patient Class: IP- Inpatient   Admission Date: 5/12/2022  Length of Stay: 0 days  Date of Service: 5/12/2022    Attending Physician: Dimitry Correa MD  Primary Care Provider: Dorie Quan MD    Reason for Consult: dione/acidosis/sepsis/stroke/ckd3/fever/anemia/htn/chf    SUBJECTIVE:     HPI: 66-year-old male with history of HTN, CHF, diabetes presents to [place] for decreased responsiveness, found to have a left gaze preference, then sent to [facility], where was found febrile, hypertensive. UA 3+ protein, 2+ glucose, WBC 3, moderate bacteria. Chest x-ray suggestive of pneumonia right upper lobe. MRI brain nonhemorrhagic infarction in the right anterolateral frontal lobe.Treated for sepsis ? PNA, ? Meningitis? and stroke.    Past Medical History:   Diagnosis Date    CHF (congestive heart failure)     Diabetes mellitus     Hypertension     Stroke      Past Surgical History:   Procedure Laterality Date    EYE SURGERY      LEFT EYE     No family history on file.  Social History     Tobacco Use    Smoking status: Former Smoker    Smokeless tobacco: Former User   Substance Use Topics    Alcohol use: Not Currently    Drug use: Not Currently       Review of patient's allergies indicates:  No Known Allergies    Outpatient meds:  Current Facility-Administered Medications on File Prior to Encounter   Medication Dose Route Frequency Provider Last Rate Last Admin    [COMPLETED] cefTRIAXone (ROCEPHIN) 1 g/50 mL D5W IVPB  1 g Intravenous ED 1 Time Arnold Mesa MD   Stopped at 05/12/22 0026    [COMPLETED] cefTRIAXone (ROCEPHIN) 1 g/50 mL D5W IVPB  1 g Intravenous ED 1 Time Arnold Mesa MD   Stopped at 05/12/22 0654    [COMPLETED] furosemide injection 40 mg  40 mg Intravenous ED 1 Time Eric Cruz MD   40 mg at 05/12/22 0736    [COMPLETED] hydrALAZINE injection 10 mg  10 mg Intravenous ED 1 Time Eric GOETZ  MD Anthony   10 mg at 05/12/22 0736    [COMPLETED] iohexoL (OMNIPAQUE 350) injection 75 mL  75 mL Intravenous ONCE PRN Arnold Mesa MD   75 mL at 05/12/22 0408    [COMPLETED] labetaloL injection 20 mg  20 mg Intravenous ED 1 Time Arnold Mesa MD   20 mg at 05/11/22 2322    [COMPLETED] labetaloL injection 20 mg  20 mg Intravenous ED 1 Time Arnold Mesa MD   20 mg at 05/12/22 0257    [DISCONTINUED] aspirin tablet 325 mg  325 mg Oral ED 1 Time Eric Cruz MD         Current Outpatient Medications on File Prior to Encounter   Medication Sig Dispense Refill    allopurinoL (ZYLOPRIM) 100 MG tablet   = 2 tab, Oral, Daily, # 60 tab, 5 Refill(s), [id number], 167, cm, 07/27/21 8:24:00 CDT, Height/Length Measured, 91.5, kg, 12/15/20 11:18:00 CST, Weight Dosing      amLODIPine (NORVASC) 10 MG tablet 10 mg.      aspirin (ECOTRIN) 81 MG EC tablet Take 1 tablet (81 mg total) by mouth once daily. 30 tablet 3    atorvastatin (LIPITOR) 40 MG tablet   = 1 tab, Oral, Daily, # 90 tab, 1 Refill(s), Soft Stop, Pharmacy: [id number], 167, cm, 04/07/21 14:01:00 CDT, Height/Length Measured, 91.5, kg, 12/15/20 11:18:00 CST, Weight Dosing      carvediloL (COREG) 3.125 MG tablet Take 1 tablet (3.125 mg total) by mouth once daily. 30 tablet 1    cloNIDine (CATAPRES) 0.1 MG tablet Take 2 tablets (0.2 mg total) by mouth 3 (three) times daily. 180 tablet 11    clopidogreL (PLAVIX) 75 mg tablet Take 75 mg by mouth once daily.      furosemide (LASIX) 20 MG tablet Take 1 tablet (20 mg total) by mouth once daily. 30 tablet 1    hydrALAZINE (APRESOLINE) 50 MG tablet Take 1 tablet (50 mg total) by mouth every 12 (twelve) hours. 60 tablet 0    linaGLIPtin (TRADJENTA) 5 mg Tab tablet 5 mg.      NOVOLOG MIX 70-30FLEXPEN U-100 100 unit/mL (70-30) InPn pen Inject into the skin.      sodium bicarbonate 650 MG tablet Take 1 tablet (650 mg total) by mouth 2 (two) times daily. 60 tablet 11        Scheduled meds:   acyclovir  10 mg/kg (Ideal) Intravenous Q8H    ampicillin 2 g in sodium chloride 0.9 % 100 mL IVPB (ready to mix system)  2 g Intravenous Q6H    [START ON 5/13/2022] aspirin  150 mg Rectal Daily    cefTRIAXone (ROCEPHIN) IVPB  2 g Intravenous Q12H    etomidate        insulin aspart U-100  0-5 Units Subcutaneous Q4H    insulin detemir U-100  8 Units Subcutaneous Daily    mupirocin   Nasal BID    propofoL        rocuronium        succinylcholine        vancomycin (VANCOCIN) IVPB  1,250 mg Intravenous Once       Infusions:   lactated ringers 50 mL/hr at 05/12/22 1311    niCARdipine         PRN meds:  acetaminophen, dextrose 10%, dextrose 10%, dextrose 50%, dextrose 50%, glucagon (human recombinant), glucose, glucose, hydrALAZINE, morphine, naloxone, ondansetron, oxyCODONE, polyethylene glycol, sodium chloride 0.9%, Pharmacy to dose Vancomycin consult **AND** vancomycin - pharmacy to dose    Review of Systems:  Review of Systems   Unable to perform ROS: Critical illness     OBJECTIVE:     Vital Signs and IO (Last 24H):  Temp:  [98 °F (36.7 °C)-101.2 °F (38.4 °C)]   Pulse:  []   Resp:  [10-58]   BP: (134-206)/()   SpO2:  [94 %-100 %]   No intake/output data recorded.    Wt Readings from Last 5 Encounters:   05/12/22 76.7 kg (169 lb)   05/11/22 87.5 kg (193 lb)   05/12/22 87.5 kg (192 lb 14.4 oz)   04/26/22 95.3 kg (210 lb)   02/02/22 91.1 kg (200 lb 13.4 oz)     Physical Exam:  Physical Exam  Constitutional:       General: He is not in acute distress.     Appearance: He is well-developed. He is ill-appearing. He is not diaphoretic.   HENT:      Head: Normocephalic and atraumatic.      Mouth/Throat:      Mouth: Mucous membranes are moist.   Eyes:      General: No scleral icterus.     Pupils: Pupils are equal, round, and reactive to light.   Cardiovascular:      Rate and Rhythm: Normal rate and regular rhythm.   Pulmonary:      Effort: Pulmonary effort is normal. No  respiratory distress.      Breath sounds: No stridor.   Abdominal:      General: There is no distension.      Palpations: Abdomen is soft.   Musculoskeletal:         General: No deformity. Normal range of motion.      Cervical back: Neck supple.   Skin:     General: Skin is warm and dry.      Findings: No erythema or rash.   Neurological:      Mental Status: He is alert. He is disoriented.      Cranial Nerves: No cranial nerve deficit.       Body mass index is 26.47 kg/m².    Laboratory:  Recent Labs   Lab 05/11/22 2303 05/12/22  1257    141   K 3.5 4.6    107   CO2 19* 16*   BUN 17 27*   CREATININE 2.5* 3.3*   ESTGFRAFRICA 30.2* 22*   EGFRNONAA 26.2* 19*   * 353*       Recent Labs   Lab 05/11/22 2303 05/12/22  1257   CALCIUM 8.5* 8.3*   ALBUMIN 2.8* 2.7*   PHOS  --  4.1   MG  --  1.6       Recent Labs   Lab 04/12/21  0952   PTH, Intact 273.0 H       Recent Labs   Lab 05/11/22  2226 05/12/22  0937 05/12/22  1229 05/12/22  1647   POCTGLUCOSE 371* 381* 388* 328*       Recent Labs   Lab 04/12/21  0952   Hemoglobin A1C 7.8 H       Recent Labs   Lab 05/11/22 2303 05/12/22  1210   WBC 6.81 8.22   HGB 13.3* 14.4   HCT 40.3 44.1    233   MCV 75* 76*   MCHC 33.0 32.7   MONO 4.4  0.3 4.9  0.4       Recent Labs   Lab 05/11/22 2303 05/12/22  1257   BILITOT 0.9 0.7   PROT 7.1 7.2   ALBUMIN 2.8* 2.7*   ALKPHOS 105 139*   ALT 8* 58*   AST 9* 83*       Recent Labs   Lab 01/26/22  0639 05/11/22 2300 05/12/22  1400   Color, UA Yellow Yellow Yellow   Appearance, UA Cloudy A Clear Clear   pH, UA 5.0 6.0 6.0   Specific [place], UA 1.020 1.020 1.020   Protein, UA 2+ A 3+ A 3+ A   Glucose, UA Negative 2+ A 3+ A   Ketones, UA Negative Negative Negative   Urobilinogen, UA 1.0 Negative Negative   Bilirubin (UA) Negative Negative Negative   Occult Blood UA 2+ A 2+ A 2+ A   Nitrite, UA Negative Negative Negative   RBC, UA 33 H 10 H 20 H   WBC, UA 55 H 3 6 H   Bacteria Moderate A Moderate A Few A   Hyaline  Casts, UA 3 A 1 0       Recent Labs   Lab 05/12/22  0140   POC PH 7.393   POC PCO2 29.3 L   POC HCO3 17.9 L   POC PO2 41 LL   POC SATURATED O2 77 L   POC BE -7   Sample ARTERIAL       Microbiology Results (last 7 days)     Procedure Component Value Units Date/Time    Gram stain [[id number]]     Order Status: No result Specimen: CSF (Spinal Fluid)     Direct AFB stain [[id number]]     Order Status: No result Specimen: CSF (Spinal Fluid)     AFB Culture & Smear [[id number]]     Order Status: No result Specimen: CSF (Spinal Fluid)     Cryptococcal antigen, CSF [[id number]]     Order Status: No result Specimen: CSF (Spinal Fluid)     CSF culture [[id number]]     Order Status: No result Specimen: CSF (Spinal Fluid)         ASSESSMENT/PLAN:     MERCEDES due to ATN due to sepsis  Acidosis  Sepsis due to PNA and/or meningitis  Acute stroke  CKD stage 3  No NSAIDs, ACEI/ARB, IV contrast or other nephrotoxins.  Keep MAP > 60, SBP > 100.  Dose meds for GFR < 30 ml/min.  Renal diet - low K, low phos.  Diagnose and treat infection.  Stop diuretics.    Anemia of CKD  Hgb and HCT are acceptable. Monitor.  Will provide SHERLY and/or IV iron PRN.    HTN  BP seem controlled with nicardipine drip.   Tolerate asymptomatic HTN up to -160.  Hold home meds.    Thank you for allowing us to participate in the care of your patient!   We will follow the patient and provide recommendations as needed.    Patient care time was spent personally by me on the following activities:   · Obtaining a history.  · Examination of patient.  · Providing medical care at the patients bedside.  · Developing a treatment plan with patient or surrogate and bedside caregivers.  · Ordering and reviewing laboratory studies, radiographic studies, pulse oximetry.  · Ordering and performing treatments and interventions.  · Evaluation of patient's response to treatment.  · Discussions with consultants while on the unit and immediately available to the  patient.  · Re-evaluation of the patient's condition.  · Documentation in the medical record.     Barrie Mathew MD    [facility]  [address] [place] [id number]    [phone] - tel  [phone] - fax    5/12/2022

## 2022-05-12 NOTE — NURSING
Received request for LP on Pt. Pt would need to hold plavix for 5 days before we would be able to do LP per Dr. Richardson. Informed Dr. Correa. Dr. Correa will reach out to anesthesia to see if they would be willing to try to do LP at bedside

## 2022-05-12 NOTE — ED NOTES
"This RN called KHALIDA Flor, pt's brother to ask questions regarding pt's mental status. Mr. Flor states "I don't know what he normally acts like. I don't sees him every day. You need to talk with his sons." I advised I have attempted the number on file for pt's son with no answer and no VM available.   "

## 2022-05-12 NOTE — CARE UPDATE
05/12/22 1718   PRE-TX-O2   O2 Device (Oxygen Therapy) nasal cannula   $ Is the patient on Low Flow Oxygen? Yes   Flow (L/min) 1   SpO2 100 %   Pulse Oximetry Type Continuous   $ Pulse Oximetry - Multiple Charge Pulse Oximetry - Multiple   Pulse 81   Resp 19

## 2022-05-12 NOTE — ASSESSMENT & PLAN NOTE
- reviewed CTH and CTA without evidence of acute ischemic stroke  - No tPA and CTA - no LVO   - rec MRI brain wo to r/o stroke   - supportive mgmt

## 2022-05-12 NOTE — HPI
66-year-old male with history of CHF, diabetes presents to [place] for decreased responsiveness found to have a fever, left gaze preference with nuchal rigidity found to have meningitis in addition to being found to have an acute CVA. Beyond this he has an MERCEDES.

## 2022-05-13 PROBLEM — I50.23 CHF (CONGESTIVE HEART FAILURE), NYHA CLASS I, ACUTE ON CHRONIC, SYSTOLIC: Status: RESOLVED | Noted: 2022-01-25 | Resolved: 2022-05-13

## 2022-05-13 PROBLEM — I50.42 CHRONIC COMBINED SYSTOLIC AND DIASTOLIC CHF (CONGESTIVE HEART FAILURE): Status: ACTIVE | Noted: 2022-05-13

## 2022-05-13 PROBLEM — N18.4 CKD (CHRONIC KIDNEY DISEASE) STAGE 4, GFR 15-29 ML/MIN: Status: ACTIVE | Noted: 2022-01-02

## 2022-05-13 PROBLEM — G03.9 MENINGITIS: Status: ACTIVE | Noted: 2022-05-13

## 2022-05-13 PROBLEM — I50.9 NEW ONSET OF CONGESTIVE HEART FAILURE: Status: RESOLVED | Noted: 2022-01-02 | Resolved: 2022-05-13

## 2022-05-13 PROBLEM — N18.4 TYPE 2 DIABETES MELLITUS WITH STAGE 4 CHRONIC KIDNEY DISEASE, WITHOUT LONG-TERM CURRENT USE OF INSULIN: Status: ACTIVE | Noted: 2022-01-02

## 2022-05-13 LAB
ALBUMIN SERPL BCP-MCNC: 2.2 G/DL (ref 3.5–5.2)
ALP SERPL-CCNC: 107 U/L (ref 55–135)
ALT SERPL W/O P-5'-P-CCNC: 52 U/L (ref 10–44)
ANION GAP SERPL CALC-SCNC: 17 MMOL/L (ref 8–16)
AST SERPL-CCNC: 49 U/L (ref 10–40)
BASOPHILS # BLD AUTO: 0.06 K/UL (ref 0–0.2)
BASOPHILS NFR BLD: 0.7 % (ref 0–1.9)
BILIRUB SERPL-MCNC: 0.5 MG/DL (ref 0.1–1)
BUN SERPL-MCNC: 30 MG/DL (ref 8–23)
CALCIUM SERPL-MCNC: 7.9 MG/DL (ref 8.7–10.5)
CHLORIDE SERPL-SCNC: 108 MMOL/L (ref 95–110)
CHOLEST SERPL-MCNC: 114 MG/DL (ref 120–199)
CHOLEST/HDLC SERPL: 3.4 {RATIO} (ref 2–5)
CO2 SERPL-SCNC: 17 MMOL/L (ref 23–29)
CREAT SERPL-MCNC: 3.7 MG/DL (ref 0.5–1.4)
DIFFERENTIAL METHOD: ABNORMAL
EOSINOPHIL # BLD AUTO: 0 K/UL (ref 0–0.5)
EOSINOPHIL NFR BLD: 0.1 % (ref 0–8)
ERYTHROCYTE [DISTWIDTH] IN BLOOD BY AUTOMATED COUNT: 18.2 % (ref 11.5–14.5)
EST. GFR  (AFRICAN AMERICAN): 19 ML/MIN/1.73 M^2
EST. GFR  (NON AFRICAN AMERICAN): 16 ML/MIN/1.73 M^2
ESTIMATED AVG GLUCOSE: 206 MG/DL (ref 68–131)
ESTIMATED AVG GLUCOSE: 214 MG/DL (ref 68–131)
GLUCOSE SERPL-MCNC: 214 MG/DL (ref 70–110)
HBA1C MFR BLD: 8.8 % (ref 4–5.6)
HBA1C MFR BLD: 9.1 % (ref 4–5.6)
HCT VFR BLD AUTO: 38.5 % (ref 40–54)
HDLC SERPL-MCNC: 34 MG/DL (ref 40–75)
HDLC SERPL: 29.8 % (ref 20–50)
HGB BLD-MCNC: 12.8 G/DL (ref 14–18)
IMM GRANULOCYTES # BLD AUTO: 0.03 K/UL (ref 0–0.04)
IMM GRANULOCYTES NFR BLD AUTO: 0.4 % (ref 0–0.5)
LDLC SERPL CALC-MCNC: 60.2 MG/DL (ref 63–159)
LYMPHOCYTES # BLD AUTO: 1.3 K/UL (ref 1–4.8)
LYMPHOCYTES NFR BLD: 15.7 % (ref 18–48)
MAGNESIUM SERPL-MCNC: 1.5 MG/DL (ref 1.6–2.6)
MCH RBC QN AUTO: 25.1 PG (ref 27–31)
MCHC RBC AUTO-ENTMCNC: 33.2 G/DL (ref 32–36)
MCV RBC AUTO: 76 FL (ref 82–98)
MONOCYTES # BLD AUTO: 0.5 K/UL (ref 0.3–1)
MONOCYTES NFR BLD: 6.4 % (ref 4–15)
NEUTROPHILS # BLD AUTO: 6.2 K/UL (ref 1.8–7.7)
NEUTROPHILS NFR BLD: 76.7 % (ref 38–73)
NONHDLC SERPL-MCNC: 80 MG/DL
NRBC BLD-RTO: 0 /100 WBC
PHOSPHATE SERPL-MCNC: 4.4 MG/DL (ref 2.7–4.5)
PLATELET # BLD AUTO: 254 K/UL (ref 150–450)
PMV BLD AUTO: 11.8 FL (ref 9.2–12.9)
POCT GLUCOSE: 156 MG/DL (ref 70–110)
POCT GLUCOSE: 160 MG/DL (ref 70–110)
POCT GLUCOSE: 171 MG/DL (ref 70–110)
POCT GLUCOSE: 202 MG/DL (ref 70–110)
POCT GLUCOSE: 205 MG/DL (ref 70–110)
POCT GLUCOSE: 268 MG/DL (ref 70–110)
POTASSIUM SERPL-SCNC: 4.4 MMOL/L (ref 3.5–5.1)
PROT SERPL-MCNC: 6.2 G/DL (ref 6–8.4)
RBC # BLD AUTO: 5.1 M/UL (ref 4.6–6.2)
SODIUM SERPL-SCNC: 142 MMOL/L (ref 136–145)
TRIGL SERPL-MCNC: 99 MG/DL (ref 30–150)
TSH SERPL DL<=0.005 MIU/L-ACNC: 0.55 UIU/ML (ref 0.4–4)
VANCOMYCIN SERPL-MCNC: 20.5 UG/ML
WBC # BLD AUTO: 8.07 K/UL (ref 3.9–12.7)

## 2022-05-13 PROCEDURE — 84100 ASSAY OF PHOSPHORUS: CPT | Performed by: INTERNAL MEDICINE

## 2022-05-13 PROCEDURE — 63600175 PHARM REV CODE 636 W HCPCS: Performed by: INTERNAL MEDICINE

## 2022-05-13 PROCEDURE — 94761 N-INVAS EAR/PLS OXIMETRY MLT: CPT

## 2022-05-13 PROCEDURE — 87040 BLOOD CULTURE FOR BACTERIA: CPT | Performed by: STUDENT IN AN ORGANIZED HEALTH CARE EDUCATION/TRAINING PROGRAM

## 2022-05-13 PROCEDURE — C1751 CATH, INF, PER/CENT/MIDLINE: HCPCS

## 2022-05-13 PROCEDURE — 94660 CPAP INITIATION&MGMT: CPT

## 2022-05-13 PROCEDURE — 63600175 PHARM REV CODE 636 W HCPCS: Performed by: STUDENT IN AN ORGANIZED HEALTH CARE EDUCATION/TRAINING PROGRAM

## 2022-05-13 PROCEDURE — 63600175 PHARM REV CODE 636 W HCPCS: Performed by: HOSPITALIST

## 2022-05-13 PROCEDURE — 25000003 PHARM REV CODE 250: Performed by: HOSPITALIST

## 2022-05-13 PROCEDURE — 85025 COMPLETE CBC W/AUTO DIFF WBC: CPT | Performed by: INTERNAL MEDICINE

## 2022-05-13 PROCEDURE — 80053 COMPREHEN METABOLIC PANEL: CPT | Performed by: INTERNAL MEDICINE

## 2022-05-13 PROCEDURE — 25000003 PHARM REV CODE 250: Performed by: STUDENT IN AN ORGANIZED HEALTH CARE EDUCATION/TRAINING PROGRAM

## 2022-05-13 PROCEDURE — 25000003 PHARM REV CODE 250: Performed by: INTERNAL MEDICINE

## 2022-05-13 PROCEDURE — 99900035 HC TECH TIME PER 15 MIN (STAT)

## 2022-05-13 PROCEDURE — 80061 LIPID PANEL: CPT | Performed by: INTERNAL MEDICINE

## 2022-05-13 PROCEDURE — 87116 MYCOBACTERIA CULTURE: CPT | Performed by: INTERNAL MEDICINE

## 2022-05-13 PROCEDURE — 95819 EEG AWAKE AND ASLEEP: CPT

## 2022-05-13 PROCEDURE — 84443 ASSAY THYROID STIM HORMONE: CPT | Performed by: INTERNAL MEDICINE

## 2022-05-13 PROCEDURE — 51798 US URINE CAPACITY MEASURE: CPT

## 2022-05-13 PROCEDURE — 27000221 HC OXYGEN, UP TO 24 HOURS

## 2022-05-13 PROCEDURE — 36410 VNPNXR 3YR/> PHY/QHP DX/THER: CPT

## 2022-05-13 PROCEDURE — 87206 SMEAR FLUORESCENT/ACID STAI: CPT | Performed by: INTERNAL MEDICINE

## 2022-05-13 PROCEDURE — 83735 ASSAY OF MAGNESIUM: CPT | Performed by: INTERNAL MEDICINE

## 2022-05-13 PROCEDURE — 20000000 HC ICU ROOM

## 2022-05-13 PROCEDURE — 76937 US GUIDE VASCULAR ACCESS: CPT

## 2022-05-13 PROCEDURE — 83036 HEMOGLOBIN GLYCOSYLATED A1C: CPT | Performed by: INTERNAL MEDICINE

## 2022-05-13 PROCEDURE — 80202 ASSAY OF VANCOMYCIN: CPT | Performed by: INTERNAL MEDICINE

## 2022-05-13 PROCEDURE — 36415 COLL VENOUS BLD VENIPUNCTURE: CPT | Performed by: INTERNAL MEDICINE

## 2022-05-13 RX ORDER — MAGNESIUM SULFATE HEPTAHYDRATE 40 MG/ML
2 INJECTION, SOLUTION INTRAVENOUS ONCE
Status: COMPLETED | OUTPATIENT
Start: 2022-05-13 | End: 2022-05-13

## 2022-05-13 RX ORDER — INSULIN ASPART 100 [IU]/ML
0-5 INJECTION, SOLUTION INTRAVENOUS; SUBCUTANEOUS EVERY 6 HOURS PRN
Status: DISCONTINUED | OUTPATIENT
Start: 2022-05-13 | End: 2022-05-17

## 2022-05-13 RX ORDER — FUROSEMIDE 10 MG/ML
40 INJECTION INTRAMUSCULAR; INTRAVENOUS ONCE
Status: COMPLETED | OUTPATIENT
Start: 2022-05-13 | End: 2022-05-13

## 2022-05-13 RX ORDER — LABETALOL HYDROCHLORIDE 5 MG/ML
10 INJECTION, SOLUTION INTRAVENOUS EVERY 4 HOURS PRN
Status: DISCONTINUED | OUTPATIENT
Start: 2022-05-13 | End: 2022-05-16

## 2022-05-13 RX ADMIN — FUROSEMIDE 40 MG: 10 INJECTION, SOLUTION INTRAMUSCULAR; INTRAVENOUS at 04:05

## 2022-05-13 RX ADMIN — INSULIN ASPART 2 UNITS: 100 INJECTION, SOLUTION INTRAVENOUS; SUBCUTANEOUS at 06:05

## 2022-05-13 RX ADMIN — AMPICILLIN SODIUM 2 G: 2 INJECTION, POWDER, FOR SOLUTION INTRAMUSCULAR; INTRAVENOUS at 02:05

## 2022-05-13 RX ADMIN — HYDRALAZINE HYDROCHLORIDE 10 MG: 20 INJECTION, SOLUTION INTRAMUSCULAR; INTRAVENOUS at 01:05

## 2022-05-13 RX ADMIN — LABETALOL HYDROCHLORIDE 10 MG: 5 INJECTION INTRAVENOUS at 04:05

## 2022-05-13 RX ADMIN — ACYCLOVIR SODIUM 660 MG: 1000 INJECTION, SOLUTION INTRAVENOUS at 06:05

## 2022-05-13 RX ADMIN — CEFTRIAXONE 2 G: 2 INJECTION, SOLUTION INTRAVENOUS at 04:05

## 2022-05-13 RX ADMIN — MUPIROCIN: 20 OINTMENT TOPICAL at 08:05

## 2022-05-13 RX ADMIN — INSULIN ASPART 2 UNITS: 100 INJECTION, SOLUTION INTRAVENOUS; SUBCUTANEOUS at 09:05

## 2022-05-13 RX ADMIN — AMPICILLIN SODIUM 2 G: 2 INJECTION, POWDER, FOR SOLUTION INTRAMUSCULAR; INTRAVENOUS at 09:05

## 2022-05-13 RX ADMIN — INSULIN DETEMIR 8 UNITS: 100 INJECTION, SOLUTION SUBCUTANEOUS at 08:05

## 2022-05-13 RX ADMIN — MAGNESIUM SULFATE 2 G: 2 INJECTION INTRAVENOUS at 12:05

## 2022-05-13 RX ADMIN — AMPICILLIN SODIUM 2 G: 2 INJECTION, POWDER, FOR SOLUTION INTRAMUSCULAR; INTRAVENOUS at 08:05

## 2022-05-13 RX ADMIN — SODIUM CHLORIDE, SODIUM LACTATE, POTASSIUM CHLORIDE, AND CALCIUM CHLORIDE: .6; .31; .03; .02 INJECTION, SOLUTION INTRAVENOUS at 07:05

## 2022-05-13 RX ADMIN — AMPICILLIN SODIUM 2 G: 2 INJECTION, POWDER, FOR SOLUTION INTRAMUSCULAR; INTRAVENOUS at 03:05

## 2022-05-13 RX ADMIN — ASPIRIN 150 MG: 300 SUPPOSITORY RECTAL at 08:05

## 2022-05-13 NOTE — EICU
Red e alert: for apneic episodes.  Discussed with bed side RN.  Notes, labs, meds reviewed.    Getting frequent apneic episodes with sats dipping into 90's and gets back to 98%.    Obeserved while having theses episodes, looks like Central sleep apnea episodes. Not obese. On nasal o2.  Stable HR and BP.    Encephalopathic.   Left gae is old.    Neuro notes: as below.     Discussed with CVA confirmed on MRI brain  Etiology workup pending  CT head: negative acute  MRI brain: nonhemorrhagic infarction in the anterior lateral left frontal lobe and a remote infarction with encephalomalacia and gliosis in the medial right occipital lobe.  There are also remote infarctions in the cerebellar hemispheres as well as in the right thalamus.  CTA head and neck: Less than 50% stenoses of the bilateral proximal internal carotid arteries.  Significant stenoses of the bilateral intracranial ICAs in the cavernous sinuses.  Normal flow in the [id number].  From neurology's standpoint, LP is not necessary at this time. Complete stoke work, evaluate for endocarditis, obtain an EEG..    EEG:  No epileptiform focus.    EF low.     A/P:    1. Mostly has CSA from CVA and CHF.    - treatment for CSA is supportive-like o2 and CHF control.  - trial of CPAP at 10 , should help his CHF pre and after load also.  - looks like able to protect airways, low thresh hold for intubation. Get an ABG  - asp precautions, NPO.     21;12  ABG:  Compensated AGMA, Cr > 3.  Ok to go for CPAP trial.     3:43      UOP low.  Cmera eval done. MAP > 70.  Apneic episodes persisting on CPAP, 35%. sats 100%.     - Lasix 40 mg IV once stat.

## 2022-05-13 NOTE — CONSULTS
18 Gx 10cm PowerGlide Midline placed to pts Left brachial vein with the use of ultrasound guidance.    Ultrasound guidance: yes  Vessel Caliber: large and patent, compressibility normal  Needle advanced into vessel with real time Ultrasound guidance.  Guidewire confirmed in vessel.  Image recorded and saved.  Sterile sheath used.  Sterile dressing applied  Arm circumference- 31 cm  Dressing dated   Education provided to patient re: proper maintenance of line- pt verbalized understanding

## 2022-05-13 NOTE — PROGRESS NOTES
Pharmacokinetic Assessment Follow Up: IV Vancomycin    Vancomycin serum concentration assessment(s):    The random level was drawn correctly and can be used to guide therapy at this time. The measurement is above the desired definitive target range of 15 to 20 mcg/mL.    Vancomycin Regimen Plan:    Re-dose when the random level is less than 20 mcg/mL, next level to be drawn at 2330 on 5/13    Drug levels (last 3 results):  Recent Labs   Lab Result Units 05/13/22  1034   Vancomycin, Random ug/mL 20.5       Pharmacy will continue to follow and monitor vancomycin.    Please contact pharmacy at extension [phone] for questions regarding this assessment.    Thank you for the consult,   Clarissa Sebastian       Patient brief summary:  Marshall Flor is a 66 y.o. male initiated on antimicrobial therapy with IV Vancomycin for treatment of meningitis    Drug Allergies:   Review of patient's allergies indicates:  No Known Allergies    Actual Body Weight:   77.9 kg    Renal Function:   Estimated Creatinine Clearance: 18.9 mL/min (A) (based on SCr of 3.7 mg/dL (H)).,     Dialysis Method (if applicable):  N/A    CBC (last 72 hours):  Recent Labs   Lab Result Units 05/11/22  2303 05/12/22  1210 05/13/22  0341   WBC K/uL 6.81 8.22 8.07   Hemoglobin g/dL 13.3* 14.4 12.8*   Hemoglobin A1C %  --  8.8* 9.1*   Hematocrit % 40.3 44.1 38.5*   Platelets K/uL 311 233 254   Gran % % 84.5* 85.3* 76.7*   Lymph % % 9.8* 9.2* 15.7*   Mono % % 4.4 4.9 6.4   Eosinophil % % 0.6 0.1 0.1   Basophil % % 0.4 0.1 0.7   Differential Method  Automated Automated Automated       Metabolic Panel (last 72 hours):  Recent Labs   Lab Result Units 05/11/22  2300 05/11/22  2303 05/12/22  1257 05/12/22  1400 05/13/22  0341   Sodium mmol/L  --  141 141  --  142   Potassium mmol/L  --  3.5 4.6  --  4.4   Chloride mmol/L  --  105 107  --  108   CO2 mmol/L  --  19* 16*  --  17*   Glucose mg/dL  --  382* 353*  --  214*   Glucose, UA  2+*  --   --  3+*  --    BUN mg/dL  --  17 27*   --  30*   Creatinine mg/dL  --  2.5* 3.3*  --  3.7*   Creatinine, Urine mg/dL 145.9  --   --   --   --    Albumin g/dL  --  2.8* 2.7*  --  2.2*   Total Bilirubin mg/dL  --  0.9 0.7  --  0.5   Alkaline Phosphatase U/L  --  105 139*  --  107   AST U/L  --  9* 83*  --  49*   ALT U/L  --  8* 58*  --  52*   Magnesium mg/dL  --   --  1.6  --  1.5*   Phosphorus mg/dL  --   --  4.1  --  4.4       Vancomycin Administrations:  vancomycin given in the last 96 hours                     vancomycin 1.25 g in dextrose 5% 250 mL IVPB (ready to mix) (mg) 1,250 mg New Bag 05/12/22 1646                    Microbiologic Results:  Microbiology Results (last 7 days)       Procedure Component Value Units Date/Time    Gram stain [[id number]]     Order Status: No result Specimen: CSF (Spinal Fluid)     Direct AFB stain [[id number]]     Order Status: No result Specimen: CSF (Spinal Fluid)     AFB Culture & Smear [[id number]]     Order Status: No result Specimen: CSF (Spinal Fluid)     Cryptococcal antigen, CSF [[id number]]     Order Status: No result Specimen: CSF (Spinal Fluid)     CSF culture [[id number]]     Order Status: No result Specimen: CSF (Spinal Fluid)

## 2022-05-13 NOTE — PLAN OF CARE
Pt to remain in ICU. CPAP removed at 1400 and nasal cannula placed at 3L. LR at 50 mL/hr. Pt able to nod/gesture appropriately. Midline placed. Minimal urine output-approx 15-20 mL/hr. BP elevated. Hydralazine PRN dose given without results. Labetalol dose given with improvement in BP. Safety maintained.     Problem: Adult Inpatient Plan of Care  Goal: Plan of Care Review  Outcome: Ongoing, Progressing  Goal: Patient-Specific Goal (Individualized)  Outcome: Ongoing, Progressing  Goal: Absence of Hospital-Acquired Illness or Injury  Outcome: Ongoing, Progressing     Problem: Cognitive Impairment (Stroke, Ischemic/Transient Ischemic Attack)  Goal: Optimal Cognitive Function  Outcome: Ongoing, Progressing

## 2022-05-13 NOTE — PROGRESS NOTES
Consult Note  Infectious Disease    Reason for Consult:  Rule out meningitis     HPI: Marshall Flor is a 66 y.o. male with past medical history of HTN, diabetes, CHF, prior stroke presented to [facility] for altered mental status and fever.  No family at bedside.  Patient seen and examined in ICU.  Unable to obtain further history, patient breathing heavily, nonverbal, staring, febrile.    In the ER, hypertensive 179/109, febrile 101.2  Labs on admission reviewed, white count 6.8, PMN 84.5%, H&H 13.3/40.3, platelet count 311  Creatinine 2.5, MERCEDES 3.3 today  AST 83/ALT 58  BNP 3064  Lactic acid 3.5, procalcitonin 6.9  U tox negative  UA 3+ protein, 2+ glucose, WBC 3, moderate bacteria  Chest x-ray suggestive of pneumonia right upper lobe  MRI brain nonhemorrhagic infarction in the right anterolateral frontal lobe.    Admitted for severe sepsis with altered mental status, found to have acute stroke, rule out meningitis.    ID consult for meningitis.    INTERVAL HISTORY:  5/13: interim reviewed, patient seen and examined at bedside. Unable to perform LP since he is on Plavix, need to wait 5 days. Currently on bipap, alert and awake but dysarthric. Hemodynamically stable, permissive HTN for acute stroke, afebrile in the last 24h.  Micro reviwed blood cultures 1/4 bottles grew GPC, ID and sensitivities pending. Labs reviewed, WBC: 8, PMN: 76.7%. Creatinine 3.7. HbA1c: 9%.    Review of patient's allergies indicates:  No Known Allergies  Past Medical History:   Diagnosis Date    CHF (congestive heart failure)     Diabetes mellitus     Hypertension     Stroke      Past Surgical History:   Procedure Laterality Date    EYE SURGERY      LEFT EYE     Social History     Tobacco Use    Smoking status: Former Smoker    Smokeless tobacco: Former User   Substance Use Topics    Alcohol use: Not Currently        No family history on file.      Review of Systems:   Unable to obtain, patient awake today with  dysarthria, no family at bedside. As per nursing, patient lives at home and with his son and he was doing fine before admission.    Antibiotic History: Ceftriaxone    EXAM & DIAGNOSTICS REVIEWED:   Vitals:     Temp:  [98.8 °F (37.1 °C)-99.6 °F (37.6 °C)]   Temp: 99.2 °F (37.3 °C) (05/13/22 0800)  Pulse: 92 (05/13/22 0905)  Resp: (!) 26 (05/13/22 0905)  BP: (!) 166/71 (05/13/22 0905)  SpO2: 98 % (05/13/22 0905)    Intake/Output Summary (Last 24 hours) at 5/13/2022 0938  Last data filed at 5/13/2022 0900  Gross per 24 hour   Intake 766.76 ml   Output 1077 ml   Net -310.24 ml       General:  Awake, alert, on Bipap, partially following commands   Eyes:  Anicteric, PERRL  ENT:  No thrush  Neck:  Neck rigidity noted yesterday, supple today   Lungs: Rhonchi R  Heart:  S1/S2+, regular rhythm, systolic murmur+  Abd:  +BS, soft, non tender, non distended, no rebound  :  Colmenares, urine clear, no flank tenderness  Musc:  Joints without effusion, swelling,  erythema, synovitis  Skin:  Warm, no rash  Wound:   Neuro: Awake and alert, dysartrhia, moving upper extremities, plegia lower extermities   Psych:  Calm  Lymphatic:       Extrem: No LE edema b/l  VAD:  Peripheral IVs       Isolation: None      General Labs reviewed:  Recent Labs   Lab 05/11/22 2303 05/12/22  1210 05/13/22  0341   WBC 6.81 8.22 8.07   HGB 13.3* 14.4 12.8*   HCT 40.3 44.1 38.5*    233 254       Recent Labs   Lab 05/11/22  2303 05/12/22  1257 05/13/22  0341    141 142   K 3.5 4.6 4.4    107 108   CO2 19* 16* 17*   BUN 17 27* 30*   CREATININE 2.5* 3.3* 3.7*   CALCIUM 8.5* 8.3* 7.9*   PROT 7.1 7.2 6.2   BILITOT 0.9 0.7 0.5   ALKPHOS 105 139* 107   ALT 8* 58* 52*   AST 9* 83* 49*     No results for input(s): CRP in the last 168 hours.  No results for input(s): SEDRATE in the last 168 hours.    Estimated Creatinine Clearance: 18.9 mL/min (A) (based on SCr of 3.7 mg/dL (H)).     Micro:  Microbiology Results (last 7 days)     Procedure Component  Value Units Date/Time    Gram stain [[id number]]     Order Status: No result Specimen: CSF (Spinal Fluid)     Direct AFB stain [[id number]]     Order Status: No result Specimen: CSF (Spinal Fluid)     AFB Culture & Smear [[id number]]     Order Status: No result Specimen: CSF (Spinal Fluid)     Cryptococcal antigen, CSF [[id number]]     Order Status: No result Specimen: CSF (Spinal Fluid)     CSF culture [[id number]]     Order Status: No result Specimen: CSF (Spinal Fluid)           Imaging Reviewed:  CXR  CT and CTA head negative for acute ischemic stroke   MRI brain - acute ischemic stroke  1. This is a very limited evaluation but the study is abnormal.  There is a nonhemorrhagic infarction in the anterior lateral left frontal lobe.  2. There is a remote infarction with encephalomalacia and gliosis in the medial right occipital lobe.  There are also remote infarctions in the cerebellar hemispheres as well as in the right thalamus.    Cardiology:   ECHO 5/12/22:  · The left ventricle is moderately enlarged with eccentric hypertrophy and severely decreased systolic function.  · Grade II left ventricular diastolic dysfunction.  · The estimated PA systolic pressure is 59 mmHg.  · Severe right ventricular enlargement with mildly to moderately reduced right ventricular systolic function.  · Moderate left atrial enlargement.  · There is moderate pulmonary hypertension.  · Intermediate central venous pressure (8 mmHg).  · Mild aortic regurgitation.  · Moderate right atrial enlargement.  · Mild to moderate tricuspid regurgitation.  · Mild pulmonic regurgitation.  · The estimated ejection fraction is 20%.  · Moderate mitral regurgitation.          IMPRESSION & PLAN     1. Sepsis cannot exclude meningitis vs R pneumonia - patient afebrile in the last 24h   Blood cultures 1/4 bottles [facility], pending final   Procal 6.9     2. Acute stroke which can also contribute to fever on admission   3. Kym, cr 3.7  3. 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

## 2022-05-13 NOTE — PROGRESS NOTES
Nephrology Consult Note        Patient Name: Marshall Flor  MRN: [id number]    Patient Class: IP- Inpatient   Admission Date: 5/12/2022  Length of Stay: 1 days  Date of Service: 5/13/2022    Attending Physician: Osmin Pope MD  Primary Care Provider: Dorie Quan MD    Reason for Consult: dione/acidosis/sepsis/stroke/ckd3/fever/anemia/htn/chf    SUBJECTIVE:     HPI: 66-year-old male with history of HTN, CHF, diabetes presents to [place] for decreased responsiveness, found to have a left gaze preference, then sent to [facility], where was found febrile, hypertensive. UA 3+ protein, 2+ glucose, WBC 3, moderate bacteria. Chest x-ray suggestive of pneumonia right upper lobe. MRI brain nonhemorrhagic infarction in the right anterolateral frontal lobe.Treated for sepsis ? PNA, ? Meningitis? and stroke.    Past Medical History:   Diagnosis Date    CHF (congestive heart failure)     Diabetes mellitus     Hypertension     Stroke      Past Surgical History:   Procedure Laterality Date    EYE SURGERY      LEFT EYE     No family history on file.  Social History     Tobacco Use    Smoking status: Former Smoker    Smokeless tobacco: Former User   Substance Use Topics    Alcohol use: Not Currently    Drug use: Not Currently       Review of patient's allergies indicates:  No Known Allergies    Outpatient meds:  No current facility-administered medications on file prior to encounter.     Current Outpatient Medications on File Prior to Encounter   Medication Sig Dispense Refill    allopurinoL (ZYLOPRIM) 100 MG tablet   = 2 tab, Oral, Daily, # 60 tab, 5 Refill(s), Pharmacy: [id number], 167, cm, 07/27/21 8:24:00 CDT, Height/Length Measured, 91.5, kg, 12/15/20 11:18:00 CST, Weight Dosing      amLODIPine (NORVASC) 10 MG tablet 10 mg.      aspirin (ECOTRIN) 81 MG EC tablet Take 1 tablet (81 mg total) by mouth once daily. 30 tablet 3    atorvastatin (LIPITOR) 40 MG tablet   = 1 tab, Oral, Daily, # 90  tab, 1 Refill(s), Soft Stop, Pharmacy: [id number], 167, cm, 04/07/21 14:01:00 CDT, Height/Length Measured, 91.5, kg, 12/15/20 11:18:00 CST, Weight Dosing      carvediloL (COREG) 3.125 MG tablet Take 1 tablet (3.125 mg total) by mouth once daily. 30 tablet 1    cloNIDine (CATAPRES) 0.1 MG tablet Take 2 tablets (0.2 mg total) by mouth 3 (three) times daily. 180 tablet 11    clopidogreL (PLAVIX) 75 mg tablet Take 75 mg by mouth once daily.      furosemide (LASIX) 20 MG tablet Take 1 tablet (20 mg total) by mouth once daily. 30 tablet 1    hydrALAZINE (APRESOLINE) 50 MG tablet Take 1 tablet (50 mg total) by mouth every 12 (twelve) hours. 60 tablet 0    linaGLIPtin (TRADJENTA) 5 mg Tab tablet 5 mg.      NOVOLOG MIX 70-30FLEXPEN U-100 100 unit/mL (70-30) InPn pen Inject into the skin.      sodium bicarbonate 650 MG tablet Take 1 tablet (650 mg total) by mouth 2 (two) times daily. 60 tablet 11       Scheduled meds:   [START ON 5/14/2022] acyclovir  10 mg/kg (Ideal) Intravenous Daily    ampicillin 2 g in sodium chloride 0.9 % 100 mL IVPB (ready to mix system)  2 g Intravenous Q8H    aspirin  150 mg Rectal Daily    cefTRIAXone (ROCEPHIN) IVPB  2 g Intravenous Q12H    insulin aspart U-100  0-5 Units Subcutaneous Q4H    insulin detemir U-100  8 Units Subcutaneous Daily    magnesium sulfate IVPB  2 g Intravenous Once    mupirocin   Nasal BID       Infusions:   lactated ringers 50 mL/hr at 05/13/22 1228    niCARdipine         PRN meds:  acetaminophen, dextrose 10%, dextrose 10%, dextrose 50%, glucagon (human recombinant), glucose, glucose, hydrALAZINE, morphine, naloxone, ondansetron, oxyCODONE, polyethylene glycol, sodium chloride 0.9%, Pharmacy to dose Vancomycin consult **AND** vancomycin - pharmacy to dose    Review of Systems:    OBJECTIVE:     Vital Signs and IO (Last 24H):  Temp:  [98.8 °F (37.1 °C)-99.2 °F (37.3 °C)]   Pulse:  []   Resp:  [16-51]   BP: (108-184)/(52-98)   SpO2:  [90  %-100 %]   I/O last 3 completed shifts:  In: 708.4 [I.V.:73.8; IV Piggyback:634.6]  Out: 1020 [Urine:1020]    Wt Readings from Last 5 Encounters:   05/13/22 77.9 kg (171 lb 11.8 oz)   05/11/22 87.5 kg (193 lb)   05/12/22 87.5 kg (192 lb 14.4 oz)   04/26/22 95.3 kg (210 lb)   02/02/22 91.1 kg (200 lb 13.4 oz)     Physical Exam:  Physical Exam  Constitutional:       General: He is not in acute distress.     Appearance: He is well-developed. He is ill-appearing. He is not diaphoretic.   HENT:      Head: Normocephalic and atraumatic.      Mouth/Throat:      Mouth: Mucous membranes are moist.   Eyes:      General: No scleral icterus.     Pupils: Pupils are equal, round, and reactive to light.   Cardiovascular:      Rate and Rhythm: Normal rate and regular rhythm.   Pulmonary:      Effort: Pulmonary effort is normal. No respiratory distress.      Breath sounds: No stridor.   Abdominal:      General: There is no distension.      Palpations: Abdomen is soft.   Musculoskeletal:         General: No deformity. Normal range of motion.      Cervical back: Neck supple.   Skin:     General: Skin is warm and dry.      Findings: No erythema or rash.   Neurological:      Mental Status: He is alert.      Cranial Nerves: No cranial nerve deficit.       Body mass index is 26.9 kg/m².    Laboratory:  Recent Labs   Lab 05/11/22 2303 05/12/22  1257 05/13/22  0341    141 142   K 3.5 4.6 4.4    107 108   CO2 19* 16* 17*   BUN 17 27* 30*   CREATININE 2.5* 3.3* 3.7*   ESTGFRAFRICA 30.2* 22* 19*   EGFRNONAA 26.2* 19* 16*   * 353* 214*       Recent Labs   Lab 05/11/22 2303 05/12/22  1257 05/13/22  0341   CALCIUM 8.5* 8.3* 7.9*   ALBUMIN 2.8* 2.7* 2.2*   PHOS  --  4.1 4.4   MG  --  1.6 1.5*       Recent Labs   Lab 04/12/21  0952   PTH, Intact 273.0 H       Recent Labs   Lab 05/11/22  2226 05/12/22  0937 05/12/22  1229 05/12/22  1647 05/12/22  2351 05/13/22  0624 05/13/22  0853 05/13/22  1219   POCTGLUCOSE 371* 381* 388* 328*  268* 202* 205* 156*       Recent Labs   Lab 04/12/21  0952 05/12/22  1210 05/13/22  0341   Hemoglobin A1C 7.8 H 8.8 H 9.1 H       Recent Labs   Lab 05/11/22  2303 05/12/22  1210 05/13/22  0341   WBC 6.81 8.22 8.07   HGB 13.3* 14.4 12.8*   HCT 40.3 44.1 38.5*    233 254   MCV 75* 76* 76*   MCHC 33.0 32.7 33.2   MONO 4.4  0.3 4.9  0.4 6.4  0.5       Recent Labs   Lab 05/11/22  2303 05/12/22  1257 05/13/22  0341   BILITOT 0.9 0.7 0.5   PROT 7.1 7.2 6.2   ALBUMIN 2.8* 2.7* 2.2*   ALKPHOS 105 139* 107   ALT 8* 58* 52*   AST 9* 83* 49*       Recent Labs   Lab 01/26/22  0639 05/11/22  2300 05/12/22  1400   Color, UA Yellow Yellow Yellow   Appearance, UA Cloudy A Clear Clear   pH, UA 5.0 6.0 6.0   Specific [place], UA 1.020 1.020 1.020   Protein, UA 2+ A 3+ A 3+ A   Glucose, UA Negative 2+ A 3+ A   Ketones, UA Negative Negative Negative   Urobilinogen, UA 1.0 Negative Negative   Bilirubin (UA) Negative Negative Negative   Occult Blood UA 2+ A 2+ A 2+ A   Nitrite, UA Negative Negative Negative   RBC, UA 33 H 10 H 20 H   WBC, UA 55 H 3 6 H   Bacteria Moderate A Moderate A Few A   Hyaline Casts, UA 3 A 1 0       Recent Labs   Lab 05/12/22  0140 05/12/22 2055   POC PH 7.393 7.417   POC PCO2 29.3 L 31.1 L   POC HCO3 17.9 L 20.0 L   POC PO2 41 LL 84   POC SATURATED O2 77 L 97   POC BE -7 -5   Sample ARTERIAL ARTERIAL       Microbiology Results (last 7 days)     Procedure Component Value Units Date/Time    Culture, MRSA [[id number]]     Order Status: No result Specimen: MRSA source from Nares, Left     Blood culture [[id number]]     Order Status: Sent Specimen: Blood     Gram stain [[id number]]     Order Status: No result Specimen: CSF (Spinal Fluid)     Direct AFB stain [[id number]]     Order Status: No result Specimen: CSF (Spinal Fluid)     AFB Culture & Smear [[id number]]     Order Status: No result Specimen: CSF (Spinal Fluid)     Cryptococcal antigen, CSF [[id number]]     Order Status: No result Specimen: CSF (Spinal  Fluid)     CSF culture [[id number]]     Order Status: No result Specimen: CSF (Spinal Fluid)         ASSESSMENT/PLAN:     MERCEDES due to ATN due to sepsis  Acidosis  Sepsis due to PNA and/or meningitis  Acute stroke  CKD stage 3  No NSAIDs, ACEI/ARB, IV contrast or other nephrotoxins.  Keep MAP > 60, SBP > 100.  Dose meds for GFR < 30 ml/min.  Renal diet - low K, low phos.  Diagnose and treat infection.    Anemia of CKD  Hgb and HCT are acceptable. Monitor.  Will provide SHERLY and/or IV iron PRN.    HTN  BP seem controlled with nicardipine drip.   Tolerate asymptomatic HTN up to -160.  Hold home meds.    Thank you for allowing us to participate in the care of your patient!   We will follow the patient and provide recommendations as needed.    Patient care time was spent personally by me on the following activities:   · Obtaining a history.  · Examination of patient.  · Providing medical care at the patients bedside.  · Developing a treatment plan with patient or surrogate and bedside caregivers.  · Ordering and reviewing laboratory studies, radiographic studies, pulse oximetry.  · Ordering and performing treatments and interventions.  · Evaluation of patient's response to treatment.  · Discussions with consultants while on the unit and immediately available to the patient.  · Re-evaluation of the patient's condition.  · Documentation in the medical record.     Barrie Mathew MD    [facility]  [address] [place] [address]  [place] [address]    [phone] - tel  [phone] - fax    5/13/2022

## 2022-05-13 NOTE — NURSING
Upon initial assessment patient found to have continued apneic breathing occurring every 1-2 minutes lasting from 15-20 seconds. O2 sat decreases during this time to low 90s. Recovery breathing with shallow abdominal breaths followed by fast hard breathing. eICU notified, discussed with Dr. Garcia. New orders received for ABG and CPAP. Will continue to monitor.

## 2022-05-13 NOTE — PT/OT/SLP PROGRESS
Speech Language Pathology      Marshall Flor  MRN: [id number]    Patient not seen today secondary to Nursing hold (Comment) (continuous CPAP). Will follow-up 5/14/22.

## 2022-05-13 NOTE — PLAN OF CARE
Transfer from [place] during day shift, admitted to ICU for stroke work up, and close monitoring. Patient with continued periods of apnea throughout the shift. EICU aware and involved in patient's care. New orders received. Flucuating neruo status. At times Patient is more alert and will attempt to follow commands. Other times patient is flat and does not open his eyes nor follow commands. He is nonverbal. Pupils are reactive but patient has a continuous left gaze and visual nelgelect to right side. Patient has some effort against gravity with upper extremities but is unable to move either lower extremity. Colmenares catheter in place. Decreased urine output noted during shift. Order for IV lasix received and administered as ordered. Urine output remains low. Nephrology following.   Problem: Adult Inpatient Plan of Care  Goal: Plan of Care Review  Outcome: Ongoing, Progressing  Goal: Patient-Specific Goal (Individualized)  Outcome: Ongoing, Progressing  Goal: Absence of Hospital-Acquired Illness or Injury  Outcome: Ongoing, Progressing  Goal: Optimal Comfort and Wellbeing  Outcome: Ongoing, Progressing  Goal: Readiness for Transition of Care  Outcome: Ongoing, Progressing     Problem: Diabetes Comorbidity  Goal: Blood Glucose Level Within Targeted Range  Outcome: Ongoing, Progressing     Problem: Skin Injury Risk Increased  Goal: Skin Health and Integrity  Outcome: Ongoing, Progressing     Problem: Adjustment to Illness (Stroke, Ischemic/Transient Ischemic Attack)  Goal: Optimal Coping  Outcome: Ongoing, Progressing     Problem: Bowel Elimination Impaired (Stroke, Ischemic/Transient Ischemic Attack)  Goal: Effective Bowel Elimination  Outcome: Ongoing, Progressing     Problem: Cerebral Tissue Perfusion (Stroke, Ischemic/Transient Ischemic Attack)  Goal: Optimal Cerebral Tissue Perfusion  Outcome: Ongoing, Progressing     Problem: Cognitive Impairment (Stroke, Ischemic/Transient Ischemic Attack)  Goal: Optimal Cognitive  Function  Outcome: Ongoing, Progressing     Problem: Communication Impairment (Stroke, Ischemic/Transient Ischemic Attack)  Goal: Improved Communication Skills  Outcome: Ongoing, Progressing     Problem: Functional Ability Impaired (Stroke, Ischemic/Transient Ischemic Attack)  Goal: Optimal Functional Ability  Outcome: Ongoing, Progressing     Problem: Respiratory Compromise (Stroke, Ischemic/Transient Ischemic Attack)  Goal: Effective Oxygenation and Ventilation  Outcome: Ongoing, Progressing     Problem: Sensorimotor Impairment (Stroke, Ischemic/Transient Ischemic Attack)  Goal: Improved Sensorimotor Function  Outcome: Ongoing, Progressing     Problem: Swallowing Impairment (Stroke, Ischemic/Transient Ischemic Attack)  Goal: Optimal Eating and Swallowing without Aspiration  Outcome: Ongoing, Progressing     Problem: Urinary Elimination Impaired (Stroke, Ischemic/Transient Ischemic Attack)  Goal: Effective Urinary Elimination  Outcome: Ongoing, Progressing     Problem: Infection  Goal: Absence of Infection Signs and Symptoms  Outcome: Ongoing, Progressing     Problem: Adjustment to Illness (Delirium)  Goal: Optimal Coping  Outcome: Ongoing, Progressing     Problem: Altered Behavior (Delirium)  Goal: Improved Behavioral Control  Outcome: Ongoing, Progressing     Problem: Attention and Thought Clarity Impairment (Delirium)  Goal: Improved Attention and Thought Clarity  Outcome: Ongoing, Progressing     Problem: Sleep Disturbance (Delirium)  Goal: Improved Sleep  Outcome: Ongoing, Progressing

## 2022-05-14 ENCOUNTER — OUTSIDE PLACE OF SERVICE (OUTPATIENT)
Dept: INFECTIOUS DISEASES | Facility: CLINIC | Age: 67
End: 2022-05-14
Payer: MEDICARE

## 2022-05-14 DIAGNOSIS — R41.82 AMS (ALTERED MENTAL STATUS): ICD-10-CM

## 2022-05-14 DIAGNOSIS — E11.22 TYPE 2 DIABETES MELLITUS WITH STAGE 4 CHRONIC KIDNEY DISEASE, WITHOUT LONG-TERM CURRENT USE OF INSULIN: ICD-10-CM

## 2022-05-14 DIAGNOSIS — R50.9 FEVER: ICD-10-CM

## 2022-05-14 DIAGNOSIS — I10 HTN (HYPERTENSION): ICD-10-CM

## 2022-05-14 DIAGNOSIS — N18.4 TYPE 2 DIABETES MELLITUS WITH STAGE 4 CHRONIC KIDNEY DISEASE, WITHOUT LONG-TERM CURRENT USE OF INSULIN: ICD-10-CM

## 2022-05-14 DIAGNOSIS — I63.9 CVA (CEREBRAL VASCULAR ACCIDENT): ICD-10-CM

## 2022-05-14 DIAGNOSIS — N17.9 ARF (ACUTE RENAL FAILURE): ICD-10-CM

## 2022-05-14 LAB
ANION GAP SERPL CALC-SCNC: 18 MMOL/L (ref 8–16)
BUN SERPL-MCNC: 42 MG/DL (ref 8–23)
CALCIUM SERPL-MCNC: 7.9 MG/DL (ref 8.7–10.5)
CHLORIDE SERPL-SCNC: 108 MMOL/L (ref 95–110)
CO2 SERPL-SCNC: 21 MMOL/L (ref 23–29)
CREAT SERPL-MCNC: 5.9 MG/DL (ref 0.5–1.4)
EST. GFR  (AFRICAN AMERICAN): 11 ML/MIN/1.73 M^2
EST. GFR  (NON AFRICAN AMERICAN): 9 ML/MIN/1.73 M^2
GLUCOSE SERPL-MCNC: 174 MG/DL (ref 70–110)
POCT GLUCOSE: 147 MG/DL (ref 70–110)
POCT GLUCOSE: 150 MG/DL (ref 70–110)
POCT GLUCOSE: 163 MG/DL (ref 70–110)
POCT GLUCOSE: 177 MG/DL (ref 70–110)
POCT GLUCOSE: 193 MG/DL (ref 70–110)
POCT GLUCOSE: 197 MG/DL (ref 70–110)
POTASSIUM SERPL-SCNC: 3.8 MMOL/L (ref 3.5–5.1)
PROCALCITONIN SERPL IA-MCNC: 13.18 NG/ML
SODIUM SERPL-SCNC: 147 MMOL/L (ref 136–145)
VANCOMYCIN SERPL-MCNC: 17.6 UG/ML

## 2022-05-14 PROCEDURE — 99900035 HC TECH TIME PER 15 MIN (STAT)

## 2022-05-14 PROCEDURE — 25000003 PHARM REV CODE 250: Performed by: INTERNAL MEDICINE

## 2022-05-14 PROCEDURE — 99233 SBSQ HOSP IP/OBS HIGH 50: CPT | Mod: S$GLB,,, | Performed by: INTERNAL MEDICINE

## 2022-05-14 PROCEDURE — 25000003 PHARM REV CODE 250: Performed by: STUDENT IN AN ORGANIZED HEALTH CARE EDUCATION/TRAINING PROGRAM

## 2022-05-14 PROCEDURE — 20000000 HC ICU ROOM

## 2022-05-14 PROCEDURE — 63600175 PHARM REV CODE 636 W HCPCS: Performed by: INTERNAL MEDICINE

## 2022-05-14 PROCEDURE — 99233 PR SUBSEQUENT HOSPITAL CARE,LEVL III: ICD-10-PCS | Mod: S$GLB,,, | Performed by: INTERNAL MEDICINE

## 2022-05-14 PROCEDURE — 27000221 HC OXYGEN, UP TO 24 HOURS

## 2022-05-14 PROCEDURE — 80202 ASSAY OF VANCOMYCIN: CPT | Performed by: HOSPITALIST

## 2022-05-14 PROCEDURE — 63600175 PHARM REV CODE 636 W HCPCS: Performed by: HOSPITALIST

## 2022-05-14 PROCEDURE — 94660 CPAP INITIATION&MGMT: CPT

## 2022-05-14 PROCEDURE — 80048 BASIC METABOLIC PNL TOTAL CA: CPT | Performed by: INTERNAL MEDICINE

## 2022-05-14 PROCEDURE — 84145 PROCALCITONIN (PCT): CPT | Performed by: STUDENT IN AN ORGANIZED HEALTH CARE EDUCATION/TRAINING PROGRAM

## 2022-05-14 PROCEDURE — 63600175 PHARM REV CODE 636 W HCPCS: Performed by: STUDENT IN AN ORGANIZED HEALTH CARE EDUCATION/TRAINING PROGRAM

## 2022-05-14 PROCEDURE — 25000003 PHARM REV CODE 250: Performed by: HOSPITALIST

## 2022-05-14 PROCEDURE — 94761 N-INVAS EAR/PLS OXIMETRY MLT: CPT

## 2022-05-14 PROCEDURE — 36415 COLL VENOUS BLD VENIPUNCTURE: CPT | Performed by: INTERNAL MEDICINE

## 2022-05-14 RX ADMIN — CEFTRIAXONE 2 G: 2 INJECTION, SOLUTION INTRAVENOUS at 02:05

## 2022-05-14 RX ADMIN — CEFTRIAXONE 2 G: 2 INJECTION, SOLUTION INTRAVENOUS at 03:05

## 2022-05-14 RX ADMIN — AMPICILLIN SODIUM 2 G: 2 INJECTION, POWDER, FOR SOLUTION INTRAMUSCULAR; INTRAVENOUS at 09:05

## 2022-05-14 RX ADMIN — LABETALOL HYDROCHLORIDE 10 MG: 5 INJECTION INTRAVENOUS at 03:05

## 2022-05-14 RX ADMIN — ASPIRIN 150 MG: 300 SUPPOSITORY RECTAL at 08:05

## 2022-05-14 RX ADMIN — INSULIN DETEMIR 8 UNITS: 100 INJECTION, SOLUTION SUBCUTANEOUS at 08:05

## 2022-05-14 RX ADMIN — ACYCLOVIR SODIUM 660 MG: 1000 INJECTION, SOLUTION INTRAVENOUS at 06:05

## 2022-05-14 RX ADMIN — AMPICILLIN SODIUM 2 G: 2 INJECTION, POWDER, FOR SOLUTION INTRAMUSCULAR; INTRAVENOUS at 06:05

## 2022-05-14 RX ADMIN — VANCOMYCIN HYDROCHLORIDE 500 MG: 500 INJECTION, POWDER, LYOPHILIZED, FOR SOLUTION INTRAVENOUS at 12:05

## 2022-05-14 RX ADMIN — MUPIROCIN: 20 OINTMENT TOPICAL at 08:05

## 2022-05-14 RX ADMIN — AMPICILLIN SODIUM 2 G: 2 INJECTION, POWDER, FOR SOLUTION INTRAMUSCULAR; INTRAVENOUS at 02:05

## 2022-05-14 NOTE — PROGRESS NOTES
[facility] Medicine  Progress Note    Patient Name: Marshall Flor  MRN: [id number]  Patient Class: IP- Inpatient   Admission Date: 5/12/2022  Length of Stay: 1 days  Attending Physician: Osmin Pope MD  Primary Care Provider: Dorie Quan MD        Subjective:     Principal Problem:CVA (cerebral vascular accident)        HPI:  66-year-old male with history of CHF, diabetes presents to [facility] for decreased responsiveness found to have a fever, left gaze preference with nuchal rigidity found to have meningitis in addition to being found to have an acute CVA. Beyond this he has an MERCEDES.          Overview/Hospital Course:    1.  meningitis   -LP not able to be performed as on plavix at home  -MRI with acute CVA  -EEG with diffuse slowing; no focal epileptiform activity  -CTX, amp, Vanco, acyclovir  -ID, Neuro consult     2. Acute CVA  -rectal aspirin  -neurology       Interval History:  no major change in condition.  Still not following commands.  Blood culture positive.  Might have meningitis.  MRI shows acute CVA and remote ones.    Review of Systems   Unable to perform ROS: Patient nonverbal   Objective:     Vital Signs (Most Recent):  Temp: 99.1 °F (37.3 °C) (05/13/22 2000)  Pulse: 92 (05/13/22 2000)  Resp: 11 (05/13/22 2000)  BP: (!) 152/73 (05/13/22 2000)  SpO2: 99 % (05/13/22 2000)   Vital Signs (24h Range):  Temp:  [97.9 °F (36.6 °C)-99.2 °F (37.3 °C)] 99.1 °F (37.3 °C)  Pulse:  [] 92  Resp:  [11-38] 11  SpO2:  [85 %-100 %] 99 %  BP: (108-212)/(52-98) 152/73     Weight: 77.9 kg (171 lb 11.8 oz)  Body mass index is 26.9 kg/m².    Intake/Output Summary (Last 24 hours) at 5/13/2022 2233  Last data filed at 5/13/2022 2200  Gross per 24 hour   Intake 1389.91 ml   Output 547 ml   Net 842.91 ml      Physical Exam  Vitals reviewed.   Constitutional:       General: He is not in acute distress.     Appearance: He is not diaphoretic.      Interventions: Nasal cannula in place.  "  HENT:      Mouth/Throat:      Mouth: Mucous membranes are moist.   Eyes:      General: No scleral icterus.        Right eye: No discharge.         Left eye: No discharge.   Neck:      Vascular: No JVD.   Cardiovascular:      Rate and Rhythm: Normal rate and regular rhythm.   Pulmonary:      Effort: Pulmonary effort is normal.      Breath sounds: Normal breath sounds.   Abdominal:      General: Bowel sounds are normal. There is no distension.      Palpations: Abdomen is soft.      Tenderness: There is no abdominal tenderness.   Skin:     General: Skin is warm.      Findings: No rash.   Neurological:      Mental Status: He is lethargic.      Comments: Nonverbal.  Not following commands.       Significant Labs: All pertinent labs within the past 24 hours have been reviewed.    Significant Imaging: I have reviewed all pertinent imaging results/findings within the past 24 hours.      Assessment/Plan:      * CVA (cerebral vascular accident)  Proven on MRI.  Old infarcts seen as well.  Neurologist consulting.  Getting aspirin suppositories.      Meningitis (needs to be ruled out)  On antibiotics due to high suspicion.  Discussed with ID.  Pt needs to be 5 days off of Plavix before getting lumbar puncture.  Should be ok Monday; will re-order the procedure for that day.    Antibiotics (From admission, onward)            Start     Stop Route Frequency Ordered    05/13/22 1400  ampicillin 2 g in sodium chloride 0.9 % 100 mL IVPB (ready to mix system)         -- IV Every 8 hours 05/13/22 1342    05/12/22 2100  mupirocin 2 % ointment         05/17 2059 Nasl 2 times daily 05/12/22 1347    05/12/22 1500  cefTRIAXone (ROCEPHIN) 2 g/50 mL D5W IVPB         -- IV Every 12 hours (non-standard times) 05/12/22 1059    05/12/22 1158  vancomycin - pharmacy to dose  (vancomycin with pharmacy to dose consult)        "And" Linked Group Details    -- IV pharmacy to manage frequency 05/12/22 1059        Antivirals (From admission, onward)     "    Stop Route Frequency     acyclovir (ZOVIRAX) injection         -- IV Daily              Chronic combined systolic and diastolic CHF (congestive heart failure)  Patient is identified as having Combined Systolic and Diastolic heart failure that is Chronic. CHF is currently controlled. Latest ECHO performed and demonstrates- Results for orders placed during the hospital encounter of 05/12/22    Echo    Interpretation Summary  · The left ventricle is moderately enlarged with eccentric hypertrophy and severely decreased systolic function.  · Grade II left ventricular diastolic dysfunction.  · The estimated PA systolic pressure is 59 mmHg.  · Severe right ventricular enlargement with mildly to moderately reduced right ventricular systolic function.  · Moderate left atrial enlargement.  · There is moderate pulmonary hypertension.  · Intermediate central venous pressure (8 mmHg).  · Mild aortic regurgitation.  · Moderate right atrial enlargement.  · Mild to moderate tricuspid regurgitation.  · Mild pulmonic regurgitation.  · The estimated ejection fraction is 20%.  · Moderate mitral regurgitation.  . Patient NPO; not on oral meds.  Monitor clinical status closely. Monitor on telemetry. Patient is off CHF pathway.  Monitor strict Is&Os and daily weights.   Last BNP reviewed- and noted below   Recent Labs   Lab 05/11/22  2303   BNP 3,064*   .      Encephalopathy  Unsure of cause.  Just a stroke?  Meningitis?  Will monitor neuro exam.  Neurologist consulting.  Avoid sedating meds.      Essential hypertension  Controlled.  Monitor pressure.  Patient is NPO; cannot take oral meds.      Coronary artery disease involving native coronary artery of native heart  Stable.  Getting aspirin suppositories.      Hyperlipidemia  Chronic.  Stable.      Type 2 diabetes mellitus with stage 4 chronic kidney disease, without long-term current use of insulin  Patient's FSGs are controlled on current medication regimen.  Last A1c reviewed-    Lab Results   Component Value Date    HGBA1C 9.1 (H) 05/13/2022     Most recent fingerstick glucose reviewed-   Recent Labs   Lab 05/13/22  0853 05/13/22  1219 05/13/22  1720 05/13/22 2005   POCTGLUCOSE 205* 156* 171* 160*     Current correctional scale  Low  Maintain anti-hyperglycemic dose as follows-   Antihyperglycemics (From admission, onward)            Start     Stop Route Frequency Ordered    05/13/22 1715  insulin aspart U-100 pen 0-5 Units         -- SubQ Every 6 hours PRN 05/13/22 1602    05/12/22 1200  insulin detemir U-100 pen 8 Units         -- SubQ Daily 05/12/22 1052        Hold Oral hypoglycemics while patient is in the hospital.        CKD (chronic kidney disease) stage 4, GFR 15-29 ml/min  Renal function stable.  Monitor BMP.  Avoid NSAID's.    Creatinine   Date Value Ref Range Status   05/13/2022 3.7 (H) 0.5 - 1.4 mg/dL Final   05/12/2022 3.3 (H) 0.5 - 1.4 mg/dL Final   ]        VTE Risk Mitigation (From admission, onward)         Ordered     IP VTE HIGH RISK PATIENT  Once         05/12/22 1351     Place sequential compression device  Until discontinued         05/12/22 1351     Place sequential compression device  Until discontinued         05/12/22 1052                Discharge Planning   STAR:      Code Status: Full Code   Is the patient medically ready for discharge?:     Reason for patient still in hospital (select all that apply): Patient trending condition and Treatment  Discharge Plan A: Home with family            Osmin Pope MD  Department of Hospital Medicine   [facility]

## 2022-05-14 NOTE — PROGRESS NOTES
Consult Note  Infectious Disease    Reason for Consult:  Rule out meningitis     HPI: Marshall Flor is a 66 y.o. male with past medical history of HTN, diabetes, CHF, prior stroke presented to [facility] for altered mental status and fever.  No family at bedside.  Patient seen and examined in ICU.  Unable to obtain further history, patient breathing heavily, nonverbal, staring, febrile.    In the ER, hypertensive 179/109, febrile 101.2  Labs on admission reviewed, white count 6.8, PMN 84.5%, H&H 13.3/40.3, platelet count 311  Creatinine 2.5, MERCEDES 3.3 today  AST 83/ALT 58  BNP 3064  Lactic acid 3.5, procalcitonin 6.9  U tox negative  UA 3+ protein, 2+ glucose, WBC 3, moderate bacteria  Chest x-ray suggestive of pneumonia right upper lobe  MRI brain nonhemorrhagic infarction in the right anterolateral frontal lobe.    Admitted for severe sepsis with altered mental status, found to have acute stroke, rule out meningitis.    ID consult for meningitis.    INTERVAL HISTORY:  5/13: interim reviewed, patient seen and examined at bedside. Unable to perform LP since he is on Plavix, need to wait 5 days. Currently on bipap, alert and awake but dysarthric. Hemodynamically stable, permissive HTN for acute stroke, afebrile in the last 24h.  Micro reviwed blood cultures 1/4 bottles grew GPC, ID and sensitivities pending. Labs reviewed, WBC: 8, PMN: 76.7%. Creatinine 3.7. HbA1c: 9%.    5/14:  Consult in data reviewed, discussed with Dr. Moore.  Afebrile,   Renal function worsening, urine output is poor.  No BMP today, therefore ordered and his creatinine has increased to 5.9 Repeat chest x-ray ordered and read as no acute process but he does have some vascular fullness right perihilar.  He is unable to attend our interact.  LP is pending washout of Plavix.  1/4 blood cultures with coagulase-negative Staph, drawn at nursing home 5/11.    EXAM & DIAGNOSTICS REVIEWED:   Vitals:     Temp:  [97.9 °F (36.6 °C)-99.1 °F (37.3  °C)]   Temp: 98 °F (36.7 °C) (05/14/22 0800)  Pulse: 73 (05/14/22 1230)  Resp: (!) 26 (05/14/22 1230)  BP: (!) 134/94 (05/14/22 1230)  SpO2: 97 % (05/14/22 1230)    Intake/Output Summary (Last 24 hours) at 5/14/2022 1258  Last data filed at 5/14/2022 1200  Gross per 24 hour   Intake 1322.62 ml   Output 489 ml   Net 833.62 ml       General:  Eyes open, does not attend.  Has Cheyne-Valdovinos respirations.    Eyes:  Anicteric, PERRL  ENT:  No thrush  Neck:  Completely supple to flexion and rotation  Lungs: Clear  Heart:  S1/S2+, regular rhythm, systolic murmur+  Abd:  +BS, soft, non tender, non distended, no rebound  :  Colmenares, urine clear,   Musc:  Joints without effusion, swelling,  erythema, synovitis  Skin:  Warm, no rash  Wound:   Neuro: Eyes intermittently open, does not attend, does not move the right arm spontaneously, but does grasp with the left hand and moves both feet.  Face is symmetric.  Cheyne-Valdovinos respirations   Psych:  Unable to assess  Lymphatic:       Extrem: No LE edema b/l  VAD:  Peripheral IVs       Isolation: None      General Labs reviewed:  Recent Labs   Lab 05/11/22  2303 05/12/22  1210 05/13/22  0341   WBC 6.81 8.22 8.07   HGB 13.3* 14.4 12.8*   HCT 40.3 44.1 38.5*    233 254       Recent Labs   Lab 05/11/22  2303 05/12/22  1257 05/13/22  0341    141 142   K 3.5 4.6 4.4    107 108   CO2 19* 16* 17*   BUN 17 27* 30*   CREATININE 2.5* 3.3* 3.7*   CALCIUM 8.5* 8.3* 7.9*   PROT 7.1 7.2 6.2   BILITOT 0.9 0.7 0.5   ALKPHOS 105 139* 107   ALT 8* 58* 52*   AST 9* 83* 49*     No results for input(s): CRP in the last 168 hours.  No results for input(s): SEDRATE in the last 168 hours.    Estimated Creatinine Clearance: 20.7 mL/min (A) (based on SCr of 3.7 mg/dL (H)).     Micro:  Microbiology Results (last 7 days)     Procedure Component Value Units Date/Time    Blood culture [[id number]] Collected: 05/13/22 2007    Order Status: Completed Specimen: Blood from Peripheral, Left Arm  Updated: 05/14/22 1115     Blood Culture, Routine No Growth to date    Narrative:      Collection has been rescheduled by ACD at 05/13/2022 15:13 Reason:   Unable to collect, no place to stick pt  Collection has been rescheduled by ACD at 05/13/2022 15:13 Reason:   Unable to collect, no place to stick pt    AFB Culture & Smear [[id number]] Collected: 05/13/22 1508    Order Status: Sent Specimen: CSF (Spinal Fluid) from Nares, Right Updated: 05/14/22 0039    Culture, MRSA [[id number]]     Order Status: Canceled Specimen: MRSA source from Nares, Left     Gram stain [[id number]]     Order Status: Canceled Specimen: CSF (Spinal Fluid)     Direct AFB stain [[id number]]     Order Status: Canceled Specimen: CSF (Spinal Fluid)     Cryptococcal antigen, CSF [[id number]]     Order Status: Canceled Specimen: CSF (Spinal Fluid)     CSF culture [[id number]]     Order Status: Canceled Specimen: CSF (Spinal Fluid)           Imaging Reviewed:  CXR  CT and CTA head negative for acute ischemic stroke   MRI brain - acute ischemic stroke  1. This is a very limited evaluation but the study is abnormal.  There is a nonhemorrhagic infarction in the anterior lateral left frontal lobe.  2. There is a remote infarction with encephalomalacia and gliosis in the medial right occipital lobe.  There are also remote infarctions in the cerebellar hemispheres as well as in the right thalamus.    Cardiology:   ECHO 5/12/22:  · The left ventricle is moderately enlarged with eccentric hypertrophy and severely decreased systolic function.  · Grade II left ventricular diastolic dysfunction.  · The estimated PA systolic pressure is 59 mmHg.  · Severe right ventricular enlargement with mildly to moderately reduced right ventricular systolic function.  · Moderate left atrial enlargement.  · There is moderate pulmonary hypertension.  · Intermediate central venous pressure (8 mmHg).  · Mild aortic regurgitation.  · Moderate right atrial enlargement.  · Mild to  moderate tricuspid regurgitation.  · Mild pulmonic regurgitation.  · The estimated ejection fraction is 20%.  · Moderate mitral regurgitation.          IMPRESSION & PLAN     1. Sepsis cannot exclude meningitis  - patient afebrile in the last 24h   Blood cultures 1/4 bottles coag-negative staph likely contaminant   Procal 6.9 and rising, due to acute renal failure     2. Acute stroke which can also contribute to fever on admission   3. Kym, cr 5.9  3. 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 Pope    Medical Decision Making during this encounter was  [_] Low Complexity  [_] Moderate Complexity  [xx] High Complexity

## 2022-05-14 NOTE — ASSESSMENT & PLAN NOTE
Patient's FSGs are controlled on current medication regimen.  Last A1c reviewed-   Lab Results   Component Value Date    HGBA1C 9.1 (H) 05/13/2022     Most recent fingerstick glucose reviewed-   Recent Labs   Lab 05/13/22  0853 05/13/22  1219 05/13/22  1720 05/13/22 2005   POCTGLUCOSE 205* 156* 171* 160*     Current correctional scale  Low  Maintain anti-hyperglycemic dose as follows-   Antihyperglycemics (From admission, onward)            Start     Stop Route Frequency Ordered    05/13/22 1715  insulin aspart U-100 pen 0-5 Units         -- SubQ Every 6 hours PRN 05/13/22 1602    05/12/22 1200  insulin detemir U-100 pen 8 Units         -- SubQ Daily 05/12/22 1052        Hold Oral hypoglycemics while patient is in the hospital.

## 2022-05-14 NOTE — RESPIRATORY THERAPY
Patient remains on CPAP +10 and FI02 6/4/2022.  Hr 86 and 02 saturation 100%.  Alarms are on and functioning properly at this time.

## 2022-05-14 NOTE — PLAN OF CARE
Problem: Adult Inpatient Plan of Care  Goal: Plan of Care Review  Outcome: Ongoing, Progressing  Goal: Patient-Specific Goal (Individualized)  Outcome: Ongoing, Progressing  Goal: Absence of Hospital-Acquired Illness or Injury  Outcome: Ongoing, Progressing  Goal: Optimal Comfort and Wellbeing  Outcome: Ongoing, Progressing  Goal: Readiness for Transition of Care  Outcome: Ongoing, Progressing     Problem: Diabetes Comorbidity  Goal: Blood Glucose Level Within Targeted Range  Outcome: Ongoing, Progressing     Problem: Skin Injury Risk Increased  Goal: Skin Health and Integrity  Outcome: Ongoing, Progressing     Problem: Adjustment to Illness (Stroke, Ischemic/Transient Ischemic Attack)  Goal: Optimal Coping  Outcome: Ongoing, Progressing     Problem: Bowel Elimination Impaired (Stroke, Ischemic/Transient Ischemic Attack)  Goal: Effective Bowel Elimination  Outcome: Ongoing, Progressing     Problem: Cerebral Tissue Perfusion (Stroke, Ischemic/Transient Ischemic Attack)  Goal: Optimal Cerebral Tissue Perfusion  Outcome: Ongoing, Progressing     Problem: Cognitive Impairment (Stroke, Ischemic/Transient Ischemic Attack)  Goal: Optimal Cognitive Function  Outcome: Ongoing, Progressing     Problem: Communication Impairment (Stroke, Ischemic/Transient Ischemic Attack)  Goal: Improved Communication Skills  Outcome: Ongoing, Progressing     Problem: Functional Ability Impaired (Stroke, Ischemic/Transient Ischemic Attack)  Goal: Optimal Functional Ability  Outcome: Ongoing, Progressing     Problem: Respiratory Compromise (Stroke, Ischemic/Transient Ischemic Attack)  Goal: Effective Oxygenation and Ventilation  Outcome: Ongoing, Progressing     Problem: Sensorimotor Impairment (Stroke, Ischemic/Transient Ischemic Attack)  Goal: Improved Sensorimotor Function  Outcome: Ongoing, Progressing     Problem: Swallowing Impairment (Stroke, Ischemic/Transient Ischemic Attack)  Goal: Optimal Eating and Swallowing without  Aspiration  Outcome: Ongoing, Progressing     Problem: Urinary Elimination Impaired (Stroke, Ischemic/Transient Ischemic Attack)  Goal: Effective Urinary Elimination  Outcome: Ongoing, Progressing     Problem: Infection  Goal: Absence of Infection Signs and Symptoms  Outcome: Ongoing, Progressing     Problem: Adjustment to Illness (Delirium)  Goal: Optimal Coping  Outcome: Ongoing, Progressing     Problem: Altered Behavior (Delirium)  Goal: Improved Behavioral Control  Outcome: Ongoing, Progressing     Problem: Attention and Thought Clarity Impairment (Delirium)  Goal: Improved Attention and Thought Clarity  Outcome: Ongoing, Progressing     Problem: Sleep Disturbance (Delirium)  Goal: Improved Sleep  Outcome: Ongoing, Progressing     Problem: Fall Injury Risk  Goal: Absence of Fall and Fall-Related Injury  Outcome: Ongoing, Progressing

## 2022-05-14 NOTE — PROGRESS NOTES
"[facility]  Neurology  Progress Note    Patient Name: Marshall Flor  MRN: [id number]  Admission Date: 5/12/2022  Hospital Length of Stay: 2 days  Code Status: Full Code   Attending Provider: Osmin Pope MD  Primary Care Physician: Dorie Quan MD   Principal Problem:CVA (cerebral vascular accident)    Subjective:   Chief Complaint:  unrepsonsive      HPI:   Per EMR: History of Present Illness:    66-year-old male with history of CHF, diabetes presents to [place] for decreased responsiveness found to have a left gaze presents with fever upon arrival to [facility].  Family is not at bedside and patient is not able to communicate in current state.  ?  Neurology Consult: Pt seen and examined with Dr Rudy Segundo. POC discussed. Pt is minimally responsive to tactile stimuli. His CT head is negative for acute infarct or hemorrhage.  He was transferred from [facility] for further work up due to fever, AMS, and left gaze. Last seen normal unknown. No family at bedside.   Per [place]: "Patient is a 66-year-old male brought from home via EMS for evaluation of altered mental status as reported by family members.  Family member stated that at around 1:00 p.m. today they noted that the patient was not exhibiting his normal behaviors.  Upon arrival here, the patient is not speaking.  He has some residual weakness on the right side secondary to a previous stroke.  Patient does not speak, but he does respond to commands.  He is exhibiting an abnormal breathing pattern of several short, chopped breaths followed by periods of apnea.  His vital signs show a temperature of 98°, pulse 98,, respiratory rate 28 per minute, O2 saturations 95% on room air, blood pressure is significantly elevated at 183/101.  Patient's baseline is unknown, but patient's family state that he has not at baseline."       Interval History: patient seen and examined.   He is more alert today .  He is not following commands " but he  was previously following commands with the bedside nurse.       5/14: Pt seen and examined. POC discussed with Dr. GRACE Segundo. Pt is still nonverbal with limited communication. His son did not answer the phone today. Baseline unknown.         Current Facility-Administered Medications   Medication Dose Route Frequency Provider Last Rate Last Admin    acetaminophen tablet 650 mg  650 mg Oral Q4H PRN Dimitry Correa MD        acyclovir (ZOVIRAX) 660 mg in dextrose 5 % 100 mL IVPB  10 mg/kg (Ideal) Intravenous Daily Marielle Osorio MD   Stopped at 05/14/22 0717    ampicillin 2 g in sodium chloride 0.9 % 100 mL IVPB (ready to mix system)  2 g Intravenous Q8H Marielle Osorio MD   Stopped at 05/14/22 1507    aspirin suppository 150 mg  150 mg Rectal Daily Dimitry Correa MD   150 mg at 05/14/22 0841    cefTRIAXone (ROCEPHIN) 2 g/50 mL D5W IVPB  2 g Intravenous Q12H Dimitry Correa MD 45 mL/hr at 05/14/22 1456 2 g at 05/14/22 1456    dextrose 10% bolus 125 mL  12.5 g Intravenous PRN Dimitry Correa MD        dextrose 10% bolus 250 mL  25 g Intravenous PRN Dimitry Correa MD        dextrose 50% injection 12.5 g  12.5 g Intravenous PRN Dimitry Correa MD        glucagon (human recombinant) injection 1 mg  1 mg Intramuscular PRN Dimitry Correa MD        glucose chewable tablet 16 g  16 g Oral PRN Dimitry Correa MD        glucose chewable tablet 24 g  24 g Oral PRN Dimitry Correa MD        insulin aspart U-100 pen 0-5 Units  0-5 Units Subcutaneous Q6H PRN Osmin Pope MD        insulin detemir U-100 pen 8 Units  8 Units Subcutaneous Daily Dimitry Correa MD   8 Units at 05/14/22 0841    labetaloL injection 10 mg  10 mg Intravenous Q4H PRN Osmin Pope MD   10 mg at 05/14/22 0345    morphine injection 3 mg  3 mg Intravenous Q4H PRN Dimitry Correa MD        mupirocin 2 % ointment   Nasal BID Dimitry Correa MD   Given at 05/14/22 0841    naloxone  0.4 mg/mL injection 0.02 mg  0.02 mg Intravenous PRN Dimitry Correa MD        niCARdipine 40 mg/200 mL (0.2 mg/mL) infusion  0-15 mg/hr Intravenous Continuous Dimitry Correa MD        ondansetron injection 4 mg  4 mg Intravenous Q8H PRN Dimitry Correa MD        oxyCODONE immediate release tablet 5 mg  5 mg Oral Q4H PRN Dimitry Correa MD        polyethylene glycol packet 17 g  17 g Oral BID PRN Dimitry Correa MD        sodium chloride 0.9% flush 10 mL  10 mL Intravenous PRN Dimitry Correa MD        vancomycin - pharmacy to dose   Intravenous pharmacy to manage frequency Dimitry Correa MD           Review of Systems   Unable to perform ROS: Patient nonverbal     Objective:     Vital Signs (Most Recent):  Temp: 98 °F (36.7 °C) (05/14/22 1400)  Pulse: 82 (05/14/22 1400)  Resp: (!) 24 (05/14/22 1400)  BP: (!) 151/67 (05/14/22 1400)  SpO2: 97 % (05/14/22 1400) Vital Signs (24h Range):  Temp:  [97.9 °F (36.6 °C)-99.1 °F (37.3 °C)] 98 °F (36.7 °C)  Pulse:  [] 82  Resp:  [11-45] 24  SpO2:  [81 %-100 %] 97 %  BP: (111-212)/() 151/67     Weight: 82 kg (180 lb 12.4 oz)  Body mass index is 28.31 kg/m².    Physical Exam  Constitutional:       Appearance: He is ill-appearing.   HENT:      Head: Normocephalic.      Mouth/Throat:      Mouth: Mucous membranes are dry.   Eyes:      Pupils: Pupils are equal, round, and reactive to light.   Cardiovascular:      Rate and Rhythm: Normal rate and regular rhythm.   Pulmonary:      Effort: Pulmonary effort is normal.   Abdominal:      Palpations: Abdomen is soft.   Musculoskeletal:      Cervical back: Normal range of motion.      Comments: Decreased ROM and strength BL, 'RS neglect   Skin:     General: Skin is warm.   Neurological:      Mental Status: He is lethargic.      Cranial Nerves: Cranial nerve deficit present.      Sensory: Sensory deficit present.      Motor: Weakness present.         NEUROLOGICAL EXAMINATION:     MENTAL STATUS   Level of  consciousness: drowsy  Unable to name object.     CRANIAL NERVES     CN III, IV, VI   Pupils are equal, round, and reactive to light.       Limited cooperation with exam     MOTOR EXAM   Muscle bulk: normal  Overall muscle tone: increased    SENSORY EXAM        withdraws from noxious stimuli       Significant Labs:   Hemoglobin A1c:   Recent Labs   Lab 05/12/22  1210 05/13/22  0341   HGBA1C 8.8* 9.1*     BMP:   Recent Labs   Lab 05/13/22  0341 05/14/22  1309   * 174*    147*   K 4.4 3.8    108   CO2 17* 21*   BUN 30* 42*   CREATININE 3.7* 5.9*   CALCIUM 7.9* 7.9*   MG 1.5*  --      CBC:   Recent Labs   Lab 05/13/22  0341   WBC 8.07   HGB 12.8*   HCT 38.5*        CMP:   Recent Labs   Lab 05/13/22  0341 05/14/22  1309   * 174*    147*   K 4.4 3.8    108   CO2 17* 21*   BUN 30* 42*   CREATININE 3.7* 5.9*   CALCIUM 7.9* 7.9*   MG 1.5*  --    PROT 6.2  --    ALBUMIN 2.2*  --    BILITOT 0.5  --    ALKPHOS 107  --    AST 49*  --    ALT 52*  --    ANIONGAP 17* 18*   EGFRNONAA 16* 9*       Significant Imaging: I have reviewed all pertinent imaging results/findings within the past 24 hours.    Assessment and Plan:  CVA (cerebral vascular accident)     Pt with h/o RS weakness from previous CVA, now presents with decreased respnsiveness     CVA confirmed on MRI brain  Etiology workup pending  CT head: negative acute  MRI brain: nonhemorrhagic infarction in the anterior lateral left frontal lobe and a remote infarction with encephalomalacia and gliosis in the medial right occipital lobe.  There are also remote infarctions in the cerebellar hemispheres as well as in the right thalamus.  CTA head and neck: Less than 50% stenoses of the bilateral proximal internal carotid arteries.  Significant stenoses of the bilateral intracranial ICAs in the cavernous sinuses.  Normal flow in the [id number]  ECHO:   Moderate left atrial enlargement, 20% ejection fraction   Pt needs a RAJ due to  his low EF  LDL:   60  TSH:   0.547  A1c: 9.1  Secondary stroke prevention: aspirin, atorvastatin, and Plavix at home. Continue aspirin and statin at this time, pending further work up.  EEG:   Moderate encephalopathy and diffuse cortical dysfunction; no epileptiform discharges or electrographic seizures captured        From neurology's standpoint, LP is not necessary at this time. Complete stroke work, evaluate for endocarditis, obtain an EEG.        Active Diagnoses:    Diagnosis Date Noted POA    PRINCIPAL PROBLEM:  CVA (cerebral vascular accident) [I63.9] 05/12/2022 Yes    Chronic combined systolic and diastolic CHF (congestive heart failure) [I50.42] 05/13/2022 Yes    Meningitis (needs to be ruled out) [G03.9] 05/13/2022 Yes    Encephalopathy [G93.40]  Yes    Essential hypertension [I10] 01/25/2022 Yes    Coronary artery disease involving native coronary artery of native heart [I25.10] 01/02/2022 Yes    Hyperlipidemia [E78.5] 01/02/2022 Yes    Type 2 diabetes mellitus with stage 4 chronic kidney disease, without long-term current use of insulin [E11.22, N18.4] 01/02/2022 Yes    CKD (chronic kidney disease) stage 4, GFR 15-29 ml/min [N18.4] 01/02/2022 Yes      Problems Resolved During this Admission:       VTE Risk Mitigation (From admission, onward)         Ordered     IP VTE HIGH RISK PATIENT  Once         05/12/22 1351     Place sequential compression device  Until discontinued         05/12/22 1351     Place sequential compression device  Until discontinued         05/12/22 1052                Hanh Esquivel DNP  Neurology  [facility]      Renal function worsening. Decrease Keppra to 250 mg po bid, or switch to vimpat 50 mg po bid and repeat EEG.

## 2022-05-14 NOTE — ASSESSMENT & PLAN NOTE
"On antibiotics due to high suspicion.  Discussed with ID.  Pt needs to be 5 days off of Plavix before getting lumbar puncture.  Should be ok Monday; will re-order the procedure for that day.    Antibiotics (From admission, onward)            Start     Stop Route Frequency Ordered    05/13/22 1400  ampicillin 2 g in sodium chloride 0.9 % 100 mL IVPB (ready to mix system)         -- IV Every 8 hours 05/13/22 1342    05/12/22 2100  mupirocin 2 % ointment         05/17 2059 Nasl 2 times daily 05/12/22 1347    05/12/22 1500  cefTRIAXone (ROCEPHIN) 2 g/50 mL D5W IVPB         -- IV Every 12 hours (non-standard times) 05/12/22 1059    05/12/22 1158  vancomycin - pharmacy to dose  (vancomycin with pharmacy to dose consult)        "And" Linked Group Details    -- IV pharmacy to manage frequency 05/12/22 1059        Antivirals (From admission, onward)        Stop Route Frequency     acyclovir (ZOVIRAX) injection         -- IV Daily            "

## 2022-05-14 NOTE — ASSESSMENT & PLAN NOTE
Patient is identified as having Combined Systolic and Diastolic heart failure that is Chronic. CHF is currently controlled. Latest ECHO performed and demonstrates- Results for orders placed during the hospital encounter of 05/12/22    Echo    Interpretation Summary  · The left ventricle is moderately enlarged with eccentric hypertrophy and severely decreased systolic function.  · Grade II left ventricular diastolic dysfunction.  · The estimated PA systolic pressure is 59 mmHg.  · Severe right ventricular enlargement with mildly to moderately reduced right ventricular systolic function.  · Moderate left atrial enlargement.  · There is moderate pulmonary hypertension.  · Intermediate central venous pressure (8 mmHg).  · Mild aortic regurgitation.  · Moderate right atrial enlargement.  · Mild to moderate tricuspid regurgitation.  · Mild pulmonic regurgitation.  · The estimated ejection fraction is 20%.  · Moderate mitral regurgitation.  . Patient NPO; not on oral meds.  Monitor clinical status closely. Monitor on telemetry. Patient is off CHF pathway.  Monitor strict Is&Os and daily weights.   Last BNP reviewed- and noted below   Recent Labs   Lab 05/11/22  2303   BNP 3,064*   .

## 2022-05-14 NOTE — PLAN OF CARE
Problem: Adult Inpatient Plan of Care  Goal: Plan of Care Review  5/14/2022 1717 by Judy Zaldivar RN  Outcome: Ongoing, Progressing  5/14/2022 1026 by Judy Zaldivar RN  Outcome: Ongoing, Progressing  Goal: Patient-Specific Goal (Individualized)  5/14/2022 1717 by Judy Zaldivar RN  Outcome: Ongoing, Progressing  5/14/2022 1026 by Judy Zaldivar RN  Outcome: Ongoing, Progressing  Goal: Absence of Hospital-Acquired Illness or Injury  5/14/2022 1717 by Judy Zaldivar RN  Outcome: Ongoing, Progressing  5/14/2022 1026 by Judy Zaldivar RN  Outcome: Ongoing, Progressing  Goal: Optimal Comfort and Wellbeing  5/14/2022 1717 by Judy Zaldivar RN  Outcome: Ongoing, Progressing  5/14/2022 1026 by Judy Zaldivar RN  Outcome: Ongoing, Progressing  Goal: Readiness for Transition of Care  5/14/2022 1717 by Judy Zaldivar RN  Outcome: Ongoing, Progressing  5/14/2022 1026 by Judy Zaldivar RN  Outcome: Ongoing, Progressing     Problem: Diabetes Comorbidity  Goal: Blood Glucose Level Within Targeted Range  5/14/2022 1717 by Judy Zaldivar RN  Outcome: Ongoing, Progressing  5/14/2022 1026 by Judy Zaldivar RN  Outcome: Ongoing, Progressing     Problem: Skin Injury Risk Increased  Goal: Skin Health and Integrity  5/14/2022 1717 by Judy Zaldivar RN  Outcome: Ongoing, Progressing  5/14/2022 1026 by Judy Zaldivar RN  Outcome: Ongoing, Progressing     Problem: Adjustment to Illness (Stroke, Ischemic/Transient Ischemic Attack)  Goal: Optimal Coping  5/14/2022 1717 by Judy Zaldivar RN  Outcome: Ongoing, Progressing  5/14/2022 1026 by Judy Zaldivar RN  Outcome: Ongoing, Progressing     Problem: Bowel Elimination Impaired (Stroke, Ischemic/Transient Ischemic Attack)  Goal: Effective Bowel Elimination  5/14/2022 1717 by Judy Zaldivar RN  Outcome: Ongoing, Progressing  5/14/2022 1026 by Judy Zaldivar RN  Outcome: Ongoing, Progressing     Problem: Cerebral Tissue Perfusion (Stroke, Ischemic/Transient Ischemic Attack)  Goal: Optimal Cerebral Tissue Perfusion  5/14/2022 1717 by Judy Zaldivar RN  Outcome:  Ongoing, Progressing  5/14/2022 1026 by Judy Zaldivar RN  Outcome: Ongoing, Progressing     Problem: Cognitive Impairment (Stroke, Ischemic/Transient Ischemic Attack)  Goal: Optimal Cognitive Function  5/14/2022 1717 by Judy Zaldivar RN  Outcome: Ongoing, Progressing  5/14/2022 1026 by Judy Zaldivar RN  Outcome: Ongoing, Progressing     Problem: Communication Impairment (Stroke, Ischemic/Transient Ischemic Attack)  Goal: Improved Communication Skills  5/14/2022 1717 by Judy Zaldivar RN  Outcome: Ongoing, Progressing  5/14/2022 1026 by Judy Zaldivar RN  Outcome: Ongoing, Progressing     Problem: Functional Ability Impaired (Stroke, Ischemic/Transient Ischemic Attack)  Goal: Optimal Functional Ability  5/14/2022 1717 by Judy Zaldivar RN  Outcome: Ongoing, Progressing  5/14/2022 1026 by Judy Zaldivar RN  Outcome: Ongoing, Progressing     Problem: Respiratory Compromise (Stroke, Ischemic/Transient Ischemic Attack)  Goal: Effective Oxygenation and Ventilation  5/14/2022 1717 by Judy Zaldivar RN  Outcome: Ongoing, Progressing  5/14/2022 1026 by Judy Zaldivar RN  Outcome: Ongoing, Progressing     Problem: Sensorimotor Impairment (Stroke, Ischemic/Transient Ischemic Attack)  Goal: Improved Sensorimotor Function  5/14/2022 1717 by Judy Zaldivar RN  Outcome: Ongoing, Progressing  5/14/2022 1026 by Judy Zaldivar RN  Outcome: Ongoing, Progressing     Problem: Swallowing Impairment (Stroke, Ischemic/Transient Ischemic Attack)  Goal: Optimal Eating and Swallowing without Aspiration  5/14/2022 1717 by Judy Zaldivar RN  Outcome: Ongoing, Progressing  5/14/2022 1026 by Judy Zaldivar RN  Outcome: Ongoing, Progressing     Problem: Urinary Elimination Impaired (Stroke, Ischemic/Transient Ischemic Attack)  Goal: Effective Urinary Elimination  5/14/2022 1717 by Judy Zaldivar RN  Outcome: Ongoing, Progressing  5/14/2022 1026 by Judy Zaldivar RN  Outcome: Ongoing, Progressing     Problem: Infection  Goal: Absence of Infection Signs and Symptoms  5/14/2022 1717 by Judy Zaldivar  RN  Outcome: Ongoing, Progressing  5/14/2022 1026 by Judy Zaldivar RN  Outcome: Ongoing, Progressing     Problem: Adjustment to Illness (Delirium)  Goal: Optimal Coping  5/14/2022 1717 by Judy Zaldivar RN  Outcome: Ongoing, Progressing  5/14/2022 1026 by Judy Zaldivar RN  Outcome: Ongoing, Progressing     Problem: Altered Behavior (Delirium)  Goal: Improved Behavioral Control  5/14/2022 1717 by Judy Zaldivar RN  Outcome: Ongoing, Progressing  5/14/2022 1026 by Judy Zaldivar RN  Outcome: Ongoing, Progressing     Problem: Attention and Thought Clarity Impairment (Delirium)  Goal: Improved Attention and Thought Clarity  5/14/2022 1717 by Judy Zaldivar RN  Outcome: Ongoing, Progressing  5/14/2022 1026 by Judy Zaldivar RN  Outcome: Ongoing, Progressing     Problem: Sleep Disturbance (Delirium)  Goal: Improved Sleep  5/14/2022 1717 by Judy Zaldivar RN  Outcome: Ongoing, Progressing  5/14/2022 1026 by Judy Zaldivar RN  Outcome: Ongoing, Progressing     Problem: Fall Injury Risk  Goal: Absence of Fall and Fall-Related Injury  5/14/2022 1717 by Judy Zaldivar RN  Outcome: Ongoing, Progressing  5/14/2022 1026 by Judy Zaldivar RN  Outcome: Ongoing, Progressing

## 2022-05-14 NOTE — PROGRESS NOTES
"[facility]  Neurology  Progress Note    Patient Name: Marshall Flor  MRN: [id number]  Admission Date: 5/12/2022  Hospital Length of Stay: 1 days  Code Status: Full Code   Attending Provider: Osmin Pope MD  Primary Care Physician: Dorie Quan MD   Principal Problem:<principal problem not specified>    Subjective:   Chief Complaint:  unrepsonsive      HPI:   Per EMR: History of Present Illness:    66-year-old male with history of CHF, diabetes presents to [place] for decreased responsiveness found to have a left gaze presents with fever upon arrival to [facility].  Family is not at bedside and patient is not able to communicate in current state.  ?  Neurology Consult: Pt seen and examined with Dr Rudy Segundo. POC discussed. Pt is minimally responsive to tactile stimuli. His CT head is negative for acute infarct or hemorrhage.  He was transferred from [facility] for further work up due to fever, AMS, and left gaze. Last seen normal unknown. No family at bedside.   Per [place]: "Patient is a 66-year-old male brought from home via EMS for evaluation of altered mental status as reported by family members.  Family member stated that at around 1:00 p.m. today they noted that the patient was not exhibiting his normal behaviors.  Upon arrival here, the patient is not speaking.  He has some residual weakness on the right side secondary to a previous stroke.  Patient does not speak, but he does respond to commands.  He is exhibiting an abnormal breathing pattern of several short, chopped breaths followed by periods of apnea.  His vital signs show a temperature of 98°, pulse 98,, respiratory rate 28 per minute, O2 saturations 95% on room air, blood pressure is significantly elevated at 183/101.  Patient's baseline is unknown, but patient's family state that he has not at baseline."       Interval History: patient seen and examined.   He is more alert today .  He is not following commands " but he  was previously following commands with the bedside nurse.           Current Facility-Administered Medications   Medication Dose Route Frequency Provider Last Rate Last Admin    acetaminophen tablet 650 mg  650 mg Oral Q4H PRN Dimitry Correa MD        [START ON 5/14/2022] acyclovir (ZOVIRAX) 660 mg in dextrose 5 % 100 mL IVPB  10 mg/kg (Ideal) Intravenous Daily Marielle Osorio MD        ampicillin 2 g in sodium chloride 0.9 % 100 mL IVPB (ready to mix system)  2 g Intravenous Q8H Marielle Osorio MD   Stopped at 05/13/22 1552    aspirin suppository 150 mg  150 mg Rectal Daily Dimitry Correa MD   150 mg at 05/13/22 0856    cefTRIAXone (ROCEPHIN) 2 g/50 mL D5W IVPB  2 g Intravenous Q12H Dimitry Correa MD   Stopped at 05/13/22 1746    dextrose 10% bolus 125 mL  12.5 g Intravenous PRN Dimitry Correa MD        dextrose 10% bolus 250 mL  25 g Intravenous PRN Dimitry Correa MD        dextrose 50% injection 12.5 g  12.5 g Intravenous PRN Dimitry Correa MD        glucagon (human recombinant) injection 1 mg  1 mg Intramuscular PRN Dimitry Correa MD        glucose chewable tablet 16 g  16 g Oral PRN Dimitry Correa MD        glucose chewable tablet 24 g  24 g Oral PRN Dimitry Correa MD        insulin aspart U-100 pen 0-5 Units  0-5 Units Subcutaneous Q6H PRN Osmin Pope MD        insulin detemir U-100 pen 8 Units  8 Units Subcutaneous Daily Dimitry Correa MD   8 Units at 05/13/22 0854    labetaloL injection 10 mg  10 mg Intravenous Q4H PRN Osmin Pope MD   10 mg at 05/13/22 1638    lactated ringers infusion   Intravenous Continuous Dimitry Correa MD 50 mL/hr at 05/13/22 1928 New Bag at 05/13/22 1928    morphine injection 3 mg  3 mg Intravenous Q4H PRN Dimitry Correa MD        mupirocin 2 % ointment   Nasal BID Dimitry Correa MD   Given at 05/13/22 2007    naloxone 0.4 mg/mL injection 0.02 mg  0.02 mg Intravenous PRN Dimitry HANKS  MD Madeline        niCARdipine 40 mg/200 mL (0.2 mg/mL) infusion  0-15 mg/hr Intravenous Continuous Dimitry Correa MD        ondansetron injection 4 mg  4 mg Intravenous Q8H PRN Dimitry Correa MD        oxyCODONE immediate release tablet 5 mg  5 mg Oral Q4H PRN Dimitry Correa MD        polyethylene glycol packet 17 g  17 g Oral BID PRN Dimitry Correa MD        sodium chloride 0.9% flush 10 mL  10 mL Intravenous PRN Dimitry Correa MD        vancomycin - pharmacy to dose   Intravenous pharmacy to manage frequency Dimitry Correa MD           Review of Systems   Unable to perform ROS: Patient nonverbal     Objective:     Vital Signs (Most Recent):  Temp: 99.1 °F (37.3 °C) (05/13/22 2000)  Pulse: 92 (05/13/22 2000)  Resp: 11 (05/13/22 2000)  BP: (!) 152/73 (05/13/22 2000)  SpO2: 99 % (05/13/22 2000) Vital Signs (24h Range):  Temp:  [97.9 °F (36.6 °C)-99.2 °F (37.3 °C)] 99.1 °F (37.3 °C)  Pulse:  [] 92  Resp:  [11-51] 11  SpO2:  [85 %-100 %] 99 %  BP: (108-212)/(52-98) 152/73     Weight: 77.9 kg (171 lb 11.8 oz)  Body mass index is 26.9 kg/m².    Physical Exam  Constitutional:       Appearance: He is ill-appearing.   HENT:      Head: Normocephalic.      Mouth/Throat:      Mouth: Mucous membranes are dry.   Eyes:      Pupils: Pupils are equal, round, and reactive to light.   Cardiovascular:      Rate and Rhythm: Normal rate and regular rhythm.   Pulmonary:      Effort: Pulmonary effort is normal.   Abdominal:      Palpations: Abdomen is soft.   Musculoskeletal:      Cervical back: Normal range of motion.      Comments: Decreased ROM and strength BL, 'RS neglect   Skin:     General: Skin is warm.   Neurological:      Mental Status: He is lethargic.      Cranial Nerves: Cranial nerve deficit present.      Sensory: Sensory deficit present.      Motor: Weakness present.         NEUROLOGICAL EXAMINATION:     MENTAL STATUS   Level of consciousness: drowsy  Unable to name object.     CRANIAL NERVES      CN III, IV, VI   Pupils are equal, round, and reactive to light.       Limited cooperation with exam     MOTOR EXAM   Muscle bulk: normal  Overall muscle tone: increased    SENSORY EXAM        withdraws from noxious stimuli       Significant Labs:   Hemoglobin A1c:   Recent Labs   Lab 05/12/22 1210 05/13/22  0341   HGBA1C 8.8* 9.1*     BMP:   Recent Labs   Lab 05/11/22 2303 05/12/22  1257 05/13/22  0341   * 353* 214*    141 142   K 3.5 4.6 4.4    107 108   CO2 19* 16* 17*   BUN 17 27* 30*   CREATININE 2.5* 3.3* 3.7*   CALCIUM 8.5* 8.3* 7.9*   MG  --  1.6 1.5*     CBC:   Recent Labs   Lab 05/11/22 2303 05/12/22 1210 05/13/22  0341   WBC 6.81 8.22 8.07   HGB 13.3* 14.4 12.8*   HCT 40.3 44.1 38.5*    233 254     CMP:   Recent Labs   Lab 05/11/22 2303 05/12/22  1257 05/13/22  0341   * 353* 214*    141 142   K 3.5 4.6 4.4    107 108   CO2 19* 16* 17*   BUN 17 27* 30*   CREATININE 2.5* 3.3* 3.7*   CALCIUM 8.5* 8.3* 7.9*   MG  --  1.6 1.5*   PROT 7.1 7.2 6.2   ALBUMIN 2.8* 2.7* 2.2*   BILITOT 0.9 0.7 0.5   ALKPHOS 105 139* 107   AST 9* 83* 49*   ALT 8* 58* 52*   ANIONGAP 17* 18* 17*   EGFRNONAA 26.2* 19* 16*       Significant Imaging: I have reviewed all pertinent imaging results/findings within the past 24 hours.    Assessment and Plan:  CVA (cerebral vascular accident)     Pt with h/o RS weakness from previous CVA, now presents with decreased respnsiveness     CVA confirmed on MRI brain  Etiology workup pending  CT head: negative acute  MRI brain: nonhemorrhagic infarction in the anterior lateral left frontal lobe and a remote infarction with encephalomalacia and gliosis in the medial right occipital lobe.  There are also remote infarctions in the cerebellar hemispheres as well as in the right thalamus.  CTA head and neck: Less than 50% stenoses of the bilateral proximal internal carotid arteries.  Significant stenoses of the bilateral intracranial ICAs in the  cavernous sinuses.  Normal flow in the [id number]  ECHO:   Moderate left atrial enlargement, 20% ejection fraction   LDL:   60  TSH:   0.547  A1c: 9.1  Secondary stroke prevention: aspirin, atorvastatin, and Plavix at home. Continue aspirin and statin at this time, pending further work up.  EEG:   Moderate encephalopathy and diffuse cortical dysfunction; no epileptiform discharges or electrographic seizures captured        From neurology's standpoint, LP is not necessary at this time. Complete stroke work, evaluate for endocarditis, obtain an EEG.        Active Diagnoses:    Diagnosis Date Noted POA    CVA (cerebral vascular accident) [I63.9] 05/12/2022 Unknown    Encephalopathy [G93.40]  Unknown      Problems Resolved During this Admission:       VTE Risk Mitigation (From admission, onward)         Ordered     IP VTE HIGH RISK PATIENT  Once         05/12/22 1351     Place sequential compression device  Until discontinued         05/12/22 1351     Place sequential compression device  Until discontinued         05/12/22 1052                Hanh Esquivel DNP  Neurology  [facility]

## 2022-05-14 NOTE — PROGRESS NOTES
Pharmacokinetic Assessment Follow Up: IV Vancomycin    Vancomycin serum concentration assessment(s):    The random level was drawn correctly and can be used to guide therapy at this time. The measurement is within the desired definitive target range of 15 to 20 mcg/mL.    Vancomycin Regimen Plan:    Pt's renal function is not stable.  Pharmacy will dose by level.   Pharmacy will dose vancomycin 500 mg x1.  A vancomycin level will be ordered on 05/16/22 at 0430 with AM labs.     Drug levels (last 3 results):  Recent Labs   Lab Result Units 05/13/22  1034 05/14/22  0340   Vancomycin, Random ug/mL 20.5 17.6       Pharmacy will continue to follow and monitor vancomycin.    Please contact pharmacy at extension [phone] for questions regarding this assessment.    Thank you for the consult,   Zaid Sebastian       Patient brief summary:  Marshall Flor is a 66 y.o. male initiated on antimicrobial therapy with IV Vancomycin for treatment of meningitis    The patient's current regimen is pulse dosing    Drug Allergies:   Review of patient's allergies indicates:  No Known Allergies    Actual Body Weight:   82kg    Renal Function:   Estimated Creatinine Clearance: 20.7 mL/min (A) (based on SCr of 3.7 mg/dL (H)).,     CBC (last 72 hours):  Recent Labs   Lab Result Units 05/11/22  2303 05/12/22  1210 05/13/22  0341   WBC K/uL 6.81 8.22 8.07   Hemoglobin g/dL 13.3* 14.4 12.8*   Hemoglobin A1C %  --  8.8* 9.1*   Hematocrit % 40.3 44.1 38.5*   Platelets K/uL 311 233 254   Gran % % 84.5* 85.3* 76.7*   Lymph % % 9.8* 9.2* 15.7*   Mono % % 4.4 4.9 6.4   Eosinophil % % 0.6 0.1 0.1   Basophil % % 0.4 0.1 0.7   Differential Method  Automated Automated Automated       Metabolic Panel (last 72 hours):  Recent Labs   Lab Result Units 05/11/22  2300 05/11/22  2303 05/12/22  1257 05/12/22  1400 05/13/22  0341   Sodium mmol/L  --  141 141  --  142   Potassium mmol/L  --  3.5 4.6  --  4.4   Chloride mmol/L  --  105 107  --  108   CO2 mmol/L  --  19*  16*  --  17*   Glucose mg/dL  --  382* 353*  --  214*   Glucose, UA  2+*  --   --  3+*  --    BUN mg/dL  --  17 27*  --  30*   Creatinine mg/dL  --  2.5* 3.3*  --  3.7*   Creatinine, Urine mg/dL 145.9  --   --   --   --    Albumin g/dL  --  2.8* 2.7*  --  2.2*   Total Bilirubin mg/dL  --  0.9 0.7  --  0.5   Alkaline Phosphatase U/L  --  105 139*  --  107   AST U/L  --  9* 83*  --  49*   ALT U/L  --  8* 58*  --  52*   Magnesium mg/dL  --   --  1.6  --  1.5*   Phosphorus mg/dL  --   --  4.1  --  4.4       Vancomycin Administrations:  vancomycin given in the last 96 hours                     vancomycin 1.25 g in dextrose 5% 250 mL IVPB (ready to mix) (mg) 1,250 mg New Bag 05/12/22 1646                    Microbiologic Results:  Microbiology Results (last 7 days)       Procedure Component Value Units Date/Time    Blood culture [[id number]] Collected: 05/13/22 2007    Order Status: Sent Specimen: Blood from Peripheral, Left Arm Updated: 05/14/22 0116    Narrative:      Collection has been rescheduled by ACD at 05/13/2022 15:13 Reason:   Unable to collect, no place to stick pt  Collection has been rescheduled by ACD at 05/13/2022 15:13 Reason:   Unable to collect, no place to stick pt    AFB Culture & Smear [[id number]] Collected: 05/13/22 1508    Order Status: Sent Specimen: CSF (Spinal Fluid) from Nares, Right Updated: 05/14/22 0039    Culture, MRSA [[id number]]     Order Status: Canceled Specimen: MRSA source from Nares, Left     Gram stain [[id number]]     Order Status: Canceled Specimen: CSF (Spinal Fluid)     Direct AFB stain [[id number]]     Order Status: Canceled Specimen: CSF (Spinal Fluid)     Cryptococcal antigen, CSF [[id number]]     Order Status: Canceled Specimen: CSF (Spinal Fluid)     CSF culture [[id number]]     Order Status: Canceled Specimen: CSF (Spinal Fluid)

## 2022-05-14 NOTE — NURSING
Order clarification from Neuro NP Hanh for RAJ and cards consult with Dr Vickers. Aware that RAJ will not be done until Monday.    S/c Dr Vickers to inform of consult for RAJ.  He will see patient this weekend and will plan for RAJ with Dr Mooney Monday.

## 2022-05-14 NOTE — ASSESSMENT & PLAN NOTE
Unsure of cause.  Just a stroke?  Meningitis?  Will monitor neuro exam.  Neurologist consulting.  Avoid sedating meds.

## 2022-05-14 NOTE — ASSESSMENT & PLAN NOTE
Proven on MRI.  Old infarcts seen as well.  Neurologist consulting.  Getting aspirin suppositories.

## 2022-05-14 NOTE — PROGRESS NOTES
Nephrology Progress Note        Patient Name: Marshall Flor  MRN: [id number]    Patient Class: IP- Inpatient   Admission Date: 5/12/2022  Length of Stay: 2 days  Date of Service: 5/14/2022    Attending Physician: Osmin Pope MD  Primary Care Provider: Dorie Quan MD    Reason for Consult: dione/acidosis/sepsis/stroke/ckd3/fever/anemia/htn/chf    SUBJECTIVE:     HPI: 66-year-old male with history of HTN, CHF, diabetes presents to [place] for decreased responsiveness, found to have a left gaze preference, then sent to [facility], where was found febrile, hypertensive. UA 3+ protein, 2+ glucose, WBC 3, moderate bacteria. Chest x-ray suggestive of pneumonia right upper lobe. MRI brain nonhemorrhagic infarction in the right anterolateral frontal lobe.Treated for sepsis ? PNA, ? Meningitis? and stroke.    5/14- BP all over the place, on 3L NC, UOP 481cc    Outpatient meds:  No current facility-administered medications on file prior to encounter.     Current Outpatient Medications on File Prior to Encounter   Medication Sig Dispense Refill    allopurinoL (ZYLOPRIM) 100 MG tablet   = 2 tab, Oral, Daily, # 60 tab, 5 Refill(s), Pharmacy: [id number], 167, cm, 07/27/21 8:24:00 CDT, Height/Length Measured, 91.5, kg, 12/15/20 11:18:00 CST, Weight Dosing      amLODIPine (NORVASC) 10 MG tablet 10 mg.      aspirin (ECOTRIN) 81 MG EC tablet Take 1 tablet (81 mg total) by mouth once daily. 30 tablet 3    atorvastatin (LIPITOR) 40 MG tablet   = 1 tab, Oral, Daily, # 90 tab, 1 Refill(s), Soft Stop, Pharmacy: [id number], 167, cm, 04/07/21 14:01:00 CDT, Height/Length Measured, 91.5, kg, 12/15/20 11:18:00 CST, Weight Dosing      carvediloL (COREG) 3.125 MG tablet Take 1 tablet (3.125 mg total) by mouth once daily. 30 tablet 1    cloNIDine (CATAPRES) 0.1 MG tablet Take 2 tablets (0.2 mg total) by mouth 3 (three) times daily. 180 tablet 11    clopidogreL (PLAVIX) 75 mg tablet Take 75 mg by mouth  once daily.      furosemide (LASIX) 20 MG tablet Take 1 tablet (20 mg total) by mouth once daily. 30 tablet 1    hydrALAZINE (APRESOLINE) 50 MG tablet Take 1 tablet (50 mg total) by mouth every 12 (twelve) hours. 60 tablet 0    linaGLIPtin (TRADJENTA) 5 mg Tab tablet 5 mg.      NOVOLOG MIX 70-30FLEXPEN U-100 100 unit/mL (70-30) InPn pen Inject into the skin.      sodium bicarbonate 650 MG tablet Take 1 tablet (650 mg total) by mouth 2 (two) times daily. 60 tablet 11       Scheduled meds:   acyclovir  10 mg/kg (Ideal) Intravenous Daily    ampicillin 2 g in sodium chloride 0.9 % 100 mL IVPB (ready to mix system)  2 g Intravenous Q8H    aspirin  150 mg Rectal Daily    cefTRIAXone (ROCEPHIN) IVPB  2 g Intravenous Q12H    insulin detemir U-100  8 Units Subcutaneous Daily    mupirocin   Nasal BID       Infusions:   lactated ringers 50 mL/hr at 05/14/22 0600    niCARdipine         PRN meds:  acetaminophen, dextrose 10%, dextrose 10%, dextrose 50%, glucagon (human recombinant), glucose, glucose, insulin aspart U-100, labetaloL, morphine, naloxone, ondansetron, oxyCODONE, polyethylene glycol, sodium chloride 0.9%, Pharmacy to dose Vancomycin consult **AND** vancomycin - pharmacy to dose    Review of Systems:  Neg    OBJECTIVE:     Vital Signs and IO (Last 24H):  Temp:  [97.9 °F (36.6 °C)-99.1 °F (37.3 °C)]   Pulse:  []   Resp:  [11-45]   BP: (111-212)/()   SpO2:  [81 %-100 %]   I/O last 3 completed shifts:  In: 2089.4 [I.V.:997.1; IV Piggyback:1092.3]  Out: 851 [Urine:851]    Wt Readings from Last 5 Encounters:   05/14/22 82 kg (180 lb 12.4 oz)   05/11/22 87.5 kg (193 lb)   05/12/22 87.5 kg (192 lb 14.4 oz)   04/26/22 95.3 kg (210 lb)   02/02/22 91.1 kg (200 lb 13.4 oz)     Physical Exam:  Constitutional: nad, sleeping, ill, nontoxic  Heart: rrr, no m/r/g, wwp, no edema  Lungs: ctab, no w/r/r/c, no lb  Abdomen: s/nt/nd, +BS    Body mass index is 28.31 kg/m².    Laboratory:  Recent Labs   Lab  05/11/22 2303 05/12/22  1257 05/13/22  0341    141 142   K 3.5 4.6 4.4    107 108   CO2 19* 16* 17*   BUN 17 27* 30*   CREATININE 2.5* 3.3* 3.7*   ESTGFRAFRICA 30.2* 22* 19*   EGFRNONAA 26.2* 19* 16*   * 353* 214*       Recent Labs   Lab 05/11/22 2303 05/12/22  1257 05/13/22  0341   CALCIUM 8.5* 8.3* 7.9*   ALBUMIN 2.8* 2.7* 2.2*   PHOS  --  4.1 4.4   MG  --  1.6 1.5*       Recent Labs   Lab 04/12/21  0952   PTH, Intact 273.0 H       Recent Labs   Lab 05/12/22  2351 05/13/22  0624 05/13/22  0853 05/13/22  1219 05/13/22  1720 05/13/22 2005 05/14/22  0008 05/14/22  0340 05/14/22  0840 05/14/22  1234   POCTGLUCOSE 268* 202* 205* 156* 171* 160* 150* 147* 197* 193*       Recent Labs   Lab 04/12/21  0952 05/12/22  1210 05/13/22  0341   Hemoglobin A1C 7.8 H 8.8 H 9.1 H       Recent Labs   Lab 05/11/22 2303 05/12/22  1210 05/13/22  0341   WBC 6.81 8.22 8.07   HGB 13.3* 14.4 12.8*   HCT 40.3 44.1 38.5*    233 254   MCV 75* 76* 76*   MCHC 33.0 32.7 33.2   MONO 4.4  0.3 4.9  0.4 6.4  0.5       Recent Labs   Lab 05/11/22 2303 05/12/22  1257 05/13/22  0341   BILITOT 0.9 0.7 0.5   PROT 7.1 7.2 6.2   ALBUMIN 2.8* 2.7* 2.2*   ALKPHOS 105 139* 107   ALT 8* 58* 52*   AST 9* 83* 49*       Recent Labs   Lab 01/26/22  0639 05/11/22  2300 05/12/22  1400   Color, UA Yellow Yellow Yellow   Appearance, UA Cloudy A Clear Clear   pH, UA 5.0 6.0 6.0   Specific [place], UA 1.020 1.020 1.020   Protein, UA 2+ A 3+ A 3+ A   Glucose, UA Negative 2+ A 3+ A   Ketones, UA Negative Negative Negative   Urobilinogen, UA 1.0 Negative Negative   Bilirubin (UA) Negative Negative Negative   Occult Blood UA 2+ A 2+ A 2+ A   Nitrite, UA Negative Negative Negative   RBC, UA 33 H 10 H 20 H   WBC, UA 55 H 3 6 H   Bacteria Moderate A Moderate A Few A   Hyaline Casts, UA 3 A 1 0       Recent Labs   Lab 05/12/22  0140 05/12/22 2055   POC PH 7.393 7.417   POC PCO2 29.3 L 31.1 L   POC HCO3 17.9 L 20.0 L   POC PO2 41 LL 84   POC  SATURATED O2 77 L 97   POC BE -7 -5   Sample ARTERIAL ARTERIAL       Microbiology Results (last 7 days)     Procedure Component Value Units Date/Time    Blood culture [[id number]] Collected: 05/13/22 2007    Order Status: Completed Specimen: Blood from Peripheral, Left Arm Updated: 05/14/22 1115     Blood Culture, Routine No Growth to date    Narrative:      Collection has been rescheduled by ACD at 05/13/2022 15:13 Reason:   Unable to collect, no place to stick pt  Collection has been rescheduled by ACD at 05/13/2022 15:13 Reason:   Unable to collect, no place to stick pt    AFB Culture & Smear [[id number]] Collected: 05/13/22 1508    Order Status: Sent Specimen: CSF (Spinal Fluid) from Nares, Right Updated: 05/14/22 0039    Culture, MRSA [[id number]]     Order Status: Canceled Specimen: MRSA source from Nares, Left     Gram stain [[id number]]     Order Status: Canceled Specimen: CSF (Spinal Fluid)     Direct AFB stain [[id number]]     Order Status: Canceled Specimen: CSF (Spinal Fluid)     Cryptococcal antigen, CSF [[id number]]     Order Status: Canceled Specimen: CSF (Spinal Fluid)     CSF culture [[id number]]     Order Status: Canceled Specimen: CSF (Spinal Fluid)         ASSESSMENT/PLAN:     Oliguric MERCEDES due to ATN; CKD III  - got IV lasix yest, on vanc  - renal function is steadily deteriorating  - headed towards RRT  - going to stop IVFs to avoid worsening CHF  - looks like he is on essential infx dx management    HTN/Acute CVA/CHF  - off cardene gtt    Hypernatremia  Hypokalemia  HypoMg  Acidosis  - advise use of hypotonic carriers  - will supplement as needed    SHPT  - no active issues    Anemia of CKD  - stable    Thank you for allowing us to participate in the care of your patient!   We will follow the patient and provide recommendations as needed.    Patient care time was spent personally by me on the following activities: > 35 min  · Obtaining a history.  · Examination of patient.  · Providing medical care at  the patients bedside.  · Developing a treatment plan with patient or surrogate and bedside caregivers.  · Ordering and reviewing laboratory studies, radiographic studies, pulse oximetry.  · Ordering and performing treatments and interventions.  · Evaluation of patient's response to treatment.  · Discussions with consultants while on the unit and immediately available to the patient.  · Re-evaluation of the patient's condition.  · Documentation in the medical record.     Marisabel Busch MD    [facility]  [address] [place] [id number]    [phone] - tel  [phone] - fax    5/14/2022

## 2022-05-14 NOTE — PT/OT/SLP PROGRESS
Speech Language Pathology      Marshall Flor  MRN: [id number]    Patient not seen today secondary to  (not adequately alert for assessment). Will follow-up Monday.

## 2022-05-14 NOTE — CARE UPDATE
05/13/22 1912   Patient Assessment/Suction   Level of Consciousness (AVPU) responds to voice   Respiratory Effort Shallow;Mild   Expansion/Accessory Muscles/Retractions no use of accessory muscles   Rhythm/Pattern, Respiratory Cheyne-Valdovinos  (periods of apneia)   PRE-TX-O2   O2 Device (Oxygen Therapy) nasal cannula   Flow (L/min) 3   SpO2 99 %   Pulse Oximetry Type Continuous   $ Pulse Oximetry - Multiple Charge Pulse Oximetry - Multiple   Pulse 85   Resp (!) 26   Preset CPAP/BiPAP Settings   Mode Of Delivery CPAP;Standby

## 2022-05-14 NOTE — ASSESSMENT & PLAN NOTE
Renal function stable.  Monitor BMP.  Avoid NSAID's.    Creatinine   Date Value Ref Range Status   05/13/2022 3.7 (H) 0.5 - 1.4 mg/dL Final   05/12/2022 3.3 (H) 0.5 - 1.4 mg/dL Final   ]

## 2022-05-14 NOTE — PLAN OF CARE
Problem: Adult Inpatient Plan of Care  Goal: Plan of Care Review  Outcome: Ongoing,   Remains drowsy. Able to open eyes when name was called. Just looking to left side. With ptosis of left eyelid. No vocalization. Able to follow some commands. Did attempt to squeeze hands when told to.  Withdraws to painful stimuli. Weakness to right side. With 15 sec of apnea noted. Was on 3Lnc. Placed  on CPAP at midnight. Still noted to have apnea also. Midline left brachial. HOB up. Farmer intact Urine output low Nephrologist aware. Wants to keep pt's farmer. Safety/fall prevention maintain.

## 2022-05-14 NOTE — NURSING
Completed bladder training for 1.5 hrs on pt. Bladder scanned-14 cc. Notified Dr. Mathew. He stated to keep farmer in due to MERCEDES and need for accurate I/O's.

## 2022-05-14 NOTE — SUBJECTIVE & OBJECTIVE
Interval History:  no major change in condition.  Still not following commands.  Blood culture positive.  Might have meningitis.  MRI shows acute CVA and remote ones.    Review of Systems   Unable to perform ROS: Patient nonverbal   Objective:     Vital Signs (Most Recent):  Temp: 99.1 °F (37.3 °C) (05/13/22 2000)  Pulse: 92 (05/13/22 2000)  Resp: 11 (05/13/22 2000)  BP: (!) 152/73 (05/13/22 2000)  SpO2: 99 % (05/13/22 2000)   Vital Signs (24h Range):  Temp:  [97.9 °F (36.6 °C)-99.2 °F (37.3 °C)] 99.1 °F (37.3 °C)  Pulse:  [] 92  Resp:  [11-38] 11  SpO2:  [85 %-100 %] 99 %  BP: (108-212)/(52-98) 152/73     Weight: 77.9 kg (171 lb 11.8 oz)  Body mass index is 26.9 kg/m².    Intake/Output Summary (Last 24 hours) at 5/13/2022 2233  Last data filed at 5/13/2022 2200  Gross per 24 hour   Intake 1389.91 ml   Output 547 ml   Net 842.91 ml      Physical Exam  Vitals reviewed.   Constitutional:       General: He is not in acute distress.     Appearance: He is not diaphoretic.      Interventions: Nasal cannula in place.   HENT:      Mouth/Throat:      Mouth: Mucous membranes are moist.   Eyes:      General: No scleral icterus.        Right eye: No discharge.         Left eye: No discharge.   Neck:      Vascular: No JVD.   Cardiovascular:      Rate and Rhythm: Normal rate and regular rhythm.   Pulmonary:      Effort: Pulmonary effort is normal.      Breath sounds: Normal breath sounds.   Abdominal:      General: Bowel sounds are normal. There is no distension.      Palpations: Abdomen is soft.      Tenderness: There is no abdominal tenderness.   Skin:     General: Skin is warm.      Findings: No rash.   Neurological:      Mental Status: He is lethargic.      Comments: Nonverbal.  Not following commands.       Significant Labs: All pertinent labs within the past 24 hours have been reviewed.    Significant Imaging: I have reviewed all pertinent imaging results/findings within the past 24 hours.

## 2022-05-15 LAB
ALBUMIN SERPL BCP-MCNC: 2.1 G/DL (ref 3.5–5.2)
ANION GAP SERPL CALC-SCNC: 18 MMOL/L (ref 8–16)
BACTERIA BLD CULT: ABNORMAL
BUN SERPL-MCNC: 46 MG/DL (ref 8–23)
CALCIUM SERPL-MCNC: 8.1 MG/DL (ref 8.7–10.5)
CHLORIDE SERPL-SCNC: 112 MMOL/L (ref 95–110)
CO2 SERPL-SCNC: 17 MMOL/L (ref 23–29)
CREAT SERPL-MCNC: 6.7 MG/DL (ref 0.5–1.4)
EST. GFR  (AFRICAN AMERICAN): 9 ML/MIN/1.73 M^2
EST. GFR  (NON AFRICAN AMERICAN): 8 ML/MIN/1.73 M^2
GLUCOSE SERPL-MCNC: 129 MG/DL (ref 70–110)
PHOSPHATE SERPL-MCNC: 6.3 MG/DL (ref 2.7–4.5)
POCT GLUCOSE: 133 MG/DL (ref 70–110)
POCT GLUCOSE: 136 MG/DL (ref 70–110)
POCT GLUCOSE: 152 MG/DL (ref 70–110)
POTASSIUM SERPL-SCNC: 4.2 MMOL/L (ref 3.5–5.1)
SODIUM SERPL-SCNC: 147 MMOL/L (ref 136–145)

## 2022-05-15 PROCEDURE — 99233 PR SUBSEQUENT HOSPITAL CARE,LEVL III: ICD-10-PCS | Mod: S$GLB,,, | Performed by: INTERNAL MEDICINE

## 2022-05-15 PROCEDURE — 80069 RENAL FUNCTION PANEL: CPT | Performed by: INTERNAL MEDICINE

## 2022-05-15 PROCEDURE — 63600175 PHARM REV CODE 636 W HCPCS: Performed by: INTERNAL MEDICINE

## 2022-05-15 PROCEDURE — 99233 SBSQ HOSP IP/OBS HIGH 50: CPT | Mod: S$GLB,,, | Performed by: INTERNAL MEDICINE

## 2022-05-15 PROCEDURE — 25000003 PHARM REV CODE 250: Performed by: INTERNAL MEDICINE

## 2022-05-15 PROCEDURE — 99291 CRITICAL CARE FIRST HOUR: CPT | Mod: ,,, | Performed by: INTERNAL MEDICINE

## 2022-05-15 PROCEDURE — 94660 CPAP INITIATION&MGMT: CPT

## 2022-05-15 PROCEDURE — 25000003 PHARM REV CODE 250: Performed by: HOSPITALIST

## 2022-05-15 PROCEDURE — 87081 CULTURE SCREEN ONLY: CPT | Performed by: HOSPITALIST

## 2022-05-15 PROCEDURE — 36415 COLL VENOUS BLD VENIPUNCTURE: CPT | Performed by: INTERNAL MEDICINE

## 2022-05-15 PROCEDURE — 25000003 PHARM REV CODE 250: Performed by: STUDENT IN AN ORGANIZED HEALTH CARE EDUCATION/TRAINING PROGRAM

## 2022-05-15 PROCEDURE — 27000190 HC CPAP FULL FACE MASK W/VALVE

## 2022-05-15 PROCEDURE — 99900035 HC TECH TIME PER 15 MIN (STAT)

## 2022-05-15 PROCEDURE — 99291 PR CRITICAL CARE, E/M 30-74 MINUTES: ICD-10-PCS | Mod: ,,, | Performed by: INTERNAL MEDICINE

## 2022-05-15 PROCEDURE — 94761 N-INVAS EAR/PLS OXIMETRY MLT: CPT

## 2022-05-15 PROCEDURE — 63600175 PHARM REV CODE 636 W HCPCS: Performed by: STUDENT IN AN ORGANIZED HEALTH CARE EDUCATION/TRAINING PROGRAM

## 2022-05-15 PROCEDURE — 20000000 HC ICU ROOM

## 2022-05-15 PROCEDURE — 27000221 HC OXYGEN, UP TO 24 HOURS

## 2022-05-15 RX ORDER — CLONIDINE 0.2 MG/24H
1 PATCH, EXTENDED RELEASE TRANSDERMAL
Status: DISCONTINUED | OUTPATIENT
Start: 2022-05-15 | End: 2022-05-26 | Stop reason: HOSPADM

## 2022-05-15 RX ADMIN — INSULIN DETEMIR 8 UNITS: 100 INJECTION, SOLUTION SUBCUTANEOUS at 08:05

## 2022-05-15 RX ADMIN — CLONIDINE 1 PATCH: 0.2 PATCH TRANSDERMAL at 02:05

## 2022-05-15 RX ADMIN — MUPIROCIN: 20 OINTMENT TOPICAL at 09:05

## 2022-05-15 RX ADMIN — LABETALOL HYDROCHLORIDE 10 MG: 5 INJECTION INTRAVENOUS at 01:05

## 2022-05-15 RX ADMIN — CEFTRIAXONE 2 G: 2 INJECTION, SOLUTION INTRAVENOUS at 02:05

## 2022-05-15 RX ADMIN — AMPICILLIN SODIUM 2 G: 2 INJECTION, POWDER, FOR SOLUTION INTRAMUSCULAR; INTRAVENOUS at 06:05

## 2022-05-15 RX ADMIN — AMPICILLIN SODIUM 2 G: 2 INJECTION, POWDER, FOR SOLUTION INTRAMUSCULAR; INTRAVENOUS at 09:05

## 2022-05-15 RX ADMIN — ASPIRIN 150 MG: 300 SUPPOSITORY RECTAL at 08:05

## 2022-05-15 RX ADMIN — LABETALOL HYDROCHLORIDE 10 MG: 5 INJECTION INTRAVENOUS at 09:05

## 2022-05-15 RX ADMIN — MUPIROCIN: 20 OINTMENT TOPICAL at 08:05

## 2022-05-15 RX ADMIN — AMPICILLIN SODIUM 2 G: 2 INJECTION, POWDER, FOR SOLUTION INTRAMUSCULAR; INTRAVENOUS at 01:05

## 2022-05-15 RX ADMIN — LABETALOL HYDROCHLORIDE 10 MG: 5 INJECTION INTRAVENOUS at 05:05

## 2022-05-15 NOTE — NURSING
Dr. Busch requested general surgery consult for trialysis line placement in morning. Consult placed and Dr. Murray contacted - need for trialysis line placement acknowledged by MD. Dialysis line placement consent and dialysis treatment consents in pt's chart at charge station - need to be signed.

## 2022-05-15 NOTE — SUBJECTIVE & OBJECTIVE
Interval History:  no major change in his status except that he has worsening kidney function.    Review of Systems   Unable to perform ROS: Patient nonverbal   Objective:     Vital Signs (Most Recent):  Temp: 98 °F (36.7 °C) (05/14/22 1515)  Pulse: 64 (05/14/22 1917)  Resp: 17 (05/14/22 1917)  BP: (!) 111/53 (05/14/22 1730)  SpO2: 100 % (05/14/22 1917)   Vital Signs (24h Range):  Temp:  [97.9 °F (36.6 °C)-98.4 °F (36.9 °C)] 98 °F (36.7 °C)  Pulse:  [] 64  Resp:  [14-46] 17  SpO2:  [38 %-100 %] 100 %  BP: (111-191)/() 111/53     Weight: 82 kg (180 lb 12.4 oz)  Body mass index is 28.31 kg/m².    Intake/Output Summary (Last 24 hours) at 5/14/2022 2015  Last data filed at 5/14/2022 1718  Gross per 24 hour   Intake 699.47 ml   Output 404 ml   Net 295.47 ml        Physical Exam  Vitals reviewed.   Constitutional:       General: He is not in acute distress.     Appearance: He is not diaphoretic.      Interventions: Nasal cannula in place.   HENT:      Mouth/Throat:      Mouth: Mucous membranes are moist.   Eyes:      General: No scleral icterus.        Right eye: No discharge.         Left eye: No discharge.   Neck:      Vascular: No JVD.   Cardiovascular:      Rate and Rhythm: Normal rate and regular rhythm.   Pulmonary:      Effort: Pulmonary effort is normal.      Breath sounds: Normal breath sounds.   Abdominal:      General: Bowel sounds are normal. There is no distension.      Palpations: Abdomen is soft.      Tenderness: There is no abdominal tenderness.   Skin:     General: Skin is warm.      Findings: No rash.   Neurological:      Mental Status: He is lethargic.      Comments: Nonverbal.  Not following commands.       Significant Labs: All pertinent labs within the past 24 hours have been reviewed.    Significant Imaging: I have reviewed all pertinent imaging results/findings within the past 24 hours.

## 2022-05-15 NOTE — PROGRESS NOTES
Nephrology Progress Note        Patient Name: Marshall Flor  MRN: [id number]    Patient Class: IP- Inpatient   Admission Date: 5/12/2022  Length of Stay: 3 days  Date of Service: 5/15/2022    Attending Physician: Osmin Pope MD  Primary Care Provider: Dorie Quan MD    Reason for Consult: dione/acidosis/sepsis/stroke/ckd3/fever/anemia/htn/chf    SUBJECTIVE:     HPI: 66-year-old male with history of HTN, CHF, diabetes presents to [place] for decreased responsiveness, found to have a left gaze preference, then sent to [facility], where was found febrile, hypertensive. UA 3+ protein, 2+ glucose, WBC 3, moderate bacteria. Chest x-ray suggestive of pneumonia right upper lobe. MRI brain nonhemorrhagic infarction in the right anterolateral frontal lobe.Treated for sepsis ? PNA, ? Meningitis? and stroke.    5/14- BP all over the place, on 3L NC, UOP 481cc  5/15- BP still all over the place, on BIPAP, UOP 455cc- no labs drawn today and its after 1pm    Outpatient meds:  No current facility-administered medications on file prior to encounter.     Current Outpatient Medications on File Prior to Encounter   Medication Sig Dispense Refill    allopurinoL (ZYLOPRIM) 100 MG tablet   = 2 tab, Oral, Daily, # 60 tab, 5 Refill(s), Pharmacy: [id number], 167, cm, 07/27/21 8:24:00 CDT, Height/Length Measured, 91.5, kg, 12/15/20 11:18:00 CST, Weight Dosing      amLODIPine (NORVASC) 10 MG tablet 10 mg.      aspirin (ECOTRIN) 81 MG EC tablet Take 1 tablet (81 mg total) by mouth once daily. 30 tablet 3    atorvastatin (LIPITOR) 40 MG tablet   = 1 tab, Oral, Daily, # 90 tab, 1 Refill(s), Soft Stop, Pharmacy: [id number], 167, cm, 04/07/21 14:01:00 CDT, Height/Length Measured, 91.5, kg, 12/15/20 11:18:00 CST, Weight Dosing      carvediloL (COREG) 3.125 MG tablet Take 1 tablet (3.125 mg total) by mouth once daily. 30 tablet 1    cloNIDine (CATAPRES) 0.1 MG tablet Take 2 tablets (0.2 mg total) by mouth 3  (three) times daily. 180 tablet 11    clopidogreL (PLAVIX) 75 mg tablet Take 75 mg by mouth once daily.      furosemide (LASIX) 20 MG tablet Take 1 tablet (20 mg total) by mouth once daily. 30 tablet 1    hydrALAZINE (APRESOLINE) 50 MG tablet Take 1 tablet (50 mg total) by mouth every 12 (twelve) hours. 60 tablet 0    linaGLIPtin (TRADJENTA) 5 mg Tab tablet 5 mg.      NOVOLOG MIX 70-30FLEXPEN U-100 100 unit/mL (70-30) InPn pen Inject into the skin.      sodium bicarbonate 650 MG tablet Take 1 tablet (650 mg total) by mouth 2 (two) times daily. 60 tablet 11       Scheduled meds:   ampicillin 2 g in sodium chloride 0.9 % 100 mL IVPB (ready to mix system)  2 g Intravenous Q8H    aspirin  150 mg Rectal Daily    cefTRIAXone (ROCEPHIN) IVPB  2 g Intravenous Q12H    insulin detemir U-100  8 Units Subcutaneous Daily    mupirocin   Nasal BID       Infusions:   niCARdipine         PRN meds:  acetaminophen, dextrose 10%, dextrose 10%, dextrose 50%, glucagon (human recombinant), glucose, glucose, insulin aspart U-100, labetaloL, morphine, naloxone, ondansetron, oxyCODONE, polyethylene glycol, sodium chloride 0.9%, Pharmacy to dose Vancomycin consult **AND** vancomycin - pharmacy to dose    Review of Systems:  Neg    OBJECTIVE:     Vital Signs and IO (Last 24H):  Temp:  [97.9 °F (36.6 °C)-99.1 °F (37.3 °C)]   Pulse:  []   Resp:  [16-46]   BP: (104-200)/()   SpO2:  [38 %-100 %]   I/O last 3 completed shifts:  In: 1090 [I.V.:550.1; IV Piggyback:539.9]  Out: 654 [Urine:654]    Wt Readings from Last 5 Encounters:   05/15/22 82.5 kg (181 lb 14.1 oz)   05/11/22 87.5 kg (193 lb)   05/12/22 87.5 kg (192 lb 14.4 oz)   04/26/22 95.3 kg (210 lb)   02/02/22 91.1 kg (200 lb 13.4 oz)     Physical Exam:  Constitutional: on BIPAP, ill, eyes closed, laying flat  Heart: rrr, no m/r/g, wwp, no edema  Lungs: coarse, no w/r/r/c, no lb  Abdomen: s/nt/nd, +BS    Body mass index is 28.49 kg/m².    Laboratory:  Recent Labs   Lab  05/12/22  1257 05/13/22  0341 05/14/22  1309    142 147*   K 4.6 4.4 3.8    108 108   CO2 16* 17* 21*   BUN 27* 30* 42*   CREATININE 3.3* 3.7* 5.9*   ESTGFRAFRICA 22* 19* 11*   EGFRNONAA 19* 16* 9*   * 214* 174*       Recent Labs   Lab 05/11/22  2303 05/12/22  1257 05/13/22  0341 05/14/22  1309   CALCIUM 8.5* 8.3* 7.9* 7.9*   ALBUMIN 2.8* 2.7* 2.2*  --    PHOS  --  4.1 4.4  --    MG  --  1.6 1.5*  --        Recent Labs   Lab 04/12/21  0952   PTH, Intact 273.0 H       Recent Labs   Lab 05/13/22 2005 05/14/22  0008 05/14/22  0340 05/14/22  0840 05/14/22  1234 05/14/22  1808 05/14/22  2056 05/15/22  0028 05/15/22  0438 05/15/22  1153   POCTGLUCOSE 160* 150* 147* 197* 193* 177* 163* 136* 152* 133*       Recent Labs   Lab 04/12/21  0952 05/12/22  1210 05/13/22  0341   Hemoglobin A1C 7.8 H 8.8 H 9.1 H       Recent Labs   Lab 05/11/22 2303 05/12/22  1210 05/13/22  0341   WBC 6.81 8.22 8.07   HGB 13.3* 14.4 12.8*   HCT 40.3 44.1 38.5*    233 254   MCV 75* 76* 76*   MCHC 33.0 32.7 33.2   MONO 4.4  0.3 4.9  0.4 6.4  0.5       Recent Labs   Lab 05/11/22 2303 05/12/22  1257 05/13/22  0341   BILITOT 0.9 0.7 0.5   PROT 7.1 7.2 6.2   ALBUMIN 2.8* 2.7* 2.2*   ALKPHOS 105 139* 107   ALT 8* 58* 52*   AST 9* 83* 49*       Recent Labs   Lab 01/26/22  0639 05/11/22  2300 05/12/22  1400   Color, UA Yellow Yellow Yellow   Appearance, UA Cloudy A Clear Clear   pH, UA 5.0 6.0 6.0   Specific [place], UA 1.020 1.020 1.020   Protein, UA 2+ A 3+ A 3+ A   Glucose, UA Negative 2+ A 3+ A   Ketones, UA Negative Negative Negative   Urobilinogen, UA 1.0 Negative Negative   Bilirubin (UA) Negative Negative Negative   Occult Blood UA 2+ A 2+ A 2+ A   Nitrite, UA Negative Negative Negative   RBC, UA 33 H 10 H 20 H   WBC, UA 55 H 3 6 H   Bacteria Moderate A Moderate A Few A   Hyaline Casts, UA 3 A 1 0       Recent Labs   Lab 05/12/22  0140 05/12/22 2055   POC PH 7.393 7.417   POC PCO2 29.3 L 31.1 L   POC HCO3 17.9 L 20.0  L   POC PO2 41 LL 84   POC SATURATED O2 77 L 97   POC BE -7 -5   Sample ARTERIAL ARTERIAL       Microbiology Results (last 7 days)     Procedure Component Value Units Date/Time    AFB Culture & Smear [[id number]] Collected: 05/13/22 1508    Order Status: Completed Specimen: CSF (Spinal Fluid) from Nares, Right Updated: 05/15/22 0927     AFB Culture & Smear Culture in progress    Blood culture [[id number]] Collected: 05/13/22 2007    Order Status: Completed Specimen: Blood from Peripheral, Left Arm Updated: 05/15/22 0613     Blood Culture, Routine No Growth to date      No Growth to date    Narrative:      Collection has been rescheduled by ACD at 05/13/2022 15:13 Reason:   Unable to collect, no place to stick pt  Collection has been rescheduled by ACD at 05/13/2022 15:13 Reason:   Unable to collect, no place to stick pt    Culture, MRSA [[id number]]     Order Status: Canceled Specimen: MRSA source from Nares, Left     Gram stain [[id number]]     Order Status: Canceled Specimen: CSF (Spinal Fluid)     Direct AFB stain [[id number]]     Order Status: Canceled Specimen: CSF (Spinal Fluid)     Cryptococcal antigen, CSF [[id number]]     Order Status: Canceled Specimen: CSF (Spinal Fluid)     CSF culture [[id number]]     Order Status: Canceled Specimen: CSF (Spinal Fluid)         ASSESSMENT/PLAN:     Oliguric MERCEDES due to ATN; CKD III  - renal function is steadily deteriorating as of yest- oliguric- ID stopped acyclovir- waiting for labs for today- headed towards RRT    HTN/Acute CVA/CHF  - off cardene gtt but probably needs to be resumed since not able to get any PO meds    Hypernatremia  Hypokalemia  HypoMg  Acidosis  - advise use of hypotonic carriers  - will supplement as needed    SHPT  - no active issues    Anemia of CKD  - stable    Thank you for allowing us to participate in the care of your patient!   We will follow the patient and provide recommendations as needed.    Patient care time was spent personally by me on the  following activities: > 35 min  · Obtaining a history.  · Examination of patient.  · Providing medical care at the patients bedside.  · Developing a treatment plan with patient or surrogate and bedside caregivers.  · Ordering and reviewing laboratory studies, radiographic studies, pulse oximetry.  · Ordering and performing treatments and interventions.  · Evaluation of patient's response to treatment.  · Discussions with consultants while on the unit and immediately available to the patient.  · Re-evaluation of the patient's condition.  · Documentation in the medical record.     Marisabel Busch MD    [facility]  [address] [place] [id number]    [phone] - tel  [phone] - fax    5/15/2022

## 2022-05-15 NOTE — ASSESSMENT & PLAN NOTE
Renal function is worse.  Monitor BMP.  Avoid NSAID's.    Creatinine   Date Value Ref Range Status   05/14/2022 5.9 (H) 0.5 - 1.4 mg/dL Final   05/13/2022 3.7 (H) 0.5 - 1.4 mg/dL Final   ]

## 2022-05-15 NOTE — ASSESSMENT & PLAN NOTE
Uncontrolled diabetes  HTN  History of congestive heart failure  Sepsis/possible meningitis  Acute CVA  MERCEDES    RECOMMENDATIONS:  Etiology of patient's presentation is unclear.  Continue workup per Neurology and Infectious Disease.  Will plan for RAJ on Monday to evaluate for evidence of endocarditis per Neurology request.

## 2022-05-15 NOTE — PROGRESS NOTES
[facility] Hospital Medicine  Progress Note    Patient Name: Marshall Flor  MRN: [id number]  Patient Class: IP- Inpatient   Admission Date: 5/12/2022  Length of Stay: 2 days  Attending Physician: Osmin Pope MD  Primary Care Provider: Dorie Quan MD        Subjective:     Principal Problem:CVA (cerebral vascular accident)        HPI:  66-year-old male with history of CHF, diabetes presents to [place] for decreased responsiveness found to have a fever, left gaze preference with nuchal rigidity found to have meningitis in addition to being found to have an acute CVA. Beyond this he has an MERCEDES.          Overview/Hospital Course:    1.  meningitis   -LP not able to be performed as on plavix at home  -MRI with acute CVA  -EEG with diffuse slowing; no focal epileptiform activity  -CTX, amp, Vanco, acyclovir  -ID, Neuro consult     2. Acute CVA  -rectal aspirin  -neurology       Interval History:  no major change in his status except that he has worsening kidney function.    Review of Systems   Unable to perform ROS: Patient nonverbal   Objective:     Vital Signs (Most Recent):  Temp: 98 °F (36.7 °C) (05/14/22 1515)  Pulse: 64 (05/14/22 1917)  Resp: 17 (05/14/22 1917)  BP: (!) 111/53 (05/14/22 1730)  SpO2: 100 % (05/14/22 1917)   Vital Signs (24h Range):  Temp:  [97.9 °F (36.6 °C)-98.4 °F (36.9 °C)] 98 °F (36.7 °C)  Pulse:  [] 64  Resp:  [14-46] 17  SpO2:  [38 %-100 %] 100 %  BP: (111-191)/() 111/53     Weight: 82 kg (180 lb 12.4 oz)  Body mass index is 28.31 kg/m².    Intake/Output Summary (Last 24 hours) at 5/14/2022 2015  Last data filed at 5/14/2022 1718  Gross per 24 hour   Intake 699.47 ml   Output 404 ml   Net 295.47 ml        Physical Exam  Vitals reviewed.   Constitutional:       General: He is not in acute distress.     Appearance: He is not diaphoretic.      Interventions: Nasal cannula in place.   HENT:      Mouth/Throat:      Mouth: Mucous membranes are moist.   Eyes:       General: No scleral icterus.        Right eye: No discharge.         Left eye: No discharge.   Neck:      Vascular: No JVD.   Cardiovascular:      Rate and Rhythm: Normal rate and regular rhythm.   Pulmonary:      Effort: Pulmonary effort is normal.      Breath sounds: Normal breath sounds.   Abdominal:      General: Bowel sounds are normal. There is no distension.      Palpations: Abdomen is soft.      Tenderness: There is no abdominal tenderness.   Skin:     General: Skin is warm.      Findings: No rash.   Neurological:      Mental Status: He is lethargic.      Comments: Nonverbal.  Not following commands.       Significant Labs: All pertinent labs within the past 24 hours have been reviewed.    Significant Imaging: I have reviewed all pertinent imaging results/findings within the past 24 hours.      Assessment/Plan:      * CVA (cerebral vascular accident)  Proven on MRI.  Old infarcts seen as well.  Neurologist consulting.  Getting aspirin suppositories.      MERCEDES (acute kidney injury)  New problem.  Nephrology group consulting.  Monitor renal function and UOP.  Avoid NSAID's and hypotension.  Acyclovir stopped.    Creatinine   Date Value Ref Range Status   05/14/2022 5.9 (H) 0.5 - 1.4 mg/dL Final   05/13/2022 3.7 (H) 0.5 - 1.4 mg/dL Final   05/12/2022 3.3 (H) 0.5 - 1.4 mg/dL Final   ]      Meningitis (needs to be ruled out)  On antibiotics due to high suspicion.  Discussed with ID.  Pt needs to be 5 days off of Plavix before getting lumbar puncture.  Should be ok Monday; I re-ordered the procedure for that day.  In light of the worsening renal function, ID consultant discontinued the acyclovir.    Antibiotics (From admission, onward)            Start     Stop Route Frequency Ordered    05/13/22 1400  ampicillin 2 g in sodium chloride 0.9 % 100 mL IVPB (ready to mix system)         -- IV Every 8 hours 05/13/22 1342    05/12/22 2100  mupirocin 2 % ointment         05/17 2059 Nasl 2 times daily 05/12/22 1347     "05/12/22 1500  cefTRIAXone (ROCEPHIN) 2 g/50 mL D5W IVPB         -- IV Every 12 hours (non-standard times) 05/12/22 1059    05/12/22 1158  vancomycin - pharmacy to dose  (vancomycin with pharmacy to dose consult)        "And" Linked Group Details    -- IV pharmacy to manage frequency 05/12/22 1059          Chronic combined systolic and diastolic CHF (congestive heart failure)  Patient is identified as having Combined Systolic and Diastolic heart failure that is Chronic. CHF is currently controlled. Latest ECHO performed and demonstrates- Results for orders placed during the hospital encounter of 05/12/22    Echo    Interpretation Summary  · The left ventricle is moderately enlarged with eccentric hypertrophy and severely decreased systolic function.  · Grade II left ventricular diastolic dysfunction.  · The estimated PA systolic pressure is 59 mmHg.  · Severe right ventricular enlargement with mildly to moderately reduced right ventricular systolic function.  · Moderate left atrial enlargement.  · There is moderate pulmonary hypertension.  · Intermediate central venous pressure (8 mmHg).  · Mild aortic regurgitation.  · Moderate right atrial enlargement.  · Mild to moderate tricuspid regurgitation.  · Mild pulmonic regurgitation.  · The estimated ejection fraction is 20%.  · Moderate mitral regurgitation.  . Patient NPO; not on oral meds.  Monitor clinical status closely. Monitor on telemetry. Patient is off CHF pathway.  Monitor strict Is&Os and daily weights.   Last BNP reviewed- and noted below   Recent Labs   Lab 05/11/22  2303   BNP 3,064*   .      Encephalopathy  Unsure of cause.  Just a stroke?  Meningitis?  Will monitor neuro exam.  Neurologist consulting.  Avoid sedating meds.      Essential hypertension  Controlled.  Monitor pressure.  Patient is NPO; cannot take oral meds.      Coronary artery disease involving native coronary artery of native heart  Stable.  Getting aspirin " suppositories.      Hyperlipidemia  Chronic.  Stable.      Type 2 diabetes mellitus with stage 4 chronic kidney disease, without long-term current use of insulin  Patient's FSGs are controlled on current medication regimen.  Last A1c reviewed-   Lab Results   Component Value Date    HGBA1C 9.1 (H) 05/13/2022     Most recent fingerstick glucose reviewed-   Recent Labs   Lab 05/14/22  0340 05/14/22  0840 05/14/22  1234 05/14/22  1808   POCTGLUCOSE 147* 197* 193* 177*     Current correctional scale  Low  Maintain anti-hyperglycemic dose as follows-   Antihyperglycemics (From admission, onward)            Start     Stop Route Frequency Ordered    05/13/22 1715  insulin aspart U-100 pen 0-5 Units         -- SubQ Every 6 hours PRN 05/13/22 1602    05/12/22 1200  insulin detemir U-100 pen 8 Units         -- SubQ Daily 05/12/22 1052        Hold Oral hypoglycemics while patient is in the hospital.        CKD (chronic kidney disease) stage 4, GFR 15-29 ml/min  Renal function is worse.  Monitor BMP.  Avoid NSAID's.    Creatinine   Date Value Ref Range Status   05/14/2022 5.9 (H) 0.5 - 1.4 mg/dL Final   05/13/2022 3.7 (H) 0.5 - 1.4 mg/dL Final   ]        VTE Risk Mitigation (From admission, onward)         Ordered     IP VTE HIGH RISK PATIENT  Once         05/12/22 1351     Place sequential compression device  Until discontinued         05/12/22 1351     Place sequential compression device  Until discontinued         05/12/22 1052                Discharge Planning   STAR:      Code Status: Full Code   Is the patient medically ready for discharge?:     Reason for patient still in hospital (select all that apply): Patient new problem, Patient trending condition, Laboratory test and Treatment  Discharge Plan A: Home with family            Osmin Pope MD  Department of Hospital Medicine   [facility]

## 2022-05-15 NOTE — CARE UPDATE
05/14/22 1917   Patient Assessment/Suction   Level of Consciousness (AVPU) responds to voice   Respiratory Effort Unlabored   Expansion/Accessory Muscles/Retractions no use of accessory muscles   All Lung Fields Breath Sounds Anterior:;coarse;diminished   Rhythm/Pattern, Respiratory assisted mechanically  (BIPAP)   Cough Frequency no cough   PRE-TX-O2   O2 Device (Oxygen Therapy) BiPAP   $ Is the patient on Low Flow Oxygen? Yes   Oxygen Analyzed Concentration (%) 30 %   SpO2 100 %   Pulse Oximetry Type Continuous   $ Pulse Oximetry - Multiple Charge Pulse Oximetry - Multiple   Pulse 64   Resp 17   Skin Integrity   $ Wound Care Tech Time 15 min   Area Observed Bridge of nose   Skin Appearance without discoloration   Barrier used? Liquid Filled Cushion   Airway Safety   Ambu bag with the patient? Yes, Adult Ambu   Is mask with the patient? Yes, Adult Mask   Preset CPAP/BiPAP Settings   Mode Of Delivery BiPAP   $ CPAP/BiPAP Daily Charge BiPAP/CPAP Daily   $ Initial CPAP/BiPAP Setup? No   $ Is patient using? Yes   Size of Mask Medium/Large   Sized Appropriately? Yes   Equipment Type V60   Ipap 10   EPAP (cm H2O) 5   Pressure Support (cm H2O) 5   ITime (sec) 1   Rise Time (sec) 2   Patient CPAP/BiPAP Settings   Timed Inspiration (Sec) 1   IPAP Rise Time (sec) 2   RR Total (Breaths/Min) 169   Tidal Volume (mL) 472   VE Minute Ventilation (L/min) 7.8 L/min   Peak Inspiratory Pressure (cm H2O) 10   TiTOT (%) 34   Total Leak (L/Min) 0   Patient Trigger - ST Mode Only (%) 100   CPAP/BiPAP Alarms   High Pressure (cm H2O) 30   Low Pressure (cm H2O) 8   Minute Ventilation (L/Min) 15   High RR (breaths/min) 45   Low RR (breaths/min) 10

## 2022-05-15 NOTE — HPI
66-year-old male with history of CHF, diabetes presents to [place] for decreased responsiveness found to have a fever, left gaze preference with nuchal rigidity found to have meningitis in addition to being found to have an acute CVA. Beyond this he has an MERCEDES.      Overview/Hospital Course:   1.  meningitis   -LP not able to be performed as on plavix at home  -MRI with acute CVA  -EEG with diffuse slowing; no focal epileptiform activity  -CTX, amp, Vanco, acyclovir  -ID, Neuro consult      2. Acute CVA  -rectal aspirin  -neurology       Interval History:  No major change in his status except that he has worsening kidney function.  Patient remains noncommunicative and does not following commands.  Cardiology consult for RAJ as part of endocarditis workup.    TTE from 5/12/2022 revealed:  The left ventricle is moderately enlarged with eccentric hypertrophy and severely decreased systolic function.  Grade II left ventricular diastolic dysfunction.  The estimated PA systolic pressure is 59 mmHg.  Severe right ventricular enlargement with mildly to moderately reduced right ventricular systolic function.  Moderate left atrial enlargement.  There is moderate pulmonary hypertension.  Intermediate central venous pressure (8 mmHg).  Mild aortic regurgitation.  Moderate right atrial enlargement.  Mild to moderate tricuspid regurgitation.  Mild pulmonic regurgitation.  The estimated ejection fraction is 20%.  Moderate mitral regurgitation.

## 2022-05-15 NOTE — PLAN OF CARE
POC reviewed w/ pt and family today - pt unable to participate d/t AMS. Pt still has left gaze deviation, but intermittently will look straight or to the right. Spontaneous movements with upper extremities and lower extremities, but rarely follows commands (appears to attempt). Definitely uses protective movements, such as when cleaning farmer catheter pt moves left arm to guard. Non-verbal. Pt still exhibiting Cheyne-Valdovinos respirations intermittently - on BiPAP 30%, RR 12, tolerating well. Trouble keeping BP down today - PRN 10mg labetalol given q4 hours today; clonidine patch started today as well. Otherwise, VSS. Remains NPO. BM x 1 today. Farmer in place - 25-35cc/hr on average. Nephrology to start pt on dialysis tomorrow - trialysis line to be placed in am per general surg. Pt's family (son, brother, nephew) all updated on POC and status of pt. Safety measures in place - bed locked and low, bed alarm on, side rails raised.    Problem: Adult Inpatient Plan of Care  Goal: Plan of Care Review  Outcome: Ongoing, Progressing  Goal: Patient-Specific Goal (Individualized)  Outcome: Ongoing, Progressing  Goal: Absence of Hospital-Acquired Illness or Injury  Outcome: Ongoing, Progressing     Problem: Skin Injury Risk Increased  Goal: Skin Health and Integrity  Outcome: Ongoing, Progressing     Problem: Adjustment to Illness (Stroke, Ischemic/Transient Ischemic Attack)  Goal: Optimal Coping  Outcome: Ongoing, Progressing     Problem: Bowel Elimination Impaired (Stroke, Ischemic/Transient Ischemic Attack)  Goal: Effective Bowel Elimination  Outcome: Ongoing, Progressing     Problem: Cerebral Tissue Perfusion (Stroke, Ischemic/Transient Ischemic Attack)  Goal: Optimal Cerebral Tissue Perfusion  Outcome: Ongoing, Progressing

## 2022-05-15 NOTE — PLAN OF CARE
Problem: Adult Inpatient Plan of Care  Goal: Plan of Care Review  Outcome: Ongoing,   Stayed on BIPAP the whole night. Still observe to have 20sec periods of apnea. Then will start breathing fast. Open eyes when name was called. Still nonverbal Left gaze. Did attempt to look to his right at times. Follows some commands only. Pt's nephew was updated last night. Requested neurologist to call. Moves left side more than right. One BM noted Was cleaned. Very stiff when turned. Mepilex intact. No sacral breakdown . Safety/Fall prevention. Bilateral SCD on.

## 2022-05-15 NOTE — PROGRESS NOTES
Consult Note  Infectious Disease    Reason for Consult:  Rule out meningitis     HPI: Marshall Flor is a 66 y.o. male with past medical history of HTN, diabetes, CHF, prior stroke presented to [facility] for altered mental status and fever.  No family at bedside.  Patient seen and examined in ICU.  Unable to obtain further history, patient breathing heavily, nonverbal, staring, febrile.    In the ER, hypertensive 179/109, febrile 101.2  Labs on admission reviewed, white count 6.8, PMN 84.5%, H&H 13.3/40.3, platelet count 311  Creatinine 2.5, MERCEDES 3.3 today  AST 83/ALT 58  BNP 3064  Lactic acid 3.5, procalcitonin 6.9  U tox negative  UA 3+ protein, 2+ glucose, WBC 3, moderate bacteria  Chest x-ray suggestive of pneumonia right upper lobe  MRI brain nonhemorrhagic infarction in the right anterolateral frontal lobe.    Admitted for severe sepsis with altered mental status, found to have acute stroke, rule out meningitis.    ID consult for meningitis.    INTERVAL HISTORY:  5/13: interim reviewed, patient seen and examined at bedside. Unable to perform LP since he is on Plavix, need to wait 5 days. Currently on bipap, alert and awake but dysarthric. Hemodynamically stable, permissive HTN for acute stroke, afebrile in the last 24h.  Micro reviwed blood cultures 1/4 bottles grew GPC, ID and sensitivities pending. Labs reviewed, WBC: 8, PMN: 76.7%. Creatinine 3.7. HbA1c: 9%.    5/14:  Consult in data reviewed, discussed with Dr. Moore.  Afebrile,   Renal function worsening, urine output is poor.  No BMP today, therefore ordered and his creatinine has increased to 5.9 Repeat chest x-ray ordered and read as no acute process but he does have some vascular fullness right perihilar.  He is unable to attend our interact.  LP is pending washout of Plavix.  1/4 blood cultures with coagulase-negative Staph, drawn at nursing home 5/11.  5/15:  Interim reviewed.  Renal function continues to deteriorate.  Afebrile.  Blood  pressure high and labile.  Plans for dialysis tomorrow noted as well as T and lumbar puncture.  Urine output is about the same and in adequate. Requiring bipap for prolonged periods of apnea. Moving right arm occasionally. Will not attend.    EXAM & DIAGNOSTICS REVIEWED:   Vitals:     Temp:  [97.9 °F (36.6 °C)-99.1 °F (37.3 °C)]   Temp: 97.9 °F (36.6 °C) (05/15/22 1200)  Pulse: 83 (05/15/22 1500)  Resp: (!) 26 (05/15/22 1500)  BP: (!) 168/80 (05/15/22 1500)  SpO2: 100 % (05/15/22 1500)    Intake/Output Summary (Last 24 hours) at 5/15/2022 1623  Last data filed at 5/15/2022 1500  Gross per 24 hour   Intake 390.5 ml   Output 505 ml   Net -114.5 ml       General:  Eyes open, does not attend. bipap in place.    Eyes:  Anicteric, tracks briefly  ENT:   obscured by bipap  Neck:    supple to flexion and rotation  Lungs: Clear  Heart:  S1/S2+, regular rhythm, systolic murmur+  Abd:  +BS, soft, non tender, non distended, no rebound  :  Colmenares, urine clear,   Musc:  Joints without effusion, swelling,  erythema, synovitis  Skin:  Warm, no rash  Wound:   Neuro: Eyes intermittently open, does not attend, did not move the right arm spontaneously during my exam, but does move left hand/arm in the direction of bipap     Psych:  Unable to assess  Lymphatic:       Extrem: No LE edema b/l  VAD:  Peripheral IVs       Isolation: None      General Labs reviewed:  Recent Labs   Lab 05/11/22  2303 05/12/22  1210 05/13/22  0341   WBC 6.81 8.22 8.07   HGB 13.3* 14.4 12.8*   HCT 40.3 44.1 38.5*    233 254       Recent Labs   Lab 05/11/22  2303 05/12/22  1257 05/13/22  0341 05/14/22  1309 05/15/22  1359    141 142 147* 147*   K 3.5 4.6 4.4 3.8 4.2    107 108 108 112*   CO2 19* 16* 17* 21* 17*   BUN 17 27* 30* 42* 46*   CREATININE 2.5* 3.3* 3.7* 5.9* 6.7*   CALCIUM 8.5* 8.3* 7.9* 7.9* 8.1*   PROT 7.1 7.2 6.2  --   --    BILITOT 0.9 0.7 0.5  --   --    ALKPHOS 105 139* 107  --   --    ALT 8* 58* 52*  --   --    AST 9* 83* 49*   --   --      No results for input(s): CRP in the last 168 hours.  No results for input(s): SEDRATE in the last 168 hours.    Estimated Creatinine Clearance: 11.5 mL/min (A) (based on SCr of 6.7 mg/dL (H)).     Micro:  Microbiology Results (last 7 days)     Procedure Component Value Units Date/Time    AFB Culture & Smear [[id number]] Collected: 05/13/22 1508    Order Status: Completed Specimen: CSF (Spinal Fluid) from Nares, Right Updated: 05/15/22 0927     AFB Culture & Smear Culture in progress    Blood culture [[id number]] Collected: 05/13/22 2007    Order Status: Completed Specimen: Blood from Peripheral, Left Arm Updated: 05/15/22 0613     Blood Culture, Routine No Growth to date      No Growth to date    Narrative:      Collection has been rescheduled by ACD at 05/13/2022 15:13 Reason:   Unable to collect, no place to stick pt  Collection has been rescheduled by ACD at 05/13/2022 15:13 Reason:   Unable to collect, no place to stick pt    Culture, MRSA [[id number]]     Order Status: Canceled Specimen: MRSA source from Nares, Left     Gram stain [[id number]]     Order Status: Canceled Specimen: CSF (Spinal Fluid)     Direct AFB stain [[id number]]     Order Status: Canceled Specimen: CSF (Spinal Fluid)     Cryptococcal antigen, CSF [[id number]]     Order Status: Canceled Specimen: CSF (Spinal Fluid)     CSF culture [[id number]]     Order Status: Canceled Specimen: CSF (Spinal Fluid)           Imaging Reviewed:  CXR  CT and CTA head negative for acute ischemic stroke   MRI brain - acute ischemic stroke  1. This is a very limited evaluation but the study is abnormal.  There is a nonhemorrhagic infarction in the anterior lateral left frontal lobe.  2. There is a remote infarction with encephalomalacia and gliosis in the medial right occipital lobe.  There are also remote infarctions in the cerebellar hemispheres as well as in the right thalamus.    Cardiology:   ECHO 5/12/22:  · The left ventricle is moderately enlarged  with eccentric hypertrophy and severely decreased systolic function.  · Grade II left ventricular diastolic dysfunction.  · The estimated PA systolic pressure is 59 mmHg.  · Severe right ventricular enlargement with mildly to moderately reduced right ventricular systolic function.  · Moderate left atrial enlargement.  · There is moderate pulmonary hypertension.  · Intermediate central venous pressure (8 mmHg).  · Mild aortic regurgitation.  · Moderate right atrial enlargement.  · Mild to moderate tricuspid regurgitation.  · Mild pulmonic regurgitation.  · The estimated ejection fraction is 20%.  · Moderate mitral regurgitation.          IMPRESSION & PLAN     1. Sepsis cannot exclude meningitis  - patient afebrile    Blood cultures 1/4 bottles coag-negative staph likely contaminant   Procal elevated due to acute renal failure     2. Acute stroke which can also contribute to fever on admission   3. Kym, cr 2.5--3.7--5.9--6.7  3. 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

## 2022-05-15 NOTE — CONSULTS
[facility]  Cardiology  Consult Note    Patient Name: Marshall Flor  MRN: [id number]  Admission Date: 5/12/2022  Hospital Length of Stay: 3 days  Code Status: Full Code   Attending Provider: Osmin Pope MD   Consulting Provider: Eric Vickers MD PhD  Primary Care Physician: Dorie Quan MD  Principal Problem:CVA (cerebral vascular accident)    Patient information was obtained from past medical records, ER records and primary team.     Consults  Subjective:     Chief Complaint:   CVA     HPI:   66-year-old male with history of CHF, diabetes presents to [place] for decreased responsiveness found to have a fever, left gaze preference with nuchal rigidity found to have meningitis in addition to being found to have an acute CVA. Beyond this he has an MERCEDES.      Overview/Hospital Course:   1.  meningitis   -LP not able to be performed as on plavix at home  -MRI with acute CVA  -EEG with diffuse slowing; no focal epileptiform activity  -CTX, amp, Vanco, acyclovir  -ID, Neuro consult      2. Acute CVA  -rectal aspirin  -neurology       Interval History:  No major change in his status except that he has worsening kidney function.  Patient remains noncommunicative and does not following commands.  Cardiology consult for RAJ as part of endocarditis workup.    TTE from 5/12/2022 revealed:  · The left ventricle is moderately enlarged with eccentric hypertrophy and severely decreased systolic function.  · Grade II left ventricular diastolic dysfunction.  · The estimated PA systolic pressure is 59 mmHg.  · Severe right ventricular enlargement with mildly to moderately reduced right ventricular systolic function.  · Moderate left atrial enlargement.  · There is moderate pulmonary hypertension.  · Intermediate central venous pressure (8 mmHg).  · Mild aortic regurgitation.  · Moderate right atrial enlargement.  · Mild to moderate tricuspid regurgitation.  · Mild pulmonic regurgitation.  · The estimated  ejection fraction is 20%.  · Moderate mitral regurgitation.         Past Medical History:   Diagnosis Date    CHF (congestive heart failure)     Diabetes mellitus     Hypertension     Stroke        Past Surgical History:   Procedure Laterality Date    EYE SURGERY      LEFT EYE       Review of patient's allergies indicates:  No Known Allergies    No current facility-administered medications on file prior to encounter.     Current Outpatient Medications on File Prior to Encounter   Medication Sig    allopurinoL (ZYLOPRIM) 100 MG tablet   = 2 tab, Oral, Daily, # 60 tab, 5 Refill(s), Pharmacy: [id number], 167, cm, 07/27/21 8:24:00 CDT, Height/Length Measured, 91.5, kg, 12/15/20 11:18:00 CST, Weight Dosing    amLODIPine (NORVASC) 10 MG tablet 10 mg.    aspirin (ECOTRIN) 81 MG EC tablet Take 1 tablet (81 mg total) by mouth once daily.    atorvastatin (LIPITOR) 40 MG tablet   = 1 tab, Oral, Daily, # 90 tab, 1 Refill(s), Soft Stop, Pharmacy: [id number], 167, cm, 04/07/21 14:01:00 CDT, Height/Length Measured, 91.5, kg, 12/15/20 11:18:00 CST, Weight Dosing    carvediloL (COREG) 3.125 MG tablet Take 1 tablet (3.125 mg total) by mouth once daily.    cloNIDine (CATAPRES) 0.1 MG tablet Take 2 tablets (0.2 mg total) by mouth 3 (three) times daily.    clopidogreL (PLAVIX) 75 mg tablet Take 75 mg by mouth once daily.    furosemide (LASIX) 20 MG tablet Take 1 tablet (20 mg total) by mouth once daily.    hydrALAZINE (APRESOLINE) 50 MG tablet Take 1 tablet (50 mg total) by mouth every 12 (twelve) hours.    linaGLIPtin (TRADJENTA) 5 mg Tab tablet 5 mg.    NOVOLOG MIX 70-30FLEXPEN U-100 100 unit/mL (70-30) InPn pen Inject into the skin.    sodium bicarbonate 650 MG tablet Take 1 tablet (650 mg total) by mouth 2 (two) times daily.     Family History    None       Tobacco Use    Smoking status: Former Smoker    Smokeless tobacco: Former User   Substance and Sexual Activity    Alcohol use: Not  Currently    Drug use: Not Currently    Sexual activity: Not Currently     Review of Systems   Unable to perform ROS: Mental status change   Objective:     Vital Signs (Most Recent):  Temp: 98.4 °F (36.9 °C) (05/14/22 2030)  Pulse: 70 (05/15/22 0001)  Resp: 17 (05/15/22 0001)  BP: (!) 170/72 (05/14/22 2130)  SpO2: 100 % (05/15/22 0001)   Vital Signs (24h Range):  Temp:  [97.9 °F (36.6 °C)-98.4 °F (36.9 °C)] 98.4 °F (36.9 °C)  Pulse:  [] 70  Resp:  [14-46] 17  SpO2:  [38 %-100 %] 100 %  BP: (111-191)/() 170/72     Weight: 82 kg (180 lb 12.4 oz)  Body mass index is 28.31 kg/m².    SpO2: 100 %  O2 Device (Oxygen Therapy): BiPAP      Intake/Output Summary (Last 24 hours) at 5/15/2022 0013  Last data filed at 5/14/2022 1718  Gross per 24 hour   Intake 699.47 ml   Output 322 ml   Net 377.47 ml       Lines/Drains/Airways       Drain  Duration                  Urethral Catheter 05/12/22 0602 Double-lumen 2 days              Peripheral Intravenous Line  Duration                  Peripheral IV - Single Lumen 05/11/22 2300 Posterior;Right Hand 3 days         Midline Catheter Insertion/Assessment  - Single Lumen 05/13/22 1030 Left brachial vein 18g x 10cm 1 day                    Physical Exam  Vitals reviewed.   Constitutional:       General: He is not in acute distress.     Appearance: He is not diaphoretic.      Interventions: Nasal cannula in place.   HENT:      Mouth/Throat:      Mouth: Mucous membranes are moist.   Eyes:      General: No scleral icterus.        Right eye: No discharge.         Left eye: No discharge.   Neck:      Vascular: No JVD.   Cardiovascular:      Rate and Rhythm: Normal rate and regular rhythm.   Pulmonary:      Effort: Pulmonary effort is normal.      Breath sounds: Normal breath sounds.   Abdominal:      General: Bowel sounds are normal. There is no distension.      Palpations: Abdomen is soft.      Tenderness: There is no abdominal tenderness.   Skin:     General: Skin is warm.       Findings: No rash.   Neurological:      Mental Status: He is lethargic.      Comments: Nonverbal.  Not following commands.     Significant Labs: Blood Culture:   Recent Labs   Lab 05/13/22 2007   LABBLOO No Growth to date   , BMP:   Recent Labs   Lab 05/13/22  0341 05/14/22  1309   * 174*    147*   K 4.4 3.8    108   CO2 17* 21*   BUN 30* 42*   CREATININE 3.7* 5.9*   CALCIUM 7.9* 7.9*   MG 1.5*  --    , CMP   Recent Labs   Lab 05/13/22  0341 05/14/22  1309    147*   K 4.4 3.8    108   CO2 17* 21*   * 174*   BUN 30* 42*   CREATININE 3.7* 5.9*   CALCIUM 7.9* 7.9*   PROT 6.2  --    ALBUMIN 2.2*  --    BILITOT 0.5  --    ALKPHOS 107  --    AST 49*  --    ALT 52*  --    ANIONGAP 17* 18*   ESTGFRAFRICA 19* 11*   EGFRNONAA 16* 9*   , CBC   Recent Labs   Lab 05/13/22  0341   WBC 8.07   HGB 12.8*   HCT 38.5*      , INR No results for input(s): INR, PROTIME in the last 48 hours., Lipid Panel   Recent Labs   Lab 05/13/22  0341   CHOL 114*   HDL 34*   LDLCALC 60.2*   TRIG 99   CHOLHDL 29.8   , Troponin No results for input(s): TROPONINI in the last 48 hours., and All pertinent lab results from the last 24 hours have been reviewed.    Significant Imaging: Echocardiogram: Transthoracic echo (TTE) complete (Cupid Only):   Results for orders placed or performed during the hospital encounter of 05/12/22   Echo   Result Value Ref Range    AV mean gradient 2 mmHg    Ao peak wes 0.87 m/s    Ao VTI 12.41 cm    IVRT 91.34 msec    IVS 1.12 (A) 0.6 - 1.1 cm    LA size 4.79 cm    Left Atrium Major Axis 5.06 cm    Left Atrium Minor Axis 5.71 cm    LVIDd 5.79 3.5 - 6.0 cm    LVIDs 5.10 (A) 2.1 - 4.0 cm    LVOT diameter 2.15 cm    LVOT peak VTI 12.15 cm    Posterior Wall 1.05 0.6 - 1.1 cm    MV Peak A Wes 0.52 m/s    E wave deceleration time 119.92 msec    MV Peak E Wes 0.99 m/s    RA Major Axis 5.06 cm    RA Width 3.12 cm    RVDD 3.46 cm    TAPSE 1.90 cm    TR Max Wes 3.57 m/s    LA WIDTH 3.60 cm     Ao root annulus 2.88 cm    AORTIC VALVE CUSP SEPERATION 1.70 cm    PV PEAK VELOCITY 0.64 cm/s    MV stenosis pressure 1/2 time 34.78 ms    LV Diastolic Volume 165.84 mL    LV Systolic Volume 123.69 mL    LVOT peak sirisha 0.75 m/s    TDI LATERAL 0.08 m/s    TDI SEPTAL 0.04 m/s    Mr max sirisha 0.05 m/s    LA volume (mod) 48.48 cm3    MV mean gradient 1 mmHg    MV peak gradient 6 mmHg    RV S' 8.49 cm/s    MV VTI 16.88 cm    LV LATERAL E/E' RATIO 12.38 m/s    LV SEPTAL E/E' RATIO 24.75 m/s    FS 12 %    LA volume 78.64 cm3    LV mass 258.73 g    Left Ventricle Relative Wall Thickness 0.36 cm    AV valve area 3.55 cm2    AV Velocity Ratio 0.86     AV index (prosthetic) 0.98     MV valve area p 1/2 method 6.33 cm2    MV valve area by continuity eq 2.61 cm2    E/A ratio 1.90     Mean e' 0.06 m/s    LVOT area 3.6 cm2    LVOT stroke volume 44.09 cm3    AV peak gradient 3 mmHg    E/E' ratio 16.50 m/s    LV Systolic Volume Index 65.8 mL/m2    LV Diastolic Volume Index 88.21 mL/m2    LA Volume Index 41.8 mL/m2    LV Mass Index 138 g/m2    Triscuspid Valve Regurgitation Peak Gradient 51 mmHg    LA Volume Index (Mod) 25.8 mL/m2    BSA 1.9 m2    Right Atrial Pressure (from IVC) 8 mmHg    EF 20 %    TV rest pulmonary artery pressure 59 mmHg    Narrative    · The left ventricle is moderately enlarged with eccentric hypertrophy and   severely decreased systolic function.  · Grade II left ventricular diastolic dysfunction.  · The estimated PA systolic pressure is 59 mmHg.  · Severe right ventricular enlargement with mildly to moderately reduced   right ventricular systolic function.  · Moderate left atrial enlargement.  · There is moderate pulmonary hypertension.  · Intermediate central venous pressure (8 mmHg).  · Mild aortic regurgitation.  · Moderate right atrial enlargement.  · Mild to moderate tricuspid regurgitation.  · Mild pulmonic regurgitation.  · The estimated ejection fraction is 20%.  · Moderate mitral regurgitation.         Assessment and Plan:     Uncontrolled diabetes  HTN  History of congestive heart failure  Sepsis/possible meningitis  Acute CVA  MERCEDES    RECOMMENDATIONS:  Etiology of patient's presentation is unclear.  Continue workup per Neurology and Infectious Disease.  Will plan for RAJ on Monday to evaluate for evidence of endocarditis per Neurology request.      VTE Risk Mitigation (From admission, onward)         Ordered     IP VTE HIGH RISK PATIENT  Once         05/12/22 1351     Place sequential compression device  Until discontinued         05/12/22 1351     Place sequential compression device  Until discontinued         05/12/22 1052                Thank you for your consult. I will follow-up with patient. Please contact us if you have any additional questions.    Eric Vickers MD PhD  Cardiology   [facility]

## 2022-05-15 NOTE — ASSESSMENT & PLAN NOTE
New problem.  Nephrology group consulting.  Monitor renal function and UOP.  Avoid NSAID's and hypotension.  Acyclovir stopped.    Creatinine   Date Value Ref Range Status   05/14/2022 5.9 (H) 0.5 - 1.4 mg/dL Final   05/13/2022 3.7 (H) 0.5 - 1.4 mg/dL Final   05/12/2022 3.3 (H) 0.5 - 1.4 mg/dL Final   ]

## 2022-05-15 NOTE — ASSESSMENT & PLAN NOTE
Patient's FSGs are controlled on current medication regimen.  Last A1c reviewed-   Lab Results   Component Value Date    HGBA1C 9.1 (H) 05/13/2022     Most recent fingerstick glucose reviewed-   Recent Labs   Lab 05/14/22  0340 05/14/22  0840 05/14/22  1234 05/14/22  1808   POCTGLUCOSE 147* 197* 193* 177*     Current correctional scale  Low  Maintain anti-hyperglycemic dose as follows-   Antihyperglycemics (From admission, onward)            Start     Stop Route Frequency Ordered    05/13/22 1715  insulin aspart U-100 pen 0-5 Units         -- SubQ Every 6 hours PRN 05/13/22 1602    05/12/22 1200  insulin detemir U-100 pen 8 Units         -- SubQ Daily 05/12/22 1052        Hold Oral hypoglycemics while patient is in the hospital.

## 2022-05-15 NOTE — PROGRESS NOTES
"[facility]  Neurology  Progress Note    Patient Name: Marshall Flor  MRN: [id number]  Admission Date: 5/12/2022  Hospital Length of Stay: 3 days  Code Status: Full Code   Attending Provider: Osmin Pope MD  Primary Care Physician: Dorie Quan MD   Principal Problem:CVA (cerebral vascular accident)    Subjective:   Chief Complaint:  unresponsive      HPI:   Per EMR: History of Present Illness:    66-year-old male with history of CHF, diabetes presents to [place] for decreased responsiveness found to have a left gaze presents with fever upon arrival to [facility].  Family is not at bedside and patient is not able to communicate in current state.  ?  Neurology Consult: Pt seen and examined with Dr Rudy Segundo. POC discussed. Pt is minimally responsive to tactile stimuli. His CT head is negative for acute infarct or hemorrhage.  He was transferred from [facility] for further work up due to fever, AMS, and left gaze. Last seen normal unknown. No family at bedside.   Per [place]: "Patient is a 66-year-old male brought from home via EMS for evaluation of altered mental status as reported by family members.  Family member stated that at around 1:00 p.m. today they noted that the patient was not exhibiting his normal behaviors.  Upon arrival here, the patient is not speaking.  He has some residual weakness on the right side secondary to a previous stroke.  Patient does not speak, but he does respond to commands.  He is exhibiting an abnormal breathing pattern of several short, chopped breaths followed by periods of apnea.  His vital signs show a temperature of 98°, pulse 98,, respiratory rate 28 per minute, O2 saturations 95% on room air, blood pressure is significantly elevated at 183/101.  Patient's baseline is unknown, but patient's family state that he has not at baseline."       Interval History: patient seen and examined.   He is more alert today .  He is not following commands " but he  was previously following commands with the bedside nurse.       5/14: Pt seen and examined. POC discussed with Dr. GRACE Segundo. Pt is still nonverbal with limited communication. His son did not answer the phone today. Baseline unknown.     5/15/22:   Patient was seen and examined by me today.   Son was at bedside today.   He reports patient was functional at baseline but had mild weakness.  He remains aphasic,  follows visual cues but still with impaired comprehension.   Seen moving all 4 extremities spontaneously, moreso the left side than the right.            Current Facility-Administered Medications   Medication Dose Route Frequency Provider Last Rate Last Admin    acetaminophen tablet 650 mg  650 mg Oral Q4H PRN Dimitry Correa MD        ampicillin 2 g in sodium chloride 0.9 % 100 mL IVPB (ready to mix system)  2 g Intravenous Q8H Marielle Osorio MD   Stopped at 05/15/22 0715    aspirin suppository 150 mg  150 mg Rectal Daily Dimitry Correa MD   150 mg at 05/15/22 0844    cefTRIAXone (ROCEPHIN) 2 g/50 mL D5W IVPB  2 g Intravenous Q12H Dimitry Correa MD   Stopped at 05/15/22 0321    dextrose 10% bolus 125 mL  12.5 g Intravenous PRN Dimitry Correa MD        dextrose 10% bolus 250 mL  25 g Intravenous PRN Dimitry Correa MD        dextrose 50% injection 12.5 g  12.5 g Intravenous PRN Dimitry Correa MD        glucagon (human recombinant) injection 1 mg  1 mg Intramuscular PRN Dimitry Correa MD        glucose chewable tablet 16 g  16 g Oral PRN Dimitry Correa MD        glucose chewable tablet 24 g  24 g Oral PRN Dimitry Correa MD        insulin aspart U-100 pen 0-5 Units  0-5 Units Subcutaneous Q6H PRN Osmin Pope MD        insulin detemir U-100 pen 8 Units  8 Units Subcutaneous Daily Dimitry Correa MD   8 Units at 05/15/22 0845    labetaloL injection 10 mg  10 mg Intravenous Q4H PRN Osmin Pope MD   10 mg at 05/15/22 0913    morphine injection 3  mg  3 mg Intravenous Q4H PRN Dimitry Correa MD        mupirocin 2 % ointment   Nasal BID Dimitry Correa MD   Given at 05/15/22 0846    naloxone 0.4 mg/mL injection 0.02 mg  0.02 mg Intravenous PRN Dimitry Correa MD        niCARdipine 40 mg/200 mL (0.2 mg/mL) infusion  0-15 mg/hr Intravenous Continuous Dimitry Correa MD        ondansetron injection 4 mg  4 mg Intravenous Q8H PRN Dimitry Correa MD        oxyCODONE immediate release tablet 5 mg  5 mg Oral Q4H PRN Dimitry Correa MD        polyethylene glycol packet 17 g  17 g Oral BID PRN Dimitry Correa MD        sodium chloride 0.9% flush 10 mL  10 mL Intravenous PRN Dimitry Correa MD        vancomycin - pharmacy to dose   Intravenous pharmacy to manage frequency Dimitry Correa MD           Review of Systems   Unable to perform ROS: Patient nonverbal     Objective:     Vital Signs (Most Recent):  Temp: 99.1 °F (37.3 °C) (05/15/22 0800)  Pulse: 93 (05/15/22 0900)  Resp: (!) 33 (05/15/22 0900)  BP: (!) 200/91 (05/15/22 0913)  SpO2: 100 % (05/15/22 0900) Vital Signs (24h Range):  Temp:  [98 °F (36.7 °C)-99.1 °F (37.3 °C)] 99.1 °F (37.3 °C)  Pulse:  [] 93  Resp:  [16-46] 33  SpO2:  [38 %-100 %] 100 %  BP: (104-200)/() 200/91     Weight: 82.5 kg (181 lb 14.1 oz)  Body mass index is 28.49 kg/m².    Physical Exam  Constitutional:       Appearance: He is ill-appearing.   HENT:      Head: Normocephalic.      Mouth/Throat:      Mouth: Mucous membranes are dry.   Eyes:      Pupils: Pupils are equal, round, and reactive to light.   Cardiovascular:      Rate and Rhythm: Normal rate and regular rhythm.   Pulmonary:      Effort: Pulmonary effort is normal.   Abdominal:      Palpations: Abdomen is soft.   Musculoskeletal:      Cervical back: Normal range of motion.      Comments: Decreased ROM and strength BL, 'RS neglect   Skin:     General: Skin is warm.   Neurological:      Mental Status: He is lethargic.      Cranial Nerves: Cranial  nerve deficit present.      Sensory: Sensory deficit present.      Motor: Weakness present.         NEUROLOGICAL EXAMINATION:     MENTAL STATUS   Level of consciousness: drowsy  Unable to name object.     CRANIAL NERVES     CN III, IV, VI   Pupils are equal, round, and reactive to light.       Limited cooperation with exam     MOTOR EXAM   Muscle bulk: normal  Overall muscle tone: increased    SENSORY EXAM        withdraws from noxious stimuli       Significant Labs:   Hemoglobin A1c:   Recent Labs   Lab 05/12/22  1210 05/13/22  0341   HGBA1C 8.8* 9.1*     BMP:   Recent Labs   Lab 05/14/22  1309   *   *   K 3.8      CO2 21*   BUN 42*   CREATININE 5.9*   CALCIUM 7.9*     CBC:   No results for input(s): WBC, HGB, HCT, PLT in the last 48 hours.  CMP:   Recent Labs   Lab 05/14/22  1309   *   *   K 3.8      CO2 21*   BUN 42*   CREATININE 5.9*   CALCIUM 7.9*   ANIONGAP 18*   EGFRNONAA 9*       Significant Imaging: I have reviewed all pertinent imaging results/findings within the past 24 hours.    Assessment and Plan:  Acute Left MCA stroke:     Pt with h/o RS weakness from previous CVA, now presents with decreased responsiveness     Left MCA stroke confirmed on MRI brain  Etiology ongoing, suspect cardioembolic in the setting of HFrEF  CT head: negative acute  MRI brain: nonhemorrhagic infarction in the anterior lateral left frontal lobe and a remote infarction with encephalomalacia and gliosis in the medial right occipital lobe.  There are also remote infarctions in the cerebellar hemispheres as well as in the right thalamus.  CTA head and neck: Less than 50% stenoses of the bilateral proximal internal carotid arteries.  Significant stenoses of the bilateral intracranial ICAs in the cavernous sinuses.  Normal flow in the [id number]  ECHO:   Moderate left atrial enlargement, 20% ejection fraction   Pt needs a RAJ due to his low EF  LDL:   60  TSH:   0.547  A1c: 9.1  Secondary  stroke prevention: aspirin, atorvastatin, and Plavix at home. Continue aspirin and statin at this time, pending further work up.  EEG:   Moderate encephalopathy and diffuse cortical dysfunction; no epileptiform discharges or electrographic seizures captured        From neurology's standpoint, LP is not necessary at this time. Complete stroke work, evaluate for endocarditis, obtain an EEG.        Active Diagnoses:    Diagnosis Date Noted POA    PRINCIPAL PROBLEM:  CVA (cerebral vascular accident) [I63.9] 05/12/2022 Yes    MERCEDES (acute kidney injury) [N17.9] 05/14/2022 No    Chronic combined systolic and diastolic CHF (congestive heart failure) [I50.42] 05/13/2022 Yes    Meningitis (needs to be ruled out) [G03.9] 05/13/2022 Yes    Encephalopathy [G93.40]  Yes    Essential hypertension [I10] 01/25/2022 Yes    Coronary artery disease involving native coronary artery of native heart [I25.10] 01/02/2022 Yes    Hyperlipidemia [E78.5] 01/02/2022 Yes    Type 2 diabetes mellitus with stage 4 chronic kidney disease, without long-term current use of insulin [E11.22, N18.4] 01/02/2022 Yes    CKD (chronic kidney disease) stage 4, GFR 15-29 ml/min [N18.4] 01/02/2022 Yes      Problems Resolved During this Admission:       VTE Risk Mitigation (From admission, onward)         Ordered     IP VTE HIGH RISK PATIENT  Once         05/12/22 1351     Place sequential compression device  Until discontinued         05/12/22 1351     Place sequential compression device  Until discontinued         05/12/22 1052                [phone] within 3 days from discharge.     All questions were answered.                              Thank you kindly for including us in the care of this patient. Please do not hesitate to contact us with any questions.    Critical Care:  50 minutes of critical care time has been spent evaluating with the patient. Time includes chart review not limited to  diagnostic imaging, labs, and vitals, patient assessment, discussion with family and nursing, current order evaluations, and new order entries.    Shelby Hugo MD  Neurology  [facility]

## 2022-05-15 NOTE — NURSING
Notified Dr. Pope of inability to control pt's BP w/ PRN labetalol q4. MD ordered clonidine patch.     Spoke to Dr. Busch regarding BP as well. Pt to be started on dialysis tomorrow - family consented w/ MD. Will get things in order for line placement.

## 2022-05-15 NOTE — ASSESSMENT & PLAN NOTE
"On antibiotics due to high suspicion.  Discussed with ID.  Pt needs to be 5 days off of Plavix before getting lumbar puncture.  Should be ok Monday; I re-ordered the procedure for that day.  In light of the worsening renal function, ID consultant discontinued the acyclovir.    Antibiotics (From admission, onward)            Start     Stop Route Frequency Ordered    05/13/22 1400  ampicillin 2 g in sodium chloride 0.9 % 100 mL IVPB (ready to mix system)         -- IV Every 8 hours 05/13/22 1342    05/12/22 2100  mupirocin 2 % ointment         05/17 2059 Nasl 2 times daily 05/12/22 1347    05/12/22 1500  cefTRIAXone (ROCEPHIN) 2 g/50 mL D5W IVPB         -- IV Every 12 hours (non-standard times) 05/12/22 1059    05/12/22 1158  vancomycin - pharmacy to dose  (vancomycin with pharmacy to dose consult)        "And" Linked Group Details    -- IV pharmacy to manage frequency 05/12/22 1059        "

## 2022-05-15 NOTE — SUBJECTIVE & OBJECTIVE
Past Medical History:   Diagnosis Date    CHF (congestive heart failure)     Diabetes mellitus     Hypertension     Stroke        Past Surgical History:   Procedure Laterality Date    EYE SURGERY      LEFT EYE       Review of patient's allergies indicates:  No Known Allergies    No current facility-administered medications on file prior to encounter.     Current Outpatient Medications on File Prior to Encounter   Medication Sig    allopurinoL (ZYLOPRIM) 100 MG tablet   = 2 tab, Oral, Daily, # 60 tab, 5 Refill(s), Pharmacy: [id number], 167, cm, 07/27/21 8:24:00 CDT, Height/Length Measured, 91.5, kg, 12/15/20 11:18:00 CST, Weight Dosing    amLODIPine (NORVASC) 10 MG tablet 10 mg.    aspirin (ECOTRIN) 81 MG EC tablet Take 1 tablet (81 mg total) by mouth once daily.    atorvastatin (LIPITOR) 40 MG tablet   = 1 tab, Oral, Daily, # 90 tab, 1 Refill(s), Soft Stop, Pharmacy: [id number], 167, cm, 04/07/21 14:01:00 CDT, Height/Length Measured, 91.5, kg, 12/15/20 11:18:00 CST, Weight Dosing    carvediloL (COREG) 3.125 MG tablet Take 1 tablet (3.125 mg total) by mouth once daily.    cloNIDine (CATAPRES) 0.1 MG tablet Take 2 tablets (0.2 mg total) by mouth 3 (three) times daily.    clopidogreL (PLAVIX) 75 mg tablet Take 75 mg by mouth once daily.    furosemide (LASIX) 20 MG tablet Take 1 tablet (20 mg total) by mouth once daily.    hydrALAZINE (APRESOLINE) 50 MG tablet Take 1 tablet (50 mg total) by mouth every 12 (twelve) hours.    linaGLIPtin (TRADJENTA) 5 mg Tab tablet 5 mg.    NOVOLOG MIX 70-30FLEXPEN U-100 100 unit/mL (70-30) InPn pen Inject into the skin.    sodium bicarbonate 650 MG tablet Take 1 tablet (650 mg total) by mouth 2 (two) times daily.     Family History    None       Tobacco Use    Smoking status: Former Smoker    Smokeless tobacco: Former User   Substance and Sexual Activity    Alcohol use: Not Currently    Drug use: Not Currently    Sexual activity: Not Currently     Review of Systems    Unable to perform ROS: Mental status change   Objective:     Vital Signs (Most Recent):  Temp: 98.4 °F (36.9 °C) (05/14/22 2030)  Pulse: 70 (05/15/22 0001)  Resp: 17 (05/15/22 0001)  BP: (!) 170/72 (05/14/22 2130)  SpO2: 100 % (05/15/22 0001)   Vital Signs (24h Range):  Temp:  [97.9 °F (36.6 °C)-98.4 °F (36.9 °C)] 98.4 °F (36.9 °C)  Pulse:  [] 70  Resp:  [14-46] 17  SpO2:  [38 %-100 %] 100 %  BP: (111-191)/() 170/72     Weight: 82 kg (180 lb 12.4 oz)  Body mass index is 28.31 kg/m².    SpO2: 100 %  O2 Device (Oxygen Therapy): BiPAP      Intake/Output Summary (Last 24 hours) at 5/15/2022 0013  Last data filed at 5/14/2022 1718  Gross per 24 hour   Intake 699.47 ml   Output 322 ml   Net 377.47 ml       Lines/Drains/Airways       Drain  Duration                  Urethral Catheter 05/12/22 0602 Double-lumen 2 days              Peripheral Intravenous Line  Duration                  Peripheral IV - Single Lumen 05/11/22 2300 Posterior;Right Hand 3 days         Midline Catheter Insertion/Assessment  - Single Lumen 05/13/22 1030 Left brachial vein 18g x 10cm 1 day                    Physical Exam  Vitals reviewed.   Constitutional:       General: He is not in acute distress.     Appearance: He is not diaphoretic.      Interventions: Nasal cannula in place.   HENT:      Mouth/Throat:      Mouth: Mucous membranes are moist.   Eyes:      General: No scleral icterus.        Right eye: No discharge.         Left eye: No discharge.   Neck:      Vascular: No JVD.   Cardiovascular:      Rate and Rhythm: Normal rate and regular rhythm.   Pulmonary:      Effort: Pulmonary effort is normal.      Breath sounds: Normal breath sounds.   Abdominal:      General: Bowel sounds are normal. There is no distension.      Palpations: Abdomen is soft.      Tenderness: There is no abdominal tenderness.   Skin:     General: Skin is warm.      Findings: No rash.   Neurological:      Mental Status: He is lethargic.      Comments:  Nonverbal.  Not following commands.     Significant Labs: Blood Culture:   Recent Labs   Lab 05/13/22 2007   LABBLOO No Growth to date   , BMP:   Recent Labs   Lab 05/13/22  0341 05/14/22  1309   * 174*    147*   K 4.4 3.8    108   CO2 17* 21*   BUN 30* 42*   CREATININE 3.7* 5.9*   CALCIUM 7.9* 7.9*   MG 1.5*  --    , CMP   Recent Labs   Lab 05/13/22  0341 05/14/22  1309    147*   K 4.4 3.8    108   CO2 17* 21*   * 174*   BUN 30* 42*   CREATININE 3.7* 5.9*   CALCIUM 7.9* 7.9*   PROT 6.2  --    ALBUMIN 2.2*  --    BILITOT 0.5  --    ALKPHOS 107  --    AST 49*  --    ALT 52*  --    ANIONGAP 17* 18*   ESTGFRAFRICA 19* 11*   EGFRNONAA 16* 9*   , CBC   Recent Labs   Lab 05/13/22  0341   WBC 8.07   HGB 12.8*   HCT 38.5*      , INR No results for input(s): INR, PROTIME in the last 48 hours., Lipid Panel   Recent Labs   Lab 05/13/22  0341   CHOL 114*   HDL 34*   LDLCALC 60.2*   TRIG 99   CHOLHDL 29.8   , Troponin No results for input(s): TROPONINI in the last 48 hours., and All pertinent lab results from the last 24 hours have been reviewed.    Significant Imaging: Echocardiogram: Transthoracic echo (TTE) complete (Cupid Only):   Results for orders placed or performed during the hospital encounter of 05/12/22   Echo   Result Value Ref Range    AV mean gradient 2 mmHg    Ao peak wes 0.87 m/s    Ao VTI 12.41 cm    IVRT 91.34 msec    IVS 1.12 (A) 0.6 - 1.1 cm    LA size 4.79 cm    Left Atrium Major Axis 5.06 cm    Left Atrium Minor Axis 5.71 cm    LVIDd 5.79 3.5 - 6.0 cm    LVIDs 5.10 (A) 2.1 - 4.0 cm    LVOT diameter 2.15 cm    LVOT peak VTI 12.15 cm    Posterior Wall 1.05 0.6 - 1.1 cm    MV Peak A Wes 0.52 m/s    E wave deceleration time 119.92 msec    MV Peak E Wes 0.99 m/s    RA Major Axis 5.06 cm    RA Width 3.12 cm    RVDD 3.46 cm    TAPSE 1.90 cm    TR Max Wes 3.57 m/s    LA WIDTH 3.60 cm    Ao root annulus 2.88 cm    AORTIC VALVE CUSP SEPERATION 1.70 cm    PV PEAK VELOCITY  0.64 cm/s    MV stenosis pressure 1/2 time 34.78 ms    LV Diastolic Volume 165.84 mL    LV Systolic Volume 123.69 mL    LVOT peak sirisha 0.75 m/s    TDI LATERAL 0.08 m/s    TDI SEPTAL 0.04 m/s    Mr max sirisha 0.05 m/s    LA volume (mod) 48.48 cm3    MV mean gradient 1 mmHg    MV peak gradient 6 mmHg    RV S' 8.49 cm/s    MV VTI 16.88 cm    LV LATERAL E/E' RATIO 12.38 m/s    LV SEPTAL E/E' RATIO 24.75 m/s    FS 12 %    LA volume 78.64 cm3    LV mass 258.73 g    Left Ventricle Relative Wall Thickness 0.36 cm    AV valve area 3.55 cm2    AV Velocity Ratio 0.86     AV index (prosthetic) 0.98     MV valve area p 1/2 method 6.33 cm2    MV valve area by continuity eq 2.61 cm2    E/A ratio 1.90     Mean e' 0.06 m/s    LVOT area 3.6 cm2    LVOT stroke volume 44.09 cm3    AV peak gradient 3 mmHg    E/E' ratio 16.50 m/s    LV Systolic Volume Index 65.8 mL/m2    LV Diastolic Volume Index 88.21 mL/m2    LA Volume Index 41.8 mL/m2    LV Mass Index 138 g/m2    Triscuspid Valve Regurgitation Peak Gradient 51 mmHg    LA Volume Index (Mod) 25.8 mL/m2    BSA 1.9 m2    Right Atrial Pressure (from IVC) 8 mmHg    EF 20 %    TV rest pulmonary artery pressure 59 mmHg    Narrative    · The left ventricle is moderately enlarged with eccentric hypertrophy and   severely decreased systolic function.  · Grade II left ventricular diastolic dysfunction.  · The estimated PA systolic pressure is 59 mmHg.  · Severe right ventricular enlargement with mildly to moderately reduced   right ventricular systolic function.  · Moderate left atrial enlargement.  · There is moderate pulmonary hypertension.  · Intermediate central venous pressure (8 mmHg).  · Mild aortic regurgitation.  · Moderate right atrial enlargement.  · Mild to moderate tricuspid regurgitation.  · Mild pulmonic regurgitation.  · The estimated ejection fraction is 20%.  · Moderate mitral regurgitation.

## 2022-05-16 LAB
ALBUMIN SERPL BCP-MCNC: 2.2 G/DL (ref 3.5–5.2)
ALLENS TEST: ABNORMAL
ANION GAP SERPL CALC-SCNC: 22 MMOL/L (ref 8–16)
BASOPHILS # BLD AUTO: 0.04 K/UL (ref 0–0.2)
BASOPHILS NFR BLD: 0.6 % (ref 0–1.9)
BUN SERPL-MCNC: 49 MG/DL (ref 8–23)
CALCIUM SERPL-MCNC: 8.2 MG/DL (ref 8.7–10.5)
CHLORIDE SERPL-SCNC: 111 MMOL/L (ref 95–110)
CLARITY CSF: CLEAR
CO2 SERPL-SCNC: 19 MMOL/L (ref 23–29)
COLOR CSF: COLORLESS
CREAT SERPL-MCNC: 6.7 MG/DL (ref 0.5–1.4)
DELSYS: ABNORMAL
DIFFERENTIAL METHOD: ABNORMAL
EOSINOPHIL # BLD AUTO: 0.1 K/UL (ref 0–0.5)
EOSINOPHIL NFR BLD: 1.4 % (ref 0–8)
EP: 5
ERYTHROCYTE [DISTWIDTH] IN BLOOD BY AUTOMATED COUNT: 18.6 % (ref 11.5–14.5)
ERYTHROCYTE [SEDIMENTATION RATE] IN BLOOD BY WESTERGREN METHOD: 12 MM/H
EST. GFR  (AFRICAN AMERICAN): 9 ML/MIN/1.73 M^2
EST. GFR  (NON AFRICAN AMERICAN): 8 ML/MIN/1.73 M^2
FIO2: 30
GLUCOSE CSF-MCNC: 94 MG/DL (ref 40–70)
GLUCOSE SERPL-MCNC: 135 MG/DL (ref 70–110)
HCO3 UR-SCNC: 16.7 MMOL/L (ref 24–28)
HCT VFR BLD AUTO: 42.6 % (ref 40–54)
HGB BLD-MCNC: 13.5 G/DL (ref 14–18)
IMM GRANULOCYTES # BLD AUTO: 0.01 K/UL (ref 0–0.04)
IMM GRANULOCYTES NFR BLD AUTO: 0.2 % (ref 0–0.5)
IP: 10
LYMPHOCYTES # BLD AUTO: 0.8 K/UL (ref 1–4.8)
LYMPHOCYTES NFR BLD: 12.8 % (ref 18–48)
MCH RBC QN AUTO: 24.5 PG (ref 27–31)
MCHC RBC AUTO-ENTMCNC: 31.7 G/DL (ref 32–36)
MCV RBC AUTO: 77 FL (ref 82–98)
MIN VOL: 8.4
MODE: ABNORMAL
MONOCYTES # BLD AUTO: 0.6 K/UL (ref 0.3–1)
MONOCYTES NFR BLD: 8.7 % (ref 4–15)
NEUTROPHILS # BLD AUTO: 5 K/UL (ref 1.8–7.7)
NEUTROPHILS NFR BLD: 76.3 % (ref 38–73)
NRBC BLD-RTO: 0 /100 WBC
PCO2 BLDA: 34.7 MMHG (ref 35–45)
PH SMN: 7.29 [PH] (ref 7.35–7.45)
PHOSPHATE SERPL-MCNC: 6.6 MG/DL (ref 2.7–4.5)
PLATELET # BLD AUTO: 264 K/UL (ref 150–450)
PMV BLD AUTO: 10.8 FL (ref 9.2–12.9)
PO2 BLDA: 161 MMHG (ref 80–100)
POC BE: -10 MMOL/L
POC SATURATED O2: 99 % (ref 95–100)
POC TCO2: 18 MMOL/L (ref 23–27)
POCT GLUCOSE: 132 MG/DL (ref 70–110)
POCT GLUCOSE: 146 MG/DL (ref 70–110)
POCT GLUCOSE: 152 MG/DL (ref 70–110)
POTASSIUM SERPL-SCNC: 4.4 MMOL/L (ref 3.5–5.1)
PROT CSF-MCNC: 66 MG/DL (ref 15–40)
RBC # BLD AUTO: 5.51 M/UL (ref 4.6–6.2)
RBC # CSF: 38 /CU MM
SAMPLE: ABNORMAL
SITE: ABNORMAL
SODIUM SERPL-SCNC: 152 MMOL/L (ref 136–145)
SP02: 100
SPECIMEN VOL CSF: 14 ML
SPONT RATE: 16
VANCOMYCIN SERPL-MCNC: 18.6 UG/ML
WBC # BLD AUTO: 6.58 K/UL (ref 3.9–12.7)
WBC # CSF: 1 /CU MM (ref 0–5)

## 2022-05-16 PROCEDURE — 80202 ASSAY OF VANCOMYCIN: CPT | Performed by: HOSPITALIST

## 2022-05-16 PROCEDURE — 87205 SMEAR GRAM STAIN: CPT | Performed by: HOSPITALIST

## 2022-05-16 PROCEDURE — 87529 HSV DNA AMP PROBE: CPT | Performed by: HOSPITALIST

## 2022-05-16 PROCEDURE — 36620 INSERTION CATHETER ARTERY: CPT

## 2022-05-16 PROCEDURE — 63600175 PHARM REV CODE 636 W HCPCS: Performed by: NURSE PRACTITIONER

## 2022-05-16 PROCEDURE — 87206 SMEAR FLUORESCENT/ACID STAI: CPT | Performed by: HOSPITALIST

## 2022-05-16 PROCEDURE — 89051 BODY FLUID CELL COUNT: CPT | Performed by: HOSPITALIST

## 2022-05-16 PROCEDURE — 99291 PR CRITICAL CARE, E/M 30-74 MINUTES: ICD-10-PCS | Mod: ,,, | Performed by: INTERNAL MEDICINE

## 2022-05-16 PROCEDURE — 87483 CNS DNA AMP PROBE TYPE 12-25: CPT | Performed by: HOSPITALIST

## 2022-05-16 PROCEDURE — 84157 ASSAY OF PROTEIN OTHER: CPT | Performed by: HOSPITALIST

## 2022-05-16 PROCEDURE — 86255 FLUORESCENT ANTIBODY SCREEN: CPT | Mod: 59 | Performed by: HOSPITALIST

## 2022-05-16 PROCEDURE — 86403 PARTICLE AGGLUT ANTBDY SCRN: CPT | Performed by: HOSPITALIST

## 2022-05-16 PROCEDURE — 99900035 HC TECH TIME PER 15 MIN (STAT)

## 2022-05-16 PROCEDURE — 37799 UNLISTED PX VASCULAR SURGERY: CPT

## 2022-05-16 PROCEDURE — 94761 N-INVAS EAR/PLS OXIMETRY MLT: CPT

## 2022-05-16 PROCEDURE — 87070 CULTURE OTHR SPECIMN AEROBIC: CPT | Performed by: HOSPITALIST

## 2022-05-16 PROCEDURE — 99291 CRITICAL CARE FIRST HOUR: CPT | Mod: ,,, | Performed by: INTERNAL MEDICINE

## 2022-05-16 PROCEDURE — 86789 WEST NILE VIRUS ANTIBODY: CPT | Performed by: HOSPITALIST

## 2022-05-16 PROCEDURE — 25000003 PHARM REV CODE 250: Performed by: INTERNAL MEDICINE

## 2022-05-16 PROCEDURE — 85025 COMPLETE CBC W/AUTO DIFF WBC: CPT | Performed by: INTERNAL MEDICINE

## 2022-05-16 PROCEDURE — 36415 COLL VENOUS BLD VENIPUNCTURE: CPT | Performed by: HOSPITALIST

## 2022-05-16 PROCEDURE — 80100014 HC HEMODIALYSIS 1:1

## 2022-05-16 PROCEDURE — 87340 HEPATITIS B SURFACE AG IA: CPT | Performed by: INTERNAL MEDICINE

## 2022-05-16 PROCEDURE — 86592 SYPHILIS TEST NON-TREP QUAL: CPT | Performed by: HOSPITALIST

## 2022-05-16 PROCEDURE — 99000 SPECIMEN HANDLING OFFICE-LAB: CPT | Performed by: HOSPITALIST

## 2022-05-16 PROCEDURE — 27000221 HC OXYGEN, UP TO 24 HOURS

## 2022-05-16 PROCEDURE — 63600175 PHARM REV CODE 636 W HCPCS: Performed by: INTERNAL MEDICINE

## 2022-05-16 PROCEDURE — 82945 GLUCOSE OTHER FLUID: CPT | Performed by: HOSPITALIST

## 2022-05-16 PROCEDURE — 36556 INSERT NON-TUNNEL CV CATH: CPT | Mod: ,,, | Performed by: SURGERY

## 2022-05-16 PROCEDURE — 94660 CPAP INITIATION&MGMT: CPT

## 2022-05-16 PROCEDURE — 86788 WEST NILE VIRUS AB IGM: CPT | Performed by: HOSPITALIST

## 2022-05-16 PROCEDURE — 80069 RENAL FUNCTION PANEL: CPT | Performed by: INTERNAL MEDICINE

## 2022-05-16 PROCEDURE — 36556 PR INSERT NON-TUNNEL CV CATH 5+ YRS OLD: ICD-10-PCS | Mod: ,,, | Performed by: SURGERY

## 2022-05-16 PROCEDURE — 20000000 HC ICU ROOM

## 2022-05-16 PROCEDURE — 82803 BLOOD GASES ANY COMBINATION: CPT

## 2022-05-16 PROCEDURE — 86706 HEP B SURFACE ANTIBODY: CPT | Performed by: INTERNAL MEDICINE

## 2022-05-16 RX ORDER — HEPARIN SODIUM 5000 [USP'U]/ML
4000 INJECTION, SOLUTION INTRAVENOUS; SUBCUTANEOUS
Status: DISCONTINUED | OUTPATIENT
Start: 2022-05-16 | End: 2022-05-16

## 2022-05-16 RX ORDER — SODIUM CHLORIDE 9 MG/ML
INJECTION, SOLUTION INTRAVENOUS
Status: CANCELLED | OUTPATIENT
Start: 2022-05-16

## 2022-05-16 RX ORDER — LORAZEPAM 2 MG/ML
1 INJECTION INTRAMUSCULAR ONCE
Status: COMPLETED | OUTPATIENT
Start: 2022-05-16 | End: 2022-05-16

## 2022-05-16 RX ORDER — HEPARIN SODIUM 5000 [USP'U]/ML
5000 INJECTION, SOLUTION INTRAVENOUS; SUBCUTANEOUS
Status: CANCELLED | OUTPATIENT
Start: 2022-05-16

## 2022-05-16 RX ORDER — SODIUM CHLORIDE 9 MG/ML
INJECTION, SOLUTION INTRAVENOUS ONCE
Status: CANCELLED | OUTPATIENT
Start: 2022-05-16 | End: 2022-05-16

## 2022-05-16 RX ORDER — HYDRALAZINE HYDROCHLORIDE 20 MG/ML
10 INJECTION INTRAMUSCULAR; INTRAVENOUS EVERY 4 HOURS PRN
Status: DISCONTINUED | OUTPATIENT
Start: 2022-05-16 | End: 2022-05-26 | Stop reason: HOSPADM

## 2022-05-16 RX ORDER — HEPARIN SODIUM 1000 [USP'U]/ML
4000 INJECTION, SOLUTION INTRAVENOUS; SUBCUTANEOUS
Status: DISCONTINUED | OUTPATIENT
Start: 2022-05-16 | End: 2022-05-26 | Stop reason: HOSPADM

## 2022-05-16 RX ADMIN — HYDRALAZINE HYDROCHLORIDE 10 MG: 20 INJECTION, SOLUTION INTRAMUSCULAR; INTRAVENOUS at 10:05

## 2022-05-16 RX ADMIN — ASPIRIN 150 MG: 300 SUPPOSITORY RECTAL at 10:05

## 2022-05-16 RX ADMIN — LORAZEPAM 1 MG: 2 INJECTION INTRAMUSCULAR; INTRAVENOUS at 11:05

## 2022-05-16 RX ADMIN — MORPHINE SULFATE 3 MG: 4 INJECTION INTRAVENOUS at 09:05

## 2022-05-16 RX ADMIN — HEPARIN SODIUM 4000 UNITS: 1000 INJECTION, SOLUTION INTRAVENOUS; SUBCUTANEOUS at 06:05

## 2022-05-16 RX ADMIN — CEFTRIAXONE 2 G: 2 INJECTION, SOLUTION INTRAVENOUS at 04:05

## 2022-05-16 RX ADMIN — AMPICILLIN SODIUM 2 G: 2 INJECTION, POWDER, FOR SOLUTION INTRAMUSCULAR; INTRAVENOUS at 10:05

## 2022-05-16 RX ADMIN — LORAZEPAM 1 MG: 2 INJECTION INTRAMUSCULAR; INTRAVENOUS at 01:05

## 2022-05-16 RX ADMIN — CEFTRIAXONE 2 G: 2 INJECTION, SOLUTION INTRAVENOUS at 03:05

## 2022-05-16 RX ADMIN — INSULIN DETEMIR 8 UNITS: 100 INJECTION, SOLUTION SUBCUTANEOUS at 10:05

## 2022-05-16 RX ADMIN — MUPIROCIN: 20 OINTMENT TOPICAL at 10:05

## 2022-05-16 RX ADMIN — LEUCINE, PHENYLALANINE, LYSINE, METHIONINE, ISOLEUCINE, VALINE, HISTIDINE, THREONINE, TRYPTOPHAN, ALANINE, GLYCINE, ARGININE, PROLINE, SERINE, TYROSINE, SODIUM ACETATE, DIBASIC POTASSIUM PHOSPHATE, MAGNESIUM CHLORIDE, SODIUM CHLORIDE, CALCIUM CHLORIDE, DEXTROSE
311; 238; 247; 170; 255; 247; 204; 179; 77; 880; 438; 489; 289; 213; 17; 297; 261; 51; 77; 33; 5 INJECTION INTRAVENOUS at 10:05

## 2022-05-16 NOTE — SUBJECTIVE & OBJECTIVE
Interval History:  GFR continues to drop.  Tomorrow will be the lumbar puncture.    Review of Systems   Unable to perform ROS: Patient nonverbal   Objective:     Vital Signs (Most Recent):  Temp: 98.8 °F (37.1 °C) (05/15/22 1600)  Pulse: 84 (05/15/22 1900)  Resp: (!) 28 (05/15/22 1900)  BP: (!) 212/108 (05/15/22 1900)  SpO2: 100 % (05/15/22 1900)   Vital Signs (24h Range):  Temp:  [97.9 °F (36.6 °C)-99.1 °F (37.3 °C)] 98.8 °F (37.1 °C)  Pulse:  [64-95] 84  Resp:  [16-39] 28  SpO2:  [93 %-100 %] 100 %  BP: (104-212)/() 212/108     Weight: 82.5 kg (181 lb 14.1 oz)  Body mass index is 28.49 kg/m².    Intake/Output Summary (Last 24 hours) at 5/15/2022 1919  Last data filed at 5/15/2022 1700  Gross per 24 hour   Intake 390.5 ml   Output 525 ml   Net -134.5 ml        Physical Exam  Vitals reviewed.   Constitutional:       General: He is not in acute distress.     Appearance: He is not diaphoretic.      Interventions: Nasal cannula in place.   HENT:      Mouth/Throat:      Mouth: Mucous membranes are moist.   Eyes:      General: No scleral icterus.        Right eye: No discharge.         Left eye: No discharge.   Neck:      Vascular: No JVD.   Cardiovascular:      Rate and Rhythm: Normal rate and regular rhythm.   Pulmonary:      Effort: Pulmonary effort is normal.      Breath sounds: Normal breath sounds.   Abdominal:      General: Bowel sounds are normal. There is no distension.      Palpations: Abdomen is soft.      Tenderness: There is no abdominal tenderness.   Skin:     General: Skin is warm.      Findings: No rash.   Neurological:      Mental Status: He is lethargic.      Comments: Nonverbal.  Not following commands.       Significant Labs: All pertinent labs within the past 24 hours have been reviewed.    Significant Imaging: I have reviewed all pertinent imaging results/findings within the past 24 hours.

## 2022-05-16 NOTE — CONSULTS
[facility]  Cardiology  Progress Note    Patient Name: aMrshall Flor  MRN: [id number]  Admission Date: 5/12/2022  Hospital Length of Stay: 4 days  Code Status: Full Code   Attending Physician: Dimitry Correa MD   Primary Care Physician: Dorie Quan MD  Expected Discharge Date:   Principal Problem:CVA (cerebral vascular accident)    Subjective:     Hospital Course:  Patient is felt to be too ill at this time for proceeding with RAJ  Interval History:   ROS  Objective:     Vital Signs (Most Recent):  Temp: 97.8 °F (36.6 °C) (05/16/22 0800)  Pulse: 67 (05/16/22 1113)  Resp: 14 (05/16/22 1113)  BP: (!) 178/84 (05/16/22 0800)  SpO2: 100 % (05/16/22 1113) Vital Signs (24h Range):  Temp:  [97.7 °F (36.5 °C)-98.8 °F (37.1 °C)] 97.8 °F (36.6 °C)  Pulse:  [] 67  Resp:  [] 14  SpO2:  [95 %-100 %] 100 %  BP: (105-212)/() 178/84     Weight: 81.6 kg (179 lb 14.3 oz)  Body mass index is 28.18 kg/m².    SpO2: 100 %  O2 Device (Oxygen Therapy): BiPAP      Intake/Output Summary (Last 24 hours) at 5/16/2022 1211  Last data filed at 5/16/2022 0600  Gross per 24 hour   Intake 194.34 ml   Output 785 ml   Net -590.66 ml       Lines/Drains/Airways     Central Venous Catheter Line  Duration           Trialysis (Dialysis) Catheter 05/16/22 0937 right internal jugular <1 day          Drain  Duration                Urethral Catheter 05/12/22 0602 Double-lumen 4 days          Peripheral Intravenous Line  Duration                Peripheral IV - Single Lumen 05/11/22 2300 Posterior;Right Hand 4 days         Midline Catheter Insertion/Assessment  - Single Lumen 05/13/22 1030 Left brachial vein 18g x 10cm 3 days                Physical Exam    Significant Labs:  Significant Imaging:   Assessment and Plan:   Problems as enumerated below  Respiratory failure as well as CVA  When more stable re-consult for RAJ  Brief HPI:     Active Diagnoses:    Diagnosis Date Noted POA    PRINCIPAL PROBLEM:  CVA (cerebral  vascular accident) [I63.9] 05/12/2022 Yes    MERCEDES (acute kidney injury) [N17.9] 05/14/2022 No    Chronic combined systolic and diastolic CHF (congestive heart failure) [I50.42] 05/13/2022 Yes    Meningitis (needs to be ruled out) [G03.9] 05/13/2022 Yes    Encephalopathy [G93.40]  Yes    Essential hypertension [I10] 01/25/2022 Yes    Coronary artery disease involving native coronary artery of native heart [I25.10] 01/02/2022 Yes    Hyperlipidemia [E78.5] 01/02/2022 Yes    Type 2 diabetes mellitus with stage 4 chronic kidney disease, without long-term current use of insulin [E11.22, N18.4] 01/02/2022 Yes    CKD (chronic kidney disease) stage 4, GFR 15-29 ml/min [N18.4] 01/02/2022 Yes      Problems Resolved During this Admission:       VTE Risk Mitigation (From admission, onward)         Ordered     IP VTE HIGH RISK PATIENT  Once         05/12/22 1351     Place sequential compression device  Until discontinued         05/12/22 1351     Place sequential compression device  Until discontinued         05/12/22 1052                Ming Mooney MD  Cardiology  [facility]

## 2022-05-16 NOTE — PT/OT/SLP PROGRESS
Speech Language Pathology  DISCHARGE    Marshall Flor  MRN: [id number]    Patient not seen today secondary to Nursing hold (Comment) (Continuous BiPAP. Pt not appropriate x3 attempts. Please recosnult when appropriate for skilled ST.)

## 2022-05-16 NOTE — NURSING
Pt's nephew, Kraig, called after his conversation w/ family and Dr. Charles. Family requests going ahead w/ intubation and RAJ tomorrow as planned. Dr. Charles updated.

## 2022-05-16 NOTE — CONSULTS
"  05/16/2022      Admit Date: 5/12/2022  Marshall Flor  New Patient Consult    No chief complaint on file.      History of Present Illness:  Pt is a 67 yo male with CHF, DM2, CVA, HTN who presented to the ED at [facility] for AMS and fever then transferred for higher level of care to [facility] on 5/12/22. Chart reviewed- hospital course complicated by sepsis, acute renal failure requiring HD, resp failure requiring BIPAP. Per anesthesia he will be intubated for RAJ today. Pulm is consulted for management of resp failure and potential ventilator requirement for procedure.   I discussed over the phone w/ pt's nephew Kraig, brother Tony and sister Nahomi who provided more history as pt is nonverbal and encephalopathic. At baseline pt is independent, walks and talks and cares for himself. He has 2 adult sons who have not been very involved in care so far- kraig reports he talks w/ them and explains everything.     PFSH:  Past Medical History:   Diagnosis Date    CHF (congestive heart failure)     Diabetes mellitus     Hypertension     Stroke      Past Surgical History:   Procedure Laterality Date    EYE SURGERY      LEFT EYE     Social History     Tobacco Use    Smoking status: Former Smoker    Smokeless tobacco: Former User   Substance Use Topics    Alcohol use: Not Currently    Drug use: Not Currently     No family history on file.  Review of patient's allergies indicates:  No Known Allergies    Performance Status:Performance Status:The patient's activity level is no limits with regular activity.    Review of Systems:  due to neurologic status/impairments a Review of Systems could not be obtained       Exam:Comprehensive exam done. BP (!) 178/84 (BP Location: Right leg, Patient Position: Lying)   Pulse 67   Temp 97.8 °F (36.6 °C) (Axillary)   Resp 14   Ht 5' 7" (1.702 m)   Wt 81.6 kg (179 lb 14.3 oz)   SpO2 100%   BMI 28.18 kg/m²   Exam included Vitals as listed, and patient's appearance " and affect and alertness and mood, oral exam for yeast and hygiene and pharynx lesions and Mallapatti (M) score, neck with inspection for jvd and masses and thyroid abnormalities and lymph nodes (supraclavicular and infraclavicular nodes also examined and noted if abn), chest exam included symmetry and effort and fremitus and percussion and auscultation, cardiac exam included rhythm and gallops and murmur and rubs and jvd and edema, abdominal exam for mass and hepatosplenomegaly and tenderness and hernias and bowel sounds, Musculoskeletal exam with muscle tone and posture and mobility/gait and  strenght, and skin for rashes and cyanosis and pallor and turgor, extremity for clubbing.  Findings were normal except as listed below:  On bipap, somnolent  No response to voice  Grimaces to pain  Moves left hand spontaneously, withdraws to pain  HR regular  Breath sounds diminished bilaterally  HD access RIJ  abd soft nontender  No edema      Radiographs reviewed: view by direct vision   CXR 5/16/22- pulmonary vasc congestion, heart enlarged  MRI brain 5/12/22-   1. This is a very limited evaluation but the study is abnormal.  There is a nonhemorrhagic infarction in the anterior lateral left frontal lobe.  2. There is a remote infarction with encephalomalacia and gliosis in the medial right occipital lobe.  There are also remote infarctions in the cerebellar hemispheres as well as in the right thalamus.  This report was flagged in Epic as abnormal.    TTE 5/12/22-   · The left ventricle is moderately enlarged with eccentric hypertrophy and severely decreased systolic function.  · Grade II left ventricular diastolic dysfunction.  · The estimated PA systolic pressure is 59 mmHg.  · Severe right ventricular enlargement with mildly to moderately reduced right ventricular systolic function.  · Moderate left atrial enlargement.  · There is moderate pulmonary hypertension.  · Intermediate central venous pressure (8  mmHg).  · Mild aortic regurgitation.  · Moderate right atrial enlargement.  · Mild to moderate tricuspid regurgitation.  · Mild pulmonic regurgitation.  · The estimated ejection fraction is 20%.  · Moderate mitral regurgitation.      Labs     Recent Labs   Lab 05/16/22 0427   WBC 6.58   HGB 13.5*   HCT 42.6        Recent Labs   Lab 05/16/22 0427   *   K 4.4   *   CO2 19*   BUN 49*   CREATININE 6.7*   *   CALCIUM 8.2*   PHOS 6.6*   ALBUMIN 2.2*   No results for input(s): PH, PCO2, PO2, HCO3 in the last 24 hours.  Microbiology Results (last 7 days)     Procedure Component Value Units Date/Time    Culture, MRSA [[id number]] Collected: 05/15/22 2300    Order Status: Sent Specimen: MRSA source from Nares, Left Updated: 05/16/22 1042    Blood culture [[id number]] Collected: 05/13/22 2007    Order Status: Completed Specimen: Blood from Peripheral, Left Arm Updated: 05/16/22 0613     Blood Culture, Routine No Growth to date      No Growth to date      No Growth to date    Narrative:      Collection has been rescheduled by ACD at 05/13/2022 15:13 Reason:   Unable to collect, no place to stick pt  Collection has been rescheduled by ACD at 05/13/2022 15:13 Reason:   Unable to collect, no place to stick pt    Cryptococcal antigen, CSF [[id number]]     Order Status: No result Specimen: CSF (Spinal Fluid)     CSF culture [[id number]]     Order Status: No result Specimen: CSF (Spinal Fluid)     Direct AFB stain [[id number]]     Order Status: No result Specimen: CSF (Spinal Fluid)     Gram stain [[id number]]     Order Status: No result Specimen: CSF (Spinal Fluid)     AFB Culture & Smear [[id number]] Collected: 05/13/22 1508    Order Status: Completed Specimen: CSF (Spinal Fluid) from Nares, Right Updated: 05/15/22 0927     AFB Culture & Smear Culture in progress    Culture, MRSA [[id number]]     Order Status: Canceled Specimen: MRSA source from Nares, Left     Gram stain [[id number]]     Order Status: Canceled  Specimen: CSF (Spinal Fluid)     Direct AFB stain [[id number]]     Order Status: Canceled Specimen: CSF (Spinal Fluid)     Cryptococcal antigen, CSF [[id number]]     Order Status: Canceled Specimen: CSF (Spinal Fluid)     CSF culture [[id number]]     Order Status: Canceled Specimen: CSF (Spinal Fluid)           Impression:  Active Hospital Problems    Diagnosis  POA    *CVA (cerebral vascular accident) [I63.9]  Yes    MERCEDES (acute kidney injury) [N17.9]  No    Chronic combined systolic and diastolic CHF (congestive heart failure) [I50.42]  Yes    Meningitis (needs to be ruled out) [G03.9]  Yes    Encephalopathy [G93.40]  Yes    Essential hypertension [I10]  Yes    Coronary artery disease involving native coronary artery of native heart [I25.10]  Yes    Hyperlipidemia [E78.5]  Yes    Type 2 diabetes mellitus with stage 4 chronic kidney disease, without long-term current use of insulin [E11.22, N18.4]  Yes    CKD (chronic kidney disease) stage 4, GFR 15-29 ml/min [N18.4]  Yes      Resolved Hospital Problems   No resolved problems to display.               Plan:     - BIPAP continue  - ABG reviewed- HD should help correct acidosis  - I advised family (alphonso, Nahomi and Tony over phone) if he is intubated with GA for the RAJ tomorrow I have concerns he may have prolonged ventilator requirement given ongoing issues with respiratory failure and neuro deficits- may not be easy to wean, may go on to require trach or prolonged vent support. They are discussing with the rest of family and will let us know whether to proceed or place limitation on code status.  - further goals of care discussion needed- palliative consulted, may benefit from family meeting as they have a large family.       The following were evaluated and adjusted as needed: mechanical ventilator settings and weaning status, sedation and neurologic status, acid base balance and oxygenation needs and CODE STATUS/OUTLOOK DISCUSSED WITH AVAILABLE NEXT  OF  KIN       Critical Care  - THE PATIENT HAS A HIGH PROBABILITY OF IMMINENT OR LIFE THREATENING DETERIORATION.  Over 50%time of encounter was in direct care at bedside.  Time was 30 to 74 minutes required for patient care.  Time needed for all of the above totaled 45 minutes.    Tosin Charles MD  Pulmonary & Critical Care Medicine

## 2022-05-16 NOTE — ASSESSMENT & PLAN NOTE
Renal function is worse.  Monitor BMP.  Avoid NSAID's.    Creatinine   Date Value Ref Range Status   05/15/2022 6.7 (H) 0.5 - 1.4 mg/dL Final   05/14/2022 5.9 (H) 0.5 - 1.4 mg/dL Final   ]

## 2022-05-16 NOTE — ASSESSMENT & PLAN NOTE
New problem.  Nephrology group consulting.  Monitor renal function and UOP.  Avoid NSAID's and hypotension.  Acyclovir stopped.  To be started on RRT tomorrow.    Creatinine   Date Value Ref Range Status   05/15/2022 6.7 (H) 0.5 - 1.4 mg/dL Final   05/14/2022 5.9 (H) 0.5 - 1.4 mg/dL Final   05/13/2022 3.7 (H) 0.5 - 1.4 mg/dL Final   ]

## 2022-05-16 NOTE — NURSING
Spoke w/ Pialr (card's nurse) about pt's RAJ planned for today w/ Dr. Mooney. Notified anesthesia about procedure and won't be until after lunch. Dr. Mooney notified and updated. Will check back with anesthesia around lunch to get a better time frame and update cards.

## 2022-05-16 NOTE — PROGRESS NOTES
Pharmacokinetic Assessment Follow Up: IV Vancomycin    Vancomycin serum concentration assessment(s):    The random level was drawn correctly and can be used to guide therapy at this time. The measurement is within the desired definitive target range of 15 to 20 mcg/mL.    Vancomycin Regimen Plan:    Pt's renal function is not stable.  Pharmacy will dose by level.   Pharmacy will dose vancomycin 500 mg x1.  A vancomycin level will be ordered on 05/18/22 at 0430 with AM labs.     Drug levels (last 3 results):  Recent Labs   Lab Result Units 05/13/22  1034 05/14/22  0340 05/16/22  0427   Vancomycin, Random ug/mL 20.5 17.6 18.6       Pharmacy will continue to follow and monitor vancomycin.    Please contact pharmacy at extension [phone] for questions regarding this assessment.    Thank you for the consult,   Zaid Sebastian       Patient brief summary:  Marshall Flor is a 66 y.o. male initiated on antimicrobial therapy with IV Vancomycin for treatment of meningitis    The patient's current regimen is pulse dose    Drug Allergies:   Review of patient's allergies indicates:  No Known Allergies    Actual Body Weight:   81.6kg    Renal Function:   Estimated Creatinine Clearance: 11.4 mL/min (A) (based on SCr of 6.7 mg/dL (H)).,     CBC (last 72 hours):  Recent Labs   Lab Result Units 05/16/22  0427   WBC K/uL 6.58   Hemoglobin g/dL 13.5*   Hematocrit % 42.6   Platelets K/uL 264   Gran % % 76.3*   Lymph % % 12.8*   Mono % % 8.7   Eosinophil % % 1.4   Basophil % % 0.6   Differential Method  Automated       Metabolic Panel (last 72 hours):  Recent Labs   Lab Result Units 05/14/22  1309 05/15/22  1359 05/16/22  0427   Sodium mmol/L 147* 147* 152*   Potassium mmol/L 3.8 4.2 4.4   Chloride mmol/L 108 112* 111*   CO2 mmol/L 21* 17* 19*   Glucose mg/dL 174* 129* 135*   BUN mg/dL 42* 46* 49*   Creatinine mg/dL 5.9* 6.7* 6.7*   Albumin g/dL  --  2.1* 2.2*   Phosphorus mg/dL  --  6.3* 6.6*       Vancomycin Administrations:  vancomycin  given in the last 96 hours                     vancomycin 500 mg in dextrose 5 % 100 mL IVPB (ready to mix system) (mg) 500 mg New Bag 05/14/22 1231    vancomycin 1.25 g in dextrose 5% 250 mL IVPB (ready to mix) (mg) 1,250 mg New Bag 05/12/22 1646                    Microbiologic Results:  Microbiology Results (last 7 days)       Procedure Component Value Units Date/Time    Culture, MRSA [[id number]] Collected: 05/15/22 2300    Order Status: Sent Specimen: MRSA source from Nares, Left Updated: 05/15/22 2318    Cryptococcal antigen, CSF [[id number]]     Order Status: No result Specimen: CSF (Spinal Fluid)     CSF culture [[id number]]     Order Status: No result Specimen: CSF (Spinal Fluid)     Direct AFB stain [[id number]]     Order Status: No result Specimen: CSF (Spinal Fluid)     Gram stain [[id number]]     Order Status: No result Specimen: CSF (Spinal Fluid)     AFB Culture & Smear [[id number]] Collected: 05/13/22 1508    Order Status: Completed Specimen: CSF (Spinal Fluid) from Nares, Right Updated: 05/15/22 0927     AFB Culture & Smear Culture in progress    Blood culture [[id number]] Collected: 05/13/22 2007    Order Status: Completed Specimen: Blood from Peripheral, Left Arm Updated: 05/15/22 0613     Blood Culture, Routine No Growth to date      No Growth to date    Narrative:      Collection has been rescheduled by ACD at 05/13/2022 15:13 Reason:   Unable to collect, no place to stick pt  Collection has been rescheduled by ACD at 05/13/2022 15:13 Reason:   Unable to collect, no place to stick pt    Culture, MRSA [[id number]]     Order Status: Canceled Specimen: MRSA source from Nares, Left     Gram stain [[id number]]     Order Status: Canceled Specimen: CSF (Spinal Fluid)     Direct AFB stain [[id number]]     Order Status: Canceled Specimen: CSF (Spinal Fluid)     Cryptococcal antigen, CSF [[id number]]     Order Status: Canceled Specimen: CSF (Spinal Fluid)     CSF culture [[id number]]     Order Status: Canceled  Specimen: CSF (Spinal Fluid)

## 2022-05-16 NOTE — ASSESSMENT & PLAN NOTE
"On antibiotics due to high suspicion.  Discussed with ID.  Pt needs to be 5 days off of Plavix before getting lumbar puncture.  Should be ok Monday, which is tomorrow; I re-ordered the procedure for that day.  In light of the worsening renal function, ID consultant discontinued the acyclovir.    Antibiotics (From admission, onward)            Start     Stop Route Frequency Ordered    05/15/22 2100  ampicillin 2 g in sodium chloride 0.9 % 100 mL IVPB (ready to mix system)         -- IV Every 12 hours 05/15/22 1529    05/12/22 2100  mupirocin 2 % ointment         05/17 2059 Nasl 2 times daily 05/12/22 1347    05/12/22 1500  cefTRIAXone (ROCEPHIN) 2 g/50 mL D5W IVPB         -- IV Every 12 hours (non-standard times) 05/12/22 1059    05/12/22 1158  vancomycin - pharmacy to dose  (vancomycin with pharmacy to dose consult)        "And" Linked Group Details    -- IV pharmacy to manage frequency 05/12/22 1059        "

## 2022-05-16 NOTE — PROGRESS NOTES
Marshall Flor [id number] is a 66 y.o. male who has been consulted for vancomycin dosing.    Pharmacy consult for vancomycin dosing in no longer required.  Vancomycin was discontinued.    Thank you for allowing us to participate in this patient's care.     Clarissa Sebastian, PharmD

## 2022-05-16 NOTE — PROGRESS NOTES
[facility] Hospital Medicine  Progress Note    Patient Name: Marshall Folr  MRN: [id number]  Patient Class: IP- Inpatient   Admission Date: 5/12/2022  Length of Stay: 3 days  Attending Physician: Osmin Pope MD  Primary Care Provider: Dorie Quan MD        Subjective:     Principal Problem:CVA (cerebral vascular accident)        HPI:  66-year-old male with history of CHF, diabetes presents to [facility] for decreased responsiveness found to have a fever, left gaze preference with nuchal rigidity found to have meningitis in addition to being found to have an acute CVA. Beyond this he has an MERCEDES.          Overview/Hospital Course:    1.  meningitis   -LP not able to be performed as on plavix at home  -MRI with acute CVA  -EEG with diffuse slowing; no focal epileptiform activity  -CTX, amp, Vanco, acyclovir  -ID, Neuro consult     2. Acute CVA  -rectal aspirin  -neurology       Interval History:  GFR continues to drop.  Tomorrow will be the lumbar puncture.    Review of Systems   Unable to perform ROS: Patient nonverbal   Objective:     Vital Signs (Most Recent):  Temp: 98.8 °F (37.1 °C) (05/15/22 1600)  Pulse: 84 (05/15/22 1900)  Resp: (!) 28 (05/15/22 1900)  BP: (!) 212/108 (05/15/22 1900)  SpO2: 100 % (05/15/22 1900)   Vital Signs (24h Range):  Temp:  [97.9 °F (36.6 °C)-99.1 °F (37.3 °C)] 98.8 °F (37.1 °C)  Pulse:  [64-95] 84  Resp:  [16-39] 28  SpO2:  [93 %-100 %] 100 %  BP: (104-212)/() 212/108     Weight: 82.5 kg (181 lb 14.1 oz)  Body mass index is 28.49 kg/m².    Intake/Output Summary (Last 24 hours) at 5/15/2022 1919  Last data filed at 5/15/2022 1700  Gross per 24 hour   Intake 390.5 ml   Output 525 ml   Net -134.5 ml        Physical Exam  Vitals reviewed.   Constitutional:       General: He is not in acute distress.     Appearance: He is not diaphoretic.      Interventions: Nasal cannula in place.   HENT:      Mouth/Throat:      Mouth: Mucous membranes are moist.   Eyes:       General: No scleral icterus.        Right eye: No discharge.         Left eye: No discharge.   Neck:      Vascular: No JVD.   Cardiovascular:      Rate and Rhythm: Normal rate and regular rhythm.   Pulmonary:      Effort: Pulmonary effort is normal.      Breath sounds: Normal breath sounds.   Abdominal:      General: Bowel sounds are normal. There is no distension.      Palpations: Abdomen is soft.      Tenderness: There is no abdominal tenderness.   Skin:     General: Skin is warm.      Findings: No rash.   Neurological:      Mental Status: He is lethargic.      Comments: Nonverbal.  Not following commands.       Significant Labs: All pertinent labs within the past 24 hours have been reviewed.    Significant Imaging: I have reviewed all pertinent imaging results/findings within the past 24 hours.      Assessment/Plan:      * CVA (cerebral vascular accident)  Proven on MRI.  Old infarcts seen as well.  Neurologist consulting.  Getting aspirin suppositories.      MERCEDES (acute kidney injury)  New problem.  Nephrology group consulting.  Monitor renal function and UOP.  Avoid NSAID's and hypotension.  Acyclovir stopped.  To be started on RRT tomorrow.    Creatinine   Date Value Ref Range Status   05/15/2022 6.7 (H) 0.5 - 1.4 mg/dL Final   05/14/2022 5.9 (H) 0.5 - 1.4 mg/dL Final   05/13/2022 3.7 (H) 0.5 - 1.4 mg/dL Final   ]      Meningitis (needs to be ruled out)  On antibiotics due to high suspicion.  Discussed with ID.  Pt needs to be 5 days off of Plavix before getting lumbar puncture.  Should be ok Monday, which is tomorrow; I re-ordered the procedure for that day.  In light of the worsening renal function, ID consultant discontinued the acyclovir.    Antibiotics (From admission, onward)            Start     Stop Route Frequency Ordered    05/15/22 2100  ampicillin 2 g in sodium chloride 0.9 % 100 mL IVPB (ready to mix system)         -- IV Every 12 hours 05/15/22 1529    05/12/22 2100  mupirocin 2 % ointment          "05/17 2059 Nasl 2 times daily 05/12/22 1347    05/12/22 1500  cefTRIAXone (ROCEPHIN) 2 g/50 mL D5W IVPB         -- IV Every 12 hours (non-standard times) 05/12/22 1059    05/12/22 1158  vancomycin - pharmacy to dose  (vancomycin with pharmacy to dose consult)        "And" Linked Group Details    -- IV pharmacy to manage frequency 05/12/22 1059          Chronic combined systolic and diastolic CHF (congestive heart failure)  Patient is identified as having Combined Systolic and Diastolic heart failure that is Chronic. CHF is currently controlled. Latest ECHO performed and demonstrates- Results for orders placed during the hospital encounter of 05/12/22    Echo    Interpretation Summary  · The left ventricle is moderately enlarged with eccentric hypertrophy and severely decreased systolic function.  · Grade II left ventricular diastolic dysfunction.  · The estimated PA systolic pressure is 59 mmHg.  · Severe right ventricular enlargement with mildly to moderately reduced right ventricular systolic function.  · Moderate left atrial enlargement.  · There is moderate pulmonary hypertension.  · Intermediate central venous pressure (8 mmHg).  · Mild aortic regurgitation.  · Moderate right atrial enlargement.  · Mild to moderate tricuspid regurgitation.  · Mild pulmonic regurgitation.  · The estimated ejection fraction is 20%.  · Moderate mitral regurgitation.  . Patient NPO; not on oral meds.  Monitor clinical status closely. Monitor on telemetry. Patient is off CHF pathway.  Monitor strict Is&Os and daily weights.   Last BNP reviewed- and noted below   Recent Labs   Lab 05/11/22  2303   BNP 3,064*   .      Encephalopathy  Unsure of cause.  Just a stroke?  Meningitis?  Will monitor neuro exam.  Neurologist consulting.  Avoid sedating meds.      Essential hypertension  Controlled.  Monitor pressure.  Patient is NPO; cannot take oral meds.      Coronary artery disease involving native coronary artery of native heart  Stable.  " Getting aspirin suppositories.      Hyperlipidemia  Chronic.  Stable.      Type 2 diabetes mellitus with stage 4 chronic kidney disease, without long-term current use of insulin  Patient's FSGs are controlled on current medication regimen.  Last A1c reviewed-   Lab Results   Component Value Date    HGBA1C 9.1 (H) 05/13/2022     Most recent fingerstick glucose reviewed-   Recent Labs   Lab 05/14/22  2056 05/15/22  0028 05/15/22  0438 05/15/22  1153   POCTGLUCOSE 163* 136* 152* 133*     Current correctional scale  Low  Maintain anti-hyperglycemic dose as follows-   Antihyperglycemics (From admission, onward)            Start     Stop Route Frequency Ordered    05/13/22 1715  insulin aspart U-100 pen 0-5 Units         -- SubQ Every 6 hours PRN 05/13/22 1602    05/12/22 1200  insulin detemir U-100 pen 8 Units         -- SubQ Daily 05/12/22 1052        Hold Oral hypoglycemics while patient is in the hospital.        CKD (chronic kidney disease) stage 4, GFR 15-29 ml/min  Renal function is worse.  Monitor BMP.  Avoid NSAID's.    Creatinine   Date Value Ref Range Status   05/15/2022 6.7 (H) 0.5 - 1.4 mg/dL Final   05/14/2022 5.9 (H) 0.5 - 1.4 mg/dL Final   ]        VTE Risk Mitigation (From admission, onward)         Ordered     IP VTE HIGH RISK PATIENT  Once         05/12/22 1351     Place sequential compression device  Until discontinued         05/12/22 1351     Place sequential compression device  Until discontinued         05/12/22 1052                Discharge Planning   STAR:      Code Status: Full Code   Is the patient medically ready for discharge?:     Reason for patient still in hospital (select all that apply): Patient trending condition, Laboratory test and Treatment  Discharge Plan A: Home with family            Osimn Pope MD  Department of Hospital Medicine   [facility]

## 2022-05-16 NOTE — NURSING
Pt went to radiology for lumbar puncture. Pt tolerated well. Remained on BiPAP. VSS throughout procedure. Specimens collected by Dr. Dupont and sent to lab by [facility].

## 2022-05-16 NOTE — PLAN OF CARE
Pt remains in ICU on continuous BiPAP with FiO2 at 30%. Notable periods of cheyne-wang respirations. Abx given. Pt given one dose of labetalol. BP labile. Non-verbal and unable to follow commands. Spontaneously moves bilat extremities w/ right-sided weakness. Guards with LUE when completing pt care. Adequate urine output this shift. Safety maintained.     Problem: Adult Inpatient Plan of Care  Goal: Plan of Care Review  Outcome: Ongoing, Progressing  Goal: Patient-Specific Goal (Individualized)  Outcome: Ongoing, Progressing  Goal: Optimal Comfort and Wellbeing  Outcome: Ongoing, Progressing     Problem: Diabetes Comorbidity  Goal: Blood Glucose Level Within Targeted Range  Outcome: Ongoing, Progressing

## 2022-05-16 NOTE — CARE UPDATE
05/15/22 1920   Patient Assessment/Suction   Level of Consciousness (AVPU) responds to voice   Expansion/Accessory Muscles/Retractions no use of accessory muscles   All Lung Fields Breath Sounds Anterior:;diminished   JOEL Breath Sounds diminished   LLL Breath Sounds diminished   RUL Breath Sounds diminished   RML Breath Sounds diminished   RLL Breath Sounds diminished   Rhythm/Pattern, Respiratory assisted mechanically;Cheyne-Valdovinos  (bipap)   PRE-TX-O2   O2 Device (Oxygen Therapy) BiPAP   $ Is the patient on Low Flow Oxygen? Yes   Oxygen Concentration (%) 30   SpO2 100 %   Pulse Oximetry Type Continuous   $ Pulse Oximetry - Multiple Charge Pulse Oximetry - Multiple   Pulse 70   Resp 18   Skin Integrity   $ Wound Care Tech Time 15 min   Area Observed Bridge of nose   Skin Appearance without discoloration   Barrier used? Liquid Filled Cushion   Airway Safety   Ambu bag with the patient? Yes, Adult Ambu   Is mask with the patient? Yes, Adult Mask   Ready to Wean/Extubation Screen   FIO2<=50 (chart decimal) 0.3   Preset CPAP/BiPAP Settings   Mode Of Delivery BiPAP S/T   $ CPAP/BiPAP Daily Charge BiPAP/CPAP Daily   $ Initial CPAP/BiPAP Setup? No   $ Is patient using? Yes   Size of Mask Medium/Large   Sized Appropriately? Yes   Equipment Type V60   Airway Device Type medium full face mask   Ipap 10   EPAP (cm H2O) 5   Pressure Support (cm H2O) 5   ITime (sec) 1   Rise Time (sec) 2   Patient CPAP/BiPAP Settings   Timed Inspiration (Sec) 1   IPAP Rise Time (sec) 2   RR Total (Breaths/Min) 23   Tidal Volume (mL) 527   VE Minute Ventilation (L/min) 11.8 L/min   Peak Inspiratory Pressure (cm H2O) 11   TiTOT (%) 30   Total Leak (L/Min) 0   Patient Trigger - ST Mode Only (%) 97   CPAP/BiPAP Alarms   High Pressure (cm H2O) 15   Low Pressure (cm H2O) 5   Minute Ventilation (L/Min) 15   High RR (breaths/min) 45   Low RR (breaths/min) 10   Apnea (Sec) 20

## 2022-05-16 NOTE — NURSING
Trialysis line placed by Dr. Higginbotham, Dr. Hastings at bedside as well. Consent received. Pt tolerated well. To start dialysis today.

## 2022-05-16 NOTE — PLAN OF CARE
POC reviewed w/ pt and family - pt unable to participate d/t mental status. Pt able to move left arm spontaneously and purposely (localizes to BiPAP mask) - still not following commands. Pt starting to move eyes to center/right more as opposed to left gaze only. Still only withdraws from pain in lower extremities, right upper extremity has little movement to pain, if at all. Remains on BiPAP mask - now at 25%. Cheyne-Valdovinos respirations still occurring. Pulmonology consulted today. NPO - to be started on clinimix after HD is done, dietician consulted for TPN. Colmenares in place. BM x 1 today.     Pt received trialysis line placement today. Right IJ. Confirmed via CXR. Getting HD right now. Tolerating well.    Pt had lumbar puncture today. Tolerated proning on BiPAP. Was able to obtain specimen - labs sent out.    A-Line placed today per request from anesthesia for RAJ.    RAJ moved to tomorrow - anesthesia must intubate pt for general anesthesia; Dr. Mooney updated. Needs consents done.     Dr. Charles spoke with family members via phone to discuss intubation tomorrow and concern for prolonged ventilator requirement. Per MD, palliative consulted to facilitate family meeting regarding pt's care going further.    Safety measures in place. Bed in locked and low position, bed alarm on, side rails raised.         Problem: Adult Inpatient Plan of Care  Goal: Plan of Care Review  Outcome: Ongoing, Progressing  Goal: Patient-Specific Goal (Individualized)  Outcome: Ongoing, Progressing  Goal: Absence of Hospital-Acquired Illness or Injury  Outcome: Ongoing, Progressing  Goal: Optimal Comfort and Wellbeing  Outcome: Ongoing, Progressing     Problem: Diabetes Comorbidity  Goal: Blood Glucose Level Within Targeted Range  Outcome: Ongoing, Progressing     Problem: Skin Injury Risk Increased  Goal: Skin Health and Integrity  Outcome: Ongoing, Progressing     Problem: Bowel Elimination Impaired (Stroke, Ischemic/Transient Ischemic  Attack)  Goal: Effective Bowel Elimination  Outcome: Ongoing, Progressing     Problem: Cerebral Tissue Perfusion (Stroke, Ischemic/Transient Ischemic Attack)  Goal: Optimal Cerebral Tissue Perfusion  Outcome: Ongoing, Progressing     Problem: Communication Impairment (Stroke, Ischemic/Transient Ischemic Attack)  Goal: Improved Communication Skills  Outcome: Ongoing, Progressing     Problem: Respiratory Compromise (Stroke, Ischemic/Transient Ischemic Attack)  Goal: Effective Oxygenation and Ventilation  Outcome: Ongoing, Progressing

## 2022-05-16 NOTE — PROGRESS NOTES
[facility] Medicine  Progress Note    Patient Name: Marshall Flor  MRN: [id number]  Patient Class: IP- Inpatient   Admission Date: 5/12/2022  Length of Stay: 4 days  Attending Physician: Dimitry Correa MD  Primary Care Provider: Dorie Quan MD        Subjective:     Principal Problem:CVA (cerebral vascular accident)        HPI:  66-year-old male with history of CHF, diabetes presents to [place] for decreased responsiveness found to have a fever, left gaze preference with nuchal rigidity found to have meningitis in addition to being found to have an acute CVA. Beyond this he has an MERCEDES.            Still not interacting. Will open eyes but doesn't follow commands. Very little oral intake.     NAD, Alert but not interactive  NC, AT  RRR  CTAB  SNTND+BS  No edema   No gross joint abnormalities     Vitals, labs and radiographs from past 24h reviewed and personally interpreted.         Overview/Hospital Course:              Assessment/Plan:       1.  meningitis  C/b metabolic encephalopathy   -LP not able to be performed as on plavix at home  -MRI with acute CVA  -EEG with diffuse slowing; no focal epileptiform activity  -CTX, amp, vanco   -acyclovir stopped on 5/15 due to worsening renal function   -ID, Neuro consult     2. Acute CVA  -rectal aspirin  -neurology      *   CVA (cerebral vascular accident)  Proven on MRI.  Old infarcts seen as well.  Neurologist consulting.  Getting aspirin suppositories.      MERCEDES (acute kidney injury)  New problem.  Nephrology group consulting.  Monitor renal function and UOP.  Avoid NSAID's and hypotension.  Acyclovir stopped.  To be started on RRT today .    Creatinine   Date Value Ref Range Status   05/15/2022 6.7 (H) 0.5 - 1.4 mg/dL Final   05/14/2022 5.9 (H) 0.5 - 1.4 mg/dL Final   05/13/2022 3.7 (H) 0.5 - 1.4 mg/dL Final   ]      Meningitis (needs to be ruled out)  On antibiotics due to high suspicion.  Discussed with ID.  Pt needs to be 5 days off  "of Plavix before getting lumbar puncture.  Should be ok Monday, which is tomorrow; I re-ordered the procedure for that day.  In light of the worsening renal function, ID consultant discontinued the acyclovir.    Antibiotics (From admission, onward)            Start     Stop Route Frequency Ordered    05/15/22 2100  ampicillin 2 g in sodium chloride 0.9 % 100 mL IVPB (ready to mix system)         -- IV Every 12 hours 05/15/22 1529    05/12/22 2100  mupirocin 2 % ointment         05/17 2059 Nasl 2 times daily 05/12/22 1347    05/12/22 1500  cefTRIAXone (ROCEPHIN) 2 g/50 mL D5W IVPB         -- IV Every 12 hours (non-standard times) 05/12/22 1059    05/12/22 1158  vancomycin - pharmacy to dose  (vancomycin with pharmacy to dose consult)        "And" Linked Group Details    -- IV pharmacy to manage frequency 05/12/22 1059          Chronic combined systolic and diastolic CHF (congestive heart failure)  Patient is identified as having Combined Systolic and Diastolic heart failure that is Chronic. CHF is currently controlled. Latest ECHO performed and demonstrates- Results for orders placed during the hospital encounter of 05/12/22    Echo    Interpretation Summary  · The left ventricle is moderately enlarged with eccentric hypertrophy and severely decreased systolic function.  · Grade II left ventricular diastolic dysfunction.  · The estimated PA systolic pressure is 59 mmHg.  · Severe right ventricular enlargement with mildly to moderately reduced right ventricular systolic function.  · Moderate left atrial enlargement.  · There is moderate pulmonary hypertension.  · Intermediate central venous pressure (8 mmHg).  · Mild aortic regurgitation.  · Moderate right atrial enlargement.  · Mild to moderate tricuspid regurgitation.  · Mild pulmonic regurgitation.  · The estimated ejection fraction is 20%.  · Moderate mitral regurgitation.  . Patient NPO; not on oral meds.  Monitor clinical status closely. Monitor on telemetry. " Patient is off CHF pathway.  Monitor strict Is&Os and daily weights.   Last BNP reviewed- and noted below   Recent Labs   Lab 05/11/22  2303   BNP 3,064*   .          Essential hypertension  Controlled.  Monitor pressure.  Patient is NPO; cannot take oral meds.      Coronary artery disease involving native coronary artery of native heart  Stable.  Getting aspirin suppositories.      Hyperlipidemia  Chronic.  Stable.      Type 2 diabetes mellitus with stage 4 chronic kidney disease, without long-term current use of insulin  Patient's FSGs are controlled on current medication regimen.  Last A1c reviewed-   Lab Results   Component Value Date    HGBA1C 9.1 (H) 05/13/2022     Most recent fingerstick glucose reviewed-   Recent Labs   Lab 05/14/22  2056 05/15/22  0028 05/15/22  0438 05/15/22  1153   POCTGLUCOSE 163* 136* 152* 133*     Current correctional scale  Low  Maintain anti-hyperglycemic dose as follows-   Antihyperglycemics (From admission, onward)            Start     Stop Route Frequency Ordered    05/13/22 1715  insulin aspart U-100 pen 0-5 Units         -- SubQ Every 6 hours PRN 05/13/22 1602    05/12/22 1200  insulin detemir U-100 pen 8 Units         -- SubQ Daily 05/12/22 1052        Hold Oral hypoglycemics while patient is in the hospital.        CKD (chronic kidney disease) stage 4, GFR 15-29 ml/min  Renal function is worse.  Monitor BMP.  Avoid NSAID's.    Creatinine   Date Value Ref Range Status   05/15/2022 6.7 (H) 0.5 - 1.4 mg/dL Final   05/14/2022 5.9 (H) 0.5 - 1.4 mg/dL Final   ]      VTE Risk Mitigation (From admission, onward)         Ordered     IP VTE HIGH RISK PATIENT  Once         05/12/22 1351     Place sequential compression device  Until discontinued         05/12/22 1351     Place sequential compression device  Until discontinued         05/12/22 1052                Discharge Planning   STAR:      Code Status: Full Code   Is the patient medically ready for discharge?:     Reason for patient  still in hospital (select all that apply): Patient trending condition, Laboratory test and Treatment  Discharge Plan A: Home with family            Dimitry Correa MD  Department of Hospital Medicine   [facility]

## 2022-05-16 NOTE — NURSING
Pt arrived from ICU via bed with this nurse, primary nurse, and respiratory on biPAP. Procedure explained and consent for procedure obtained by Dr. Dupont. Time out performed. LP performed by Dr. Dupont. Specimens collected and sent to laboratory per order. Bandage applied to lower back. Pt transported back to ICU in stable condition via bed for recovery.

## 2022-05-16 NOTE — NURSING
Pt supposed to have RAJ done today. Anesthesia determined pt needs to be intubated for procedure as well as have an arterial line. Dr. Mooney can't perform procedure after 1200 today, so he rescheduled procedure for the morning. Pulmonology consulted for vent weaning after procedure. Will work on getting arterial line placed in the meantime, as well as verify plan w/ anesthesia for the morning.

## 2022-05-16 NOTE — PROCEDURES
"Marshall Flor is a 66 y.o. male patient.    Temp: 97.8 °F (36.6 °C) (05/16/22 0800)  Pulse: 104 (05/16/22 0800)  Resp: (!) 28 (05/16/22 0909)  BP: (!) 178/84 (05/16/22 0800)  SpO2: 100 % (05/16/22 0800)  Weight: 81.6 kg (179 lb 14.3 oz) (05/16/22 0000)  Height: 5' 7" (170.2 cm) (05/12/22 1200)       Central Line    Date/Time: 5/16/2022 9:37 AM  Performed by: Keaton Hastings MD  Authorized by: Keaton Hastings MD     Location procedure was performed:  [facility] ICU  Pre-operative diagnosis:  Spencer  Post-operative diagnosis:  Spencer  Consent Done ?:  Yes  Time out complete?: Verified correct patient, procedure, equipment, staff, and site/side    Indications:  Hemodialysis  Anesthesia:  Local infiltration  Local anesthetic:  Lidocaine 1% without epinephrine  Anesthetic total (ml):  10  Preparation:  Skin prepped with ChloraPrep  Skin prep agent dried: Skin prep agent completely dried prior to procedure    Sterile barriers: All five maximal sterile barriers used - gloves, gown, cap, mask and large sterile sheet    Hand hygiene: Hand hygiene performed immediately prior to central venous catheter insertion    Location:  Right internal jugular  Catheter type:  Trialysis  Catheter size:  14 Fr  Inserted Catheter Length (cm):  16  Ultrasound guidance: Yes    Vessel Caliber:  Large   patent  Comprressibility:  Normal  Doppler:  Not done  Needle advanced into vessel with real time ultrasound guidance.    Guidewire confirmed in vessel.    Steril sheath on probe.    Sterile gel used.  Manometry: No    Number of attempts:  1  Securement:  Line sutured, sterile dressing applied and blood return through all ports  Complications: No    Estimated blood loss (mL):  5  Specimens: No    Implants: No    XRay:  Placement verified by x-ray  Adverse Events:  NoneTermination Site: superior vena cava        5/16/2022  "

## 2022-05-16 NOTE — PROGRESS NOTES
MRSA culture collected and sent to lab.  BOTH nares were swabbed for this culture as ordered; however, Epic will not allow the choice of both nares as source of specimen.  Either left OR right nare has to be chosen.  The actual specimen as well as requisition slip was labeled  that both nares were swabbed.

## 2022-05-16 NOTE — ASSESSMENT & PLAN NOTE
Patient's FSGs are controlled on current medication regimen.  Last A1c reviewed-   Lab Results   Component Value Date    HGBA1C 9.1 (H) 05/13/2022     Most recent fingerstick glucose reviewed-   Recent Labs   Lab 05/14/22 2056 05/15/22  0028 05/15/22  0438 05/15/22  1153   POCTGLUCOSE 163* 136* 152* 133*     Current correctional scale  Low  Maintain anti-hyperglycemic dose as follows-   Antihyperglycemics (From admission, onward)            Start     Stop Route Frequency Ordered    05/13/22 1715  insulin aspart U-100 pen 0-5 Units         -- SubQ Every 6 hours PRN 05/13/22 1602    05/12/22 1200  insulin detemir U-100 pen 8 Units         -- SubQ Daily 05/12/22 1052        Hold Oral hypoglycemics while patient is in the hospital.

## 2022-05-16 NOTE — CONSULTS
"  [facility]  Adult Nutrition  Consult Note    SUMMARY   Intervention: parenteral nutrition therapy    Recommendations  Recommendation/Intervention:   1.) Consider Custom TPN 5%AA/D15 @ goal rate 75 mls/hr continuous + 20% lipids (250 mls) daily providing 1778  Kcals/day, 90 g protein/day, and 1800 mls fluid/day. GIR: 2.3 mg/kg/min.     Goals: 1.) pt will meet >50% of EEN during admit  Nutrition Goal Status: new  Communication of RD Recs: other (comment) (POC)    1. Stroke    2. CHF (congestive heart failure)    3. Encephalopathy    4. CVA (cerebral vascular accident)    5. MERCEDES (acute kidney injury)      Past Medical History:   Diagnosis Date    CHF (congestive heart failure)     Diabetes mellitus     Hypertension     Stroke          Assessment and Plan  Nutrition Problem  inadequate energy intake    Related to (etiology):   Acute illness     Signs and Symptoms (as evidenced by):   NPO  TPN meeting 30% of EEN     Interventions/Recommendations (treatment strategy):  Consider custom TPN     Nutrition Diagnosis Status:   New           Malnutrition Assessment  Severe Weight Loss (Malnutrition): greater than 7.5% in 3 months (11.5% weight loss in 4 months)    Reason for Assessment  Reason For Assessment: consult  General Information Comments: admits with decreased responsiveness, pt to start dialysis today, on continuous bipap, non-verbal, central line present. RD consulted for TPN. Pending SLP asessment for diet advancement. Remains NPO.    Nutrition Risk Screen  Nutrition Risk Screen: dysphagia or difficulty swallowing    Nutrition/Diet History  Food Allergies: NKFA  Factors Affecting Nutritional Intake:  (weakness)    Anthropometrics  Temp: 98 °F (36.7 °C)  Height: 5' 7"  Height (inches): 67 in  Weight Method: Bed Scale  Weight: 81.6 kg (179 lb 14.3 oz)  Weight (lb): 179.9 lb  Ideal Body Weight (IBW), Male: 148 lb  % Ideal Body Weight, Male (lb): 121.55 %  BMI (Calculated): 28.2  BMI Grade: 25 " - 29.9 - overweight  Usual Body Weight (UBW), k.27 kg (2022)  % Usual Body Weight: 88.62  % Weight Change From Usual Weight: -11.56 %       Lab/Procedures/Meds  Pertinent Labs Reviewed: reviewed  BMP  Lab Results   Component Value Date     (H) 2022    K 4.4 2022     (H) 2022    CO2 19 (L) 2022    BUN 49 (H) 2022    CREATININE 6.7 (H) 2022    CALCIUM 8.2 (L) 2022    ANIONGAP 22 (H) 2022    ESTGFRAFRICA 9 (A) 2022    EGFRNONAA 8 (A) 2022     Lab Results   Component Value Date    ALBUMIN 2.2 (L) 2022     Lab Results   Component Value Date    CALCIUM 8.2 (L) 2022    PHOS 6.6 (H) 2022     Recent Labs   Lab 22  0550   POCTGLUCOSE 146*       Pertinent Medications Reviewed: reviewed  Scheduled Meds:   amino acids 4.25%-rftba-[id number]-V2X   Intravenous Once    ampicillin 2 g in sodium chloride 0.9 % 100 mL IVPB (ready to mix system)  2 g Intravenous Q12H    aspirin  150 mg Rectal Daily    cefTRIAXone (ROCEPHIN) IVPB  2 g Intravenous Q12H    cloNIDine 0.2 mg/24 hr td ptwk  1 patch Transdermal Q7 Days    insulin detemir U-100  8 Units Subcutaneous Daily    mupirocin   Nasal BID    vancomycin (VANCOCIN) IVPB  500 mg Intravenous Once     Continuous Infusions:   niCARdipine         Estimated/Assessed Needs  Weight Used For Calorie Calculations: 81.6 kg (179 lb 14.3 oz)  Energy Calorie Requirements (kcal): 2000 kcals/day  Energy Need Method: [place] (x1.3)  Protein Requirements: 81g  Weight Used For Protein Calculations: 81.6 kg (179 lb 14.3 oz)  Estimated Fluid Requirement Method: RDA Method  RDA Method (mL):       Nutrition Prescription Ordered  Current Diet Order: NPO  Current Nutrition Support Formula Ordered: Clinimix 4.25/5  Current Nutrition Support Rate Ordered: 75 (ml)  Current Nutrition Support Frequency Ordered: continuous    Evaluation of Received Nutrient/Fluid Intake  Parenteral Calories (kcal):  612  Parenteral Protein (gm): 77  Parenteral Fluid (mL): 1800  GIR (Glucose Infusion Rate) (mg/kg/min): 0.7 mg/kg/min  I/O: -221.8  Tolerance: tolerating  % Intake of Estimated Energy Needs: 25 - 50 %  % Meal Intake: 25 - 50 %    Nutrition Risk  Level of Risk/Frequency of Follow-up:  (x2 weekly)       Monitor and Evaluation  Food and Nutrient Intake: energy intake, parenteral nutrition intake, enteral nutrition intake  Food and Nutrient Adminstration: diet order, enteral and parenteral nutrition administration  Anthropometric Measurements: weight, weight change, body mass index  Biochemical Data, Medical Tests and Procedures: electrolyte and renal panel, glucose/endocrine profile, lipid profile  Nutrition-Focused Physical Findings: overall appearance       Nutrition Follow-Up  RD Follow-up?: Yes

## 2022-05-16 NOTE — PLAN OF CARE
Intervention: parenteral nutrition therapy     Recommendations  Recommendation/Intervention:   1.) Consider Custom TPN 5%AA/D15 @ goal rate 75 mls/hr continuous + 20% lipids (250 mls) daily providing 1778  Kcals/day, 90 g protein/day, and 1800 mls fluid/day. GIR: 2.3 mg/kg/min.      Goals: 1.) pt will meet >50% of EEN during admit  Nutrition Goal Status: new  Communication of RD Recs: other (comment) (POC)

## 2022-05-16 NOTE — PROGRESS NOTES
"[facility]  Neurology  Progress Note    Patient Name: Marshall Flor  MRN: [id number]  Admission Date: 5/12/2022  Hospital Length of Stay: 4 days  Code Status: Full Code   Attending Provider: Dimitry Correa MD  Primary Care Physician: Dorie Quan MD   Principal Problem:CVA (cerebral vascular accident)    Subjective:   Chief Complaint:  unresponsive      HPI:   Per EMR: History of Present Illness:    66-year-old male with history of CHF, diabetes presents to [facility] for decreased responsiveness found to have a left gaze presents with fever upon arrival to [facility].  Family is not at bedside and patient is not able to communicate in current state.  ?  Neurology Consult: Pt seen and examined with Dr Rudy Segundo. POC discussed. Pt is minimally responsive to tactile stimuli. His CT head is negative for acute infarct or hemorrhage.  He was transferred from [facility] for further work up due to fever, AMS, and left gaze. Last seen normal unknown. No family at bedside.   Per [place]: "Patient is a 66-year-old male brought from home via EMS for evaluation of altered mental status as reported by family members.  Family member stated that at around 1:00 p.m. today they noted that the patient was not exhibiting his normal behaviors.  Upon arrival here, the patient is not speaking.  He has some residual weakness on the right side secondary to a previous stroke.  Patient does not speak, but he does respond to commands.  He is exhibiting an abnormal breathing pattern of several short, chopped breaths followed by periods of apnea.  His vital signs show a temperature of 98°, pulse 98,, respiratory rate 28 per minute, O2 saturations 95% on room air, blood pressure is significantly elevated at 183/101.  Patient's baseline is unknown, but patient's family state that he has not at baseline."       Interval History: patient seen and examined.   He is more alert today .  He is not following commands " but he  was previously following commands with the bedside nurse.       5/14: Pt seen and examined. POC discussed with Dr. GRACE Segundo. Pt is still nonverbal with limited communication. His son did not answer the phone today. Baseline unknown.     5/15/22:   Patient was seen and examined by me today.   Son was at bedside today.   He reports patient was functional at baseline but had mild weakness.  He remains aphasic,  follows visual cues but still with impaired comprehension.   Seen moving all 4 extremities spontaneously, moreso the left side than the right.         5/16/22:   Patient was seen and examined by  me today.   He still with significant aphasia,  but able to follow commands with  visual cues sometimes.  Right arm with deep hemiparesis,  seen moving both legs but more  left-sided than right. He is still on BiPAP.         Current Facility-Administered Medications   Medication Dose Route Frequency Provider Last Rate Last Admin    acetaminophen tablet 650 mg  650 mg Oral Q4H PRN Dimitry Correa MD        ampicillin 2 g in sodium chloride 0.9 % 100 mL IVPB (ready to mix system)  2 g Intravenous Q12H Chari Ivory MD   Stopped at 05/15/22 2259    aspirin suppository 150 mg  150 mg Rectal Daily Dimitry Correa MD   150 mg at 05/15/22 0844    cefTRIAXone (ROCEPHIN) 2 g/50 mL D5W IVPB  2 g Intravenous Q12H Dimitry Correa MD   Stopped at 05/16/22 0417    cloNIDine 0.2 mg/24 hr td ptwk 1 patch  1 patch Transdermal Q7 Days Osmin Pope MD   1 patch at 05/15/22 1441    dextrose 10% bolus 125 mL  12.5 g Intravenous PRN Dimitry Correa MD        dextrose 10% bolus 250 mL  25 g Intravenous PRN Dimitry Correa MD        dextrose 50% injection 12.5 g  12.5 g Intravenous PRN Dimitry Correa MD        glucagon (human recombinant) injection 1 mg  1 mg Intramuscular PRN Dimitry Correa MD        glucose chewable tablet 16 g  16 g Oral PRN Dimitry Correa MD        glucose chewable tablet  24 g  24 g Oral PRN Dimitry Correa MD        insulin aspart U-100 pen 0-5 Units  0-5 Units Subcutaneous Q6H PRN Osmin Pope MD        insulin detemir U-100 pen 8 Units  8 Units Subcutaneous Daily Dimitry Correa MD   8 Units at 05/15/22 0845    labetaloL injection 10 mg  10 mg Intravenous Q4H PRN Osmin Pope MD   10 mg at 05/15/22 2139    morphine injection 3 mg  3 mg Intravenous Q4H PRN Dimitry Correa MD        mupirocin 2 % ointment   Nasal BID Dimitry Correa MD   Given at 05/15/22 2141    naloxone 0.4 mg/mL injection 0.02 mg  0.02 mg Intravenous PRN Dimitry Correa MD        niCARdipine 40 mg/200 mL (0.2 mg/mL) infusion  0-15 mg/hr Intravenous Continuous Dimitry Correa MD        ondansetron injection 4 mg  4 mg Intravenous Q8H PRN Dimitry Correa MD        oxyCODONE immediate release tablet 5 mg  5 mg Oral Q4H PRN Dimitry Correa MD        polyethylene glycol packet 17 g  17 g Oral BID PRN Dimitry Correa MD        sodium chloride 0.9% flush 10 mL  10 mL Intravenous PRN Dimitry Correa MD        vancomycin - pharmacy to dose   Intravenous pharmacy to manage frequency Dimitry Correa MD        vancomycin 500 mg in dextrose 5 % 100 mL IVPB (ready to mix system)  500 mg Intravenous Once Dimitry Correa MD         Objective:     Vital Signs (Most Recent):  Temp: 97.8 °F (36.6 °C) (05/16/22 0800)  Pulse: 104 (05/16/22 0800)  Resp: (!) 41 (05/16/22 0800)  BP: (!) 178/84 (05/16/22 0800)  SpO2: 100 % (05/16/22 0800) Vital Signs (24h Range):  Temp:  [97.7 °F (36.5 °C)-98.8 °F (37.1 °C)] 97.8 °F (36.6 °C)  Pulse:  [] 104  Resp:  [] 41  SpO2:  [93 %-100 %] 100 %  BP: (105-212)/() 178/84     Weight: 81.6 kg (179 lb 14.3 oz)  Body mass index is 28.18 kg/m².    Physical Exam  Constitutional:       Appearance: He is ill-appearing.   HENT:      Head: Normocephalic.      Mouth/Throat:      Mouth: Mucous membranes are dry.   Eyes:      Pupils: Pupils are equal,  round, and reactive to light.   Cardiovascular:      Rate and Rhythm: Normal rate and regular rhythm.   Pulmonary:      Effort: Pulmonary effort is normal.   Abdominal:      Palpations: Abdomen is soft.   Musculoskeletal:      Cervical back: Normal range of motion.      Comments: Decreased ROM and strength BL, 'RS neglect   Skin:     General: Skin is warm.   Neurological:      Mental Status: He is lethargic.      Cranial Nerves: Cranial nerve deficit present.      Sensory: Sensory deficit present.      Motor: Weakness present.         NEUROLOGICAL EXAMINATION:     MENTAL STATUS   Level of consciousness: drowsy  Unable to name object.     CRANIAL NERVES     CN III, IV, VI   Pupils are equal, round, and reactive to light.       Limited cooperation with exam     MOTOR EXAM   Muscle bulk: normal  Overall muscle tone: increased    SENSORY EXAM        withdraws from noxious stimuli       Significant Labs:   Hemoglobin A1c:   Recent Labs   Lab 05/12/22  1210 05/13/22  0341   HGBA1C 8.8* 9.1*     BMP:   Recent Labs   Lab 05/14/22  1309 05/15/22  1359 05/16/22  0427   * 129* 135*   * 147* 152*   K 3.8 4.2 4.4    112* 111*   CO2 21* 17* 19*   BUN 42* 46* 49*   CREATININE 5.9* 6.7* 6.7*   CALCIUM 7.9* 8.1* 8.2*     CBC:   Recent Labs   Lab 05/16/22  0427   WBC 6.58   HGB 13.5*   HCT 42.6        CMP:   Recent Labs   Lab 05/14/22  1309 05/15/22  1359 05/16/22  0427   * 129* 135*   * 147* 152*   K 3.8 4.2 4.4    112* 111*   CO2 21* 17* 19*   BUN 42* 46* 49*   CREATININE 5.9* 6.7* 6.7*   CALCIUM 7.9* 8.1* 8.2*   ALBUMIN  --  2.1* 2.2*   ANIONGAP 18* 18* 22*   EGFRNONAA 9* 8* 8*       Significant Imaging: I have reviewed all pertinent imaging results/findings within the past 24 hours.    Assessment and Plan:  Acute Left MCA stroke:     Pt with h/o RS weakness from previous CVA, now presents with decreased responsiveness     Left MCA stroke confirmed on MRI brain  Etiology ongoing,  suspect cardioembolic in the setting of HFrEF  CT head: negative acute  MRI brain: nonhemorrhagic infarction in the anterior lateral left frontal lobe and a remote infarction with encephalomalacia and gliosis in the medial right occipital lobe.  There are also remote infarctions in the cerebellar hemispheres as well as in the right thalamus.  CTA head and neck: Less than 50% stenoses of the bilateral proximal internal carotid arteries.  Significant stenoses of the bilateral intracranial ICAs in the cavernous sinuses.  Normal flow in the sxilet-up-Qshvss  ECHO:   Moderate left atrial enlargement, 20% ejection fraction   Pt needs a PATRICIO due to his low EF.   However, he will likely need to be intubated for procedure and remains unclear if he will be able to get off the vent in the near future       LDL:   60  TSH:   0.547  A1c: 9.1  Secondary stroke prevention: aspirin, atorvastatin, and Plavix at home. Continue aspirin and statin at this time, pending further work up.  EEG:   Moderate encephalopathy and diffuse cortical dysfunction; no epileptiform discharges or electrographic seizures captured        From neurology's standpoint, LP is not necessary at this time. Complete stroke work, evaluate for endocarditis.   Patricio pending.   Will follow peripherally, call with questions.         Active Diagnoses:    Diagnosis Date Noted POA    PRINCIPAL PROBLEM:  CVA (cerebral vascular accident) [I63.9] 05/12/2022 Yes    MERCEDES (acute kidney injury) [N17.9] 05/14/2022 No    Chronic combined systolic and diastolic CHF (congestive heart failure) [I50.42] 05/13/2022 Yes    Meningitis (needs to be ruled out) [G03.9] 05/13/2022 Yes    Encephalopathy [G93.40]  Yes    Essential hypertension [I10] 01/25/2022 Yes    Coronary artery disease involving native coronary artery of native heart [I25.10] 01/02/2022 Yes    Hyperlipidemia [E78.5] 01/02/2022 Yes    Type 2 diabetes mellitus with stage 4 chronic kidney disease, without long-term  current use of insulin [E11.22, N18.4] 01/02/2022 Yes    CKD (chronic kidney disease) stage 4, GFR 15-29 ml/min [N18.4] 01/02/2022 Yes      Problems Resolved During this Admission:       VTE Risk Mitigation (From admission, onward)         Ordered     IP VTE HIGH RISK PATIENT  Once         05/12/22 1351     Place sequential compression device  Until discontinued         05/12/22 1351     Place sequential compression device  Until discontinued         05/12/22 1052                [phone] within 3 days from discharge.     All questions were answered.                              Thank you kindly for including us in the care of this patient. Please do not hesitate to contact us with any questions.    Critical Care:  48 minutes of critical care time has been spent evaluating with the patient. Time includes chart review not limited to diagnostic imaging, labs, and vitals, patient assessment, discussion with family and nursing, current order evaluations, and new order entries.          Shelby Hugo MD  Neurology  [facility]

## 2022-05-16 NOTE — PROGRESS NOTES
Nephrology Progress Note        Patient Name: Marshall Flor  MRN: [id number]    Patient Class: IP- Inpatient   Admission Date: 5/12/2022  Length of Stay: 4 days  Date of Service: 5/16/2022    Attending Physician: Dimitry Correa MD  Primary Care Provider: Dorie Quan MD    Reason for Consult: dione/acidosis/sepsis/stroke/ckd3/fever/anemia/htn/chf    SUBJECTIVE:     HPI: 66-year-old male with history of HTN, CHF, diabetes presents to [place] for decreased responsiveness, found to have a left gaze preference, then sent to [facility], where was found febrile, hypertensive. UA 3+ protein, 2+ glucose, WBC 3, moderate bacteria. Chest x-ray suggestive of pneumonia right upper lobe. MRI brain nonhemorrhagic infarction in the right anterolateral frontal lobe.Treated for sepsis ? PNA, ? Meningitis? and stroke.    5/14- BP all over the place, on 3L NC, UOP 481cc  5/15- BP still all over the place, on BIPAP, UOP 455cc- no labs drawn today and its after 1pm  5/16  Renal function continues to decline.  Mental status no better. Consent for dialysis obtained from patient's son yesterday by Dr. Busch.  I called to confirm and no answer (no voice mail available)    Outpatient meds:  No current facility-administered medications on file prior to encounter.     Current Outpatient Medications on File Prior to Encounter   Medication Sig Dispense Refill    allopurinoL (ZYLOPRIM) 100 MG tablet   = 2 tab, Oral, Daily, # 60 tab, 5 Refill(s), Pharmacy: [id number], 167, cm, 07/27/21 8:24:00 CDT, Height/Length Measured, 91.5, kg, 12/15/20 11:18:00 CST, Weight Dosing      amLODIPine (NORVASC) 10 MG tablet 10 mg.      aspirin (ECOTRIN) 81 MG EC tablet Take 1 tablet (81 mg total) by mouth once daily. 30 tablet 3    atorvastatin (LIPITOR) 40 MG tablet   = 1 tab, Oral, Daily, # 90 tab, 1 Refill(s), Soft Stop, Pharmacy: [id number], 167, cm, 04/07/21 14:01:00 CDT, Height/Length Measured, 91.5, kg, 12/15/20 11:18:00  CST, Weight Dosing      carvediloL (COREG) 3.125 MG tablet Take 1 tablet (3.125 mg total) by mouth once daily. 30 tablet 1    cloNIDine (CATAPRES) 0.1 MG tablet Take 2 tablets (0.2 mg total) by mouth 3 (three) times daily. 180 tablet 11    clopidogreL (PLAVIX) 75 mg tablet Take 75 mg by mouth once daily.      furosemide (LASIX) 20 MG tablet Take 1 tablet (20 mg total) by mouth once daily. 30 tablet 1    hydrALAZINE (APRESOLINE) 50 MG tablet Take 1 tablet (50 mg total) by mouth every 12 (twelve) hours. 60 tablet 0    linaGLIPtin (TRADJENTA) 5 mg Tab tablet 5 mg.      NOVOLOG MIX 70-30FLEXPEN U-100 100 unit/mL (70-30) InPn pen Inject into the skin.      sodium bicarbonate 650 MG tablet Take 1 tablet (650 mg total) by mouth 2 (two) times daily. 60 tablet 11       Scheduled meds:   ampicillin 2 g in sodium chloride 0.9 % 100 mL IVPB (ready to mix system)  2 g Intravenous Q12H    aspirin  150 mg Rectal Daily    cefTRIAXone (ROCEPHIN) IVPB  2 g Intravenous Q12H    cloNIDine 0.2 mg/24 hr td ptwk  1 patch Transdermal Q7 Days    insulin detemir U-100  8 Units Subcutaneous Daily    mupirocin   Nasal BID    vancomycin (VANCOCIN) IVPB  500 mg Intravenous Once       Infusions:   niCARdipine         PRN meds:  acetaminophen, dextrose 10%, dextrose 10%, dextrose 50%, glucagon (human recombinant), glucose, glucose, insulin aspart U-100, labetaloL, morphine, naloxone, ondansetron, oxyCODONE, polyethylene glycol, sodium chloride 0.9%, Pharmacy to dose Vancomycin consult **AND** vancomycin - pharmacy to dose    Review of Systems:  Neg    OBJECTIVE:     Vital Signs and IO (Last 24H):  Temp:  [97.7 °F (36.5 °C)-98.8 °F (37.1 °C)]   Pulse:  []   Resp:  []   BP: (105-212)/()   SpO2:  [93 %-100 %]   I/O last 3 completed shifts:  In: 584.8 [IV Piggyback:584.8]  Out: 1125 [Urine:1125]    Wt Readings from Last 5 Encounters:   05/16/22 81.6 kg (179 lb 14.3 oz)   05/11/22 87.5 kg (193 lb)   05/12/22 87.5 kg  (192 lb 14.4 oz)   04/26/22 95.3 kg (210 lb)   02/02/22 91.1 kg (200 lb 13.4 oz)     Physical Exam:  Constitutional: on BIPAP, ill, eyes closed, laying flat  Heart: rrr, no m/r/g, wwp, no edema  Lungs: coarse, no w/r/r/c, no lb  Abdomen: s/nt/nd, +BS    Body mass index is 28.18 kg/m².    Laboratory:  Recent Labs   Lab 05/14/22  1309 05/15/22  1359 05/16/22  0427   * 147* 152*   K 3.8 4.2 4.4    112* 111*   CO2 21* 17* 19*   BUN 42* 46* 49*   CREATININE 5.9* 6.7* 6.7*   ESTGFRAFRICA 11* 9* 9*   EGFRNONAA 9* 8* 8*   * 129* 135*       Recent Labs   Lab 05/12/22  1257 05/13/22  0341 05/14/22  1309 05/15/22  1359 05/16/22  0427   CALCIUM 8.3* 7.9* 7.9* 8.1* 8.2*   ALBUMIN 2.7* 2.2*  --  2.1* 2.2*   PHOS 4.1 4.4  --  6.3* 6.6*   MG 1.6 1.5*  --   --   --        Recent Labs   Lab 04/12/21  0952   PTH, Intact 273.0 H       Recent Labs   Lab 05/14/22  0340 05/14/22  0840 05/14/22  1234 05/14/22  1808 05/14/22  2056 05/15/22  0028 05/15/22  0438 05/15/22  1153 05/16/22  0016 05/16/22  0550   POCTGLUCOSE 147* 197* 193* 177* 163* 136* 152* 133* 132* 146*       Recent Labs   Lab 04/12/21  0952 05/12/22  1210 05/13/22  0341   Hemoglobin A1C 7.8 H 8.8 H 9.1 H       Recent Labs   Lab 05/12/22  1210 05/13/22  0341 05/16/22  0427   WBC 8.22 8.07 6.58   HGB 14.4 12.8* 13.5*   HCT 44.1 38.5* 42.6    254 264   MCV 76* 76* 77*   MCHC 32.7 33.2 31.7*   MONO 4.9  0.4 6.4  0.5 8.7  0.6       Recent Labs   Lab 05/11/22  2303 05/12/22  1257 05/13/22  0341 05/15/22  1359 05/16/22  0427   BILITOT 0.9 0.7 0.5  --   --    PROT 7.1 7.2 6.2  --   --    ALBUMIN 2.8* 2.7* 2.2* 2.1* 2.2*   ALKPHOS 105 139* 107  --   --    ALT 8* 58* 52*  --   --    AST 9* 83* 49*  --   --        Recent Labs   Lab 01/26/22  0639 05/11/22  2300 05/12/22  1400   Color, UA Yellow Yellow Yellow   Appearance, UA Cloudy A Clear Clear   pH, UA 5.0 6.0 6.0   Specific [place], UA 1.020 1.020 1.020   Protein, UA 2+ A 3+ A 3+ A   Glucose, UA Negative  2+ A 3+ A   Ketones, UA Negative Negative Negative   Urobilinogen, UA 1.0 Negative Negative   Bilirubin (UA) Negative Negative Negative   Occult Blood UA 2+ A 2+ A 2+ A   Nitrite, UA Negative Negative Negative   RBC, UA 33 H 10 H 20 H   WBC, UA 55 H 3 6 H   Bacteria Moderate A Moderate A Few A   Hyaline Casts, UA 3 A 1 0       Recent Labs   Lab 05/12/22  0140 05/12/22 2055   POC PH 7.393 7.417   POC PCO2 29.3 L 31.1 L   POC HCO3 17.9 L 20.0 L   POC PO2 41 LL 84   POC SATURATED O2 77 L 97   POC BE -7 -5   Sample ARTERIAL ARTERIAL       Microbiology Results (last 7 days)     Procedure Component Value Units Date/Time    Blood culture [[id number]] Collected: 05/13/22 2007    Order Status: Completed Specimen: Blood from Peripheral, Left Arm Updated: 05/16/22 0613     Blood Culture, Routine No Growth to date      No Growth to date      No Growth to date    Narrative:      Collection has been rescheduled by ACD at 05/13/2022 15:13 Reason:   Unable to collect, no place to stick pt  Collection has been rescheduled by ACD at 05/13/2022 15:13 Reason:   Unable to collect, no place to stick pt    Culture, MRSA [[id number]] Collected: 05/15/22 2300    Order Status: Sent Specimen: MRSA source from Nares, Left Updated: 05/15/22 2318    Cryptococcal antigen, CSF [[id number]]     Order Status: No result Specimen: CSF (Spinal Fluid)     CSF culture [[id number]]     Order Status: No result Specimen: CSF (Spinal Fluid)     Direct AFB stain [[id number]]     Order Status: No result Specimen: CSF (Spinal Fluid)     Gram stain [[id number]]     Order Status: No result Specimen: CSF (Spinal Fluid)     AFB Culture & Smear [[id number]] Collected: 05/13/22 1508    Order Status: Completed Specimen: CSF (Spinal Fluid) from Nares, Right Updated: 05/15/22 0927     AFB Culture & Smear Culture in progress    Culture, MRSA [[id number]]     Order Status: Canceled Specimen: MRSA source from Nares, Left     Gram stain [[id number]]     Order Status: Canceled  Specimen: CSF (Spinal Fluid)     Direct AFB stain [[id number]]     Order Status: Canceled Specimen: CSF (Spinal Fluid)     Cryptococcal antigen, CSF [[id number]]     Order Status: Canceled Specimen: CSF (Spinal Fluid)     CSF culture [[id number]]     Order Status: Canceled Specimen: CSF (Spinal Fluid)         ASSESSMENT/PLAN:     Oliguric MERCEDES due to ATN; CKD III  - renal function is steadily deteriorating as of yest- oliguric- ID stopped acyclovir-  -initiate hemodialysis for uremic clearance.        HTN/Acute CVA/CHF  - off cardene gtt but probably needs to be resumed since not able to get any PO meds    Hypernatremia--145 Na dialysate  Hypokalemia--better  HypoMg--no recent levels.  Check with next blood work  Acidosis  - advise use of hypotonic carriers  - will supplement as needed    SHPT  - no active issues    Anemia of CKD  - stable    Thank you for allowing us to participate in the care of your patient!   We will follow the patient and provide recommendations as needed.    Patient care time was spent personally by me on the following activities: > 35 min  · Obtaining a history.  · Examination of patient.  · Providing medical care at the patients bedside.  · Developing a treatment plan with patient or surrogate and bedside caregivers.  · Ordering and reviewing laboratory studies, radiographic studies, pulse oximetry.  · Ordering and performing treatments and interventions.  · Evaluation of patient's response to treatment.  · Discussions with consultants while on the unit and immediately available to the patient.  · Re-evaluation of the patient's condition.  · Documentation in the medical record.     Ariel Little MD    [facility]  [address] [place] [address]  [place] [id number]    [phone] - tel  [phone] - fax    5/16/2022

## 2022-05-16 NOTE — PROGRESS NOTES
Consult Note  Infectious Disease    Reason for Consult:  Rule out meningitis     HPI: Marshall Flor is a 66 y.o. male with past medical history of HTN, diabetes, CHF, prior stroke presented to [facility] for altered mental status and fever.  No family at bedside.  Patient seen and examined in ICU.  Unable to obtain further history, patient breathing heavily, nonverbal, staring, febrile.    In the ER, hypertensive 179/109, febrile 101.2  Labs on admission reviewed, white count 6.8, PMN 84.5%, H&H 13.3/40.3, platelet count 311  Creatinine 2.5, MERCEDES 3.3 today  AST 83/ALT 58  BNP 3064  Lactic acid 3.5, procalcitonin 6.9  U tox negative  UA 3+ protein, 2+ glucose, WBC 3, moderate bacteria  Chest x-ray suggestive of pneumonia right upper lobe  MRI brain nonhemorrhagic infarction in the right anterolateral frontal lobe.    Admitted for severe sepsis with altered mental status, found to have acute stroke, rule out meningitis.    ID consult for meningitis.    INTERVAL HISTORY:  5/13: interim reviewed, patient seen and examined at bedside. Unable to perform LP since he is on Plavix, need to wait 5 days. Currently on bipap, alert and awake but dysarthric. Hemodynamically stable, permissive HTN for acute stroke, afebrile in the last 24h.  Micro reviwed blood cultures 1/4 bottles grew GPC, ID and sensitivities pending. Labs reviewed, WBC: 8, PMN: 76.7%. Creatinine 3.7. HbA1c: 9%.    5/14:  Consult in data reviewed, discussed with Dr. Moore.  Afebrile,   Renal function worsening, urine output is poor.  No BMP today, therefore ordered and his creatinine has increased to 5.9 Repeat chest x-ray ordered and read as no acute process but he does have some vascular fullness right perihilar.  He is unable to attend our interact.  LP is pending washout of Plavix.  1/4 blood cultures with coagulase-negative Staph, drawn at nursing home 5/11.  5/15:  Interim reviewed.  Renal function continues to deteriorate.  Afebrile.  Blood  pressure high and labile.  Plans for dialysis tomorrow noted as well as T and lumbar puncture.  Urine output is about the same and in adequate. Requiring bipap for prolonged periods of apnea. Moving right arm occasionally. Will not attend.    5/16: Interim reviewed, discussed with Dr Ivory. Patient seen and examined at bedside, nephew present. States he was very active before admission, fishing and riding his bike. Hypertensive, tachycardic, afebrile. Labs reviewed, WBC: 6.5, PMN: 76.6%. H/H and plt count stable. Awaiting RAJ and LP today.   EXAM & DIAGNOSTICS REVIEWED:   Vitals:     Temp:  [97.7 °F (36.5 °C)-98.8 °F (37.1 °C)]   Temp: 97.7 °F (36.5 °C) (05/16/22 0400)  Pulse: 86 (05/16/22 0655)  Resp: (!) 30 (05/16/22 0655)  BP: (!) 105/46 (05/16/22 0600)  SpO2: 100 % (05/16/22 0655)    Intake/Output Summary (Last 24 hours) at 5/16/2022 0822  Last data filed at 5/16/2022 0600  Gross per 24 hour   Intake 194.34 ml   Output 905 ml   Net -710.66 ml       General:  Somnolent, with BiPaP in place FiO2 30%  Eyes:  Anicteric, PERRL  ENT:  Unable to fully assess, Bipap in place  Neck:  Supple to flexion and rotation   Lungs: Clear  Heart:  S1/S2+, regular rhythm, systolic murmur+  Abd:  +BS, soft, non tender, non distended, no rebound  :  Colmenares, urine clear  Musc:  Joints without effusion, swelling,  erythema, synovitis  Skin:  Warm, no rash  Wound:   Neuro: Eyes intermittently open, moves left arm, b/l LE weakness    Psych: Unable to assess  Lymphatic:       Extrem: No LE edema b/l  VAD:  Peripheral IVs       Isolation: None      General Labs reviewed:  Recent Labs   Lab 05/12/22  1210 05/13/22  0341 05/16/22  0427   WBC 8.22 8.07 6.58   HGB 14.4 12.8* 13.5*   HCT 44.1 38.5* 42.6    254 264       Recent Labs   Lab 05/11/22  2303 05/12/22  1257 05/13/22  0341 05/14/22  1309 05/15/22  1359 05/16/22  0427    141 142 147* 147* 152*   K 3.5 4.6 4.4 3.8 4.2 4.4    107 108 108 112* 111*   CO2 19* 16* 17*  21* 17* 19*   BUN 17 27* 30* 42* 46* 49*   CREATININE 2.5* 3.3* 3.7* 5.9* 6.7* 6.7*   CALCIUM 8.5* 8.3* 7.9* 7.9* 8.1* 8.2*   PROT 7.1 7.2 6.2  --   --   --    BILITOT 0.9 0.7 0.5  --   --   --    ALKPHOS 105 139* 107  --   --   --    ALT 8* 58* 52*  --   --   --    AST 9* 83* 49*  --   --   --      No results for input(s): CRP in the last 168 hours.  No results for input(s): SEDRATE in the last 168 hours.    Estimated Creatinine Clearance: 11.4 mL/min (A) (based on SCr of 6.7 mg/dL (H)).     Micro:  Microbiology Results (last 7 days)     Procedure Component Value Units Date/Time    Blood culture [[id number]] Collected: 05/13/22 2007    Order Status: Completed Specimen: Blood from Peripheral, Left Arm Updated: 05/16/22 0613     Blood Culture, Routine No Growth to date      No Growth to date      No Growth to date    Narrative:      Collection has been rescheduled by ACD at 05/13/2022 15:13 Reason:   Unable to collect, no place to stick pt  Collection has been rescheduled by ACD at 05/13/2022 15:13 Reason:   Unable to collect, no place to stick pt    Culture, MRSA [[id number]] Collected: 05/15/22 2300    Order Status: Sent Specimen: MRSA source from Nares, Left Updated: 05/15/22 2318    Cryptococcal antigen, CSF [[id number]]     Order Status: No result Specimen: CSF (Spinal Fluid)     CSF culture [[id number]]     Order Status: No result Specimen: CSF (Spinal Fluid)     Direct AFB stain [[id number]]     Order Status: No result Specimen: CSF (Spinal Fluid)     Gram stain [[id number]]     Order Status: No result Specimen: CSF (Spinal Fluid)     AFB Culture & Smear [[id number]] Collected: 05/13/22 1508    Order Status: Completed Specimen: CSF (Spinal Fluid) from Nares, Right Updated: 05/15/22 0927     AFB Culture & Smear Culture in progress    Culture, MRSA [[id number]]     Order Status: Canceled Specimen: MRSA source from Nares, Left     Gram stain [[id number]]     Order Status: Canceled Specimen: CSF (Spinal Fluid)     Direct  AFB stain [[id number]]     Order Status: Canceled Specimen: CSF (Spinal Fluid)     Cryptococcal antigen, CSF [[id number]]     Order Status: Canceled Specimen: CSF (Spinal Fluid)     CSF culture [[id number]]     Order Status: Canceled Specimen: CSF (Spinal Fluid)           Imaging Reviewed:  CXR  CT and CTA head negative for acute ischemic stroke   MRI brain - acute ischemic stroke  1. This is a very limited evaluation but the study is abnormal.  There is a nonhemorrhagic infarction in the anterior lateral left frontal lobe.  2. There is a remote infarction with encephalomalacia and gliosis in the medial right occipital lobe.  There are also remote infarctions in the cerebellar hemispheres as well as in the right thalamus.    Cardiology:   ECHO 5/12/22:  · The left ventricle is moderately enlarged with eccentric hypertrophy and severely decreased systolic function.  · Grade II left ventricular diastolic dysfunction.  · The estimated PA systolic pressure is 59 mmHg.  · Severe right ventricular enlargement with mildly to moderately reduced right ventricular systolic function.  · Moderate left atrial enlargement.  · There is moderate pulmonary hypertension.  · Intermediate central venous pressure (8 mmHg).  · Mild aortic regurgitation.  · Moderate right atrial enlargement.  · Mild to moderate tricuspid regurgitation.  · Mild pulmonic regurgitation.  · The estimated ejection fraction is 20%.  · Moderate mitral regurgitation.          IMPRESSION & PLAN     1. Sepsis cannot exclude meningitis  - patient afebrile    Blood cultures 1/4 bottles coag-negative staph likely contaminant   Procal elevated due to acute renal failure   Strep urine Ag pending      2. Acute stroke which can also contribute to fever on admission   3. Kym, cr 2.5--3.7--5.9--6.7  3. 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

## 2022-05-17 LAB
ACID FAST MOD KINY STN SPEC: NORMAL
ALBUMIN SERPL BCP-MCNC: 2 G/DL (ref 3.5–5.2)
ALLENS TEST: ABNORMAL
ALP SERPL-CCNC: 77 U/L (ref 55–135)
ALT SERPL W/O P-5'-P-CCNC: 59 U/L (ref 10–44)
ANION GAP SERPL CALC-SCNC: 17 MMOL/L (ref 8–16)
AST SERPL-CCNC: 25 U/L (ref 10–40)
BACTERIA BLD CULT: NORMAL
BASOPHILS # BLD AUTO: 0.03 K/UL (ref 0–0.2)
BASOPHILS NFR BLD: 0.6 % (ref 0–1.9)
BILIRUB SERPL-MCNC: 0.4 MG/DL (ref 0.1–1)
BUN SERPL-MCNC: 30 MG/DL (ref 8–23)
CALCIUM SERPL-MCNC: 8 MG/DL (ref 8.7–10.5)
CHLORIDE SERPL-SCNC: 108 MMOL/L (ref 95–110)
CO2 SERPL-SCNC: 20 MMOL/L (ref 23–29)
CREAT SERPL-MCNC: 4 MG/DL (ref 0.5–1.4)
CRYPTOC AG CSF QL LA: NEGATIVE
DELSYS: ABNORMAL
DIFFERENTIAL METHOD: ABNORMAL
EOSINOPHIL # BLD AUTO: 0.3 K/UL (ref 0–0.5)
EOSINOPHIL NFR BLD: 5.1 % (ref 0–8)
EP: 5
ERYTHROCYTE [DISTWIDTH] IN BLOOD BY AUTOMATED COUNT: 17.8 % (ref 11.5–14.5)
EST. GFR  (AFRICAN AMERICAN): 17 ML/MIN/1.73 M^2
EST. GFR  (NON AFRICAN AMERICAN): 15 ML/MIN/1.73 M^2
FIO2: 25
GLUCOSE SERPL-MCNC: 177 MG/DL (ref 70–110)
HCO3 UR-SCNC: 24.7 MMOL/L (ref 24–28)
HCT VFR BLD AUTO: 38.9 % (ref 40–54)
HGB BLD-MCNC: 12.6 G/DL (ref 14–18)
HSV1, PCR, CSF: NEGATIVE
HSV2, PCR, CSF: NEGATIVE
IMM GRANULOCYTES # BLD AUTO: 0.01 K/UL (ref 0–0.04)
IMM GRANULOCYTES NFR BLD AUTO: 0.2 % (ref 0–0.5)
IP: 10
LYMPHOCYTES # BLD AUTO: 0.7 K/UL (ref 1–4.8)
LYMPHOCYTES NFR BLD: 13.8 % (ref 18–48)
MAGNESIUM SERPL-MCNC: 2.1 MG/DL (ref 1.6–2.6)
MCH RBC QN AUTO: 25 PG (ref 27–31)
MCHC RBC AUTO-ENTMCNC: 32.4 G/DL (ref 32–36)
MCV RBC AUTO: 77 FL (ref 82–98)
MIN VOL: 13.5
MODE: ABNORMAL
MONOCYTES # BLD AUTO: 0.5 K/UL (ref 0.3–1)
MONOCYTES NFR BLD: 9.1 % (ref 4–15)
NEUTROPHILS # BLD AUTO: 3.8 K/UL (ref 1.8–7.7)
NEUTROPHILS NFR BLD: 71.2 % (ref 38–73)
NRBC BLD-RTO: 0 /100 WBC
PCO2 BLDA: 41.6 MMHG (ref 35–45)
PH SMN: 7.38 [PH] (ref 7.35–7.45)
PHOSPHATE SERPL-MCNC: 4.5 MG/DL (ref 2.7–4.5)
PLATELET # BLD AUTO: 222 K/UL (ref 150–450)
PMV BLD AUTO: 11.4 FL (ref 9.2–12.9)
PO2 BLDA: 102 MMHG (ref 80–100)
POC BE: 0 MMOL/L
POC SATURATED O2: 98 % (ref 95–100)
POC TCO2: 26 MMOL/L (ref 23–27)
POCT GLUCOSE: 173 MG/DL (ref 70–110)
POCT GLUCOSE: 224 MG/DL (ref 70–110)
POCT GLUCOSE: 249 MG/DL (ref 70–110)
POCT GLUCOSE: 257 MG/DL (ref 70–110)
POCT GLUCOSE: 258 MG/DL (ref 70–110)
POTASSIUM SERPL-SCNC: 4.1 MMOL/L (ref 3.5–5.1)
PROT SERPL-MCNC: 5.9 G/DL (ref 6–8.4)
RBC # BLD AUTO: 5.05 M/UL (ref 4.6–6.2)
SAMPLE: ABNORMAL
SITE: ABNORMAL
SODIUM SERPL-SCNC: 145 MMOL/L (ref 136–145)
SP02: 100
SPONT RATE: 15
WBC # BLD AUTO: 5.3 K/UL (ref 3.9–12.7)

## 2022-05-17 PROCEDURE — 25000003 PHARM REV CODE 250: Performed by: INTERNAL MEDICINE

## 2022-05-17 PROCEDURE — 36415 COLL VENOUS BLD VENIPUNCTURE: CPT | Performed by: INTERNAL MEDICINE

## 2022-05-17 PROCEDURE — 36600 WITHDRAWAL OF ARTERIAL BLOOD: CPT

## 2022-05-17 PROCEDURE — 99900035 HC TECH TIME PER 15 MIN (STAT)

## 2022-05-17 PROCEDURE — 82803 BLOOD GASES ANY COMBINATION: CPT

## 2022-05-17 PROCEDURE — 27000221 HC OXYGEN, UP TO 24 HOURS

## 2022-05-17 PROCEDURE — B4185 PARENTERAL SOL 10 GM LIPIDS: HCPCS | Performed by: INTERNAL MEDICINE

## 2022-05-17 PROCEDURE — 63600175 PHARM REV CODE 636 W HCPCS: Performed by: INTERNAL MEDICINE

## 2022-05-17 PROCEDURE — 63600175 PHARM REV CODE 636 W HCPCS: Performed by: STUDENT IN AN ORGANIZED HEALTH CARE EDUCATION/TRAINING PROGRAM

## 2022-05-17 PROCEDURE — 80100014 HC HEMODIALYSIS 1:1

## 2022-05-17 PROCEDURE — 84100 ASSAY OF PHOSPHORUS: CPT | Performed by: INTERNAL MEDICINE

## 2022-05-17 PROCEDURE — 83735 ASSAY OF MAGNESIUM: CPT | Performed by: INTERNAL MEDICINE

## 2022-05-17 PROCEDURE — A4217 STERILE WATER/SALINE, 500 ML: HCPCS | Performed by: INTERNAL MEDICINE

## 2022-05-17 PROCEDURE — 85025 COMPLETE CBC W/AUTO DIFF WBC: CPT | Performed by: INTERNAL MEDICINE

## 2022-05-17 PROCEDURE — 99291 CRITICAL CARE FIRST HOUR: CPT | Mod: ,,, | Performed by: INTERNAL MEDICINE

## 2022-05-17 PROCEDURE — 20000000 HC ICU ROOM

## 2022-05-17 PROCEDURE — 27000923 HC TRIALYSIS CATHETER, ANY SIZE

## 2022-05-17 PROCEDURE — 80053 COMPREHEN METABOLIC PANEL: CPT | Performed by: INTERNAL MEDICINE

## 2022-05-17 PROCEDURE — 94660 CPAP INITIATION&MGMT: CPT

## 2022-05-17 PROCEDURE — 99291 PR CRITICAL CARE, E/M 30-74 MINUTES: ICD-10-PCS | Mod: ,,, | Performed by: INTERNAL MEDICINE

## 2022-05-17 PROCEDURE — 94761 N-INVAS EAR/PLS OXIMETRY MLT: CPT

## 2022-05-17 RX ORDER — LABETALOL HYDROCHLORIDE 5 MG/ML
10 INJECTION, SOLUTION INTRAVENOUS EVERY 6 HOURS PRN
Status: DISCONTINUED | OUTPATIENT
Start: 2022-05-17 | End: 2022-05-26 | Stop reason: HOSPADM

## 2022-05-17 RX ORDER — SODIUM CHLORIDE 9 MG/ML
INJECTION, SOLUTION INTRAVENOUS ONCE
Status: CANCELLED | OUTPATIENT
Start: 2022-05-17 | End: 2022-05-17

## 2022-05-17 RX ORDER — INSULIN ASPART 100 [IU]/ML
1-10 INJECTION, SOLUTION INTRAVENOUS; SUBCUTANEOUS EVERY 4 HOURS PRN
Status: DISCONTINUED | OUTPATIENT
Start: 2022-05-17 | End: 2022-05-24

## 2022-05-17 RX ORDER — FAMOTIDINE 10 MG/ML
20 INJECTION INTRAVENOUS DAILY
Status: DISCONTINUED | OUTPATIENT
Start: 2022-05-17 | End: 2022-05-26 | Stop reason: HOSPADM

## 2022-05-17 RX ORDER — NICARDIPINE HYDROCHLORIDE 0.2 MG/ML
0-15 INJECTION INTRAVENOUS CONTINUOUS
Status: DISCONTINUED | OUTPATIENT
Start: 2022-05-17 | End: 2022-05-22

## 2022-05-17 RX ORDER — HEPARIN SODIUM 5000 [USP'U]/ML
5000 INJECTION, SOLUTION INTRAVENOUS; SUBCUTANEOUS
Status: CANCELLED | OUTPATIENT
Start: 2022-05-17

## 2022-05-17 RX ORDER — ENOXAPARIN SODIUM 100 MG/ML
30 INJECTION SUBCUTANEOUS DAILY
Status: DISCONTINUED | OUTPATIENT
Start: 2022-05-17 | End: 2022-05-18

## 2022-05-17 RX ORDER — SODIUM CHLORIDE 9 MG/ML
INJECTION, SOLUTION INTRAVENOUS
Status: CANCELLED | OUTPATIENT
Start: 2022-05-17

## 2022-05-17 RX ADMIN — HYDRALAZINE HYDROCHLORIDE 10 MG: 20 INJECTION, SOLUTION INTRAMUSCULAR; INTRAVENOUS at 04:05

## 2022-05-17 RX ADMIN — INSULIN ASPART 2 UNITS: 100 INJECTION, SOLUTION INTRAVENOUS; SUBCUTANEOUS at 06:05

## 2022-05-17 RX ADMIN — ASCORBIC ACID, VITAMIN A PALMITATE, CHOLECALCIFEROL, THIAMINE HYDROCHLORIDE, RIBOFLAVIN-5 PHOSPHATE SODIUM, PYRIDOXINE HYDROCHLORIDE, NIACINAMIDE, DEXPANTHENOL, ALPHA-TOCOPHEROL ACETATE, VITAMIN K1, FOLIC ACID, BIOTIN, CYANOCOBALAMIN: 200; 3300; 200; 6; 3.6; 6; 40; 15; 10; 150; 600; 60; 5 INJECTION, SOLUTION INTRAVENOUS at 09:05

## 2022-05-17 RX ADMIN — INSULIN ASPART 3 UNITS: 100 INJECTION, SOLUTION INTRAVENOUS; SUBCUTANEOUS at 12:05

## 2022-05-17 RX ADMIN — MORPHINE SULFATE 3 MG: 4 INJECTION INTRAVENOUS at 02:05

## 2022-05-17 RX ADMIN — NICARDIPINE HYDROCHLORIDE 2.5 MG/HR: 0.2 INJECTION, SOLUTION INTRAVENOUS at 06:05

## 2022-05-17 RX ADMIN — ENOXAPARIN SODIUM 30 MG: 30 INJECTION, SOLUTION INTRAVENOUS; SUBCUTANEOUS at 10:05

## 2022-05-17 RX ADMIN — INSULIN ASPART 6 UNITS: 100 INJECTION, SOLUTION INTRAVENOUS; SUBCUTANEOUS at 09:05

## 2022-05-17 RX ADMIN — HYDRALAZINE HYDROCHLORIDE 10 MG: 20 INJECTION, SOLUTION INTRAMUSCULAR; INTRAVENOUS at 09:05

## 2022-05-17 RX ADMIN — LABETALOL HYDROCHLORIDE 10 MG: 5 INJECTION INTRAVENOUS at 10:05

## 2022-05-17 RX ADMIN — I.V. FAT EMULSION 250 ML: 20 EMULSION INTRAVENOUS at 10:05

## 2022-05-17 RX ADMIN — MUPIROCIN: 20 OINTMENT TOPICAL at 09:05

## 2022-05-17 RX ADMIN — INSULIN DETEMIR 8 UNITS: 100 INJECTION, SOLUTION SUBCUTANEOUS at 09:05

## 2022-05-17 RX ADMIN — FAMOTIDINE 20 MG: 10 INJECTION INTRAVENOUS at 10:05

## 2022-05-17 RX ADMIN — CEFTRIAXONE 2 G: 2 INJECTION, SOLUTION INTRAVENOUS at 04:05

## 2022-05-17 RX ADMIN — ASPIRIN 150 MG: 300 SUPPOSITORY RECTAL at 09:05

## 2022-05-17 NOTE — PROGRESS NOTES
Pharmacist Renal Dose Adjustment Note    Marshall Flor is a 66 y.o. male being treated with the medication Famotidine    Patient Data:    Vital Signs (Most Recent):  Temp: 98 °F (36.7 °C) (05/17/22 0800)  Pulse: 74 (05/17/22 1000)  Resp: 17 (05/17/22 1000)  BP: (!) 182/77 (05/17/22 1000)  SpO2: 100 % (05/17/22 1000)   Vital Signs (72h Range):  Temp:  [97 °F (36.1 °C)-99.1 °F (37.3 °C)]   Pulse:  []   Resp:  []   BP: (104-212)/()   SpO2:  [38 %-100 %]   Arterial Line BP: (132-190)/(50-60)      Recent Labs   Lab 05/15/22  1359 05/16/22  0427 05/17/22  0312   CREATININE 6.7* 6.7* 4.0*     Serum creatinine: 4 mg/dL (H) 05/17/22 0312  Estimated creatinine clearance: 19.1 mL/min (A)    Famotidine therapy for Marshall Flor [id number] has been evaluated according to the pharmacy practice protocol.      Based on the patient's Estimated Creatinine Clearance: 19.1 mL/min (A) (based on SCr of 4 mg/dL (H))., famotidine therapy has been adjusted to 20 mg once daily.    Thank you,  Rosalinda Gould

## 2022-05-17 NOTE — CONSULTS
Consult noted; chart reviewed. Pt is a FULL CODE; no ACP docs noted. Pt on BIPAP with Acute CVA; nonverbal. No family currently at the bedside.     Called both of patient's Sons:Joseph [phone] and Jefry [phone]. Reports pt has no POA/LW. Came from home with family; was walking with cane.  Introduced Palliative care as an extra layer of support for patients and families dealing with chronic illnesses. Both report they have received updates on pt's condition from Dr. Charles. They are agreeable to family meeting with Dr. Casey tomorrow @ 1:00pm. CM and Dr. Correa, Dr. Charles updated. PC Team will continue to follow. Spiritual care also following.       1100- Schedule conflict with Dr. Casey. Called sons back; agreeable to 2:30pm. Medical team updated.

## 2022-05-17 NOTE — CARE UPDATE
05/16/22 2033   PRE-TX-O2   O2 Device (Oxygen Therapy) BiPAP   Oxygen Concentration (%) 25   SpO2 100 %   Pulse Oximetry Type Continuous   Pulse (!) 57   Resp 17   BP (!) 158/65   Ready to Wean/Extubation Screen   FIO2<=50 (chart decimal) 0.25   Preset CPAP/BiPAP Settings   Mode Of Delivery BiPAP   $ Is patient using? Yes   Size of Mask Medium/Large   Sized Appropriately? Yes   Equipment Type V60   Airway Device Type medium full face mask   Ipap 10   EPAP (cm H2O) 5   Pressure Support (cm H2O) 5   Set Rate (Breaths/Min) 12   ITime (sec) 1   Rise Time (sec) 2   Patient CPAP/BiPAP Settings   RR Total (Breaths/Min) 19   Tidal Volume (mL) 499   VE Minute Ventilation (L/min) 9.2 L/min   Peak Inspiratory Pressure (cm H2O) 10   TiTOT (%) 35   Total Leak (L/Min) 0   Patient Trigger - ST Mode Only (%) 100   CPAP/BiPAP Alarms   High Pressure (cm H2O) 15   Low Pressure (cm H2O) 5   Minute Ventilation (L/Min) 1.5   High RR (breaths/min) 45   Low RR (breaths/min) 10   Apnea (Sec) 15

## 2022-05-17 NOTE — PROGRESS NOTES
The enoxaparin dose has been adjusted for Marshall Flor [id number] according to the pharmacy practice protocol.      Based on the patient's Estimated Creatinine Clearance: 19.1 mL/min (A) (based on SCr of 4 mg/dL (H))., Body mass index is 28.18 kg/m²., and weight= 81.6 kg (179 lb 14.3 oz), the enoxaparin dose has been adjusted 30 mg every 24 hours.    Thank you,  Zuhair Sebastian, PharmD  [phone]   · Continue diltiazem, Eliquis for anticoagulation

## 2022-05-17 NOTE — PROGRESS NOTES
Pt on HD in ICU. Bipap and NPO x 5 days. See nutrition note for full TPN recs. 5/16/22 ( Greer Mejia).     However, pt only with midline at this time- plan to start PPN for now   D 5 AA 4.25 @ 75 ml/hr + standard lipids  provides 76 g protein ( 82% EPN), 1110 kcal ( 47% EEN)    Needs adjusted for HD  Weight Used For Calorie Calculations: 78 kg NHANES  Energy Calorie Requirements (kcal): 2340 kcals/day  Energy Need Method: 30 kcal/kg ( overweight vs. HD)  Protein Requirements: 93g ( 1.2 g protein/kg HD)  Weight Used For Protein Calculations: 78 kg HD  Estimated Fluid Requirement Method: 1500 ml HD

## 2022-05-17 NOTE — PROGRESS NOTES
Progress Note  PULMONARY    Admit Date: 5/12/2022 05/17/2022    History of Present Illness:  Pt is a 65 yo male with CHF, DM2, CVA, HTN who presented to the ED at [facility] for AMS and fever then transferred for higher level of care to [facility] on 5/12/22. Chart reviewed- hospital course complicated by sepsis, acute renal failure requiring HD, resp failure requiring BIPAP. Per anesthesia he will be intubated for RAJ today. Pulm is consulted for management of resp failure and potential ventilator requirement for procedure.   I discussed over the phone w/ pt's nephew Alphonso, brother Tony and sister Nahomi who provided more history as pt is nonverbal and encephalopathic. At baseline pt is independent, walks and talks and cares for himself. He has 2 adult sons who have not been very involved in care so far- alphonso reports he talks w/ them and explains everything.    SUBJECTIVE:     5/17- pt continues on bipap, hypertensive to 180s-190s today, getting hydralazine IV. RAJ delayed per cardiology. Pt underwent HD yesterday with removal of 1.5L      OBJECTIVE:     Vitals (Most recent):  Vitals:    05/17/22 0821   BP:    Pulse: 71   Resp: 12   Temp:        Vitals (24 hour range):  Temp:  [97 °F (36.1 °C)-98.1 °F (36.7 °C)]   Pulse:  [57-80]   Resp:  [12-28]   BP: (122-206)/(54-78)   SpO2:  [100 %]   Arterial Line BP: (132-190)/(50-60)       Intake/Output Summary (Last 24 hours) at 5/17/2022 0900  Last data filed at 5/17/2022 0634  Gross per 24 hour   Intake 1096.25 ml   Output 2003 ml   Net -906.75 ml          Physical Exam:  The patient's neuro status (alertness,orientation,cognitive function,motor skills,), pharyngeal exam (oral lesions, hygiene, abn dentition,), Neck (jvd,mass,thyroid,nodes in neck and above/below clavicle),RESPIRATORY(symmetry,effort,fremitus,percussion,auscultation),  Cor(rhythm,heart tones including gallops,perfusion,edema)ABD(distention,hepatic&splenomegaly,tenderness,masses),  Skin(rash,cyanosis),Psyc(affect,judgement,).  Exam negative except for these pertinent findings:    On bipap, eyes open, does not track  GCS 10  PERRL  No response to voice  Localizes pain  Moves left hand spontaneously  HR regular  Breath sounds diminished bilaterally  HD access RIJ  abd soft nontender  No edema        Radiographs reviewed: view by direct vision   CXR 5/16/22- pulmonary vasc congestion, heart enlarged    TTE 5/12/22-   · The left ventricle is moderately enlarged with eccentric hypertrophy and severely decreased systolic function.  · Grade II left ventricular diastolic dysfunction.  · The estimated PA systolic pressure is 59 mmHg.  · Severe right ventricular enlargement with mildly to moderately reduced right ventricular systolic function.  · Moderate left atrial enlargement.  · There is moderate pulmonary hypertension.  · Intermediate central venous pressure (8 mmHg).  · Mild aortic regurgitation.  · Moderate right atrial enlargement.  · Mild to moderate tricuspid regurgitation.  · Mild pulmonic regurgitation.  · The estimated ejection fraction is 20%.  · Moderate mitral regurgitation.      Labs     Recent Labs   Lab 05/17/22  0312   WBC 5.30   HGB 12.6*   HCT 38.9*        Recent Labs   Lab 05/17/22  0312      K 4.1      CO2 20*   BUN 30*   CREATININE 4.0*   *   CALCIUM 8.0*   MG 2.1   PHOS 4.5   AST 25   ALT 59*   ALKPHOS 77   BILITOT 0.4   PROT 5.9*   ALBUMIN 2.0*     Recent Labs   Lab 05/16/22  1525   PH 7.290*   PCO2 34.7*   PO2 161*   HCO3 16.7*     Microbiology Results (last 7 days)     Procedure Component Value Units Date/Time    Direct AFB stain [[id number]] Collected: 05/16/22 1404    Order Status: Completed Specimen: CSF (Spinal Fluid) from CSF Tap, Tube 2 Updated: 05/17/22 0843     Direct Acid Fast No acid fast bacilli seen.    Blood culture [[id number]] Collected: 05/13/22 2007    Order Status: Completed Specimen: Blood from Peripheral, Left Arm Updated: 05/17/22  0612     Blood Culture, Routine No Growth to date      No Growth to date      No Growth to date      No Growth to date    Narrative:      Collection has been rescheduled by ACD at 05/13/2022 15:13 Reason:   Unable to collect, no place to stick pt  Collection has been rescheduled by ACD at 05/13/2022 15:13 Reason:   Unable to collect, no place to stick pt    Cryptococcal antigen, CSF [[id number]] Collected: 05/16/22 1404    Order Status: Sent Specimen: CSF (Spinal Fluid) from CSF Tap, Tube 2 Updated: 05/17/22 0232    CSF culture [[id number]] Collected: 05/16/22 1404    Order Status: Completed Specimen: CSF (Spinal Fluid) from CSF Tap, Tube 2 Updated: 05/16/22 1707     Gram Stain Result No WBC's, epithelial cells or organisms seen    AFB Culture & Smear [[id number]] Collected: 05/13/22 1508    Order Status: Completed Specimen: CSF (Spinal Fluid) from Nares, Right Updated: 05/16/22 1446     AFB Culture & Smear Culture in progress     AFB CULTURE STAIN No acid fast bacilli seen.    Gram stain [[id number]] Collected: 05/16/22 1404    Order Status: Canceled Specimen: CSF (Spinal Fluid) from CSF Tap, Tube 2     Culture, MRSA [[id number]] Collected: 05/15/22 2300    Order Status: Sent Specimen: MRSA source from Nares, Left Updated: 05/16/22 1042    Culture, MRSA [[id number]]     Order Status: Canceled Specimen: MRSA source from Nares, Left     Gram stain [[id number]]     Order Status: Canceled Specimen: CSF (Spinal Fluid)     Direct AFB stain [[id number]]     Order Status: Canceled Specimen: CSF (Spinal Fluid)     Cryptococcal antigen, CSF [[id number]]     Order Status: Canceled Specimen: CSF (Spinal Fluid)     CSF culture [[id number]]     Order Status: Canceled Specimen: CSF (Spinal Fluid)           Impression:  Active Hospital Problems    Diagnosis  POA    *CVA (cerebral vascular accident) [I63.9]  Yes    MERCEDES (acute kidney injury) [N17.9]  No    Chronic combined systolic and diastolic CHF (congestive heart failure) [I50.42]  Yes     Meningitis (needs to be ruled out) [G03.9]  Yes    Encephalopathy [G93.40]  Yes    Essential hypertension [I10]  Yes    Coronary artery disease involving native coronary artery of native heart [I25.10]  Yes    Hyperlipidemia [E78.5]  Yes    Type 2 diabetes mellitus with stage 4 chronic kidney disease, without long-term current use of insulin [E11.22, N18.4]  Yes    CKD (chronic kidney disease) stage 4, GFR 15-29 ml/min [N18.4]  Yes      Resolved Hospital Problems   No resolved problems to display.               Plan:       - BIPAP continue  - repeat ABG s/p dialysis then could consider coming off bipap if improved  - HD per nephrology  - monitor neuro status- neuro following  - control BP  - antibiotics per ID  - needs DVT and GI prophylaxis  - per hospitalist may require PEG  - pending palliative meeting for goals of care      The following were evaluated and adjusted as needed: mechanical ventilator settings and weaning status, intravenous fluids and nutritional status, sedation and neurologic status, acid base balance and oxygenation needs and input and output and renal status       Critical Care  - THE PATIENT HAS A HIGH PROBABILITY OF IMMINENT OR LIFE THREATENING DETERIORATION.  Over 50%time of encounter was in direct care at bedside.  Time was 30 to 74 minutes required for patient care.  Time needed for all of the above totaled 35 minutes.    Tosin Charles MD  Pulmonary & Critical Care Medicine                              .

## 2022-05-17 NOTE — PROGRESS NOTES
Nephrology Progress Note        Patient Name: Marshall Flor  MRN: [id number]    Patient Class: IP- Inpatient   Admission Date: 5/12/2022  Length of Stay: 5 days  Date of Service: 5/17/2022    Attending Physician: Dimitry Correa MD  Primary Care Provider: Dorie Quan MD    Reason for Consult: dione/acidosis/sepsis/stroke/ckd3/fever/anemia/htn/chf    SUBJECTIVE:     HPI: 66-year-old male with history of HTN, CHF, diabetes presents to [place] for decreased responsiveness, found to have a left gaze preference, then sent to [facility], where was found febrile, hypertensive. UA 3+ protein, 2+ glucose, WBC 3, moderate bacteria. Chest x-ray suggestive of pneumonia right upper lobe. MRI brain nonhemorrhagic infarction in the right anterolateral frontal lobe.Treated for sepsis ? PNA, ? Meningitis? and stroke.    5/14- BP all over the place, on 3L NC, UOP 481cc  5/15- BP still all over the place, on BIPAP, UOP 455cc- no labs drawn today and its after 1pm  5/16  Renal function continues to decline.  Mental status no better. Consent for dialysis obtained from patient's son yesterday by Dr. Busch.  I called to confirm and no answer (no voice mail available)  5/17  On bipap.  Not interactive.  Oliguric    Outpatient meds:  No current facility-administered medications on file prior to encounter.     Current Outpatient Medications on File Prior to Encounter   Medication Sig Dispense Refill    allopurinoL (ZYLOPRIM) 100 MG tablet   = 2 tab, Oral, Daily, # 60 tab, 5 Refill(s), Pharmacy: [id number], 167, cm, 07/27/21 8:24:00 CDT, Height/Length Measured, 91.5, kg, 12/15/20 11:18:00 CST, Weight Dosing      amLODIPine (NORVASC) 10 MG tablet 10 mg.      aspirin (ECOTRIN) 81 MG EC tablet Take 1 tablet (81 mg total) by mouth once daily. 30 tablet 3    atorvastatin (LIPITOR) 40 MG tablet   = 1 tab, Oral, Daily, # 90 tab, 1 Refill(s), Soft Stop, Pharmacy: [id number], 167, cm, 04/07/21 14:01:00 CDT,  Height/Length Measured, 91.5, kg, 12/15/20 11:18:00 CST, Weight Dosing      carvediloL (COREG) 3.125 MG tablet Take 1 tablet (3.125 mg total) by mouth once daily. 30 tablet 1    cloNIDine (CATAPRES) 0.1 MG tablet Take 2 tablets (0.2 mg total) by mouth 3 (three) times daily. 180 tablet 11    clopidogreL (PLAVIX) 75 mg tablet Take 75 mg by mouth once daily.      furosemide (LASIX) 20 MG tablet Take 1 tablet (20 mg total) by mouth once daily. 30 tablet 1    hydrALAZINE (APRESOLINE) 50 MG tablet Take 1 tablet (50 mg total) by mouth every 12 (twelve) hours. 60 tablet 0    linaGLIPtin (TRADJENTA) 5 mg Tab tablet 5 mg.      NOVOLOG MIX 70-30FLEXPEN U-100 100 unit/mL (70-30) InPn pen Inject into the skin.      sodium bicarbonate 650 MG tablet Take 1 tablet (650 mg total) by mouth 2 (two) times daily. 60 tablet 11       Scheduled meds:   aspirin  150 mg Rectal Daily    cefTRIAXone (ROCEPHIN) IVPB  2 g Intravenous Q24H    cloNIDine 0.2 mg/24 hr td ptwk  1 patch Transdermal Q7 Days    enoxaparin  30 mg Subcutaneous Daily    famotidine (PF)  20 mg Intravenous Daily    fat emulsion 20%  250 mL Intravenous Daily    insulin detemir U-100  8 Units Subcutaneous Daily    mupirocin   Nasal BID       Infusions:   TPN ADULT CENTRAL LINE CUSTOM         PRN meds:  acetaminophen, dextrose 10%, dextrose 10%, glucagon (human recombinant), glucose, glucose, heparin (porcine), hydrALAZINE, insulin aspart U-100, labetaloL, morphine, naloxone, ondansetron, oxyCODONE, polyethylene glycol, sodium chloride 0.9%    Review of Systems:  Neg    OBJECTIVE:     Vital Signs and IO (Last 24H):  Temp:  [97 °F (36.1 °C)-98.1 °F (36.7 °C)]   Pulse:  [57-84]   Resp:  [12-24]   BP: (122-217)/(55-88)   SpO2:  [100 %]   Arterial Line BP: (132-190)/(50-60)   I/O last 3 completed shifts:  In: 1290.6 [I.V.:596.3; Other:500; IV Piggyback:194.3]  Out: 2628 [Urine:1128; Other:1500]    Wt Readings from Last 5 Encounters:   05/17/22 81.6 kg (179 lb 14.3  oz)   05/11/22 87.5 kg (193 lb)   05/12/22 87.5 kg (192 lb 14.4 oz)   04/26/22 95.3 kg (210 lb)   02/02/22 91.1 kg (200 lb 13.4 oz)     Physical Exam:  Constitutional: on BIPAP, ill, eyes closed, laying flat  Heart: rrr, no m/r/g, wwp, no edema  Lungs: coarse, no w/r/r/c, no lb  Abdomen: s/nt/nd, +BS    Body mass index is 28.18 kg/m².    Laboratory:  Recent Labs   Lab 05/15/22  1359 05/16/22  0427 05/17/22  0312   * 152* 145   K 4.2 4.4 4.1   * 111* 108   CO2 17* 19* 20*   BUN 46* 49* 30*   CREATININE 6.7* 6.7* 4.0*   ESTGFRAFRICA 9* 9* 17*   EGFRNONAA 8* 8* 15*   * 135* 177*       Recent Labs   Lab 05/12/22  1257 05/13/22  0341 05/14/22  1309 05/15/22  1359 05/16/22  0427 05/17/22  0312   CALCIUM 8.3* 7.9*   < > 8.1* 8.2* 8.0*   ALBUMIN 2.7* 2.2*  --  2.1* 2.2* 2.0*   PHOS 4.1 4.4  --  6.3* 6.6* 4.5   MG 1.6 1.5*  --   --   --  2.1    < > = values in this interval not displayed.       Recent Labs   Lab 04/12/21  0952   PTH, Intact 273.0 H       Recent Labs   Lab 05/14/22  1808 05/14/22  2056 05/15/22  0028 05/15/22  0438 05/15/22  1153 05/16/22  0016 05/16/22  0550 05/16/22  1603 05/17/22  0116 05/17/22  0926   POCTGLUCOSE 177* 163* 136* 152* 133* 132* 146* 152* 173* 249*       Recent Labs   Lab 04/12/21  0952 05/12/22  1210 05/13/22  0341   Hemoglobin A1C 7.8 H 8.8 H 9.1 H       Recent Labs   Lab 05/13/22  0341 05/16/22  0427 05/17/22  0312   WBC 8.07 6.58 5.30   HGB 12.8* 13.5* 12.6*   HCT 38.5* 42.6 38.9*    264 222   MCV 76* 77* 77*   MCHC 33.2 31.7* 32.4   MONO 6.4  0.5 8.7  0.6 9.1  0.5       Recent Labs   Lab 05/12/22  1257 05/13/22  0341 05/15/22  1359 05/16/22  0427 05/17/22 0312   BILITOT 0.7 0.5  --   --  0.4   PROT 7.2 6.2  --   --  5.9*   ALBUMIN 2.7* 2.2* 2.1* 2.2* 2.0*   ALKPHOS 139* 107  --   --  77   ALT 58* 52*  --   --  59*   AST 83* 49*  --   --  25       Recent Labs   Lab 01/26/22  0639 05/11/22  2300 05/12/22  1400   Color, UA Yellow Yellow Yellow   Appearance, UA  Cloudy A Clear Clear   pH, UA 5.0 6.0 6.0   Specific [place], UA 1.020 1.020 1.020   Protein, UA 2+ A 3+ A 3+ A   Glucose, UA Negative 2+ A 3+ A   Ketones, UA Negative Negative Negative   Urobilinogen, UA 1.0 Negative Negative   Bilirubin (UA) Negative Negative Negative   Occult Blood UA 2+ A 2+ A 2+ A   Nitrite, UA Negative Negative Negative   RBC, UA 33 H 10 H 20 H   WBC, UA 55 H 3 6 H   Bacteria Moderate A Moderate A Few A   Hyaline Casts, UA 3 A 1 0       Recent Labs   Lab 05/12/22 2055 05/16/22  1525 05/17/22  1102   POC PH 7.417 7.290 LL 7.381   POC PCO2 31.1 L 34.7 L 41.6   POC HCO3 20.0 L 16.7 L 24.7   POC PO2 84 161 H 102 H   POC SATURATED O2 97 99 98   POC BE -5 -10 0   Sample ARTERIAL ARTERIAL ARTERIAL       Microbiology Results (last 7 days)     Procedure Component Value Units Date/Time    Cryptococcal antigen, CSF [[id number]] Collected: 05/16/22 1404    Order Status: Completed Specimen: CSF (Spinal Fluid) from CSF Tap, Tube 2 Updated: 05/17/22 0922     Crypto Ag, CSF Negative    Direct AFB stain [[id number]] Collected: 05/16/22 1404    Order Status: Completed Specimen: CSF (Spinal Fluid) from CSF Tap, Tube 2 Updated: 05/17/22 0843     Direct Acid Fast No acid fast bacilli seen.    Blood culture [[id number]] Collected: 05/13/22 2007    Order Status: Completed Specimen: Blood from Peripheral, Left Arm Updated: 05/17/22 0612     Blood Culture, Routine No Growth to date      No Growth to date      No Growth to date      No Growth to date    Narrative:      Collection has been rescheduled by ACD at 05/13/2022 15:13 Reason:   Unable to collect, no place to stick pt  Collection has been rescheduled by ACD at 05/13/2022 15:13 Reason:   Unable to collect, no place to stick pt    CSF culture [[id number]] Collected: 05/16/22 1404    Order Status: Completed Specimen: CSF (Spinal Fluid) from CSF Tap, Tube 2 Updated: 05/16/22 1707     Gram Stain Result No WBC's, epithelial cells or organisms seen    AFB Culture & Smear  [[id number]] Collected: 05/13/22 1508    Order Status: Completed Specimen: CSF (Spinal Fluid) from Nares, Right Updated: 05/16/22 1446     AFB Culture & Smear Culture in progress     AFB CULTURE STAIN No acid fast bacilli seen.    Gram stain [[id number]] Collected: 05/16/22 1404    Order Status: Canceled Specimen: CSF (Spinal Fluid) from CSF Tap, Tube 2     Culture, MRSA [[id number]] Collected: 05/15/22 2300    Order Status: Sent Specimen: MRSA source from Nares, Left Updated: 05/16/22 1042    Culture, MRSA [[id number]]     Order Status: Canceled Specimen: MRSA source from Nares, Left     Gram stain [[id number]]     Order Status: Canceled Specimen: CSF (Spinal Fluid)     Direct AFB stain [[id number]]     Order Status: Canceled Specimen: CSF (Spinal Fluid)     Cryptococcal antigen, CSF [[id number]]     Order Status: Canceled Specimen: CSF (Spinal Fluid)     CSF culture [[id number]]     Order Status: Canceled Specimen: CSF (Spinal Fluid)         ASSESSMENT/PLAN:     Oliguric MERCEDES due to ATN; CKD III  - renal function is steadily deteriorating as of yest- oliguric- ID stopped acyclovir-  -initiated hemodialysis for uremic clearance on 5/16  Repeat hd today and repeat tomorrow      HTN/Acute CVA/CHF  - off cardene gtt but probably needs to be resumed since not able to get any PO meds    Hypernatremia--145 Na dialysate  Hypokalemia--better  HypoMg--no recent levels.  Check with next blood work  Acidosis  - advise use of hypotonic carriers  - will supplement as needed    SHPT  - no active issues    Anemia of CKD  - stable    Thank you for allowing us to participate in the care of your patient!   We will follow the patient and provide recommendations as needed.    Patient care time was spent personally by me on the following activities: > 35 min  · Obtaining a history.  · Examination of patient.  · Providing medical care at the patients bedside.  · Developing a treatment plan with patient or surrogate and bedside  caregivers.  · Ordering and reviewing laboratory studies, radiographic studies, pulse oximetry.  · Ordering and performing treatments and interventions.  · Evaluation of patient's response to treatment.  · Discussions with consultants while on the unit and immediately available to the patient.  · Re-evaluation of the patient's condition.  · Documentation in the medical record.     Ariel Little MD    [facility]  [address] [place] [id number]    [phone] - tel  [phone] - fax    5/17/2022

## 2022-05-17 NOTE — PROGRESS NOTES
1000 ml net uf removed   05/16/22 1900   Vital Signs   Temp 97 °F (36.1 °C)   Pulse 65   Resp 19   SpO2 100 %   BP (!) 146/62   MAP (mmHg) 89   Pre-Hemodialysis Assessment   Treatment Status Completed   Trialysis (Dialysis) Catheter 05/16/22 0937 right internal jugular   Placement Date/Time: 05/16/22 0937   Location: right internal jugular  Catheter Size (Fr): 14 Fr  Catheter Secured At (cm): 16  Placement Verification: X-ray   Venous Patency/Care heparin locked   Arterial Patency/Care heparin locked   Post-Hemodialysis Assessment   Rinseback Volume (mL) 250 mL   Blood Volume Processed (Liters) 51 L   Dialyzer Clearance Lightly streaked   Duration of Treatment 180 minutes   Additional Fluid Intake (mL) 500 mL   Total UF (mL) 1500 mL   Net Fluid Removal 1000   Patient Response to Treatment tolerated well   Post-Treatment Weight 80.6 kg (177 lb 11.1 oz)   Treatment Weight Change 80.6   Post-Hemodialysis Comments tx completed

## 2022-05-17 NOTE — PLAN OF CARE
Pt nonverbal on continuous BiPAP with o2 100%. Pt given x1 dose hydralazine for BP over 180 systolic, x1 dose ativan for increased HR, BP, and noted anxiety. Pt also received x1 dose morphine for pain. Pt SR in monitor. Pt a-line removed around 0210 due to site leaking and line being out of pt. See flow sheets for further assessment and vital sign data. Dialysis removed 1L. Clinamix at 75ml/hr. Pt able to spontaneously move bilateral feet. Pt remains unable to move RUE. Urinary output minimum during shift. POC reviewed with pt. Bed alarm on, call light in reach, and bed in lowest position with wheels locked.

## 2022-05-17 NOTE — PROGRESS NOTES
[facility] Medicine  Progress Note    Patient Name: Marshall Flor  MRN: [id number]  Patient Class: IP- Inpatient   Admission Date: 5/12/2022  Length of Stay: 5 days  Attending Physician: Dimitry Correa MD  Primary Care Provider: Dorie Quan MD        Subjective:     Principal Problem:CVA (cerebral vascular accident)        HPI:  66-year-old male with history of CHF, diabetes presents to [place] for decreased responsiveness found to have a fever, left gaze preference with nuchal rigidity found to have meningitis in addition to being found to have an acute CVA. Beyond this he has an MERCEDES.            Still not interacting. Will open eyes but doesn't follow commands. On BiPAP    NAD, Alert but not interactive  NC, AT  RRR  CTAB  SNTND+BS  No edema   No gross joint abnormalities     Vitals, labs and radiographs from past 24h reviewed and personally interpreted.         Overview/Hospital Course:              Assessment/Plan:         1. Acute CVA  -rectal aspirin  -repeat CT   -On TPN  -neurology      2. Acute bacterial pneumonia 2/2 unknown organism  -treatment complete.      CVA (cerebral vascular accident)  Proven on MRI.  Old infarcts seen as well.  Neurologist consulting.  Getting aspirin suppositories.      MERCEDES (acute kidney injury)  New problem.  Nephrology group consulting.  Monitor renal function and UOP.  Avoid NSAID's and hypotension.  Acyclovir stopped.  To be started on RRT .    Creatinine   Date Value Ref Range Status   05/15/2022 6.7 (H) 0.5 - 1.4 mg/dL Final   05/14/2022 5.9 (H) 0.5 - 1.4 mg/dL Final   05/13/2022 3.7 (H) 0.5 - 1.4 mg/dL Final   ]      Meningitis (ruled out)           Chronic combined systolic and diastolic CHF (congestive heart failure)  Patient is identified as having Combined Systolic and Diastolic heart failure that is Chronic. CHF is currently controlled. Latest ECHO performed and demonstrates- Results for orders placed during the hospital encounter of  05/12/22    Echo    Interpretation Summary  · The left ventricle is moderately enlarged with eccentric hypertrophy and severely decreased systolic function.  · Grade II left ventricular diastolic dysfunction.  · The estimated PA systolic pressure is 59 mmHg.  · Severe right ventricular enlargement with mildly to moderately reduced right ventricular systolic function.  · Moderate left atrial enlargement.  · There is moderate pulmonary hypertension.  · Intermediate central venous pressure (8 mmHg).  · Mild aortic regurgitation.  · Moderate right atrial enlargement.  · Mild to moderate tricuspid regurgitation.  · Mild pulmonic regurgitation.  · The estimated ejection fraction is 20%.  · Moderate mitral regurgitation.  . Patient NPO; not on oral meds.  Monitor clinical status closely. Monitor on telemetry. Patient is off CHF pathway.  Monitor strict Is&Os and daily weights.   Last BNP reviewed- and noted below   Recent Labs   Lab 05/11/22  2303   BNP 3,064*   .          Essential hypertension  Controlled.  Monitor pressure.  Patient is NPO; cannot take oral meds.      Coronary artery disease involving native coronary artery of native heart  Stable.  Getting aspirin suppositories.      Hyperlipidemia  Chronic.  Stable.      Type 2 diabetes mellitus with stage 4 chronic kidney disease, without long-term current use of insulin  Patient's FSGs are controlled on current medication regimen.  Last A1c reviewed-   Lab Results   Component Value Date    HGBA1C 9.1 (H) 05/13/2022     Most recent fingerstick glucose reviewed-   Recent Labs   Lab 05/14/22  2056 05/15/22  0028 05/15/22  0438 05/15/22  1153   POCTGLUCOSE 163* 136* 152* 133*     Current correctional scale  Low  Maintain anti-hyperglycemic dose as follows-   Antihyperglycemics (From admission, onward)            Start     Stop Route Frequency Ordered    05/13/22 1715  insulin aspart U-100 pen 0-5 Units         -- SubQ Every 6 hours PRN 05/13/22 1602    05/12/22 1200   insulin detemir U-100 pen 8 Units         -- SubQ Daily 05/12/22 1052        Hold Oral hypoglycemics while patient is in the hospital.        CKD (chronic kidney disease) stage 4, GFR 15-29 ml/min  Renal function is worse.  Monitor BMP.  Avoid NSAID's.    Creatinine   Date Value Ref Range Status   05/15/2022 6.7 (H) 0.5 - 1.4 mg/dL Final   05/14/2022 5.9 (H) 0.5 - 1.4 mg/dL Final   ]      VTE Risk Mitigation (From admission, onward)         Ordered     enoxaparin injection 30 mg  Daily         05/17/22 1043     heparin (porcine) injection 4,000 Units  As needed (PRN)         05/16/22 1841     IP VTE HIGH RISK PATIENT  Once         05/12/22 1351     Place sequential compression device  Until discontinued         05/12/22 1351     Place sequential compression device  Until discontinued         05/12/22 1052                Discharge Planning   STAR:      Code Status: Full Code   Is the patient medically ready for discharge?:     Reason for patient still in hospital (select all that apply): Patient trending condition, Laboratory test and Treatment  Discharge Plan A: Home with family            Dimitry Correa MD  Department of Hospital Medicine   [facility]

## 2022-05-17 NOTE — NURSING
"Spoke with Dr. Mooney on pt possible RAJ in am. Dr. Mooney stated "I will come in and reevaluate him to determine if he is stable enough for the procedure." Notified day shift nurse.   "

## 2022-05-17 NOTE — PROGRESS NOTES
05/17/22 1609   Post-Hemodialysis Assessment   Rinseback Volume (mL) 250 mL   Blood Volume Processed (Liters) 63 L   Dialyzer Clearance Lightly streaked   Duration of Treatment 180 minutes   Additional Fluid Intake (mL) 500 mL   Total UF (mL) 3500 mL   Net Fluid Removal 3000   Patient Response to Treatment tolerated well   Post-Treatment Weight 79 kg (174 lb 2.6 oz)   Treatment Weight Change -3   Post-Hemodialysis Comments BP elevated throughout

## 2022-05-17 NOTE — PROGRESS NOTES
"[facility]  Neurology  Progress Note    Patient Name: Marshall Flor  MRN: [id number]  Admission Date: 5/12/2022  Hospital Length of Stay: 5 days  Code Status: Full Code   Attending Provider: Dimitry Correa MD  Primary Care Physician: Dorie Quan MD   Principal Problem:CVA (cerebral vascular accident)    Subjective:   Chief Complaint:  unresponsive      HPI:   Per EMR: History of Present Illness:    66-year-old male with history of CHF, diabetes presents to [place] for decreased responsiveness found to have a left gaze presents with fever upon arrival to [facility].  Family is not at bedside and patient is not able to communicate in current state.  ?  Neurology Consult: Pt seen and examined with Dr Rudy Segundo. POC discussed. Pt is minimally responsive to tactile stimuli. His CT head is negative for acute infarct or hemorrhage.  He was transferred from [facility] for further work up due to fever, AMS, and left gaze. Last seen normal unknown. No family at bedside.   Per [place]: "Patient is a 66-year-old male brought from home via EMS for evaluation of altered mental status as reported by family members.  Family member stated that at around 1:00 p.m. today they noted that the patient was not exhibiting his normal behaviors.  Upon arrival here, the patient is not speaking.  He has some residual weakness on the right side secondary to a previous stroke.  Patient does not speak, but he does respond to commands.  He is exhibiting an abnormal breathing pattern of several short, chopped breaths followed by periods of apnea.  His vital signs show a temperature of 98°, pulse 98,, respiratory rate 28 per minute, O2 saturations 95% on room air, blood pressure is significantly elevated at 183/101.  Patient's baseline is unknown, but patient's family state that he has not at baseline."       Interval History: patient seen and examined.   He is more alert today .  He is not following commands " but he  was previously following commands with the bedside nurse.       5/14: Pt seen and examined. POC discussed with Dr. GRACE Segundo. Pt is still nonverbal with limited communication. His son did not answer the phone today. Baseline unknown.     5/15/22:   Patient was seen and examined by me today.   Son was at bedside today.   He reports patient was functional at baseline but had mild weakness.  He remains aphasic,  follows visual cues but still with impaired comprehension.   Seen moving all 4 extremities spontaneously, moreso the left side than the right.         5/16/22:   Patient was seen and examined by  me today.   He still with significant aphasia,  but able to follow commands with  visual cues sometimes.  Right arm with deep hemiparesis,  seen moving both legs but more  left-sided than right. He is still on BiPAP.       5/17/22:  Patient was seen and examined by me today. his exam is unchanged .  He is still requiring BiPAP. undergoing hemodialysis for kidney failure.            Current Facility-Administered Medications   Medication Dose Route Frequency Provider Last Rate Last Admin    acetaminophen tablet 650 mg  650 mg Oral Q4H PRN Dimitry Correa MD        aspirin suppository 150 mg  150 mg Rectal Daily Dimitry Correa MD   150 mg at 05/17/22 0927    cefTRIAXone (ROCEPHIN) 2 g/50 mL D5W IVPB  2 g Intravenous Q24H Marielle Osorio MD        cloNIDine 0.2 mg/24 hr td ptwk 1 patch  1 patch Transdermal Q7 Days Osmin Pope MD   1 patch at 05/15/22 1441    dextrose 10% bolus 125 mL  12.5 g Intravenous PRN Dimitry Correa MD        dextrose 10% bolus 250 mL  25 g Intravenous PRN Dimitry Correa MD        fat emulsion 20% infusion 250 mL  250 mL Intravenous Daily Dimitry Correa MD        glucagon (human recombinant) injection 1 mg  1 mg Intramuscular PRN Dimitry Correa MD        glucose chewable tablet 16 g  16 g Oral PRN Dimitry Correa MD        glucose chewable tablet  24 g  24 g Oral PRN Dimitry Correa MD        heparin (porcine) injection 4,000 Units  4,000 Units Intra-Catheter PRN Ariel Little MD   4,000 Units at 05/16/22 1846    hydrALAZINE injection 10 mg  10 mg Intravenous Q4H PRN Dimitry Correa MD   10 mg at 05/17/22 0927    insulin aspart U-100 pen 0-5 Units  0-5 Units Subcutaneous Q6H PRN Osmin Pope MD        insulin detemir U-100 pen 8 Units  8 Units Subcutaneous Daily Dimitry Correa MD   8 Units at 05/17/22 0927    morphine injection 3 mg  3 mg Intravenous Q4H PRN Dimitry Correa MD   3 mg at 05/17/22 0202    mupirocin 2 % ointment   Nasal BID Dimitry Correa MD   Given at 05/17/22 0931    naloxone 0.4 mg/mL injection 0.02 mg  0.02 mg Intravenous PRN Dimitry Correa MD        niCARdipine 40 mg/200 mL (0.2 mg/mL) infusion  0-15 mg/hr Intravenous Continuous Dimitry Correa MD        ondansetron injection 4 mg  4 mg Intravenous Q8H PRN Dimitry Correa MD        oxyCODONE immediate release tablet 5 mg  5 mg Oral Q4H PRN Dimitry Corrae MD        polyethylene glycol packet 17 g  17 g Oral BID PRN Dimitry Correa MD        sodium chloride 0.9% flush 10 mL  10 mL Intravenous PRN Dimitry Correa MD        TPN ADULT CENTRAL LINE CUSTOM   Intravenous Continuous Dimitry Correa MD         Objective:     Vital Signs (Most Recent):  Temp: 98 °F (36.7 °C) (05/17/22 0400)  Pulse: 71 (05/17/22 0821)  Resp: 12 (05/17/22 0821)  BP: (!) 197/79 (05/17/22 0927)  SpO2: 100 % (05/17/22 0821) Vital Signs (24h Range):  Temp:  [97 °F (36.1 °C)-98.1 °F (36.7 °C)] 98 °F (36.7 °C)  Pulse:  [57-80] 71  Resp:  [12-24] 12  SpO2:  [100 %] 100 %  BP: (122-206)/(54-79) 197/79  Arterial Line BP: (132-190)/(50-60) 190/51     Weight: 81.6 kg (179 lb 14.3 oz)  Body mass index is 28.18 kg/m².    Physical Exam  Constitutional:       Appearance: He is ill-appearing.   HENT:      Head: Normocephalic.      Mouth/Throat:      Mouth: Mucous membranes are dry.   Eyes:       Pupils: Pupils are equal, round, and reactive to light.   Cardiovascular:      Rate and Rhythm: Normal rate and regular rhythm.   Pulmonary:      Effort: Pulmonary effort is normal.   Abdominal:      Palpations: Abdomen is soft.   Musculoskeletal:      Cervical back: Normal range of motion.      Comments: Decreased ROM and strength BL, 'RS neglect   Skin:     General: Skin is warm.   Neurological:      Mental Status: He is lethargic.      Cranial Nerves: Cranial nerve deficit present.      Sensory: Sensory deficit present.      Motor: Weakness present.         NEUROLOGICAL EXAMINATION:     MENTAL STATUS   Level of consciousness: drowsy  Unable to name object.     CRANIAL NERVES     CN III, IV, VI   Pupils are equal, round, and reactive to light.       Limited cooperation with exam     MOTOR EXAM   Muscle bulk: normal  Overall muscle tone: increased    SENSORY EXAM        withdraws from noxious stimuli       Significant Labs:   Hemoglobin A1c:   Recent Labs   Lab 05/12/22  1210 05/13/22  0341   HGBA1C 8.8* 9.1*     BMP:   Recent Labs   Lab 05/15/22  1359 05/16/22 0427 05/17/22 0312   * 135* 177*   * 152* 145   K 4.2 4.4 4.1   * 111* 108   CO2 17* 19* 20*   BUN 46* 49* 30*   CREATININE 6.7* 6.7* 4.0*   CALCIUM 8.1* 8.2* 8.0*   MG  --   --  2.1     CBC:   Recent Labs   Lab 05/16/22 0427 05/17/22 0312   WBC 6.58 5.30   HGB 13.5* 12.6*   HCT 42.6 38.9*    222     CMP:   Recent Labs   Lab 05/15/22  1359 05/16/22 0427 05/17/22 0312   * 135* 177*   * 152* 145   K 4.2 4.4 4.1   * 111* 108   CO2 17* 19* 20*   BUN 46* 49* 30*   CREATININE 6.7* 6.7* 4.0*   CALCIUM 8.1* 8.2* 8.0*   MG  --   --  2.1   PROT  --   --  5.9*   ALBUMIN 2.1* 2.2* 2.0*   BILITOT  --   --  0.4   ALKPHOS  --   --  77   AST  --   --  25   ALT  --   --  59*   ANIONGAP 18* 22* 17*   EGFRNONAA 8* 8* 15*       Significant Imaging: I have reviewed all pertinent imaging results/findings within the past 24  hours.    Assessment and Plan:  Acute Left MCA stroke:     Pt with h/o RS weakness from previous CVA, now presents with decreased responsiveness     Left MCA stroke confirmed on MRI brain  Etiology ongoing, suspect cardioembolic in the setting of HFrEF  CT head: negative acute  MRI brain: nonhemorrhagic infarction in the anterior lateral left frontal lobe and a remote infarction with encephalomalacia and gliosis in the medial right occipital lobe.  There are also remote infarctions in the cerebellar hemispheres as well as in the right thalamus.  CTA head and neck: Less than 50% stenoses of the bilateral proximal internal carotid arteries.  Significant stenoses of the bilateral intracranial ICAs in the cavernous sinuses.  Normal flow in the [id number]  ECHO:   Moderate left atrial enlargement, 20% ejection fraction   Pt needs a RAJ due to his low EF.   However, he will likely need to be intubated for procedure and remains unclear if he will be able to get off the vent in the near future       LDL:   60  TSH:   0.547  A1c: 9.1  - LP was performed, CSF showed 1 white blood cell  and mild elevation of protein which is likely a consequence of patient's stroke. I do not believe patient has CNS infection.          - Secondary stroke prevention: aspirin, atorvastatin, and Plavix at home. Continue aspirin and statin at this time, pending further work up.  EEG:   Moderate encephalopathy and diffuse cortical dysfunction; no epileptiform discharges or electrographic seizures captured     - had conversation with family member about neuro prognosis.  Though patient may recover some function after his stroke, has several other medical problems which make his recovery less likely.             From neurology's standpoint,  no further neurology workup is required at this time.     Will follow peripherally, call with questions.         Active Diagnoses:    Diagnosis Date Noted POA    PRINCIPAL PROBLEM:  CVA (cerebral vascular  accident) [I63.9] 05/12/2022 Yes    MERCEDES (acute kidney injury) [N17.9] 05/14/2022 No    Chronic combined systolic and diastolic CHF (congestive heart failure) [I50.42] 05/13/2022 Yes    Meningitis (needs to be ruled out) [G03.9] 05/13/2022 Yes    Encephalopathy [G93.40]  Yes    Essential hypertension [I10] 01/25/2022 Yes    Coronary artery disease involving native coronary artery of native heart [I25.10] 01/02/2022 Yes    Hyperlipidemia [E78.5] 01/02/2022 Yes    Type 2 diabetes mellitus with stage 4 chronic kidney disease, without long-term current use of insulin [E11.22, N18.4] 01/02/2022 Yes    CKD (chronic kidney disease) stage 4, GFR 15-29 ml/min [N18.4] 01/02/2022 Yes      Problems Resolved During this Admission:       VTE Risk Mitigation (From admission, onward)         Ordered     heparin (porcine) injection 4,000 Units  As needed (PRN)         05/16/22 1841     IP VTE HIGH RISK PATIENT  Once         05/12/22 1351     Place sequential compression device  Until discontinued         05/12/22 1351     Place sequential compression device  Until discontinued         05/12/22 1052                [phone] 3 days from discharge.     All questions were answered.                              Thank you kindly for including us in the care of this patient. Please do not hesitate to contact us with any questions.    Critical Care:  48 minutes of critical care time has been spent evaluating with the patient. Time includes chart review not limited to diagnostic imaging, labs, and vitals, patient assessment, discussion with family and nursing, current order evaluations, and new order entries.          Shelby Hugo MD  Neurology  [facility]

## 2022-05-18 PROBLEM — Z71.89 GOALS OF CARE, COUNSELING/DISCUSSION: Status: ACTIVE | Noted: 2022-05-18

## 2022-05-18 LAB
ALBUMIN SERPL BCP-MCNC: 2.2 G/DL (ref 3.5–5.2)
ALP SERPL-CCNC: 77 U/L (ref 55–135)
ALT SERPL W/O P-5'-P-CCNC: 42 U/L (ref 10–44)
ANION GAP SERPL CALC-SCNC: 12 MMOL/L (ref 8–16)
AST SERPL-CCNC: 14 U/L (ref 10–40)
BASOPHILS # BLD AUTO: 0.03 K/UL (ref 0–0.2)
BASOPHILS NFR BLD: 0.6 % (ref 0–1.9)
BILIRUB SERPL-MCNC: 0.3 MG/DL (ref 0.1–1)
BSA FOR ECHO PROCEDURE: 1.96 M2
BUN SERPL-MCNC: 30 MG/DL (ref 8–23)
C GATTII+NEOFOR DNA CSF QL NAA+NON-PROBE: NEGATIVE
CALCIUM SERPL-MCNC: 8.4 MG/DL (ref 8.7–10.5)
CHLORIDE SERPL-SCNC: 103 MMOL/L (ref 95–110)
CMV DNA CSF QL NAA+NON-PROBE: NEGATIVE
CO2 SERPL-SCNC: 24 MMOL/L (ref 23–29)
CREAT SERPL-MCNC: 3.4 MG/DL (ref 0.5–1.4)
DIFFERENTIAL METHOD: ABNORMAL
E COLI K1 DNA CSF QL NAA+NON-PROBE: NEGATIVE
EOSINOPHIL # BLD AUTO: 0.3 K/UL (ref 0–0.5)
EOSINOPHIL NFR BLD: 6.6 % (ref 0–8)
ERYTHROCYTE [DISTWIDTH] IN BLOOD BY AUTOMATED COUNT: 17.5 % (ref 11.5–14.5)
EST. GFR  (AFRICAN AMERICAN): 21 ML/MIN/1.73 M^2
EST. GFR  (NON AFRICAN AMERICAN): 18 ML/MIN/1.73 M^2
EV RNA CSF QL NAA+NON-PROBE: NEGATIVE
GLUCOSE SERPL-MCNC: 285 MG/DL (ref 70–110)
GP B STREP DNA CSF QL NAA+NON-PROBE: NEGATIVE
HAEM INFLU DNA CSF QL NAA+NON-PROBE: NEGATIVE
HBV SURFACE AB SER-ACNC: NEGATIVE M[IU]/ML
HBV SURFACE AG SERPL QL IA: NEGATIVE
HCT VFR BLD AUTO: 39.9 % (ref 40–54)
HGB BLD-MCNC: 12.7 G/DL (ref 14–18)
HHV6 DNA CSF QL NAA+NON-PROBE: NEGATIVE
HSV1 DNA CSF QL NAA+NON-PROBE: NEGATIVE
HSV2 DNA CSF QL NAA+NON-PROBE: NEGATIVE
IMM GRANULOCYTES # BLD AUTO: 0.02 K/UL (ref 0–0.04)
IMM GRANULOCYTES NFR BLD AUTO: 0.4 % (ref 0–0.5)
L MONOCYTOG DNA CSF QL NAA+NON-PROBE: NEGATIVE
LYMPHOCYTES # BLD AUTO: 0.7 K/UL (ref 1–4.8)
LYMPHOCYTES NFR BLD: 13.6 % (ref 18–48)
MAGNESIUM SERPL-MCNC: 2 MG/DL (ref 1.6–2.6)
MCH RBC QN AUTO: 24.2 PG (ref 27–31)
MCHC RBC AUTO-ENTMCNC: 31.8 G/DL (ref 32–36)
MCV RBC AUTO: 76 FL (ref 82–98)
MICROBIOLOGIST REVIEW: NORMAL
MONOCYTES # BLD AUTO: 0.4 K/UL (ref 0.3–1)
MONOCYTES NFR BLD: 8.8 % (ref 4–15)
MRSA SPEC QL CULT: NORMAL
N MEN DNA CSF QL NAA+NON-PROBE: NEGATIVE
NEUTROPHILS # BLD AUTO: 3.5 K/UL (ref 1.8–7.7)
NEUTROPHILS NFR BLD: 70 % (ref 38–73)
NRBC BLD-RTO: 0 /100 WBC
PARECHOVIRUS A RNA CSF QL NAA+NON-PROBE: NEGATIVE
PHOSPHATE SERPL-MCNC: 2.8 MG/DL (ref 2.7–4.5)
PLATELET # BLD AUTO: 226 K/UL (ref 150–450)
PMV BLD AUTO: 11.1 FL (ref 9.2–12.9)
POCT GLUCOSE: 197 MG/DL (ref 70–110)
POCT GLUCOSE: 209 MG/DL (ref 70–110)
POCT GLUCOSE: 265 MG/DL (ref 70–110)
POCT GLUCOSE: 284 MG/DL (ref 70–110)
POCT GLUCOSE: 319 MG/DL (ref 70–110)
POCT GLUCOSE: 339 MG/DL (ref 70–110)
POTASSIUM SERPL-SCNC: 3.2 MMOL/L (ref 3.5–5.1)
PROT SERPL-MCNC: 6.4 G/DL (ref 6–8.4)
RBC # BLD AUTO: 5.24 M/UL (ref 4.6–6.2)
S PNEUM DNA CSF QL NAA+NON-PROBE: NEGATIVE
SODIUM SERPL-SCNC: 139 MMOL/L (ref 136–145)
VDRL CSF QL: NEGATIVE
VZV DNA CSF QL NAA+NON-PROBE: NEGATIVE
WBC # BLD AUTO: 5 K/UL (ref 3.9–12.7)
WEST NILE VIRUS CSF INTERP: NORMAL
WNV IGG CSF QL: NEGATIVE
WNV IGM CSF QL IA: NEGATIVE

## 2022-05-18 PROCEDURE — 85025 COMPLETE CBC W/AUTO DIFF WBC: CPT | Performed by: INTERNAL MEDICINE

## 2022-05-18 PROCEDURE — 99223 PR INITIAL HOSPITAL CARE,LEVL III: ICD-10-PCS | Mod: ,,, | Performed by: FAMILY MEDICINE

## 2022-05-18 PROCEDURE — 63600175 PHARM REV CODE 636 W HCPCS: Performed by: INTERNAL MEDICINE

## 2022-05-18 PROCEDURE — 94660 CPAP INITIATION&MGMT: CPT

## 2022-05-18 PROCEDURE — B4185 PARENTERAL SOL 10 GM LIPIDS: HCPCS | Performed by: INTERNAL MEDICINE

## 2022-05-18 PROCEDURE — 99497 ADVNCD CARE PLAN 30 MIN: CPT | Mod: 25,,, | Performed by: FAMILY MEDICINE

## 2022-05-18 PROCEDURE — 99223 1ST HOSP IP/OBS HIGH 75: CPT | Mod: ,,, | Performed by: FAMILY MEDICINE

## 2022-05-18 PROCEDURE — 51798 US URINE CAPACITY MEASURE: CPT

## 2022-05-18 PROCEDURE — 99497 PR ADVNCD CARE PLAN 30 MIN: ICD-10-PCS | Mod: 25,,, | Performed by: FAMILY MEDICINE

## 2022-05-18 PROCEDURE — A4217 STERILE WATER/SALINE, 500 ML: HCPCS | Performed by: INTERNAL MEDICINE

## 2022-05-18 PROCEDURE — 94761 N-INVAS EAR/PLS OXIMETRY MLT: CPT

## 2022-05-18 PROCEDURE — 99900035 HC TECH TIME PER 15 MIN (STAT)

## 2022-05-18 PROCEDURE — 27000221 HC OXYGEN, UP TO 24 HOURS

## 2022-05-18 PROCEDURE — 83735 ASSAY OF MAGNESIUM: CPT | Performed by: INTERNAL MEDICINE

## 2022-05-18 PROCEDURE — 80053 COMPREHEN METABOLIC PANEL: CPT | Performed by: INTERNAL MEDICINE

## 2022-05-18 PROCEDURE — 25000003 PHARM REV CODE 250: Performed by: INTERNAL MEDICINE

## 2022-05-18 PROCEDURE — 80100014 HC HEMODIALYSIS 1:1

## 2022-05-18 PROCEDURE — 99291 PR CRITICAL CARE, E/M 30-74 MINUTES: ICD-10-PCS | Mod: ,,, | Performed by: INTERNAL MEDICINE

## 2022-05-18 PROCEDURE — 99291 CRITICAL CARE FIRST HOUR: CPT | Mod: ,,, | Performed by: INTERNAL MEDICINE

## 2022-05-18 PROCEDURE — 84100 ASSAY OF PHOSPHORUS: CPT | Performed by: INTERNAL MEDICINE

## 2022-05-18 PROCEDURE — 20000000 HC ICU ROOM

## 2022-05-18 RX ORDER — POTASSIUM CHLORIDE 7.45 MG/ML
30 INJECTION INTRAVENOUS ONCE
Status: DISCONTINUED | OUTPATIENT
Start: 2022-05-18 | End: 2022-05-18 | Stop reason: SDUPTHER

## 2022-05-18 RX ORDER — POTASSIUM CHLORIDE 7.45 MG/ML
10 INJECTION INTRAVENOUS
Status: COMPLETED | OUTPATIENT
Start: 2022-05-18 | End: 2022-05-18

## 2022-05-18 RX ADMIN — INSULIN ASPART 8 UNITS: 100 INJECTION, SOLUTION INTRAVENOUS; SUBCUTANEOUS at 05:05

## 2022-05-18 RX ADMIN — INSULIN ASPART 4 UNITS: 100 INJECTION, SOLUTION INTRAVENOUS; SUBCUTANEOUS at 11:05

## 2022-05-18 RX ADMIN — FAMOTIDINE 20 MG: 10 INJECTION INTRAVENOUS at 08:05

## 2022-05-18 RX ADMIN — INSULIN ASPART 8 UNITS: 100 INJECTION, SOLUTION INTRAVENOUS; SUBCUTANEOUS at 04:05

## 2022-05-18 RX ADMIN — POTASSIUM CHLORIDE 10 MEQ: 7.46 INJECTION, SOLUTION INTRAVENOUS at 08:05

## 2022-05-18 RX ADMIN — NICARDIPINE HYDROCHLORIDE 7.5 MG/HR: 0.2 INJECTION, SOLUTION INTRAVENOUS at 07:05

## 2022-05-18 RX ADMIN — I.V. FAT EMULSION 250 ML: 20 EMULSION INTRAVENOUS at 09:05

## 2022-05-18 RX ADMIN — NICARDIPINE HYDROCHLORIDE 2.5 MG/HR: 0.2 INJECTION, SOLUTION INTRAVENOUS at 03:05

## 2022-05-18 RX ADMIN — ASCORBIC ACID, VITAMIN A PALMITATE, CHOLECALCIFEROL, THIAMINE HYDROCHLORIDE, RIBOFLAVIN-5 PHOSPHATE SODIUM, PYRIDOXINE HYDROCHLORIDE, NIACINAMIDE, DEXPANTHENOL, ALPHA-TOCOPHEROL ACETATE, VITAMIN K1, FOLIC ACID, BIOTIN, CYANOCOBALAMIN: 200; 3300; 200; 6; 3.6; 6; 40; 15; 10; 150; 600; 60; 5 INJECTION, SOLUTION INTRAVENOUS at 09:05

## 2022-05-18 RX ADMIN — HEPARIN SODIUM 4000 UNITS: 1000 INJECTION, SOLUTION INTRAVENOUS; SUBCUTANEOUS at 12:05

## 2022-05-18 RX ADMIN — INSULIN ASPART 3 UNITS: 100 INJECTION, SOLUTION INTRAVENOUS; SUBCUTANEOUS at 01:05

## 2022-05-18 RX ADMIN — POTASSIUM CHLORIDE 10 MEQ: 7.46 INJECTION, SOLUTION INTRAVENOUS at 11:05

## 2022-05-18 RX ADMIN — POTASSIUM CHLORIDE 10 MEQ: 7.46 INJECTION, SOLUTION INTRAVENOUS at 09:05

## 2022-05-18 NOTE — HPI
Per record and family:  History of Present Illness:  Pt is a 65 yo male with CHF, DM2, CVA, HTN who presented to the ED at [place] for AMS and fever then transferred for higher level of care to [facility] on 5/12/22. Hospital course complicated by sepsis, acute renal failure requiring HD, resp failure requiring BIPAP now weaned to NC. Neuro consulted and found to have frontal temporal transformative hemorrhagic CVA with significant midline shift. Pulmonology and cardiology consulted with RAJ done showing a 20% ejection fraction and concurrent diastolic heart failure component. Currently on nicardipine gtt.  ID consulted for suspected sepsis but currently off antibiotics with no signs of infection.   Palliative medicine consulted for GO. Patient previously living with 2 sons but independent in all ADLS and IADLS.   At time of visit patient is in the ICU none distressed with eyes in left downward gaze(unchanged since admission), none verbal, lacking in capacity.     Writer meeting with 2 sons, Joseph and Ger today, along with nephew, Kraig, and brother, Tony. Patient is not .   Family details what neuro has disclosed to them which is that patient will likely need complete care for the remainder of his life. He will be at increased risk for further infections along with requiring forms of artificial life support, such as dialysis and tube feeding. This is very troubling to hear for family present who voice patient's values of independence. No AD but they do not feel patient would choose to live this way given the circumstances. The hospice alternative is reviewed which they are interested in and this is explained in length. They are requesting a few days to arrange for family members to visit from out of town, namely a sister in [facility], prior to transitioning to comfort focused care. The goal would be Saturday, 5/21/2022, for this transition with intention to continue all supportive care  including dialysis if warranted until this time to allow family members to say their goodbyes. In the event that patient does not die within the following hours to days they are asked to speak as a family about where patient would have hospice, whether it be home or at a facility. They did not have immediate answer for that but will address in the future. Lastly code status is reviewed and it is decided to allow a natural death if his heart were to stop. Intubation is not an option. DNR.   Palliative medicine will continue to stay engaged with family for further needs.

## 2022-05-18 NOTE — PROGRESS NOTES
05/18/22 1237   Handoff Report   Received From MEETA Fleming   Given To MEETA Conley   Vital Signs   Temp 98.2 °F (36.8 °C)   Pulse 72   Resp 11   SpO2 100 %   BP (!) 145/65   MAP (mmHg) 94   Post-Hemodialysis Assessment   Rinseback Volume (mL) 250 mL   Blood Volume Processed (Liters) 62.3 L   Dialyzer Clearance Lightly streaked   Duration of Treatment 180 minutes   Additional Fluid Intake (mL) 500 mL   Total UF (mL) 3500 mL   Net Fluid Removal 3000   Patient Response to Treatment tolerated well   Post-Treatment Weight 75.8 kg (167 lb 1.7 oz)   Treatment Weight Change -3   Post-Hemodialysis Comments tx completed without incident

## 2022-05-18 NOTE — NURSING
Melvin FISHER ordered for patient to be placed on NC once dialysis completed. Patient now on 3L NC SpO2 99%.

## 2022-05-18 NOTE — PLAN OF CARE
Intervention: parenteral nutrition therapy     Recommendations  Recommendation/Intervention:   1.) Continue with Custom TPN 5%AA/D15 @ goal rate 75 mls/hr continuous + 20% lipids (250 mls) daily providing 1778  Kcals/day, 90 g protein/day, and 1800 mls fluid/day. GIR: 2.3 mg/kg/min.      2.) Enteral nutrition: Consider Novasource renal @ 30 mls/hr advancing as tolerated to goal rate 45 mls/hr continuous providing 2160 kcals/day, 98 g protein/day, 198 g CHO/day, and 774 mls water/day. FWF: 100 mls q4 hrs or per MD.      Goals: 1.) pt will meet >50% of EEN during admit  Nutrition Goal Status: 1.) goal met/ongoing   Communication of RD Recs: other (comment) (POC)

## 2022-05-18 NOTE — PROGRESS NOTES
[facility]  Adult Nutrition  Consult Note    SUMMARY   Intervention: parenteral nutrition therapy    Recommendations  Recommendation/Intervention:   1.) Continue with Custom TPN 5%AA/D15 @ goal rate 75 mls/hr continuous + 20% lipids (250 mls) daily providing 1778  Kcals/day, 90 g protein/day, and 1800 mls fluid/day. GIR: 2.3 mg/kg/min.     2.) Enteral nutrition: Consider Novasource renal @ 30 mls/hr advancing as tolerated to goal rate 45 mls/hr continuous providing 2160 kcals/day, 98 g protein/day, 198 g CHO/day, and 774 mls water/day. FWF: 100 mls q4 hrs or per MD.     Goals: 1.) pt will meet >50% of EEN during admit  Nutrition Goal Status: 1.) goal met/ongoing   Communication of RD Recs: other (comment) (POC)    1. Stroke    2. CHF (congestive heart failure)    3. Encephalopathy    4. CVA (cerebral vascular accident)    5. MERCEDES (acute kidney injury)      Past Medical History:   Diagnosis Date    CHF (congestive heart failure)     Diabetes mellitus     Hypertension     Stroke          Assessment and Plan  Nutrition Problem  inadequate energy intake    Related to (etiology):   Acute illness     Signs and Symptoms (as evidenced by):   NPO  TPN meeting 30% of EEN     Interventions/Recommendations (treatment strategy):  Consider custom TPN     Nutrition Diagnosis Status:   Improving           Malnutrition Assessment  Severe Weight Loss (Malnutrition): greater than 7.5% in 3 months (11.5% weight loss in 4 months)    Reason for Assessment  Reason For Assessment: RD follow up   General Information Comments: admits with decreased responsiveness, pt to start dialysis today, on continuous bipap, non-verbal, central line present. RD consulted for TPN. Pending SLP asessment for diet advancement. Remains NPO.  5/18: pt receiving dialysis. TPN infusing, on 3L NC.     Nutrition Risk Screen  Nutrition Risk Screen: dysphagia or difficulty swallowing    Nutrition/Diet History  Food Allergies: NKFA  Factors  "Affecting Nutritional Intake:  (weakness)    Anthropometrics  Temp: 98.2 °F (36.8 °C)  Height: 5' 7"  Height (inches): 67 in  Weight Method: Bed Scale  Weight: 78.8 kg (173 lb 11.6 oz)  Weight (lb): 173.72 lb  Ideal Body Weight (IBW), Male: 148 lb  % Ideal Body Weight, Male (lb): 120.95 %  BMI (Calculated): 27.2  BMI Grade: 25 - 29.9 - overweight  Usual Body Weight (UBW), k.27 kg (2022)  % Usual Body Weight: 88.62  % Weight Change From Usual Weight: -11.56 %       Lab/Procedures/Meds  Pertinent Labs Reviewed: reviewed  BMP  Lab Results   Component Value Date     2022    K 3.2 (L) 2022     2022    CO2 24 2022    BUN 30 (H) 2022    CREATININE 3.4 (H) 2022    CALCIUM 8.4 (L) 2022    ANIONGAP 12 2022    ESTGFRAFRICA 21 (A) 2022    EGFRNONAA 18 (A) 2022     Lab Results   Component Value Date    ALBUMIN 2.2 (L) 2022     Lab Results   Component Value Date    CALCIUM 8.4 (L) 2022    PHOS 2.8 2022     Recent Labs   Lab 22  1128   POCTGLUCOSE 209*       Pertinent Medications Reviewed: reviewed  Scheduled Meds:   cloNIDine 0.2 mg/24 hr td ptwk  1 patch Transdermal Q7 Days    famotidine (PF)  20 mg Intravenous Daily    fat emulsion 20%  250 mL Intravenous Daily    insulin detemir U-100  6 Units Subcutaneous BID     Continuous Infusions:   niCARdipine Stopped (22 1322)    TPN ADULT CENTRAL LINE CUSTOM 75 mL/hr at 22 1325    TPN ADULT CENTRAL LINE CUSTOM         Estimated/Assessed Needs  Weight Used For Calorie Calculations: 81.6 kg (179 lb 14.3 oz)  Energy Calorie Requirements (kcal): 2000 kcals/day  Energy Need Method: DeWitt-St Jeor (x1.3)  Protein Requirements: 81g  Weight Used For Protein Calculations: 81.6 kg (179 lb 14.3 oz)  Estimated Fluid Requirement Method: RDA Method  RDA Method (mL): 2000      Nutrition Prescription Ordered  Current Diet Order: NPO  Current Nutrition Support Formula Ordered: " Custom TPN 5/15  Current Nutrition Support Rate Ordered: 75 (ml)  Current Nutrition Support Frequency Ordered: continuous  +20% lipids 250 mls daily     Evaluation of Received Nutrient/Fluid Intake  Parenteral Calories (kcal): 1778  Parenteral Protein (gm): 90  Parenteral Fluid (mL): 1800  GIR (Glucose Infusion Rate) (mg/kg/min): 2.4 mg/kg/min  I/O: -2304.7  Tolerance: tolerating  % Intake of Estimated Energy Needs: 75 - 100 %  % Meal Intake: NPO    Nutrition Risk  Level of Risk/Frequency of Follow-up:  (x2 weekly)       Monitor and Evaluation  Food and Nutrient Intake: energy intake, parenteral nutrition intake, enteral nutrition intake  Food and Nutrient Adminstration: diet order, enteral and parenteral nutrition administration  Anthropometric Measurements: weight, weight change, body mass index  Biochemical Data, Medical Tests and Procedures: electrolyte and renal panel, glucose/endocrine profile, lipid profile  Nutrition-Focused Physical Findings: overall appearance       Nutrition Follow-Up  RD Follow-up?: Yes

## 2022-05-18 NOTE — SUBJECTIVE & OBJECTIVE
Medications:  Continuous Infusions:   niCARdipine 2.5 mg/hr (05/18/22 1413)    TPN ADULT CENTRAL LINE CUSTOM 75 mL/hr at 05/18/22 1325    TPN ADULT CENTRAL LINE CUSTOM       Scheduled Meds:   cloNIDine 0.2 mg/24 hr td ptwk  1 patch Transdermal Q7 Days    famotidine (PF)  20 mg Intravenous Daily    fat emulsion 20%  250 mL Intravenous Daily    insulin detemir U-100  6 Units Subcutaneous BID     PRN Meds:acetaminophen, dextrose 10%, dextrose 10%, glucagon (human recombinant), glucose, glucose, heparin (porcine), hydrALAZINE, insulin aspart U-100, labetaloL, morphine, naloxone, ondansetron, oxyCODONE, polyethylene glycol, sodium chloride 0.9%    Objective:     Vital Signs (Most Recent):  Temp: 98.2 °F (36.8 °C) (05/18/22 1237)  Pulse: 67 (05/18/22 1315)  Resp: 15 (05/18/22 1315)  BP: (!) 193/77 (05/18/22 1413)  SpO2: 100 % (05/18/22 1315)   Vital Signs (24h Range):  Temp:  [97.7 °F (36.5 °C)-98.2 °F (36.8 °C)] 98.2 °F (36.8 °C)  Pulse:  [59-86] 67  Resp:  [10-34] 15  SpO2:  [98 %-100 %] 100 %  BP: (117-211)/(51-85) 193/77     Weight: 78.8 kg (173 lb 11.6 oz)  Body mass index is 27.21 kg/m².    Physical Exam  Vitals and nursing note reviewed.   Constitutional:       General: He is awake. He is not in acute distress.     Appearance: He is well-developed. He is ill-appearing.      Comments: Non distressed leftward gaze. Increased tone with somewhat contracted left upper extremity. Responds to touch. Does not follow commands. Does not track.   HENT:      Head: Normocephalic and atraumatic.      Nose: Nose normal.   Eyes:      Conjunctiva/sclera: Conjunctivae normal.   Pulmonary:      Effort: Pulmonary effort is normal. No respiratory distress.   Abdominal:      General: There is no distension.      Palpations: Abdomen is soft.   Neurological:      Cranial Nerves: No cranial nerve deficit.   Psychiatric:         Behavior: Behavior is uncooperative.       Review of Symptoms      Symptom Assessment (ESAS 0-10  Scale)  Unable to complete assessment due to Acuity of condition     CAM / Delirium:  Positive    Pain Assessment in Advanced Demential Scale (PAINAD)   Breathing - Independent of vocalization:  0  Negative vocalization:  0  Facial expression:  0  Body language:  0  Consolability:  0  Total:  0    ECOG Performance Status thGthrthathdtheth:th th5th Living Arrangements:  Lives with family    Psychosocial/Cultural: Not   2 sons and 2 daughters(the whereabouts of daughters are unknown)  2 sisters and 1 brother  Very independent and did not like asking for help. Caretaker of others    Spiritual:  F - Jamia and Belief:  Gnosticism background but not very Yarsanism.      Advance Care Planning   Advance Directives:   Living Will: No    LaPOST: No    Do Not Resuscitate Status: Yes    Medical Power of : No      Decision Making:  Family answered questions and Patient unable to communicate due to disease severity/cognitive impairment       Significant Labs: All pertinent labs within the past 24 hours have been reviewed.  CBC:   Recent Labs   Lab 05/18/22 0328   WBC 5.00   HGB 12.7*   HCT 39.9*   MCV 76*        BMP:  Recent Labs   Lab 05/18/22 0328   *      K 3.2*      CO2 24   BUN 30*   CREATININE 3.4*   CALCIUM 8.4*   MG 2.0     LFT:  Lab Results   Component Value Date    AST 14 05/18/2022    ALKPHOS 77 05/18/2022    BILITOT 0.3 05/18/2022     Albumin:   Albumin   Date Value Ref Range Status   05/18/2022 2.2 (L) 3.5 - 5.2 g/dL Final     Protein:   Total Protein   Date Value Ref Range Status   05/18/2022 6.4 6.0 - 8.4 g/dL Final     Lactic acid:   Lab Results   Component Value Date    LACTATE 3.4 (H) 05/12/2022    LACTATE 3.5 (HH) 05/12/2022       Significant Imaging: I have reviewed all pertinent imaging results/findings within the past 24 hours.

## 2022-05-18 NOTE — NURSING
Notified Madeline FISHER of patient BP remaining above ordered parameters despite use of PRN hydralazine and labetalol post dialysis. MD ordered to place patient on nicardipine infusion to maintain SBP <180.

## 2022-05-18 NOTE — PROGRESS NOTES
Nephrology Progress Note        Patient Name: Marshall Flor  MRN: [id number]    Patient Class: IP- Inpatient   Admission Date: 5/12/2022  Length of Stay: 6 days  Date of Service: 5/18/2022    Attending Physician: Dimitry Correa MD  Primary Care Provider: Dorie Quan MD    Reason for Consult: dione/acidosis/sepsis/stroke/ckd3/fever/anemia/htn/chf    SUBJECTIVE:     HPI: 66-year-old male with history of HTN, CHF, diabetes [facility], found to have a left gaze preference, then sent to [facility], where was found febrile, hypertensive. UA 3+ protein, 2+ glucose, WBC 3, moderate bacteria. Chest x-ray suggestive of pneumonia right upper lobe. MRI brain nonhemorrhagic infarction in the right anterolateral frontal lobe.Treated for sepsis ? PNA, ? Meningitis? and stroke.    5/14- BP all over the place, on 3L NC, UOP 481cc  5/15- BP still all over the place, on BIPAP, UOP 455cc- no labs drawn today and its after 1pm  5/16  Renal function continues to decline.  Mental status no better. Consent for dialysis obtained from patient's son yesterday by Dr. Busch.  I called to confirm and no answer (no voice mail available)  5/17  On bipap.  Not interactive.  Oliguric  5/18  S/p dialysis with 3 liter ultrafiltration.  Output not recorded last pm.  Not interactive      Outpatient meds:  No current facility-administered medications on file prior to encounter.     Current Outpatient Medications on File Prior to Encounter   Medication Sig Dispense Refill    allopurinoL (ZYLOPRIM) 100 MG tablet   = 2 tab, Oral, Daily, # 60 tab, 5 Refill(s), Pharmacy: [id number], 167, cm, 07/27/21 8:24:00 CDT, Height/Length Measured, 91.5, kg, 12/15/20 11:18:00 CST, Weight Dosing      amLODIPine (NORVASC) 10 MG tablet 10 mg.      aspirin (ECOTRIN) 81 MG EC tablet Take 1 tablet (81 mg total) by mouth once daily. 30 tablet 3    atorvastatin (LIPITOR) 40 MG tablet   = 1 tab, Oral, Daily, # 90  tab, 1 Refill(s), Soft Stop, Pharmacy: [id number], 167, cm, 04/07/21 14:01:00 CDT, Height/Length Measured, 91.5, kg, 12/15/20 11:18:00 CST, Weight Dosing      carvediloL (COREG) 3.125 MG tablet Take 1 tablet (3.125 mg total) by mouth once daily. 30 tablet 1    cloNIDine (CATAPRES) 0.1 MG tablet Take 2 tablets (0.2 mg total) by mouth 3 (three) times daily. 180 tablet 11    clopidogreL (PLAVIX) 75 mg tablet Take 75 mg by mouth once daily.      furosemide (LASIX) 20 MG tablet Take 1 tablet (20 mg total) by mouth once daily. 30 tablet 1    hydrALAZINE (APRESOLINE) 50 MG tablet Take 1 tablet (50 mg total) by mouth every 12 (twelve) hours. 60 tablet 0    linaGLIPtin (TRADJENTA) 5 mg Tab tablet 5 mg.      NOVOLOG MIX 70-30FLEXPEN U-100 100 unit/mL (70-30) InPn pen Inject into the skin.      sodium bicarbonate 650 MG tablet Take 1 tablet (650 mg total) by mouth 2 (two) times daily. 60 tablet 11       Scheduled meds:   cloNIDine 0.2 mg/24 hr td ptwk  1 patch Transdermal Q7 Days    famotidine (PF)  20 mg Intravenous Daily    fat emulsion 20%  250 mL Intravenous Daily    insulin detemir U-100  6 Units Subcutaneous BID       Infusions:   niCARdipine Stopped (05/18/22 1322)    TPN ADULT CENTRAL LINE CUSTOM 75 mL/hr at 05/18/22 1325    TPN ADULT CENTRAL LINE CUSTOM         PRN meds:  acetaminophen, dextrose 10%, dextrose 10%, glucagon (human recombinant), glucose, glucose, heparin (porcine), hydrALAZINE, insulin aspart U-100, labetaloL, morphine, naloxone, ondansetron, oxyCODONE, polyethylene glycol, sodium chloride 0.9%    Review of Systems:  Neg    OBJECTIVE:     Vital Signs and IO (Last 24H):  Temp:  [97.7 °F (36.5 °C)-98.2 °F (36.8 °C)]   Pulse:  [59-86]   Resp:  [10-34]   BP: (117-211)/(51-86)   SpO2:  [98 %-100 %]   I/O last 3 completed shifts:  In: 2110.6 [I.V.:786.8; Other:500; IV Piggyback:49.3]  Out: 4017 [Urine:517; Other:3500]    Wt Readings from Last 5 Encounters:   05/18/22 78.8 kg (173 lb  11.6 oz)   05/11/22 87.5 kg (193 lb)   05/12/22 87.5 kg (192 lb 14.4 oz)   04/26/22 95.3 kg (210 lb)   02/02/22 91.1 kg (200 lb 13.4 oz)     Physical Exam:  Constitutional: on BIPAP, ill, eyes closed, laying flat  Heart: rrr, no m/r/g, wwp, no edema  Lungs: coarse, no w/r/r/c, no lb  Abdomen: s/nt/nd, +BS    Body mass index is 27.21 kg/m².    Laboratory:  Recent Labs   Lab 05/16/22  0427 05/17/22  0312 05/18/22  0328   * 145 139   K 4.4 4.1 3.2*   * 108 103   CO2 19* 20* 24   BUN 49* 30* 30*   CREATININE 6.7* 4.0* 3.4*   ESTGFRAFRICA 9* 17* 21*   EGFRNONAA 8* 15* 18*   * 177* 285*       Recent Labs   Lab 05/13/22  0341 05/14/22  1309 05/16/22  0427 05/17/22  0312 05/18/22  0328   CALCIUM 7.9*   < > 8.2* 8.0* 8.4*   ALBUMIN 2.2*   < > 2.2* 2.0* 2.2*   PHOS 4.4   < > 6.6* 4.5 2.8   MG 1.5*  --   --  2.1 2.0    < > = values in this interval not displayed.       Recent Labs   Lab 04/12/21  0952   PTH, Intact 273.0 H       Recent Labs   Lab 05/16/22  1603 05/17/22  0116 05/17/22  0926 05/17/22  1245 05/17/22  1818 05/17/22  2111 05/18/22  0100 05/18/22  0554 05/18/22  0801 05/18/22  1128   POCTGLUCOSE 152* 173* 249* 257* 224* 258* 284* 339* 265* 209*       Recent Labs   Lab 04/12/21  0952 05/12/22  1210 05/13/22  0341   Hemoglobin A1C 7.8 H 8.8 H 9.1 H       Recent Labs   Lab 05/16/22  0427 05/17/22 0312 05/18/22  0328   WBC 6.58 5.30 5.00   HGB 13.5* 12.6* 12.7*   HCT 42.6 38.9* 39.9*    222 226   MCV 77* 77* 76*   MCHC 31.7* 32.4 31.8*   MONO 8.7  0.6 9.1  0.5 8.8  0.4       Recent Labs   Lab 05/13/22  0341 05/15/22  1359 05/16/22  0427 05/17/22 0312 05/18/22  0328   BILITOT 0.5  --   --  0.4 0.3   PROT 6.2  --   --  5.9* 6.4   ALBUMIN 2.2*   < > 2.2* 2.0* 2.2*   ALKPHOS 107  --   --  77 77   ALT 52*  --   --  59* 42   AST 49*  --   --  25 14    < > = values in this interval not displayed.       Recent Labs   Lab 01/26/22  0639 05/11/22  2300 05/12/22  1400   Color, UA Yellow Yellow  Yellow   Appearance, UA Cloudy A Clear Clear   pH, UA 5.0 6.0 6.0   Specific [place], UA 1.020 1.020 1.020   Protein, UA 2+ A 3+ A 3+ A   Glucose, UA Negative 2+ A 3+ A   Ketones, UA Negative Negative Negative   Urobilinogen, UA 1.0 Negative Negative   Bilirubin (UA) Negative Negative Negative   Occult Blood UA 2+ A 2+ A 2+ A   Nitrite, UA Negative Negative Negative   RBC, UA 33 H 10 H 20 H   WBC, UA 55 H 3 6 H   Bacteria Moderate A Moderate A Few A   Hyaline Casts, UA 3 A 1 0       Recent Labs   Lab 05/12/22 2055 05/16/22  1525 05/17/22  1102   POC PH 7.417 7.290 LL 7.381   POC PCO2 31.1 L 34.7 L 41.6   POC HCO3 20.0 L 16.7 L 24.7   POC PO2 84 161 H 102 H   POC SATURATED O2 97 99 98   POC BE -5 -10 0   Sample ARTERIAL ARTERIAL ARTERIAL       Microbiology Results (last 7 days)     Procedure Component Value Units Date/Time    Culture, MRSA [[id number]] Collected: 05/15/22 2300    Order Status: Completed Specimen: MRSA source from Nares, Left Updated: 05/18/22 0943     MRSA Surveillance Screen No MRSA isolated    Narrative:      Both nares, thank you    Blood culture [[id number]] Collected: 05/13/22 2007    Order Status: Completed Specimen: Blood from Peripheral, Left Arm Updated: 05/18/22 0612     Blood Culture, Routine No Growth to date      No Growth to date      No Growth to date      No Growth to date      No Growth to date    Narrative:      Collection has been rescheduled by ACD at 05/13/2022 15:13 Reason:   Unable to collect, no place to stick pt  Collection has been rescheduled by ACD at 05/13/2022 15:13 Reason:   Unable to collect, no place to stick pt    Cryptococcal antigen, CSF [[id number]] Collected: 05/16/22 1404    Order Status: Completed Specimen: CSF (Spinal Fluid) from CSF Tap, Tube 2 Updated: 05/17/22 0922     Crypto Ag, CSF Negative    Direct AFB stain [[id number]] Collected: 05/16/22 1404    Order Status: Completed Specimen: CSF (Spinal Fluid) from CSF Tap, Tube 2 Updated: 05/17/22 0843     Direct  Acid Fast No acid fast bacilli seen.    CSF culture [[id number]] Collected: 05/16/22 1404    Order Status: Completed Specimen: CSF (Spinal Fluid) from CSF Tap, Tube 2 Updated: 05/16/22 1707     Gram Stain Result No WBC's, epithelial cells or organisms seen    AFB Culture & Smear [[id number]] Collected: 05/13/22 1508    Order Status: Completed Specimen: CSF (Spinal Fluid) from Nares, Right Updated: 05/16/22 1446     AFB Culture & Smear Culture in progress     AFB CULTURE STAIN No acid fast bacilli seen.    Gram stain [[id number]] Collected: 05/16/22 1404    Order Status: Canceled Specimen: CSF (Spinal Fluid) from CSF Tap, Tube 2     Culture, MRSA [[id number]]     Order Status: Canceled Specimen: MRSA source from Nares, Left     Gram stain [[id number]]     Order Status: Canceled Specimen: CSF (Spinal Fluid)     Direct AFB stain [[id number]]     Order Status: Canceled Specimen: CSF (Spinal Fluid)     Cryptococcal antigen, CSF [[id number]]     Order Status: Canceled Specimen: CSF (Spinal Fluid)     CSF culture [[id number]]     Order Status: Canceled Specimen: CSF (Spinal Fluid)         ASSESSMENT/PLAN:     Oliguric MERCEDES due to ATN; CKD III  - renal function is steadily deteriorating as of yest- oliguric- ID stopped acyclovir-  -initiated hemodialysis for uremic clearance on 5/16  Hold dialysis tomorrow  Hopefully can get accurate I/Os      HTN/Acute CVA/CHF  - off cardene gtt but probably needs to be resumed since not able to get any PO meds    Hypernatremia--better  Hypokalemia--better  HypoMg--better  Acidosis--better after dialysis  Hypokalemia--replete  -     SHPT  - no active issues    Anemia of CKD  - stable    Thank you for allowing us to participate in the care of your patient!   We will follow the patient and provide recommendations as needed.    Pt's nephew is at the bedside.  All of his questions were answered to his apparent satisfaction     Patient care time was spent personally by me on the following activities: >  35 min  · Obtaining a history.  · Examination of patient.  · Providing medical care at the patients bedside.  · Developing a treatment plan with patient or surrogate and bedside caregivers.  · Ordering and reviewing laboratory studies, radiographic studies, pulse oximetry.  · Ordering and performing treatments and interventions.  · Evaluation of patient's response to treatment.  · Discussions with consultants while on the unit and immediately available to the patient.  · Re-evaluation of the patient's condition.  · Documentation in the medical record.     Ariel Little MD    [facility]  [address] [place] [address]  SIMON Devine [id number]    [phone] - tel  [phone] - fax    5/18/2022

## 2022-05-18 NOTE — NURSING
Neuro exam performed. Patient able to visually track movement from left to right. Weak hand  noted to left hand, no right . Patient able to wiggle toes bilaterally when prompted and able to plantarflex left foot. Patient remains nonverbal.

## 2022-05-18 NOTE — CHAPLAIN
Visited pt while during in-room dialysis; pt was awake, eyes open, not sure what he could understand but spoke to him as if he hears every word; provided compassionate presence, assured him he's being well take care of and that I hope to meet his family later today. Said brief blessing over him.  will continue to follow as a part of palliative care. Lord, in your mercy.

## 2022-05-18 NOTE — CONSULTS
[facility]  Palliative Medicine  Consult Note    Patient Name: Marshall Flor  MRN: [id number]  Admission Date: 5/12/2022  Hospital Length of Stay: 6 days  Code Status: DNR   Attending Provider: Dimitry Correa MD  Consulting Provider: Russell Casey MD  Primary Care Physician: Dorie Quan MD  Principal Problem:CVA (cerebral vascular accident)        Consults  Assessment/Plan:     Goals of care, counseling/discussion  Advance Care Planning     5/18/2022    Writer meeting with 2 sons, Joseph and Ger today, along with nephew, Kraig, and brother, Tony. Patient is not .   Family details what neuro has disclosed to them which is that patient will likely need complete care for the remainder of his life. He will be at increased risk for further infections along with requiring forms of artificial life support, such as dialysis and tube feeding. This is very troubling to hear for family present who voice patient's values of independence. No AD but they do not feel patient would choose to live this way given the circumstances. The hospice alternative is reviewed which they are interested in and this is explained in length. They are requesting a few days to arrange for family members to visit from out of town, namely a sister in [facility], prior to transitioning to comfort focused care. The goal would be Saturday, 5/21/2022, for this transition with intention to continue all supportive care including dialysis if warranted until this time to allow family members to say their goodbyes. In the event that patient does not die within the following hours to days they are asked to speak as a family about where patient would have hospice, whether it be home or at a facility. They did not have immediate answer for that but will address in the future. Lastly code status is reviewed and it is decided to allow a natural death if his heart were to stop. Intubation is not an option. DNR.    Palliative medicine will continue to stay engaged with family for further needs.                  Thank you for your consult.     Subjective:     HPI:   Per record and family:  History of Present Illness:  Pt is a 65 yo male with CHF, DM2, CVA, HTN who presented to the ED at [place] for AMS and fever then transferred for higher level of care to [facility] on 5/12/22. Hospital course complicated by sepsis, acute renal failure requiring HD, resp failure requiring BIPAP now weaned to NC. Neuro consulted and found to have frontal temporal transformative hemorrhagic CVA with significant midline shift. Pulmonology and cardiology consulted with RAJ done showing a 20% ejection fraction and concurrent diastolic heart failure component. Currently on nicardipine gtt.  ID consulted for suspected sepsis but currently off antibiotics with no signs of infection.   Palliative medicine consulted for GOC. Patient previously living with 2 sons but independent in all ADLS and IADLS.   At time of visit patient is in the ICU none distressed with eyes in left downward gaze(unchanged since admission), none verbal, lacking in capacity.     Writer meeting with 2 sons, Joseph and Ger today, along with nephew, Kraig, and brother, Tony. Patient is not .   Family details what neuro has disclosed to them which is that patient will likely need complete care for the remainder of his life. He will be at increased risk for further infections along with requiring forms of artificial life support, such as dialysis and tube feeding. This is very troubling to hear for family present who voice patient's values of independence. No AD but they do not feel patient would choose to live this way given the circumstances. The hospice alternative is reviewed which they are interested in and this is explained in length. They are requesting a few days to arrange for family members to visit from out of town, namely a sister in [facility],  prior to transitioning to comfort focused care. The goal would be Saturday, 5/21/2022, for this transition with intention to continue all supportive care including dialysis if warranted until this time to allow family members to say their goodbyes. In the event that patient does not die within the following hours to days they are asked to speak as a family about where patient would have hospice, whether it be home or at a facility. They did not have immediate answer for that but will address in the future. Lastly code status is reviewed and it is decided to allow a natural death if his heart were to stop. Intubation is not an option. DNR.   Palliative medicine will continue to stay engaged with family for further needs.          Hospital Course:  No notes on file        Medications:  Continuous Infusions:   niCARdipine 2.5 mg/hr (05/18/22 1413)    TPN ADULT CENTRAL LINE CUSTOM 75 mL/hr at 05/18/22 1325    TPN ADULT CENTRAL LINE CUSTOM       Scheduled Meds:   cloNIDine 0.2 mg/24 hr td ptwk  1 patch Transdermal Q7 Days    famotidine (PF)  20 mg Intravenous Daily    fat emulsion 20%  250 mL Intravenous Daily    insulin detemir U-100  6 Units Subcutaneous BID     PRN Meds:acetaminophen, dextrose 10%, dextrose 10%, glucagon (human recombinant), glucose, glucose, heparin (porcine), hydrALAZINE, insulin aspart U-100, labetaloL, morphine, naloxone, ondansetron, oxyCODONE, polyethylene glycol, sodium chloride 0.9%    Objective:     Vital Signs (Most Recent):  Temp: 98.2 °F (36.8 °C) (05/18/22 1237)  Pulse: 67 (05/18/22 1315)  Resp: 15 (05/18/22 1315)  BP: (!) 193/77 (05/18/22 1413)  SpO2: 100 % (05/18/22 1315)   Vital Signs (24h Range):  Temp:  [97.7 °F (36.5 °C)-98.2 °F (36.8 °C)] 98.2 °F (36.8 °C)  Pulse:  [59-86] 67  Resp:  [10-34] 15  SpO2:  [98 %-100 %] 100 %  BP: (117-211)/(51-85) 193/77     Weight: 78.8 kg (173 lb 11.6 oz)  Body mass index is 27.21 kg/m².    Physical Exam  Vitals and nursing note reviewed.    Constitutional:       General: He is awake. He is not in acute distress.     Appearance: He is well-developed. He is ill-appearing.      Comments: Non distressed leftward gaze. Increased tone with somewhat contracted left upper extremity. Responds to touch. Does not follow commands. Does not track.   HENT:      Head: Normocephalic and atraumatic.      Nose: Nose normal.   Eyes:      Conjunctiva/sclera: Conjunctivae normal.   Pulmonary:      Effort: Pulmonary effort is normal. No respiratory distress.   Abdominal:      General: There is no distension.      Palpations: Abdomen is soft.   Neurological:      Cranial Nerves: No cranial nerve deficit.   Psychiatric:         Behavior: Behavior is uncooperative.       Review of Symptoms      Symptom Assessment (ESAS 0-10 Scale)  Unable to complete assessment due to Acuity of condition     CAM / Delirium:  Positive    Pain Assessment in Advanced Demential Scale (PAINAD)   Breathing - Independent of vocalization:  0  Negative vocalization:  0  Facial expression:  0  Body language:  0  Consolability:  0  Total:  0    ECOG Performance Status thGthrthathdtheth:th th5th Living Arrangements:  Lives with family    Psychosocial/Cultural: Not   2 sons and 2 daughters(the whereabouts of daughters are unknown)  2 sisters and 1 brother  Very independent and did not like asking for help. Caretaker of others    Spiritual:  F - Jamia and Belief:  Hoahaoism background but not very Protestant.      Advance Care Planning   Advance Directives:   Living Will: No    LaPOST: No    Do Not Resuscitate Status: Yes    Medical Power of : No      Decision Making:  Family answered questions and Patient unable to communicate due to disease severity/cognitive impairment       Significant Labs: All pertinent labs within the past 24 hours have been reviewed.  CBC:   Recent Labs   Lab 05/18/22  0328   WBC 5.00   HGB 12.7*   HCT 39.9*   MCV 76*        BMP:  Recent Labs   Lab 05/18/22 0328   *       K 3.2*      CO2 24   BUN 30*   CREATININE 3.4*   CALCIUM 8.4*   MG 2.0     LFT:  Lab Results   Component Value Date    AST 14 05/18/2022    ALKPHOS 77 05/18/2022    BILITOT 0.3 05/18/2022     Albumin:   Albumin   Date Value Ref Range Status   05/18/2022 2.2 (L) 3.5 - 5.2 g/dL Final     Protein:   Total Protein   Date Value Ref Range Status   05/18/2022 6.4 6.0 - 8.4 g/dL Final     Lactic acid:   Lab Results   Component Value Date    LACTATE 3.4 (H) 05/12/2022    LACTATE 3.5 (HH) 05/12/2022       Significant Imaging: I have reviewed all pertinent imaging results/findings within the past 24 hours.      > 50% of 60 min visit spent in chart review, face to face discussion of goals of care,  symptom assessment, coordination of care and emotional support.  An additional 20 min spent in AD planning     Russell Casey MD  Palliative Medicine  [facility]

## 2022-05-18 NOTE — NURSING
When asking patient if he enjoys listening to music patient nodded yes. ICU bed playing classical music at this time.

## 2022-05-18 NOTE — NURSING
Neuro exam performed. Patient responds to voice and able to wiggle toes on command, however unable to follow command to other tasks when prompted.

## 2022-05-18 NOTE — PLAN OF CARE
Problem: Urinary Elimination Impaired (Stroke, Ischemic/Transient Ischemic Attack)  Goal: Effective Urinary Elimination  Outcome: Ongoing, Progressing     Problem: Adult Inpatient Plan of Care  Goal: Plan of Care Review  Outcome: Ongoing, Not Progressing     Problem: Diabetes Comorbidity  Goal: Blood Glucose Level Within Targeted Range  Outcome: Ongoing, Not Progressing     Problem: Skin Injury Risk Increased  Goal: Skin Health and Integrity  Outcome: Ongoing, Not Progressing     Problem: Cognitive Impairment (Stroke, Ischemic/Transient Ischemic Attack)  Goal: Optimal Cognitive Function  Outcome: Ongoing, Not Progressing     Problem: Communication Impairment (Stroke, Ischemic/Transient Ischemic Attack)  Goal: Improved Communication Skills  Outcome: Ongoing, Not Progressing     Problem: Respiratory Compromise (Stroke, Ischemic/Transient Ischemic Attack)  Goal: Effective Oxygenation and Ventilation  Outcome: Ongoing, Not Progressing     POC reviewed with the patient's nephew at bedside and he verbalized understanding. All comments and concerns addressed. Patient responds to pain consistently, occasionally responds to voice. No visual tracking noted--left gaze. PERRLA. Spontaneous movement noted to left arm and bilateral legs. No movement noted to right arm--flexion seen at shoulder at times. Patient remains on continuous BiPAP--periods of apnea noted. Frequent oral care provided due to secretions. Infrequent cough noted. NSR. BP elevated throughout shift--see prior notes. Colmenares removed--voided x1 via external catheter. No BM this shift. Midline and trialysis maintained. Nicardipine and TPN infusing. No skin issues to report at this time. Bed locked in lowest position with bed alarm set, call light within reach. Safety precautions maintained.

## 2022-05-18 NOTE — PROGRESS NOTES
Consult Note  Infectious Disease    Reason for Consult:  Rule out meningitis/Acute stroke    HPI: Marshall Flor is a 66 y.o. male with past medical history of HTN, diabetes, CHF, prior stroke presented to [facility] for altered mental status and fever.  No family at bedside.  Patient seen and examined in ICU.  Unable to obtain further history, patient breathing heavily, nonverbal, staring, febrile.    In the ER, hypertensive 179/109, febrile 101.2  Labs on admission reviewed, white count 6.8, PMN 84.5%, H&H 13.3/40.3, platelet count 311  Creatinine 2.5, MERCEDES 3.3 today  AST 83/ALT 58  BNP 3064  Lactic acid 3.5, procalcitonin 6.9  U tox negative  UA 3+ protein, 2+ glucose, WBC 3, moderate bacteria  Chest x-ray suggestive of pneumonia right upper lobe  MRI brain nonhemorrhagic infarction in the right anterolateral frontal lobe.    Admitted for severe sepsis with altered mental status, found to have acute stroke, rule out meningitis.    ID consult for meningitis.    INTERVAL HISTORY:  5/13: interim reviewed, patient seen and examined at bedside. Unable to perform LP since he is on Plavix, need to wait 5 days. Currently on bipap, alert and awake but dysarthric. Hemodynamically stable, permissive HTN for acute stroke, afebrile in the last 24h.  Micro reviwed blood cultures 1/4 bottles grew GPC, ID and sensitivities pending. Labs reviewed, WBC: 8, PMN: 76.7%. Creatinine 3.7. HbA1c: 9%.    5/14:  Consult in data reviewed, discussed with Dr. Moore.  Afebrile,   Renal function worsening, urine output is poor.  No BMP today, therefore ordered and his creatinine has increased to 5.9 Repeat chest x-ray ordered and read as no acute process but he does have some vascular fullness right perihilar.  He is unable to attend our interact.  LP is pending washout of Plavix.  1/4 blood cultures with coagulase-negative Staph, drawn at nursing home 5/11.  5/15:  Interim reviewed.  Renal function continues to deteriorate.   Afebrile.  Blood pressure high and labile.  Plans for dialysis tomorrow noted as well as T and lumbar puncture.  Urine output is about the same and in adequate. Requiring bipap for prolonged periods of apnea. Moving right arm occasionally. Will not attend.    5/16: Interim reviewed, discussed with Dr Ivory. Patient seen and examined at bedside, nephew present. States he was very active before admission, fishing and riding his bike. Hypertensive, tachycardic, afebrile. Labs reviewed, WBC: 6.5, PMN: 76.6%. H/H and plt count stable. Awaiting RAJ and LP today.     5/17: Interim reviewed, patient seen and examined at bedside. Remains hypertensive, afebrile. Started on HD yesterday, LP performed yesterday, WBC 1, negative for meningitis. Vanco and ampicillin discontinued, ceftriaxone adjusted to 2g IV daily. Discussed with Pulmonary, patient with episodes of apnea, on bipap. High risk for RAJ. Labs reviewed, WBC: 5.3, no left shift. Cr 4.0    5/18: Interim reviewed, CT scan overnight revealed hemorrhagic conversion - subacute infarction in the left frontal lobe with petechial hemorrhagic transformation.  No significant mass effect or midline shift. Remote infarctions within the right occipital lobe and the right thalamus. Permissive HTN, on HD. Remains on BiPAP, somnolent on and off, arousable to verbal commands briefly, wiggling toes today. Afebrile. Labs reviewed, CBC stable. Cr 3.4, on HD.  Micro reviewed, so far no growth.     EXAM & DIAGNOSTICS REVIEWED:   Vitals:     Temp:  [97.7 °F (36.5 °C)-98.2 °F (36.8 °C)]   Temp: 98.2 °F (36.8 °C) (05/18/22 0800)  Pulse: 69 (05/18/22 0807)  Resp: 19 (05/18/22 0807)  BP: (!) 181/72 (05/18/22 0811)  SpO2: 100 % (05/18/22 0807)    Intake/Output Summary (Last 24 hours) at 5/18/2022 0821  Last data filed at 5/18/2022 0755  Gross per 24 hour   Intake 1709.49 ml   Output 3790 ml   Net -2080.51 ml       General:  Somnolent on BiPaP in place FiO2 30% - distinct odor likely from acute  stroke  Eyes:  Anicteric, PERRL  ENT:  Unable to fully assess, bipap in place  Neck:  Supple to flexion and rotation   Lungs: Clear  Heart:  S1/S2+, regular rhythm, systolic murmur+  Abd:  +BS, soft, non tender, non distended, no rebound  :  Colmenares, urine clear  Musc:  Joints without effusion, swelling,  erythema, synovitis  Skin:  Warm, no rash  Wound:   Neuro: Eyes intermittently open, wiggling toes, moves left arm, b/l LE weakness   Psych:  Unable to assess  Lymphatic:       Extrem: No LE edema b/l  VAD:  Peripheral IVs       Isolation: None      General Labs reviewed:  Recent Labs   Lab 05/16/22 0427 05/17/22 0312 05/18/22  0328   WBC 6.58 5.30 5.00   HGB 13.5* 12.6* 12.7*   HCT 42.6 38.9* 39.9*    222 226       Recent Labs   Lab 05/13/22  0341 05/14/22  1309 05/16/22  0427 05/17/22 0312 05/18/22  0328      < > 152* 145 139   K 4.4   < > 4.4 4.1 3.2*      < > 111* 108 103   CO2 17*   < > 19* 20* 24   BUN 30*   < > 49* 30* 30*   CREATININE 3.7*   < > 6.7* 4.0* 3.4*   CALCIUM 7.9*   < > 8.2* 8.0* 8.4*   PROT 6.2  --   --  5.9* 6.4   BILITOT 0.5  --   --  0.4 0.3   ALKPHOS 107  --   --  77 77   ALT 52*  --   --  59* 42   AST 49*  --   --  25 14    < > = values in this interval not displayed.     No results for input(s): CRP in the last 168 hours.  No results for input(s): SEDRATE in the last 168 hours.    Estimated Creatinine Clearance: 20.5 mL/min (A) (based on SCr of 3.4 mg/dL (H)).     Micro:  Microbiology Results (last 7 days)     Procedure Component Value Units Date/Time    Blood culture [[id number]] Collected: 05/13/22 2007    Order Status: Completed Specimen: Blood from Peripheral, Left Arm Updated: 05/18/22 0612     Blood Culture, Routine No Growth to date      No Growth to date      No Growth to date      No Growth to date      No Growth to date    Narrative:      Collection has been rescheduled by ACD at 05/13/2022 15:13 Reason:   Unable to collect, no place to stick pt  Collection  has been rescheduled by ACD at 05/13/2022 15:13 Reason:   Unable to collect, no place to stick pt    Cryptococcal antigen, CSF [[id number]] Collected: 05/16/22 1404    Order Status: Completed Specimen: CSF (Spinal Fluid) from CSF Tap, Tube 2 Updated: 05/17/22 0922     Crypto Ag, CSF Negative    Direct AFB stain [[id number]] Collected: 05/16/22 1404    Order Status: Completed Specimen: CSF (Spinal Fluid) from CSF Tap, Tube 2 Updated: 05/17/22 0843     Direct Acid Fast No acid fast bacilli seen.    CSF culture [[id number]] Collected: 05/16/22 1404    Order Status: Completed Specimen: CSF (Spinal Fluid) from CSF Tap, Tube 2 Updated: 05/16/22 1707     Gram Stain Result No WBC's, epithelial cells or organisms seen    AFB Culture & Smear [[id number]] Collected: 05/13/22 1508    Order Status: Completed Specimen: CSF (Spinal Fluid) from Nares, Right Updated: 05/16/22 1446     AFB Culture & Smear Culture in progress     AFB CULTURE STAIN No acid fast bacilli seen.    Gram stain [[id number]] Collected: 05/16/22 1404    Order Status: Canceled Specimen: CSF (Spinal Fluid) from CSF Tap, Tube 2     Culture, MRSA [[id number]] Collected: 05/15/22 2300    Order Status: Sent Specimen: MRSA source from Nares, Left Updated: 05/16/22 1042    Culture, MRSA [[id number]]     Order Status: Canceled Specimen: MRSA source from Nares, Left     Gram stain [[id number]]     Order Status: Canceled Specimen: CSF (Spinal Fluid)     Direct AFB stain [[id number]]     Order Status: Canceled Specimen: CSF (Spinal Fluid)     Cryptococcal antigen, CSF [[id number]]     Order Status: Canceled Specimen: CSF (Spinal Fluid)     CSF culture [[id number]]     Order Status: Canceled Specimen: CSF (Spinal Fluid)           Imaging Reviewed:  CXR  CT and CTA head negative for acute ischemic stroke   MRI brain - acute ischemic stroke  1. This is a very limited evaluation but the study is abnormal.  There is a nonhemorrhagic infarction in the anterior lateral left frontal  lobe.  2. There is a remote infarction with encephalomalacia and gliosis in the medial right occipital lobe.  There are also remote infarctions in the cerebellar hemispheres as well as in the right thalamus.    Cardiology:   ECHO 5/12/22:  · The left ventricle is moderately enlarged with eccentric hypertrophy and severely decreased systolic function.  · Grade II left ventricular diastolic dysfunction.  · The estimated PA systolic pressure is 59 mmHg.  · Severe right ventricular enlargement with mildly to moderately reduced right ventricular systolic function.  · Moderate left atrial enlargement.  · There is moderate pulmonary hypertension.  · Intermediate central venous pressure (8 mmHg).  · Mild aortic regurgitation.  · Moderate right atrial enlargement.  · Mild to moderate tricuspid regurgitation.  · Mild pulmonic regurgitation.  · The estimated ejection fraction is 20%.  · Moderate mitral regurgitation.          IMPRESSION & PLAN     1. Sepsis resolved, fever on admission likely from acute stroke now with hemorrhagic conversion - remains afebrile    CSF WBC: 1 , negative for meningitis -  glucose 94 and protein 66    HSV 1 & 2 negative, Crypto Ag negative    WN, VDRL and CSF PCR in process    Blood cultures 1/4 bottles coag-negative staph likely contaminant - repeat cx neative   Procal elevated due to acute renal failure   Strep urine Ag pending      2. Acute stroke now with hemorrhagic conversion   3. Kym, cr 2.5--3.7--5.9--6.7-->4.0-->3.4   On HD, nephrology following  3. PMHx, uncontrolled diabetes, HTN, prior stroke, congestive heart failure    Recommendations:  Completed IV abx 5/17  Monitor OFF antibiotics   Follow CSF studies including VDRL, WNV and CSF PCR  Aspiration precautions   Neurology recommendation appreciated   Aspiration precautions     Will follow peripherally     D/w nursing, Dr Correa      Medical Decision Making during this encounter was  [_] Low Complexity  [_] Moderate Complexity  [xx]  High Complexity

## 2022-05-18 NOTE — PLAN OF CARE
Problem: Adult Inpatient Plan of Care  Goal: Plan of Care Review  Outcome: Ongoing, Progressing  Goal: Patient-Specific Goal (Individualized)  Outcome: Ongoing, Progressing  Goal: Absence of Hospital-Acquired Illness or Injury  Outcome: Ongoing, Progressing  Goal: Optimal Comfort and Wellbeing  Outcome: Ongoing, Not Progressing  Goal: Readiness for Transition of Care  Outcome: Ongoing, Not Progressing     Problem: Diabetes Comorbidity  Goal: Blood Glucose Level Within Targeted Range  Outcome: Ongoing, Progressing     Problem: Skin Injury Risk Increased  Goal: Skin Health and Integrity  Outcome: Ongoing, Progressing     Problem: Adjustment to Illness (Stroke, Ischemic/Transient Ischemic Attack)  Goal: Optimal Coping  Outcome: Ongoing, Not Progressing     Problem: Bowel Elimination Impaired (Stroke, Ischemic/Transient Ischemic Attack)  Goal: Effective Bowel Elimination  Outcome: Ongoing, Progressing     Problem: Cerebral Tissue Perfusion (Stroke, Ischemic/Transient Ischemic Attack)  Goal: Optimal Cerebral Tissue Perfusion  Outcome: Ongoing, Not Progressing     Problem: Cognitive Impairment (Stroke, Ischemic/Transient Ischemic Attack)  Goal: Optimal Cognitive Function  Outcome: Ongoing, Not Progressing     Problem: Communication Impairment (Stroke, Ischemic/Transient Ischemic Attack)  Goal: Improved Communication Skills  Outcome: Ongoing, Not Progressing     Problem: Functional Ability Impaired (Stroke, Ischemic/Transient Ischemic Attack)  Goal: Optimal Functional Ability  Outcome: Ongoing, Not Progressing     Problem: Respiratory Compromise (Stroke, Ischemic/Transient Ischemic Attack)  Goal: Effective Oxygenation and Ventilation  Outcome: Ongoing, Not Progressing     Problem: Sensorimotor Impairment (Stroke, Ischemic/Transient Ischemic Attack)  Goal: Improved Sensorimotor Function  Outcome: Ongoing, Not Progressing     Problem: Swallowing Impairment (Stroke, Ischemic/Transient Ischemic Attack)  Goal: Optimal Eating  and Swallowing without Aspiration  Outcome: Ongoing, Not Progressing     Problem: Urinary Elimination Impaired (Stroke, Ischemic/Transient Ischemic Attack)  Goal: Effective Urinary Elimination  Outcome: Ongoing, Not Progressing     Problem: Infection  Goal: Absence of Infection Signs and Symptoms  Outcome: Ongoing, Progressing

## 2022-05-18 NOTE — PROGRESS NOTES
Progress Note  PULMONARY    Admit Date: 5/12/2022 05/18/2022    History of Present Illness:  Pt is a 67 yo male with CHF, DM2, CVA, HTN who presented to the ED at [place] for AMS and fever then transferred for higher level of care to [facility] on 5/12/22. Chart reviewed- hospital course complicated by sepsis, acute renal failure requiring HD, resp failure requiring BIPAP. Per anesthesia he will be intubated for RAJ today. Pulm is consulted for management of resp failure and potential ventilator requirement for procedure.   I discussed over the phone w/ pt's nephew Alphonso, brother Tony and sister Nahomi who provided more history as pt is nonverbal and encephalopathic. At baseline pt is independent, walks and talks and cares for himself. He has 2 adult sons who have not been very involved in care so far- alphonso reports he talks w/ them and explains everything.    SUBJECTIVE:     5/17- pt continues on bipap, hypertensive to 180s-190s today, getting hydralazine IV. RAJ postponed per cardiology. Pt underwent HD yesterday with removal of 1.5L  5/18- pt continues on bipap, underwent HD yesterday with -3.5L      OBJECTIVE:     Vitals (Most recent):  Vitals:    05/18/22 0811   BP: (!) 181/72   Pulse:    Resp:    Temp:        Vitals (24 hour range):  Temp:  [97.7 °F (36.5 °C)-98.2 °F (36.8 °C)]   Pulse:  [59-82]   Resp:  [11-34]   BP: (130-217)/(58-88)   SpO2:  [98 %-100 %]       Intake/Output Summary (Last 24 hours) at 5/18/2022 0849  Last data filed at 5/18/2022 0755  Gross per 24 hour   Intake 1709.49 ml   Output 3740 ml   Net -2030.51 ml          Physical Exam:  The patient's neuro status (alertness,orientation,cognitive function,motor skills,), pharyngeal exam (oral lesions, hygiene, abn dentition,), Neck (jvd,mass,thyroid,nodes in neck and above/below clavicle),RESPIRATORY(symmetry,effort,fremitus,percussion,auscultation),  Cor(rhythm,heart tones including  gallops,perfusion,edema)ABD(distention,hepatic&splenomegaly,tenderness,masses), Skin(rash,cyanosis),Psyc(affect,judgement,).  Exam negative except for these pertinent findings:    On bipap, eyes open, tracks, follows command L hand  No verbalization  GCS 11  PERRL  HR regular  Breath sounds diminished bilaterally  HD access RIJ  abd soft nontender  No edema        Radiographs reviewed: view by direct vision   CT head 5/16-   1. Subacute infarction in the left frontal lobe with petechial hemorrhagic transformation.  No significant mass effect or midline shift.  2. Remote infarctions within the right occipital lobe and the right thalamus.    CXR 5/16/22- pulmonary vasc congestion, heart enlarged    TTE 5/12/22-   · The left ventricle is moderately enlarged with eccentric hypertrophy and severely decreased systolic function.  · Grade II left ventricular diastolic dysfunction.  · The estimated PA systolic pressure is 59 mmHg.  · Severe right ventricular enlargement with mildly to moderately reduced right ventricular systolic function.  · Moderate left atrial enlargement.  · There is moderate pulmonary hypertension.  · Intermediate central venous pressure (8 mmHg).  · Mild aortic regurgitation.  · Moderate right atrial enlargement.  · Mild to moderate tricuspid regurgitation.  · Mild pulmonic regurgitation.  · The estimated ejection fraction is 20%.  · Moderate mitral regurgitation.      Labs     Recent Labs   Lab 05/18/22  0328   WBC 5.00   HGB 12.7*   HCT 39.9*        Recent Labs   Lab 05/18/22  0328      K 3.2*      CO2 24   BUN 30*   CREATININE 3.4*   *   CALCIUM 8.4*   MG 2.0   PHOS 2.8   AST 14   ALT 42   ALKPHOS 77   BILITOT 0.3   PROT 6.4   ALBUMIN 2.2*     Recent Labs   Lab 05/17/22  1102   PH 7.381   PCO2 41.6   PO2 102*   HCO3 24.7     Microbiology Results (last 7 days)     Procedure Component Value Units Date/Time    Blood culture [[id number]] Collected: 05/13/22 2007    Order Status:  Completed Specimen: Blood from Peripheral, Left Arm Updated: 05/18/22 0612     Blood Culture, Routine No Growth to date      No Growth to date      No Growth to date      No Growth to date      No Growth to date    Narrative:      Collection has been rescheduled by ACD at 05/13/2022 15:13 Reason:   Unable to collect, no place to stick pt  Collection has been rescheduled by ACD at 05/13/2022 15:13 Reason:   Unable to collect, no place to stick pt    Cryptococcal antigen, CSF [[id number]] Collected: 05/16/22 1404    Order Status: Completed Specimen: CSF (Spinal Fluid) from CSF Tap, Tube 2 Updated: 05/17/22 0922     Crypto Ag, CSF Negative    Direct AFB stain [[id number]] Collected: 05/16/22 1404    Order Status: Completed Specimen: CSF (Spinal Fluid) from CSF Tap, Tube 2 Updated: 05/17/22 0843     Direct Acid Fast No acid fast bacilli seen.    CSF culture [[id number]] Collected: 05/16/22 1404    Order Status: Completed Specimen: CSF (Spinal Fluid) from CSF Tap, Tube 2 Updated: 05/16/22 1707     Gram Stain Result No WBC's, epithelial cells or organisms seen    AFB Culture & Smear [[id number]] Collected: 05/13/22 1508    Order Status: Completed Specimen: CSF (Spinal Fluid) from Nares, Right Updated: 05/16/22 1446     AFB Culture & Smear Culture in progress     AFB CULTURE STAIN No acid fast bacilli seen.    Gram stain [[id number]] Collected: 05/16/22 1404    Order Status: Canceled Specimen: CSF (Spinal Fluid) from CSF Tap, Tube 2     Culture, MRSA [[id number]] Collected: 05/15/22 2300    Order Status: Sent Specimen: MRSA source from Nares, Left Updated: 05/16/22 1042    Culture, MRSA [[id number]]     Order Status: Canceled Specimen: MRSA source from Nares, Left     Gram stain [[id number]]     Order Status: Canceled Specimen: CSF (Spinal Fluid)     Direct AFB stain [[id number]]     Order Status: Canceled Specimen: CSF (Spinal Fluid)     Cryptococcal antigen, CSF [[id number]]     Order Status: Canceled Specimen: CSF (Spinal Fluid)      CSF culture [[id number]]     Order Status: Canceled Specimen: CSF (Spinal Fluid)           Impression:  Active Hospital Problems    Diagnosis  POA    *CVA (cerebral vascular accident) [I63.9]  Yes    MERCEDES (acute kidney injury) [N17.9]  No    Chronic combined systolic and diastolic CHF (congestive heart failure) [I50.42]  Yes    Meningitis (needs to be ruled out) [G03.9]  Yes    Encephalopathy [G93.40]  Yes    Essential hypertension [I10]  Yes    Coronary artery disease involving native coronary artery of native heart [I25.10]  Yes    Hyperlipidemia [E78.5]  Yes    Type 2 diabetes mellitus with stage 4 chronic kidney disease, without long-term current use of insulin [E11.22, N18.4]  Yes    CKD (chronic kidney disease) stage 4, GFR 15-29 ml/min [N18.4]  Yes      Resolved Hospital Problems   No resolved problems to display.               Plan:       - BIPAP/supplemental O2 to maintain sats >92%  - HD per nephrology  - neuro following  - control BP  - antibiotics per ID  - continue pepcid for GI prophylaxis  - lovenox held for hemorrhagic transformation  - per hospitalist may require PEG  - pending palliative meeting for goals of care      The following were evaluated and adjusted as needed: mechanical ventilator settings and weaning status, intravenous fluids and nutritional status, sedation and neurologic status, acid base balance and oxygenation needs and input and output and renal status       Critical Care  - THE PATIENT HAS A HIGH PROBABILITY OF IMMINENT OR LIFE THREATENING DETERIORATION.  Over 50%time of encounter was in direct care at bedside.  Time was 30 to 74 minutes required for patient care.  Time needed for all of the above totaled 35 minutes.    Tosin Charles MD  Pulmonary & Critical Care Medicine                              .

## 2022-05-18 NOTE — NURSING
Notified Madeline FISHER of patient's BP remaining elevated above ordered parameters after receiving PRN hydralazine. MD ordered labetalol 10mg q6hr PRN to maintain SBP <180.

## 2022-05-18 NOTE — CARE UPDATE
05/17/22 1923   Patient Assessment/Suction   Level of Consciousness (AVPU) responds to pain   Respiratory Effort Mild   Expansion/Accessory Muscles/Retractions no use of accessory muscles   All Lung Fields Breath Sounds Anterior:;diminished   JOEL Breath Sounds diminished   LLL Breath Sounds diminished   RUL Breath Sounds diminished   RML Breath Sounds diminished   RLL Breath Sounds diminished   Rhythm/Pattern, Respiratory assisted mechanically;Cheyne-Valdovinos  (on bipap)   PRE-TX-O2   O2 Device (Oxygen Therapy) BiPAP   $ Is the patient on Low Flow Oxygen? Yes   Oxygen Concentration (%) 25   SpO2 100 %   Pulse Oximetry Type Continuous   $ Pulse Oximetry - Multiple Charge Pulse Oximetry - Multiple   Pulse 69   Resp 19   BP (!) 171/70   Skin Integrity   $ Wound Care Tech Time 15 min   Area Observed Bridge of nose   Skin Appearance without discoloration   Barrier used? Liquid Filled Cushion   Airway Safety   Ambu bag with the patient? Yes, Adult Ambu   Is mask with the patient? Yes, Adult Mask   Ready to Wean/Extubation Screen   FIO2<=50 (chart decimal) 0.25   Preset CPAP/BiPAP Settings   Mode Of Delivery BiPAP   $ CPAP/BiPAP Daily Charge BiPAP/CPAP Daily   $ Initial CPAP/BiPAP Setup? No   $ Is patient using? Yes   Size of Mask Medium/Large   Sized Appropriately? Yes   Equipment Type V60   Airway Device Type medium full face mask   Ipap 10   EPAP (cm H2O) 5   Pressure Support (cm H2O) 5   Set Rate (Breaths/Min) 12   ITime (sec) 0.1   Rise Time (sec) 2   Patient CPAP/BiPAP Settings   Timed Inspiration (Sec) 0.1   IPAP Rise Time (sec) 2   RR Total (Breaths/Min) 17   Tidal Volume (mL) 428   VE Minute Ventilation (L/min) 7.5 L/min   Peak Inspiratory Pressure (cm H2O) 11   TiTOT (%) 27   Total Leak (L/Min) 0   Patient Trigger - ST Mode Only (%) 99   CPAP/BiPAP Backup Settings   Backup Rate 12 breaths per minute (bpm)   CPAP/BiPAP Alarms   High Pressure (cm H2O) 15   Low Pressure (cm H2O) 5   Minute Ventilation (L/Min) 1.5    High RR (breaths/min) 40   Low RR (breaths/min) 10   Apnea (Sec) 20

## 2022-05-18 NOTE — ASSESSMENT & PLAN NOTE
Advance Care Planning     5/18/2022    Writer meeting with 2 sons, Joseph and Ger today, along with nephew, Kraig, and brother, Tony. Patient is not .   Family details what neuro has disclosed to them which is that patient will likely need complete care for the remainder of his life. He will be at increased risk for further infections along with requiring forms of artificial life support, such as dialysis and tube feeding. This is very troubling to hear for family present who voice patient's values of independence. No AD but they do not feel patient would choose to live this way given the circumstances. The hospice alternative is reviewed which they are interested in and this is explained in length. They are requesting a few days to arrange for family members to visit from out of town, namely a sister in [facility], prior to transitioning to comfort focused care. The goal would be Saturday, 5/21/2022, for this transition with intention to continue all supportive care including dialysis if warranted until this time to allow family members to say their goodbyes. In the event that patient does not die within the following hours to days they are asked to speak as a family about where patient would have hospice, whether it be home or at a facility. They did not have immediate answer for that but will address in the future. Lastly code status is reviewed and it is decided to allow a natural death if his heart were to stop. Intubation is not an option. DNR.   Palliative medicine will continue to stay engaged with family for further needs.

## 2022-05-18 NOTE — PLAN OF CARE
Problem: Cognitive Impairment (Stroke, Ischemic/Transient Ischemic Attack)  Goal: Optimal Cognitive Function  Outcome: Ongoing, Progressing     Problem: Respiratory Compromise (Stroke, Ischemic/Transient Ischemic Attack)  Goal: Effective Oxygenation and Ventilation  Outcome: Ongoing, Progressing     Problem: Adult Inpatient Plan of Care  Goal: Plan of Care Review  Outcome: Ongoing, Not Progressing     Problem: Diabetes Comorbidity  Goal: Blood Glucose Level Within Targeted Range  Outcome: Ongoing, Not Progressing     Problem: Urinary Elimination Impaired (Stroke, Ischemic/Transient Ischemic Attack)  Goal: Effective Urinary Elimination  Outcome: Ongoing, Not Progressing     POC reviewed with the patient's nephew at bedside and he verbalized understanding. All comments and concerns addressed. Patient more alert today; began visually tracking this afternoon. Pupils R>L per pupilometer. Nodding appropriately this afternoon. Spontaneous movement noted to left arm and bilateral legs. No movement noted to right arm. Continuous bipap removed after dialysis per order from Melvin FISHER--patient currently on 3L NC. Oral care provided. Infrequent cough noted. NSR. BP elevated throughout shift--nicardipine utilized. TPN infusing, potassium replaced. Dialysis today. External catheter; no voiding this shift--bladder scan reading 150-159 throughout shift, Nephrology aware. No BM this shift. Midline and trialysis maintained. No skin issues to report at this time. Bed locked in lowest position with bed alarm set, call light within reach. Safety precautions maintained.

## 2022-05-19 LAB
ALBUMIN SERPL BCP-MCNC: 2.3 G/DL (ref 3.5–5.2)
ALP SERPL-CCNC: 71 U/L (ref 55–135)
ALT SERPL W/O P-5'-P-CCNC: 31 U/L (ref 10–44)
ANION GAP SERPL CALC-SCNC: 11 MMOL/L (ref 8–16)
AST SERPL-CCNC: 14 U/L (ref 10–40)
BACTERIA BLD CULT: NORMAL
BASOPHILS # BLD AUTO: 0.03 K/UL (ref 0–0.2)
BASOPHILS NFR BLD: 0.6 % (ref 0–1.9)
BILIRUB SERPL-MCNC: 0.3 MG/DL (ref 0.1–1)
BUN SERPL-MCNC: 30 MG/DL (ref 8–23)
CALCIUM SERPL-MCNC: 8.3 MG/DL (ref 8.7–10.5)
CHLORIDE SERPL-SCNC: 102 MMOL/L (ref 95–110)
CO2 SERPL-SCNC: 22 MMOL/L (ref 23–29)
CREAT SERPL-MCNC: 3.7 MG/DL (ref 0.5–1.4)
DIFFERENTIAL METHOD: ABNORMAL
EOSINOPHIL # BLD AUTO: 0.2 K/UL (ref 0–0.5)
EOSINOPHIL NFR BLD: 4.7 % (ref 0–8)
ERYTHROCYTE [DISTWIDTH] IN BLOOD BY AUTOMATED COUNT: 17.5 % (ref 11.5–14.5)
EST. GFR  (AFRICAN AMERICAN): 19 ML/MIN/1.73 M^2
EST. GFR  (NON AFRICAN AMERICAN): 16 ML/MIN/1.73 M^2
GLUCOSE SERPL-MCNC: 381 MG/DL (ref 70–110)
HCT VFR BLD AUTO: 38.1 % (ref 40–54)
HGB BLD-MCNC: 12.6 G/DL (ref 14–18)
IMM GRANULOCYTES # BLD AUTO: 0.02 K/UL (ref 0–0.04)
IMM GRANULOCYTES NFR BLD AUTO: 0.4 % (ref 0–0.5)
LYMPHOCYTES # BLD AUTO: 1 K/UL (ref 1–4.8)
LYMPHOCYTES NFR BLD: 18.8 % (ref 18–48)
MAGNESIUM SERPL-MCNC: 1.8 MG/DL (ref 1.6–2.6)
MCH RBC QN AUTO: 25 PG (ref 27–31)
MCHC RBC AUTO-ENTMCNC: 33.1 G/DL (ref 32–36)
MCV RBC AUTO: 75 FL (ref 82–98)
MONOCYTES # BLD AUTO: 0.6 K/UL (ref 0.3–1)
MONOCYTES NFR BLD: 12.3 % (ref 4–15)
NEUTROPHILS # BLD AUTO: 3.2 K/UL (ref 1.8–7.7)
NEUTROPHILS NFR BLD: 63.2 % (ref 38–73)
NRBC BLD-RTO: 0 /100 WBC
PHOSPHATE SERPL-MCNC: 2 MG/DL (ref 2.7–4.5)
PLATELET # BLD AUTO: 208 K/UL (ref 150–450)
PMV BLD AUTO: 11.4 FL (ref 9.2–12.9)
POCT GLUCOSE: 183 MG/DL (ref 70–110)
POCT GLUCOSE: 271 MG/DL (ref 70–110)
POCT GLUCOSE: 331 MG/DL (ref 70–110)
POCT GLUCOSE: 358 MG/DL (ref 70–110)
POCT GLUCOSE: 382 MG/DL (ref 70–110)
POCT GLUCOSE: 420 MG/DL (ref 70–110)
POTASSIUM SERPL-SCNC: 3.4 MMOL/L (ref 3.5–5.1)
PROT SERPL-MCNC: 6.5 G/DL (ref 6–8.4)
RBC # BLD AUTO: 5.05 M/UL (ref 4.6–6.2)
S PNEUM AG UR QL: NOT DETECTED
SODIUM SERPL-SCNC: 135 MMOL/L (ref 136–145)
WBC # BLD AUTO: 5.11 K/UL (ref 3.9–12.7)

## 2022-05-19 PROCEDURE — 80053 COMPREHEN METABOLIC PANEL: CPT | Performed by: INTERNAL MEDICINE

## 2022-05-19 PROCEDURE — 85025 COMPLETE CBC W/AUTO DIFF WBC: CPT | Performed by: INTERNAL MEDICINE

## 2022-05-19 PROCEDURE — 99233 SBSQ HOSP IP/OBS HIGH 50: CPT | Mod: ,,, | Performed by: INTERNAL MEDICINE

## 2022-05-19 PROCEDURE — 99222 1ST HOSP IP/OBS MODERATE 55: CPT | Mod: ,,, | Performed by: FAMILY MEDICINE

## 2022-05-19 PROCEDURE — 25000003 PHARM REV CODE 250: Performed by: INTERNAL MEDICINE

## 2022-05-19 PROCEDURE — A4217 STERILE WATER/SALINE, 500 ML: HCPCS | Performed by: INTERNAL MEDICINE

## 2022-05-19 PROCEDURE — B4185 PARENTERAL SOL 10 GM LIPIDS: HCPCS | Performed by: INTERNAL MEDICINE

## 2022-05-19 PROCEDURE — 51798 US URINE CAPACITY MEASURE: CPT

## 2022-05-19 PROCEDURE — 99900035 HC TECH TIME PER 15 MIN (STAT)

## 2022-05-19 PROCEDURE — 84100 ASSAY OF PHOSPHORUS: CPT | Performed by: INTERNAL MEDICINE

## 2022-05-19 PROCEDURE — 20000000 HC ICU ROOM

## 2022-05-19 PROCEDURE — 94761 N-INVAS EAR/PLS OXIMETRY MLT: CPT

## 2022-05-19 PROCEDURE — 99222 PR INITIAL HOSPITAL CARE,LEVL II: ICD-10-PCS | Mod: ,,, | Performed by: FAMILY MEDICINE

## 2022-05-19 PROCEDURE — 27000221 HC OXYGEN, UP TO 24 HOURS

## 2022-05-19 PROCEDURE — C9399 UNCLASSIFIED DRUGS OR BIOLOG: HCPCS | Performed by: INTERNAL MEDICINE

## 2022-05-19 PROCEDURE — 83735 ASSAY OF MAGNESIUM: CPT | Performed by: INTERNAL MEDICINE

## 2022-05-19 PROCEDURE — 99233 PR SUBSEQUENT HOSPITAL CARE,LEVL III: ICD-10-PCS | Mod: ,,, | Performed by: INTERNAL MEDICINE

## 2022-05-19 RX ORDER — HEPARIN SODIUM 5000 [USP'U]/ML
5000 INJECTION, SOLUTION INTRAVENOUS; SUBCUTANEOUS EVERY 12 HOURS
Status: DISCONTINUED | OUTPATIENT
Start: 2022-05-19 | End: 2022-05-21

## 2022-05-19 RX ORDER — ASPIRIN 300 MG/1
150 SUPPOSITORY RECTAL DAILY
Status: DISCONTINUED | OUTPATIENT
Start: 2022-05-19 | End: 2022-05-20

## 2022-05-19 RX ORDER — POTASSIUM CHLORIDE 7.45 MG/ML
10 INJECTION INTRAVENOUS
Status: DISCONTINUED | OUTPATIENT
Start: 2022-05-19 | End: 2022-05-19

## 2022-05-19 RX ORDER — ASPIRIN 300 MG/1
150 SUPPOSITORY RECTAL EVERY 6 HOURS PRN
Status: DISCONTINUED | OUTPATIENT
Start: 2022-05-19 | End: 2022-05-19

## 2022-05-19 RX ADMIN — INSULIN DETEMIR 4 UNITS: 100 INJECTION, SOLUTION SUBCUTANEOUS at 12:05

## 2022-05-19 RX ADMIN — NICARDIPINE HYDROCHLORIDE 7.5 MG/HR: 0.2 INJECTION, SOLUTION INTRAVENOUS at 12:05

## 2022-05-19 RX ADMIN — INSULIN ASPART 5 UNITS: 100 INJECTION, SOLUTION INTRAVENOUS; SUBCUTANEOUS at 12:05

## 2022-05-19 RX ADMIN — NICARDIPINE HYDROCHLORIDE 7.5 MG/HR: 0.2 INJECTION, SOLUTION INTRAVENOUS at 09:05

## 2022-05-19 RX ADMIN — INSULIN ASPART 10 UNITS: 100 INJECTION, SOLUTION INTRAVENOUS; SUBCUTANEOUS at 04:05

## 2022-05-19 RX ADMIN — I.V. FAT EMULSION 250 ML: 20 EMULSION INTRAVENOUS at 10:05

## 2022-05-19 RX ADMIN — FAMOTIDINE 20 MG: 10 INJECTION INTRAVENOUS at 08:05

## 2022-05-19 RX ADMIN — INSULIN ASPART 6 UNITS: 100 INJECTION, SOLUTION INTRAVENOUS; SUBCUTANEOUS at 04:05

## 2022-05-19 RX ADMIN — INSULIN ASPART 8 UNITS: 100 INJECTION, SOLUTION INTRAVENOUS; SUBCUTANEOUS at 12:05

## 2022-05-19 RX ADMIN — ASPIRIN 150 MG: 300 SUPPOSITORY RECTAL at 04:05

## 2022-05-19 RX ADMIN — POTASSIUM PHOSPHATE, MONOBASIC AND POTASSIUM PHOSPHATE, DIBASIC: 224; 236 INJECTION, SOLUTION INTRAVENOUS at 10:05

## 2022-05-19 RX ADMIN — NICARDIPINE HYDROCHLORIDE 2.5 MG/HR: 0.2 INJECTION, SOLUTION INTRAVENOUS at 05:05

## 2022-05-19 RX ADMIN — NICARDIPINE HYDROCHLORIDE 7.5 MG/HR: 0.2 INJECTION, SOLUTION INTRAVENOUS at 04:05

## 2022-05-19 NOTE — CARE UPDATE
05/19/22 0810   Patient Assessment/Suction   Level of Consciousness (AVPU) responds to voice   PRE-TX-O2   O2 Device (Oxygen Therapy) nasal cannula   Flow (L/min) (S)  3  (weaned to 2lpm)   Pulse Oximetry Type Continuous   $ Pulse Oximetry - Multiple Charge Pulse Oximetry - Multiple   Skin Integrity   $ Wound Care Tech Time 15 min   Area Observed Bridge of nose   Skin Appearance white;without discoloration   Barrier used? Other (see comments)   Was wound care notified?   (has white cream on nose by RN)

## 2022-05-19 NOTE — PLAN OF CARE
Problem: Adult Inpatient Plan of Care  Goal: Plan of Care Review  Outcome: Ongoing, Progressing     Problem: Diabetes Comorbidity  Goal: Blood Glucose Level Within Targeted Range  Outcome: Ongoing, Progressing     Problem: Skin Injury Risk Increased  Goal: Skin Health and Integrity  Outcome: Ongoing, Progressing     Problem: Adjustment to Illness (Stroke, Ischemic/Transient Ischemic Attack)  Goal: Optimal Coping  Outcome: Ongoing, Progressing       No acute events overnight. F/U CT unchanged from previous scan. Pt alert, shakes head yes/no to simple questions. Pt able to move all ext's to command. Remains on Cardene @ 7.5mg/hr. safety maintained.

## 2022-05-19 NOTE — CARE UPDATE
05/18/22 1938   Patient Assessment/Suction   Level of Consciousness (AVPU) alert   Respiratory Effort Shallow   Expansion/Accessory Muscles/Retractions no use of accessory muscles   All Lung Fields Breath Sounds Anterior:;diminished   Rhythm/Pattern, Respiratory shallow  (periods of apnea)   PRE-TX-O2   O2 Device (Oxygen Therapy) nasal cannula   $ Is the patient on Low Flow Oxygen? Yes   Flow (L/min) 3   SpO2 100 %   Pulse Oximetry Type Continuous   $ Pulse Oximetry - Multiple Charge Pulse Oximetry - Multiple   Pulse 81   Resp (!) 25   BP (!) 148/68   Skin Integrity   $ Wound Care Tech Time 15 min   Area Observed Bridge of nose   Skin Appearance without discoloration   Barrier used? Liquid Filled Cushion   Airway Safety   Ambu bag with the patient? Yes, Adult Ambu   Is mask with the patient? Yes, Adult Mask   Preset CPAP/BiPAP Settings   Mode Of Delivery Standby

## 2022-05-19 NOTE — PROGRESS NOTES
[facility] Hospital Medicine  Progress Note    Patient Name: Marshall Flor  MRN: [id number]  Patient Class: IP- Inpatient   Admission Date: 5/12/2022  Length of Stay: 7 days  Attending Physician: Dimitry Correa MD  Primary Care Provider: Dorie Quan MD        Subjective:     Principal Problem:CVA (cerebral vascular accident)        HPI:  66-year-old male with history of CHF, diabetes presents to [place] for decreased responsiveness found to have a fever, left gaze preference with nuchal rigidity found to have meningitis in addition to being found to have an acute CVA. Beyond this he has an MERCEDES.            Not communicating. Off BiPAP    NAD, Alert  NC, AT  RRR  CTAB  SNTND+BS  No edema   No gross joint abnormalities  PERRL, gaze preference left. Weakness present throughout     Vitals, labs and radiographs from past 24h reviewed and personally interpreted.         Overview/Hospital Course:              Assessment/Plan:         1. Acute CVA  -rectal aspirin resumed  -On TPN  -possible intracranial hemorrhage: repeat CT on 5/18 demonstrates stability  -neurology      2. Acute bacterial pneumonia 2/2 unknown organism  -treatment complete.      CVA (cerebral vascular accident)  Proven on MRI.  Old infarcts seen as well.  Neurologist consulting.  Getting aspirin suppositories until concern for hemorrhagic conversion. Now resumed as repeat CT demonstrated stability of possibly converted region       MERCEDES (acute kidney injury)  New problem.S  Nephrology group consulting.  Monitor renal function and UOP.  Avoid NSAID's and hypotension.  Acyclovir stopped.  S/p RRT .    Creatinine   Date Value Ref Range Status   05/15/2022 6.7 (H) 0.5 - 1.4 mg/dL Final   05/14/2022 5.9 (H) 0.5 - 1.4 mg/dL Final   05/13/2022 3.7 (H) 0.5 - 1.4 mg/dL Final   ]      Meningitis (ruled out)           Chronic combined systolic and diastolic CHF (congestive heart failure)  Patient is identified as having Combined Systolic and  Diastolic heart failure that is Chronic. CHF is currently controlled. Latest ECHO performed and demonstrates- Results for orders placed during the hospital encounter of 05/12/22    Echo    Interpretation Summary  · The left ventricle is moderately enlarged with eccentric hypertrophy and severely decreased systolic function.  · Grade II left ventricular diastolic dysfunction.  · The estimated PA systolic pressure is 59 mmHg.  · Severe right ventricular enlargement with mildly to moderately reduced right ventricular systolic function.  · Moderate left atrial enlargement.  · There is moderate pulmonary hypertension.  · Intermediate central venous pressure (8 mmHg).  · Mild aortic regurgitation.  · Moderate right atrial enlargement.  · Mild to moderate tricuspid regurgitation.  · Mild pulmonic regurgitation.  · The estimated ejection fraction is 20%.  · Moderate mitral regurgitation.  . Patient NPO; not on oral meds.  Monitor clinical status closely. Monitor on telemetry. Patient is off CHF pathway.  Monitor strict Is&Os and daily weights.   Last BNP reviewed- and noted below   Recent Labs   Lab 05/11/22  2303   BNP 3,064*   .          Essential hypertension  Controlled.  Monitor pressure.  Patient is NPO; cannot take oral meds.      Coronary artery disease involving native coronary artery of native heart  Stable.  Getting aspirin suppositories.      Hyperlipidemia  Chronic.  Stable.      Type 2 diabetes mellitus with stage 4 chronic kidney disease, without long-term current use of insulin  Patient's FSGs are controlled on current medication regimen.  Last A1c reviewed-   Lab Results   Component Value Date    HGBA1C 9.1 (H) 05/13/2022     Most recent fingerstick glucose reviewed-   Recent Labs   Lab 05/14/22  2056 05/15/22  0028 05/15/22  0438 05/15/22  1153   POCTGLUCOSE 163* 136* 152* 133*     Current correctional scale  Low  Maintain anti-hyperglycemic dose as follows-   Antihyperglycemics (From admission, onward)             Start     Stop Route Frequency Ordered    05/13/22 1715  insulin aspart U-100 pen 0-5 Units         -- SubQ Every 6 hours PRN 05/13/22 1602    05/12/22 1200  insulin detemir U-100 pen 8 Units         -- SubQ Daily 05/12/22 1052        Hold Oral hypoglycemics while patient is in the hospital.        CKD (chronic kidney disease) stage 4, GFR 15-29 ml/min  Renal function is worse.  Monitor BMP.  Avoid NSAID's.    Creatinine   Date Value Ref Range Status   05/15/2022 6.7 (H) 0.5 - 1.4 mg/dL Final   05/14/2022 5.9 (H) 0.5 - 1.4 mg/dL Final   ]    SQH bid for dvt ppx as CT demonstrates stability of possible hemorrhagic lesion     VTE Risk Mitigation (From admission, onward)         Ordered     heparin (porcine) injection 4,000 Units  As needed (PRN)         05/16/22 1841     IP VTE HIGH RISK PATIENT  Once         05/12/22 1351     Place sequential compression device  Until discontinued         05/12/22 1351     Place sequential compression device  Until discontinued         05/12/22 1052                Discharge Planning   STAR:      Code Status: DNR   Is the patient medically ready for discharge?:     Reason for patient still in hospital (select all that apply): Patient trending condition, Laboratory test and Treatment  Discharge Plan A: Home with family            Dimitry Correa MD  Department of Hospital Medicine   [facility]

## 2022-05-19 NOTE — PROGRESS NOTES
[facility]  Adult Nutrition  Progress Note    SUMMARY     Intervention: parenteral nutrition therapy     Recommendations  1.) Change TPN- decreased dextrose TPN 5%AA/D10 @ goal rate 75 mls/hr continuous + 20% lipids (250 mls) daily   providing 1472  Kcals/day (62% EEN), 90 g protein/day ( 96%EPN))      2.) Advance PO diet to DM 1500 kcal or if unable to advance consider switch to Enteral nutrition  -Novasource renal @ 30 mls/hr advancing as tolerated to goal rate 45 mls/hr continuous providing 2160 kcals/day, 98 g protein/day, 198 g CHO/day, and 774 mls water/day. FWF: 100 mls q4 hrs or per MD.     3.) Weigh s/p HD     Goals: 1.) pt will meet >50% of EEN during admit 2) Glucose improved by f/u  Nutrition Goal Status: 1.) meeting, 2.)new  Communication of RD Recs: POC, sticky note, second sign, reviewed with MD/RN     1. Stroke    2. CHF (congestive heart failure)    3. Encephalopathy    4. CVA (cerebral vascular accident)    5. MERCEDES (acute kidney injury)            Past Medical History:   Diagnosis Date    CHF (congestive heart failure)      Diabetes mellitus      Hypertension      Stroke              Assessment and Plan  Nutrition Problem  inadequate energy intake     Related to (etiology):   Acute illness      Signs and Symptoms (as evidenced by):   NPO  TPN meeting 30% of EEN      Interventions:  above     Nutrition Diagnosis Status:   Improving              Malnutrition Assessment  Severe Weight Loss (Malnutrition): greater than 7.5% in 3 months (11.5% weight loss in 4 months)   Kain = 12, wound  Edema 1-3+  NFPE done 5/19/22, no wasting seen    Reason for Assessment  Reason For Assessment: RD follow up   Rounds: none    General Information Comments: admits with decreased responsiveness, pt to start dialysis today, on continuous bipap, non-verbal, central line present. RD consulted for TPN. Pending SLP asessment for diet advancement. Remains NPO.  5/18: pt receiving dialysis. TPN  "infusing, on 3L NC.   22 Pt continues on TPN, glucose in the 400's. Discussed ordering lower dextrose TPN today. Continues on HD, off bipap but unresponsive. Plan to consider comfort care .     Nutrition Risk Screen  Nutrition Risk Screen: dysphagia or difficulty swallowing, NPO     Nutrition/Diet History  Food Allergies: NKFA  Factors Affecting Nutritional Intake:  (weakness)  Spiritual/cultural/Tenriism factors affecting PO intakes: none     Anthropometrics  Height: 5' 7"  Height (inches): 67 in  Weight Method: Bed Scale  Weight: 76.7 kg 22, 78.8 kg (admit)  Weight (lb): 169 lb  Ideal Body Weight (IBW), Male: 148 lb  BMI (Calculated): 27.2 admission  BMI Grade: 25 - 29.9 - overweight  Usual Body Weight (UBW), k.27 kg (2022)     Lab/Procedures/Meds  Pertinent Labs Reviewed: reviewed  BMP  Lab Results   Component Value Date     (L) 2022    K 3.4 (L) 2022     2022    CO2 22 (L) 2022    BUN 30 (H) 2022    CREATININE 3.7 (H) 2022    CALCIUM 8.3 (L) 2022    ANIONGAP 11 2022    ESTGFRAFRICA 19 (A) 2022    EGFRNONAA 16 (A) 2022     Lab Results   Component Value Date    ALBUMIN 2.3 (L) 2022     Lab Results   Component Value Date    CALCIUM 8.3 (L) 2022    PHOS 2.0 (L) 2022     Recent Labs   Lab 22  1235   POCTGLUCOSE 331*       Pertinent Medications Reviewed: reviewed  Insulin, zofran, polyethylene glycol    Estimated/Assessed Needs  Needs adjusted for HD  Weight Used For Calorie Calculations: 78 kg NHANES  Energy Calorie Requirements (kcal): 2340 kcals/day  Energy Need Method: 30 kcal/kg ( overweight vs. HD)  Protein Requirements: 93g ( 1.2 g protein/kg HD)  Weight Used For Protein Calculations: 78 kg HD  Estimated Fluid Requirement Method: 1500 ml HD        Nutrition Prescription Ordered  Current Diet Order: NPO + TPN above     Evaluation of Received Nutrient/Fluid Intake  Energy needs: not " meeting  Protein needs:meeting  Fluid needs: meeting  Tolerance: TPN @ 75 ml/hr  % Intake of Estimated Energy Needs: 62% EEN  % Meal Intake: NPO + TPN above     Nutrition Risk  Level of Risk/Frequency of Follow-up: moderate  (x2 weekly)         Monitor and Evaluation  Food and Nutrient Intake: energy intake, parenteral nutrition intake, enteral nutrition intake  Food and Nutrient Adminstration: diet order, enteral and parenteral nutrition administration  Anthropometric Measurements: weight, weight change, body mass index  Biochemical Data, Medical Tests and Procedures: electrolyte and renal panel, glucose/endocrine profile, lipid profile  Nutrition-Focused Physical Findings: overall appearance         Nutrition Follow-Up  RD Follow-up?: Yes

## 2022-05-19 NOTE — PROGRESS NOTES
Nephrology Progress Note        Patient Name: Marshall Flor  MRN: [id number]    Patient Class: IP- Inpatient   Admission Date: 5/12/2022  Length of Stay: 7 days  Date of Service: 5/19/2022    Attending Physician: Dimitry Correa MD  Primary Care Provider: Dorie Quan MD    Reason for Consult: dione/acidosis/sepsis/stroke/ckd3/fever/anemia/htn/chf    SUBJECTIVE:     HPI: 66-year-old male with history of HTN, CHF, diabetes presents to [place] for decreased responsiveness, found to have a left gaze preference, then sent to [facility], where was found febrile, hypertensive. UA 3+ protein, 2+ glucose, WBC 3, moderate bacteria. Chest x-ray suggestive of pneumonia right upper lobe. MRI brain nonhemorrhagic infarction in the right anterolateral frontal lobe.Treated for sepsis ? PNA, ? Meningitis? and stroke.    5/14- BP all over the place, on 3L NC, UOP 481cc  5/15- BP still all over the place, on BIPAP, UOP 455cc- no labs drawn today and its after 1pm  5/16  Renal function continues to decline.  Mental status no better. Consent for dialysis obtained from patient's son yesterday by Dr. Busch.  I called to confirm and no answer (no voice mail available)  5/17  On bipap.  Not interactive.  Oliguric  5/18  S/p dialysis with 3 liter ultrafiltration.  Output not recorded last pm.  Not interactive  5/19     Not interactive. No distress      Outpatient meds:  No current facility-administered medications on file prior to encounter.     Current Outpatient Medications on File Prior to Encounter   Medication Sig Dispense Refill    allopurinoL (ZYLOPRIM) 100 MG tablet   = 2 tab, Oral, Daily, # 60 tab, 5 Refill(s), Pharmacy: [id number], 167, cm, 07/27/21 8:24:00 CDT, Height/Length Measured, 91.5, kg, 12/15/20 11:18:00 CST, Weight Dosing      amLODIPine (NORVASC) 10 MG tablet 10 mg.      aspirin (ECOTRIN) 81 MG EC tablet Take 1 tablet (81 mg total) by mouth once daily. 30 tablet 3    atorvastatin (LIPITOR) 40 MG  tablet   = 1 tab, Oral, Daily, # 90 tab, 1 Refill(s), Soft Stop, Pharmacy: [id number], 167, cm, 04/07/21 14:01:00 CDT, Height/Length Measured, 91.5, kg, 12/15/20 11:18:00 CST, Weight Dosing      carvediloL (COREG) 3.125 MG tablet Take 1 tablet (3.125 mg total) by mouth once daily. 30 tablet 1    cloNIDine (CATAPRES) 0.1 MG tablet Take 2 tablets (0.2 mg total) by mouth 3 (three) times daily. 180 tablet 11    clopidogreL (PLAVIX) 75 mg tablet Take 75 mg by mouth once daily.      furosemide (LASIX) 20 MG tablet Take 1 tablet (20 mg total) by mouth once daily. 30 tablet 1    hydrALAZINE (APRESOLINE) 50 MG tablet Take 1 tablet (50 mg total) by mouth every 12 (twelve) hours. 60 tablet 0    linaGLIPtin (TRADJENTA) 5 mg Tab tablet 5 mg.      NOVOLOG MIX 70-30FLEXPEN U-100 100 unit/mL (70-30) InPn pen Inject into the skin.      sodium bicarbonate 650 MG tablet Take 1 tablet (650 mg total) by mouth 2 (two) times daily. 60 tablet 11       Scheduled meds:   cloNIDine 0.2 mg/24 hr td ptwk  1 patch Transdermal Q7 Days    famotidine (PF)  20 mg Intravenous Daily    fat emulsion 20%  250 mL Intravenous Daily    insulin detemir U-100  6 Units Subcutaneous BID       Infusions:   niCARdipine 7.5 mg/hr (05/19/22 0694)    TPN ADULT CENTRAL LINE CUSTOM 75 mL/hr at 05/18/22 2153       PRN meds:  acetaminophen, dextrose 10%, dextrose 10%, glucagon (human recombinant), glucose, glucose, heparin (porcine), hydrALAZINE, insulin aspart U-100, labetaloL, morphine, naloxone, ondansetron, oxyCODONE, polyethylene glycol, sodium chloride 0.9%    Review of Systems:  Neg    OBJECTIVE:     Vital Signs and IO (Last 24H):  Temp:  [97.4 °F (36.3 °C)-98.6 °F (37 °C)]   Pulse:  [63-86]   Resp:  [8-28]   BP: (117-199)/(51-79)   SpO2:  [98 %-100 %]   I/O last 3 completed shifts:  In: 4155 [I.V.:786.1; Other:500; IV Piggyback:300]  Out: 3500 [Other:3500]    Wt Readings from Last 5 Encounters:   05/19/22 76.7 kg (169 lb 1.5 oz)    05/11/22 87.5 kg (193 lb)   05/12/22 87.5 kg (192 lb 14.4 oz)   04/26/22 95.3 kg (210 lb)   02/02/22 91.1 kg (200 lb 13.4 oz)     Physical Exam:  Constitutional: on BIPAP, ill, eyes closed, laying flat  Heart: rrr, no m/r/g, wwp, no edema  Lungs: coarse, no w/r/r/c, no lb  Abdomen: s/nt/nd, +BS    Body mass index is 26.48 kg/m².    Laboratory:  Recent Labs   Lab 05/17/22  0312 05/18/22  0328 05/19/22  0406    139 135*   K 4.1 3.2* 3.4*    103 102   CO2 20* 24 22*   BUN 30* 30* 30*   CREATININE 4.0* 3.4* 3.7*   ESTGFRAFRICA 17* 21* 19*   EGFRNONAA 15* 18* 16*   * 285* 381*       Recent Labs   Lab 05/17/22 0312 05/18/22  0328 05/19/22  0406   CALCIUM 8.0* 8.4* 8.3*   ALBUMIN 2.0* 2.2* 2.3*   PHOS 4.5 2.8 2.0*   MG 2.1 2.0 1.8       Recent Labs   Lab 04/12/21  0952   PTH, Intact 273.0 H       Recent Labs   Lab 05/17/22  2111 05/18/22  0100 05/18/22  0554 05/18/22  0801 05/18/22  1128 05/18/22  1632 05/18/22  2107 05/19/22  0008 05/19/22  0405 05/19/22  0408   POCTGLUCOSE 258* 284* 339* 265* 209* 319* 197* 358* 420* 382*       Recent Labs   Lab 04/12/21  0952 05/12/22  1210 05/13/22  0341   Hemoglobin A1C 7.8 H 8.8 H 9.1 H       Recent Labs   Lab 05/17/22  0312 05/18/22  0328 05/19/22  0406   WBC 5.30 5.00 5.11   HGB 12.6* 12.7* 12.6*   HCT 38.9* 39.9* 38.1*    226 208   MCV 77* 76* 75*   MCHC 32.4 31.8* 33.1   MONO 9.1  0.5 8.8  0.4 12.3  0.6       Recent Labs   Lab 05/17/22  0312 05/18/22  0328 05/19/22  0406   BILITOT 0.4 0.3 0.3   PROT 5.9* 6.4 6.5   ALBUMIN 2.0* 2.2* 2.3*   ALKPHOS 77 77 71   ALT 59* 42 31   AST 25 14 14       Recent Labs   Lab 01/26/22  0639 05/11/22  2300 05/12/22  1400   Color, UA Yellow Yellow Yellow   Appearance, UA Cloudy A Clear Clear   pH, UA 5.0 6.0 6.0   Specific [place], UA 1.020 1.020 1.020   Protein, UA 2+ A 3+ A 3+ A   Glucose, UA Negative 2+ A 3+ A   Ketones, UA Negative Negative Negative   Urobilinogen, UA 1.0 Negative Negative   Bilirubin (UA)  Negative Negative Negative   Occult Blood UA 2+ A 2+ A 2+ A   Nitrite, UA Negative Negative Negative   RBC, UA 33 H 10 H 20 H   WBC, UA 55 H 3 6 H   Bacteria Moderate A Moderate A Few A   Hyaline Casts, UA 3 A 1 0       Recent Labs   Lab 05/12/22 2055 05/16/22  1525 05/17/22  1102   POC PH 7.417 7.290 LL 7.381   POC PCO2 31.1 L 34.7 L 41.6   POC HCO3 20.0 L 16.7 L 24.7   POC PO2 84 161 H 102 H   POC SATURATED O2 97 99 98   POC BE -5 -10 0   Sample ARTERIAL ARTERIAL ARTERIAL       Microbiology Results (last 7 days)     Procedure Component Value Units Date/Time    Blood culture [[id number]] Collected: 05/13/22 2007    Order Status: Completed Specimen: Blood from Peripheral, Left Arm Updated: 05/19/22 0612     Blood Culture, Routine No growth after 5 days.    Narrative:      Collection has been rescheduled by ACD at 05/13/2022 15:13 Reason:   Unable to collect, no place to stick pt  Collection has been rescheduled by ACD at 05/13/2022 15:13 Reason:   Unable to collect, no place to stick pt    Culture, MRSA [[id number]] Collected: 05/15/22 2300    Order Status: Completed Specimen: MRSA source from Nares, Left Updated: 05/18/22 0943     MRSA Surveillance Screen No MRSA isolated    Narrative:      Both nares, thank you    Cryptococcal antigen, CSF [[id number]] Collected: 05/16/22 1404    Order Status: Completed Specimen: CSF (Spinal Fluid) from CSF Tap, Tube 2 Updated: 05/17/22 0922     Crypto Ag, CSF Negative    Direct AFB stain [[id number]] Collected: 05/16/22 1404    Order Status: Completed Specimen: CSF (Spinal Fluid) from CSF Tap, Tube 2 Updated: 05/17/22 0843     Direct Acid Fast No acid fast bacilli seen.    CSF culture [[id number]] Collected: 05/16/22 1404    Order Status: Completed Specimen: CSF (Spinal Fluid) from CSF Tap, Tube 2 Updated: 05/16/22 1707     Gram Stain Result No WBC's, epithelial cells or organisms seen    AFB Culture & Smear [[id number]] Collected: 05/13/22 1508    Order Status: Completed Specimen:  CSF (Spinal Fluid) from Nares, Right Updated: 05/16/22 1446     AFB Culture & Smear Culture in progress     AFB CULTURE STAIN No acid fast bacilli seen.    Gram stain [[id number]] Collected: 05/16/22 1404    Order Status: Canceled Specimen: CSF (Spinal Fluid) from CSF Tap, Tube 2     Culture, MRSA [[id number]]     Order Status: Canceled Specimen: MRSA source from Nares, Left     Gram stain [[id number]]     Order Status: Canceled Specimen: CSF (Spinal Fluid)     Direct AFB stain [[id number]]     Order Status: Canceled Specimen: CSF (Spinal Fluid)     Cryptococcal antigen, CSF [[id number]]     Order Status: Canceled Specimen: CSF (Spinal Fluid)     CSF culture [[id number]]     Order Status: Canceled Specimen: CSF (Spinal Fluid)         ASSESSMENT/PLAN:     Oliguric MERCEDES due to ATN; CKD III  - renal function is steadily deteriorating as of yest- oliguric- ID stopped acyclovir-  -initiated hemodialysis for uremic clearance on 5/16  Hold dialysis tomorrow  Hopefully can get accurate I/Os      HTN/Acute CVA/CHF  - off cardene gtt but probably needs to be resumed since not able to get any PO meds    Hypernatremia--better  Hypokalemia--better  HypoMg--better  Acidosis--better after dialysis  Hypokalemia--replete  -     SHPT  - no active issues    Anemia of CKD  - stable    Thank you for allowing us to participate in the care of your patient!   We will follow the patient and provide recommendations as needed.    Pt's nephew is at the bedside.  All of his questions were answered to his apparent satisfaction     Patient care time was spent personally by me on the following activities: > 35 min  · Obtaining a history.  · Examination of patient.  · Providing medical care at the patients bedside.  · Developing a treatment plan with patient or surrogate and bedside caregivers.  · Ordering and reviewing laboratory studies, radiographic studies, pulse oximetry.  · Ordering and performing treatments and interventions.  · Evaluation of  patient's response to treatment.  · Discussions with consultants while on the unit and immediately available to the patient.  · Re-evaluation of the patient's condition.  · Documentation in the medical record.     Ariel Little MD    [facility]  [address] [place] [address]  [place] [address]    [phone] - tel  [phone] - fax    5/19/2022

## 2022-05-19 NOTE — PLAN OF CARE
POC reviewed with family by provider. VVS. Afebrile. Pt following some commands. BG monitored. Cardene titrated to maintain systolic under 180. Incontinence pad checked. Condom cath checked. Urine output 125ml for shift. Lateral rotation on bed. Triad applied to nose wound by wound care nurse. Also seen by wound care physician. Fall and safety precautions in place. Comfort and wellbeing maintained.     Problem: Adult Inpatient Plan of Care  Goal: Plan of Care Review  Outcome: Ongoing, Progressing  Goal: Patient-Specific Goal (Individualized)  Outcome: Ongoing, Progressing  Goal: Optimal Comfort and Wellbeing  Outcome: Ongoing, Progressing     Problem: Diabetes Comorbidity  Goal: Blood Glucose Level Within Targeted Range  Outcome: Ongoing, Progressing     Problem: Skin Injury Risk Increased  Goal: Skin Health and Integrity  Outcome: Ongoing, Progressing

## 2022-05-19 NOTE — CONSULTS
Consulted for wound to the bridge of the nose. Area noted to be shearing in nature with the appearance of an abrasion from BiPap. Dr. Chaparro at bedside with wound care to assess this area. Cleaned and applied Triad hydrophilic wound dressing ointment and left open to air. Patients nurse Myranda at bedside for assessment as well. Wound care to follow this patient.

## 2022-05-19 NOTE — PLAN OF CARE
[facility]  Discharge Reassessment    Primary Care Provider: Dorie Quan MD    Expected Discharge Date: 5/21/2022     Per MDRs, patient medically not ready for discharge today.  Palliative Care Team following patient.  Per Palliative Care MD note, patient's family would like till 05/21/2022 Saturday to transition patient to comfort care and/or hospice.  CM following for discharge planning needs    Reassessment (most recent)     Discharge Reassessment - 05/19/22 1540        Discharge Reassessment    Assessment Type Discharge Planning Reassessment     Did the patient's condition or plan change since previous assessment? Yes     Discharge Plan discussed with: Adult children     Communicated STAR with patient/caregiver Yes     Discharge Plan A Inpatient Hospice     Discharge Plan B Hospice/home     DME Needed Upon Discharge  none     Discharge Barriers Identified None     Why the patient remains in the hospital Requires continued medical care

## 2022-05-19 NOTE — PLAN OF CARE
Intervention: parenteral nutrition therapy     Recommendations  1.) Change TPN- decreased dextrose TPN 5%AA/D10 @ goal rate 75 mls/hr continuous + 20% lipids (250 mls) daily   providing 1472  Kcals/day (62% EEN), 90 g protein/day ( 96%EPN))      2.) Advance PO diet to DM 1500 kcal or if unable to advance consider switch to Enteral nutrition  -Novasource renal @ 30 mls/hr advancing as tolerated to goal rate 45 mls/hr continuous providing 2160 kcals/day, 98 g protein/day, 198 g CHO/day, and 774 mls water/day. FWF: 100 mls q4 hrs or per MD.      3.) Weigh s/p HD     Goals: 1.) pt will meet >50% of EEN during admit 2) Glucose improved by f/u  Nutrition Goal Status: 1.) meeting, 2.)new  Communication of RD Recs: POC, sticky note, second sign, reviewed with MD/RN

## 2022-05-19 NOTE — PROGRESS NOTES
[facility] Medicine  Progress Note    Patient Name: Marshall Flor  MRN: [id number]  Patient Class: IP- Inpatient   Admission Date: 5/12/2022  Length of Stay: 6 days  Attending Physician: Dimitry Correa MD  Primary Care Provider: Dorie Quan MD        Subjective:     Principal Problem:CVA (cerebral vascular accident)        HPI:  66-year-old male with history of CHF, diabetes presents to [place] for decreased responsiveness found to have a fever, left gaze preference with nuchal rigidity found to have meningitis in addition to being found to have an acute CVA. Beyond this he has an MERCEDES.            Still not interacting. Will open eyes but doesn't follow commands. Off BiPAP    NAD, Alert but not interactive  NC, AT  RRR  CTAB  SNTND+BS  No edema   No gross joint abnormalities     Vitals, labs and radiographs from past 24h reviewed and personally interpreted.         Overview/Hospital Course:              Assessment/Plan:         1. Acute CVA  -rectal aspirin (held)   -On TPN  -possible intracranial hemorrhage: repeat CT on 5/18  -neurology      2. Acute bacterial pneumonia 2/2 unknown organism  -treatment complete.      CVA (cerebral vascular accident)  Proven on MRI.  Old infarcts seen as well.  Neurologist consulting.  Getting aspirin suppositories.      MERCEDES (acute kidney injury)  New problem.  Nephrology group consulting.  Monitor renal function and UOP.  Avoid NSAID's and hypotension.  Acyclovir stopped.  S/p RRT .    Creatinine   Date Value Ref Range Status   05/15/2022 6.7 (H) 0.5 - 1.4 mg/dL Final   05/14/2022 5.9 (H) 0.5 - 1.4 mg/dL Final   05/13/2022 3.7 (H) 0.5 - 1.4 mg/dL Final   ]      Meningitis (ruled out)           Chronic combined systolic and diastolic CHF (congestive heart failure)  Patient is identified as having Combined Systolic and Diastolic heart failure that is Chronic. CHF is currently controlled. Latest ECHO performed and demonstrates- Results for orders  placed during the hospital encounter of 05/12/22    Echo    Interpretation Summary  · The left ventricle is moderately enlarged with eccentric hypertrophy and severely decreased systolic function.  · Grade II left ventricular diastolic dysfunction.  · The estimated PA systolic pressure is 59 mmHg.  · Severe right ventricular enlargement with mildly to moderately reduced right ventricular systolic function.  · Moderate left atrial enlargement.  · There is moderate pulmonary hypertension.  · Intermediate central venous pressure (8 mmHg).  · Mild aortic regurgitation.  · Moderate right atrial enlargement.  · Mild to moderate tricuspid regurgitation.  · Mild pulmonic regurgitation.  · The estimated ejection fraction is 20%.  · Moderate mitral regurgitation.  . Patient NPO; not on oral meds.  Monitor clinical status closely. Monitor on telemetry. Patient is off CHF pathway.  Monitor strict Is&Os and daily weights.   Last BNP reviewed- and noted below   Recent Labs   Lab 05/11/22  2303   BNP 3,064*   .          Essential hypertension  Controlled.  Monitor pressure.  Patient is NPO; cannot take oral meds.      Coronary artery disease involving native coronary artery of native heart  Stable.  Getting aspirin suppositories.      Hyperlipidemia  Chronic.  Stable.      Type 2 diabetes mellitus with stage 4 chronic kidney disease, without long-term current use of insulin  Patient's FSGs are controlled on current medication regimen.  Last A1c reviewed-   Lab Results   Component Value Date    HGBA1C 9.1 (H) 05/13/2022     Most recent fingerstick glucose reviewed-   Recent Labs   Lab 05/14/22 2056 05/15/22  0028 05/15/22  0438 05/15/22  1153   POCTGLUCOSE 163* 136* 152* 133*     Current correctional scale  Low  Maintain anti-hyperglycemic dose as follows-   Antihyperglycemics (From admission, onward)            Start     Stop Route Frequency Ordered    05/13/22 1715  insulin aspart U-100 pen 0-5 Units         -- SubQ Every 6  hours PRN 05/13/22 1602    05/12/22 1200  insulin detemir U-100 pen 8 Units         -- SubQ Daily 05/12/22 1052        Hold Oral hypoglycemics while patient is in the hospital.        CKD (chronic kidney disease) stage 4, GFR 15-29 ml/min  Renal function is worse.  Monitor BMP.  Avoid NSAID's.    Creatinine   Date Value Ref Range Status   05/15/2022 6.7 (H) 0.5 - 1.4 mg/dL Final   05/14/2022 5.9 (H) 0.5 - 1.4 mg/dL Final   ]      VTE Risk Mitigation (From admission, onward)         Ordered     heparin (porcine) injection 4,000 Units  As needed (PRN)         05/16/22 1841     IP VTE HIGH RISK PATIENT  Once         05/12/22 1351     Place sequential compression device  Until discontinued         05/12/22 1351     Place sequential compression device  Until discontinued         05/12/22 1052                Discharge Planning   STAR:      Code Status: DNR   Is the patient medically ready for discharge?:     Reason for patient still in hospital (select all that apply): Patient trending condition, Laboratory test and Treatment  Discharge Plan A: Home with family            Dimitry Correa MD  Department of Hospital Medicine   [facility]

## 2022-05-19 NOTE — PROGRESS NOTES
Progress Note  PULMONARY    Admit Date: 5/12/2022 05/19/2022    History of Present Illness:  Pt is a 65 yo male with CHF, DM2, CVA, HTN who presented to the ED at [place] for AMS and fever then transferred for higher level of care to [facility] on 5/12/22. Chart reviewed- hospital course complicated by sepsis, acute renal failure requiring HD, resp failure requiring BIPAP. Per anesthesia he will be intubated for RAJ today. Pulm is consulted for management of resp failure and potential ventilator requirement for procedure.   I discussed over the phone w/ pt's nephew Alphonso, brother Tony and sister Nahomi who provided more history as pt is nonverbal and encephalopathic. At baseline pt is independent, walks and talks and cares for himself. He has 2 adult sons who have not been very involved in care so far- alphonso reports he talks w/ them and explains everything.    SUBJECTIVE:     5/17- pt continues on bipap, hypertensive to 180s-190s today, getting hydralazine IV. RAJ postponed per cardiology. Pt underwent HD yesterday with removal of 1.5L  5/18- pt continues on bipap, underwent HD yesterday with -3.5L  5/19- pt comfortable on nc. Nephew at bedside. Confirms plan for comfort measures as soon as family here      OBJECTIVE:     Vitals (Most recent):  Vitals:    05/19/22 1415   BP: (!) 175/73   Pulse: 69   Resp: (!) 23   Temp:        Vitals (24 hour range):  Temp:  [97.4 °F (36.3 °C)-98.6 °F (37 °C)]   Pulse:  [63-85]   Resp:  [11-31]   BP: (127-199)/(57-79)   SpO2:  [98 %-100 %]       Intake/Output Summary (Last 24 hours) at 5/19/2022 1436  Last data filed at 5/19/2022 1423  Gross per 24 hour   Intake 2968.49 ml   Output 0 ml   Net 2968.49 ml          Physical Exam:  The patient's neuro status (alertness,orientation,cognitive function,motor skills,), pharyngeal exam (oral lesions, hygiene, abn dentition,), Neck (jvd,mass,thyroid,nodes in neck and above/below  clavicle),RESPIRATORY(symmetry,effort,fremitus,percussion,auscultation),  Cor(rhythm,heart tones including gallops,perfusion,edema)ABD(distention,hepatic&splenomegaly,tenderness,masses), Skin(rash,cyanosis),Psyc(affect,judgement,).  Exam negative except for these pertinent findings:    On bipap, eyes open, tracks, follows command L hand  No verbalization  GCS 11  PERRL  HR regular  Breath sounds diminished, clear bilaterally  HD access RIJ  abd soft nontender  No edema        Radiographs reviewed: view by direct vision   CT head 5/16-   1. Subacute infarction in the left frontal lobe with petechial hemorrhagic transformation.  No significant mass effect or midline shift.  2. Remote infarctions within the right occipital lobe and the right thalamus.    CXR 5/16/22- pulmonary vasc congestion, heart enlarged    TTE 5/12/22-   · The left ventricle is moderately enlarged with eccentric hypertrophy and severely decreased systolic function.  · Grade II left ventricular diastolic dysfunction.  · The estimated PA systolic pressure is 59 mmHg.  · Severe right ventricular enlargement with mildly to moderately reduced right ventricular systolic function.  · Moderate left atrial enlargement.  · There is moderate pulmonary hypertension.  · Intermediate central venous pressure (8 mmHg).  · Mild aortic regurgitation.  · Moderate right atrial enlargement.  · Mild to moderate tricuspid regurgitation.  · Mild pulmonic regurgitation.  · The estimated ejection fraction is 20%.  · Moderate mitral regurgitation.      Labs     Recent Labs   Lab 05/19/22  0406   WBC 5.11   HGB 12.6*   HCT 38.1*        Recent Labs   Lab 05/19/22  0406   *   K 3.4*      CO2 22*   BUN 30*   CREATININE 3.7*   *   CALCIUM 8.3*   MG 1.8   PHOS 2.0*   AST 14   ALT 31   ALKPHOS 71   BILITOT 0.3   PROT 6.5   ALBUMIN 2.3*     No results for input(s): PH, PCO2, PO2, HCO3 in the last 24 hours.  Microbiology Results (last 7 days)     Procedure  Component Value Units Date/Time    Blood culture [[id number]] Collected: 05/13/22 2007    Order Status: Completed Specimen: Blood from Peripheral, Left Arm Updated: 05/19/22 0612     Blood Culture, Routine No growth after 5 days.    Narrative:      Collection has been rescheduled by ACD at 05/13/2022 15:13 Reason:   Unable to collect, no place to stick pt  Collection has been rescheduled by ACD at 05/13/2022 15:13 Reason:   Unable to collect, no place to stick pt    Culture, MRSA [[id number]] Collected: 05/15/22 2300    Order Status: Completed Specimen: MRSA source from Nares, Left Updated: 05/18/22 0943     MRSA Surveillance Screen No MRSA isolated    Narrative:      Both nares, thank you    Cryptococcal antigen, CSF [[id number]] Collected: 05/16/22 1404    Order Status: Completed Specimen: CSF (Spinal Fluid) from CSF Tap, Tube 2 Updated: 05/17/22 0922     Crypto Ag, CSF Negative    Direct AFB stain [[id number]] Collected: 05/16/22 1404    Order Status: Completed Specimen: CSF (Spinal Fluid) from CSF Tap, Tube 2 Updated: 05/17/22 0843     Direct Acid Fast No acid fast bacilli seen.    CSF culture [[id number]] Collected: 05/16/22 1404    Order Status: Completed Specimen: CSF (Spinal Fluid) from CSF Tap, Tube 2 Updated: 05/16/22 1707     Gram Stain Result No WBC's, epithelial cells or organisms seen    AFB Culture & Smear [[id number]] Collected: 05/13/22 1508    Order Status: Completed Specimen: CSF (Spinal Fluid) from Nares, Right Updated: 05/16/22 1446     AFB Culture & Smear Culture in progress     AFB CULTURE STAIN No acid fast bacilli seen.    Gram stain [[id number]] Collected: 05/16/22 1404    Order Status: Canceled Specimen: CSF (Spinal Fluid) from CSF Tap, Tube 2     Culture, MRSA [[id number]]     Order Status: Canceled Specimen: MRSA source from Nares, Left           Impression:  Active Hospital Problems    Diagnosis  POA    *CVA (cerebral vascular accident) [I63.9]  Yes    Goals of care, counseling/discussion  [Z71.89]  Not Applicable    MERCEDES (acute kidney injury) [N17.9]  No    Chronic combined systolic and diastolic CHF (congestive heart failure) [I50.42]  Yes    Meningitis (needs to be ruled out) [G03.9]  Yes    Encephalopathy [G93.40]  Yes    Essential hypertension [I10]  Yes    Coronary artery disease involving native coronary artery of native heart [I25.10]  Yes    Hyperlipidemia [E78.5]  Yes    Type 2 diabetes mellitus with stage 4 chronic kidney disease, without long-term current use of insulin [E11.22, N18.4]  Yes    CKD (chronic kidney disease) stage 4, GFR 15-29 ml/min [N18.4]  Yes      Resolved Hospital Problems   No resolved problems to display.               Plan:       - BIPAP/supplemental O2 to maintain sats >92%  - HD per nephrology  - neuro following  - control BP  - antibiotics per ID  - continue pepcid for GI prophylaxis  - lovenox held for hemorrhagic transformation  - appreciate palliative consult  - comfort measures soon  - pulm will sign off at this time; please call Dr. Mena Friday and the weekend if further assistance is needed    Tosin Charles MD  Pulmonary & Critical Care Medicine                              .

## 2022-05-20 LAB
ANION GAP SERPL CALC-SCNC: 14 MMOL/L (ref 8–16)
BASOPHILS # BLD AUTO: 0.03 K/UL (ref 0–0.2)
BASOPHILS NFR BLD: 0.5 % (ref 0–1.9)
BUN SERPL-MCNC: 48 MG/DL (ref 8–23)
CALCIUM SERPL-MCNC: 8.6 MG/DL (ref 8.7–10.5)
CHLORIDE SERPL-SCNC: 99 MMOL/L (ref 95–110)
CO2 SERPL-SCNC: 22 MMOL/L (ref 23–29)
CREAT SERPL-MCNC: 4.6 MG/DL (ref 0.5–1.4)
DIFFERENTIAL METHOD: ABNORMAL
EOSINOPHIL # BLD AUTO: 0.3 K/UL (ref 0–0.5)
EOSINOPHIL NFR BLD: 5.3 % (ref 0–8)
ERYTHROCYTE [DISTWIDTH] IN BLOOD BY AUTOMATED COUNT: 17.7 % (ref 11.5–14.5)
EST. GFR  (AFRICAN AMERICAN): 14 ML/MIN/1.73 M^2
EST. GFR  (NON AFRICAN AMERICAN): 13 ML/MIN/1.73 M^2
GLUCOSE SERPL-MCNC: 203 MG/DL (ref 70–110)
HCT VFR BLD AUTO: 37.5 % (ref 40–54)
HGB BLD-MCNC: 12.1 G/DL (ref 14–18)
IMM GRANULOCYTES # BLD AUTO: 0.04 K/UL (ref 0–0.04)
IMM GRANULOCYTES NFR BLD AUTO: 0.6 % (ref 0–0.5)
LYMPHOCYTES # BLD AUTO: 1.5 K/UL (ref 1–4.8)
LYMPHOCYTES NFR BLD: 23.1 % (ref 18–48)
MAGNESIUM SERPL-MCNC: 1.7 MG/DL (ref 1.6–2.6)
MCH RBC QN AUTO: 24.7 PG (ref 27–31)
MCHC RBC AUTO-ENTMCNC: 32.3 G/DL (ref 32–36)
MCV RBC AUTO: 77 FL (ref 82–98)
MONOCYTES # BLD AUTO: 0.9 K/UL (ref 0.3–1)
MONOCYTES NFR BLD: 14.6 % (ref 4–15)
NEUTROPHILS # BLD AUTO: 3.6 K/UL (ref 1.8–7.7)
NEUTROPHILS NFR BLD: 55.9 % (ref 38–73)
NRBC BLD-RTO: 0 /100 WBC
PHOSPHATE SERPL-MCNC: 3.3 MG/DL (ref 2.7–4.5)
PLATELET # BLD AUTO: 215 K/UL (ref 150–450)
PMV BLD AUTO: 11.1 FL (ref 9.2–12.9)
POCT GLUCOSE: 193 MG/DL (ref 70–110)
POCT GLUCOSE: 205 MG/DL (ref 70–110)
POCT GLUCOSE: 212 MG/DL (ref 70–110)
POCT GLUCOSE: 218 MG/DL (ref 70–110)
POCT GLUCOSE: 240 MG/DL (ref 70–110)
POCT GLUCOSE: 255 MG/DL (ref 70–110)
POTASSIUM SERPL-SCNC: 3.8 MMOL/L (ref 3.5–5.1)
RBC # BLD AUTO: 4.89 M/UL (ref 4.6–6.2)
SODIUM SERPL-SCNC: 135 MMOL/L (ref 136–145)
WBC # BLD AUTO: 6.44 K/UL (ref 3.9–12.7)

## 2022-05-20 PROCEDURE — 63600175 PHARM REV CODE 636 W HCPCS: Performed by: INTERNAL MEDICINE

## 2022-05-20 PROCEDURE — 20000000 HC ICU ROOM

## 2022-05-20 PROCEDURE — 80048 BASIC METABOLIC PNL TOTAL CA: CPT | Performed by: INTERNAL MEDICINE

## 2022-05-20 PROCEDURE — 27000221 HC OXYGEN, UP TO 24 HOURS

## 2022-05-20 PROCEDURE — 80100014 HC HEMODIALYSIS 1:1

## 2022-05-20 PROCEDURE — 85025 COMPLETE CBC W/AUTO DIFF WBC: CPT | Performed by: INTERNAL MEDICINE

## 2022-05-20 PROCEDURE — B4185 PARENTERAL SOL 10 GM LIPIDS: HCPCS | Performed by: INTERNAL MEDICINE

## 2022-05-20 PROCEDURE — 94761 N-INVAS EAR/PLS OXIMETRY MLT: CPT

## 2022-05-20 PROCEDURE — 25000003 PHARM REV CODE 250: Performed by: INTERNAL MEDICINE

## 2022-05-20 PROCEDURE — 83735 ASSAY OF MAGNESIUM: CPT | Performed by: INTERNAL MEDICINE

## 2022-05-20 PROCEDURE — 84100 ASSAY OF PHOSPHORUS: CPT | Performed by: INTERNAL MEDICINE

## 2022-05-20 PROCEDURE — A4217 STERILE WATER/SALINE, 500 ML: HCPCS | Performed by: INTERNAL MEDICINE

## 2022-05-20 PROCEDURE — 51798 US URINE CAPACITY MEASURE: CPT

## 2022-05-20 RX ORDER — NAPROXEN SODIUM 220 MG/1
81 TABLET, FILM COATED ORAL DAILY
Status: DISCONTINUED | OUTPATIENT
Start: 2022-05-21 | End: 2022-05-21

## 2022-05-20 RX ORDER — CLOPIDOGREL BISULFATE 75 MG/1
75 TABLET ORAL
Status: DISCONTINUED | OUTPATIENT
Start: 2022-05-20 | End: 2022-05-20

## 2022-05-20 RX ORDER — SODIUM CHLORIDE 9 MG/ML
INJECTION, SOLUTION INTRAVENOUS
Status: CANCELLED | OUTPATIENT
Start: 2022-05-20

## 2022-05-20 RX ORDER — CLOPIDOGREL BISULFATE 75 MG/1
75 TABLET ORAL
Status: DISCONTINUED | OUTPATIENT
Start: 2022-05-20 | End: 2022-05-21

## 2022-05-20 RX ORDER — BISACODYL 10 MG
10 SUPPOSITORY, RECTAL RECTAL DAILY PRN
Status: DISCONTINUED | OUTPATIENT
Start: 2022-05-20 | End: 2022-05-26 | Stop reason: HOSPADM

## 2022-05-20 RX ORDER — SODIUM CHLORIDE 9 MG/ML
INJECTION, SOLUTION INTRAVENOUS ONCE
Status: CANCELLED | OUTPATIENT
Start: 2022-05-20 | End: 2022-05-20

## 2022-05-20 RX ADMIN — ASCORBIC ACID, VITAMIN A PALMITATE, CHOLECALCIFEROL, THIAMINE HYDROCHLORIDE, RIBOFLAVIN-5 PHOSPHATE SODIUM, PYRIDOXINE HYDROCHLORIDE, NIACINAMIDE, DEXPANTHENOL, ALPHA-TOCOPHEROL ACETATE, VITAMIN K1, FOLIC ACID, BIOTIN, CYANOCOBALAMIN: 200; 3300; 200; 6; 3.6; 6; 40; 15; 10; 150; 600; 60; 5 INJECTION, SOLUTION INTRAVENOUS at 09:05

## 2022-05-20 RX ADMIN — FAMOTIDINE 20 MG: 10 INJECTION INTRAVENOUS at 08:05

## 2022-05-20 RX ADMIN — HYDRALAZINE HYDROCHLORIDE 10 MG: 20 INJECTION, SOLUTION INTRAMUSCULAR; INTRAVENOUS at 11:05

## 2022-05-20 RX ADMIN — CLOPIDOGREL 75 MG: 75 TABLET, FILM COATED ORAL at 03:05

## 2022-05-20 RX ADMIN — HEPARIN SODIUM 5000 UNITS: 5000 INJECTION INTRAVENOUS; SUBCUTANEOUS at 08:05

## 2022-05-20 RX ADMIN — ASPIRIN 150 MG: 300 SUPPOSITORY RECTAL at 05:05

## 2022-05-20 RX ADMIN — SOYBEAN OIL 250 ML: 20 INJECTION, SOLUTION INTRAVENOUS at 09:05

## 2022-05-20 RX ADMIN — BISACODYL 10 MG: 10 SUPPOSITORY RECTAL at 03:05

## 2022-05-20 RX ADMIN — INSULIN ASPART 6 UNITS: 100 INJECTION, SOLUTION INTRAVENOUS; SUBCUTANEOUS at 12:05

## 2022-05-20 RX ADMIN — INSULIN ASPART 1 UNITS: 100 INJECTION, SOLUTION INTRAVENOUS; SUBCUTANEOUS at 03:05

## 2022-05-20 RX ADMIN — INSULIN ASPART 2 UNITS: 100 INJECTION, SOLUTION INTRAVENOUS; SUBCUTANEOUS at 09:05

## 2022-05-20 RX ADMIN — HEPARIN SODIUM 4000 UNITS: 1000 INJECTION, SOLUTION INTRAVENOUS; SUBCUTANEOUS at 06:05

## 2022-05-20 RX ADMIN — INSULIN ASPART 4 UNITS: 100 INJECTION, SOLUTION INTRAVENOUS; SUBCUTANEOUS at 08:05

## 2022-05-20 RX ADMIN — INSULIN ASPART 2 UNITS: 100 INJECTION, SOLUTION INTRAVENOUS; SUBCUTANEOUS at 12:05

## 2022-05-20 RX ADMIN — INSULIN ASPART 4 UNITS: 100 INJECTION, SOLUTION INTRAVENOUS; SUBCUTANEOUS at 05:05

## 2022-05-20 NOTE — NURSING
Follow up skin assessment. Abrasion to nose improving at this assessment. There is an NG tube in place now and it is secured to the patients left cheek to avoid the nose area. Plan of care reviewed with patients nurse Whitmore.

## 2022-05-20 NOTE — PHYSICIAN QUERY
PT Name: Marshall Flor  MR #: [id number]     DOCUMENTATION CLARIFICATION     CDS/: CHARLY Magana, RN, CDS               Contact information:[email or web address]   This form is a permanent document in the medical record.     Query Date: May 20, 2022    By submitting this query, we are merely seeking further clarification of documentation.  Please utilize your independent clinical judgment when addressing the question(s) below.  The Medical Record contains the following   Indicators   Supporting Clinical Findings Location in Medical Record   X SOB, JAMES, Wheezing, Productive Cough, Use of Accessory Muscles, etc.  Tachypnea     Apneic episodes persisting on CPAP, 35%. sats 100%     Upon initial assessment patient found to have continued apneic breathing occurring every 1-2 minutes lasting from 15-20 seconds. O2 sat decreases during this time to low 90s. Recovery breathing with shallow abdominal breaths followed by fast hard breathing     Cheyne-Valdovinos with periods of apnea   Shallow respiratory effort    H&P, Dr. Correa, 5/12     RT, 5/12 at 519 PM     Nurse note, 5/12 at 820 PM             RT, 5/13 at 717 PM   X RR         ABGs         O2 sat  RR 35, SpO2 97%     Resp:  [11-38] 11   SpO2:  [85 %-100 %] 99 %     Resp:  [14-46] 17   SpO2:  [38 %-100 %] 100 %        5/12 5/17   POC PH 7.417 7.381   POC PCO2 31.1 (L) 41.6   POC PO2 84 102   POC BE -5 0   POC HCO3 20.0 (L) 24.7   POC SATURATED O2 97 98   POC TCO2 21 (L) 26   FiO2 28 25   Flow 1.5    Sample Arterial  Arterial    DelSys Nasal cannula  CPAP//BIPAP       H&P, Dr. Correa, 5/12     HM, Dr. Pope, 5/13       HM, Dr. Pope, 5/14         ABG 5/12-5/17    Hypoxia/Hypercapnia     X BiPAP/Intubation/Mechanical Ventilation  Trial of CPAP at 10      Bipap    eICU, Dr. Garcia, 5/12     RT, 5/14 at 720 PM   X Supplemental O2  1 liter     Pt to remain in ICU. CPAP removed at 1400 and nasal cannula placed at 3L    RT, 5/12 at 519 PM     Nurse, 5/13 at 529 PM     Home O2, Oxygen Dependence     X Respiratory distress or failure  Resp failure requiring BIPAP     Pulm is consulted for management of resp failure and potential ventilator requirement for procedure     I have concerns he may have prolonged ventilator requirement given ongoing issues with respiratory failure and neuro deficits- may not be easy to wean, may go on to require trach or prolonged vent support     Respiratory failure as well as CVA    Pulm, Dr. Charles, 5/16                     Cards, Dr. Mooney, 5/16       X Radiology findings  CXR impression:   No definite acute process.      CXR impression:  Large central line noted ending at the level of the right atrium.  Consider retraction into the superior vena cava. Cardiomegaly.  No pneumothorax seen.       CXR 5/14       CXR 5/16   X Acute/Chronic Illness  Acute CVA, Severe sepsis, CHF     Acute bacterial pneumonia 2/2 unknown organism, acute   CVA, MERCEDES, Chronic combined systolic and diastolic CHF (congestive heart failure), HTN, Coronary artery disease, hyperlipidemia, type diabetes, CKD    H&P, Dr. Correa, 5/12     , Dr. Correa, 5/19    Treatment      Other         The noted clinical guidelines are only system guidelines and do not replace the providers clinical judgment.    Provider, please specify the diagnosis or diagnoses associated with above clinical findings.     [    x] Acute Respiratory Failure with Hypoxia - ABG pO2 < 60 mmHg or O2 sat of <91% on room air and respiratory symptoms documented     [    ] Hypoxia Only     [    ] Other Respiratory Diagnosis (please specify): _________________     [   ] Clinically Undetermined         Please document in your progress notes daily for the duration of treatment until resolved and include in your discharge summary.     Reference:    CRYSTAL Cortez MD. (2020, March 13). Acute respiratory distress syndrome: Clinical features, diagnosis, and complications in adults ([id number] LOCO Gomez MD &  [id number] TAMMY Vale MD, Eds.). Retrieved November 13, 2020, from [email or web address]/contents/acute-respiratory-distress-syndrome-clinical-features-diagnosis-and-complications-in-adults?search=ards&source=search_result&selectedTitle=1~150&usage_type=default&display_rank=1  Form No. 09556

## 2022-05-20 NOTE — PHYSICIAN QUERY
PT Name: Marshall Flor  MR #: [id number]  DOCUMENTATION CLARIFICATION     CDS/: CHARLY Magana, RN, CDS               Contact information:[email or web address]  This form is a permanent document in the medical record.     Query Date: May 20, 2022    By submitting this query, we are merely seeking further clarification of documentation. Please utilize your independent clinical judgment when addressing the question(s) below.    The Medical Record contains the following:   Indicators Supporting Clinical Findings Location in Medical Record   X CKD or Chronic Kidney (Renal) Failure/Disease  CKD III     CKD stage 3     CKD (chronic kidney disease) stage 4, GFR 15-29 ml/min     Oliguric MERCEDES due to ATN; CKD III     CKD III  H&P, Dr. Correa, 5/12     Neph, Dr. Mathew, 5/12     , Dr. Pope, 5/14     Neph, Dr. Busch, 5/15     Siva, Dr. Little, 5/17     X BUN/Creatinine                          GFR  05/12 05/13 05/14 05/15 05/16 05/17 05/18 05/19 05/20   BUN 27 (H) 30 (H) 42 (H) 46 (H) 49 (H) 30 (H) 30 (H) 30 (H) 48 (H)   Creatinine 3.3 (H) 3.7 (H) 5.9 (H) 6.7 (H) 6.7 (H) 4.0 (H) 3.4 (H) 3.7 (H) 4.6 (H)   EGFR  19  16 9  8  8  15  18  16  13      Lab 5/12-5/20    Dehydration      Nausea / Vomiting     X Dialysis / CRRT  Repeat hd today and repeat tomorrow    Dr. Anabel Paniagua, 5/17    Medication     X Treatment  No NSAIDs, ACEI/ARB, IV contrast or other nephrotoxins.   Keep MAP > 60, SBP > 100.   Dose meds for GFR < 30 ml/min.   Renal diet - low K, low phos.   Diagnose and treat infection.   Stop diuretics.     Repeat hd today and repeat tomorrow    Dr. Priyanka Paniagua, 5/12               Siva, Dr. Little, 5/17   X Other Chronic Conditions  history of CHF, diabetes       anemia of chronic CKD, SHPT, HTN, CHF      , Dr. Pope, 5/14     Neph, Dr. Busch, 5/15    Other       Provider, please further specify the stage of Chronic Kidney Disease (CKD):    [   ] Chronic Kidney Disease (CKD) stage 3a - Mildly to moderately  decreased eGFR 45-59   [   ] Chronic Kidney Disease (CKD) stage 3b - Moderately to severely decreased eGFR 30-44   [  x ] Chronic Kidney Disease (CKD) stage 3 unspecified   [   ] Chronic Kidney Disease (CKD) stage 4 - Severely decreased eGFR 15-29   [   ] Other (please specify): _________________   [   ] Clinically Undetermined     Please document in your progress notes daily for the duration of treatment until resolved and include in your discharge summary.    Reference:     UNRULY Elliott MD, & ALYSSIA Mosquera MD, MS. (2020, June 18). Definition and staging of chronic kidney disease in adults ([id number] TAMMY Iglesias MD, ScD & [id number] KHALIDA Caceres MD, MSc, Eds.). Retrieved October 21, 2020, from https://www.[employer]/contents/definition-and-staging-of-chronic-kidney-disease-in-adults?search=ckd%20staging&source=search_result&selectedTitle=1~150&usage_type=default&display_rank=1    Form No. 90728

## 2022-05-20 NOTE — PLAN OF CARE
[facility]  Discharge Reassessment    Primary Care Provider: Dorie Quan MD    Expected Discharge Date:     Per MDRs, patient not medically ready for discharge today.  Palliative care following patient.  CM will follow up with any discharge planning needs.    Reassessment (most recent)     Discharge Reassessment - 05/20/22 6109        Discharge Reassessment    Assessment Type Discharge Planning Reassessment     Did the patient's condition or plan change since previous assessment? Yes     Discharge Plan discussed with: Patient     Communicated STAR with patient/caregiver Date not available/Unable to determine     Discharge Plan A Skilled Nursing Facility     Discharge Plan B Rehab     DME Needed Upon Discharge  --   TBD    Discharge Barriers Identified None     Why the patient remains in the hospital Requires continued medical care

## 2022-05-20 NOTE — PLAN OF CARE
Pt remains in ICU on 2L nasal cannula. Cardene gtt turned off at 2011. Pt's BP stable. TPN at 75 ml/hr and lipids running. Able to follow simple commands-wiggle toes and weak hand grasp. Pt is able to track when moving around the room and speaking to him. Minimal urine output-200 cc. Afebrile. Safety maintained.     Problem: Adult Inpatient Plan of Care  Goal: Plan of Care Review  Outcome: Ongoing, Progressing  Goal: Patient-Specific Goal (Individualized)  Outcome: Ongoing, Progressing  Goal: Absence of Hospital-Acquired Illness or Injury  Outcome: Ongoing, Progressing  Goal: Optimal Comfort and Wellbeing  Outcome: Ongoing, Progressing  Goal: Readiness for Transition of Care  Outcome: Ongoing, Progressing

## 2022-05-20 NOTE — CARE UPDATE
05/20/22 0824   Patient Assessment/Suction   Level of Consciousness (AVPU) alert   Respiratory Effort Normal;Unlabored   Expansion/Accessory Muscles/Retractions no use of accessory muscles   Rhythm/Pattern, Respiratory unlabored;pattern regular;depth regular   Cough Frequency no cough   PRE-TX-O2   O2 Device (Oxygen Therapy) nasal cannula   $ Is the patient on Low Flow Oxygen? Yes   Flow (L/min) 1   SpO2 100 %   Pulse Oximetry Type Continuous   $ Pulse Oximetry - Multiple Charge Pulse Oximetry - Multiple   Pulse 72   Resp 19   BP (!) 144/67   Preset CPAP/BiPAP Settings   Mode Of Delivery Standby

## 2022-05-20 NOTE — PLAN OF CARE
POC reviewed with pt and son. Pt more alert today and able to follow simple commands. Pt will respond to yes or no questions with a thumbs up. Repeat CT head done today. MD ordered ngt. Ngt placed and x-ray confirmed placement. Pt receiving dialysis right now. Tube feeding will be started after dialysis. MD ordered bisacodyl suppository. Bm an hour after. Urine ouput improved with 750 during shift. BG monitored and insulin administered per sliding scale. Condom cath changed this evening. Ngt dressing to the side of nose to prevent impaired healing to injury on nose. Fall and safety precautions in place. Comfort and wellbeing maintained.     Problem: Adult Inpatient Plan of Care  Goal: Plan of Care Review  Outcome: Ongoing, Progressing  Goal: Patient-Specific Goal (Individualized)  Outcome: Ongoing, Progressing  Goal: Optimal Comfort and Wellbeing  Outcome: Ongoing, Progressing     Problem: Diabetes Comorbidity  Goal: Blood Glucose Level Within Targeted Range  Outcome: Ongoing, Progressing     Problem: Skin Injury Risk Increased  Goal: Skin Health and Integrity  Outcome: Ongoing, Progressing

## 2022-05-20 NOTE — NURSING
Heparin held per Dr. Anil COELHO's, recommendation after reviewing MRI and CT. To be reevaluated tomorrow.

## 2022-05-20 NOTE — CHAPLAIN
Visited w/pt, he was alone, no family bedside; pt seemed much more awake and alert today but not sure how much he understood; I reminded pt how much he's loved and being well taken care of. I said a blessing over him. Lord, in your mercy.

## 2022-05-20 NOTE — PROGRESS NOTES
Nephrology Progress Note        Patient Name: Marshall Flor  MRN: [id number]    Patient Class: IP- Inpatient   Admission Date: 5/12/2022  Length of Stay: 8 days  Date of Service: 5/20/2022    Attending Physician: Dimitry Correa MD  Primary Care Provider: Dorie Quan MD    Reason for Consult: dione/acidosis/sepsis/stroke/ckd3/fever/anemia/htn/chf    SUBJECTIVE:     HPI: 66-year-old male with history of HTN, CHF, diabetes presents to [place] for decreased responsiveness, found to have a left gaze preference, then sent to [facility], where was found febrile, hypertensive. UA 3+ protein, 2+ glucose, WBC 3, moderate bacteria. Chest x-ray suggestive of pneumonia right upper lobe. MRI brain nonhemorrhagic infarction in the right anterolateral frontal lobe.Treated for sepsis ? PNA, ? Meningitis? and stroke.    5/14- BP all over the place, on 3L NC, UOP 481cc  5/15- BP still all over the place, on BIPAP, UOP 455cc- no labs drawn today and its after 1pm  5/16  Renal function continues to decline.  Mental status no better. Consent for dialysis obtained from patient's son yesterday by Dr. Busch.  I called to confirm and no answer (no voice mail available)  5/17  On bipap.  Not interactive.  Oliguric  5/18  S/p dialysis with 3 liter ultrafiltration.  Output not recorded last pm.  Not interactive  5/19     Not interactive. No distress  5/20.  Not interactive.  More interactive this am per report.  No distress.  325 cc UOP      Outpatient meds:  No current facility-administered medications on file prior to encounter.     Current Outpatient Medications on File Prior to Encounter   Medication Sig Dispense Refill    allopurinoL (ZYLOPRIM) 100 MG tablet   = 2 tab, Oral, Daily, # 60 tab, 5 Refill(s), Pharmacy: [id number], 167, cm, 07/27/21 8:24:00 CDT, Height/Length Measured, 91.5, kg, 12/15/20 11:18:00 CST, Weight Dosing      amLODIPine (NORVASC) 10 MG tablet 10 mg.      aspirin (ECOTRIN) 81 MG EC tablet Take  1 tablet (81 mg total) by mouth once daily. 30 tablet 3    atorvastatin (LIPITOR) 40 MG tablet   = 1 tab, Oral, Daily, # 90 tab, 1 Refill(s), Soft Stop, Pharmacy: [id number], 167, cm, 04/07/21 14:01:00 CDT, Height/Length Measured, 91.5, kg, 12/15/20 11:18:00 CST, Weight Dosing      carvediloL (COREG) 3.125 MG tablet Take 1 tablet (3.125 mg total) by mouth once daily. 30 tablet 1    cloNIDine (CATAPRES) 0.1 MG tablet Take 2 tablets (0.2 mg total) by mouth 3 (three) times daily. 180 tablet 11    clopidogreL (PLAVIX) 75 mg tablet Take 75 mg by mouth once daily.      furosemide (LASIX) 20 MG tablet Take 1 tablet (20 mg total) by mouth once daily. 30 tablet 1    hydrALAZINE (APRESOLINE) 50 MG tablet Take 1 tablet (50 mg total) by mouth every 12 (twelve) hours. 60 tablet 0    linaGLIPtin (TRADJENTA) 5 mg Tab tablet 5 mg.      NOVOLOG MIX 70-30FLEXPEN U-100 100 unit/mL (70-30) InPn pen Inject into the skin.      sodium bicarbonate 650 MG tablet Take 1 tablet (650 mg total) by mouth 2 (two) times daily. 60 tablet 11       Scheduled meds:   cloNIDine 0.2 mg/24 hr td ptwk  1 patch Transdermal Q7 Days    famotidine (PF)  20 mg Intravenous Daily    fat emulsion 20%  250 mL Intravenous Daily    [START ON 5/21/2022] fat emulsion 20%  250 mL Intravenous Daily    heparin (porcine)  5,000 Units Subcutaneous Q12H    insulin detemir U-100  10 Units Subcutaneous BID       Infusions:   niCARdipine Stopped (05/19/22 2011)    TPN ADULT CENTRAL LINE CUSTOM 75 mL/hr at 05/20/22 1217    TPN ADULT CENTRAL LINE CUSTOM      [START ON 5/21/2022] TPN ADULT CENTRAL LINE CUSTOM         PRN meds:  acetaminophen, bisacodyL, dextrose 10%, dextrose 10%, glucagon (human recombinant), glucose, glucose, heparin (porcine), hydrALAZINE, insulin aspart U-100, labetaloL, morphine, naloxone, ondansetron, oxyCODONE, polyethylene glycol, sodium chloride 0.9%    Review of Systems:  Neg    OBJECTIVE:     Vital Signs and IO (Last  24H):  Temp:  [97.5 °F (36.4 °C)-99.1 °F (37.3 °C)]   Pulse:  [60-83]   Resp:  [13-24]   BP: (109-199)/(52-77)   SpO2:  [98 %-100 %]   I/O last 3 completed shifts:  In: 3968.9 [I.V.:876.4]  Out: 325 [Urine:325]    Wt Readings from Last 5 Encounters:   05/20/22 79.6 kg (175 lb 7.8 oz)   05/11/22 87.5 kg (193 lb)   05/12/22 87.5 kg (192 lb 14.4 oz)   04/26/22 95.3 kg (210 lb)   02/02/22 91.1 kg (200 lb 13.4 oz)     Physical Exam:  Constitutional:  No apparent distress  Heart: rrr, no m/r/g, wwp, no edema  Lungs: coarse, no w/r/r/c, no lb  Abdomen: s/nt/nd, +BS    Body mass index is 27.49 kg/m².    Laboratory:  Recent Labs   Lab 05/18/22  0328 05/19/22  0406 05/20/22  0356    135* 135*   K 3.2* 3.4* 3.8    102 99   CO2 24 22* 22*   BUN 30* 30* 48*   CREATININE 3.4* 3.7* 4.6*   ESTGFRAFRICA 21* 19* 14*   EGFRNONAA 18* 16* 13*   * 381* 203*       Recent Labs   Lab 05/17/22  0312 05/18/22  0328 05/19/22  0406 05/20/22  0356   CALCIUM 8.0* 8.4* 8.3* 8.6*   ALBUMIN 2.0* 2.2* 2.3*  --    PHOS 4.5 2.8 2.0* 3.3   MG 2.1 2.0 1.8 1.7       Recent Labs   Lab 04/12/21  0952   PTH, Intact 273.0 H       Recent Labs   Lab 05/19/22  0008 05/19/22  0405 05/19/22  0408 05/19/22  1235 05/19/22  1619 05/19/22  2004 05/20/22  0023 05/20/22  0349 05/20/22  0808 05/20/22  1211   POCTGLUCOSE 358* 420* 382* 331* 271* 183* 240* 218* 212* 255*       Recent Labs   Lab 04/12/21  0952 05/12/22  1210 05/13/22  0341   Hemoglobin A1C 7.8 H 8.8 H 9.1 H       Recent Labs   Lab 05/18/22  0328 05/19/22  0406 05/20/22  0356   WBC 5.00 5.11 6.44   HGB 12.7* 12.6* 12.1*   HCT 39.9* 38.1* 37.5*    208 215   MCV 76* 75* 77*   MCHC 31.8* 33.1 32.3   MONO 8.8  0.4 12.3  0.6 14.6  0.9       Recent Labs   Lab 05/17/22  0312 05/18/22  0328 05/19/22  0406   BILITOT 0.4 0.3 0.3   PROT 5.9* 6.4 6.5   ALBUMIN 2.0* 2.2* 2.3*   ALKPHOS 77 77 71   ALT 59* 42 31   AST 25 14 14       Recent Labs   Lab 01/26/22  0639 05/11/22  2300 05/12/22  1400    Color, UA Yellow Yellow Yellow   Appearance, UA Cloudy A Clear Clear   pH, UA 5.0 6.0 6.0   Specific Moy[place], UA 1.020 1.020 1.020   Protein, UA 2+ A 3+ A 3+ A   Glucose, UA Negative 2+ A 3+ A   Ketones, UA Negative Negative Negative   Urobilinogen, UA 1.0 Negative Negative   Bilirubin (UA) Negative Negative Negative   Occult Blood UA 2+ A 2+ A 2+ A   Nitrite, UA Negative Negative Negative   RBC, UA 33 H 10 H 20 H   WBC, UA 55 H 3 6 H   Bacteria Moderate A Moderate A Few A   Hyaline Casts, UA 3 A 1 0       Recent Labs   Lab 05/12/22 2055 05/16/22  1525 05/17/22  1102   POC PH 7.417 7.290 LL 7.381   POC PCO2 31.1 L 34.7 L 41.6   POC HCO3 20.0 L 16.7 L 24.7   POC PO2 84 161 H 102 H   POC SATURATED O2 97 99 98   POC BE -5 -10 0   Sample ARTERIAL ARTERIAL ARTERIAL       Microbiology Results (last 7 days)     Procedure Component Value Units Date/Time    CSF culture [[id number]] Collected: 05/16/22 1404    Order Status: Completed Specimen: CSF (Spinal Fluid) from CSF Tap, Tube 2 Updated: 05/20/22 0729     CSF CULTURE No Growth to date     Gram Stain Result No WBC's, epithelial cells or organisms seen    Blood culture [[id number]] Collected: 05/13/22 2007    Order Status: Completed Specimen: Blood from Peripheral, Left Arm Updated: 05/19/22 0612     Blood Culture, Routine No growth after 5 days.    Narrative:      Collection has been rescheduled by ACD at 05/13/2022 15:13 Reason:   Unable to collect, no place to stick pt  Collection has been rescheduled by ACD at 05/13/2022 15:13 Reason:   Unable to collect, no place to stick pt    Culture, MRSA [[id number]] Collected: 05/15/22 2300    Order Status: Completed Specimen: MRSA source from Nares, Left Updated: 05/18/22 0943     MRSA Surveillance Screen No MRSA isolated    Narrative:      Both nares, thank you    Cryptococcal antigen, CSF [[id number]] Collected: 05/16/22 1404    Order Status: Completed Specimen: CSF (Spinal Fluid) from CSF Tap, Tube 2 Updated: 05/17/22 0922      Crypto Ag, CSF Negative    Direct AFB stain [[id number]] Collected: 05/16/22 1404    Order Status: Completed Specimen: CSF (Spinal Fluid) from CSF Tap, Tube 2 Updated: 05/17/22 0843     Direct Acid Fast No acid fast bacilli seen.    AFB Culture & Smear [[id number]] Collected: 05/13/22 1508    Order Status: Completed Specimen: CSF (Spinal Fluid) from Nares, Right Updated: 05/16/22 1446     AFB Culture & Smear Culture in progress     AFB CULTURE STAIN No acid fast bacilli seen.    Gram stain [[id number]] Collected: 05/16/22 1404    Order Status: Canceled Specimen: CSF (Spinal Fluid) from CSF Tap, Tube 2         ASSESSMENT/PLAN:     Oliguric MERCEDES due to ATN; CKD III  - renal function is steadily deteriorating as of yest- oliguric- ID stopped acyclovir-  -initiated hemodialysis for uremic clearance on 5/16  Dialysis today given some improvement in mental status   Hold hd tomorrow and possibly stop altogether if comfort care measures entertained         HTN/Acute CVA/CHF  - off cardene gtt but probably needs to be resumed since not able to get any PO meds    Hypernatremia--better  Hypokalemia--better  HypoMg--better  Acidosis--better after dialysis  Hypokalemia--replete  -     SHPT  - no active issues    Anemia of CKD  - stable    Thank you for allowing us to participate in the care of your patient!   We will follow the patient and provide recommendations as needed.    Pt's nephew is at the bedside.  All of his questions were answered to his apparent satisfaction     Patient care time was spent personally by me on the following activities: > 35 min  · Obtaining a history.  · Examination of patient.  · Providing medical care at the patients bedside.  · Developing a treatment plan with patient or surrogate and bedside caregivers.  · Ordering and reviewing laboratory studies, radiographic studies, pulse oximetry.  · Ordering and performing treatments and interventions.  · Evaluation of patient's response to  treatment.  · Discussions with consultants while on the unit and immediately available to the patient.  · Re-evaluation of the patient's condition.  · Documentation in the medical record.     Ariel Little MD    [facility]  [address] [place] [address], [place] [address] [phone] - tel  [phone] - fax    5/20/2022

## 2022-05-21 LAB
ABO + RH BLD: NORMAL
ALBUMIN SERPL BCP-MCNC: 2.4 G/DL (ref 3.5–5.2)
ALP SERPL-CCNC: 78 U/L (ref 55–135)
ALT SERPL W/O P-5'-P-CCNC: 29 U/L (ref 10–44)
AMORPH CRY URNS QL MICRO: ABNORMAL
ANION GAP SERPL CALC-SCNC: 11 MMOL/L (ref 8–16)
AST SERPL-CCNC: 25 U/L (ref 10–40)
BACTERIA #/AREA URNS HPF: ABNORMAL /HPF
BASOPHILS # BLD AUTO: 0.02 K/UL (ref 0–0.2)
BASOPHILS NFR BLD: 0.4 % (ref 0–1.9)
BILIRUB SERPL-MCNC: 0.3 MG/DL (ref 0.1–1)
BILIRUB UR QL STRIP: NEGATIVE
BLD GP AB SCN CELLS X3 SERPL QL: NORMAL
BUN SERPL-MCNC: 41 MG/DL (ref 8–23)
CALCIUM SERPL-MCNC: 8.5 MG/DL (ref 8.7–10.5)
CHLORIDE SERPL-SCNC: 101 MMOL/L (ref 95–110)
CLARITY UR: CLEAR
CO2 SERPL-SCNC: 21 MMOL/L (ref 23–29)
COLOR UR: YELLOW
CREAT SERPL-MCNC: 3.1 MG/DL (ref 0.5–1.4)
DIFFERENTIAL METHOD: ABNORMAL
EOSINOPHIL # BLD AUTO: 0.1 K/UL (ref 0–0.5)
EOSINOPHIL NFR BLD: 2.4 % (ref 0–8)
ERYTHROCYTE [DISTWIDTH] IN BLOOD BY AUTOMATED COUNT: 17.6 % (ref 11.5–14.5)
EST. GFR  (AFRICAN AMERICAN): 23 ML/MIN/1.73 M^2
EST. GFR  (NON AFRICAN AMERICAN): 20 ML/MIN/1.73 M^2
GLUCOSE SERPL-MCNC: 240 MG/DL (ref 70–110)
GLUCOSE UR QL STRIP: ABNORMAL
HCT VFR BLD AUTO: 36.1 % (ref 40–54)
HGB BLD-MCNC: 12 G/DL (ref 14–18)
HGB UR QL STRIP: ABNORMAL
HYALINE CASTS #/AREA URNS LPF: 0 /LPF
IMM GRANULOCYTES # BLD AUTO: 0.02 K/UL (ref 0–0.04)
IMM GRANULOCYTES NFR BLD AUTO: 0.4 % (ref 0–0.5)
KETONES UR QL STRIP: NEGATIVE
LEUKOCYTE ESTERASE UR QL STRIP: NEGATIVE
LYMPHOCYTES # BLD AUTO: 0.9 K/UL (ref 1–4.8)
LYMPHOCYTES NFR BLD: 16 % (ref 18–48)
MAGNESIUM SERPL-MCNC: 1.5 MG/DL (ref 1.6–2.6)
MCH RBC QN AUTO: 24.7 PG (ref 27–31)
MCHC RBC AUTO-ENTMCNC: 33.2 G/DL (ref 32–36)
MCV RBC AUTO: 74 FL (ref 82–98)
MICROSCOPIC COMMENT: ABNORMAL
MONOCYTES # BLD AUTO: 0.6 K/UL (ref 0.3–1)
MONOCYTES NFR BLD: 10.7 % (ref 4–15)
NEUTROPHILS # BLD AUTO: 3.7 K/UL (ref 1.8–7.7)
NEUTROPHILS NFR BLD: 70.1 % (ref 38–73)
NITRITE UR QL STRIP: NEGATIVE
NON-SQ EPI CELLS #/AREA URNS HPF: 7 /HPF
NRBC BLD-RTO: 0 /100 WBC
PH UR STRIP: 6 [PH] (ref 5–8)
PHOSPHATE SERPL-MCNC: 3.3 MG/DL (ref 2.7–4.5)
PLATELET # BLD AUTO: 217 K/UL (ref 150–450)
PMV BLD AUTO: 11 FL (ref 9.2–12.9)
POCT GLUCOSE: 172 MG/DL (ref 70–110)
POCT GLUCOSE: 213 MG/DL (ref 70–110)
POCT GLUCOSE: 252 MG/DL (ref 70–110)
POCT GLUCOSE: 275 MG/DL (ref 70–110)
POCT GLUCOSE: 294 MG/DL (ref 70–110)
POTASSIUM SERPL-SCNC: 3.5 MMOL/L (ref 3.5–5.1)
PROT SERPL-MCNC: 6.6 G/DL (ref 6–8.4)
PROT UR QL STRIP: ABNORMAL
RBC # BLD AUTO: 4.85 M/UL (ref 4.6–6.2)
RBC #/AREA URNS HPF: 87 /HPF (ref 0–4)
SODIUM SERPL-SCNC: 133 MMOL/L (ref 136–145)
SP GR UR STRIP: 1.02 (ref 1–1.03)
SQUAMOUS #/AREA URNS HPF: 3 /HPF
URATE CRY URNS QL MICRO: ABNORMAL
URN SPEC COLLECT METH UR: ABNORMAL
UROBILINOGEN UR STRIP-ACNC: NEGATIVE EU/DL
WBC # BLD AUTO: 5.32 K/UL (ref 3.9–12.7)
WBC #/AREA URNS HPF: 3 /HPF (ref 0–5)

## 2022-05-21 PROCEDURE — 85025 COMPLETE CBC W/AUTO DIFF WBC: CPT | Performed by: INTERNAL MEDICINE

## 2022-05-21 PROCEDURE — 63600175 PHARM REV CODE 636 W HCPCS: Performed by: NURSE PRACTITIONER

## 2022-05-21 PROCEDURE — 63600175 PHARM REV CODE 636 W HCPCS: Performed by: INTERNAL MEDICINE

## 2022-05-21 PROCEDURE — 20000000 HC ICU ROOM

## 2022-05-21 PROCEDURE — 84100 ASSAY OF PHOSPHORUS: CPT | Performed by: INTERNAL MEDICINE

## 2022-05-21 PROCEDURE — B4185 PARENTERAL SOL 10 GM LIPIDS: HCPCS | Performed by: INTERNAL MEDICINE

## 2022-05-21 PROCEDURE — 51701 INSERT BLADDER CATHETER: CPT

## 2022-05-21 PROCEDURE — 83735 ASSAY OF MAGNESIUM: CPT | Performed by: INTERNAL MEDICINE

## 2022-05-21 PROCEDURE — 25000003 PHARM REV CODE 250: Performed by: INTERNAL MEDICINE

## 2022-05-21 PROCEDURE — 94761 N-INVAS EAR/PLS OXIMETRY MLT: CPT

## 2022-05-21 PROCEDURE — 51798 US URINE CAPACITY MEASURE: CPT

## 2022-05-21 PROCEDURE — 87040 BLOOD CULTURE FOR BACTERIA: CPT | Performed by: INTERNAL MEDICINE

## 2022-05-21 PROCEDURE — 80053 COMPREHEN METABOLIC PANEL: CPT | Performed by: INTERNAL MEDICINE

## 2022-05-21 PROCEDURE — 36415 COLL VENOUS BLD VENIPUNCTURE: CPT | Performed by: INTERNAL MEDICINE

## 2022-05-21 PROCEDURE — A4217 STERILE WATER/SALINE, 500 ML: HCPCS | Performed by: INTERNAL MEDICINE

## 2022-05-21 PROCEDURE — 86901 BLOOD TYPING SEROLOGIC RH(D): CPT | Performed by: INTERNAL MEDICINE

## 2022-05-21 PROCEDURE — 81000 URINALYSIS NONAUTO W/SCOPE: CPT | Performed by: INTERNAL MEDICINE

## 2022-05-21 RX ORDER — MAGNESIUM SULFATE HEPTAHYDRATE 40 MG/ML
2 INJECTION, SOLUTION INTRAVENOUS ONCE
Status: COMPLETED | OUTPATIENT
Start: 2022-05-21 | End: 2022-05-21

## 2022-05-21 RX ADMIN — SOYBEAN OIL 250 ML: 20 INJECTION, SOLUTION INTRAVENOUS at 09:05

## 2022-05-21 RX ADMIN — ALTEPLASE 2 MG: 2.2 INJECTION, POWDER, LYOPHILIZED, FOR SOLUTION INTRAVENOUS at 04:05

## 2022-05-21 RX ADMIN — ASPIRIN 81 MG CHEWABLE TABLET 81 MG: 81 TABLET CHEWABLE at 10:05

## 2022-05-21 RX ADMIN — INSULIN ASPART 6 UNITS: 100 INJECTION, SOLUTION INTRAVENOUS; SUBCUTANEOUS at 07:05

## 2022-05-21 RX ADMIN — LABETALOL HYDROCHLORIDE 10 MG: 5 INJECTION INTRAVENOUS at 06:05

## 2022-05-21 RX ADMIN — INSULIN ASPART 2 UNITS: 100 INJECTION, SOLUTION INTRAVENOUS; SUBCUTANEOUS at 10:05

## 2022-05-21 RX ADMIN — INSULIN ASPART 3 UNITS: 100 INJECTION, SOLUTION INTRAVENOUS; SUBCUTANEOUS at 04:05

## 2022-05-21 RX ADMIN — INSULIN ASPART 2 UNITS: 100 INJECTION, SOLUTION INTRAVENOUS; SUBCUTANEOUS at 11:05

## 2022-05-21 RX ADMIN — ASCORBIC ACID, VITAMIN A PALMITATE, CHOLECALCIFEROL, THIAMINE HYDROCHLORIDE, RIBOFLAVIN-5 PHOSPHATE SODIUM, PYRIDOXINE HYDROCHLORIDE, NIACINAMIDE, DEXPANTHENOL, ALPHA-TOCOPHEROL ACETATE, VITAMIN K1, FOLIC ACID, BIOTIN, CYANOCOBALAMIN: 200; 3300; 200; 6; 3.6; 6; 40; 15; 10; 150; 600; 60; 5 INJECTION, SOLUTION INTRAVENOUS at 09:05

## 2022-05-21 RX ADMIN — INSULIN ASPART 6 UNITS: 100 INJECTION, SOLUTION INTRAVENOUS; SUBCUTANEOUS at 01:05

## 2022-05-21 RX ADMIN — FAMOTIDINE 20 MG: 10 INJECTION INTRAVENOUS at 10:05

## 2022-05-21 RX ADMIN — MAGNESIUM SULFATE 2 G: 2 INJECTION INTRAVENOUS at 05:05

## 2022-05-21 RX ADMIN — HEPARIN SODIUM 5000 UNITS: 5000 INJECTION INTRAVENOUS; SUBCUTANEOUS at 10:05

## 2022-05-21 RX ADMIN — HYDRALAZINE HYDROCHLORIDE 10 MG: 20 INJECTION, SOLUTION INTRAMUSCULAR; INTRAVENOUS at 04:05

## 2022-05-21 NOTE — PLAN OF CARE
Problem: Adult Inpatient Plan of Care  Goal: Plan of Care Review  Outcome: Ongoing, Progressing  Goal: Patient-Specific Goal (Individualized)  Outcome: Ongoing, Progressing  Goal: Absence of Hospital-Acquired Illness or Injury  Outcome: Ongoing, Progressing  Goal: Optimal Comfort and Wellbeing  Outcome: Ongoing, Progressing  Goal: Readiness for Transition of Care  Outcome: Ongoing, Progressing     Problem: Diabetes Comorbidity  Goal: Blood Glucose Level Within Targeted Range  Outcome: Ongoing, Progressing     Problem: Skin Injury Risk Increased  Goal: Skin Health and Integrity  Outcome: Ongoing, Progressing     Problem: Adjustment to Illness (Stroke, Ischemic/Transient Ischemic Attack)  Goal: Optimal Coping  Outcome: Ongoing, Not Progressing     Problem: Bowel Elimination Impaired (Stroke, Ischemic/Transient Ischemic Attack)  Goal: Effective Bowel Elimination  Outcome: Ongoing, Progressing     Problem: Cognitive Impairment (Stroke, Ischemic/Transient Ischemic Attack)  Goal: Optimal Cognitive Function  Outcome: Ongoing, Not Progressing     Problem: Communication Impairment (Stroke, Ischemic/Transient Ischemic Attack)  Goal: Improved Communication Skills  Outcome: Ongoing, Not Progressing     Problem: Functional Ability Impaired (Stroke, Ischemic/Transient Ischemic Attack)  Goal: Optimal Functional Ability  Outcome: Ongoing, Not Progressing     Problem: Respiratory Compromise (Stroke, Ischemic/Transient Ischemic Attack)  Goal: Effective Oxygenation and Ventilation  Outcome: Ongoing, Progressing     Problem: Infection  Goal: Absence of Infection Signs and Symptoms  Outcome: Ongoing, Progressing     Problem: Fall Injury Risk  Goal: Absence of Fall and Fall-Related Injury  Outcome: Ongoing, Progressing     Problem: Fluid and Electrolyte Imbalance (Acute Kidney Injury/Impairment)  Goal: Fluid and Electrolyte Balance  Outcome: Ongoing, Progressing     Problem: Renal Function Impairment (Acute Kidney  Injury/Impairment)  Goal: Effective Renal Function  Outcome: Ongoing, Progressing     Repeat Head CT complete; pending result.  Patient may receive platelets pending repeat head ct result.  Patient opens eyes spontaneously and to voice; makes eye contact but does not follow commands most commands.  He is able to move all extremities spontaneously.  Patient to remain on q1h neurocheck.  Patient remains on room air.  Couple runs of v-tach noted today; made aware; no new order given.  Magnesium IV infusing as ordered.  BP control; PRN hydralazine and labetalol given; see chart for vitals.  TPN and TF continued as ordered; blood glucose checks q4h; SSI administered.  External farmer catheter in use; bladder scan done; intermittent cath done.  Bed set to lowest position, wheels locked, side rails up, bed alarm on.  Call light within reach.

## 2022-05-21 NOTE — NURSING
Repeat head CT done; results pending.  NAD noted during transport and procedure.  Patient back in room; placed on continuous tele, bp, and o2 monitor.  Bed set to lowest position, wheels locked, side rails up.  TPN and magnesium IV infusions restarted; TF restarted.  Call light within reach.  Heels elevated, bed set to lateral rotation.

## 2022-05-21 NOTE — PLAN OF CARE
Pt Awake and alert. Pt remains nonverbal but will give thumbs up when questions are asked. Pt given CHG bath. Novasource renal started via NG tube at 20 ml/hr. Pt tolerating tube feeds well. Pt remains on TPN an lipids per order. TPN at 75 ml/hr and lipids running at 20.8ml/hr. See flow sheets for further assessment and vital sign data. POC and medications reviewed with patient. Condom cath in place. Bed alarm on, bed in lowest position and wheels locked.     0500: tpn and lipids stopped and cathflo placed in line. Line now able to flush and draw back. Midline dressing changed.

## 2022-05-21 NOTE — CARE UPDATE
05/21/22 0759   Patient Assessment/Suction   Level of Consciousness (AVPU)   (pt asleep)   Respiratory Effort Normal;Unlabored   Expansion/Accessory Muscles/Retractions expansion symmetric;no retractions;no use of accessory muscles   All Lung Fields Breath Sounds diminished   Rhythm/Pattern, Respiratory unlabored   PRE-TX-O2   O2 Device (Oxygen Therapy) room air   Pulse Oximetry Type Continuous   $ Pulse Oximetry - Multiple Charge Pulse Oximetry - Multiple   Preset CPAP/BiPAP Settings   Mode Of Delivery Standby

## 2022-05-21 NOTE — NURSING
Head CT complete; see chart for result.  No acute distress noted during transport and procedure.  See chart for new orders.

## 2022-05-21 NOTE — PROGRESS NOTES
"[facility]  Neurology  Progress Note    Patient Name: Marshall Flor  MRN: [id number]  Admission Date: 5/12/2022  Hospital Length of Stay: 9 days  Code Status: DNR   Attending Provider: Dimitry Correa MD  Primary Care Physician: Dorie Quan MD   Principal Problem:CVA (cerebral vascular accident)    Subjective:   Chief Complaint:  unresponsive      HPI:   Per EMR: History of Present Illness:    66-year-old male with history of CHF, diabetes presents to [place] for decreased responsiveness found to have a left gaze presents with fever upon arrival to [facility].  Family is not at bedside and patient is not able to communicate in current state.  ?  Neurology Consult: Pt seen and examined with Dr Rudy Segundo. POC discussed. Pt is minimally responsive to tactile stimuli. His CT head is negative for acute infarct or hemorrhage.  He was transferred from [facility] for further work up due to fever, AMS, and left gaze. Last seen normal unknown. No family at bedside.   Per [place]: "Patient is a 66-year-old male brought from home via EMS for evaluation of altered mental status as reported by family members.  Family member stated that at around 1:00 p.m. today they noted that the patient was not exhibiting his normal behaviors.  Upon arrival here, the patient is not speaking.  He has some residual weakness on the right side secondary to a previous stroke.  Patient does not speak, but he does respond to commands.  He is exhibiting an abnormal breathing pattern of several short, chopped breaths followed by periods of apnea.  His vital signs show a temperature of 98°, pulse 98,, respiratory rate 28 per minute, O2 saturations 95% on room air, blood pressure is significantly elevated at 183/101.  Patient's baseline is unknown, but patient's family state that he has not at baseline."    5/14: Pt seen and examined. POC discussed with Dr. RUDY Segundo. Pt is still nonverbal with limited communication. " His son did not answer the phone today. Baseline unknown.     5/15/22:   Patient was seen and examined by me today.   Son was at bedside today.   He reports patient was functional at baseline but had mild weakness.  He remains aphasic,  follows visual cues but still with impaired comprehension.   Seen moving all 4 extremities spontaneously, moreso the left side than the right.         5/16/22:   Patient was seen and examined by  me today.   He still with significant aphasia,  but able to follow commands with  visual cues sometimes.  Right arm with deep hemiparesis,  seen moving both legs but more  left-sided than right. He is still on BiPAP.       5/17/22:  Patient was seen and examined by me today. his exam is unchanged .  He is still requiring BiPAP. undergoing hemodialysis for kidney failure.          5/21/22:    Patient was seen and  examined by me today.   Repeat CT brain obtained on 05/20/2022 demonstrated hemorrhagic  transformation in the area of left MCA stroke.   Follow-up CT performed today showed some expansion of the bleed but no evidence of mass effect. patient's neurological condition has actually improved as per primary team,  but he is still requiring ICU level care.                     Current Facility-Administered Medications   Medication Dose Route Frequency Provider Last Rate Last Admin    acetaminophen tablet 650 mg  650 mg Oral Q4H PRN Dimitry Corrae MD        bisacodyL suppository 10 mg  10 mg Rectal Daily PRN Dimitry Correa MD   10 mg at 05/20/22 1522    cloNIDine 0.2 mg/24 hr td ptwk 1 patch  1 patch Transdermal Q7 Days Osmin Pope MD   1 patch at 05/15/22 1441    dextrose 10% bolus 125 mL  12.5 g Intravenous PRN Dimitry Correa MD        dextrose 10% bolus 250 mL  25 g Intravenous PRN Dimitry Correa MD        famotidine (PF) injection 20 mg  20 mg Intravenous Daily Tosin Charles MD   20 mg at 05/21/22 1017    fat emulsion 20% infusion 250 mL  250 mL  Intravenous Daily Dimitry Corrae MD        glucagon (human recombinant) injection 1 mg  1 mg Intramuscular PRN Dimitry Correa MD        glucose chewable tablet 16 g  16 g Oral PRN Dimitry Correa MD        glucose chewable tablet 24 g  24 g Oral PRN Dimitry Correa MD        heparin (porcine) injection 4,000 Units  4,000 Units Intra-Catheter PRN Ariel Little MD   4,000 Units at 05/20/22 1850    hydrALAZINE injection 10 mg  10 mg Intravenous Q4H PRN Dimitry Correa MD   10 mg at 05/20/22 2323    insulin aspart U-100 pen 1-10 Units  1-10 Units Subcutaneous Q4H PRN Dimitry Correa MD   6 Units at 05/21/22 1345    insulin detemir U-100 pen 10 Units  10 Units Subcutaneous BID Dimitry Correa MD   10 Units at 05/21/22 1017    labetaloL injection 10 mg  10 mg Intravenous Q6H PRN Dimitry Correa MD   10 mg at 05/17/22 1053    morphine injection 3 mg  3 mg Intravenous Q4H PRN Dimitry Correa MD   3 mg at 05/17/22 0202    naloxone 0.4 mg/mL injection 0.02 mg  0.02 mg Intravenous PRN Dimitry Correa MD        niCARdipine 40 mg/200 mL (0.2 mg/mL) infusion  0-15 mg/hr Intravenous Continuous Dimitry Correa MD   Stopped at 05/19/22 2011    ondansetron injection 4 mg  4 mg Intravenous Q8H PRN Dimitry Correa MD        oxyCODONE immediate release tablet 5 mg  5 mg Oral Q4H PRN Dimitry Correa MD        polyethylene glycol packet 17 g  17 g Oral BID PRN Dimitry Correa MD        sodium chloride 0.9% flush 10 mL  10 mL Intravenous PRN Dimitry Correa MD        TPN ADULT CENTRAL LINE CUSTOM   Intravenous Continuous Dimitry Correa MD 75 mL/hr at 05/21/22 0909 Restarted at 05/21/22 0909    TPN ADULT CENTRAL LINE CUSTOM   Intravenous Continuous Dimitry Correa MD         Objective:     Vital Signs (Most Recent):  Temp: 98.8 °F (37.1 °C) (05/21/22 1230)  Pulse: 81 (05/21/22 1300)  Resp: (!) 25 (05/21/22 1300)  BP: (!) 167/71 (05/21/22 1300)  SpO2: 97 % (05/21/22 1300) Vital Signs (24h  Range):  Temp:  [98.4 °F (36.9 °C)-100.6 °F (38.1 °C)] 98.8 °F (37.1 °C)  Pulse:  [] 81  Resp:  [16-33] 25  SpO2:  [96 %-100 %] 97 %  BP: (131-208)/(58-85) 167/71     Weight: 77 kg (169 lb 12.1 oz)  Body mass index is 26.59 kg/m².    Physical Exam  Constitutional:       Appearance: He is ill-appearing.   HENT:      Head: Normocephalic.      Mouth/Throat:      Mouth: Mucous membranes are dry.   Eyes:      Pupils: Pupils are equal, round, and reactive to light.   Cardiovascular:      Rate and Rhythm: Normal rate and regular rhythm.   Pulmonary:      Effort: Pulmonary effort is normal.   Abdominal:      Palpations: Abdomen is soft.   Musculoskeletal:      Cervical back: Normal range of motion.      Comments: Decreased ROM and strength BL, 'RS neglect   Skin:     General: Skin is warm.   Neurological:      Mental Status: He is alert.      Cranial Nerves: Cranial nerve deficit present.      Sensory: Sensory deficit present.      Motor: Weakness present.         NEUROLOGICAL EXAMINATION:     MENTAL STATUS   Level of consciousness: alert  Unable to name object.     CRANIAL NERVES     CN III, IV, VI   Pupils are equal, round, and reactive to light.       Limited cooperation with exam     MOTOR EXAM   Muscle bulk: normal  Overall muscle tone: increased    SENSORY EXAM        withdraws from noxious stimuli       Significant Labs:   Hemoglobin A1c:   Recent Labs   Lab 05/12/22  1210 05/13/22  0341   HGBA1C 8.8* 9.1*     BMP:   Recent Labs   Lab 05/20/22  0356 05/21/22  0441   * 240*   * 133*   K 3.8 3.5   CL 99 101   CO2 22* 21*   BUN 48* 41*   CREATININE 4.6* 3.1*   CALCIUM 8.6* 8.5*   MG 1.7 1.5*     CBC:   Recent Labs   Lab 05/20/22  0356 05/21/22  0441   WBC 6.44 5.32   HGB 12.1* 12.0*   HCT 37.5* 36.1*    217     CMP:   Recent Labs   Lab 05/20/22  0356 05/21/22  0441   * 240*   * 133*   K 3.8 3.5   CL 99 101   CO2 22* 21*   BUN 48* 41*   CREATININE 4.6* 3.1*   CALCIUM 8.6* 8.5*   MG  1.7 1.5*   PROT  --  6.6   ALBUMIN  --  2.4*   BILITOT  --  0.3   ALKPHOS  --  78   AST  --  25   ALT  --  29   ANIONGAP 14 11   EGFRNONAA 13* 20*       Significant Imaging: I have reviewed all pertinent imaging results/findings within the past 24 hours.    Assessment and Plan:    Acute Left MCA stroke with hemorrhagic transformation     Pt with h/o RS weakness from previous CVA, now presented with decreased responsiveness. Left MCA stroke confirmed on MRI brain. Etiology ongoing, suspect cardioembolic in the setting of HFrEF.  On 5/20 a repeat CT was ordered and showed petechial hemorrhagic conversion, so he was continued on DAPT. However, repeat CT 5/21 showed some expansion of the hemorrhage, so discontinued DAPT and DVT ppx for now.    Initial CT head: negative acute  MRI brain: nonhemorrhagic infarction in the anterior lateral left frontal lobe and a remote infarction with encephalomalacia and gliosis in the medial right occipital lobe.  There are also remote infarctions in the cerebellar hemispheres as well as in the right thalamus.  CT brain 5/20: Moderate size subacute infarct of the left frontal parietal lobe unchanged in size but with hemorrhagic transformation.  This is unchanged in size from the most recent head CT.  There is underlying mild brain atrophy with deep white matter ischemic changes.  Old infarct of the right posterior cerebral distribution in the right occipital lobe and old lacunar infarct on the right are noted.  Free hemorrhage or hematoma is not noted.  CT brain 5/21: Evolving subacute left frontal infarction with subacute hemorrhagic transformation which has slightly increased in size over the interval with persistent mild surrounding mass effect without midline shift  CTA head and neck: Less than 50% stenoses of the bilateral proximal internal carotid arteries.  Significant stenoses of the bilateral intracranial ICAs in the cavernous sinuses.  Normal flow in the [id number]  ECHO:    Moderate left atrial enlargement, 20% ejection fraction   Pt needs a RAJ due to his low EF.   However, he will likely need to be intubated for procedure and remains unclear if he will be able to get off the vent in the near future       LDL:   60  TSH:   0.547  A1c: 9.1  - LP was performed, CSF showed 1 white blood cell  and mild elevation of protein which is likely a consequence of patient's stroke. I do not believe patient has CNS infection.          - Secondary stroke prevention: holding aspirin and plavix in the setting of hemorrhagic transformation of stroke. Continue atorvastatin. Also hold DVT ppx, will repeat CT brain 24H after expansion of the bleed to assess stability and determine if DVT ppx can be restarted.  EEG:   Moderate encephalopathy and diffuse cortical dysfunction; no epileptiform discharges or electrographic seizures captured  - had conversation with family member about neuro prognosis.  Though patient may recover some function after his stroke, has several other medical problems which make his recovery less likely. Now his stroke is complicated with hemorrhagic transformation. He has had some improvement, but not significant in terms of neuro recovery. Will follow peripherally, please call with questions.          Active Diagnoses:    Diagnosis Date Noted POA    PRINCIPAL PROBLEM:  CVA (cerebral vascular accident) [I63.9] 05/12/2022 Yes    Abrasion, nose w/o infection [S00.31XA]  Unknown    Goals of care, counseling/discussion [Z71.89] 05/18/2022 Not Applicable    MERCEDES (acute kidney injury) [N17.9] 05/14/2022 No    Chronic combined systolic and diastolic CHF (congestive heart failure) [I50.42] 05/13/2022 Yes    Meningitis (needs to be ruled out) [G03.9] 05/13/2022 Yes    Encephalopathy [G93.40]  Yes    Essential hypertension [I10] 01/25/2022 Yes    Coronary artery disease involving native coronary artery of native heart [I25.10] 01/02/2022 Yes    Hyperlipidemia [E78.5] 01/02/2022 Yes     Type 2 diabetes mellitus with stage 4 chronic kidney disease, without long-term current use of insulin [E11.22, N18.4] 01/02/2022 Yes    CKD (chronic kidney disease) stage 4, GFR 15-29 ml/min [N18.4] 01/02/2022 Yes      Problems Resolved During this Admission:       VTE Risk Mitigation (From admission, onward)         Ordered     heparin (porcine) injection 4,000 Units  As needed (PRN)         05/16/22 1841     IP VTE HIGH RISK PATIENT  Once         05/12/22 1351     Place sequential compression device  Until discontinued         05/12/22 1351     Place sequential compression device  Until discontinued         05/12/22 1052                [facility] [phone] within 3 days from discharge.     All questions were answered.                              Thank you kindly for including us in the care of this patient. Please do not hesitate to contact us with any questions.    Critical Care:  54 minutes of critical care time has been spent evaluating with the patient. Time includes chart review not limited to diagnostic imaging, labs, and vitals, patient assessment, discussion with family and nursing, current order evaluations, and new order entries.          Shelby Hugo MD  Neurology  [facility]

## 2022-05-21 NOTE — PROGRESS NOTES
[facility] Hospital Medicine  Progress Note    Patient Name: Marshall Flor  MRN: [id number]  Patient Class: IP- Inpatient   Admission Date: 5/12/2022  Length of Stay: 9 days  Attending Physician: Dimitry Correa MD  Primary Care Provider: Dorie Quan MD        Subjective:     Principal Problem:CVA (cerebral vascular accident)        HPI:  66-year-old male with history of CHF, diabetes presents to [place] for decreased responsiveness found to have a fever, left gaze preference with nuchal rigidity found to have meningitis in addition to being found to have an acute CVA. Beyond this he has an MERCEDES.          Subjective:   Seen in the morning and withdrawing to pain.     In the afternoon, he again was following simple commands and alert with nurse         NAD, obtunded in the morning  NC, AT  RRR  CTAB  SNTND+BS  No edema   No gross joint abnormalities  PERRL,     gaze preference left. Withdrawing to pain     Vitals, labs and radiographs from past 24h reviewed and personally interpreted.         Overview/Hospital Course:    66-year-old male with history of CHF, diabetes presents to [place] for decreased responsiveness ultimately becoming unresponsive, fever and left gaze preference found to have an acute CVA in addition to an MERCEDES.    The patient was started on aspirin rectally.  Over the 1st week the patient remained nonresponsive and would only withdrawal to pain.  One point he was found to have an intracranial hemorrhage which demonstrated stability thereafter on follow-up CT.  Neurology ultimately recommended resumption of aspirin.  Briefly the patient is neurologic status improved such that he was alert and following simple commands inconsistently.  On the morning of 05/21/2022 he was again unresponsive and so repeat CT was performed which showed mild worsening of the hemorrhage.  Follow-up CT the evening of 5/21 according to the neurologist's read was stable.  The patient will remain off  aspirin and Plavix on subQ heparin at this point.    By the evening of 05/21/2022 the patient was again alert and following simple commands.    Beyond this initially there was concern the patient had meningitis.  He was started on broad-spectrum antibiotics.  Ultimately lumbar puncture was able to be performed and at that point meningitis was ruled out.    Course was complicated by the development of a pneumonia which has been fully treated.    He presented also with an acute kidney injury; nephrology is following.  The patient has intermittently required dialysis with specific note that uremia would leave to cognitive depression.    Palliative care team has been consulted. Family continues to consider electing only comfort-driven measures.           Assessment/Plan:         1. Acute CVA  -ASA 81 and plavix resumed by NG tube on 5/20 by neurology's recommendation.   -Worsened exam morning of 5/21. CT at that time demonstrated worsening of known hemorrhage.   -ASA, plavix, SQH stopped  -PM CT demonstrated stability   -by the evening of 5/21, patient was again following simple commands and alert  -On TPN, can discontinue once tube feeds are at goal.  -neurology      2. Acute bacterial pneumonia 2/2 unknown organism  -treatment complete.      CVA (cerebral vascular accident)  Proven on MRI.  Old infarcts seen as well.  Neurologist consulting.  As above      MERCEDES (acute kidney injury)  New problem.S  Nephrology group consulting.  Monitor renal function and UOP.  Avoid NSAID's and hypotension.  Acyclovir stopped.  S/p RRT; repeat on 5/20 .    Creatinine   Date Value Ref Range Status   05/15/2022 6.7 (H) 0.5 - 1.4 mg/dL Final   05/14/2022 5.9 (H) 0.5 - 1.4 mg/dL Final   05/13/2022 3.7 (H) 0.5 - 1.4 mg/dL Final   ]      Meningitis (ruled out)           Chronic combined systolic and diastolic CHF (congestive heart failure)  Patient is identified as having Combined Systolic and Diastolic heart failure that is Chronic. CHF is  currently controlled. Latest ECHO performed and demonstrates- Results for orders placed during the hospital encounter of 05/12/22    Echo    Interpretation Summary  · The left ventricle is moderately enlarged with eccentric hypertrophy and severely decreased systolic function.  · Grade II left ventricular diastolic dysfunction.  · The estimated PA systolic pressure is 59 mmHg.  · Severe right ventricular enlargement with mildly to moderately reduced right ventricular systolic function.  · Moderate left atrial enlargement.  · There is moderate pulmonary hypertension.  · Intermediate central venous pressure (8 mmHg).  · Mild aortic regurgitation.  · Moderate right atrial enlargement.  · Mild to moderate tricuspid regurgitation.  · Mild pulmonic regurgitation.  · The estimated ejection fraction is 20%.  · Moderate mitral regurgitation.  . Patient NPO; not on oral meds.  Monitor clinical status closely. Monitor on telemetry. Patient is off CHF pathway.  Monitor strict Is&Os and daily weights.   Last BNP reviewed- and noted below   Recent Labs   Lab 05/11/22  2303   BNP 3,064*   .          Essential hypertension  Titrate meds as required       Coronary artery disease involving native coronary artery of native heart  Stable.        Hyperlipidemia  Chronic.  Stable.      Type 2 diabetes mellitus with stage 4 chronic kidney disease, without long-term current use of insulin  Patient's FSGs are controlled on current medication regimen.  Last A1c reviewed-   Lab Results   Component Value Date    HGBA1C 9.1 (H) 05/13/2022     Most recent fingerstick glucose reviewed-   Recent Labs   Lab 05/14/22  2056 05/15/22  0028 05/15/22  0438 05/15/22  1153   POCTGLUCOSE 163* 136* 152* 133*     Current correctional scale  Low  Maintain anti-hyperglycemic dose as follows-   Antihyperglycemics (From admission, onward)            Start     Stop Route Frequency Ordered    05/13/22 1715  insulin aspart U-100 pen 0-5 Units         -- SubQ Every  6 hours PRN 05/13/22 1602    05/12/22 1200  insulin detemir U-100 pen 8 Units         -- SubQ Daily 05/12/22 1052        Hold Oral hypoglycemics while patient is in the hospital.        CKD (chronic kidney disease) stage 4, GFR 15-29 ml/min  Renal function is worse.  Monitor BMP.  Avoid NSAID's.    Creatinine   Date Value Ref Range Status   05/15/2022 6.7 (H) 0.5 - 1.4 mg/dL Final   05/14/2022 5.9 (H) 0.5 - 1.4 mg/dL Final   ]    SQH held given IC hemorrhage     VTE Risk Mitigation (From admission, onward)         Ordered     heparin (porcine) injection 4,000 Units  As needed (PRN)         05/16/22 1841     IP VTE HIGH RISK PATIENT  Once         05/12/22 1351     Place sequential compression device  Until discontinued         05/12/22 1351     Place sequential compression device  Until discontinued         05/12/22 1052                Discharge Planning   STAR:      Code Status: DNR   Is the patient medically ready for discharge?:     Reason for patient still in hospital (select all that apply): Patient trending condition, Laboratory test and Treatment  Discharge Plan A: Skilled Nursing Facility            Dimitry Correa MD  Department of Hospital Medicine   [facility]    5/21: night team to be notified about pending CT read. Neurology has reviewed the scans and that service does not believe bleed to have progressed over the course of the day.  They recommended against platelet administration.  Should CT demonstrate progression, night coverage team will be asked to reach out to neurology for further recommendations.

## 2022-05-21 NOTE — PROGRESS NOTES
[facility] Medicine  Progress Note    Patient Name: Marshall Flor  MRN: [id number]  Patient Class: IP- Inpatient   Admission Date: 5/12/2022  Length of Stay: 8 days  Attending Physician: Dimitry Correa MD  Primary Care Provider: Dorie Quan MD        Subjective:     Principal Problem:CVA (cerebral vascular accident)        HPI:  66-year-old male with history of CHF, diabetes presents to [facility] for decreased responsiveness found to have a fever, left gaze preference with nuchal rigidity found to have meningitis in addition to being found to have an acute CVA. Beyond this he has an MERCEDES.            Alert. Following simple commands variably. Attempts to vocalize     NAD, Alert  NC, AT  RRR  CTAB  SNTND+BS  No edema   No gross joint abnormalities  PERRL, gaze preference left. Weakness present throughout     Vitals, labs and radiographs from past 24h reviewed and personally interpreted.         Overview/Hospital Course:              Assessment/Plan:         1. Acute CVA  -ASA 81 and plavix resumed by NG tube  -On TPN  -possible intracranial hemorrhage: repeat CT on 5/18 demonstrates stability; again on 5/20  -neurology      2. Acute bacterial pneumonia 2/2 unknown organism  -treatment complete.      CVA (cerebral vascular accident)  Proven on MRI.  Old infarcts seen as well.  Neurologist consulting.  As above      MERCEDES (acute kidney injury)  New problem.S  Nephrology group consulting.  Monitor renal function and UOP.  Avoid NSAID's and hypotension.  Acyclovir stopped.  S/p RRT; repeat on 5/20 .    Creatinine   Date Value Ref Range Status   05/15/2022 6.7 (H) 0.5 - 1.4 mg/dL Final   05/14/2022 5.9 (H) 0.5 - 1.4 mg/dL Final   05/13/2022 3.7 (H) 0.5 - 1.4 mg/dL Final   ]      Meningitis (ruled out)           Chronic combined systolic and diastolic CHF (congestive heart failure)  Patient is identified as having Combined Systolic and Diastolic heart failure that is Chronic. CHF is currently  controlled. Latest ECHO performed and demonstrates- Results for orders placed during the hospital encounter of 05/12/22    Echo    Interpretation Summary  · The left ventricle is moderately enlarged with eccentric hypertrophy and severely decreased systolic function.  · Grade II left ventricular diastolic dysfunction.  · The estimated PA systolic pressure is 59 mmHg.  · Severe right ventricular enlargement with mildly to moderately reduced right ventricular systolic function.  · Moderate left atrial enlargement.  · There is moderate pulmonary hypertension.  · Intermediate central venous pressure (8 mmHg).  · Mild aortic regurgitation.  · Moderate right atrial enlargement.  · Mild to moderate tricuspid regurgitation.  · Mild pulmonic regurgitation.  · The estimated ejection fraction is 20%.  · Moderate mitral regurgitation.  . Patient NPO; not on oral meds.  Monitor clinical status closely. Monitor on telemetry. Patient is off CHF pathway.  Monitor strict Is&Os and daily weights.   Last BNP reviewed- and noted below   Recent Labs   Lab 05/11/22  2303   BNP 3,064*   .          Essential hypertension  Titrate meds as required       Coronary artery disease involving native coronary artery of native heart  Stable.        Hyperlipidemia  Chronic.  Stable.      Type 2 diabetes mellitus with stage 4 chronic kidney disease, without long-term current use of insulin  Patient's FSGs are controlled on current medication regimen.  Last A1c reviewed-   Lab Results   Component Value Date    HGBA1C 9.1 (H) 05/13/2022     Most recent fingerstick glucose reviewed-   Recent Labs   Lab 05/14/22  2056 05/15/22  0028 05/15/22  0438 05/15/22  1153   POCTGLUCOSE 163* 136* 152* 133*     Current correctional scale  Low  Maintain anti-hyperglycemic dose as follows-   Antihyperglycemics (From admission, onward)            Start     Stop Route Frequency Ordered    05/13/22 1715  insulin aspart U-100 pen 0-5 Units         -- SubQ Every 6 hours  PRN 05/13/22 1602    05/12/22 1200  insulin detemir U-100 pen 8 Units         -- SubQ Daily 05/12/22 1052        Hold Oral hypoglycemics while patient is in the hospital.        CKD (chronic kidney disease) stage 4, GFR 15-29 ml/min  Renal function is worse.  Monitor BMP.  Avoid NSAID's.    Creatinine   Date Value Ref Range Status   05/15/2022 6.7 (H) 0.5 - 1.4 mg/dL Final   05/14/2022 5.9 (H) 0.5 - 1.4 mg/dL Final   ]    SQH bid for dvt ppx as CT demonstrates stability of possible hemorrhagic lesion     VTE Risk Mitigation (From admission, onward)         Ordered     heparin (porcine) injection 5,000 Units  Every 12 hours         05/19/22 1501     heparin (porcine) injection 4,000 Units  As needed (PRN)         05/16/22 1841     IP VTE HIGH RISK PATIENT  Once         05/12/22 1351     Place sequential compression device  Until discontinued         05/12/22 1351     Place sequential compression device  Until discontinued         05/12/22 1052                Discharge Planning   STAR:      Code Status: DNR   Is the patient medically ready for discharge?:     Reason for patient still in hospital (select all that apply): Patient trending condition, Laboratory test and Treatment  Discharge Plan A: Skilled Nursing Facility            Dimitry Correa MD  Department of Hospital Medicine   [facility]

## 2022-05-21 NOTE — PROGRESS NOTES
Nephrology Progress Note        Patient Name: Marshall Flor  MRN: [id number]    Patient Class: IP- Inpatient   Admission Date: 5/12/2022  Length of Stay: 9 days  Date of Service: 5/21/2022    Attending Physician: Dimitry Correa MD  Primary Care Provider: Dorie Quan MD    Reason for Consult: dione/acidosis/sepsis/stroke/ckd3/fever/anemia/htn/chf    SUBJECTIVE:     HPI: 66-year-old male with history of HTN, CHF, diabetes presents to [place] for decreased responsiveness, found to have a left gaze preference, then sent to [facility], where was found febrile, hypertensive. UA 3+ protein, 2+ glucose, WBC 3, moderate bacteria. Chest x-ray suggestive of pneumonia right upper lobe. MRI brain nonhemorrhagic infarction in the right anterolateral frontal lobe.Treated for sepsis ? PNA, ? Meningitis? and stroke.    5/14- BP all over the place, on 3L NC, UOP 481cc  5/15- BP still all over the place, on BIPAP, UOP 455cc- no labs drawn today and its after 1pm  5/16  Renal function continues to decline.  Mental status no better. Consent for dialysis obtained from patient's son yesterday by Dr. Busch.  I called to confirm and no answer (no voice mail available)  5/17  On bipap.  Not interactive.  Oliguric  5/18  S/p dialysis with 3 liter ultrafiltration.  Output not recorded last pm.  Not interactive  5/19     Not interactive. No distress  5/20.  Not interactive.  More interactive this am per report.  No distress.  325 cc UOP  5/21 VSS, plan HD PRN. Hospice? On TPN, good UO.    Outpatient meds:  No current facility-administered medications on file prior to encounter.     Current Outpatient Medications on File Prior to Encounter   Medication Sig Dispense Refill    allopurinoL (ZYLOPRIM) 100 MG tablet   = 2 tab, Oral, Daily, # 60 tab, 5 Refill(s), Pharmacy: [id number], 167, cm, 07/27/21 8:24:00 CDT, Height/Length Measured, 91.5, kg, 12/15/20 11:18:00 CST, Weight Dosing      amLODIPine (NORVASC) 10 MG tablet 10  mg.      aspirin (ECOTRIN) 81 MG EC tablet Take 1 tablet (81 mg total) by mouth once daily. 30 tablet 3    atorvastatin (LIPITOR) 40 MG tablet   = 1 tab, Oral, Daily, # 90 tab, 1 Refill(s), Soft Stop, Pharmacy: [id number], 167, cm, 04/07/21 14:01:00 CDT, Height/Length Measured, 91.5, kg, 12/15/20 11:18:00 CST, Weight Dosing      carvediloL (COREG) 3.125 MG tablet Take 1 tablet (3.125 mg total) by mouth once daily. 30 tablet 1    cloNIDine (CATAPRES) 0.1 MG tablet Take 2 tablets (0.2 mg total) by mouth 3 (three) times daily. 180 tablet 11    clopidogreL (PLAVIX) 75 mg tablet Take 75 mg by mouth once daily.      furosemide (LASIX) 20 MG tablet Take 1 tablet (20 mg total) by mouth once daily. 30 tablet 1    hydrALAZINE (APRESOLINE) 50 MG tablet Take 1 tablet (50 mg total) by mouth every 12 (twelve) hours. 60 tablet 0    linaGLIPtin (TRADJENTA) 5 mg Tab tablet 5 mg.      NOVOLOG MIX 70-30FLEXPEN U-100 100 unit/mL (70-30) InPn pen Inject into the skin.      sodium bicarbonate 650 MG tablet Take 1 tablet (650 mg total) by mouth 2 (two) times daily. 60 tablet 11       Scheduled meds:   aspirin  81 mg Per G Tube Daily    cloNIDine 0.2 mg/24 hr td ptwk  1 patch Transdermal Q7 Days    clopidogreL  75 mg Per G Tube with lunch    famotidine (PF)  20 mg Intravenous Daily    fat emulsion 20%  250 mL Intravenous Daily    heparin (porcine)  5,000 Units Subcutaneous Q12H    insulin detemir U-100  10 Units Subcutaneous BID       Infusions:   niCARdipine Stopped (05/19/22 2011)    TPN ADULT CENTRAL LINE CUSTOM 75 mL/hr at 05/21/22 0909    TPN ADULT CENTRAL LINE CUSTOM         PRN meds:  acetaminophen, bisacodyL, dextrose 10%, dextrose 10%, glucagon (human recombinant), glucose, glucose, heparin (porcine), hydrALAZINE, insulin aspart U-100, labetaloL, morphine, naloxone, ondansetron, oxyCODONE, polyethylene glycol, sodium chloride 0.9%    Review of Systems:  Neg    OBJECTIVE:     Vital Signs and IO (Last  24H):  Temp:  [97.5 °F (36.4 °C)-100.6 °F (38.1 °C)]   Pulse:  []   Resp:  [16-33]   BP: (131-208)/()   SpO2:  [96 %-100 %]   I/O last 3 completed shifts:  In: 3290.7 [I.V.:26.6; Other:500]  Out: 2890 [Urine:1390; Other:1500]    Wt Readings from Last 5 Encounters:   05/21/22 77 kg (169 lb 12.1 oz)   05/11/22 87.5 kg (193 lb)   05/12/22 87.5 kg (192 lb 14.4 oz)   04/26/22 95.3 kg (210 lb)   02/02/22 91.1 kg (200 lb 13.4 oz)     Physical Exam:  Constitutional:  No apparent distress  Heart: rrr, no m/r/g, wwp, no edema  Lungs: coarse, no w/r/r/c, no lb  Abdomen: s/nt/nd, +BS    Body mass index is 26.59 kg/m².    Laboratory:  Recent Labs   Lab 05/19/22  0406 05/20/22  0356 05/21/22  0441   * 135* 133*   K 3.4* 3.8 3.5    99 101   CO2 22* 22* 21*   BUN 30* 48* 41*   CREATININE 3.7* 4.6* 3.1*   ESTGFRAFRICA 19* 14* 23*   EGFRNONAA 16* 13* 20*   * 203* 240*       Recent Labs   Lab 05/18/22  0328 05/19/22  0406 05/20/22  0356 05/21/22  0441   CALCIUM 8.4* 8.3* 8.6* 8.5*   ALBUMIN 2.2* 2.3*  --  2.4*   PHOS 2.8 2.0* 3.3 3.3   MG 2.0 1.8 1.7 1.5*       Recent Labs   Lab 04/12/21  0952   PTH, Intact 273.0 H       Recent Labs   Lab 05/19/22  1619 05/19/22  2004 05/20/22  0023 05/20/22  0349 05/20/22  0808 05/20/22  1211 05/20/22  1701 05/20/22  2053 05/21/22  0447 05/21/22  0852   POCTGLUCOSE 271* 183* 240* 218* 212* 255* 205* 193* 275* 172*       Recent Labs   Lab 04/12/21  0952 05/12/22  1210 05/13/22  0341   Hemoglobin A1C 7.8 H 8.8 H 9.1 H       Recent Labs   Lab 05/19/22  0406 05/20/22  0356 05/21/22  0441   WBC 5.11 6.44 5.32   HGB 12.6* 12.1* 12.0*   HCT 38.1* 37.5* 36.1*    215 217   MCV 75* 77* 74*   MCHC 33.1 32.3 33.2   MONO 12.3  0.6 14.6  0.9 10.7  0.6       Recent Labs   Lab 05/18/22  0328 05/19/22  0406 05/21/22  0441   BILITOT 0.3 0.3 0.3   PROT 6.4 6.5 6.6   ALBUMIN 2.2* 2.3* 2.4*   ALKPHOS 77 71 78   ALT 42 31 29   AST 14 14 25       Recent Labs   Lab 01/26/22  0639  05/11/22  2300 05/12/22  1400   Color, UA Yellow Yellow Yellow   Appearance, UA Cloudy A Clear Clear   pH, UA 5.0 6.0 6.0   Specific [place], UA 1.020 1.020 1.020   Protein, UA 2+ A 3+ A 3+ A   Glucose, UA Negative 2+ A 3+ A   Ketones, UA Negative Negative Negative   Urobilinogen, UA 1.0 Negative Negative   Bilirubin (UA) Negative Negative Negative   Occult Blood UA 2+ A 2+ A 2+ A   Nitrite, UA Negative Negative Negative   RBC, UA 33 H 10 H 20 H   WBC, UA 55 H 3 6 H   Bacteria Moderate A Moderate A Few A   Hyaline Casts, UA 3 A 1 0       Recent Labs   Lab 05/12/22  2055 05/16/22  1525 05/17/22  1102   POC PH 7.417 7.290 LL 7.381   POC PCO2 31.1 L 34.7 L 41.6   POC HCO3 20.0 L 16.7 L 24.7   POC PO2 84 161 H 102 H   POC SATURATED O2 97 99 98   POC BE -5 -10 0   Sample ARTERIAL ARTERIAL ARTERIAL       Microbiology Results (last 7 days)     Procedure Component Value Units Date/Time    CSF culture [[id number]] Collected: 05/16/22 1404    Order Status: Completed Specimen: CSF (Spinal Fluid) from CSF Tap, Tube 2 Updated: 05/21/22 0717     CSF CULTURE No Growth to date     Gram Stain Result No WBC's, epithelial cells or organisms seen    Blood culture [[id number]] Collected: 05/13/22 2007    Order Status: Completed Specimen: Blood from Peripheral, Left Arm Updated: 05/19/22 0612     Blood Culture, Routine No growth after 5 days.    Narrative:      Collection has been rescheduled by ACD at 05/13/2022 15:13 Reason:   Unable to collect, no place to stick pt  Collection has been rescheduled by ACD at 05/13/2022 15:13 Reason:   Unable to collect, no place to stick pt    Culture, MRSA [[id number]] Collected: 05/15/22 2300    Order Status: Completed Specimen: MRSA source from Nares, Left Updated: 05/18/22 0943     MRSA Surveillance Screen No MRSA isolated    Narrative:      Both nares, thank you    Cryptococcal antigen, CSF [[id number]] Collected: 05/16/22 1404    Order Status: Completed Specimen: CSF (Spinal Fluid) from CSF Tap, Tube  2 Updated: 05/17/22 0922     Crypto Ag, CSF Negative    Direct AFB stain [[id number]] Collected: 05/16/22 1404    Order Status: Completed Specimen: CSF (Spinal Fluid) from CSF Tap, Tube 2 Updated: 05/17/22 0843     Direct Acid Fast No acid fast bacilli seen.    AFB Culture & Smear [[id number]] Collected: 05/13/22 1508    Order Status: Completed Specimen: CSF (Spinal Fluid) from Nares, Right Updated: 05/16/22 1446     AFB Culture & Smear Culture in progress     AFB CULTURE STAIN No acid fast bacilli seen.    Gram stain [[id number]] Collected: 05/16/22 1404    Order Status: Canceled Specimen: CSF (Spinal Fluid) from CSF Tap, Tube 2         ASSESSMENT/PLAN:     Non-oliguric MERCEDES due to ATN; baseline CKD III; dialysis started 5/16  - initiated hemodialysis for uremic clearance on 5/16, PRN for now  - possibly stop altogether if comfort care measures entertained    HTN/Acute CVA/CHF  - off cardene gtt but may need to be resumed since not able to get any PO meds    Hypernatremia--better  Hypokalemia--better  HypoMg--better  Acidosis--better after dialysis  Hypokalemia--replete    Secondary HPT  - no active issues    Anemia of CKD  - stable    Thank you for allowing us to participate in the care of your patient!   We will follow the patient and provide recommendations as needed.    Pt's nephew is at the bedside.  All of his questions were answered to his apparent satisfaction     Patient care time was spent personally by me on the following activities: >  min  · Obtaining a history.  · Examination of patient.  · Providing medical care at the patients bedside.  · Developing a treatment plan with patient or surrogate and bedside caregivers.  · Ordering and reviewing laboratory studies, radiographic studies, pulse oximetry.  · Ordering and performing treatments and interventions.  · Evaluation of patient's response to treatment.  · Discussions with consultants while on the unit and immediately available to the  patient.  · Re-evaluation of the patient's condition.  · Documentation in the medical record.     Barrie Mathew MD    [facility]  [address] [place] [id number]    [phone] - tel  [phone] - fax    5/21/2022

## 2022-05-22 LAB
ALBUMIN SERPL BCP-MCNC: 2.4 G/DL (ref 3.5–5.2)
ALP SERPL-CCNC: 74 U/L (ref 55–135)
ALT SERPL W/O P-5'-P-CCNC: 42 U/L (ref 10–44)
ANION GAP SERPL CALC-SCNC: 14 MMOL/L (ref 8–16)
AST SERPL-CCNC: 31 U/L (ref 10–40)
BASOPHILS # BLD AUTO: 0.03 K/UL (ref 0–0.2)
BASOPHILS NFR BLD: 0.5 % (ref 0–1.9)
BILIRUB SERPL-MCNC: 0.3 MG/DL (ref 0.1–1)
BUN SERPL-MCNC: 56 MG/DL (ref 8–23)
CALCIUM SERPL-MCNC: 8.5 MG/DL (ref 8.7–10.5)
CHLORIDE SERPL-SCNC: 98 MMOL/L (ref 95–110)
CO2 SERPL-SCNC: 21 MMOL/L (ref 23–29)
CREAT SERPL-MCNC: 2.8 MG/DL (ref 0.5–1.4)
DIFFERENTIAL METHOD: ABNORMAL
EOSINOPHIL # BLD AUTO: 0.3 K/UL (ref 0–0.5)
EOSINOPHIL NFR BLD: 4.9 % (ref 0–8)
ERYTHROCYTE [DISTWIDTH] IN BLOOD BY AUTOMATED COUNT: 17.7 % (ref 11.5–14.5)
EST. GFR  (AFRICAN AMERICAN): 26 ML/MIN/1.73 M^2
EST. GFR  (NON AFRICAN AMERICAN): 23 ML/MIN/1.73 M^2
GLUCOSE SERPL-MCNC: 181 MG/DL (ref 70–110)
HCT VFR BLD AUTO: 34.1 % (ref 40–54)
HGB BLD-MCNC: 11.2 G/DL (ref 14–18)
IMM GRANULOCYTES # BLD AUTO: 0.02 K/UL (ref 0–0.04)
IMM GRANULOCYTES NFR BLD AUTO: 0.3 % (ref 0–0.5)
LYMPHOCYTES # BLD AUTO: 1.2 K/UL (ref 1–4.8)
LYMPHOCYTES NFR BLD: 18.8 % (ref 18–48)
MAGNESIUM SERPL-MCNC: 1.8 MG/DL (ref 1.6–2.6)
MCH RBC QN AUTO: 24.6 PG (ref 27–31)
MCHC RBC AUTO-ENTMCNC: 32.8 G/DL (ref 32–36)
MCV RBC AUTO: 75 FL (ref 82–98)
MONOCYTES # BLD AUTO: 0.8 K/UL (ref 0.3–1)
MONOCYTES NFR BLD: 13 % (ref 4–15)
NEUTROPHILS # BLD AUTO: 4 K/UL (ref 1.8–7.7)
NEUTROPHILS NFR BLD: 62.5 % (ref 38–73)
NRBC BLD-RTO: 0 /100 WBC
PHOSPHATE SERPL-MCNC: 2.5 MG/DL (ref 2.7–4.5)
PLATELET # BLD AUTO: 236 K/UL (ref 150–450)
PLATELET BLD QL SMEAR: ABNORMAL
PMV BLD AUTO: 11.7 FL (ref 9.2–12.9)
POCT GLUCOSE: 155 MG/DL (ref 70–110)
POCT GLUCOSE: 202 MG/DL (ref 70–110)
POCT GLUCOSE: 226 MG/DL (ref 70–110)
POCT GLUCOSE: 245 MG/DL (ref 70–110)
POCT GLUCOSE: 246 MG/DL (ref 70–110)
POCT GLUCOSE: 376 MG/DL (ref 70–110)
POTASSIUM SERPL-SCNC: 3.2 MMOL/L (ref 3.5–5.1)
PROT SERPL-MCNC: 6.5 G/DL (ref 6–8.4)
RBC # BLD AUTO: 4.56 M/UL (ref 4.6–6.2)
SODIUM SERPL-SCNC: 133 MMOL/L (ref 136–145)
WBC # BLD AUTO: 6.38 K/UL (ref 3.9–12.7)

## 2022-05-22 PROCEDURE — 80053 COMPREHEN METABOLIC PANEL: CPT | Performed by: INTERNAL MEDICINE

## 2022-05-22 PROCEDURE — 25000003 PHARM REV CODE 250: Performed by: HOSPITALIST

## 2022-05-22 PROCEDURE — 25000003 PHARM REV CODE 250: Performed by: INTERNAL MEDICINE

## 2022-05-22 PROCEDURE — 94761 N-INVAS EAR/PLS OXIMETRY MLT: CPT

## 2022-05-22 PROCEDURE — 36415 COLL VENOUS BLD VENIPUNCTURE: CPT | Performed by: INTERNAL MEDICINE

## 2022-05-22 PROCEDURE — 20000000 HC ICU ROOM

## 2022-05-22 PROCEDURE — 83735 ASSAY OF MAGNESIUM: CPT | Performed by: INTERNAL MEDICINE

## 2022-05-22 PROCEDURE — 63600175 PHARM REV CODE 636 W HCPCS: Performed by: INTERNAL MEDICINE

## 2022-05-22 PROCEDURE — 85025 COMPLETE CBC W/AUTO DIFF WBC: CPT | Performed by: INTERNAL MEDICINE

## 2022-05-22 PROCEDURE — 84100 ASSAY OF PHOSPHORUS: CPT | Performed by: INTERNAL MEDICINE

## 2022-05-22 PROCEDURE — 25000003 PHARM REV CODE 250: Performed by: NURSE PRACTITIONER

## 2022-05-22 RX ORDER — SCOLOPAMINE TRANSDERMAL SYSTEM 1 MG/1
1 PATCH, EXTENDED RELEASE TRANSDERMAL
Status: DISCONTINUED | OUTPATIENT
Start: 2022-05-22 | End: 2022-05-26 | Stop reason: HOSPADM

## 2022-05-22 RX ORDER — SODIUM,POTASSIUM PHOSPHATES 280-250MG
1 POWDER IN PACKET (EA) ORAL ONCE
Status: COMPLETED | OUTPATIENT
Start: 2022-05-22 | End: 2022-05-22

## 2022-05-22 RX ORDER — ACETAMINOPHEN 500 MG
1000 TABLET ORAL EVERY 6 HOURS PRN
Status: DISCONTINUED | OUTPATIENT
Start: 2022-05-22 | End: 2022-05-26 | Stop reason: HOSPADM

## 2022-05-22 RX ADMIN — HYDRALAZINE HYDROCHLORIDE 10 MG: 20 INJECTION, SOLUTION INTRAMUSCULAR; INTRAVENOUS at 06:05

## 2022-05-22 RX ADMIN — INSULIN ASPART 10 UNITS: 100 INJECTION, SOLUTION INTRAVENOUS; SUBCUTANEOUS at 12:05

## 2022-05-22 RX ADMIN — ACETAMINOPHEN 650 MG: 325 TABLET ORAL at 06:05

## 2022-05-22 RX ADMIN — SCOPALAMINE 1 PATCH: 1 PATCH, EXTENDED RELEASE TRANSDERMAL at 11:05

## 2022-05-22 RX ADMIN — FAMOTIDINE 20 MG: 10 INJECTION INTRAVENOUS at 08:05

## 2022-05-22 RX ADMIN — ACETAMINOPHEN 650 MG: 325 TABLET ORAL at 10:05

## 2022-05-22 RX ADMIN — INSULIN ASPART 4 UNITS: 100 INJECTION, SOLUTION INTRAVENOUS; SUBCUTANEOUS at 03:05

## 2022-05-22 RX ADMIN — INSULIN ASPART 2 UNITS: 100 INJECTION, SOLUTION INTRAVENOUS; SUBCUTANEOUS at 11:05

## 2022-05-22 RX ADMIN — ACETAMINOPHEN 1000 MG: 500 TABLET ORAL at 11:05

## 2022-05-22 RX ADMIN — POTASSIUM & SODIUM PHOSPHATES POWDER PACK 280-160-250 MG 1 PACKET: 280-160-250 PACK at 10:05

## 2022-05-22 RX ADMIN — POTASSIUM BICARBONATE 20 MEQ: 391 TABLET, EFFERVESCENT ORAL at 10:05

## 2022-05-22 RX ADMIN — CLONIDINE 1 PATCH: 0.2 PATCH TRANSDERMAL at 08:05

## 2022-05-22 NOTE — CARE UPDATE
CT head result: IMPRESSION:  No progressive hemorrhage in comparison with the prior exam obtained about 6 hours ago

## 2022-05-22 NOTE — PROGRESS NOTES
Palliative care physician reaching out to nephew, Kraig, who is the family prime point of contact. Writer asks what family thoughts are based on conversation had earlier in the week to transition to comfort focused care yesterday. The family is asking for more time as encouraged from discussion with hospital team this past Friday 5/20/2022. They saw that he was more alert and are hopeful that he will continue to regain more neurological function. Kraig asking about a firm time in which they would need to make a decision and told that he cannot be on TPN indefinitely. He would need to have a PEG placed in the coming days likely. He is reminded that his overall prognosis remains very poor, especially since family clarified patient's goal to live independently. In the best case scenario patient will be dependent on others for the forseable future. Agreeable to to further dialogue on this as he communicates to other family members. The sister who lives out of town whom they were waiting to arrives this weekend has elected not to come in and see him in his current state.

## 2022-05-22 NOTE — PROGRESS NOTES
Nephrology Progress Note        Patient Name: Marshall Flor  MRN: [id number]    Patient Class: IP- Inpatient   Admission Date: 5/12/2022  Length of Stay: 10 days  Date of Service: 5/22/2022    Attending Physician: Obi Mustafa MD  Primary Care Provider: Dorie Quan MD    Reason for Consult: dione/acidosis/sepsis/stroke/ckd3/fever/anemia/htn/chf    SUBJECTIVE:     HPI: 66-year-old male with history of HTN, CHF, diabetes presents to [facility] for decreased responsiveness, found to have a left gaze preference, then sent to [facility], where was found febrile, hypertensive. UA 3+ protein, 2+ glucose, WBC 3, moderate bacteria. Chest x-ray suggestive of pneumonia right upper lobe. MRI brain nonhemorrhagic infarction in the right anterolateral frontal lobe.Treated for sepsis ? PNA, ? Meningitis? and stroke.    5/14- BP all over the place, on 3L NC, UOP 481cc  5/15- BP still all over the place, on BIPAP, UOP 455cc- no labs drawn today and its after 1pm  5/16  Renal function continues to decline.  Mental status no better. Consent for dialysis obtained from patient's son yesterday by Dr. Busch.  I called to confirm and no answer (no voice mail available)  5/17  On bipap.  Not interactive.  Oliguric  5/18  S/p dialysis with 3 liter ultrafiltration.  Output not recorded last pm.  Not interactive  5/19     Not interactive. No distress  5/20.  Not interactive.  More interactive this am per report.  No distress.  325 cc UOP  5/21 VSS, plan HD PRN. Hospice? On TPN, good UO.  5/22  Still no decision from family re: comfort care.  Will replete electrolytes.  Scr 2.8, UOP 1.3L.  D/w bedside nurse.    Outpatient meds:  No current facility-administered medications on file prior to encounter.     Current Outpatient Medications on File Prior to Encounter   Medication Sig Dispense Refill    allopurinoL (ZYLOPRIM) 100 MG tablet   = 2 tab, Oral, Daily, # 60 tab, 5 Refill(s), Pharmacy: [id number], 167, cm, 07/27/21  8:24:00 CDT, Height/Length Measured, 91.5, kg, 12/15/20 11:18:00 CST, Weight Dosing      amLODIPine (NORVASC) 10 MG tablet 10 mg.      aspirin (ECOTRIN) 81 MG EC tablet Take 1 tablet (81 mg total) by mouth once daily. 30 tablet 3    atorvastatin (LIPITOR) 40 MG tablet   = 1 tab, Oral, Daily, # 90 tab, 1 Refill(s), Soft Stop, Pharmacy: [id number], 167, cm, 04/07/21 14:01:00 CDT, Height/Length Measured, 91.5, kg, 12/15/20 11:18:00 CST, Weight Dosing      carvediloL (COREG) 3.125 MG tablet Take 1 tablet (3.125 mg total) by mouth once daily. 30 tablet 1    cloNIDine (CATAPRES) 0.1 MG tablet Take 2 tablets (0.2 mg total) by mouth 3 (three) times daily. 180 tablet 11    clopidogreL (PLAVIX) 75 mg tablet Take 75 mg by mouth once daily.      furosemide (LASIX) 20 MG tablet Take 1 tablet (20 mg total) by mouth once daily. 30 tablet 1    hydrALAZINE (APRESOLINE) 50 MG tablet Take 1 tablet (50 mg total) by mouth every 12 (twelve) hours. 60 tablet 0    linaGLIPtin (TRADJENTA) 5 mg Tab tablet 5 mg.      NOVOLOG MIX 70-30FLEXPEN U-100 100 unit/mL (70-30) InPn pen Inject into the skin.      sodium bicarbonate 650 MG tablet Take 1 tablet (650 mg total) by mouth 2 (two) times daily. 60 tablet 11       Scheduled meds:   cloNIDine 0.2 mg/24 hr td ptwk  1 patch Transdermal Q7 Days    famotidine (PF)  20 mg Intravenous Daily    fat emulsion 20%  250 mL Intravenous Daily    insulin detemir U-100  10 Units Subcutaneous BID       Infusions:   niCARdipine Stopped (05/19/22 2011)    TPN ADULT CENTRAL LINE CUSTOM 75 mL/hr at 05/22/22 0546       PRN meds:  acetaminophen, bisacodyL, dextrose 10%, dextrose 10%, glucagon (human recombinant), glucose, glucose, heparin (porcine), hydrALAZINE, insulin aspart U-100, labetaloL, morphine, naloxone, ondansetron, oxyCODONE, polyethylene glycol, sodium chloride 0.9%    Review of Systems:  Neg    OBJECTIVE:     Vital Signs and IO (Last 24H):  Temp:  [98.4 °F (36.9 °C)-100.6 °F  (38.1 °C)]   Pulse:  [71-92]   Resp:  [14-29]   BP: (112-198)/(55-99)   SpO2:  [97 %-100 %]   I/O last 3 completed shifts:  In: 3562 [I.V.:39.6; Other:500; NG/GT:587]  Out: 3240 [Urine:1740; Other:1500]    Wt Readings from Last 5 Encounters:   05/22/22 78.6 kg (173 lb 4.5 oz)   05/11/22 87.5 kg (193 lb)   05/12/22 87.5 kg (192 lb 14.4 oz)   04/26/22 95.3 kg (210 lb)   02/02/22 91.1 kg (200 lb 13.4 oz)     Physical Exam:  Constitutional:  No apparent distress  Heart: rrr, no m/r/g, wwp, no edema  Lungs: coarse, no w/r/r/c, no lb  Abdomen: s/nt/nd, +BS    Body mass index is 27.14 kg/m².    Laboratory:  Recent Labs   Lab 05/20/22  0356 05/21/22  0441 05/22/22  0600   * 133* 133*   K 3.8 3.5 3.2*   CL 99 101 98   CO2 22* 21* 21*   BUN 48* 41* 56*   CREATININE 4.6* 3.1* 2.8*   ESTGFRAFRICA 14* 23* 26*   EGFRNONAA 13* 20* 23*   * 240* 181*       Recent Labs   Lab 05/19/22  0406 05/20/22  0356 05/21/22  0441 05/22/22  0600   CALCIUM 8.3* 8.6* 8.5* 8.5*   ALBUMIN 2.3*  --  2.4* 2.4*   PHOS 2.0* 3.3 3.3 2.5*   MG 1.8 1.7 1.5* 1.8       Recent Labs   Lab 04/12/21  0952   PTH, Intact 273.0 H       Recent Labs   Lab 05/20/22  1211 05/20/22  1701 05/20/22 2053 05/21/22  0447 05/21/22  0852 05/21/22  1344 05/21/22  1913 05/21/22 2021 05/21/22  2337 05/22/22  0358   POCTGLUCOSE 255* 205* 193* 275* 172* 294* 252* 213* 246* 245*       Recent Labs   Lab 04/12/21  0952 05/12/22  1210 05/13/22  0341   Hemoglobin A1C 7.8 H 8.8 H 9.1 H       Recent Labs   Lab 05/20/22  0356 05/21/22  0441 05/22/22  0600   WBC 6.44 5.32 6.38   HGB 12.1* 12.0* 11.2*   HCT 37.5* 36.1* 34.1*    217 236   MCV 77* 74* 75*   MCHC 32.3 33.2 32.8   MONO 14.6  0.9 10.7  0.6 13.0  0.8       Recent Labs   Lab 05/19/22  0406 05/21/22  0441 05/22/22  0600   BILITOT 0.3 0.3 0.3   PROT 6.5 6.6 6.5   ALBUMIN 2.3* 2.4* 2.4*   ALKPHOS 71 78 74   ALT 31 29 42   AST 14 25 31       Recent Labs   Lab 05/11/22  2300 05/12/22  1400 05/21/22 2000    Color, UA Yellow Yellow Yellow   Appearance, UA Clear Clear Clear   pH, UA 6.0 6.0 6.0   Specific [place], UA 1.020 1.020 1.025   Protein, UA 3+ A 3+ A 2+ A   Glucose, UA 2+ A 3+ A 2+ A   Ketones, UA Negative Negative Negative   Urobilinogen, UA Negative Negative Negative   Bilirubin (UA) Negative Negative Negative   Occult Blood UA 2+ A 2+ A 2+ A   Nitrite, UA Negative Negative Negative   RBC, UA 10 H 20 H 87 H   WBC, UA 3 6 H 3   Bacteria Moderate A Few A Occasional   Hyaline Casts, UA 1 0 0       Recent Labs   Lab 05/12/22 2055 05/16/22  1525 05/17/22  1102   POC PH 7.417 7.290 LL 7.381   POC PCO2 31.1 L 34.7 L 41.6   POC HCO3 20.0 L 16.7 L 24.7   POC PO2 84 161 H 102 H   POC SATURATED O2 97 99 98   POC BE -5 -10 0   Sample ARTERIAL ARTERIAL ARTERIAL       Microbiology Results (last 7 days)     Procedure Component Value Units Date/Time    CSF culture [[id number]] Collected: 05/16/22 1404    Order Status: Completed Specimen: CSF (Spinal Fluid) from CSF Tap, Tube 2 Updated: 05/22/22 0724     CSF CULTURE No Growth to date     Gram Stain Result No WBC's, epithelial cells or organisms seen    Blood culture [[id number]] Collected: 05/21/22 1223    Order Status: Completed Specimen: Blood from Antecubital, Right Arm Updated: 05/21/22 2115     Blood Culture, Routine No Growth to date    Blood culture [[id number]] Collected: 05/13/22 2007    Order Status: Completed Specimen: Blood from Peripheral, Left Arm Updated: 05/19/22 0612     Blood Culture, Routine No growth after 5 days.    Narrative:      Collection has been rescheduled by ACD at 05/13/2022 15:13 Reason:   Unable to collect, no place to stick pt  Collection has been rescheduled by ACD at 05/13/2022 15:13 Reason:   Unable to collect, no place to stick pt    Culture, MRSA [[id number]] Collected: 05/15/22 2300    Order Status: Completed Specimen: MRSA source from Nares, Left Updated: 05/18/22 0943     MRSA Surveillance Screen No MRSA isolated    Narrative:      Both  nasima, thank you    Cryptococcal antigen, CSF [[id number]] Collected: 05/16/22 1404    Order Status: Completed Specimen: CSF (Spinal Fluid) from CSF Tap, Tube 2 Updated: 05/17/22 0922     Crypto Ag, CSF Negative    Direct AFB stain [[id number]] Collected: 05/16/22 1404    Order Status: Completed Specimen: CSF (Spinal Fluid) from CSF Tap, Tube 2 Updated: 05/17/22 0843     Direct Acid Fast No acid fast bacilli seen.    AFB Culture & Smear [[id number]] Collected: 05/13/22 1508    Order Status: Completed Specimen: CSF (Spinal Fluid) from Nares, Right Updated: 05/16/22 1446     AFB Culture & Smear Culture in progress     AFB CULTURE STAIN No acid fast bacilli seen.    Gram stain [[id number]] Collected: 05/16/22 1404    Order Status: Canceled Specimen: CSF (Spinal Fluid) from CSF Tap, Tube 2         ASSESSMENT/PLAN:     Non-oliguric MERCEDES due to ATN; baseline CKD III; dialysis started 5/16  - initiated hemodialysis for uremic clearance on 5/16, PRN for now  - possibly stop altogether if comfort care measures entertained    HTN/Acute CVA/CHF  - off cardene gtt but may need to be resumed since not able to get any PO meds    Hypernatremia--better  Hypokalemia--better  HypoMg--better  Acidosis--better after dialysis  Hypokalemia--replete    Secondary HPT  - no active issues    Anemia of CKD  - stable    Thank you for allowing us to participate in the care of your patient!   We will follow the patient and provide recommendations as needed.    Pt's nephew is at the bedside.  All of his questions were answered to his apparent satisfaction     Patient care time was spent personally by me on the following activities: >  min  · Obtaining a history.  · Examination of patient.  · Providing medical care at the patients bedside.  · Developing a treatment plan with patient or surrogate and bedside caregivers.  · Ordering and reviewing laboratory studies, radiographic studies, pulse oximetry.  · Ordering and performing treatments and  interventions.  · Evaluation of patient's response to treatment.  · Discussions with consultants while on the unit and immediately available to the patient.  · Re-evaluation of the patient's condition.  · Documentation in the medical record.     Eli Escobar NP      [facility]  [address] [place] [address]  [place] [address]    [phone] - tel  [phone] - fax    5/22/2022

## 2022-05-22 NOTE — PLAN OF CARE
Problem: Adult Inpatient Plan of Care  Goal: Plan of Care Review  Outcome: Ongoing, Progressing   Pt alert does not follow any commands. Spontaneous movement to LUE. BLE on stimulation and RUE occasionally moves spontaneously. Drools. Frequent oral care done. Spontaneous cough. TPN and tube feeds in progress. Blood sugars q 4 hours. Condom cath intact. Bath and linen change done. Safety measures in place

## 2022-05-22 NOTE — CARE UPDATE
05/22/22 0810   Patient Assessment/Suction   Level of Consciousness (AVPU) responds to voice   Respiratory Effort Normal;Unlabored   Expansion/Accessory Muscles/Retractions no retractions   All Lung Fields Breath Sounds diminished   PRE-TX-O2   O2 Device (Oxygen Therapy) room air   Pulse Oximetry Type Continuous   $ Pulse Oximetry - Multiple Charge Pulse Oximetry - Multiple   Preset CPAP/BiPAP Settings   Mode Of Delivery Standby

## 2022-05-23 LAB
AMPA-R AB CBA, CSF: NEGATIVE
AMPHIPHYSIN AB TITR CSF: NEGATIVE TITER
ANION GAP SERPL CALC-SCNC: 12 MMOL/L (ref 8–16)
BASOPHILS # BLD AUTO: 0.02 K/UL (ref 0–0.2)
BASOPHILS NFR BLD: 0.3 % (ref 0–1.9)
BUN SERPL-MCNC: 63 MG/DL (ref 8–23)
CALCIUM SERPL-MCNC: 8.8 MG/DL (ref 8.7–10.5)
CASPR2-IGG CBA, CSF: NEGATIVE
CHLORIDE SERPL-SCNC: 101 MMOL/L (ref 95–110)
CO2 SERPL-SCNC: 21 MMOL/L (ref 23–29)
CREAT SERPL-MCNC: 2.7 MG/DL (ref 0.5–1.4)
CV2 IGG TITR CSF: NEGATIVE TITER
DIFFERENTIAL METHOD: ABNORMAL
DPPX IGG CSF QL IF: NEGATIVE
EOSINOPHIL # BLD AUTO: 0.1 K/UL (ref 0–0.5)
EOSINOPHIL NFR BLD: 2 % (ref 0–8)
ERYTHROCYTE [DISTWIDTH] IN BLOOD BY AUTOMATED COUNT: 17.7 % (ref 11.5–14.5)
EST. GFR  (AFRICAN AMERICAN): 28 ML/MIN/1.73 M^2
EST. GFR  (NON AFRICAN AMERICAN): 24 ML/MIN/1.73 M^2
GABA-B-R AB, CBA, CSF: NEGATIVE
GAD65 AB CSF-SCNC: 0 NMOL/L
GFAP IFA, CSF: NEGATIVE
GLIAL NUC TYPE 1 AB TITR CSF: NEGATIVE TITER
GLUCOSE SERPL-MCNC: 197 MG/DL (ref 70–110)
HCT VFR BLD AUTO: 34 % (ref 40–54)
HGB BLD-MCNC: 11.6 G/DL (ref 14–18)
HU1 AB TITR CSF IF: NEGATIVE TITER
HU2 AB TITR CSF IF: NEGATIVE TITER
HU3 AB TITR CSF: NEGATIVE TITER
IGLON5 IFA, CSF: NEGATIVE
IMM GRANULOCYTES # BLD AUTO: 0.01 K/UL (ref 0–0.04)
IMM GRANULOCYTES NFR BLD AUTO: 0.1 % (ref 0–0.5)
IMMUNOLOGIST REVIEW: NORMAL
LGI1-IGG CBA,CSF: NEGATIVE
LYMPHOCYTES # BLD AUTO: 0.9 K/UL (ref 1–4.8)
LYMPHOCYTES NFR BLD: 12 % (ref 18–48)
MAGNESIUM SERPL-MCNC: 1.7 MG/DL (ref 1.6–2.6)
MCH RBC QN AUTO: 25 PG (ref 27–31)
MCHC RBC AUTO-ENTMCNC: 34.1 G/DL (ref 32–36)
MCV RBC AUTO: 73 FL (ref 82–98)
MGLUR1 AB IFA, CSF: NEGATIVE
MONOCYTES # BLD AUTO: 0.9 K/UL (ref 0.3–1)
MONOCYTES NFR BLD: 13.3 % (ref 4–15)
NEUTROPHILS # BLD AUTO: 5.1 K/UL (ref 1.8–7.7)
NEUTROPHILS NFR BLD: 72.3 % (ref 38–73)
NIF IFA, CSF: NEGATIVE
NMDAR1 AB, CBA, CSF: NEGATIVE
NRBC BLD-RTO: 0 /100 WBC
PCA-2 AB TITR CSF: NEGATIVE TITER
PCA-TR AB TITR CSF: NEGATIVE TITER
PHOSPHATE SERPL-MCNC: 2.7 MG/DL (ref 2.7–4.5)
PLATELET # BLD AUTO: 240 K/UL (ref 150–450)
PMV BLD AUTO: 11.1 FL (ref 9.2–12.9)
PNEOE REFLEX TEST ADDED: NORMAL
POCT GLUCOSE: 104 MG/DL (ref 70–110)
POCT GLUCOSE: 197 MG/DL (ref 70–110)
POCT GLUCOSE: 253 MG/DL (ref 70–110)
POTASSIUM SERPL-SCNC: 3.6 MMOL/L (ref 3.5–5.1)
PURKINJE CELLS AB TITR CSF IF: NEGATIVE TITER
RBC # BLD AUTO: 4.64 M/UL (ref 4.6–6.2)
SODIUM SERPL-SCNC: 134 MMOL/L (ref 136–145)
WBC # BLD AUTO: 7.07 K/UL (ref 3.9–12.7)

## 2022-05-23 PROCEDURE — 85025 COMPLETE CBC W/AUTO DIFF WBC: CPT | Performed by: HOSPITALIST

## 2022-05-23 PROCEDURE — 25000003 PHARM REV CODE 250: Performed by: INTERNAL MEDICINE

## 2022-05-23 PROCEDURE — 25000003 PHARM REV CODE 250: Performed by: HOSPITALIST

## 2022-05-23 PROCEDURE — 84100 ASSAY OF PHOSPHORUS: CPT | Performed by: HOSPITALIST

## 2022-05-23 PROCEDURE — 94761 N-INVAS EAR/PLS OXIMETRY MLT: CPT

## 2022-05-23 PROCEDURE — 20000000 HC ICU ROOM

## 2022-05-23 PROCEDURE — 83735 ASSAY OF MAGNESIUM: CPT | Performed by: HOSPITALIST

## 2022-05-23 PROCEDURE — 80048 BASIC METABOLIC PNL TOTAL CA: CPT | Performed by: HOSPITALIST

## 2022-05-23 PROCEDURE — 99900035 HC TECH TIME PER 15 MIN (STAT)

## 2022-05-23 RX ADMIN — ACETAMINOPHEN 1000 MG: 500 TABLET ORAL at 01:05

## 2022-05-23 RX ADMIN — FAMOTIDINE 20 MG: 10 INJECTION INTRAVENOUS at 08:05

## 2022-05-23 RX ADMIN — INSULIN ASPART 6 UNITS: 100 INJECTION, SOLUTION INTRAVENOUS; SUBCUTANEOUS at 01:05

## 2022-05-23 RX ADMIN — INSULIN ASPART 2 UNITS: 100 INJECTION, SOLUTION INTRAVENOUS; SUBCUTANEOUS at 05:05

## 2022-05-23 NOTE — PLAN OF CARE
Problem: Adult Inpatient Plan of Care  Goal: Plan of Care Review  Outcome: Ongoing, Progressing   Alert non verbal. CARLO SPARROW but not to command. Temp max 100.7 ax . Tylenol given. Continues to have wet productive cough. Drools. Started on Scopalamine patch. Tolerating tube feeds. Bath and linen change done. Safety measures in place.

## 2022-05-23 NOTE — ASSESSMENT & PLAN NOTE
Advance Care Planning     Code Status  In light of the patients advanced and life limiting illness,I engaged the the family in a conversation about the patient's preferences for care  at the very end of life. The patient wishes to have a natural, peaceful death.  Along those lines, the patient does not wish to have CPR or other invasive treatments performed when his heart and/or breathing stops. I communicated to the family that a DNR order would be placed in his medical record to reflect this preference.  Currently waiting for palliative medicine have further conversations with the patient

## 2022-05-23 NOTE — ASSESSMENT & PLAN NOTE
Patient's FSGs are controlled on current medication regimen.  Last A1c reviewed-   Lab Results   Component Value Date    HGBA1C 9.1 (H) 05/13/2022     Most recent fingerstick glucose reviewed-   Recent Labs   Lab 05/22/22  0358 05/22/22  1245 05/22/22  1819 05/22/22 2023   POCTGLUCOSE 245* 376* 155* 202*     Current correctional scale  Low  Maintain anti-hyperglycemic dose as follows-   Antihyperglycemics (From admission, onward)            Start     Stop Route Frequency Ordered    05/19/22 2100  insulin detemir U-100 pen 10 Units         -- SubQ 2 times daily 05/19/22 1121    05/17/22 1931  insulin aspart U-100 pen 1-10 Units         -- SubQ Every 4 hours PRN 05/17/22 1831        Hold Oral hypoglycemics while patient is in the hospital.

## 2022-05-23 NOTE — ASSESSMENT & PLAN NOTE
On antibiotics due to high suspicion.  Discussed with ID.  Pt needs to be 5 days off of Plavix before getting lumbar puncture.  Should be ok Monday, which is tomorrow; I re-ordered the procedure for that day.  In light of the worsening renal function, ID consultant discontinued the acyclovir.

## 2022-05-23 NOTE — PROGRESS NOTES
[facility] Hospital Medicine  Progress Note    Patient Name: Marshall Flor  MRN: [id number]  Patient Class: IP- Inpatient   Admission Date: 5/12/2022  Length of Stay: 10 days  Attending Physician: Obi Mustafa MD  Primary Care Provider: Dorie Quan MD        Subjective:     Principal Problem:CVA (cerebral vascular accident)        HPI:  66-year-old male with history of CHF, diabetes presents to [place] for decreased responsiveness found to have a fever, left gaze preference with nuchal rigidity found to have meningitis in addition to being found to have an acute CVA. Beyond this he has an MERCEDES.          Overview/Hospital Course:  66-year-old male with history of CHF, diabetes presents to [place] for decreased responsiveness ultimately becoming unresponsive, fever and left gaze preference found to have an acute CVA in addition to an MERCEDES.    The patient was started on aspirin rectally.  Over the 1st week the patient remained nonresponsive and would only withdrawal to pain.  One point he was found to have an intracranial hemorrhage which demonstrated stability thereafter on follow-up CT.  Neurology ultimately recommended resumption of aspirin.  Briefly the patient is neurologic status improved such that he was alert and following simple commands inconsistently.  On the morning of 05/21/2022 he was again unresponsive and so repeat CT was performed which showed mild worsening of the hemorrhage.  Follow-up CT the evening of 5/21 according to the neurologist's read was stable.  The patient will remain off aspirin and Plavix on subQ heparin at this point.    By the evening of 05/21/2022 the patient was again alert and following simple commands.    Beyond this initially there was concern the patient had meningitis.  He was started on broad-spectrum antibiotics.  Ultimately lumbar puncture was able to be performed and at that point meningitis was ruled out.    Course was complicated by the development  of a pneumonia which has been fully treated.    He presented also with an acute kidney injury; nephrology is following.  The patient has intermittently required dialysis with specific note that uremia would leave to cognitive depression.    Palliative care team has been consulted. Family continues to consider electing only comfort-driven measures.       Interval History:  Patient seen and examined.  No acute events overnight.  Continues to have wet productive cough, along with multiple secretions through his nose and mouth.  Unfortunately, patient remains completely nonresponsive.  Plan of care discussed with the patient's nurse as well as palliative medicine.    Review of Systems   Unable to perform ROS: Patient nonverbal   Objective:     Vital Signs (Most Recent):  Temp: 99.3 °F (37.4 °C) (05/22/22 2145)  Pulse: 100 (05/22/22 2145)  Resp: (!) 33 (05/22/22 2145)  BP: (!) 169/77 (05/22/22 2145)  SpO2: 98 % (05/22/22 2145)   Vital Signs (24h Range):  Temp:  [98.7 °F (37.1 °C)-101.1 °F (38.4 °C)] 99.3 °F (37.4 °C)  Pulse:  [] 100  Resp:  [14-71] 33  SpO2:  [94 %-100 %] 98 %  BP: (112-207)/() 169/77     Weight: 78.6 kg (173 lb 4.5 oz)  Body mass index is 27.14 kg/m².    Intake/Output Summary (Last 24 hours) at 5/22/2022 2157  Last data filed at 5/22/2022 2100  Gross per 24 hour   Intake 3399.59 ml   Output 2075 ml   Net 1324.59 ml      Physical Exam  Vitals and nursing note reviewed.   Constitutional:       General: He is not in acute distress.     Appearance: He is not ill-appearing.      Comments: Nonverbal, encephalopathic.   HENT:      Nose:      Comments: Nasogastric tube in place  Eyes:      Pupils: Pupils are equal, round, and reactive to light.   Neck:      Vascular: No JVD.   Cardiovascular:      Rate and Rhythm: Normal rate and regular rhythm.      Heart sounds: Normal heart sounds.   Pulmonary:      Effort: Pulmonary effort is normal.      Breath sounds: Normal breath sounds.   Abdominal:       General: Bowel sounds are normal. There is no distension.      Palpations: Abdomen is soft.      Tenderness: There is no abdominal tenderness.   Musculoskeletal:         General: No deformity.   Lymphadenopathy:      Cervical: No cervical adenopathy.   Skin:     Coloration: Skin is not pale.      Findings: No rash.   Neurological:      Mental Status: He is disoriented.      Comments: Encephalopathic, nonverbal at baseline.       Significant Labs: All pertinent labs within the past 24 hours have been reviewed.    Significant Imaging: I have reviewed all pertinent imaging results/findings within the past 24 hours.      Assessment/Plan:      * CVA (cerebral vascular accident)  Proven on MRI.  Old infarcts seen as well.  Neurologist consulting.  Getting aspirin suppositories.  NIHSS 22- very little hope for recovery.  Currently discussing with the family about potential palliative medicine and hospice.      Encephalopathy  Etiology likely related to devastating stroke.  Will monitor neuro exam.  Neurologist consulting.  Discussed with family about palliative care.      Goals of care, counseling/discussion  Advance Care Planning     Code Status  In light of the patients advanced and life limiting illness,I engaged the the family in a conversation about the patient's preferences for care  at the very end of life. The patient wishes to have a natural, peaceful death.  Along those lines, the patient does not wish to have CPR or other invasive treatments performed when his heart and/or breathing stops. I communicated to the family that a DNR order would be placed in his medical record to reflect this preference.  Currently waiting for palliative medicine have further conversations with the patient         MERCEDES (acute kidney injury)  New problem.  Nephrology group consulting, continue dialysis as needed  Monitor renal function and UOP.  Avoid NSAID's and hypotension.      Creatinine   Date Value Ref Range Status   05/22/2022  2.8 (H) 0.5 - 1.4 mg/dL Final   05/21/2022 3.1 (H) 0.5 - 1.4 mg/dL Final   05/20/2022 4.6 (H) 0.5 - 1.4 mg/dL Final   ]      Chronic combined systolic and diastolic CHF (congestive heart failure)  Patient is identified as having Combined Systolic and Diastolic heart failure that is Chronic. CHF is currently controlled. Latest ECHO performed and demonstrates- Results for orders placed during the hospital encounter of 05/12/22    Echo    Interpretation Summary  · The left ventricle is moderately enlarged with eccentric hypertrophy and severely decreased systolic function.  · Grade II left ventricular diastolic dysfunction.  · The estimated PA systolic pressure is 59 mmHg.  · Severe right ventricular enlargement with mildly to moderately reduced right ventricular systolic function.  · Moderate left atrial enlargement.  · There is moderate pulmonary hypertension.  · Intermediate central venous pressure (8 mmHg).  · Mild aortic regurgitation.  · Moderate right atrial enlargement.  · Mild to moderate tricuspid regurgitation.  · Mild pulmonic regurgitation.  · The estimated ejection fraction is 20%.  · Moderate mitral regurgitation.  . Patient NPO; not on oral meds.  Monitor clinical status closely. Monitor on telemetry. Patient is off CHF pathway.  Monitor strict Is&Os and daily weights.   Last BNP reviewed- and noted below   Recent Labs   Lab 05/11/22  2303   BNP 3,064*   .      Essential hypertension  Controlled.  Monitor pressure.  Patient is NPO; cannot take oral meds.      Coronary artery disease involving native coronary artery of native heart  Stable.  Getting aspirin suppositories.      Hyperlipidemia  Chronic.  Stable.      Type 2 diabetes mellitus with stage 4 chronic kidney disease, without long-term current use of insulin  Patient's FSGs are controlled on current medication regimen.  Last A1c reviewed-   Lab Results   Component Value Date    HGBA1C 9.1 (H) 05/13/2022     Most recent fingerstick glucose  reviewed-   Recent Labs   Lab 05/22/22  0358 05/22/22  1245 05/22/22  1819 05/22/22 2023   POCTGLUCOSE 245* 376* 155* 202*     Current correctional scale  Low  Maintain anti-hyperglycemic dose as follows-   Antihyperglycemics (From admission, onward)            Start     Stop Route Frequency Ordered    05/19/22 2100  insulin detemir U-100 pen 10 Units         -- SubQ 2 times daily 05/19/22 1121    05/17/22 1931  insulin aspart U-100 pen 1-10 Units         -- SubQ Every 4 hours PRN 05/17/22 1831        Hold Oral hypoglycemics while patient is in the hospital.        CKD (chronic kidney disease) stage 4, GFR 15-29 ml/min  Renal function is worse.  Monitor BMP.  Avoid NSAID's.    Creatinine   Date Value Ref Range Status   05/22/2022 2.8 (H) 0.5 - 1.4 mg/dL Final   05/21/2022 3.1 (H) 0.5 - 1.4 mg/dL Final           VTE Risk Mitigation (From admission, onward)         Ordered     heparin (porcine) injection 4,000 Units  As needed (PRN)         05/16/22 1841     IP VTE HIGH RISK PATIENT  Once         05/12/22 1351     Place sequential compression device  Until discontinued         05/12/22 1351     Place sequential compression device  Until discontinued         05/12/22 1052                Discharge Planning   STAR: 5/21/2022     Code Status: DNR   Is the patient medically ready for discharge?:     Reason for patient still in hospital (select all that apply): Treatment  Discharge Plan A: Skilled Nursing Facility            Critical care time spent on the evaluation and treatment of severe organ dysfunction, review of pertinent labs and imaging studies, discussions with consulting providers and discussions with patient/family: 36 minutes.      Obi Mustafa MD  Department of Hospital Medicine   [facility]

## 2022-05-23 NOTE — ASSESSMENT & PLAN NOTE
New problem.  Nephrology group consulting, continue dialysis as needed  Monitor renal function and UOP.  Avoid NSAID's and hypotension.      Creatinine   Date Value Ref Range Status   05/22/2022 2.8 (H) 0.5 - 1.4 mg/dL Final   05/21/2022 3.1 (H) 0.5 - 1.4 mg/dL Final   05/20/2022 4.6 (H) 0.5 - 1.4 mg/dL Final   ]

## 2022-05-23 NOTE — PLAN OF CARE
Awake, responds to verbal, will not obey commands, move arms spont, left greater than right, cough, room air, diminished lung sounds, ekg shows sr, tube feeding in use, no bm, ext cath, voids fine, trialysis cath in use, family to talk with dr. Mustafa.

## 2022-05-23 NOTE — ASSESSMENT & PLAN NOTE
Etiology likely related to devastating stroke.  Will monitor neuro exam.  Neurologist consulting.  Discussed with family about palliative care.

## 2022-05-23 NOTE — ASSESSMENT & PLAN NOTE
Renal function is worse.  Monitor BMP.  Avoid NSAID's.    Creatinine   Date Value Ref Range Status   05/22/2022 2.8 (H) 0.5 - 1.4 mg/dL Final   05/21/2022 3.1 (H) 0.5 - 1.4 mg/dL Final

## 2022-05-23 NOTE — ASSESSMENT & PLAN NOTE
Proven on MRI.  Old infarcts seen as well.  Neurologist consulting.  Getting aspirin suppositories.  NIHSS 22- very little hope for recovery.  Currently discussing with the family about potential palliative medicine and hospice.

## 2022-05-23 NOTE — CARE UPDATE
05/23/22 0750   Patient Assessment/Suction   Level of Consciousness (AVPU) responds to voice   Respiratory Effort Unlabored   PRE-TX-O2   O2 Device (Oxygen Therapy) room air   Pulse Oximetry Type Continuous   $ Pulse Oximetry - Multiple Charge Pulse Oximetry - Multiple   Preset CPAP/BiPAP Settings   Mode Of Delivery Standby

## 2022-05-23 NOTE — SUBJECTIVE & OBJECTIVE
Interval History:  Patient seen and examined.  No acute events overnight.  Continues to have wet productive cough, along with multiple secretions through his nose and mouth.  Unfortunately, patient remains completely nonresponsive.  Plan of care discussed with the patient's nurse as well as palliative medicine.    Review of Systems   Unable to perform ROS: Patient nonverbal   Objective:     Vital Signs (Most Recent):  Temp: 99.3 °F (37.4 °C) (05/22/22 2145)  Pulse: 100 (05/22/22 2145)  Resp: (!) 33 (05/22/22 2145)  BP: (!) 169/77 (05/22/22 2145)  SpO2: 98 % (05/22/22 2145)   Vital Signs (24h Range):  Temp:  [98.7 °F (37.1 °C)-101.1 °F (38.4 °C)] 99.3 °F (37.4 °C)  Pulse:  [] 100  Resp:  [14-71] 33  SpO2:  [94 %-100 %] 98 %  BP: (112-207)/() 169/77     Weight: 78.6 kg (173 lb 4.5 oz)  Body mass index is 27.14 kg/m².    Intake/Output Summary (Last 24 hours) at 5/22/2022 2157  Last data filed at 5/22/2022 2100  Gross per 24 hour   Intake 3399.59 ml   Output 2075 ml   Net 1324.59 ml      Physical Exam  Vitals and nursing note reviewed.   Constitutional:       General: He is not in acute distress.     Appearance: He is not ill-appearing.      Comments: Nonverbal, encephalopathic.   HENT:      Nose:      Comments: Nasogastric tube in place  Eyes:      Pupils: Pupils are equal, round, and reactive to light.   Neck:      Vascular: No JVD.   Cardiovascular:      Rate and Rhythm: Normal rate and regular rhythm.      Heart sounds: Normal heart sounds.   Pulmonary:      Effort: Pulmonary effort is normal.      Breath sounds: Normal breath sounds.   Abdominal:      General: Bowel sounds are normal. There is no distension.      Palpations: Abdomen is soft.      Tenderness: There is no abdominal tenderness.   Musculoskeletal:         General: No deformity.   Lymphadenopathy:      Cervical: No cervical adenopathy.   Skin:     Coloration: Skin is not pale.      Findings: No rash.   Neurological:      Mental Status: He is  disoriented.      Comments: Encephalopathic, nonverbal at baseline.       Significant Labs: All pertinent labs within the past 24 hours have been reviewed.    Significant Imaging: I have reviewed all pertinent imaging results/findings within the past 24 hours.

## 2022-05-23 NOTE — PROGRESS NOTES
Nephrology Progress Note        Patient Name: Marshall Flor  MRN: [id number]    Patient Class: IP- Inpatient   Admission Date: 5/12/2022  Length of Stay: 11 days  Date of Service: 5/23/2022    Attending Physician: Obi Mustafa MD  Primary Care Provider: Dorie Quan MD    Reason for Consult: dione/acidosis/sepsis/stroke/ckd3/fever/anemia/htn/chf    SUBJECTIVE:     HPI: 66-year-old male with history of HTN, CHF, diabetes presents to [facility] for decreased responsiveness, found to have a left gaze preference, then sent to [facility], where was found febrile, hypertensive. UA 3+ protein, 2+ glucose, WBC 3, moderate bacteria. Chest x-ray suggestive of pneumonia right upper lobe. MRI brain nonhemorrhagic infarction in the right anterolateral frontal lobe.Treated for sepsis ? PNA, ? Meningitis? and stroke.    5/14- BP all over the place, on 3L NC, UOP 481cc  5/15- BP still all over the place, on BIPAP, UOP 455cc- no labs drawn today and its after 1pm  5/16  Renal function continues to decline.  Mental status no better. Consent for dialysis obtained from patient's son yesterday by Dr. Busch.  I called to confirm and no answer (no voice mail available)  5/17  On bipap.  Not interactive.  Oliguric  5/18  S/p dialysis with 3 liter ultrafiltration.  Output not recorded last pm.  Not interactive  5/19     Not interactive. No distress  5/20.  Not interactive.  More interactive this am per report.  No distress.  325 cc UOP  5/21 VSS, plan HD PRN. Hospice? On TPN, good UO.  5/22  Still no decision from family re: comfort care.  Will replete electrolytes.  Scr 2.8, UOP 1.3L.  D/w bedside nurse.  5/23  2250 cc UOP.  Febrile episodes yesterday       Outpatient meds:  No current facility-administered medications on file prior to encounter.     Current Outpatient Medications on File Prior to Encounter   Medication Sig Dispense Refill    allopurinoL (ZYLOPRIM) 100 MG tablet   = 2 tab, Oral, Daily, # 60 tab, 5 Refill(s),  Pharmacy: [id number], 167, cm, 07/27/21 8:24:00 CDT, Height/Length Measured, 91.5, kg, 12/15/20 11:18:00 CST, Weight Dosing      amLODIPine (NORVASC) 10 MG tablet 10 mg.      aspirin (ECOTRIN) 81 MG EC tablet Take 1 tablet (81 mg total) by mouth once daily. 30 tablet 3    atorvastatin (LIPITOR) 40 MG tablet   = 1 tab, Oral, Daily, # 90 tab, 1 Refill(s), Soft Stop, Pharmacy: [id number], 167, cm, 04/07/21 14:01:00 CDT, Height/Length Measured, 91.5, kg, 12/15/20 11:18:00 CST, Weight Dosing      carvediloL (COREG) 3.125 MG tablet Take 1 tablet (3.125 mg total) by mouth once daily. 30 tablet 1    cloNIDine (CATAPRES) 0.1 MG tablet Take 2 tablets (0.2 mg total) by mouth 3 (three) times daily. 180 tablet 11    clopidogreL (PLAVIX) 75 mg tablet Take 75 mg by mouth once daily.      furosemide (LASIX) 20 MG tablet Take 1 tablet (20 mg total) by mouth once daily. 30 tablet 1    hydrALAZINE (APRESOLINE) 50 MG tablet Take 1 tablet (50 mg total) by mouth every 12 (twelve) hours. 60 tablet 0    linaGLIPtin (TRADJENTA) 5 mg Tab tablet 5 mg.      NOVOLOG MIX 70-30FLEXPEN U-100 100 unit/mL (70-30) InPn pen Inject into the skin.      sodium bicarbonate 650 MG tablet Take 1 tablet (650 mg total) by mouth 2 (two) times daily. 60 tablet 11       Scheduled meds:   cloNIDine 0.2 mg/24 hr td ptwk  1 patch Transdermal Q7 Days    famotidine (PF)  20 mg Intravenous Daily    insulin detemir U-100  10 Units Subcutaneous BID    scopolamine  1 patch Transdermal Q3 Days       Infusions:      PRN meds:  acetaminophen, bisacodyL, dextrose 10%, dextrose 10%, glucagon (human recombinant), glucose, glucose, heparin (porcine), hydrALAZINE, insulin aspart U-100, labetaloL, morphine, naloxone, ondansetron, oxyCODONE, polyethylene glycol, sodium chloride 0.9%    Review of Systems:  Neg    OBJECTIVE:     Vital Signs and IO (Last 24H):  Temp:  [99 °F (37.2 °C)-101.1 °F (38.4 °C)]   Pulse:  []   Resp:  [15-71]   BP:  (115-207)/()   SpO2:  [94 %-100 %]   I/O last 3 completed shifts:  In: 4123.6 [I.V.:39.8; NG/GT:2331]  Out: 3000 [Urine:3000]    Wt Readings from Last 5 Encounters:   05/23/22 79.2 kg (174 lb 9.7 oz)   05/11/22 87.5 kg (193 lb)   05/12/22 87.5 kg (192 lb 14.4 oz)   04/26/22 95.3 kg (210 lb)   02/02/22 91.1 kg (200 lb 13.4 oz)     Physical Exam:  Constitutional:  No apparent distress  Heart: rrr, no m/r/g, wwp, no edema  Lungs: coarse, no w/r/r/c, no lb  Abdomen: s/nt/nd, +BS    Body mass index is 27.35 kg/m².    Laboratory:  Recent Labs   Lab 05/21/22  0441 05/22/22  0600 05/23/22  0524   * 133* 134*   K 3.5 3.2* 3.6    98 101   CO2 21* 21* 21*   BUN 41* 56* 63*   CREATININE 3.1* 2.8* 2.7*   ESTGFRAFRICA 23* 26* 28*   EGFRNONAA 20* 23* 24*   * 181* 197*       Recent Labs   Lab 05/19/22  0406 05/20/22  0356 05/21/22  0441 05/22/22  0600 05/23/22  0524   CALCIUM 8.3*   < > 8.5* 8.5* 8.8   ALBUMIN 2.3*  --  2.4* 2.4*  --    PHOS 2.0*   < > 3.3 2.5* 2.7   MG 1.8   < > 1.5* 1.8 1.7    < > = values in this interval not displayed.       Recent Labs   Lab 04/12/21  0952   PTH, Intact 273.0 H       Recent Labs   Lab 05/21/22  1344 05/21/22  1913 05/21/22 2021 05/21/22  2337 05/22/22  0358 05/22/22  1245 05/22/22  1819 05/22/22 2023 05/22/22  2309 05/23/22  0512   POCTGLUCOSE 294* 252* 213* 246* 245* 376* 155* 202* 226* 197*       Recent Labs   Lab 04/12/21  0952 05/12/22  1210 05/13/22  0341   Hemoglobin A1C 7.8 H 8.8 H 9.1 H       Recent Labs   Lab 05/21/22  0441 05/22/22  0600 05/23/22  0524   WBC 5.32 6.38 7.07   HGB 12.0* 11.2* 11.6*   HCT 36.1* 34.1* 34.0*    236 240   MCV 74* 75* 73*   MCHC 33.2 32.8 34.1   MONO 10.7  0.6 13.0  0.8 13.3  0.9       Recent Labs   Lab 05/19/22  0406 05/21/22  0441 05/22/22  0600   BILITOT 0.3 0.3 0.3   PROT 6.5 6.6 6.5   ALBUMIN 2.3* 2.4* 2.4*   ALKPHOS 71 78 74   ALT 31 29 42   AST 14 25 31       Recent Labs   Lab 05/11/22  2300 05/12/22  1400  05/21/22  2000   Color, UA Yellow Yellow Yellow   Appearance, UA Clear Clear Clear   pH, UA 6.0 6.0 6.0   Specific [place], UA 1.020 1.020 1.025   Protein, UA 3+ A 3+ A 2+ A   Glucose, UA 2+ A 3+ A 2+ A   Ketones, UA Negative Negative Negative   Urobilinogen, UA Negative Negative Negative   Bilirubin (UA) Negative Negative Negative   Occult Blood UA 2+ A 2+ A 2+ A   Nitrite, UA Negative Negative Negative   RBC, UA 10 H 20 H 87 H   WBC, UA 3 6 H 3   Bacteria Moderate A Few A Occasional   Hyaline Casts, UA 1 0 0       Recent Labs   Lab 05/12/22 2055 05/16/22  1525 05/17/22  1102   POC PH 7.417 7.290 LL 7.381   POC PCO2 31.1 L 34.7 L 41.6   POC HCO3 20.0 L 16.7 L 24.7   POC PO2 84 161 H 102 H   POC SATURATED O2 97 99 98   POC BE -5 -10 0   Sample ARTERIAL ARTERIAL ARTERIAL       Microbiology Results (last 7 days)     Procedure Component Value Units Date/Time    CSF culture [[id number]] Collected: 05/16/22 1404    Order Status: Completed Specimen: CSF (Spinal Fluid) from CSF Tap, Tube 2 Updated: 05/23/22 0734     CSF CULTURE No Growth to date     Gram Stain Result No WBC's, epithelial cells or organisms seen    Blood culture [[id number]] Collected: 05/21/22 1223    Order Status: Completed Specimen: Blood from Antecubital, Right Arm Updated: 05/22/22 1612     Blood Culture, Routine No Growth to date      No Growth to date    Blood culture [[id number]] Collected: 05/13/22 2007    Order Status: Completed Specimen: Blood from Peripheral, Left Arm Updated: 05/19/22 0612     Blood Culture, Routine No growth after 5 days.    Narrative:      Collection has been rescheduled by ACD at 05/13/2022 15:13 Reason:   Unable to collect, no place to stick pt  Collection has been rescheduled by ACD at 05/13/2022 15:13 Reason:   Unable to collect, no place to stick pt    Culture, MRSA [[id number]] Collected: 05/15/22 2300    Order Status: Completed Specimen: MRSA source from Nares, Left Updated: 05/18/22 0943     MRSA Surveillance Screen No  MRSA isolated    Narrative:      Both nares, thank you    Cryptococcal antigen, CSF [[id number]] Collected: 05/16/22 1404    Order Status: Completed Specimen: CSF (Spinal Fluid) from CSF Tap, Tube 2 Updated: 05/17/22 0922     Crypto Ag, CSF Negative    Direct AFB stain [[id number]] Collected: 05/16/22 1404    Order Status: Completed Specimen: CSF (Spinal Fluid) from CSF Tap, Tube 2 Updated: 05/17/22 0843     Direct Acid Fast No acid fast bacilli seen.    AFB Culture & Smear [[id number]] Collected: 05/13/22 1508    Order Status: Completed Specimen: CSF (Spinal Fluid) from Nares, Right Updated: 05/16/22 1446     AFB Culture & Smear Culture in progress     AFB CULTURE STAIN No acid fast bacilli seen.    Gram stain [[id number]] Collected: 05/16/22 1404    Order Status: Canceled Specimen: CSF (Spinal Fluid) from CSF Tap, Tube 2         ASSESSMENT/PLAN:     Non-oliguric MERCEDES due to ATN; baseline CKD III; dialysis started 5/16  - initiated hemodialysis for uremic clearance on 5/16, PRN for now  - renal function improving.  No hd needed today    HTN/Acute CVA/CHF  - off cardene gtt but may need to be resumed since not able to get any PO meds    Hypernatremia--better  Hypokalemia--better  HypoMg--better  Acidosis--better after dialysis  Hypokalemia--replete    Secondary HPT  - no active issues    Anemia of CKD  - stable    Thank you for allowing us to participate in the care of your patient!   We will follow the patient and provide recommendations as needed.    Pt's nephew is at the bedside.  All of his questions were answered to his apparent satisfaction     Patient care time was spent personally by me on the following activities: >  min  · Obtaining a history.  · Examination of patient.  · Providing medical care at the patients bedside.  · Developing a treatment plan with patient or surrogate and bedside caregivers.  · Ordering and reviewing laboratory studies, radiographic studies, pulse oximetry.  · Ordering and performing  treatments and interventions.  · Evaluation of patient's response to treatment.  · Discussions with consultants while on the unit and immediately available to the patient.  · Re-evaluation of the patient's condition.  · Documentation in the medical record.     Ariel Little MD      [facility]  [address] [place] [address] [place] [address]    [phone] - tel  [phone] - fax    5/23/2022

## 2022-05-23 NOTE — PLAN OF CARE
Neuro unchanged. Opens eyes and tracks at times. Able to move all exts but not to command. Copious oral secretions, drooling and frequent cough. NGT intact. Tolerating TF at goal rate of 45cc hour. Condom cath in use. Good urine output. Rotation bed on. Safety maintained.   Potassium and Phos replaced today. Repeat labs in am.   No visitors today. Awaiting family decisions regarding plan of care.

## 2022-05-24 LAB
BACTERIA CSF CULT: NO GROWTH
GRAM STN SPEC: NORMAL
POCT GLUCOSE: 100 MG/DL (ref 70–110)
POCT GLUCOSE: 178 MG/DL (ref 70–110)
POCT GLUCOSE: 215 MG/DL (ref 70–110)
POCT GLUCOSE: 250 MG/DL (ref 70–110)
POCT GLUCOSE: 286 MG/DL (ref 70–110)

## 2022-05-24 PROCEDURE — 12000002 HC ACUTE/MED SURGE SEMI-PRIVATE ROOM

## 2022-05-24 PROCEDURE — 27000923 HC TRIALYSIS CATHETER, ANY SIZE

## 2022-05-24 PROCEDURE — 63600175 PHARM REV CODE 636 W HCPCS: Performed by: INTERNAL MEDICINE

## 2022-05-24 PROCEDURE — 25000003 PHARM REV CODE 250: Performed by: HOSPITALIST

## 2022-05-24 PROCEDURE — 25000003 PHARM REV CODE 250: Performed by: INTERNAL MEDICINE

## 2022-05-24 PROCEDURE — 94761 N-INVAS EAR/PLS OXIMETRY MLT: CPT

## 2022-05-24 RX ORDER — INSULIN ASPART 100 [IU]/ML
1-10 INJECTION, SOLUTION INTRAVENOUS; SUBCUTANEOUS EVERY 6 HOURS PRN
Status: DISCONTINUED | OUTPATIENT
Start: 2022-05-24 | End: 2022-05-26 | Stop reason: HOSPADM

## 2022-05-24 RX ADMIN — ACETAMINOPHEN 1000 MG: 500 TABLET ORAL at 12:05

## 2022-05-24 RX ADMIN — INSULIN ASPART 6 UNITS: 100 INJECTION, SOLUTION INTRAVENOUS; SUBCUTANEOUS at 11:05

## 2022-05-24 RX ADMIN — HYDRALAZINE HYDROCHLORIDE 10 MG: 20 INJECTION, SOLUTION INTRAMUSCULAR; INTRAVENOUS at 12:05

## 2022-05-24 RX ADMIN — ACETAMINOPHEN 1000 MG: 500 TABLET ORAL at 04:05

## 2022-05-24 RX ADMIN — INSULIN ASPART 4 UNITS: 100 INJECTION, SOLUTION INTRAVENOUS; SUBCUTANEOUS at 06:05

## 2022-05-24 RX ADMIN — INSULIN ASPART 4 UNITS: 100 INJECTION, SOLUTION INTRAVENOUS; SUBCUTANEOUS at 04:05

## 2022-05-24 RX ADMIN — FAMOTIDINE 20 MG: 10 INJECTION INTRAVENOUS at 08:05

## 2022-05-24 RX ADMIN — LABETALOL HYDROCHLORIDE 10 MG: 5 INJECTION INTRAVENOUS at 08:05

## 2022-05-24 NOTE — PROGRESS NOTES
[facility] Hospital Medicine  Progress Note    Patient Name: Marshall Flor  MRN: [id number]  Patient Class: IP- Inpatient   Admission Date: 5/12/2022  Length of Stay: 12 days  Attending Physician: Obi Mustafa MD  Primary Care Provider: Dorie Quan MD        Subjective:     Principal Problem:CVA (cerebral vascular accident)        HPI:  66-year-old male with history of CHF, diabetes presents to [facility] for decreased responsiveness found to have a fever, left gaze preference with nuchal rigidity found to have meningitis in addition to being found to have an acute CVA. Beyond this he has an MERCEDES.          Overview/Hospital Course:  66-year-old male with history of CHF, diabetes presents to [facility] for decreased responsiveness ultimately becoming unresponsive, fever and left gaze preference found to have an acute CVA in addition to an EMRCEDES.    The patient was started on aspirin rectally.  Over the 1st week the patient remained nonresponsive and would only withdrawal to pain.  One point he was found to have an intracranial hemorrhage which demonstrated stability thereafter on follow-up CT.  Neurology ultimately recommended resumption of aspirin.  Briefly the patient is neurologic status improved such that he was alert and following simple commands inconsistently.  On the morning of 05/21/2022 he was again unresponsive and so repeat CT was performed which showed mild worsening of the hemorrhage.  Follow-up CT the evening of 5/21 according to the neurologist's read was stable.  The patient will remain off aspirin and Plavix on subQ heparin at this point.    By the evening of 05/21/2022 the patient was again alert and following simple commands.    Beyond this initially there was concern the patient had meningitis.  He was started on broad-spectrum antibiotics.  Ultimately lumbar puncture was able to be performed and at that point meningitis was ruled out.    Course was complicated by the development  of a pneumonia which has been fully treated.    He presented also with an acute kidney injury; nephrology is following.  The patient has intermittently required dialysis with specific note that uremia would leave to cognitive depression.    Palliative care team has been consulted. Family continues to consider electing only comfort-driven measures.       Interval History:  Patient seen and examined.  No acute events overnight.  Continues to have wet productive cough, along with multiple secretions through his nose and mouth.  Family meeting set up for tomorrow    Review of Systems   Unable to perform ROS: Patient nonverbal   Objective:     Vital Signs (Most Recent):  Temp: 100.2 °F (37.9 °C) (05/24/22 1146)  Pulse: (!) 113 (05/24/22 0600)  Resp: (!) 42 (05/24/22 0600)  BP: (!) 192/91 (05/24/22 0810)  SpO2: 97 % (05/24/22 0600)   Vital Signs (24h Range):  Temp:  [99.1 °F (37.3 °C)-101 °F (38.3 °C)] 100.2 °F (37.9 °C)  Pulse:  [] 113  Resp:  [22-51] 42  SpO2:  [96 %-100 %] 97 %  BP: (123-192)/(62-91) 192/91     Weight: 78.9 kg (173 lb 15.1 oz)  Body mass index is 27.24 kg/m².    Intake/Output Summary (Last 24 hours) at 5/24/2022 1402  Last data filed at 5/24/2022 0600  Gross per 24 hour   Intake 1610 ml   Output 925 ml   Net 685 ml        Physical Exam  Vitals and nursing note reviewed.   Constitutional:       General: He is not in acute distress.     Appearance: He is not ill-appearing.      Comments: Nonverbal, encephalopathic.   HENT:      Nose:      Comments: Nasogastric tube in place  Eyes:      Pupils: Pupils are equal, round, and reactive to light.   Neck:      Vascular: No JVD.   Cardiovascular:      Rate and Rhythm: Normal rate and regular rhythm.      Heart sounds: Normal heart sounds.   Pulmonary:      Effort: Pulmonary effort is normal.      Breath sounds: Normal breath sounds.   Abdominal:      General: Bowel sounds are normal. There is no distension.      Palpations: Abdomen is soft.       Tenderness: There is no abdominal tenderness.   Musculoskeletal:         General: No deformity.   Lymphadenopathy:      Cervical: No cervical adenopathy.   Skin:     Coloration: Skin is not pale.      Findings: No rash.   Neurological:      Mental Status: He is disoriented.      Comments: Encephalopathic, nonverbal at baseline.       Significant Labs: All pertinent labs within the past 24 hours have been reviewed.    Significant Imaging: I have reviewed all pertinent imaging results/findings within the past 24 hours.      Assessment/Plan:      * CVA (cerebral vascular accident)  Proven on MRI.  Old infarcts seen as well.  Neurologist consulting.  Getting aspirin suppositories.  NIHSS 22- very little hope for recovery.  Currently discussing with the family about potential palliative medicine and hospice.      Encephalopathy  Etiology likely related to devastating stroke.  Will monitor neuro exam.  Neurologist consulting.  Discussed with family about palliative care.      Abrasion, nose w/o infection        Goals of care, counseling/discussion  Advance Care Planning     Code Status  In light of the patients advanced and life limiting illness,I engaged the the family in a conversation about the patient's preferences for care  at the very end of life. The patient wishes to have a natural, peaceful death.  Along those lines, the patient does not wish to have CPR or other invasive treatments performed when his heart and/or breathing stops. I communicated to the family that a DNR order would be placed in his medical record to reflect this preference.  Currently waiting for palliative medicine have further conversations with the patient         MERCEDES (acute kidney injury)  New problem.  Nephrology group consulting, continue dialysis as needed  Monitor renal function and UOP.  Avoid NSAID's and hypotension.      Creatinine   Date Value Ref Range Status   05/23/2022 2.7 (H) 0.5 - 1.4 mg/dL Final   05/22/2022 2.8 (H) 0.5 - 1.4  mg/dL Final   05/21/2022 3.1 (H) 0.5 - 1.4 mg/dL Final         Chronic combined systolic and diastolic CHF (congestive heart failure)  Patient is identified as having Combined Systolic and Diastolic heart failure that is Chronic. CHF is currently controlled. Latest ECHO performed and demonstrates- Results for orders placed during the hospital encounter of 05/12/22    Echo    Interpretation Summary  · The left ventricle is moderately enlarged with eccentric hypertrophy and severely decreased systolic function.  · Grade II left ventricular diastolic dysfunction.  · The estimated PA systolic pressure is 59 mmHg.  · Severe right ventricular enlargement with mildly to moderately reduced right ventricular systolic function.  · Moderate left atrial enlargement.  · There is moderate pulmonary hypertension.  · Intermediate central venous pressure (8 mmHg).  · Mild aortic regurgitation.  · Moderate right atrial enlargement.  · Mild to moderate tricuspid regurgitation.  · Mild pulmonic regurgitation.  · The estimated ejection fraction is 20%.  · Moderate mitral regurgitation.  . Patient NPO; not on oral meds.  Monitor clinical status closely. Monitor on telemetry. Patient is off CHF pathway.  Monitor strict Is&Os and daily weights.   Last BNP reviewed- and noted below   No results for input(s): BNP, BNPTRIAGEBLO in the last 168 hours..      Essential hypertension  Controlled.  Monitor pressure.  Patient is NPO; cannot take oral meds.      Coronary artery disease involving native coronary artery of native heart  Stable.  Getting aspirin suppositories.      Hyperlipidemia  Chronic.  Stable.      Type 2 diabetes mellitus with stage 4 chronic kidney disease, without long-term current use of insulin  Patient's FSGs are controlled on current medication regimen.  Last A1c reviewed-   Lab Results   Component Value Date    HGBA1C 9.1 (H) 05/13/2022     Most recent fingerstick glucose reviewed-   Recent Labs   Lab 05/23/22  1701  05/23/22  2348 05/24/22  0558 05/24/22  1134   POCTGLUCOSE 104 178* 250* 286*     Current correctional scale  Low  Maintain anti-hyperglycemic dose as follows-   Antihyperglycemics (From admission, onward)            Start     Stop Route Frequency Ordered    05/19/22 2100  insulin detemir U-100 pen 10 Units         -- SubQ 2 times daily 05/19/22 1121    05/17/22 1931  insulin aspart U-100 pen 1-10 Units         -- SubQ Every 4 hours PRN 05/17/22 1831        Hold Oral hypoglycemics while patient is in the hospital.        CKD (chronic kidney disease) stage 4, GFR 15-29 ml/min  Renal function is worse.  Monitor BMP.  Avoid NSAID's.    Creatinine   Date Value Ref Range Status   05/23/2022 2.7 (H) 0.5 - 1.4 mg/dL Final   05/22/2022 2.8 (H) 0.5 - 1.4 mg/dL Final           VTE Risk Mitigation (From admission, onward)         Ordered     heparin (porcine) injection 4,000 Units  As needed (PRN)         05/16/22 1841     IP VTE HIGH RISK PATIENT  Once         05/12/22 1351     Place sequential compression device  Until discontinued         05/12/22 1351     Place sequential compression device  Until discontinued         05/12/22 1052                Discharge Planning   STAR: 5/26/2022     Code Status: DNR   Is the patient medically ready for discharge?:     Reason for patient still in hospital (select all that apply): Treatment  Discharge Plan A: Skilled Nursing Facility              Obi Mustafa MD  Department of Hospital Medicine   [facility]

## 2022-05-24 NOTE — SUBJECTIVE & OBJECTIVE
Interval History:  Patient seen and examined.  No acute events overnight.  Continues to have wet productive cough, along with multiple secretions through his nose and mouth.  Family meeting set up for tomorrow    Review of Systems   Unable to perform ROS: Patient nonverbal   Objective:     Vital Signs (Most Recent):  Temp: 100.2 °F (37.9 °C) (05/24/22 1146)  Pulse: (!) 113 (05/24/22 0600)  Resp: (!) 42 (05/24/22 0600)  BP: (!) 192/91 (05/24/22 0810)  SpO2: 97 % (05/24/22 0600)   Vital Signs (24h Range):  Temp:  [99.1 °F (37.3 °C)-101 °F (38.3 °C)] 100.2 °F (37.9 °C)  Pulse:  [] 113  Resp:  [22-51] 42  SpO2:  [96 %-100 %] 97 %  BP: (123-192)/(62-91) 192/91     Weight: 78.9 kg (173 lb 15.1 oz)  Body mass index is 27.24 kg/m².    Intake/Output Summary (Last 24 hours) at 5/24/2022 1402  Last data filed at 5/24/2022 0600  Gross per 24 hour   Intake 1610 ml   Output 925 ml   Net 685 ml        Physical Exam  Vitals and nursing note reviewed.   Constitutional:       General: He is not in acute distress.     Appearance: He is not ill-appearing.      Comments: Nonverbal, encephalopathic.   HENT:      Nose:      Comments: Nasogastric tube in place  Eyes:      Pupils: Pupils are equal, round, and reactive to light.   Neck:      Vascular: No JVD.   Cardiovascular:      Rate and Rhythm: Normal rate and regular rhythm.      Heart sounds: Normal heart sounds.   Pulmonary:      Effort: Pulmonary effort is normal.      Breath sounds: Normal breath sounds.   Abdominal:      General: Bowel sounds are normal. There is no distension.      Palpations: Abdomen is soft.      Tenderness: There is no abdominal tenderness.   Musculoskeletal:         General: No deformity.   Lymphadenopathy:      Cervical: No cervical adenopathy.   Skin:     Coloration: Skin is not pale.      Findings: No rash.   Neurological:      Mental Status: He is disoriented.      Comments: Encephalopathic, nonverbal at baseline.       Significant Labs: All  pertinent labs within the past 24 hours have been reviewed.    Significant Imaging: I have reviewed all pertinent imaging results/findings within the past 24 hours.

## 2022-05-24 NOTE — PLAN OF CARE
FLORINA spoke with Cherelle, Palliative Nurse, regarding Dr. Mustafa request for Family mtg. She stated she would arrange mtg for tomorrow with entire team and family.

## 2022-05-24 NOTE — CARE UPDATE
05/24/22 0753   Patient Assessment/Suction   Level of Consciousness (AVPU) responds to voice   Respiratory Effort Unlabored   All Lung Fields Breath Sounds diminished   Rhythm/Pattern, Respiratory unlabored   PRE-TX-O2   O2 Device (Oxygen Therapy) room air   Pulse Oximetry Type Continuous   $ Pulse Oximetry - Multiple Charge Pulse Oximetry - Multiple   Preset CPAP/BiPAP Settings   Mode Of Delivery Standby

## 2022-05-24 NOTE — ASSESSMENT & PLAN NOTE
Renal function is worse.  Monitor BMP.  Avoid NSAID's.    Creatinine   Date Value Ref Range Status   05/23/2022 2.7 (H) 0.5 - 1.4 mg/dL Final   05/22/2022 2.8 (H) 0.5 - 1.4 mg/dL Final

## 2022-05-24 NOTE — PROGRESS NOTES
[facility] Hospital Medicine  Progress Note    Patient Name: Marshall Flor  MRN: [id number]  Patient Class: IP- Inpatient   Admission Date: 5/12/2022  Length of Stay: 11 days  Attending Physician: Obi Mustafa MD  Primary Care Provider: Dorie Quan MD        Subjective:     Principal Problem:CVA (cerebral vascular accident)        HPI:  66-year-old male with history of CHF, diabetes presents to [place] for decreased responsiveness found to have a fever, left gaze preference with nuchal rigidity found to have meningitis in addition to being found to have an acute CVA. Beyond this he has an MERCEDES.          Overview/Hospital Course:  66-year-old male with history of CHF, diabetes presents to [place] for decreased responsiveness ultimately becoming unresponsive, fever and left gaze preference found to have an acute CVA in addition to an MERCEDES.    The patient was started on aspirin rectally.  Over the 1st week the patient remained nonresponsive and would only withdrawal to pain.  One point he was found to have an intracranial hemorrhage which demonstrated stability thereafter on follow-up CT.  Neurology ultimately recommended resumption of aspirin.  Briefly the patient is neurologic status improved such that he was alert and following simple commands inconsistently.  On the morning of 05/21/2022 he was again unresponsive and so repeat CT was performed which showed mild worsening of the hemorrhage.  Follow-up CT the evening of 5/21 according to the neurologist's read was stable.  The patient will remain off aspirin and Plavix on subQ heparin at this point.    By the evening of 05/21/2022 the patient was again alert and following simple commands.    Beyond this initially there was concern the patient had meningitis.  He was started on broad-spectrum antibiotics.  Ultimately lumbar puncture was able to be performed and at that point meningitis was ruled out.    Course was complicated by the development  of a pneumonia which has been fully treated.    He presented also with an acute kidney injury; nephrology is following.  The patient has intermittently required dialysis with specific note that uremia would leave to cognitive depression.    Palliative care team has been consulted. Family continues to consider electing only comfort-driven measures.       Interval History:  Patient seen and examined.  No acute events overnight.  Continues to have wet productive cough, along with multiple secretions through his nose and mouth.  Plan of care discussed with the patient's nephew.  He is not ready to transition the patient to comfort care currently, but should the patient start to have worsening disease progression, would be open to considering that.    Review of Systems   Unable to perform ROS: Patient nonverbal   Objective:     Vital Signs (Most Recent):  Temp: 99.2 °F (37.3 °C) (05/23/22 1800)  Pulse: 84 (05/23/22 1937)  Resp: (!) 23 (05/23/22 1937)  BP: 139/63 (05/23/22 1800)  SpO2: 100 % (05/23/22 1937)   Vital Signs (24h Range):  Temp:  [99 °F (37.2 °C)-101.2 °F (38.4 °C)] 99.2 °F (37.3 °C)  Pulse:  [] 84  Resp:  [15-48] 23  SpO2:  [94 %-100 %] 100 %  BP: (115-183)/(55-95) 139/63     Weight: 79.2 kg (174 lb 9.7 oz)  Body mass index is 27.35 kg/m².    Intake/Output Summary (Last 24 hours) at 5/23/2022 2103  Last data filed at 5/23/2022 1800  Gross per 24 hour   Intake 1464 ml   Output 1150 ml   Net 314 ml        Physical Exam  Vitals and nursing note reviewed.   Constitutional:       General: He is not in acute distress.     Appearance: He is not ill-appearing.      Comments: Nonverbal, encephalopathic.   HENT:      Nose:      Comments: Nasogastric tube in place  Eyes:      Pupils: Pupils are equal, round, and reactive to light.   Neck:      Vascular: No JVD.   Cardiovascular:      Rate and Rhythm: Normal rate and regular rhythm.      Heart sounds: Normal heart sounds.   Pulmonary:      Effort: Pulmonary effort  is normal.      Breath sounds: Normal breath sounds.   Abdominal:      General: Bowel sounds are normal. There is no distension.      Palpations: Abdomen is soft.      Tenderness: There is no abdominal tenderness.   Musculoskeletal:         General: No deformity.   Lymphadenopathy:      Cervical: No cervical adenopathy.   Skin:     Coloration: Skin is not pale.      Findings: No rash.   Neurological:      Mental Status: He is disoriented.      Comments: Encephalopathic, nonverbal at baseline.       Significant Labs: All pertinent labs within the past 24 hours have been reviewed.    Significant Imaging: I have reviewed all pertinent imaging results/findings within the past 24 hours.      Assessment/Plan:      * CVA (cerebral vascular accident)  Proven on MRI.  Old infarcts seen as well.  Neurologist consulting.  Getting aspirin suppositories.  NIHSS 22- very little hope for recovery.  Currently discussing with the family about potential palliative medicine and hospice.      Encephalopathy  Etiology likely related to devastating stroke.  Will monitor neuro exam.  Neurologist consulting.  Discussed with family about palliative care.      Abrasion, nose w/o infection        Goals of care, counseling/discussion  Advance Care Planning     Code Status  In light of the patients advanced and life limiting illness,I engaged the the family in a conversation about the patient's preferences for care  at the very end of life. The patient wishes to have a natural, peaceful death.  Along those lines, the patient does not wish to have CPR or other invasive treatments performed when his heart and/or breathing stops. I communicated to the family that a DNR order would be placed in his medical record to reflect this preference.  Currently waiting for palliative medicine have further conversations with the patient         MERCEDES (acute kidney injury)  New problem.  Nephrology group consulting, continue dialysis as needed  Monitor renal  function and UOP.  Avoid NSAID's and hypotension.      Creatinine   Date Value Ref Range Status   05/23/2022 2.7 (H) 0.5 - 1.4 mg/dL Final   05/22/2022 2.8 (H) 0.5 - 1.4 mg/dL Final   05/21/2022 3.1 (H) 0.5 - 1.4 mg/dL Final   ]      Chronic combined systolic and diastolic CHF (congestive heart failure)  Patient is identified as having Combined Systolic and Diastolic heart failure that is Chronic. CHF is currently controlled. Latest ECHO performed and demonstrates- Results for orders placed during the hospital encounter of 05/12/22    Echo    Interpretation Summary  · The left ventricle is moderately enlarged with eccentric hypertrophy and severely decreased systolic function.  · Grade II left ventricular diastolic dysfunction.  · The estimated PA systolic pressure is 59 mmHg.  · Severe right ventricular enlargement with mildly to moderately reduced right ventricular systolic function.  · Moderate left atrial enlargement.  · There is moderate pulmonary hypertension.  · Intermediate central venous pressure (8 mmHg).  · Mild aortic regurgitation.  · Moderate right atrial enlargement.  · Mild to moderate tricuspid regurgitation.  · Mild pulmonic regurgitation.  · The estimated ejection fraction is 20%.  · Moderate mitral regurgitation.  . Patient NPO; not on oral meds.  Monitor clinical status closely. Monitor on telemetry. Patient is off CHF pathway.  Monitor strict Is&Os and daily weights.   Last BNP reviewed- and noted below   No results for input(s): BNP, BNPTRIAGEBLO in the last 168 hours..      Essential hypertension  Controlled.  Monitor pressure.  Patient is NPO; cannot take oral meds.      Coronary artery disease involving native coronary artery of native heart  Stable.  Getting aspirin suppositories.      Hyperlipidemia  Chronic.  Stable.      Type 2 diabetes mellitus with stage 4 chronic kidney disease, without long-term current use of insulin  Patient's FSGs are controlled on current medication  regimen.  Last A1c reviewed-   Lab Results   Component Value Date    HGBA1C 9.1 (H) 05/13/2022     Most recent fingerstick glucose reviewed-   Recent Labs   Lab 05/22/22  2309 05/23/22  0512 05/23/22  1325 05/23/22  1701   POCTGLUCOSE 226* 197* 253* 104     Current correctional scale  Low  Maintain anti-hyperglycemic dose as follows-   Antihyperglycemics (From admission, onward)            Start     Stop Route Frequency Ordered    05/19/22 2100  insulin detemir U-100 pen 10 Units         -- SubQ 2 times daily 05/19/22 1121    05/17/22 1931  insulin aspart U-100 pen 1-10 Units         -- SubQ Every 4 hours PRN 05/17/22 1831        Hold Oral hypoglycemics while patient is in the hospital.        CKD (chronic kidney disease) stage 4, GFR 15-29 ml/min  Renal function is worse.  Monitor BMP.  Avoid NSAID's.    Creatinine   Date Value Ref Range Status   05/23/2022 2.7 (H) 0.5 - 1.4 mg/dL Final   05/22/2022 2.8 (H) 0.5 - 1.4 mg/dL Final           VTE Risk Mitigation (From admission, onward)         Ordered     heparin (porcine) injection 4,000 Units  As needed (PRN)         05/16/22 1841     IP VTE HIGH RISK PATIENT  Once         05/12/22 1351     Place sequential compression device  Until discontinued         05/12/22 1351     Place sequential compression device  Until discontinued         05/12/22 1052                Discharge Planning   STAR: 5/26/2022     Code Status: DNR   Is the patient medically ready for discharge?:     Reason for patient still in hospital (select all that apply): Treatment  Discharge Plan A: Skilled Nursing Facility            Critical care time spent on the evaluation and treatment of severe organ dysfunction, review of pertinent labs and imaging studies, discussions with consulting providers and discussions with patient/family: 43 minutes.      Obi Mustafa MD  Department of Hospital Medicine   [facility]

## 2022-05-24 NOTE — PLAN OF CARE
Intervention: enteral nutrition therapy     Recommendations  1.) Continue TF Novasource renal @ 45 mls/hr continuous providing 2160 kcals/day, 98 g protein/day, 198 g CHO/day, and 774 mls water/day. FWF: 100 mls q4 hrs or per MD.     2.) Advance PO diet to DM 1500 kcal when medically able  3.) Weigh s/p HD  4.) increase insulin     Goals: 1.) pt will meet >50% of EEN during admit 2) Glucose improved by f/u  Nutrition Goal Status: 1.) meeting, 2.) improving-continues    Communication of RD Recs: POC, sticky note

## 2022-05-24 NOTE — ASSESSMENT & PLAN NOTE
New problem.  Nephrology group consulting, continue dialysis as needed  Monitor renal function and UOP.  Avoid NSAID's and hypotension.      Creatinine   Date Value Ref Range Status   05/23/2022 2.7 (H) 0.5 - 1.4 mg/dL Final   05/22/2022 2.8 (H) 0.5 - 1.4 mg/dL Final   05/21/2022 3.1 (H) 0.5 - 1.4 mg/dL Final

## 2022-05-24 NOTE — PROGRESS NOTES
Nephrology Progress Note        Patient Name: Marshall Flor  MRN: [id number]    Patient Class: IP- Inpatient   Admission Date: 5/12/2022  Length of Stay: 12 days  Date of Service: 5/24/2022    Attending Physician: Obi Mustafa MD  Primary Care Provider: Dorie Quan MD    Reason for Consult: dione/acidosis/sepsis/stroke/ckd3/fever/anemia/htn/chf    SUBJECTIVE:     HPI: 66-year-old male with history of HTN, CHF, diabetes presents to [place] for decreased responsiveness, found to have a left gaze preference, then sent to [facility], where was found febrile, hypertensive. UA 3+ protein, 2+ glucose, WBC 3, moderate bacteria. Chest x-ray suggestive of pneumonia right upper lobe. MRI brain nonhemorrhagic infarction in the right anterolateral frontal lobe.Treated for sepsis ? PNA, ? Meningitis? and stroke.    5/14- BP all over the place, on 3L NC, UOP 481cc  5/15- BP still all over the place, on BIPAP, UOP 455cc- no labs drawn today and its after 1pm  5/16  Renal function continues to decline.  Mental status no better. Consent for dialysis obtained from patient's son yesterday by Dr. Busch.  I called to confirm and no answer (no voice mail available)  5/17  On bipap.  Not interactive.  Oliguric  5/18  S/p dialysis with 3 liter ultrafiltration.  Output not recorded last pm.  Not interactive  5/19     Not interactive. No distress  5/20.  Not interactive.  More interactive this am per report.  No distress.  325 cc UOP  5/21 VSS, plan HD PRN. Hospice? On TPN, good UO.  5/22  Still no decision from family re: comfort care.  Will replete electrolytes.  Scr 2.8, UOP 1.3L.  D/w bedside nurse.  5/23  2250 cc UOP.  Febrile episodes yesterday   5/24  Not interactive.  925 cc uop   Tmax 101        Outpatient meds:  No current facility-administered medications on file prior to encounter.     Current Outpatient Medications on File Prior to Encounter   Medication Sig Dispense Refill    allopurinoL (ZYLOPRIM) 100 MG tablet   =  2 tab, Oral, Daily, # 60 tab, 5 Refill(s), Pharmacy: [id number], 167, cm, 07/27/21 8:24:00 CDT, Height/Length Measured, 91.5, kg, 12/15/20 11:18:00 CST, Weight Dosing      amLODIPine (NORVASC) 10 MG tablet 10 mg.      aspirin (ECOTRIN) 81 MG EC tablet Take 1 tablet (81 mg total) by mouth once daily. 30 tablet 3    atorvastatin (LIPITOR) 40 MG tablet   = 1 tab, Oral, Daily, # 90 tab, 1 Refill(s), Soft Stop, Pharmacy: [id number], 167, cm, 04/07/21 14:01:00 CDT, Height/Length Measured, 91.5, kg, 12/15/20 11:18:00 CST, Weight Dosing      carvediloL (COREG) 3.125 MG tablet Take 1 tablet (3.125 mg total) by mouth once daily. 30 tablet 1    cloNIDine (CATAPRES) 0.1 MG tablet Take 2 tablets (0.2 mg total) by mouth 3 (three) times daily. 180 tablet 11    clopidogreL (PLAVIX) 75 mg tablet Take 75 mg by mouth once daily.      furosemide (LASIX) 20 MG tablet Take 1 tablet (20 mg total) by mouth once daily. 30 tablet 1    hydrALAZINE (APRESOLINE) 50 MG tablet Take 1 tablet (50 mg total) by mouth every 12 (twelve) hours. 60 tablet 0    linaGLIPtin (TRADJENTA) 5 mg Tab tablet 5 mg.      NOVOLOG MIX 70-30FLEXPEN U-100 100 unit/mL (70-30) InPn pen Inject into the skin.      sodium bicarbonate 650 MG tablet Take 1 tablet (650 mg total) by mouth 2 (two) times daily. 60 tablet 11       Scheduled meds:   cloNIDine 0.2 mg/24 hr td ptwk  1 patch Transdermal Q7 Days    famotidine (PF)  20 mg Intravenous Daily    insulin detemir U-100  10 Units Subcutaneous BID    scopolamine  1 patch Transdermal Q3 Days       Infusions:      PRN meds:  acetaminophen, bisacodyL, dextrose 10%, dextrose 10%, glucagon (human recombinant), glucose, glucose, heparin (porcine), hydrALAZINE, insulin aspart U-100, labetaloL, morphine, naloxone, ondansetron, oxyCODONE, polyethylene glycol, sodium chloride 0.9%    Review of Systems:  Neg    OBJECTIVE:     Vital Signs and IO (Last 24H):  Temp:  [99.1 °F (37.3 °C)-101.2 °F (38.4  °C)]   Pulse:  []   Resp:  [21-51]   BP: (123-192)/(62-91)   SpO2:  [96 %-100 %]   I/O last 3 completed shifts:  In: 2334 [NG/GT:2334]  Out: 1750 [Urine:1750]    Wt Readings from Last 5 Encounters:   05/24/22 78.9 kg (173 lb 15.1 oz)   05/11/22 87.5 kg (193 lb)   05/12/22 87.5 kg (192 lb 14.4 oz)   04/26/22 95.3 kg (210 lb)   02/02/22 91.1 kg (200 lb 13.4 oz)     Physical Exam:  Constitutional:  No apparent distress  Heart: rrr, no m/r/g, wwp, no edema  Lungs: coarse, no w/r/r/c, no lb  Abdomen: s/nt/nd, +BS    Body mass index is 27.24 kg/m².    Laboratory:  Recent Labs   Lab 05/21/22  0441 05/22/22  0600 05/23/22  0524   * 133* 134*   K 3.5 3.2* 3.6    98 101   CO2 21* 21* 21*   BUN 41* 56* 63*   CREATININE 3.1* 2.8* 2.7*   ESTGFRAFRICA 23* 26* 28*   EGFRNONAA 20* 23* 24*   * 181* 197*       Recent Labs   Lab 05/19/22  0406 05/20/22  0356 05/21/22  0441 05/22/22  0600 05/23/22  0524   CALCIUM 8.3*   < > 8.5* 8.5* 8.8   ALBUMIN 2.3*  --  2.4* 2.4*  --    PHOS 2.0*   < > 3.3 2.5* 2.7   MG 1.8   < > 1.5* 1.8 1.7    < > = values in this interval not displayed.       Recent Labs   Lab 04/12/21  0952   PTH, Intact 273.0 H       Recent Labs   Lab 05/22/22  0358 05/22/22  1245 05/22/22  1819 05/22/22  2023 05/22/22  2309 05/23/22  0512 05/23/22  1325 05/23/22  1701 05/23/22  2348 05/24/22  0558   POCTGLUCOSE 245* 376* 155* 202* 226* 197* 253* 104 178* 250*       Recent Labs   Lab 04/12/21  0952 05/12/22  1210 05/13/22  0341   Hemoglobin A1C 7.8 H 8.8 H 9.1 H       Recent Labs   Lab 05/21/22  0441 05/22/22  0600 05/23/22  0524   WBC 5.32 6.38 7.07   HGB 12.0* 11.2* 11.6*   HCT 36.1* 34.1* 34.0*    236 240   MCV 74* 75* 73*   MCHC 33.2 32.8 34.1   MONO 10.7  0.6 13.0  0.8 13.3  0.9       Recent Labs   Lab 05/19/22  0406 05/21/22  0441 05/22/22  0600   BILITOT 0.3 0.3 0.3   PROT 6.5 6.6 6.5   ALBUMIN 2.3* 2.4* 2.4*   ALKPHOS 71 78 74   ALT 31 29 42   AST 14 25 31       Recent Labs   Lab  05/11/22  2300 05/12/22  1400 05/21/22  2000   Color, UA Yellow Yellow Yellow   Appearance, UA Clear Clear Clear   pH, UA 6.0 6.0 6.0   Specific [place], UA 1.020 1.020 1.025   Protein, UA 3+ A 3+ A 2+ A   Glucose, UA 2+ A 3+ A 2+ A   Ketones, UA Negative Negative Negative   Urobilinogen, UA Negative Negative Negative   Bilirubin (UA) Negative Negative Negative   Occult Blood UA 2+ A 2+ A 2+ A   Nitrite, UA Negative Negative Negative   RBC, UA 10 H 20 H 87 H   WBC, UA 3 6 H 3   Bacteria Moderate A Few A Occasional   Hyaline Casts, UA 1 0 0       Recent Labs   Lab 05/12/22  2055 05/16/22  1525 05/17/22  1102   POC PH 7.417 7.290 LL 7.381   POC PCO2 31.1 L 34.7 L 41.6   POC HCO3 20.0 L 16.7 L 24.7   POC PO2 84 161 H 102 H   POC SATURATED O2 97 99 98   POC BE -5 -10 0   Sample ARTERIAL ARTERIAL ARTERIAL       Microbiology Results (last 7 days)     Procedure Component Value Units Date/Time    CSF culture [[id number]] Collected: 05/16/22 1404    Order Status: Completed Specimen: CSF (Spinal Fluid) from CSF Tap, Tube 2 Updated: 05/24/22 0718     CSF CULTURE No Growth     Gram Stain Result No WBC's, epithelial cells or organisms seen    Blood culture [[id number]] Collected: 05/21/22 1223    Order Status: Completed Specimen: Blood from Antecubital, Right Arm Updated: 05/23/22 1612     Blood Culture, Routine No Growth to date      No Growth to date      No Growth to date    Blood culture [[id number]] Collected: 05/13/22 2007    Order Status: Completed Specimen: Blood from Peripheral, Left Arm Updated: 05/19/22 0612     Blood Culture, Routine No growth after 5 days.    Narrative:      Collection has been rescheduled by ACD at 05/13/2022 15:13 Reason:   Unable to collect, no place to stick pt  Collection has been rescheduled by ACD at 05/13/2022 15:13 Reason:   Unable to collect, no place to stick pt    Culture, MRSA [[id number]] Collected: 05/15/22 2300    Order Status: Completed Specimen: MRSA source from Nares, Left Updated:  05/18/22 0943     MRSA Surveillance Screen No MRSA isolated    Narrative:      Both nares, thank you        ASSESSMENT/PLAN:     Non-oliguric MERCEDES due to ATN; baseline CKD III; dialysis started 5/16  - initiated hemodialysis for uremic clearance on 5/16, PRN for now  - renal function improving.  No hd needed today.  --continue daily reassessment for dialytic need    HTN/Acute CVA/CHF  - off cardene gtt but may need to be resumed since not able to get any PO meds    Hypernatremia--better  Hypokalemia--better  HypoMg--better  Acidosis--better after dialysis  Hypokalemia--replete    Secondary HPT  - no active issues    Anemia of CKD  - stable    Thank you for allowing us to participate in the care of your patient!   We will follow the patient and provide recommendations as needed.    Pt's nephew is at the bedside.  All of his questions were answered to his apparent satisfaction     Patient care time was spent personally by me on the following activities: >  min  · Obtaining a history.  · Examination of patient.  · Providing medical care at the patients bedside.  · Developing a treatment plan with patient or surrogate and bedside caregivers.  · Ordering and reviewing laboratory studies, radiographic studies, pulse oximetry.  · Ordering and performing treatments and interventions.  · Evaluation of patient's response to treatment.  · Discussions with consultants while on the unit and immediately available to the patient.  · Re-evaluation of the patient's condition.  · Documentation in the medical record.     Ariel Little MD      [facility]  [address] [place] [id number]    [phone] - tel  [phone] - fax    5/24/2022

## 2022-05-24 NOTE — ASSESSMENT & PLAN NOTE
Patient's FSGs are controlled on current medication regimen.  Last A1c reviewed-   Lab Results   Component Value Date    HGBA1C 9.1 (H) 05/13/2022     Most recent fingerstick glucose reviewed-   Recent Labs   Lab 05/23/22  1701 05/23/22  2348 05/24/22  0558 05/24/22  1134   POCTGLUCOSE 104 178* 250* 286*     Current correctional scale  Low  Maintain anti-hyperglycemic dose as follows-   Antihyperglycemics (From admission, onward)            Start     Stop Route Frequency Ordered    05/19/22 2100  insulin detemir U-100 pen 10 Units         -- SubQ 2 times daily 05/19/22 1121    05/17/22 1931  insulin aspart U-100 pen 1-10 Units         -- SubQ Every 4 hours PRN 05/17/22 1831        Hold Oral hypoglycemics while patient is in the hospital.

## 2022-05-24 NOTE — PLAN OF CARE
Pt unable to participate in review of care plan. Pt remains in ICU on room air. Able to move arms spontaneously with right sided weakness. Squeezes with left hand on command. Grimaces to pain when moving legs. Eyes track nurse when speaking and moving around the room. Tolerating tube feeds well with minimal residuals. Male external catheter in place. Small bowel movement. Bath given. Safety maintained.     Problem: Adult Inpatient Plan of Care  Goal: Plan of Care Review  Outcome: Ongoing, Progressing  Goal: Patient-Specific Goal (Individualized)  Outcome: Ongoing, Progressing  Goal: Absence of Hospital-Acquired Illness or Injury  Outcome: Ongoing, Progressing  Goal: Optimal Comfort and Wellbeing  Outcome: Ongoing, Progressing  Goal: Readiness for Transition of Care  Outcome: Ongoing, Progressing

## 2022-05-24 NOTE — ASSESSMENT & PLAN NOTE
Patient is identified as having Combined Systolic and Diastolic heart failure that is Chronic. CHF is currently controlled. Latest ECHO performed and demonstrates- Results for orders placed during the hospital encounter of 05/12/22    Echo    Interpretation Summary  · The left ventricle is moderately enlarged with eccentric hypertrophy and severely decreased systolic function.  · Grade II left ventricular diastolic dysfunction.  · The estimated PA systolic pressure is 59 mmHg.  · Severe right ventricular enlargement with mildly to moderately reduced right ventricular systolic function.  · Moderate left atrial enlargement.  · There is moderate pulmonary hypertension.  · Intermediate central venous pressure (8 mmHg).  · Mild aortic regurgitation.  · Moderate right atrial enlargement.  · Mild to moderate tricuspid regurgitation.  · Mild pulmonic regurgitation.  · The estimated ejection fraction is 20%.  · Moderate mitral regurgitation.  . Patient NPO; not on oral meds.  Monitor clinical status closely. Monitor on telemetry. Patient is off CHF pathway.  Monitor strict Is&Os and daily weights.   Last BNP reviewed- and noted below   No results for input(s): BNP, BNPTRIAGEBLO in the last 168 hours..

## 2022-05-24 NOTE — NURSING
To room 216 via bed, bed side report given, patient was placed on bed, awake, alert, no distress noted.

## 2022-05-24 NOTE — ASSESSMENT & PLAN NOTE
New problem.  Nephrology group consulting, continue dialysis as needed  Monitor renal function and UOP.  Avoid NSAID's and hypotension.      Creatinine   Date Value Ref Range Status   05/23/2022 2.7 (H) 0.5 - 1.4 mg/dL Final   05/22/2022 2.8 (H) 0.5 - 1.4 mg/dL Final   05/21/2022 3.1 (H) 0.5 - 1.4 mg/dL Final   ]

## 2022-05-24 NOTE — PROGRESS NOTES
Received notification from Case management that Dr. Mustafa is requesting a follow-up Family meeting. Family was updated yesterday via phone that pt is not expected to recover. Dr. Casey last spoke with Nephew, Kraig, via phone on Sunday. At that time family was not ready to transition to comfort care.     Called Nephoseas Cornell [phone]. Reports he lives in [facility] but is able to attend meeting in person tomorrow for 11:30am with Dr. Mustafa. I encouraged Mr. Cornell to bring any and all family members that need to be present. Nephew to call both of patient's Sons and notify. CM and medical team updated. Chaplain Hancock to escort family to peaceful place tomorrow. PC Team will continue to follow.

## 2022-05-24 NOTE — SUBJECTIVE & OBJECTIVE
Interval History:  Patient seen and examined.  No acute events overnight.  Continues to have wet productive cough, along with multiple secretions through his nose and mouth.  Plan of care discussed with the patient's nephew.  He is not ready to transition the patient to comfort care currently, but should the patient start to have worsening disease progression, would be open to considering that.    Review of Systems   Unable to perform ROS: Patient nonverbal   Objective:     Vital Signs (Most Recent):  Temp: 99.2 °F (37.3 °C) (05/23/22 1800)  Pulse: 84 (05/23/22 1937)  Resp: (!) 23 (05/23/22 1937)  BP: 139/63 (05/23/22 1800)  SpO2: 100 % (05/23/22 1937)   Vital Signs (24h Range):  Temp:  [99 °F (37.2 °C)-101.2 °F (38.4 °C)] 99.2 °F (37.3 °C)  Pulse:  [] 84  Resp:  [15-48] 23  SpO2:  [94 %-100 %] 100 %  BP: (115-183)/(55-95) 139/63     Weight: 79.2 kg (174 lb 9.7 oz)  Body mass index is 27.35 kg/m².    Intake/Output Summary (Last 24 hours) at 5/23/2022 2103  Last data filed at 5/23/2022 1800  Gross per 24 hour   Intake 1464 ml   Output 1150 ml   Net 314 ml        Physical Exam  Vitals and nursing note reviewed.   Constitutional:       General: He is not in acute distress.     Appearance: He is not ill-appearing.      Comments: Nonverbal, encephalopathic.   HENT:      Nose:      Comments: Nasogastric tube in place  Eyes:      Pupils: Pupils are equal, round, and reactive to light.   Neck:      Vascular: No JVD.   Cardiovascular:      Rate and Rhythm: Normal rate and regular rhythm.      Heart sounds: Normal heart sounds.   Pulmonary:      Effort: Pulmonary effort is normal.      Breath sounds: Normal breath sounds.   Abdominal:      General: Bowel sounds are normal. There is no distension.      Palpations: Abdomen is soft.      Tenderness: There is no abdominal tenderness.   Musculoskeletal:         General: No deformity.   Lymphadenopathy:      Cervical: No cervical adenopathy.   Skin:     Coloration: Skin is  not pale.      Findings: No rash.   Neurological:      Mental Status: He is disoriented.      Comments: Encephalopathic, nonverbal at baseline.       Significant Labs: All pertinent labs within the past 24 hours have been reviewed.    Significant Imaging: I have reviewed all pertinent imaging results/findings within the past 24 hours.

## 2022-05-24 NOTE — PROGRESS NOTES
[facility]  Adult Nutrition  Progress Note    SUMMARY     Intervention: enteral nutrition therapy     Recommendations  1.) Continue TF Novasource renal @ 45 mls/hr continuous providing 2160 kcals/day, 98 g protein/day, 198 g CHO/day, and 774 mls water/day. FWF: 100 mls q4 hrs or per MD.     2.) Advance PO diet to DM 1500 kcal when medically able  3.) Weigh s/p HD  4.) increase insulin     Goals: 1.) pt will meet >50% of EEN during admit 2) Glucose improved by f/u  Nutrition Goal Status: 1.) meeting, 2.) improving-continues    Communication of RD Recs: POC, sticky note     1. Stroke    2. CHF (congestive heart failure)    3. Encephalopathy    4. CVA (cerebral vascular accident)    5. MERCEDES (acute kidney injury)               Past Medical History:   Diagnosis Date    CHF (congestive heart failure)      Diabetes mellitus      Hypertension      Stroke              Assessment and Plan  Nutrition Problem  inadequate energy intake     Related to (etiology):   Acute illness      Signs and Symptoms (as evidenced by):   NPO  TPN meeting 30% of EEN      Interventions:  above     Nutrition Diagnosis Status:   Improving     Malnutrition Assessment  Severe Weight Loss (Malnutrition): greater than 7.5% in 3 months (11.5% weight loss in 4 months)   Kain = 14, wound  Edema 1-3+  NFPE done 5/19/22, no wasting seen     Reason for Assessment  Reason For Assessment: RD follow up   Rounds: attended     General Information Comments: admits with decreased responsiveness, pt to start dialysis today, on continuous bipap, non-verbal, central line present. RD consulted for TPN. Pending SLP asessment for diet advancement. Remains NPO.  5/18: pt receiving dialysis. TPN infusing, on 3L NC.   5/19/22 Pt continues on TPN, glucose in the 400's. Discussed ordering lower dextrose TPN today. Continues on HD, off bipap but unresponsive. Plan to consider comfort care 5/21.  5/24/22 TPN discontinued, pt now tolerating TF @ goal  "today with flushes as ordered. Unable to advance PO diet at this time.     Nutrition Risk Screen  Nutrition Risk Screen: dysphagia or difficulty swallowing, NPO     Nutrition/Diet History  Food Allergies: NKFA  Factors Affecting Nutritional Intake: NPO, trouble swallowing  Spiritual/cultural/Jain factors affecting PO intakes: none     Anthropometrics  Height: 5' 7"  Height (inches): 67 in  Weight Method: Bed Scale  Weight: 78.9 kg , 76.7 kg 22, 78.8 kg (admit)  Weight (lb): 173 lb ( edema)  Ideal Body Weight (IBW), Male: 148 lb  BMI (Calculated): 27.2 admission  BMI Grade: 25 - 29.9 - overweight  Usual Body Weight (UBW), k.27 kg (2022)     Lab/Procedures/Meds  Pertinent Labs Reviewed: reviewed  BMP  Lab Results   Component Value Date     (L) 2022    K 3.6 2022     2022    CO2 21 (L) 2022    BUN 63 (H) 2022    CREATININE 2.7 (H) 2022    CALCIUM 8.8 2022    ANIONGAP 12 2022    ESTGFRAFRICA 28 (A) 2022    EGFRNONAA 24 (A) 2022     Lab Results   Component Value Date    ALBUMIN 2.4 (L) 2022     Lab Results   Component Value Date    CALCIUM 8.8 2022    PHOS 2.7 2022     Recent Labs   Lab 22  1134   POCTGLUCOSE 286*       Pertinent Medications Reviewed: reviewed  Insulin ( detemin 10 units BID + 1-10 units fast acting q 4 hr) , scopolamine, polyethylene glycol     Estimated/Assessed Needs  Needs adjusted for HD  Weight Used For Calorie Calculations: 78 kg NHANES  Energy Calorie Requirements (kcal): 2340 kcals/day  Energy Need Method: 30 kcal/kg ( overweight vs. HD)  Protein Requirements: 93g ( 1.2 g protein/kg HD)  Weight Used For Protein Calculations: 78 kg HD  Estimated Fluid Requirement Method: 1500 ml HD        Nutrition Prescription Ordered  Current Diet Order: NPO + TF above     Evaluation of Received Nutrient/Fluid Intake  Energy needs: meeting  Protein needs:meeting  Fluid needs: " meeting  Tolerance: toleratingr  % Intake of Estimated Energy Needs: 92% EEN  % Meal Intake: NPO + TFabove     Nutrition Risk  Level of Risk/Frequency of Follow-up: moderate  (x2 weekly)         Monitor and Evaluation  Food and Nutrient Intake: energy intake, parenteral nutrition intake, enteral nutrition intake  Food and Nutrient Adminstration: diet order, enteral and parenteral nutrition administration  Anthropometric Measurements: weight, weight change, body mass index  Biochemical Data, Medical Tests and Procedures: electrolyte and renal panel, glucose/endocrine profile  Nutrition-Focused Physical Findings: overall appearance         Nutrition Follow-Up  RD Follow-up?: Yes

## 2022-05-24 NOTE — PLAN OF CARE
Awake, looking to his left, does not obey commands, room air, ekg shows sr, upper airway cough, tube feeding in progress, no bm, abd rounded, voiding fine, external cath, mid line intact, trialysis cath in use,

## 2022-05-24 NOTE — PLAN OF CARE
05/23/22 1937   Patient Assessment/Suction   Level of Consciousness (AVPU) responds to voice   Respiratory Effort Normal;Unlabored   Expansion/Accessory Muscles/Retractions no retractions;no use of accessory muscles   All Lung Fields Breath Sounds Anterior:;Lateral:   JOEL Breath Sounds coarse   LLL Breath Sounds diminished   RUL Breath Sounds coarse;diminished   RML Breath Sounds diminished   RLL Breath Sounds diminished   Rhythm/Pattern, Respiratory depth regular;pattern regular;unlabored   Cough Frequency no cough   PRE-TX-O2   O2 Device (Oxygen Therapy) room air   SpO2 100 %   Pulse Oximetry Type Continuous   $ Pulse Oximetry - Multiple Charge Pulse Oximetry - Multiple   Pulse 84   Resp (!) 23   Skin Integrity   $ Wound Care Tech Time 15 min   Area Observed Bridge of nose   Skin Appearance without discoloration   Preset CPAP/BiPAP Settings   Mode Of Delivery Standby

## 2022-05-24 NOTE — ASSESSMENT & PLAN NOTE
Patient's FSGs are controlled on current medication regimen.  Last A1c reviewed-   Lab Results   Component Value Date    HGBA1C 9.1 (H) 05/13/2022     Most recent fingerstick glucose reviewed-   Recent Labs   Lab 05/22/22  2309 05/23/22  0512 05/23/22  1325 05/23/22  1701   POCTGLUCOSE 226* 197* 253* 104     Current correctional scale  Low  Maintain anti-hyperglycemic dose as follows-   Antihyperglycemics (From admission, onward)            Start     Stop Route Frequency Ordered    05/19/22 2100  insulin detemir U-100 pen 10 Units         -- SubQ 2 times daily 05/19/22 1121    05/17/22 1931  insulin aspart U-100 pen 1-10 Units         -- SubQ Every 4 hours PRN 05/17/22 1831        Hold Oral hypoglycemics while patient is in the hospital.

## 2022-05-24 NOTE — CHAPLAIN
Visited w/bedside nurse and then pt alone; he was at 45 degree angle sitting in bed, awake, alert, but didn't track to my voice, tends to look/stare over his left shoulder a lot per nurse and I noticed as well; held 1-way conversation with him, reminding him that he is loved and being well taken care of; held his hand and said a blessing over him, providing compassionate presence.  will continue to follow. Lord, in your mercy.

## 2022-05-25 LAB
POCT GLUCOSE: 245 MG/DL (ref 70–110)
POCT GLUCOSE: 253 MG/DL (ref 70–110)
POCT GLUCOSE: 268 MG/DL (ref 70–110)
POCT GLUCOSE: 285 MG/DL (ref 70–110)

## 2022-05-25 PROCEDURE — 25000003 PHARM REV CODE 250: Performed by: HOSPITALIST

## 2022-05-25 PROCEDURE — 27000923 HC TRIALYSIS CATHETER, ANY SIZE

## 2022-05-25 PROCEDURE — 99233 SBSQ HOSP IP/OBS HIGH 50: CPT | Mod: ,,, | Performed by: FAMILY MEDICINE

## 2022-05-25 PROCEDURE — 99233 PR SUBSEQUENT HOSPITAL CARE,LEVL III: ICD-10-PCS | Mod: ,,, | Performed by: FAMILY MEDICINE

## 2022-05-25 PROCEDURE — 12000002 HC ACUTE/MED SURGE SEMI-PRIVATE ROOM

## 2022-05-25 PROCEDURE — 63600175 PHARM REV CODE 636 W HCPCS: Performed by: HOSPITALIST

## 2022-05-25 PROCEDURE — 99232 SBSQ HOSP IP/OBS MODERATE 35: CPT | Mod: ,,, | Performed by: FAMILY MEDICINE

## 2022-05-25 PROCEDURE — 99232 PR SUBSEQUENT HOSPITAL CARE,LEVL II: ICD-10-PCS | Mod: ,,, | Performed by: FAMILY MEDICINE

## 2022-05-25 PROCEDURE — 94761 N-INVAS EAR/PLS OXIMETRY MLT: CPT

## 2022-05-25 RX ADMIN — HYDRALAZINE HYDROCHLORIDE 10 MG: 20 INJECTION, SOLUTION INTRAMUSCULAR; INTRAVENOUS at 04:05

## 2022-05-25 RX ADMIN — ACETAMINOPHEN 1000 MG: 500 TABLET ORAL at 08:05

## 2022-05-25 RX ADMIN — ACETAMINOPHEN 1000 MG: 500 TABLET ORAL at 09:05

## 2022-05-25 RX ADMIN — FAMOTIDINE 20 MG: 10 INJECTION INTRAVENOUS at 08:05

## 2022-05-25 RX ADMIN — INSULIN ASPART 1 UNITS: 100 INJECTION, SOLUTION INTRAVENOUS; SUBCUTANEOUS at 06:05

## 2022-05-25 NOTE — PROGRESS NOTES
Nephrology Progress Note        Patient Name: Marshall Flor  MRN: [id number]    Patient Class: IP- Inpatient   Admission Date: 5/12/2022  Length of Stay: 13 days  Date of Service: 5/25/2022    Attending Physician: Obi Mustafa MD  Primary Care Provider: Dorie Quan MD    Reason for Consult: dione/acidosis/sepsis/stroke/ckd3/fever/anemia/htn/chf    SUBJECTIVE:     HPI: 66-year-old male with history of HTN, CHF, diabetes presents to [place] for decreased responsiveness, found to have a left gaze preference, then sent to [facility], where was found febrile, hypertensive. UA 3+ protein, 2+ glucose, WBC 3, moderate bacteria. Chest x-ray suggestive of pneumonia right upper lobe. MRI brain nonhemorrhagic infarction in the right anterolateral frontal lobe.Treated for sepsis ? PNA, ? Meningitis? and stroke.    5/14- BP all over the place, on 3L NC, UOP 481cc  5/15- BP still all over the place, on BIPAP, UOP 455cc- no labs drawn today and its after 1pm  5/16  Renal function continues to decline.  Mental status no better. Consent for dialysis obtained from patient's son yesterday by Dr. Busch.  I called to confirm and no answer (no voice mail available)  5/17  On bipap.  Not interactive.  Oliguric  5/18  S/p dialysis with 3 liter ultrafiltration.  Output not recorded last pm.  Not interactive  5/19     Not interactive. No distress  5/20.  Not interactive.  More interactive this am per report.  No distress.  325 cc UOP  5/21 VSS, plan HD PRN. Hospice? On TPN, good UO.  5/22  Still no decision from family re: comfort care.  Will replete electrolytes.  Scr 2.8, UOP 1.3L.  D/w bedside nurse.  5/23  2250 cc UOP.  Febrile episodes yesterday   5/24  Not interactive.  925 cc uop   Tmax 101   5/25  Spiking fevers.  Blood culture NGTD (4 days)  1950 cc uop       Outpatient meds:  No current facility-administered medications on file prior to encounter.     Current Outpatient Medications on File Prior to Encounter   Medication  Sig Dispense Refill    allopurinoL (ZYLOPRIM) 100 MG tablet   = 2 tab, Oral, Daily, # 60 tab, 5 Refill(s), Pharmacy: [id number], 167, cm, 07/27/21 8:24:00 CDT, Height/Length Measured, 91.5, kg, 12/15/20 11:18:00 CST, Weight Dosing      amLODIPine (NORVASC) 10 MG tablet 10 mg.      aspirin (ECOTRIN) 81 MG EC tablet Take 1 tablet (81 mg total) by mouth once daily. 30 tablet 3    atorvastatin (LIPITOR) 40 MG tablet   = 1 tab, Oral, Daily, # 90 tab, 1 Refill(s), Soft Stop, Pharmacy: [id number], 167, cm, 04/07/21 14:01:00 CDT, Height/Length Measured, 91.5, kg, 12/15/20 11:18:00 CST, Weight Dosing      carvediloL (COREG) 3.125 MG tablet Take 1 tablet (3.125 mg total) by mouth once daily. 30 tablet 1    cloNIDine (CATAPRES) 0.1 MG tablet Take 2 tablets (0.2 mg total) by mouth 3 (three) times daily. 180 tablet 11    clopidogreL (PLAVIX) 75 mg tablet Take 75 mg by mouth once daily.      furosemide (LASIX) 20 MG tablet Take 1 tablet (20 mg total) by mouth once daily. 30 tablet 1    hydrALAZINE (APRESOLINE) 50 MG tablet Take 1 tablet (50 mg total) by mouth every 12 (twelve) hours. 60 tablet 0    linaGLIPtin (TRADJENTA) 5 mg Tab tablet 5 mg.      NOVOLOG MIX 70-30FLEXPEN U-100 100 unit/mL (70-30) InPn pen Inject into the skin.      sodium bicarbonate 650 MG tablet Take 1 tablet (650 mg total) by mouth 2 (two) times daily. 60 tablet 11       Scheduled meds:   cloNIDine 0.2 mg/24 hr td ptwk  1 patch Transdermal Q7 Days    famotidine (PF)  20 mg Intravenous Daily    insulin detemir U-100  10 Units Subcutaneous BID    scopolamine  1 patch Transdermal Q3 Days       Infusions:      PRN meds:  acetaminophen, bisacodyL, dextrose 10%, dextrose 10%, glucagon (human recombinant), glucose, glucose, heparin (porcine), hydrALAZINE, insulin aspart U-100, labetaloL, morphine, naloxone, ondansetron, oxyCODONE, polyethylene glycol, sodium chloride 0.9%    Review of Systems:  Neg    OBJECTIVE:     Vital Signs  and IO (Last 24H):  Temp:  [97.5 °F (36.4 °C)-101.2 °F (38.4 °C)]   Pulse:  []   Resp:  [16-23]   BP: (123-199)/(57-97)   SpO2:  [97 %-100 %]   I/O last 3 completed shifts:  In: 2155 [NG/GT:2155]  Out: 2325 [Urine:2325]    Wt Readings from Last 5 Encounters:   05/25/22 76.4 kg (168 lb 6.9 oz)   05/11/22 87.5 kg (193 lb)   05/12/22 87.5 kg (192 lb 14.4 oz)   04/26/22 95.3 kg (210 lb)   02/02/22 91.1 kg (200 lb 13.4 oz)     Physical Exam:  Constitutional:  No apparent distress  Heart: rrr, no m/r/g, wwp, no edema  Lungs: coarse, no w/r/r/c, no lb  Abdomen: s/nt/nd, +BS    Body mass index is 26.38 kg/m².    Laboratory:  Recent Labs   Lab 05/21/22  0441 05/22/22  0600 05/23/22  0524   * 133* 134*   K 3.5 3.2* 3.6    98 101   CO2 21* 21* 21*   BUN 41* 56* 63*   CREATININE 3.1* 2.8* 2.7*   ESTGFRAFRICA 23* 26* 28*   EGFRNONAA 20* 23* 24*   * 181* 197*       Recent Labs   Lab 05/19/22  0406 05/20/22  0356 05/21/22  0441 05/22/22  0600 05/23/22  0524   CALCIUM 8.3*   < > 8.5* 8.5* 8.8   ALBUMIN 2.3*  --  2.4* 2.4*  --    PHOS 2.0*   < > 3.3 2.5* 2.7   MG 1.8   < > 1.5* 1.8 1.7    < > = values in this interval not displayed.       Recent Labs   Lab 04/12/21  0952   PTH, Intact 273.0 H       Recent Labs   Lab 05/23/22  0512 05/23/22  1325 05/23/22  1701 05/23/22  2348 05/24/22  0558 05/24/22  1134 05/24/22  1647 05/24/22  2156 05/25/22  0807 05/25/22  1140   POCTGLUCOSE 197* 253* 104 178* 250* 286* 215* 100 245* 268*       Recent Labs   Lab 04/12/21  0952 05/12/22  1210 05/13/22  0341   Hemoglobin A1C 7.8 H 8.8 H 9.1 H       Recent Labs   Lab 05/21/22  0441 05/22/22  0600 05/23/22  0524   WBC 5.32 6.38 7.07   HGB 12.0* 11.2* 11.6*   HCT 36.1* 34.1* 34.0*    236 240   MCV 74* 75* 73*   MCHC 33.2 32.8 34.1   MONO 10.7  0.6 13.0  0.8 13.3  0.9       Recent Labs   Lab 05/19/22  0406 05/21/22  0441 05/22/22  0600   BILITOT 0.3 0.3 0.3   PROT 6.5 6.6 6.5   ALBUMIN 2.3* 2.4* 2.4*   ALKPHOS 71 78 74    ALT 31 29 42   AST 14 25 31       Recent Labs   Lab 05/11/22  2300 05/12/22  1400 05/21/22  2000   Color, UA Yellow Yellow Yellow   Appearance, UA Clear Clear Clear   pH, UA 6.0 6.0 6.0   Specific [place], UA 1.020 1.020 1.025   Protein, UA 3+ A 3+ A 2+ A   Glucose, UA 2+ A 3+ A 2+ A   Ketones, UA Negative Negative Negative   Urobilinogen, UA Negative Negative Negative   Bilirubin (UA) Negative Negative Negative   Occult Blood UA 2+ A 2+ A 2+ A   Nitrite, UA Negative Negative Negative   RBC, UA 10 H 20 H 87 H   WBC, UA 3 6 H 3   Bacteria Moderate A Few A Occasional   Hyaline Casts, UA 1 0 0       Recent Labs   Lab 05/12/22 2055 05/16/22  1525 05/17/22  1102   POC PH 7.417 7.290 LL 7.381   POC PCO2 31.1 L 34.7 L 41.6   POC HCO3 20.0 L 16.7 L 24.7   POC PO2 84 161 H 102 H   POC SATURATED O2 97 99 98   POC BE -5 -10 0   Sample ARTERIAL ARTERIAL ARTERIAL       Microbiology Results (last 7 days)     Procedure Component Value Units Date/Time    Blood culture [[id number]] Collected: 05/21/22 1223    Order Status: Completed Specimen: Blood from Antecubital, Right Arm Updated: 05/24/22 1612     Blood Culture, Routine No Growth to date      No Growth to date      No Growth to date      No Growth to date    CSF culture [[id number]] Collected: 05/16/22 1404    Order Status: Completed Specimen: CSF (Spinal Fluid) from CSF Tap, Tube 2 Updated: 05/24/22 0718     CSF CULTURE No Growth     Gram Stain Result No WBC's, epithelial cells or organisms seen    Blood culture [[id number]] Collected: 05/13/22 2007    Order Status: Completed Specimen: Blood from Peripheral, Left Arm Updated: 05/19/22 0612     Blood Culture, Routine No growth after 5 days.    Narrative:      Collection has been rescheduled by ACD at 05/13/2022 15:13 Reason:   Unable to collect, no place to stick pt  Collection has been rescheduled by ACD at 05/13/2022 15:13 Reason:   Unable to collect, no place to stick pt        ASSESSMENT/PLAN:     Non-oliguric MERCEDES due to  ATN; baseline CKD III; dialysis started 5/16  - initiated hemodialysis for uremic clearance on 5/16, PRN for now  - renal function improving.  No hd needed today.  --continue daily reassessment for dialytic need    HTN/Acute CVA/CHF  - labile bp.    --keep map above 55    Hypernatremia--better  Hypokalemia--better  HypoMg--better  Acidosis--better after dialysis  Hypokalemia--replete    Secondary HPT  - no active issues    Anemia of CKD  - stable    Thank you for allowing us to participate in the care of your patient!   We will follow the patient and provide recommendations as needed.       Patient care time was spent personally by me on the following activities: >  min  · Obtaining a history.  · Examination of patient.  · Providing medical care at the patients bedside.  · Developing a treatment plan with patient or surrogate and bedside caregivers.  · Ordering and reviewing laboratory studies, radiographic studies, pulse oximetry.  · Ordering and performing treatments and interventions.  · Evaluation of patient's response to treatment.  · Discussions with consultants while on the unit and immediately available to the patient.  · Re-evaluation of the patient's condition.  · Documentation in the medical record.     Ariel Little MD      [facility]  [address] [place] [address], [place] [address]    [phone] - tel  [phone] - fax    5/25/2022

## 2022-05-25 NOTE — NURSING
Tube feedings restarted after advancement of lt nare NGT to  novasource renal at 45ml with no residuals noted. Placement confirmed after advancement and prior to restart with CXR.
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
## 2022-05-25 NOTE — CHAPLAIN
"Visited palliative care pt who is now on the floor and out of ICU; visited him many times; he was awake, sitting up in bed; nonverbal, but he did not "yes/affirmative" when I said, "God's got you!" I showed him the rain outside and opened his blinds a little; reminded him that he is loved, being well taken care of and I said I blessing over him; wrote my name/number on white board for family;  will continue to follow. Lord, in your mercy.  "
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
## 2022-05-25 NOTE — PLAN OF CARE
Patient free of falls and injuries this shift. Remains with a lt gaze. Tracking minimal with no direct eye contact noted. Does not follow any commands. Some minimal, spontaneous, non purposeful movements to upper extremities. Appears to have minimal neuro activity per assessment. NGT to lt nare, dislodged per patient. Advanced per day RN. CXR done , advanced 5 cm and CXR retaken. Placement verified.  Tube feedings of novasource renal at 45ml/hr with 100 water every 4 hrs restarted. Low to no residuals. External catheter patent to suction with clear, yellow urine. Repositioned with pillows for support and comfort. Heels floating.

## 2022-05-25 NOTE — SUBJECTIVE & OBJECTIVE
Interval History: Patient continues to spike fevers. He is awake but not following commands. Non verbal. NG tube in place.         Medications:  Continuous Infusions:  Scheduled Meds:   cloNIDine 0.2 mg/24 hr td ptwk  1 patch Transdermal Q7 Days    famotidine (PF)  20 mg Intravenous Daily    insulin detemir U-100  10 Units Subcutaneous BID    scopolamine  1 patch Transdermal Q3 Days     PRN Meds:acetaminophen, bisacodyL, dextrose 10%, dextrose 10%, glucagon (human recombinant), glucose, glucose, heparin (porcine), hydrALAZINE, insulin aspart U-100, labetaloL, morphine, naloxone, ondansetron, oxyCODONE, polyethylene glycol, sodium chloride 0.9%    Objective:     Vital Signs (Most Recent):  Temp: (!) 101.2 °F (38.4 °C) (05/25/22 0826)  Pulse: 100 (05/25/22 0837)  Resp: 18 (05/25/22 0837)  BP: (!) 144/67 (05/25/22 0749)  SpO2: 98 % (05/25/22 0837)   Vital Signs (24h Range):  Temp:  [97.5 °F (36.4 °C)-101.2 °F (38.4 °C)] 101.2 °F (38.4 °C)  Pulse:  [] 100  Resp:  [16-23] 18  SpO2:  [97 %-99 %] 98 %  BP: (123-199)/(57-97) 144/67     Weight: 76.4 kg (168 lb 6.9 oz)  Body mass index is 26.38 kg/m².    Physical Exam  Vitals and nursing note reviewed.   Constitutional:       General: He is not in acute distress.     Appearance: He is well-developed. He is ill-appearing.      Comments: Ill appearing non engaged but awake AA male. NG tube in place. Non verbal. Non distressed.    HENT:      Head: Normocephalic and atraumatic.      Nose: Nose normal.   Eyes:      Conjunctiva/sclera: Conjunctivae normal.   Pulmonary:      Effort: Pulmonary effort is normal. No respiratory distress.   Abdominal:      General: There is no distension.      Palpations: Abdomen is soft.      Tenderness: There is no abdominal tenderness.   Neurological:      Mental Status: He is alert.      Cranial Nerves: No cranial nerve deficit.       Palliative Exam    Advance Care Planning   Advance Care Planning       Significant Labs: All pertinent labs  within the past 24 hours have been reviewed.  CBC:   Recent Labs   Lab 05/23/22 0524   WBC 7.07   HGB 11.6*   HCT 34.0*   MCV 73*        BMP:  No results for input(s): GLU, NA, K, CL, CO2, BUN, CREATININE, CALCIUM, MG in the last 24 hours.  LFT:  Lab Results   Component Value Date    AST 31 05/22/2022    ALKPHOS 74 05/22/2022    BILITOT 0.3 05/22/2022     Albumin:   Albumin   Date Value Ref Range Status   05/22/2022 2.4 (L) 3.5 - 5.2 g/dL Final     Protein:   Total Protein   Date Value Ref Range Status   05/22/2022 6.5 6.0 - 8.4 g/dL Final     Lactic acid:   Lab Results   Component Value Date    LACTATE 3.4 (H) 05/12/2022    LACTATE 3.5 (HH) 05/12/2022       Significant Imaging: I have reviewed all pertinent imaging results/findings within the past 24 hours.

## 2022-05-25 NOTE — PROGRESS NOTES
Wound Care and Hyperbaric Medicine                Progress Note    Subjective:       Patient ID: Marshall Flor is a 66 y.o. male.    Chief Complaint: No chief complaint on file.    HPI  Pt seen at bedside for a FU visit for an open wound of the nose. Pts wound ios from bipap, and has improved and nearly healed since he is off bipap. Pt has no other complaints today  Review of Systems   Skin: Positive for wound.        Open wound nose   All other systems reviewed and are negative.        Objective:        Physical Exam  Vitals and nursing note reviewed.   Constitutional:       General: He is not in acute distress.     Appearance: He is not ill-appearing, toxic-appearing or diaphoretic.   Skin:     General: Skin is warm and dry.      Coloration: Skin is not jaundiced or pale.      Findings: Lesion present. No bruising, erythema or rash.      Comments: Open wound of the nose, nearly healed   Neurological:      Mental Status: He is alert.         Vitals:    05/25/22 0837   BP:    Pulse: 100   Resp: 18   Temp:        Assessment:           ICD-10-CM ICD-9-CM   1. Stroke  I63.9 434.91   2. CHF (congestive heart failure)  I50.9 428.0   3. Encephalopathy  G93.40 348.30   4. CVA (cerebral vascular accident)  I63.9 434.91   5. MERCEDES (acute kidney injury)  N17.9 584.9   6. Cerebrovascular accident (CVA), unspecified mechanism  I63.9 434.91   7. Chronic combined systolic and diastolic CHF (congestive heart failure)  I50.42 428.42     428.0   8. Essential hypertension  I10 401.9   9. Type 2 diabetes mellitus with stage 4 chronic kidney disease, without long-term current use of insulin  E11.22 250.40    N18.4 585.4   10. Abrasion, nose w/o infection  S00.31XA 910.0            Altered Skin Integrity 05/19/22 midline Nose Abrasion(s) Partial thickness tissue loss. Shallow open ulcer with a red or pink wound bed, without slough. Intact or Open/Ruptured Serum-filled blister. (Active)   05/19/22    Altered Skin Integrity  Present on Admission: suspected hospital acquired   Side:    Orientation: midline   Location: Nose   Wound Number:    Is this injury device related?: Yes   Primary Wound Type: Abrasion(s)   Description of Altered Skin Integrity: Partial thickness tissue loss. Shallow open ulcer with a red or pink wound bed, without slough. Intact or Open/Ruptured Serum-filled blister.   Removal Indication and Assessment:    Wound Outcome:    (Retired) Wound Length (cm):    (Retired) Wound Width (cm):    (Retired) Depth (cm):    Wound Description (Comments):    Removal Indications:    Dressing Appearance Open to air 05/24/22 2000   Drainage Amount None 05/24/22 2000   Appearance Red;Pink 05/24/22 2000   Wound Edges Irregular 05/24/22 2000   Care Cleansed with:;Sterile normal saline;Applied: 05/23/22 2000   Periwound Care Dry periwound area maintained 05/24/22 2000            Wound 05/18/22 2000 Other (comment) (Active)   05/18/22 2000    Pre-existing: No   Primary Wound Type: Other   Side:    Orientation:    Location:    Wound Number:    Ankle-Brachial Index:    Pulses:    Removal Indication and Assessment:    Wound Outcome:    (Retired) Wound Type:    (Retired) Wound Length (cm):    (Retired) Wound Width (cm):    (Retired) Depth (cm):    Wound Description (Comments):    Removal Indications:    Dressing Appearance Dry;Intact 05/24/22 2000   Drainage Amount None 05/24/22 2000   Appearance Dressing in place, unable to visualize 05/21/22 2130   Care Other (see comments) 05/18/22 2000           Plan:       CVA (cerebral vascular accident)    Pt with h/o RS weakness from previous CVA, now presents with decreased responsiveness    CVA confirmed on MRI brain  Etiology workup pending  CT head: negative acute  MRI brain: nonhemorrhagic infarction in the anterior lateral left frontal lobe and a remote infarction with encephalomalacia and gliosis in the medial right occipital lobe.  There are also remote infarctions in the cerebellar  hemispheres as well as in the right thalamus.  CTA head and neck: Less than 50% stenoses of the bilateral proximal internal carotid arteries.  Significant stenoses of the bilateral intracranial ICAs in the cavernous sinuses.  Normal flow in the [id number]  ECHO: pending  LDL: pending  A1c: pending  Secondary stroke prevention: aspirin, atorvastatin, and Plavix at home. Continue aspirin and statin at this time, pending further work up.    From neurology's standpoint, LP is not necessary at this time. Complete stoke work, evaluate for endocarditis, obtain an EEG.       Type 2 diabetes mellitus with stage 4 chronic kidney disease, without long-term current use of insulin  Patient's FSGs are controlled on current medication regimen.  Last A1c reviewed-   Lab Results   Component Value Date    HGBA1C 9.1 (H) 05/13/2022     Most recent fingerstick glucose reviewed-   Recent Labs   Lab 05/13/22  0853 05/13/22  1219 05/13/22  1720 05/13/22  2005   POCTGLUCOSE 205* 156* 171* 160*     Current correctional scale  Low  Maintain anti-hyperglycemic dose as follows-   Antihyperglycemics (From admission, onward)            Start     Stop Route Frequency Ordered    05/13/22 1715  insulin aspart U-100 pen 0-5 Units         -- SubQ Every 6 hours PRN 05/13/22 1602    05/12/22 1200  insulin detemir U-100 pen 8 Units         -- SubQ Daily 05/12/22 1052        Hold Oral hypoglycemics while patient is in the hospital.        Meningitis (needs to be ruled out)  On antibiotics due to high suspicion.  Discussed with ID.  Pt needs to be 5 days off of Plavix before getting lumbar puncture.  Should be ok Monday; will re-order the procedure for that day.    Antibiotics (From admission, onward)            Start     Stop Route Frequency Ordered    05/13/22 1400  ampicillin 2 g in sodium chloride 0.9 % 100 mL IVPB (ready to mix system)         -- IV Every 8 hours 05/13/22 1342    05/12/22 2100  mupirocin 2 % ointment         05/17 2059 Nasl 2  "times daily 05/12/22 1347    05/12/22 1500  cefTRIAXone (ROCEPHIN) 2 g/50 mL D5W IVPB         -- IV Every 12 hours (non-standard times) 05/12/22 1059    05/12/22 1158  vancomycin - pharmacy to dose  (vancomycin with pharmacy to dose consult)        "And" Linked Group Details    -- IV pharmacy to manage frequency 05/12/22 1059        Antivirals (From admission, onward)        Stop Route Frequency     acyclovir (ZOVIRAX) injection         -- IV Daily              Hyperlipidemia  Chronic.  Stable.      Essential hypertension  Controlled.  Monitor pressure.  Patient is NPO; cannot take oral meds.      Encephalopathy  Unsure of cause.  Just a stroke?  Meningitis?  Will monitor neuro exam.  Neurologist consulting.  Avoid sedating meds.      CVA (cerebral vascular accident)  Proven on MRI.  Old infarcts seen as well.  Neurologist consulting.  Getting aspirin suppositories.      Coronary artery disease involving native coronary artery of native heart  Stable.  Getting aspirin suppositories.      CKD (chronic kidney disease) stage 4, GFR 15-29 ml/min  Renal function stable.  Monitor BMP.  Avoid NSAID's.    Creatinine   Date Value Ref Range Status   05/13/2022 3.7 (H) 0.5 - 1.4 mg/dL Final   05/12/2022 3.3 (H) 0.5 - 1.4 mg/dL Final   ]      Chronic combined systolic and diastolic CHF (congestive heart failure)  Patient is identified as having Combined Systolic and Diastolic heart failure that is Chronic. CHF is currently controlled. Latest ECHO performed and demonstrates- Results for orders placed during the hospital encounter of 05/12/22    Echo    Interpretation Summary  · The left ventricle is moderately enlarged with eccentric hypertrophy and severely decreased systolic function.  · Grade II left ventricular diastolic dysfunction.  · The estimated PA systolic pressure is 59 mmHg.  · Severe right ventricular enlargement with mildly to moderately reduced right ventricular systolic function.  · Moderate left atrial " enlargement.  · There is moderate pulmonary hypertension.  · Intermediate central venous pressure (8 mmHg).  · Mild aortic regurgitation.  · Moderate right atrial enlargement.  · Mild to moderate tricuspid regurgitation.  · Mild pulmonic regurgitation.  · The estimated ejection fraction is 20%.  · Moderate mitral regurgitation.  . Patient NPO; not on oral meds.  Monitor clinical status closely. Monitor on telemetry. Patient is off CHF pathway.  Monitor strict Is&Os and daily weights.   Last BNP reviewed- and noted below   Recent Labs   Lab 05/11/22  2303   BNP 3,064*   .      Type 2 diabetes mellitus with stage 4 chronic kidney disease, without long-term current use of insulin  Patient's FSGs are controlled on current medication regimen.  Last A1c reviewed-   Lab Results   Component Value Date    HGBA1C 9.1 (H) 05/13/2022     Most recent fingerstick glucose reviewed-   Recent Labs   Lab 05/14/22  0340 05/14/22  0840 05/14/22  1234 05/14/22  1808   POCTGLUCOSE 147* 197* 193* 177*     Current correctional scale  Low  Maintain anti-hyperglycemic dose as follows-   Antihyperglycemics (From admission, onward)            Start     Stop Route Frequency Ordered    05/13/22 1715  insulin aspart U-100 pen 0-5 Units         -- SubQ Every 6 hours PRN 05/13/22 1602    05/12/22 1200  insulin detemir U-100 pen 8 Units         -- SubQ Daily 05/12/22 1052        Hold Oral hypoglycemics while patient is in the hospital.        Meningitis (needs to be ruled out)  On antibiotics due to high suspicion.  Discussed with ID.  Pt needs to be 5 days off of Plavix before getting lumbar puncture.  Should be ok Monday; I re-ordered the procedure for that day.  In light of the worsening renal function, ID consultant discontinued the acyclovir.    Antibiotics (From admission, onward)            Start     Stop Route Frequency Ordered    05/13/22 1400  ampicillin 2 g in sodium chloride 0.9 % 100 mL IVPB (ready to mix system)         -- IV Every 8  "hours 05/13/22 1342    05/12/22 2100  mupirocin 2 % ointment         05/17 2059 Nasl 2 times daily 05/12/22 1347    05/12/22 1500  cefTRIAXone (ROCEPHIN) 2 g/50 mL D5W IVPB         -- IV Every 12 hours (non-standard times) 05/12/22 1059    05/12/22 1158  vancomycin - pharmacy to dose  (vancomycin with pharmacy to dose consult)        "And" Linked Group Details    -- IV pharmacy to manage frequency 05/12/22 1059          Essential hypertension  Controlled.  Monitor pressure.  Patient is NPO; cannot take oral meds.      Encephalopathy  Unsure of cause.  Just a stroke?  Meningitis?  Will monitor neuro exam.  Neurologist consulting.  Avoid sedating meds.      CKD (chronic kidney disease) stage 4, GFR 15-29 ml/min  Renal function is worse.  Monitor BMP.  Avoid NSAID's.    Creatinine   Date Value Ref Range Status   05/14/2022 5.9 (H) 0.5 - 1.4 mg/dL Final   05/13/2022 3.7 (H) 0.5 - 1.4 mg/dL Final   ]      MERCEDES (acute kidney injury)  New problem.  Nephrology group consulting.  Monitor renal function and UOP.  Avoid NSAID's and hypotension.  Acyclovir stopped.    Creatinine   Date Value Ref Range Status   05/14/2022 5.9 (H) 0.5 - 1.4 mg/dL Final   05/13/2022 3.7 (H) 0.5 - 1.4 mg/dL Final   05/12/2022 3.3 (H) 0.5 - 1.4 mg/dL Final   ]      Encephalopathy  Uncontrolled diabetes  HTN  History of congestive heart failure  Sepsis/possible meningitis  Acute CVA  MERCEDES    RECOMMENDATIONS:  Etiology of patient's presentation is unclear.  Continue workup per Neurology and Infectious Disease.  Will plan for RAJ on Monday to evaluate for evidence of endocarditis per Neurology request.    Type 2 diabetes mellitus with stage 4 chronic kidney disease, without long-term current use of insulin  Patient's FSGs are controlled on current medication regimen.  Last A1c reviewed-   Lab Results   Component Value Date    HGBA1C 9.1 (H) 05/13/2022     Most recent fingerstick glucose reviewed-   Recent Labs   Lab 05/14/22  2056 05/15/22  0028 " "05/15/22  0438 05/15/22  1153   POCTGLUCOSE 163* 136* 152* 133*     Current correctional scale  Low  Maintain anti-hyperglycemic dose as follows-   Antihyperglycemics (From admission, onward)            Start     Stop Route Frequency Ordered    05/13/22 1715  insulin aspart U-100 pen 0-5 Units         -- SubQ Every 6 hours PRN 05/13/22 1602    05/12/22 1200  insulin detemir U-100 pen 8 Units         -- SubQ Daily 05/12/22 1052        Hold Oral hypoglycemics while patient is in the hospital.        Meningitis (needs to be ruled out)  On antibiotics due to high suspicion.  Discussed with ID.  Pt needs to be 5 days off of Plavix before getting lumbar puncture.  Should be ok Monday, which is tomorrow; I re-ordered the procedure for that day.  In light of the worsening renal function, ID consultant discontinued the acyclovir.    Antibiotics (From admission, onward)            Start     Stop Route Frequency Ordered    05/15/22 2100  ampicillin 2 g in sodium chloride 0.9 % 100 mL IVPB (ready to mix system)         -- IV Every 12 hours 05/15/22 1529    05/12/22 2100  mupirocin 2 % ointment         05/17 2059 Nasl 2 times daily 05/12/22 1347    05/12/22 1500  cefTRIAXone (ROCEPHIN) 2 g/50 mL D5W IVPB         -- IV Every 12 hours (non-standard times) 05/12/22 1059    05/12/22 1158  vancomycin - pharmacy to dose  (vancomycin with pharmacy to dose consult)        "And" Linked Group Details    -- IV pharmacy to manage frequency 05/12/22 1059          CKD (chronic kidney disease) stage 4, GFR 15-29 ml/min  Renal function is worse.  Monitor BMP.  Avoid NSAID's.    Creatinine   Date Value Ref Range Status   05/15/2022 6.7 (H) 0.5 - 1.4 mg/dL Final   05/14/2022 5.9 (H) 0.5 - 1.4 mg/dL Final   ]      MERCEDES (acute kidney injury)  New problem.  Nephrology group consulting.  Monitor renal function and UOP.  Avoid NSAID's and hypotension.  Acyclovir stopped.  To be started on RRT tomorrow.    Creatinine   Date Value Ref Range Status "   05/15/2022 6.7 (H) 0.5 - 1.4 mg/dL Final   05/14/2022 5.9 (H) 0.5 - 1.4 mg/dL Final   05/13/2022 3.7 (H) 0.5 - 1.4 mg/dL Final   ]      Goals of care, counseling/discussion  Advance Care Planning     5/18/2022    Writer meeting with 2 sons, Joseph and Ger today, along with nephew, Kraig, and brother, Tony. Patient is not .   Family details what neuro has disclosed to them which is that patient will likely need complete care for the remainder of his life. He will be at increased risk for further infections along with requiring forms of artificial life support, such as dialysis and tube feeding. This is very troubling to hear for family present who voice patient's values of independence. No AD but they do not feel patient would choose to live this way given the circumstances. The hospice alternative is reviewed which they are interested in and this is explained in length. They are requesting a few days to arrange for family members to visit from out of town, namely a sister in [facility], prior to transitioning to comfort focused care. The goal would be Saturday, 5/21/2022, for this transition with intention to continue all supportive care including dialysis if warranted until this time to allow family members to say their goodbyes. In the event that patient does not die within the following hours to days they are asked to speak as a family about where patient would have hospice, whether it be home or at a facility. They did not have immediate answer for that but will address in the future. Lastly code status is reviewed and it is decided to allow a natural death if his heart were to stop. Intubation is not an option. DNR.   Palliative medicine will continue to stay engaged with family for further needs.               Type 2 diabetes mellitus with stage 4 chronic kidney disease, without long-term current use of insulin  Patient's FSGs are controlled on current medication regimen.  Last A1c reviewed-    Lab Results   Component Value Date    HGBA1C 9.1 (H) 05/13/2022     Most recent fingerstick glucose reviewed-   Recent Labs   Lab 05/22/22  0358 05/22/22  1245 05/22/22  1819 05/22/22 2023   POCTGLUCOSE 245* 376* 155* 202*     Current correctional scale  Low  Maintain anti-hyperglycemic dose as follows-   Antihyperglycemics (From admission, onward)            Start     Stop Route Frequency Ordered    05/19/22 2100  insulin detemir U-100 pen 10 Units         -- SubQ 2 times daily 05/19/22 1121    05/17/22 1931  insulin aspart U-100 pen 1-10 Units         -- SubQ Every 4 hours PRN 05/17/22 1831        Hold Oral hypoglycemics while patient is in the hospital.        Meningitis (needs to be ruled out)  On antibiotics due to high suspicion.  Discussed with ID.  Pt needs to be 5 days off of Plavix before getting lumbar puncture.  Should be ok Monday, which is tomorrow; I re-ordered the procedure for that day.  In light of the worsening renal function, ID consultant discontinued the acyclovir.        CVA (cerebral vascular accident)  Proven on MRI.  Old infarcts seen as well.  Neurologist consulting.  Getting aspirin suppositories.  NIHSS 22- very little hope for recovery.  Currently discussing with the family about potential palliative medicine and hospice.      Hyperlipidemia  Chronic.  Stable.      Goals of care, counseling/discussion  Advance Care Planning     Code Status  In light of the patients advanced and life limiting illness,I engaged the the family in a conversation about the patient's preferences for care  at the very end of life. The patient wishes to have a natural, peaceful death.  Along those lines, the patient does not wish to have CPR or other invasive treatments performed when his heart and/or breathing stops. I communicated to the family that a DNR order would be placed in his medical record to reflect this preference.  Currently waiting for palliative medicine have further conversations with the  patient          Essential hypertension  Controlled.  Monitor pressure.  Patient is NPO; cannot take oral meds.      Encephalopathy  Etiology likely related to devastating stroke.  Will monitor neuro exam.  Neurologist consulting.  Discussed with family about palliative care.      Coronary artery disease involving native coronary artery of native heart  Stable.  Getting aspirin suppositories.      CKD (chronic kidney disease) stage 4, GFR 15-29 ml/min  Renal function is worse.  Monitor BMP.  Avoid NSAID's.    Creatinine   Date Value Ref Range Status   05/22/2022 2.8 (H) 0.5 - 1.4 mg/dL Final   05/21/2022 3.1 (H) 0.5 - 1.4 mg/dL Final         MERCEDES (acute kidney injury)  New problem.  Nephrology group consulting, continue dialysis as needed  Monitor renal function and UOP.  Avoid NSAID's and hypotension.      Creatinine   Date Value Ref Range Status   05/22/2022 2.8 (H) 0.5 - 1.4 mg/dL Final   05/21/2022 3.1 (H) 0.5 - 1.4 mg/dL Final   05/20/2022 4.6 (H) 0.5 - 1.4 mg/dL Final   ]      CVA (cerebral vascular accident)  Proven on MRI.  Old infarcts seen as well.  Neurologist consulting.  Getting aspirin suppositories.  NIHSS 22- very little hope for recovery.  Currently discussing with the family about potential palliative medicine and hospice.      Encephalopathy  Etiology likely related to devastating stroke.  Will monitor neuro exam.  Neurologist consulting.  Discussed with family about palliative care.      Type 2 diabetes mellitus with stage 4 chronic kidney disease, without long-term current use of insulin  Patient's FSGs are controlled on current medication regimen.  Last A1c reviewed-   Lab Results   Component Value Date    HGBA1C 9.1 (H) 05/13/2022     Most recent fingerstick glucose reviewed-   Recent Labs   Lab 05/22/22  2309 05/23/22  0512 05/23/22  1325 05/23/22  1701   POCTGLUCOSE 226* 197* 253* 104     Current correctional scale  Low  Maintain anti-hyperglycemic dose as follows-   Antihyperglycemics (From  admission, onward)            Start     Stop Route Frequency Ordered    05/19/22 2100  insulin detemir U-100 pen 10 Units         -- SubQ 2 times daily 05/19/22 1121    05/17/22 1931  insulin aspart U-100 pen 1-10 Units         -- SubQ Every 4 hours PRN 05/17/22 1831        Hold Oral hypoglycemics while patient is in the hospital.        Hyperlipidemia  Chronic.  Stable.      Goals of care, counseling/discussion  Advance Care Planning     Code Status  In light of the patients advanced and life limiting illness,I engaged the the family in a conversation about the patient's preferences for care  at the very end of life. The patient wishes to have a natural, peaceful death.  Along those lines, the patient does not wish to have CPR or other invasive treatments performed when his heart and/or breathing stops. I communicated to the family that a DNR order would be placed in his medical record to reflect this preference.  Currently waiting for palliative medicine have further conversations with the patient          Essential hypertension  Controlled.  Monitor pressure.  Patient is NPO; cannot take oral meds.      Coronary artery disease involving native coronary artery of native heart  Stable.  Getting aspirin suppositories.      CKD (chronic kidney disease) stage 4, GFR 15-29 ml/min  Renal function is worse.  Monitor BMP.  Avoid NSAID's.    Creatinine   Date Value Ref Range Status   05/23/2022 2.7 (H) 0.5 - 1.4 mg/dL Final   05/22/2022 2.8 (H) 0.5 - 1.4 mg/dL Final         Chronic combined systolic and diastolic CHF (congestive heart failure)  Patient is identified as having Combined Systolic and Diastolic heart failure that is Chronic. CHF is currently controlled. Latest ECHO performed and demonstrates- Results for orders placed during the hospital encounter of 05/12/22    Echo    Interpretation Summary  · The left ventricle is moderately enlarged with eccentric hypertrophy and severely decreased systolic function.  ·  Grade II left ventricular diastolic dysfunction.  · The estimated PA systolic pressure is 59 mmHg.  · Severe right ventricular enlargement with mildly to moderately reduced right ventricular systolic function.  · Moderate left atrial enlargement.  · There is moderate pulmonary hypertension.  · Intermediate central venous pressure (8 mmHg).  · Mild aortic regurgitation.  · Moderate right atrial enlargement.  · Mild to moderate tricuspid regurgitation.  · Mild pulmonic regurgitation.  · The estimated ejection fraction is 20%.  · Moderate mitral regurgitation.  . Patient NPO; not on oral meds.  Monitor clinical status closely. Monitor on telemetry. Patient is off CHF pathway.  Monitor strict Is&Os and daily weights.   Last BNP reviewed- and noted below   No results for input(s): BNP, BNPTRIAGEBLO in the last 168 hours..      MERCEDES (acute kidney injury)  New problem.  Nephrology group consulting, continue dialysis as needed  Monitor renal function and UOP.  Avoid NSAID's and hypotension.      Creatinine   Date Value Ref Range Status   05/23/2022 2.7 (H) 0.5 - 1.4 mg/dL Final   05/22/2022 2.8 (H) 0.5 - 1.4 mg/dL Final   05/21/2022 3.1 (H) 0.5 - 1.4 mg/dL Final   ]      Abrasion, nose w/o infection        CVA (cerebral vascular accident)  Proven on MRI.  Old infarcts seen as well.  Neurologist consulting.  Getting aspirin suppositories.  NIHSS 22- very little hope for recovery.  Currently discussing with the family about potential palliative medicine and hospice.      Encephalopathy  Etiology likely related to devastating stroke.  Will monitor neuro exam.  Neurologist consulting.  Discussed with family about palliative care.      Type 2 diabetes mellitus with stage 4 chronic kidney disease, without long-term current use of insulin  Patient's FSGs are controlled on current medication regimen.  Last A1c reviewed-   Lab Results   Component Value Date    HGBA1C 9.1 (H) 05/13/2022     Most recent fingerstick glucose reviewed-    Recent Labs   Lab 05/23/22  1701 05/23/22  2348 05/24/22  0558 05/24/22  1134   POCTGLUCOSE 104 178* 250* 286*     Current correctional scale  Low  Maintain anti-hyperglycemic dose as follows-   Antihyperglycemics (From admission, onward)            Start     Stop Route Frequency Ordered    05/19/22 2100  insulin detemir U-100 pen 10 Units         -- SubQ 2 times daily 05/19/22 1121    05/17/22 1931  insulin aspart U-100 pen 1-10 Units         -- SubQ Every 4 hours PRN 05/17/22 1831        Hold Oral hypoglycemics while patient is in the hospital.        Hyperlipidemia  Chronic.  Stable.      Goals of care, counseling/discussion  Advance Care Planning     Code Status  In light of the patients advanced and life limiting illness,I engaged the the family in a conversation about the patient's preferences for care  at the very end of life. The patient wishes to have a natural, peaceful death.  Along those lines, the patient does not wish to have CPR or other invasive treatments performed when his heart and/or breathing stops. I communicated to the family that a DNR order would be placed in his medical record to reflect this preference.  Currently waiting for palliative medicine have further conversations with the patient          Essential hypertension  Controlled.  Monitor pressure.  Patient is NPO; cannot take oral meds.      Coronary artery disease involving native coronary artery of native heart  Stable.  Getting aspirin suppositories.      CKD (chronic kidney disease) stage 4, GFR 15-29 ml/min  Renal function is worse.  Monitor BMP.  Avoid NSAID's.    Creatinine   Date Value Ref Range Status   05/23/2022 2.7 (H) 0.5 - 1.4 mg/dL Final   05/22/2022 2.8 (H) 0.5 - 1.4 mg/dL Final         Chronic combined systolic and diastolic CHF (congestive heart failure)  Patient is identified as having Combined Systolic and Diastolic heart failure that is Chronic. CHF is currently controlled. Latest ECHO performed and  demonstrates- Results for orders placed during the hospital encounter of 05/12/22    Echo    Interpretation Summary  · The left ventricle is moderately enlarged with eccentric hypertrophy and severely decreased systolic function.  · Grade II left ventricular diastolic dysfunction.  · The estimated PA systolic pressure is 59 mmHg.  · Severe right ventricular enlargement with mildly to moderately reduced right ventricular systolic function.  · Moderate left atrial enlargement.  · There is moderate pulmonary hypertension.  · Intermediate central venous pressure (8 mmHg).  · Mild aortic regurgitation.  · Moderate right atrial enlargement.  · Mild to moderate tricuspid regurgitation.  · Mild pulmonic regurgitation.  · The estimated ejection fraction is 20%.  · Moderate mitral regurgitation.  . Patient NPO; not on oral meds.  Monitor clinical status closely. Monitor on telemetry. Patient is off CHF pathway.  Monitor strict Is&Os and daily weights.   Last BNP reviewed- and noted below   No results for input(s): BNP, BNPTRIAGEBLO in the last 168 hours..      MERCEDES (acute kidney injury)  New problem.  Nephrology group consulting, continue dialysis as needed  Monitor renal function and UOP.  Avoid NSAID's and hypotension.      Creatinine   Date Value Ref Range Status   05/23/2022 2.7 (H) 0.5 - 1.4 mg/dL Final   05/22/2022 2.8 (H) 0.5 - 1.4 mg/dL Final   05/21/2022 3.1 (H) 0.5 - 1.4 mg/dL Final         Abrasion, nose w/o infection              Abrasion of the nose  Improved, nearly healed  Will follow as needed  Continue covering daily

## 2022-05-25 NOTE — PLAN OF CARE
Problem: Adult Inpatient Plan of Care  Goal: Plan of Care Review  Outcome: Ongoing, Progressing     Problem: Diabetes Comorbidity  Goal: Blood Glucose Level Within Targeted Range  Outcome: Ongoing, Progressing     Problem: Adjustment to Illness (Stroke, Ischemic/Transient Ischemic Attack)  Goal: Optimal Coping  Outcome: Ongoing, Progressing     Problem: Bowel Elimination Impaired (Stroke, Ischemic/Transient Ischemic Attack)  Goal: Effective Bowel Elimination  Outcome: Ongoing, Progressing     Problem: Cerebral Tissue Perfusion (Stroke, Ischemic/Transient Ischemic Attack)  Goal: Optimal Cerebral Tissue Perfusion  Outcome: Ongoing, Progressing     Problem: Cognitive Impairment (Stroke, Ischemic/Transient Ischemic Attack)  Goal: Optimal Cognitive Function  Outcome: Ongoing, Progressing     Problem: Communication Impairment (Stroke, Ischemic/Transient Ischemic Attack)  Goal: Improved Communication Skills  Outcome: Ongoing, Progressing     Problem: Functional Ability Impaired (Stroke, Ischemic/Transient Ischemic Attack)  Goal: Optimal Functional Ability  Outcome: Ongoing, Progressing

## 2022-05-25 NOTE — CARE UPDATE
05/25/22 0837   Patient Assessment/Suction   Level of Consciousness (AVPU) responds to voice   Respiratory Effort Unlabored   PRE-TX-O2   O2 Device (Oxygen Therapy) room air   SpO2 98 %   Pulse Oximetry Type Intermittent   $ Pulse Oximetry - Multiple Charge Pulse Oximetry - Multiple   Pulse 100   Resp 18

## 2022-05-26 ENCOUNTER — HOSPITAL ENCOUNTER (INPATIENT)
Facility: HOSPITAL | Age: 67
LOS: 8 days | Discharge: HOSPICE | DRG: 056 | End: 2022-06-03
Attending: HOSPITALIST | Admitting: HOSPITALIST
Payer: OTHER MISCELLANEOUS

## 2022-05-26 VITALS
WEIGHT: 168.44 LBS | SYSTOLIC BLOOD PRESSURE: 180 MMHG | BODY MASS INDEX: 26.44 KG/M2 | RESPIRATION RATE: 20 BRPM | DIASTOLIC BLOOD PRESSURE: 78 MMHG | HEART RATE: 90 BPM | HEIGHT: 67 IN | OXYGEN SATURATION: 97 % | TEMPERATURE: 99 F

## 2022-05-26 DIAGNOSIS — Z51.5 END OF LIFE CARE: ICD-10-CM

## 2022-05-26 LAB
BACTERIA BLD CULT: NORMAL
POCT GLUCOSE: 177 MG/DL (ref 70–110)
POCT GLUCOSE: 220 MG/DL (ref 70–110)
POCT GLUCOSE: 225 MG/DL (ref 70–110)
POCT GLUCOSE: 265 MG/DL (ref 70–110)

## 2022-05-26 PROCEDURE — 99231 SBSQ HOSP IP/OBS SF/LOW 25: CPT | Mod: ,,, | Performed by: FAMILY MEDICINE

## 2022-05-26 PROCEDURE — G0378 HOSPITAL OBSERVATION PER HR: HCPCS

## 2022-05-26 PROCEDURE — 27000923 HC TRIALYSIS CATHETER, ANY SIZE

## 2022-05-26 PROCEDURE — 25000003 PHARM REV CODE 250: Performed by: HOSPITALIST

## 2022-05-26 PROCEDURE — G0379 DIRECT REFER HOSPITAL OBSERV: HCPCS

## 2022-05-26 PROCEDURE — 12000002 HC ACUTE/MED SURGE SEMI-PRIVATE ROOM

## 2022-05-26 PROCEDURE — 92610 EVALUATE SWALLOWING FUNCTION: CPT

## 2022-05-26 PROCEDURE — 94761 N-INVAS EAR/PLS OXIMETRY MLT: CPT

## 2022-05-26 PROCEDURE — 99231 PR SUBSEQUENT HOSPITAL CARE,LEVL I: ICD-10-PCS | Mod: ,,, | Performed by: FAMILY MEDICINE

## 2022-05-26 RX ORDER — IBUPROFEN 200 MG
16 TABLET ORAL
Status: CANCELLED | OUTPATIENT
Start: 2022-05-26

## 2022-05-26 RX ORDER — POLYETHYLENE GLYCOL 3350 17 G/17G
17 POWDER, FOR SOLUTION ORAL 2 TIMES DAILY PRN
Status: DISCONTINUED | OUTPATIENT
Start: 2022-05-26 | End: 2022-05-29

## 2022-05-26 RX ORDER — NALOXONE HCL 0.4 MG/ML
0.02 VIAL (ML) INJECTION
Status: DISCONTINUED | OUTPATIENT
Start: 2022-05-26 | End: 2022-06-03 | Stop reason: HOSPADM

## 2022-05-26 RX ORDER — SODIUM CHLORIDE 0.9 % (FLUSH) 0.9 %
10 SYRINGE (ML) INJECTION
Status: DISCONTINUED | OUTPATIENT
Start: 2022-05-26 | End: 2022-06-03 | Stop reason: HOSPADM

## 2022-05-26 RX ORDER — HEPARIN SODIUM 1000 [USP'U]/ML
4000 INJECTION, SOLUTION INTRAVENOUS; SUBCUTANEOUS
Status: DISCONTINUED | OUTPATIENT
Start: 2022-05-26 | End: 2022-06-03 | Stop reason: HOSPADM

## 2022-05-26 RX ORDER — LORAZEPAM 2 MG/ML
1 INJECTION INTRAMUSCULAR EVERY 4 HOURS PRN
Status: DISCONTINUED | OUTPATIENT
Start: 2022-05-26 | End: 2022-06-03 | Stop reason: HOSPADM

## 2022-05-26 RX ORDER — BISACODYL 10 MG
10 SUPPOSITORY, RECTAL RECTAL DAILY PRN
Status: DISCONTINUED | OUTPATIENT
Start: 2022-05-26 | End: 2022-06-03 | Stop reason: HOSPADM

## 2022-05-26 RX ORDER — INSULIN ASPART 100 [IU]/ML
1-10 INJECTION, SOLUTION INTRAVENOUS; SUBCUTANEOUS EVERY 6 HOURS PRN
Status: CANCELLED | OUTPATIENT
Start: 2022-05-26

## 2022-05-26 RX ORDER — LABETALOL HYDROCHLORIDE 5 MG/ML
10 INJECTION, SOLUTION INTRAVENOUS EVERY 6 HOURS PRN
Status: CANCELLED | OUTPATIENT
Start: 2022-05-26

## 2022-05-26 RX ORDER — MORPHINE SULFATE 4 MG/ML
3 INJECTION, SOLUTION INTRAMUSCULAR; INTRAVENOUS EVERY 4 HOURS PRN
Status: DISCONTINUED | OUTPATIENT
Start: 2022-05-26 | End: 2022-05-26

## 2022-05-26 RX ORDER — HEPARIN SODIUM 1000 [USP'U]/ML
4000 INJECTION, SOLUTION INTRAVENOUS; SUBCUTANEOUS
Status: CANCELLED | OUTPATIENT
Start: 2022-05-26

## 2022-05-26 RX ORDER — ONDANSETRON 2 MG/ML
4 INJECTION INTRAMUSCULAR; INTRAVENOUS EVERY 8 HOURS PRN
Status: CANCELLED | OUTPATIENT
Start: 2022-05-26

## 2022-05-26 RX ORDER — IBUPROFEN 200 MG
24 TABLET ORAL
Status: CANCELLED | OUTPATIENT
Start: 2022-05-26

## 2022-05-26 RX ORDER — LABETALOL HYDROCHLORIDE 5 MG/ML
10 INJECTION, SOLUTION INTRAVENOUS EVERY 6 HOURS PRN
Status: DISCONTINUED | OUTPATIENT
Start: 2022-05-26 | End: 2022-06-03 | Stop reason: HOSPADM

## 2022-05-26 RX ORDER — SCOLOPAMINE TRANSDERMAL SYSTEM 1 MG/1
1 PATCH, EXTENDED RELEASE TRANSDERMAL
Status: CANCELLED | OUTPATIENT
Start: 2022-05-28

## 2022-05-26 RX ORDER — LORAZEPAM 2 MG/ML
1 INJECTION INTRAMUSCULAR EVERY 30 MIN PRN
Status: DISCONTINUED | OUTPATIENT
Start: 2022-05-26 | End: 2022-05-26

## 2022-05-26 RX ORDER — GLUCAGON 1 MG
1 KIT INJECTION
Status: DISCONTINUED | OUTPATIENT
Start: 2022-05-26 | End: 2022-06-03 | Stop reason: HOSPADM

## 2022-05-26 RX ORDER — MORPHINE SULFATE 4 MG/ML
3 INJECTION, SOLUTION INTRAMUSCULAR; INTRAVENOUS EVERY 4 HOURS PRN
Status: CANCELLED | OUTPATIENT
Start: 2022-05-26

## 2022-05-26 RX ORDER — MORPHINE SULFATE 4 MG/ML
4 INJECTION, SOLUTION INTRAMUSCULAR; INTRAVENOUS EVERY 4 HOURS PRN
Status: DISCONTINUED | OUTPATIENT
Start: 2022-05-26 | End: 2022-06-03 | Stop reason: HOSPADM

## 2022-05-26 RX ORDER — GLUCAGON 1 MG
1 KIT INJECTION
Status: CANCELLED | OUTPATIENT
Start: 2022-05-26

## 2022-05-26 RX ORDER — INSULIN ASPART 100 [IU]/ML
1-10 INJECTION, SOLUTION INTRAVENOUS; SUBCUTANEOUS EVERY 6 HOURS PRN
Status: DISCONTINUED | OUTPATIENT
Start: 2022-05-26 | End: 2022-06-03 | Stop reason: HOSPADM

## 2022-05-26 RX ORDER — NALOXONE HCL 0.4 MG/ML
0.02 VIAL (ML) INJECTION
Status: CANCELLED | OUTPATIENT
Start: 2022-05-26

## 2022-05-26 RX ORDER — POLYETHYLENE GLYCOL 3350 17 G/17G
17 POWDER, FOR SOLUTION ORAL 2 TIMES DAILY PRN
Status: CANCELLED | OUTPATIENT
Start: 2022-05-26

## 2022-05-26 RX ORDER — IBUPROFEN 200 MG
24 TABLET ORAL
Status: DISCONTINUED | OUTPATIENT
Start: 2022-05-26 | End: 2022-05-29

## 2022-05-26 RX ORDER — SODIUM CHLORIDE 0.9 % (FLUSH) 0.9 %
10 SYRINGE (ML) INJECTION
Status: CANCELLED | OUTPATIENT
Start: 2022-05-26

## 2022-05-26 RX ORDER — ACETAMINOPHEN 500 MG
1000 TABLET ORAL EVERY 6 HOURS PRN
Status: DISCONTINUED | OUTPATIENT
Start: 2022-05-26 | End: 2022-06-03 | Stop reason: HOSPADM

## 2022-05-26 RX ORDER — IBUPROFEN 200 MG
16 TABLET ORAL
Status: DISCONTINUED | OUTPATIENT
Start: 2022-05-26 | End: 2022-05-29

## 2022-05-26 RX ORDER — BISACODYL 10 MG
10 SUPPOSITORY, RECTAL RECTAL DAILY PRN
Status: CANCELLED | OUTPATIENT
Start: 2022-05-26

## 2022-05-26 RX ORDER — LORAZEPAM 2 MG/ML
1 INJECTION INTRAMUSCULAR EVERY 30 MIN PRN
Status: CANCELLED | OUTPATIENT
Start: 2022-05-26

## 2022-05-26 RX ORDER — ACETAMINOPHEN 500 MG
1000 TABLET ORAL EVERY 6 HOURS PRN
Status: CANCELLED | OUTPATIENT
Start: 2022-05-26

## 2022-05-26 RX ORDER — ONDANSETRON 2 MG/ML
4 INJECTION INTRAMUSCULAR; INTRAVENOUS EVERY 8 HOURS PRN
Status: DISCONTINUED | OUTPATIENT
Start: 2022-05-26 | End: 2022-06-03 | Stop reason: HOSPADM

## 2022-05-26 RX ORDER — SCOLOPAMINE TRANSDERMAL SYSTEM 1 MG/1
1 PATCH, EXTENDED RELEASE TRANSDERMAL
Status: DISCONTINUED | OUTPATIENT
Start: 2022-05-28 | End: 2022-06-03 | Stop reason: HOSPADM

## 2022-05-26 RX ADMIN — FAMOTIDINE 20 MG: 10 INJECTION INTRAVENOUS at 09:05

## 2022-05-26 RX ADMIN — SCOPALAMINE 1 PATCH: 1 PATCH, EXTENDED RELEASE TRANSDERMAL at 12:05

## 2022-05-26 RX ADMIN — INSULIN ASPART 2 UNITS: 100 INJECTION, SOLUTION INTRAVENOUS; SUBCUTANEOUS at 12:05

## 2022-05-26 RX ADMIN — INSULIN ASPART 2 UNITS: 100 INJECTION, SOLUTION INTRAVENOUS; SUBCUTANEOUS at 05:05

## 2022-05-26 NOTE — PROGRESS NOTES
Nephrology Progress Note        Patient Name: Marshall Flor  MRN: [id number]    Patient Class: IP- Inpatient   Admission Date: 5/12/2022  Length of Stay: 14 days  Date of Service: 5/26/2022    Attending Physician: Obi Mustafa MD  Primary Care Provider: Dorie Quan MD    Reason for Consult: dione/acidosis/sepsis/stroke/ckd3/fever/anemia/htn/chf    SUBJECTIVE:     HPI: 66-year-old male with history of HTN, CHF, diabetes presents to [facility] for decreased responsiveness, found to have a left gaze preference, then sent to [facility], where was found febrile, hypertensive. UA 3+ protein, 2+ glucose, WBC 3, moderate bacteria. Chest x-ray suggestive of pneumonia right upper lobe. MRI brain nonhemorrhagic infarction in the right anterolateral frontal lobe.Treated for sepsis ? PNA, ? Meningitis? and stroke.    5/14- BP all over the place, on 3L NC, UOP 481cc  5/15- BP still all over the place, on BIPAP, UOP 455cc- no labs drawn today and its after 1pm  5/16  Renal function continues to decline.  Mental status no better. Consent for dialysis obtained from patient's son yesterday by Dr. Busch.  I called to confirm and no answer (no voice mail available)  5/17  On bipap.  Not interactive.  Oliguric  5/18  S/p dialysis with 3 liter ultrafiltration.  Output not recorded last pm.  Not interactive  5/19     Not interactive. No distress  5/20.  Not interactive.  More interactive this am per report.  No distress.  325 cc UOP  5/21 VSS, plan HD PRN. Hospice? On TPN, good UO.  5/22  Still no decision from family re: comfort care.  Will replete electrolytes.  Scr 2.8, UOP 1.3L.  D/w bedside nurse.  5/23  2250 cc UOP.  Febrile episodes yesterday   5/24  Not interactive.  925 cc uop   Tmax 101   5/25  Spiking fevers.  Blood culture NGTD (4 days)  1950 cc   5/26  Looks the same.  1150 cc uop.  Over 24 hours since last fever.       Outpatient meds:  No current facility-administered medications on file prior to encounter.      Current Outpatient Medications on File Prior to Encounter   Medication Sig Dispense Refill    allopurinoL (ZYLOPRIM) 100 MG tablet   = 2 tab, Oral, Daily, # 60 tab, 5 Refill(s), Pharmacy: [id number], 167, cm, 07/27/21 8:24:00 CDT, Height/Length Measured, 91.5, kg, 12/15/20 11:18:00 CST, Weight Dosing      amLODIPine (NORVASC) 10 MG tablet 10 mg.      aspirin (ECOTRIN) 81 MG EC tablet Take 1 tablet (81 mg total) by mouth once daily. 30 tablet 3    atorvastatin (LIPITOR) 40 MG tablet   = 1 tab, Oral, Daily, # 90 tab, 1 Refill(s), Soft Stop, Pharmacy: [id number], 167, cm, 04/07/21 14:01:00 CDT, Height/Length Measured, 91.5, kg, 12/15/20 11:18:00 CST, Weight Dosing      carvediloL (COREG) 3.125 MG tablet Take 1 tablet (3.125 mg total) by mouth once daily. 30 tablet 1    cloNIDine (CATAPRES) 0.1 MG tablet Take 2 tablets (0.2 mg total) by mouth 3 (three) times daily. 180 tablet 11    clopidogreL (PLAVIX) 75 mg tablet Take 75 mg by mouth once daily.      furosemide (LASIX) 20 MG tablet Take 1 tablet (20 mg total) by mouth once daily. 30 tablet 1    hydrALAZINE (APRESOLINE) 50 MG tablet Take 1 tablet (50 mg total) by mouth every 12 (twelve) hours. 60 tablet 0    linaGLIPtin (TRADJENTA) 5 mg Tab tablet 5 mg.      NOVOLOG MIX 70-30FLEXPEN U-100 100 unit/mL (70-30) InPn pen Inject into the skin.      sodium bicarbonate 650 MG tablet Take 1 tablet (650 mg total) by mouth 2 (two) times daily. 60 tablet 11       Scheduled meds:   cloNIDine 0.2 mg/24 hr td ptwk  1 patch Transdermal Q7 Days    famotidine (PF)  20 mg Intravenous Daily    insulin detemir U-100  10 Units Subcutaneous BID    scopolamine  1 patch Transdermal Q3 Days       Infusions:      PRN meds:  acetaminophen, bisacodyL, dextrose 10%, dextrose 10%, glucagon (human recombinant), glucose, glucose, heparin (porcine), hydrALAZINE, insulin aspart U-100, labetaloL, morphine, naloxone, ondansetron, oxyCODONE, polyethylene glycol,  sodium chloride 0.9%    Review of Systems:  Neg    OBJECTIVE:     Vital Signs and IO (Last 24H):  Temp:  [97.5 °F (36.4 °C)-99.4 °F (37.4 °C)]   Pulse:  [90-99]   Resp:  [16-20]   BP: (147-185)/(67-87)   SpO2:  [96 %-100 %]   I/O last 3 completed shifts:  In: 1939 [NG/GT:1939]  Out: 2000 [Urine:2000]    Wt Readings from Last 5 Encounters:   05/25/22 76.4 kg (168 lb 6.9 oz)   05/11/22 87.5 kg (193 lb)   05/12/22 87.5 kg (192 lb 14.4 oz)   04/26/22 95.3 kg (210 lb)   02/02/22 91.1 kg (200 lb 13.4 oz)     Physical Exam:  Constitutional:  No apparent distress  Heart: rrr, no m/r/g, wwp, no edema  Lungs: coarse, no w/r/r/c, no lb  Abdomen: s/nt/nd, +BS    Body mass index is 26.38 kg/m².    Laboratory:  Recent Labs   Lab 05/21/22  0441 05/22/22  0600 05/23/22  0524   * 133* 134*   K 3.5 3.2* 3.6    98 101   CO2 21* 21* 21*   BUN 41* 56* 63*   CREATININE 3.1* 2.8* 2.7*   ESTGFRAFRICA 23* 26* 28*   EGFRNONAA 20* 23* 24*   * 181* 197*       Recent Labs   Lab 05/21/22  0441 05/22/22  0600 05/23/22  0524   CALCIUM 8.5* 8.5* 8.8   ALBUMIN 2.4* 2.4*  --    PHOS 3.3 2.5* 2.7   MG 1.5* 1.8 1.7       Recent Labs   Lab 04/12/21  0952   PTH, Intact 273.0 H       Recent Labs   Lab 05/24/22  0558 05/24/22  1134 05/24/22  1647 05/24/22  2156 05/25/22  0807 05/25/22  1140 05/25/22  1806 05/25/22 1959 05/26/22  0032 05/26/22  0553   POCTGLUCOSE 250* 286* 215* 100 245* 268* 285* 253* 225* 177*       Recent Labs   Lab 04/12/21  0952 05/12/22  1210 05/13/22  0341   Hemoglobin A1C 7.8 H 8.8 H 9.1 H       Recent Labs   Lab 05/21/22  0441 05/22/22  0600 05/23/22  0524   WBC 5.32 6.38 7.07   HGB 12.0* 11.2* 11.6*   HCT 36.1* 34.1* 34.0*    236 240   MCV 74* 75* 73*   MCHC 33.2 32.8 34.1   MONO 10.7  0.6 13.0  0.8 13.3  0.9       Recent Labs   Lab 05/21/22  0441 05/22/22  0600   BILITOT 0.3 0.3   PROT 6.6 6.5   ALBUMIN 2.4* 2.4*   ALKPHOS 78 74   ALT 29 42   AST 25 31       Recent Labs   Lab 05/11/22  2300  05/12/22  1400 05/21/22 2000   Color, UA Yellow Yellow Yellow   Appearance, UA Clear Clear Clear   pH, UA 6.0 6.0 6.0   Specific Hope, UA 1.020 1.020 1.025   Protein, UA 3+ A 3+ A 2+ A   Glucose, UA 2+ A 3+ A 2+ A   Ketones, UA Negative Negative Negative   Urobilinogen, UA Negative Negative Negative   Bilirubin (UA) Negative Negative Negative   Occult Blood UA 2+ A 2+ A 2+ A   Nitrite, UA Negative Negative Negative   RBC, UA 10 H 20 H 87 H   WBC, UA 3 6 H 3   Bacteria Moderate A Few A Occasional   Hyaline Casts, UA 1 0 0       Recent Labs   Lab 05/12/22 2055 05/16/22  1525 05/17/22  1102   POC PH 7.417 7.290 LL 7.381   POC PCO2 31.1 L 34.7 L 41.6   POC HCO3 20.0 L 16.7 L 24.7   POC PO2 84 161 H 102 H   POC SATURATED O2 97 99 98   POC BE -5 -10 0   Sample ARTERIAL ARTERIAL ARTERIAL       Microbiology Results (last 7 days)     Procedure Component Value Units Date/Time    Blood culture [[id number]] Collected: 05/21/22 1223    Order Status: Completed Specimen: Blood from Antecubital, Right Arm Updated: 05/25/22 1612     Blood Culture, Routine No Growth to date      No Growth to date      No Growth to date      No Growth to date      No Growth to date    CSF culture [[id number]] Collected: 05/16/22 1404    Order Status: Completed Specimen: CSF (Spinal Fluid) from CSF Tap, Tube 2 Updated: 05/24/22 0718     CSF CULTURE No Growth     Gram Stain Result No WBC's, epithelial cells or organisms seen        ASSESSMENT/PLAN:     Non-oliguric MERCEDES due to ATN; baseline CKD III; dialysis started 5/16  - initiated hemodialysis for uremic clearance on 5/16, PRN for now  - renal function improving.  No hd needed today.  --continue daily reassessment for dialytic need    HTN/Acute CVA/CHF  - labile bp.    --keep map above 55    Hypernatremia--better  Hypokalemia--better  HypoMg--better  Acidosis--better after dialysis  Hypokalemia--replete    Secondary HPT  - no active issues    Anemia of CKD  - stable    Thank you for allowing us to  participate in the care of your patient!   We will follow the patient and provide recommendations as needed.       Patient care time was spent personally by me on the following activities: >  min  · Obtaining a history.  · Examination of patient.  · Providing medical care at the patients bedside.  · Developing a treatment plan with patient or surrogate and bedside caregivers.  · Ordering and reviewing laboratory studies, radiographic studies, pulse oximetry.  · Ordering and performing treatments and interventions.  · Evaluation of patient's response to treatment.  · Discussions with consultants while on the unit and immediately available to the patient.  · Re-evaluation of the patient's condition.  · Documentation in the medical record.     Ariel Little MD      [facility]  [address] [place] [address] [id number] [phone] - tel  [phone] - fax    5/26/2022

## 2022-05-26 NOTE — SUBJECTIVE & OBJECTIVE
Interval History:  Patient seen and examined.  No acute events overnight.  Continues to have wet productive cough, along with multiple secretions through his nose and mouth.  Family meeting today with the patient's nephew, c=/alphonso, myself, Dr. Ceja, and the patient's brother.    Review of Systems   Unable to perform ROS: Patient nonverbal   Objective:     Vital Signs (Most Recent):  Temp: 97.5 °F (36.4 °C) (05/25/22 1614)  Pulse: 90 (05/25/22 1614)  Resp: 16 (05/25/22 1614)  BP: (!) 169/76 (05/25/22 1614)  SpO2: 99 % (05/25/22 1614)   Vital Signs (24h Range):  Temp:  [97.5 °F (36.4 °C)-101.2 °F (38.4 °C)] 97.5 °F (36.4 °C)  Pulse:  [] 90  Resp:  [16-23] 16  SpO2:  [97 %-100 %] 99 %  BP: (124-199)/(57-97) 169/76     Weight: 76.4 kg (168 lb 6.9 oz)  Body mass index is 26.38 kg/m².    Intake/Output Summary (Last 24 hours) at 5/25/2022 2014  Last data filed at 5/25/2022 1600  Gross per 24 hour   Intake 1639 ml   Output 1250 ml   Net 389 ml        Physical Exam  Vitals and nursing note reviewed.   Constitutional:       General: He is not in acute distress.     Appearance: He is not ill-appearing.      Comments: Nonverbal, encephalopathic.   HENT:      Nose:      Comments: Nasogastric tube in place  Eyes:      Pupils: Pupils are equal, round, and reactive to light.   Neck:      Vascular: No JVD.   Cardiovascular:      Rate and Rhythm: Normal rate and regular rhythm.      Heart sounds: Normal heart sounds.   Pulmonary:      Effort: Pulmonary effort is normal.      Breath sounds: Normal breath sounds.   Abdominal:      General: Bowel sounds are normal. There is no distension.      Palpations: Abdomen is soft.      Tenderness: There is no abdominal tenderness.   Musculoskeletal:         General: No deformity.   Lymphadenopathy:      Cervical: No cervical adenopathy.   Skin:     Coloration: Skin is not pale.      Findings: No rash.   Neurological:      Mental Status: He is disoriented.      Comments:  Encephalopathic, nonverbal at baseline.       Significant Labs: All pertinent labs within the past 24 hours have been reviewed.    Significant Imaging: I have reviewed all pertinent imaging results/findings within the past 24 hours.

## 2022-05-26 NOTE — PROGRESS NOTES
"Received call from nephew, Kraig, late this afternoon about direction of care. Kraig along with patient two sons spoke at length today about hospice. Kraig is reassured of a meaningful recovery based on signs of improvement today. "He shook my hand". 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 Feel God has a plan with him and "not ready to give up yet".   Writer reviewed QOL issues and anticipated distress from PEG feeding, aspiration/PNA etc, agitation leading to potentially pulling of PEG. He acknowledges this risk and still wants to wait some more days to see how he does.   Discussed with primary team.       After conversation writer learns that family has already signed hospice paperwork. Discussed with primary team and decision made to keep on hospice but continue tube feeds for now as patient not benefiting from other interventions at this time. Will continue to follow and engage family.   "

## 2022-05-26 NOTE — CHAPLAIN
Attempted to visit pt, but speech therapy was with him.  will visit again tomorrow. Lord, in your mercy.

## 2022-05-26 NOTE — PLAN OF CARE
Pt admitted to [facility] with [facility] for uncontrolled fevers and respiratory secretions/coughing s/p CVA. Per pt's nephew, Kraig [facility] [place]. He stated that the [facility] nurse told him they would arrange if pt does not pass during GIP.          05/26/22 1554   Final Note   Assessment Type Final Discharge Note   Anticipated Discharge Disposition HospiceMedic   Post-Acute Status   Post-Acute Authorization Hospice   Hospice Status Set-up Complete/Auth obtained   Discharge Delays None known at this time

## 2022-05-26 NOTE — PLAN OF CARE
Problem: SLP  Goal: SLP Goal  Description: 1. Pt will participate in ongoing swallow evaluation pending goals of care  Outcome: Ongoing, Progressing       Clinical swallow evaluation completed. REC pt remain NPO with non oral means of nutrition/hydration. Will continue to follow pending decision on goals of care

## 2022-05-26 NOTE — PLAN OF CARE
Problem: Adjustment to Illness (Stroke, Ischemic/Transient Ischemic Attack)  Goal: Optimal Coping  Outcome: Ongoing, Progressing     Problem: Cerebral Tissue Perfusion (Stroke, Ischemic/Transient Ischemic Attack)  Goal: Optimal Cerebral Tissue Perfusion  Outcome: Ongoing, Progressing     Problem: Cognitive Impairment (Stroke, Ischemic/Transient Ischemic Attack)  Goal: Optimal Cognitive Function  Outcome: Ongoing, Progressing

## 2022-05-26 NOTE — CARE UPDATE
05/25/22 2024   PRE-TX-O2   O2 Device (Oxygen Therapy) room air   SpO2 99 %   Pulse Oximetry Type Intermittent

## 2022-05-26 NOTE — CARE UPDATE
05/26/22 0917   Patient Assessment/Suction   Level of Consciousness (AVPU) responds to voice   Respiratory Effort Unlabored   PRE-TX-O2   O2 Device (Oxygen Therapy) room air   SpO2 96 %   Pulse Oximetry Type Intermittent   $ Pulse Oximetry - Multiple Charge Pulse Oximetry - Multiple

## 2022-05-26 NOTE — ASSESSMENT & PLAN NOTE
Advance Care Planning     Date: 05/25/2022    Today a meeting took place: Exam Room    Patient Participation: Patient is unable to participate     Attendees (Name and  Relationship to patient): Health care power of : Kraig    Staff attendees (Name and  Role): Dr. Ceja, Myself    ACP Conversation (General): Other (specify below) -  discussion had with the patient's nephew, myself and palliative medicine.  On this discussion, we discussed the patient's wishes in the neck he stated the patient would not want to be in a prolonged stay of immobility and unable to interact with the well meaningfully.  He stated that in this condition, the patient would rather not live.     Code Status: DNR; status confirmed/order placed in chart    ACP Documents: None    Goals of care: The family endorses that what is most important right now is to focus on symptom/pain control and quality of life, even if it means sacrificing a little time    Accordingly, we have decided that the best plan to meet the patient's goals includes discontinuing treatment      Recommendations/  Follow-up tasks:  Given given that the patient is being maintained on artificial life support, the patient's family has elected to discontinue tube feedings, and pursue a plan for inpatient hospice at this point in time.    Length of ACP   conversation in minutes: 48 minutes

## 2022-05-26 NOTE — ASSESSMENT & PLAN NOTE
Patient's FSGs are controlled on current medication regimen.  Last A1c reviewed-   Lab Results   Component Value Date    HGBA1C 9.1 (H) 05/13/2022     Most recent fingerstick glucose reviewed-   Recent Labs   Lab 05/25/22  0807 05/25/22  1140 05/25/22  1806 05/25/22  1959   POCTGLUCOSE 245* 268* 285* 253*     Current correctional scale  Low  Maintain anti-hyperglycemic dose as follows-   Antihyperglycemics (From admission, onward)            Start     Stop Route Frequency Ordered    05/24/22 1905  insulin aspart U-100 pen 1-10 Units         -- SubQ Every 6 hours PRN 05/24/22 1906    05/19/22 2100  insulin detemir U-100 pen 10 Units         -- SubQ 2 times daily 05/19/22 1121        Hold Oral hypoglycemics while patient is in the hospital.

## 2022-05-26 NOTE — PLAN OF CARE
Hospice Referral sent out via [facility].       05/26/22 09   Post-Acute Status   Post-Acute Authorization Hospice   Hospice Status Referrals Sent   Discharge Delays   (Hospice set up)   Discharge Plan   Discharge Plan A Inpatient Hospice   Discharge Plan B Hospice/home

## 2022-05-26 NOTE — PLAN OF CARE
Problem: Adult Inpatient Plan of Care  Goal: Plan of Care Review  Outcome: Ongoing, Progressing     Problem: Adult Inpatient Plan of Care  Goal: Optimal Comfort and Wellbeing  Outcome: Ongoing, Progressing     Problem: Diabetes Comorbidity  Goal: Blood Glucose Level Within Targeted Range  Outcome: Ongoing, Progressing     Problem: Skin Injury Risk Increased  Goal: Skin Health and Integrity  Outcome: Ongoing, Progressing     Problem: Cognitive Impairment (Stroke, Ischemic/Transient Ischemic Attack)  Goal: Optimal Cognitive Function  Outcome: Ongoing, Progressing     Problem: Sensorimotor Impairment (Stroke, Ischemic/Transient Ischemic Attack)  Goal: Improved Sensorimotor Function  Outcome: Ongoing, Progressing     Problem: Fall Injury Risk  Goal: Absence of Fall and Fall-Related Injury  Outcome: Ongoing, Progressing

## 2022-05-26 NOTE — PT/OT/SLP EVAL
Speech Language Pathology Evaluation  Bedside Swallow    Patient Name:  Marshall Flor   MRN:  [id number]  Admitting Diagnosis: CVA (cerebral vascular accident)    Recommendations:                 General Recommendations:  Ongoing clinical swallow evaluation.   Diet recommendations:  NPO, NPO   Aspiration Precautions: Frequent oral care   General Precautions: Standard, aphasia, aspiration, fall, vision impaired, NPO    History:     Past Medical History:   Diagnosis Date    CHF (congestive heart failure)     Diabetes mellitus     Hypertension     Stroke        Past Surgical History:   Procedure Laterality Date    EYE SURGERY      LEFT EYE       Social History: Patient lives with sons.    Prior Intubation HX:  None this admit    Modified Barium Swallow: None in Epic    Imaging:  X-Ray Abdomen AP 1 View   Final Result      NG tube is been advanced with its tip now oriented toward the right side of the abdomen overlying the antrum of the stomach.         Electronically signed by: Kyrie Montgomery   Date:    05/24/2022   Time:    22:39      X-Ray Abdomen AP 1 View   Final Result      NG tube tip near GE junction.  Consider advancement by at least 5-6 cm.         Electronically signed by: Kyrie Montgomery   Date:    05/24/2022   Time:    21:21      CT Head Without Contrast   Final Result   Abnormal      1. Essentially stable subacute left frontal hemorrhagic infarction without changes of hydrocephalus or midline shift.   2. Cortical atrophy with periventricular deep white matter change consistent with chronic small vessel ischemic disease.   3. Chronic right occipital and cerebellar foci of encephalomalacia.   This report was flagged in Epic as abnormal.         Electronically signed by: Darnell Ascencio   Date:    05/22/2022   Time:    11:11      CT Head Without Contrast   Final Result   Abnormal      1. Evolving subacute left frontal infarction with subacute hemorrhagic transformation which has slightly increased in size  over the interval with persistent mild surrounding mass effect without midline shift.   2. Cortical atrophy with periventricular deep white matter change consistent with chronic small vessel ischemic disease.   3. Chronic right occipital encephalomalacia.   Close interval follow-up is recommended.      This report was flagged in Epic as abnormal.         Electronically signed by: Darnell Ascencio   Date:    05/21/2022   Time:    11:57      CT Head Without Contrast   Final Result      Moderate size subacute infarct of the left frontal parietal lobe unchanged in size but with hemorrhagic transformation.  This is unchanged in size from the most recent head CT.  There is underlying mild brain atrophy with deep white matter ischemic changes.  Old infarct of the right posterior cerebral distribution in the right occipital lobe and old lacunar infarct on the right are noted.  Free hemorrhage or hematoma is not noted.         Electronically signed by: Kyrie Damon MD   Date:    05/20/2022   Time:    13:48      X-Ray Chest AP Portable   Final Result      Mild cardiomegaly. Double-lumen central line and NG tubes in good position         Electronically signed by: Kyrie Damon MD   Date:    05/20/2022   Time:    13:28      CT Head Without Contrast   Final Result      Left frontal subacute infarction with petechial hemorrhagic transformation, unchanged from prior.         Electronically signed by: Ramy Whitmore   Date:    05/18/2022   Time:    22:29      CT Head Without Contrast   Final Result      1. Subacute infarction in the left frontal lobe with petechial hemorrhagic transformation.  No significant mass effect or midline shift.   2. Remote infarctions within the right occipital lobe and the right thalamus.         Electronically signed by: Ramy Whitmore   Date:    05/17/2022   Time:    21:25      FL Lumbar Puncture (xpd)   Final Result      Successful lumbar puncture under fluoroscopic guidance as detailed above.          Electronically signed by: Raudel Dupont   Date:    05/16/2022   Time:    15:30      X-Ray Chest AP Portable   Final Result      Tip of central venous catheter at the level of the distal SVC.         Electronically signed by: Bhavna Bryant MD   Date:    05/16/2022   Time:    12:15      X-Ray Chest AP Portable   Final Result   Abnormal      Large central line noted ending at the level of the right atrium.  Consider retraction into the superior vena cava.  Cardiomegaly.  No pneumothorax seen.      This report was flagged in Epic as abnormal.         Electronically signed by: Kyrie Damon MD   Date:    05/16/2022   Time:    10:02      X-Ray Chest AP Portable   Final Result      No definite acute process.         Electronically signed by: Darnell Matamoros MD   Date:    05/14/2022   Time:    13:38      MRI Brain Without Contrast   Final Result   Abnormal      1. This is a very limited evaluation but the study is abnormal.  There is a nonhemorrhagic infarction in the anterior lateral left frontal lobe.   2. There is a remote infarction with encephalomalacia and gliosis in the medial right occipital lobe.  There are also remote infarctions in the cerebellar hemispheres as well as in the right thalamus.   This report was flagged in Epic as abnormal.         Electronically signed by: Nicanor Roger MD   Date:    05/12/2022   Time:    12:09           Prior diet: Presumed regular/thin.    Occupation/hobbies/homemaking: No family at bedside. Per chart review, pts PLOF was independent.    Subjective     Nonverbal    Pain/Comfort:  · Pain Rating 1:  (Does not appear to be in pain)    Respiratory Status: Room air    Objective:   Pt seen for clinical swallow evaluation. Asleep upon entrance into room. NGT to L nare noted. Opens eyes to voice. Does not attend to therapist with eye contact. +L gaze preference noted. Open mouth posture. Appears to be holding oral secretion in oral cavity with some anterior loss noted on  ALYSSIA Pt is nonverbal, does not follow commands, no nonverbal attempts to communicate. Appears to present with global aphasia.     Oral Musculature Evaluation  Oral Musculature: unable to assess due to poor participation/comprehension, general weakness  Dentition:  (no upper dentition, scattered lower)  Secretion Management: oral holding, left corner drooling    Bedside Swallow Eval:   Consistencies Assessed:  · Thin liquids --ice chips x1, tip tsp of water x1     Oral Phase:   · poor oral readiness  · Anterior loss  · Decreased closure around utensil--once ice chip placed in mouth, pt seals lips and manipulates bolus (slowly)  · Drooling    Pharyngeal Phase:   · change in breath sounds noted with ice chip trials  · Coughing/choking--delayed cough following tip tsp of water    Compensatory Strategies  · None    Assessment:     Marshall Flor is a 66 y.o. male with an SLP diagnosis of Aphasia, Dysphagia and Visio-Spatial Impairment. Clinical swallow evaluation completed. REC pt remain NPO with non oral means of nutrition/hydration. Will continue to follow pending decision on goals of care .    Goals:   Multidisciplinary Problems     SLP Goals        Problem: SLP    Goal Priority Disciplines Outcome   SLP Goal     SLP Ongoing, Progressing   Description: 1. Pt will participate in ongoing swallow evaluation pending goals of care                   Plan:     · Patient to be seen:  3 x/week   · Plan of Care expires:  06/09/22  · Plan of Care reviewed with:  patient (however, no evidence of understanding is noted)   · SLP Follow-Up:  Yes       Discharge recommendations:      Barriers to Discharge:  Level of Skilled Assistance Needed , and Safety Awareness .    Time Tracking:     SLP Treatment Date:   05/26/22  Speech Start Time:  1347  Speech Stop Time:  1400     Speech Total Time (min):  13 min    Billable Minutes: Eval Swallow and Oral Function 13 and Total Time 13    05/26/2022

## 2022-05-26 NOTE — PROGRESS NOTES
[facility] Hospital Medicine  Progress Note    Patient Name: Marshall Flor  MRN: [id number]  Patient Class: IP- Inpatient   Admission Date: 5/12/2022  Length of Stay: 13 days  Attending Physician: Obi Mustafa MD  Primary Care Provider: Dorie Quan MD        Subjective:     Principal Problem:CVA (cerebral vascular accident)        HPI:  66-year-old male with history of CHF, diabetes presents to [facility] for decreased responsiveness found to have a fever, left gaze preference with nuchal rigidity found to have meningitis in addition to being found to have an acute CVA. Beyond this he has an MERCEDES.          Overview/Hospital Course:  66-year-old male with history of CHF, diabetes presents to [facility] for decreased responsiveness ultimately becoming unresponsive, fever and left gaze preference found to have an acute CVA in addition to an MERCEDES.    The patient was started on aspirin rectally.  Over the 1st week the patient remained nonresponsive and would only withdrawal to pain.  One point he was found to have an intracranial hemorrhage which demonstrated stability thereafter on follow-up CT.  Neurology ultimately recommended resumption of aspirin.  Briefly the patient is neurologic status improved such that he was alert and following simple commands inconsistently.  On the morning of 05/21/2022 he was again unresponsive and so repeat CT was performed which showed mild worsening of the hemorrhage.  Follow-up CT the evening of 5/21 according to the neurologist's read was stable.  The patient will remain off aspirin and Plavix on subQ heparin at this point.    By the evening of 05/21/2022 the patient was again alert and following simple commands.    Beyond this initially there was concern the patient had meningitis.  He was started on broad-spectrum antibiotics.  Ultimately lumbar puncture was able to be performed and at that point meningitis was ruled out.    Course was complicated by the development  of a pneumonia which has been fully treated.    He presented also with an acute kidney injury; nephrology is following.  The patient has intermittently required dialysis with specific note that uremia would leave to cognitive depression.    Palliative care team has been consulted. Family continues to consider electing only comfort-driven measures.       Interval History:  Patient seen and examined.  No acute events overnight.  Continues to have wet productive cough, along with multiple secretions through his nose and mouth.  Family meeting today with the patient's nephew, c=/alphonso, myself, Dr. Ceja, and the patient's brother.    Review of Systems   Unable to perform ROS: Patient nonverbal   Objective:     Vital Signs (Most Recent):  Temp: 97.5 °F (36.4 °C) (05/25/22 1614)  Pulse: 90 (05/25/22 1614)  Resp: 16 (05/25/22 1614)  BP: (!) 169/76 (05/25/22 1614)  SpO2: 99 % (05/25/22 1614)   Vital Signs (24h Range):  Temp:  [97.5 °F (36.4 °C)-101.2 °F (38.4 °C)] 97.5 °F (36.4 °C)  Pulse:  [] 90  Resp:  [16-23] 16  SpO2:  [97 %-100 %] 99 %  BP: (124-199)/(57-97) 169/76     Weight: 76.4 kg (168 lb 6.9 oz)  Body mass index is 26.38 kg/m².    Intake/Output Summary (Last 24 hours) at 5/25/2022 2014  Last data filed at 5/25/2022 1600  Gross per 24 hour   Intake 1639 ml   Output 1250 ml   Net 389 ml        Physical Exam  Vitals and nursing note reviewed.   Constitutional:       General: He is not in acute distress.     Appearance: He is not ill-appearing.      Comments: Nonverbal, encephalopathic.   HENT:      Nose:      Comments: Nasogastric tube in place  Eyes:      Pupils: Pupils are equal, round, and reactive to light.   Neck:      Vascular: No JVD.   Cardiovascular:      Rate and Rhythm: Normal rate and regular rhythm.      Heart sounds: Normal heart sounds.   Pulmonary:      Effort: Pulmonary effort is normal.      Breath sounds: Normal breath sounds.   Abdominal:      General: Bowel sounds are normal. There  is no distension.      Palpations: Abdomen is soft.      Tenderness: There is no abdominal tenderness.   Musculoskeletal:         General: No deformity.   Lymphadenopathy:      Cervical: No cervical adenopathy.   Skin:     Coloration: Skin is not pale.      Findings: No rash.   Neurological:      Mental Status: He is disoriented.      Comments: Encephalopathic, nonverbal at baseline.       Significant Labs: All pertinent labs within the past 24 hours have been reviewed.    Significant Imaging: I have reviewed all pertinent imaging results/findings within the past 24 hours.      Assessment/Plan:      * CVA (cerebral vascular accident)  Proven on MRI.  Old infarcts seen as well.  Neurologist consulting.  Getting aspirin suppositories.  NIHSS 22- very little hope for recovery.  Currently discussing with the family about potential palliative medicine and hospice.      Encephalopathy  Etiology likely related to devastating stroke.  Will monitor neuro exam.  Neurologist consulting.  Discussed with family about palliative care.      Abrasion, nose w/o infection        Goals of care, counseling/discussion  Advance Care Planning     Date: 05/25/2022    Today a meeting took place: Exam Room    Patient Participation: Patient is unable to participate     Attendees (Name and  Relationship to patient): Health care power of : Granton    Staff attendees (Name and  Role): Dr. Ceja, Myself    ACP Conversation (General): Other (specify below) -  discussion had with the patient's nephew, myself and palliative medicine.  On this discussion, we discussed the patient's wishes in the neck he stated the patient would not want to be in a prolonged stay of immobility and unable to interact with the well meaningfully.  He stated that in this condition, the patient would rather not live.     Code Status: DNR; status confirmed/order placed in chart    ACP Documents: None    Goals of care: The family endorses that what is most  important right now is to focus on symptom/pain control and quality of life, even if it means sacrificing a little time    Accordingly, we have decided that the best plan to meet the patient's goals includes discontinuing treatment      Recommendations/  Follow-up tasks:  Given given that the patient is being maintained on artificial life support, the patient's family has elected to discontinue tube feedings, and pursue a plan for inpatient hospice at this point in time.    Length of ACP   conversation in minutes: 48 minutes          MERCEDES (acute kidney injury)  New problem.  Nephrology group consulting, continue dialysis as needed  Monitor renal function and UOP.  Avoid NSAID's and hypotension.      Creatinine   Date Value Ref Range Status   05/23/2022 2.7 (H) 0.5 - 1.4 mg/dL Final   05/22/2022 2.8 (H) 0.5 - 1.4 mg/dL Final   05/21/2022 3.1 (H) 0.5 - 1.4 mg/dL Final         Chronic combined systolic and diastolic CHF (congestive heart failure)  Patient is identified as having Combined Systolic and Diastolic heart failure that is Chronic. CHF is currently controlled. Latest ECHO performed and demonstrates- Results for orders placed during the hospital encounter of 05/12/22    Echo    Interpretation Summary  · The left ventricle is moderately enlarged with eccentric hypertrophy and severely decreased systolic function.  · Grade II left ventricular diastolic dysfunction.  · The estimated PA systolic pressure is 59 mmHg.  · Severe right ventricular enlargement with mildly to moderately reduced right ventricular systolic function.  · Moderate left atrial enlargement.  · There is moderate pulmonary hypertension.  · Intermediate central venous pressure (8 mmHg).  · Mild aortic regurgitation.  · Moderate right atrial enlargement.  · Mild to moderate tricuspid regurgitation.  · Mild pulmonic regurgitation.  · The estimated ejection fraction is 20%.  · Moderate mitral regurgitation.  . Patient NPO; not on oral meds.  Monitor  clinical status closely. Monitor on telemetry. Patient is off CHF pathway.  Monitor strict Is&Os and daily weights.   Last BNP reviewed- and noted below   No results for input(s): BNP, BNPTRIAGEBLO in the last 168 hours..      Essential hypertension  Controlled.  Monitor pressure.  Patient is NPO; cannot take oral meds.      Coronary artery disease involving native coronary artery of native heart  Stable.  Getting aspirin suppositories.      Hyperlipidemia  Chronic.  Stable.      Type 2 diabetes mellitus with stage 4 chronic kidney disease, without long-term current use of insulin  Patient's FSGs are controlled on current medication regimen.  Last A1c reviewed-   Lab Results   Component Value Date    HGBA1C 9.1 (H) 05/13/2022     Most recent fingerstick glucose reviewed-   Recent Labs   Lab 05/25/22  0807 05/25/22  1140 05/25/22  1806 05/25/22  1959   POCTGLUCOSE 245* 268* 285* 253*     Current correctional scale  Low  Maintain anti-hyperglycemic dose as follows-   Antihyperglycemics (From admission, onward)            Start     Stop Route Frequency Ordered    05/24/22 1905  insulin aspart U-100 pen 1-10 Units         -- SubQ Every 6 hours PRN 05/24/22 1906    05/19/22 2100  insulin detemir U-100 pen 10 Units         -- SubQ 2 times daily 05/19/22 1121        Hold Oral hypoglycemics while patient is in the hospital.        CKD (chronic kidney disease) stage 4, GFR 15-29 ml/min  Renal function is worse.  Monitor BMP.  Avoid NSAID's.    Creatinine   Date Value Ref Range Status   05/23/2022 2.7 (H) 0.5 - 1.4 mg/dL Final   05/22/2022 2.8 (H) 0.5 - 1.4 mg/dL Final           VTE Risk Mitigation (From admission, onward)         Ordered     heparin (porcine) injection 4,000 Units  As needed (PRN)         05/16/22 1841     IP VTE HIGH RISK PATIENT  Once         05/12/22 1351     Place sequential compression device  Until discontinued         05/12/22 1351     Place sequential compression device  Until discontinued          05/12/22 1052                Discharge Planning   STAR: 5/27/2022     Code Status: DNR   Is the patient medically ready for discharge?:     Reason for patient still in hospital (select all that apply): Treatment  Discharge Plan A: Skilled Nursing Facility                  Obi Mustafa MD  Department of Hospital Medicine   [facility]

## 2022-05-26 NOTE — PLAN OF CARE
14:21 - CM received call from Iraida at [facility] stating they were unable to reach pt's family. CM provided her with pt's nephew, Kraig, phone number.    15:30 CM called Iraida back at [facility] to follow up.She stated they reached Kraig and sent admission paperwork electronically to him. She also stated Hospice nurse should be at bedside for admission and will then send paperwork to Dr. Mustafa for signature.    Dr. Mustafa notified of conversation.

## 2022-05-26 NOTE — PROGRESS NOTES
Palliative physician reaching out to patient family representative, Kraig, via phone. He has spoken to all family members except one aunt which he hopes to speak to today. Writer reviews previously agreed upon hospice decision and he is in agreement. He does mention further meetings which writer is agreeable to but does not protest moving forward with hospice plan today in the hospital. It is very possible that patient will outlive 5 day in house hospice and further plans will need to be made about where he will go afterwards, whether it be home or a facility.   Has writer's cell for further questions.   Discussed with primary team.

## 2022-05-27 LAB — POCT GLUCOSE: 302 MG/DL (ref 70–110)

## 2022-05-27 PROCEDURE — 25000003 PHARM REV CODE 250: Performed by: HOSPITALIST

## 2022-05-27 PROCEDURE — 99231 PR SUBSEQUENT HOSPITAL CARE,LEVL I: ICD-10-PCS | Mod: ,,, | Performed by: FAMILY MEDICINE

## 2022-05-27 PROCEDURE — 99231 SBSQ HOSP IP/OBS SF/LOW 25: CPT | Mod: ,,, | Performed by: FAMILY MEDICINE

## 2022-05-27 PROCEDURE — 12000002 HC ACUTE/MED SURGE SEMI-PRIVATE ROOM

## 2022-05-27 PROCEDURE — 63600175 PHARM REV CODE 636 W HCPCS: Performed by: HOSPITALIST

## 2022-05-27 RX ORDER — CLONIDINE 0.3 MG/24H
1 PATCH, EXTENDED RELEASE TRANSDERMAL
Status: DISCONTINUED | OUTPATIENT
Start: 2022-05-28 | End: 2022-05-27

## 2022-05-27 RX ORDER — CLONIDINE 0.3 MG/24H
1 PATCH, EXTENDED RELEASE TRANSDERMAL
Status: DISCONTINUED | OUTPATIENT
Start: 2022-05-27 | End: 2022-05-29

## 2022-05-27 RX ADMIN — LABETALOL HYDROCHLORIDE 10 MG: 5 INJECTION INTRAVENOUS at 03:05

## 2022-05-27 RX ADMIN — LORAZEPAM 1 MG: 2 INJECTION INTRAMUSCULAR; INTRAVENOUS at 12:05

## 2022-05-27 RX ADMIN — CLONIDINE 1 PATCH: 0.3 PATCH TRANSDERMAL at 08:05

## 2022-05-27 RX ADMIN — MORPHINE SULFATE 4 MG: 4 INJECTION INTRAVENOUS at 12:05

## 2022-05-27 NOTE — SUBJECTIVE & OBJECTIVE
Interval History:  Patient seen and examined.  No acute events overnight.  Continues to have wet productive cough, along with multiple secretions through his nose and mouth.  Patient is essentially nonverbal and nonresponsive.  Family has requested to take the patient off of hospice care, but no further medical care indicated at this point in time.  Managing comfort focus treatment.    Review of Systems   Unable to perform ROS: Patient nonverbal   Objective:     Vital Signs (Most Recent):  Temp: 99 °F (37.2 °C) (05/27/22 1202)  Pulse: 89 (05/27/22 1202)  Resp: 20 (05/27/22 1202)  BP: (!) 195/90 (05/27/22 1202)  SpO2: 98 % (05/27/22 1202)   Vital Signs (24h Range):  Temp:  [97.5 °F (36.4 °C)-99.1 °F (37.3 °C)] 99 °F (37.2 °C)  Pulse:  [83-95] 89  Resp:  [18-20] 20  SpO2:  [94 %-100 %] 98 %  BP: (143-198)/(74-94) 195/90     Weight: 76.4 kg (168 lb 6.9 oz)  Body mass index is 26.38 kg/m².    Intake/Output Summary (Last 24 hours) at 5/27/2022 1525  Last data filed at 5/27/2022 0300  Gross per 24 hour   Intake --   Output 1000 ml   Net -1000 ml        Physical Exam  Vitals and nursing note reviewed.   Constitutional:       General: He is not in acute distress.     Appearance: He is not ill-appearing.      Comments: Nonverbal, encephalopathic.   HENT:      Nose:      Comments: Nasogastric tube in place  Eyes:      Pupils: Pupils are equal, round, and reactive to light.   Neck:      Vascular: No JVD.   Cardiovascular:      Rate and Rhythm: Normal rate and regular rhythm.      Heart sounds: Normal heart sounds.   Pulmonary:      Effort: Pulmonary effort is normal.      Breath sounds: Normal breath sounds.   Abdominal:      General: Bowel sounds are normal. There is no distension.      Palpations: Abdomen is soft.      Tenderness: There is no abdominal tenderness.   Musculoskeletal:         General: No deformity.   Lymphadenopathy:      Cervical: No cervical adenopathy.   Skin:     Coloration: Skin is not pale.       Findings: No rash.   Neurological:      Mental Status: He is disoriented.      Comments: Encephalopathic, nonverbal at baseline.       Significant Labs: All pertinent labs within the past 24 hours have been reviewed.    Significant Imaging: I have reviewed all pertinent imaging results/findings within the past 24 hours.

## 2022-05-27 NOTE — ASSESSMENT & PLAN NOTE
Assessment/Plan:  Patient was seen and examined by myself. Plan of care reviewed with bedside nurse. Pain is controlled. SOB is controlled. Patient  Appears comfortable at this time. Continue present POC and adjust as follows-    1) Pain control continue p.r.n. morphine as needed as well as Tylenol for fever.  2) Secretions- Utilize scopolamine patch  3) Control of dyspnea continue p.r.n. morphine and Ativan  4) Control of anxiety continue p.r.n. Ativan      Advance Care plan- DNR and comfort focus treatment and place  Advance Care Planning           Disposition- home with hospice  - Currently Day 1 of inpatient care

## 2022-05-27 NOTE — SUBJECTIVE & OBJECTIVE
Past Medical History:   Diagnosis Date    CHF (congestive heart failure)     Diabetes mellitus     Hypertension     Stroke        Past Surgical History:   Procedure Laterality Date    EYE SURGERY      LEFT EYE       Review of patient's allergies indicates:  No Known Allergies    Current Facility-Administered Medications on File Prior to Encounter   Medication    [DISCONTINUED] acetaminophen tablet 1,000 mg    [DISCONTINUED] bisacodyL suppository 10 mg    [DISCONTINUED] cloNIDine 0.2 mg/24 hr td ptwk 1 patch    [DISCONTINUED] dextrose 10% bolus 125 mL    [DISCONTINUED] dextrose 10% bolus 250 mL    [DISCONTINUED] famotidine (PF) injection 20 mg    [DISCONTINUED] glucagon (human recombinant) injection 1 mg    [DISCONTINUED] glucose chewable tablet 16 g    [DISCONTINUED] glucose chewable tablet 24 g    [DISCONTINUED] heparin (porcine) injection 4,000 Units    [DISCONTINUED] hydrALAZINE injection 10 mg    [DISCONTINUED] insulin aspart U-100 pen 1-10 Units    [DISCONTINUED] insulin detemir U-100 pen 10 Units    [DISCONTINUED] labetaloL injection 10 mg    [DISCONTINUED] morphine injection 3 mg    [DISCONTINUED] naloxone 0.4 mg/mL injection 0.02 mg    [DISCONTINUED] ondansetron injection 4 mg    [DISCONTINUED] oxyCODONE immediate release tablet 5 mg    [DISCONTINUED] polyethylene glycol packet 17 g    [DISCONTINUED] scopolamine 1.3-1.5 mg (1 mg over 3 days) 1 patch    [DISCONTINUED] sodium chloride 0.9% flush 10 mL     Current Outpatient Medications on File Prior to Encounter   Medication Sig    allopurinoL (ZYLOPRIM) 100 MG tablet   = 2 tab, Oral, Daily, # 60 tab, 5 Refill(s), [id number], 167, cm, 07/27/21 8:24:00 CDT, Height/Length Measured, 91.5, kg, 12/15/20 11:18:00 CST, Weight Dosing    amLODIPine (NORVASC) 10 MG tablet 10 mg.    aspirin (ECOTRIN) 81 MG EC tablet Take 1 tablet (81 mg total) by mouth once daily.    atorvastatin (LIPITOR) 40 MG tablet   = 1 tab, Oral, Daily, # 90 tab, 1 Refill(s),  Soft Stop, Pharmacy: [facility], 167, cm, 04/07/21 14:01:00 CDT, Height/Length Measured, 91.5, kg, 12/15/20 11:18:00 CST, Weight Dosing    carvediloL (COREG) 3.125 MG tablet Take 1 tablet (3.125 mg total) by mouth once daily.    cloNIDine (CATAPRES) 0.1 MG tablet Take 2 tablets (0.2 mg total) by mouth 3 (three) times daily.    clopidogreL (PLAVIX) 75 mg tablet Take 75 mg by mouth once daily.    furosemide (LASIX) 20 MG tablet Take 1 tablet (20 mg total) by mouth once daily.    hydrALAZINE (APRESOLINE) 50 MG tablet Take 1 tablet (50 mg total) by mouth every 12 (twelve) hours.    linaGLIPtin (TRADJENTA) 5 mg Tab tablet 5 mg.    NOVOLOG MIX 70-30FLEXPEN U-100 100 unit/mL (70-30) InPn pen Inject into the skin.    sodium bicarbonate 650 MG tablet Take 1 tablet (650 mg total) by mouth 2 (two) times daily.     Family History    None       Tobacco Use    Smoking status: Former Smoker    Smokeless tobacco: Former User   Substance and Sexual Activity    Alcohol use: Not Currently    Drug use: Not Currently    Sexual activity: Not Currently     Review of Systems   Unable to perform ROS: Patient unresponsive   Objective:     Vital Signs (Most Recent):  Temp: 99.1 °F (37.3 °C) (05/26/22 1900)  Pulse: 95 (05/26/22 1900)  Resp: 18 (05/26/22 1900)  BP: (!) 147/74 (05/26/22 1900)  SpO2: 98 % (05/26/22 1900)   Vital Signs (24h Range):  Temp:  [97.5 °F (36.4 °C)-99.4 °F (37.4 °C)] 99.1 °F (37.3 °C)  Pulse:  [90-99] 95  Resp:  [18-20] 18  SpO2:  [96 %-99 %] 98 %  BP: (147-185)/(67-87) 147/74        There is no height or weight on file to calculate BMI.    Physical Exam  Vitals and nursing note reviewed.   Constitutional:       Comments: Elderly unresponsive  male.   HENT:      Nose:      Comments: Nasogastric tube in place.  Neck:      Vascular: No JVD.   Cardiovascular:      Rate and Rhythm: Normal rate and regular rhythm.      Heart sounds: Normal heart sounds.   Pulmonary:      Effort: Pulmonary effort is  normal.      Comments:  rhonchorous breath sounds bilaterally  Abdominal:      General: Bowel sounds are normal. There is no distension.      Palpations: Abdomen is soft.      Tenderness: There is no abdominal tenderness.   Musculoskeletal:         General: No deformity.      Right lower leg: No edema.      Left lower leg: No edema.   Lymphadenopathy:      Cervical: No cervical adenopathy.   Skin:     Coloration: Skin is not pale.      Findings: No rash.   Neurological:      Comments: Unresponsive, does not track.           Significant Labs: All pertinent labs within the past 24 hours have been reviewed.    Significant Imaging:  Reviewed

## 2022-05-27 NOTE — PROGRESS NOTES
Spoke to family contact, Kraig, on the phone this morning and caught him before he arrived. He says he did not actually sign the hospice paperwork yesterday as wanting more time. He is informed that patient remains on hospice from our perspective due to lack of further medical based intervention on our part. The only main difference is the NG feeding which is not a hospice intervention. I informed him we are not proceeding with a PEG tube either as we will be watching him for inevitable further decline but that medicine will be on board for distress when this happens. He is accepting of this plan.

## 2022-05-27 NOTE — PROGRESS NOTES
[facility]  Adult Nutrition  Progress Note    SUMMARY     Intervention: enteral nutrition therapy     Recommendations  1.) Continue TF only if pt prefers- typically d/c nutrition support when CMO  2.) liberalized diet if medically able per care team discretion     Goals: not following  Communication of RD Recs: POC, sticky note     1. Stroke    2. CHF (congestive heart failure)    3. Encephalopathy    4. CVA (cerebral vascular accident)    5. MERCEDES (acute kidney injury)               Past Medical History:   Diagnosis Date    CHF (congestive heart failure)      Diabetes mellitus      Hypertension      Stroke          Assessment and Plan  Nutrition Problem  inadequate energy intake     Related to (etiology):   Acute illness      Signs and Symptoms (as evidenced by):   NPO  TPN meeting 30% of EEN      Interventions:  above     Nutrition Diagnosis Status:   Resolved     Malnutrition Assessment  Severe Weight Loss (Malnutrition): greater than 7.5% in 3 months (11.5% weight loss in 4 months)   Kain = 12, wound  Edema 1-3+  NFPE done 5/19/22, no wasting seen     Reason for Assessment  Reason For Assessment: RD follow up   Rounds: did not attend     General Information Comments: admits with decreased responsiveness, pt to start dialysis today, on continuous bipap, non-verbal, central line present. RD consulted for TPN. Pending SLP asessment for diet advancement. Remains NPO.  5/18: pt receiving dialysis. TPN infusing, on 3L NC.   5/19/22 Pt continues on TPN, glucose in the 400's. Discussed ordering lower dextrose TPN today. Continues on HD, off bipap but unresponsive. Plan to consider comfort care 5/21.  5/24/22 TPN discontinued, pt now tolerating TF @ goal today with flushes as ordered. Unable to advance PO diet at this time.  5/27/22 Pt continues on TF via NG, novasource @ 40 ml/hr, NPO. Made comfort measures only today but TF continues for now. No plan for PEG placement.     Nutrition Risk  "Screen  Nutrition Risk Screen: dysphagia or difficulty swallowing, NPO     Nutrition/Diet History  Food Allergies: NKFA  Factors Affecting Nutritional Intake: NPO, trouble swallowing  Spiritual/cultural/Baptism factors affecting PO intakes: none     Anthropometrics  Height: 5' 7"  Height (inches): 67 in  Weight Method: Bed Scale  Weight: 76.4 kg 22, 78.9 kg , 76.7 kg 22, 78.8 kg (admit)  Weight (lb): 168 lb ( edema)  Ideal Body Weight (IBW), Male: 148 lb  BMI (Calculated): 27.2 admission  BMI Grade: 25 - 29.9 - overweight  Usual Body Weight (UBW), k.27 kg (2022)     Lab/Procedures/Meds  Pertinent Labs Reviewed: reviewed    Pertinent Medications Reviewed: reviewed    Estimated/Assessed Needs  Needs adjusted for HD  Weight Used For Calorie Calculations: 78 kg [facility]  Energy Calorie Requirements (kcal): 2340 kcals/day  Energy Need Method: 30 kcal/kg ( overweight vs. HD)  Protein Requirements: 93g ( 1.2 g protein/kg HD)  Weight Used For Protein Calculations: 78 kg HD  Estimated Fluid Requirement Method: 1500 ml HD        Nutrition Prescription Ordered  Current Diet Order: NPO + TF above     Evaluation of Received Nutrient/Fluid Intake  Energy needs: meeting  Protein needs:meeting  Fluid needs: meeting  Tolerance: toleratingr  % Intake of Estimated Energy Needs: 92% EEN  % Meal Intake: NPO + TFabove     Nutrition Risk  Level of Risk/Frequency of Follow-up: CMO/Hospice x 10 days        Monitor and Evaluation  Not monitoring     Nutrition Follow-Up  RD Follow-up?: Yes              "

## 2022-05-27 NOTE — DISCHARGE SUMMARY
[facility] Hospital Medicine  Discharge Summary      Patient Name: Marshall Flor  MRN: [id number]  Patient Class: IP- Inpatient  Admission Date: 5/12/2022  Hospital Length of Stay: 14 days  Discharge Date and Time: 5/26/2022  4:57 PM  Attending Physician: No att. providers found   Discharging Provider: Obi Mustafa MD  Primary Care Provider: Dorie Quan MD      HPI:   66-year-old male with history of CHF, diabetes presents to [place] for decreased responsiveness found to have a fever, left gaze preference with nuchal rigidity found to have meningitis in addition to being found to have an acute CVA. Beyond this he has an MERCEDES.          * No surgery found *      Hospital Course:   66-year-old male with history of CHF, diabetes presents to [place] for decreased responsiveness ultimately becoming unresponsive, fever and left gaze preference found to have an acute CVA in addition to an MERCEDES.  The patient was started on aspirin rectally.  Over the 1st week the patient remained nonresponsive and would only withdrawal to pain.  One point he was found to have an intracranial hemorrhage which demonstrated stability thereafter on follow-up CT.  Neurology ultimately recommended resumption of aspirin.  Briefly the patient is neurologic status improved such that he was alert and following simple commands inconsistently.  On the morning of 05/21/2022 he was again unresponsive and so repeat CT was performed which showed mild worsening of the hemorrhage.  Follow-up CT the evening of 5/21 according to the neurologist's read was stable.  Beyond this initially there was concern the patient had meningitis.  He was started on broad-spectrum antibiotics.  Ultimately lumbar puncture was able to be performed and at that point meningitis was ruled out. Course was complicated by the development of a pneumonia which has been fully treated. He presented also with an acute kidney injury; nephrology is following.  The  patient has intermittently required dialysis with specific note that uremia would leave to cognitive depression. Palliative care team has been consulted.  Family has initially wished to transition the patient to comfort focus care at this point in time.  Patient was having intermittent fevers, but no other signs or symptoms of sepsis.  He was discharged from inpatient hospital admission and admitted for inpatient hospice care.       Goals of Care Treatment Preferences:  Code Status: DNR    Health care agent: Kraig  Health care agent number: No value filed.          What is most important right now is to focus on symptom/pain control, quality of life, even if it means sacrificing a little time.  Accordingly, we have decided that the best plan to meet the patient's goals includes discontinuing treatment.      Consults:   Consults (From admission, onward)        Status Ordering Provider     Inpatient consult to Social Work/Case Management  Once        Provider:  (Not yet assigned)    Completed TORY SAMPSON     Inpatient consult to Wound Care Physician  Once        Provider:  Deangelo Chaparro DO    Completed DONELL PAREDES     Inpatient consult to Palliative Care  Once        Provider:  Russell Casey MD    Completed MADHU CHARLES     Inpatient consult to Registered Dietitian/Nutritionist  Once        Provider:  (Not yet assigned)    Completed DONELL PAREDES     Inpatient consult to Pulmonology  Once        Provider:  Madhu Charles MD    Completed DONELL PAREDES     Inpatient consult to General Surgery  Once        Provider:  Farhan Murray MD    Completed RILEY MADRID     Inpatient consult to Midline team  Once        Provider:  (Not yet assigned)    Completed LINDA MEADE     Inpatient consult to Infectious Diseases  Once        Provider:  Marielle Osorio MD    Completed DONELL PAREDES     Inpatient consult to Social Work/Case Management  Once        Provider:   (Not yet assigned)    Completed DONELL PAREDES     Inpatient consult to Nephrology  Once        Provider:  Barrie Mathew MD    Completed DONELL PAREDES          No new Assessment & Plan notes have been filed under this hospital service since the last note was generated.  Service: Hospital Medicine    Final Active Diagnoses:    Diagnosis Date Noted POA    PRINCIPAL PROBLEM:  CVA (cerebral vascular accident) [I63.9] 05/12/2022 Yes    Encephalopathy [G93.40]  Yes    Abrasion, nose w/o infection [S00.31XA]  Yes    Goals of care, counseling/discussion [Z71.89] 05/18/2022 Not Applicable    MERCEDES (acute kidney injury) [N17.9] 05/14/2022 No    Chronic combined systolic and diastolic CHF (congestive heart failure) [I50.42] 05/13/2022 Yes    Essential hypertension [I10] 01/25/2022 Yes    Coronary artery disease involving native coronary artery of native heart [I25.10] 01/02/2022 Yes    Hyperlipidemia [E78.5] 01/02/2022 Yes    Type 2 diabetes mellitus with stage 4 chronic kidney disease, without long-term current use of insulin [E11.22, N18.4] 01/02/2022 Yes    CKD (chronic kidney disease) stage 4, GFR 15-29 ml/min [N18.4] 01/02/2022 Yes      Problems Resolved During this Admission:       Discharged Condition: stable    Disposition: Hospice/Medical Facility    Follow Up:    Patient Instructions:   No discharge procedures on file.    Significant Diagnostic Studies:     Medications:  Reconciled Home Medications:      Medication List      ASK your doctor about these medications    allopurinoL 100 MG tablet  Commonly known as: ZYLOPRIM  = 2 tab, Oral, Daily, # 60 tab, 5 Refill(s), Pharmacy: [id number], 167, cm, 07/27/21 8:24:00 CDT, Height/Length Measured, 91.5, kg, 12/15/20 11:18:00 CST, Weight Dosing     amLODIPine 10 MG tablet  Commonly known as: NORVASC  10 mg.     aspirin 81 MG EC tablet  Commonly known as: ECOTRIN  Take 1 tablet (81 mg total) by mouth once daily.     atorvastatin 40 MG  tablet  Commonly known as: LIPITOR  = 1 tab, Oral, Daily, # 90 tab, 1 Refill(s), Soft Stop, Pharmacy: [id number], 167, cm, 04/07/21 14:01:00 CDT, Height/Length Measured, 91.5, kg, 12/15/20 11:18:00 CST, Weight Dosing     carvediloL 3.125 MG tablet  Commonly known as: COREG  Take 1 tablet (3.125 mg total) by mouth once daily.     cloNIDine 0.1 MG tablet  Commonly known as: CATAPRES  Take 2 tablets (0.2 mg total) by mouth 3 (three) times daily.     clopidogreL 75 mg tablet  Commonly known as: PLAVIX  Take 75 mg by mouth once daily.     furosemide 20 MG tablet  Commonly known as: LASIX  Take 1 tablet (20 mg total) by mouth once daily.     hydrALAZINE 50 MG tablet  Commonly known as: APRESOLINE  Take 1 tablet (50 mg total) by mouth every 12 (twelve) hours.     linaGLIPtin 5 mg Tab tablet  Commonly known as: TRADJENTA  5 mg.     NovoLOG Mix 70-30FlexPen U-100 100 unit/mL (70-30) Inpn pen  Generic drug: insulin aspart protamine-insulin aspart  Inject into the skin.     sodium bicarbonate 650 MG tablet  Take 1 tablet (650 mg total) by mouth 2 (two) times daily.            Indwelling Lines/Drains at time of discharge:   Lines/Drains/Airways     Central Venous Catheter Line  Duration           Trialysis (Dialysis) Catheter 05/16/22 0937 right internal jugular 10 days          Drain  Duration                NG/OG Tube -- days    Male External Urinary Catheter 05/22/22 2045 Large 4 days                Time spent on the discharge of patient: 36 minutes         Obi Mustafa MD  Department of Hospital Medicine  [facility]

## 2022-05-27 NOTE — HPI
Patient was admitted for hospice care following transition from inpatient.  Please see previous discharge summary.  In short, the patient suffered a devastating stroke and was left completely dependent for all ADLs.  He remained nonverbal, unable to communicate.  Family has decided to transition to hospice care, but reversed this decision at the last minute.  The patient is still end of life.  There was nothing further medically that can be accomplished at this point in time.

## 2022-05-27 NOTE — PROGRESS NOTES
[facility]  Progress Note    Patient Name: Marshall Flor  MRN: [id number]  Patient Class: IP- Inpatient   Admission Date: 5/26/2022  Length of Stay: 1 days  Attending Physician: Obi Mustafa MD  Primary Care Provider: Dorie Quan MD        Subjective:     Principal Problem:Comfort measures only status        HPI:  Patient was admitted for hospice care following transition from inpatient.  Please see previous discharge summary.  In short, the patient suffered a devastating stroke and was left completely dependent for all ADLs.  He remained nonverbal, unable to communicate.  Family has decided to transition to hospice care, but reversed this decision at the last minute.  The patient is still end of life.  There was nothing further medically that can be accomplished at this point in time.      Overview/Hospital Course:  No notes on file    Interval History:  Patient seen and examined.  No acute events overnight.  Continues to have wet productive cough, along with multiple secretions through his nose and mouth.  Patient is essentially nonverbal and nonresponsive.  Family has requested to take the patient off of hospice care, but no further medical care indicated at this point in time.  Managing comfort focus treatment.    Review of Systems   Unable to perform ROS: Patient nonverbal   Objective:     Vital Signs (Most Recent):  Temp: 99 °F (37.2 °C) (05/27/22 1202)  Pulse: 89 (05/27/22 1202)  Resp: 20 (05/27/22 1202)  BP: (!) 195/90 (05/27/22 1202)  SpO2: 98 % (05/27/22 1202)   Vital Signs (24h Range):  Temp:  [97.5 °F (36.4 °C)-99.1 °F (37.3 °C)] 99 °F (37.2 °C)  Pulse:  [83-95] 89  Resp:  [18-20] 20  SpO2:  [94 %-100 %] 98 %  BP: (143-198)/(74-94) 195/90     Weight: 76.4 kg (168 lb 6.9 oz)  Body mass index is 26.38 kg/m².    Intake/Output Summary (Last 24 hours) at 5/27/2022 1525  Last data filed at 5/27/2022 0300  Gross per 24 hour   Intake --   Output 1000 ml   Net -1000 ml         Physical Exam  Vitals and nursing note reviewed.   Constitutional:       General: He is not in acute distress.     Appearance: He is not ill-appearing.      Comments: Nonverbal, encephalopathic.   HENT:      Nose:      Comments: Nasogastric tube in place  Eyes:      Pupils: Pupils are equal, round, and reactive to light.   Neck:      Vascular: No JVD.   Cardiovascular:      Rate and Rhythm: Normal rate and regular rhythm.      Heart sounds: Normal heart sounds.   Pulmonary:      Effort: Pulmonary effort is normal.      Breath sounds: Normal breath sounds.   Abdominal:      General: Bowel sounds are normal. There is no distension.      Palpations: Abdomen is soft.      Tenderness: There is no abdominal tenderness.   Musculoskeletal:         General: No deformity.   Lymphadenopathy:      Cervical: No cervical adenopathy.   Skin:     Coloration: Skin is not pale.      Findings: No rash.   Neurological:      Mental Status: He is disoriented.      Comments: Encephalopathic, nonverbal at baseline.       Significant Labs: All pertinent labs within the past 24 hours have been reviewed.    Significant Imaging: I have reviewed all pertinent imaging results/findings within the past 24 hours.      Assessment/Plan:      * Comfort measures only status  Assessment/Plan:  Patient was seen and examined by myself. Plan of care reviewed with bedside nurse. Pain is controlled. SOB is controlled. Patient  Appears comfortable at this time. Continue present POC and adjust as follows-    1) Pain control continue p.r.n. morphine as needed as well as Tylenol for fever.  2) Secretions- Utilize scopolamine patch  3) Control of dyspnea continue p.r.n. morphine and Ativan  4) Control of anxiety continue p.r.n. Ativan      Advance Care plan- DNR and comfort focus treatment and place  Advance Care Planning          Disposition- home with hospice  - Currently Day 1 of inpatient care                  VTE Risk Mitigation (From admission,  onward)         Ordered     IP VTE HIGH RISK PATIENT  Once         05/26/22 1702     heparin (porcine) injection 4,000 Units  As needed (PRN)         05/26/22 1702                Discharge Planning   STAR:      Code Status: DNR   Is the patient medically ready for discharge?:     Reason for patient still in hospital (select all that apply): Treatment  Discharge Plan A: Inpatient Hospice                  Obi Mustafa MD  Department of Hospital Medicine   [facility]

## 2022-05-27 NOTE — PLAN OF CARE
[facility]  Initial Discharge Assessment       Primary Care Provider: Dorie Quan MD    Admission Diagnosis: End of life care [Z51.5]    Admission Date: 5/26/2022     The pt is currently inpatient hospice with [facility] and his back up plan is their inpatient unit in [place]. CM following.   Expected Discharge Date:          Payor: [employer] / Plan: [employer] / Product Type: Guarantor 3rd Party /     Extended Emergency Contact Information  Primary Emergency Contact: Jefry Flor  Mobile Phone: [phone]  Relation: Son   needed? No  Secondary Emergency Contact: Kraig Flor  Mobile Phone: [phone]  Relation: Relative    Discharge Plan A: Inpatient Hospice  Discharge Plan B: Hospice/home      [facility], [place] - [address]  [address] [place] [address]  [facility] [address]  Phone: [phone] Fax: [phone]      Initial Assessment (most recent)     Adult Discharge Assessment - 05/27/22 0821        Discharge Assessment    Assessment Type Discharge Planning Assessment     Discharge Plan A Inpatient Hospice     Discharge Plan B Hospice/home

## 2022-05-27 NOTE — H&P
[facility] Medicine  History & Physical    Patient Name: Marshall Flor  MRN: [id number]  Patient Class: IP- Hospice  Admission Date: 5/26/2022  Attending Physician: Obi Mustafa MD  Primary Care Provider: Dorie Quan MD         Patient information was obtained from relative(s), past medical records and ER records.     Subjective:     Principal Problem:Comfort measures only status    Chief Complaint: No chief complaint on file.       HPI: Patient was admitted for hospice care following transition from inpatient.  Please see previous discharge summary.  In short, the patient suffered a devastating stroke and was left completely dependent for all ADLs.  He remained nonverbal, unable to communicate.  Family has decided to transition to hospice care.      Past Medical History:   Diagnosis Date    CHF (congestive heart failure)     Diabetes mellitus     Hypertension     Stroke        Past Surgical History:   Procedure Laterality Date    EYE SURGERY      LEFT EYE       Review of patient's allergies indicates:  No Known Allergies    Current Facility-Administered Medications on File Prior to Encounter   Medication    [DISCONTINUED] acetaminophen tablet 1,000 mg    [DISCONTINUED] bisacodyL suppository 10 mg    [DISCONTINUED] cloNIDine 0.2 mg/24 hr td ptwk 1 patch    [DISCONTINUED] dextrose 10% bolus 125 mL    [DISCONTINUED] dextrose 10% bolus 250 mL    [DISCONTINUED] famotidine (PF) injection 20 mg    [DISCONTINUED] glucagon (human recombinant) injection 1 mg    [DISCONTINUED] glucose chewable tablet 16 g    [DISCONTINUED] glucose chewable tablet 24 g    [DISCONTINUED] heparin (porcine) injection 4,000 Units    [DISCONTINUED] hydrALAZINE injection 10 mg    [DISCONTINUED] insulin aspart U-100 pen 1-10 Units    [DISCONTINUED] insulin detemir U-100 pen 10 Units    [DISCONTINUED] labetaloL injection 10 mg    [DISCONTINUED] morphine injection 3 mg    [DISCONTINUED] naloxone 0.4  mg/mL injection 0.02 mg    [DISCONTINUED] ondansetron injection 4 mg    [DISCONTINUED] oxyCODONE immediate release tablet 5 mg    [DISCONTINUED] polyethylene glycol packet 17 g    [DISCONTINUED] scopolamine 1.3-1.5 mg (1 mg over 3 days) 1 patch    [DISCONTINUED] sodium chloride 0.9% flush 10 mL     Current Outpatient Medications on File Prior to Encounter   Medication Sig    allopurinoL (ZYLOPRIM) 100 MG tablet   = 2 tab, Oral, Daily, # 60 tab, 5 Refill(s), Pharmacy: [id number], 167, cm, 07/27/21 8:24:00 CDT, Height/Length Measured, 91.5, kg, 12/15/20 11:18:00 CST, Weight Dosing    amLODIPine (NORVASC) 10 MG tablet 10 mg.    aspirin (ECOTRIN) 81 MG EC tablet Take 1 tablet (81 mg total) by mouth once daily.    atorvastatin (LIPITOR) 40 MG tablet   = 1 tab, Oral, Daily, # 90 tab, 1 Refill(s), Soft Stop, Pharmacy: [id number], 167, cm, 04/07/21 14:01:00 CDT, Height/Length Measured, 91.5, kg, 12/15/20 11:18:00 CST, Weight Dosing    carvediloL (COREG) 3.125 MG tablet Take 1 tablet (3.125 mg total) by mouth once daily.    cloNIDine (CATAPRES) 0.1 MG tablet Take 2 tablets (0.2 mg total) by mouth 3 (three) times daily.    clopidogreL (PLAVIX) 75 mg tablet Take 75 mg by mouth once daily.    furosemide (LASIX) 20 MG tablet Take 1 tablet (20 mg total) by mouth once daily.    hydrALAZINE (APRESOLINE) 50 MG tablet Take 1 tablet (50 mg total) by mouth every 12 (twelve) hours.    linaGLIPtin (TRADJENTA) 5 mg Tab tablet 5 mg.    NOVOLOG MIX 70-30FLEXPEN U-100 100 unit/mL (70-30) InPn pen Inject into the skin.    sodium bicarbonate 650 MG tablet Take 1 tablet (650 mg total) by mouth 2 (two) times daily.     Family History    None       Tobacco Use    Smoking status: Former Smoker    Smokeless tobacco: Former User   Substance and Sexual Activity    Alcohol use: Not Currently    Drug use: Not Currently    Sexual activity: Not Currently     Review of Systems   Unable to perform ROS: Patient  unresponsive   Objective:     Vital Signs (Most Recent):  Temp: 99.1 °F (37.3 °C) (05/26/22 1900)  Pulse: 95 (05/26/22 1900)  Resp: 18 (05/26/22 1900)  BP: (!) 147/74 (05/26/22 1900)  SpO2: 98 % (05/26/22 1900)   Vital Signs (24h Range):  Temp:  [97.5 °F (36.4 °C)-99.4 °F (37.4 °C)] 99.1 °F (37.3 °C)  Pulse:  [90-99] 95  Resp:  [18-20] 18  SpO2:  [96 %-99 %] 98 %  BP: (147-185)/(67-87) 147/74        There is no height or weight on file to calculate BMI.    Physical Exam  Vitals and nursing note reviewed.   Constitutional:       Comments: Elderly unresponsive  male.   HENT:      Nose:      Comments: Nasogastric tube in place.  Neck:      Vascular: No JVD.   Cardiovascular:      Rate and Rhythm: Normal rate and regular rhythm.      Heart sounds: Normal heart sounds.   Pulmonary:      Effort: Pulmonary effort is normal.      Comments:  rhonchorous breath sounds bilaterally  Abdominal:      General: Bowel sounds are normal. There is no distension.      Palpations: Abdomen is soft.      Tenderness: There is no abdominal tenderness.   Musculoskeletal:         General: No deformity.      Right lower leg: No edema.      Left lower leg: No edema.   Lymphadenopathy:      Cervical: No cervical adenopathy.   Skin:     Coloration: Skin is not pale.      Findings: No rash.   Neurological:      Comments: Unresponsive, does not track.           Significant Labs: All pertinent labs within the past 24 hours have been reviewed.    Significant Imaging:  Reviewed    Assessment/Plan:     Comfort measures only status  Assessment/Plan:  Patient was seen and examined by myself. Plan of care reviewed with patient, family and hospice care team. Pain is uncontrolled. SOB is uncontrolled. Patient  Appears uncomfortable at this time. Continue present POC and adjust as follows-    1) Pain control continue p.r.n. morphine as needed as well as Tylenol for fever.  2) Secretions- Utilize scopolamine patch  3) Control of dyspnea  continue p.r.n. morphine and Ativan  4) Control of anxiety continue p.r.n. Ativan      Advance Care plan- DNR and comfort focus treatment and place  Advance Care Planning          Disposition- home with hospice  - Currently Day 0 of inpatient care    Time spent seeing patient( greater than 1/2 spent in direct contact) : 35 min                  VTE Risk Mitigation (From admission, onward)         Ordered     IP VTE HIGH RISK PATIENT  Once         05/26/22 1702     heparin (porcine) injection 4,000 Units  As needed (PRN)         05/26/22 1702                   Obi Mustafa MD  Department of Hospital Medicine   [facility]

## 2022-05-27 NOTE — PLAN OF CARE
Pt appears to be resting, eyes are closed, breaths are deep and even. VSS, NAD noted at this time. Discussed PoC with pt, pt unable to verbalize or indicate understanding. Pt turned q2hrs, tube feedings per NGT @40mL/hr (goal). C/o pain handled w/PRN pain medications per orders, will continue to monitor.

## 2022-05-27 NOTE — PLAN OF CARE
Intervention: enteral nutrition therapy     Recommendations  1.) Continue TF only if pt prefers- typically d/c nutrition support when CMO  2.) liberalized diet if medically able per care team discretion     Goals: not following  Communication of RD Recs: POC, sticky note

## 2022-05-27 NOTE — PLAN OF CARE
Problem: Adjustment to Illness (Stroke, Ischemic/Transient Ischemic Attack)  Goal: Optimal Coping  Outcome: Ongoing, Progressing     Problem: Cerebral Tissue Perfusion (Stroke, Ischemic/Transient Ischemic Attack)  Goal: Optimal Cerebral Tissue Perfusion  Outcome: Ongoing, Progressing

## 2022-05-27 NOTE — CONSULTS
Reconsult noted. PC team was already following. Please see Dr. Casey's note from 5/26/2022. PC Team and Spiritual care will continue to follow.

## 2022-05-27 NOTE — CONSULTS
Visited pt again, been following through his admit; Pt remains awake, nonverbal and not tracking me, but I spoke to him compassionately and softly, tender touch; reminded him again that he is well loved; pt is Sabianist, so I prayed again over him for comfort, peace and to feel God's love, mercy and susan upon him; put Peace & Comfort sign on his door; debriefed with bedside nurse. Thank you for the Spiritual Care Consult; , in your mercy.

## 2022-05-27 NOTE — ASSESSMENT & PLAN NOTE
Assessment/Plan:  Patient was seen and examined by myself. Plan of care reviewed with patient, family and hospice care team. Pain is uncontrolled. SOB is uncontrolled. Patient  Appears uncomfortable at this time. Continue present POC and adjust as follows-    1) Pain control continue p.r.n. morphine as needed as well as Tylenol for fever.  2) Secretions- Utilize scopolamine patch  3) Control of dyspnea continue p.r.n. morphine and Ativan  4) Control of anxiety continue p.r.n. Ativan      Advance Care plan- DNR and comfort focus treatment and place  Advance Care Planning           Disposition- home with hospice  - Currently Day 0 of inpatient care    Time spent seeing patient( greater than 1/2 spent in direct contact) : 35 min

## 2022-05-28 ENCOUNTER — TELEPHONE (OUTPATIENT)
Dept: PRIMARY CARE CLINIC | Facility: CLINIC | Age: 67
End: 2022-05-28
Payer: MEDICARE

## 2022-05-28 PROCEDURE — 12000002 HC ACUTE/MED SURGE SEMI-PRIVATE ROOM

## 2022-05-28 PROCEDURE — 94761 N-INVAS EAR/PLS OXIMETRY MLT: CPT

## 2022-05-28 PROCEDURE — 99231 SBSQ HOSP IP/OBS SF/LOW 25: CPT | Mod: ,,, | Performed by: FAMILY MEDICINE

## 2022-05-28 PROCEDURE — 99231 PR SUBSEQUENT HOSPITAL CARE,LEVL I: ICD-10-PCS | Mod: ,,, | Performed by: FAMILY MEDICINE

## 2022-05-28 PROCEDURE — 27000923 HC TRIALYSIS CATHETER, ANY SIZE

## 2022-05-28 PROCEDURE — 25000003 PHARM REV CODE 250: Performed by: HOSPITALIST

## 2022-05-28 RX ORDER — MUPIROCIN 20 MG/G
OINTMENT TOPICAL 2 TIMES DAILY
Status: COMPLETED | OUTPATIENT
Start: 2022-05-28 | End: 2022-06-02

## 2022-05-28 RX ADMIN — MUPIROCIN: 20 OINTMENT TOPICAL at 10:05

## 2022-05-28 RX ADMIN — SCOPALAMINE 1 PATCH: 1 PATCH, EXTENDED RELEASE TRANSDERMAL at 10:05

## 2022-05-28 NOTE — PROGRESS NOTES
Received phone call from haroldo Cornell, for family phone meeting on Monday 5/30 via phone. Writer works in clinic in a different location on Monday but will be available late afternoon shooting for 5-530. Family agreeable to late afternoon discussion.

## 2022-05-28 NOTE — NURSING
During rounding noticed NG was pulled out, held feeding and placed new one in to right Nostril. Pt tolerated well. Awaiting Xray to confirm placement.

## 2022-05-28 NOTE — CARE UPDATE
05/27/22 2020   Patient Assessment/Suction   Level of Consciousness (AVPU) responds to voice   PRE-TX-O2   O2 Device (Oxygen Therapy) room air   SpO2 98 %   Pulse Oximetry Type Intermittent

## 2022-05-28 NOTE — PROGRESS NOTES
[facility] Medicine  Progress Note    Patient Name: Marshall Flor  MRN: [id number]  Patient Class: IP- Inpatient   Admission Date: 5/26/2022  Length of Stay: 2 days  Attending Physician: Obi Mustafa MD  Primary Care Provider: Dorie Quan MD        Subjective:     Principal Problem:Comfort measures only status        HPI:  Patient was admitted for hospice care following transition from inpatient.  Please see previous discharge summary.  In short, the patient suffered a devastating stroke and was left completely dependent for all ADLs.  He remained nonverbal, unable to communicate.  Family has decided to transition to hospice care, but reversed this decision at the last minute.  The patient is still end of life.  There was nothing further medically that can be accomplished at this point in time.      Overview/Hospital Course:  No notes on file    Interval History:  Patient seen and examined.  No acute events overnight.  Continues to have wet productive cough, along with multiple secretions through his nose and mouth.  Patient is essentially nonverbal and nonresponsive.  Family has requested to take the patient off of hospice care, but no further medical care indicated at this point in time.  Managing comfort focus treatment.    Review of Systems   Unable to perform ROS: Patient nonverbal   Objective:     Vital Signs (Most Recent):  Temp: 99.2 °F (37.3 °C) (05/28/22 0748)  Pulse: 92 (05/28/22 0748)  Resp: 16 (05/28/22 0748)  BP: (!) 121/47 (05/28/22 0748)  SpO2: 100 % (05/28/22 0748)   Vital Signs (24h Range):  Temp:  [96.7 °F (35.9 °C)-99.2 °F (37.3 °C)] 99.2 °F (37.3 °C)  Pulse:  [91-98] 92  Resp:  [16-20] 16  SpO2:  [97 %-100 %] 100 %  BP: (121-182)/(47-86) 121/47     Weight: 76.4 kg (168 lb 6.9 oz)  Body mass index is 26.38 kg/m².    Intake/Output Summary (Last 24 hours) at 5/28/2022 1320  Last data filed at 5/28/2022 0400  Gross per 24 hour   Intake --   Output 1450 ml   Net  -1450 ml        Physical Exam  Vitals and nursing note reviewed.   Constitutional:       General: He is not in acute distress.     Appearance: He is not ill-appearing.      Comments: Nonverbal, encephalopathic.   HENT:      Nose:      Comments: Nasogastric tube in place  Eyes:      Pupils: Pupils are equal, round, and reactive to light.   Neck:      Vascular: No JVD.   Cardiovascular:      Rate and Rhythm: Normal rate and regular rhythm.      Heart sounds: Normal heart sounds.   Pulmonary:      Effort: Pulmonary effort is normal.      Breath sounds: Normal breath sounds.   Abdominal:      General: Bowel sounds are normal. There is no distension.      Palpations: Abdomen is soft.      Tenderness: There is no abdominal tenderness.   Musculoskeletal:         General: No deformity.   Lymphadenopathy:      Cervical: No cervical adenopathy.   Skin:     Coloration: Skin is not pale.      Findings: No rash.   Neurological:      Mental Status: He is disoriented.      Comments: Encephalopathic, nonverbal at baseline.       Significant Labs: All pertinent labs within the past 24 hours have been reviewed.    Significant Imaging: I have reviewed all pertinent imaging results/findings within the past 24 hours.      Assessment/Plan:      * Comfort measures only status  Assessment/Plan:  Patient was seen and examined by myself. Plan of care reviewed with bedside nurse. Pain is controlled. SOB is controlled. Patient  Appears comfortable at this time. Continue present POC and adjust as follows-    1) Pain control continue p.r.n. morphine as needed as well as Tylenol for fever.  2) Secretions- Utilize scopolamine patch  3) Control of dyspnea continue p.r.n. morphine and Ativan  4) Control of anxiety continue p.r.n. Ativan      Advance Care plan- DNR and comfort focus treatment and place  Advance Care Planning          Disposition- home with hospice  - Currently Day 2 of inpatient care                    VTE Risk Mitigation (From  admission, onward)         Ordered     IP VTE HIGH RISK PATIENT  Once         05/26/22 1702     heparin (porcine) injection 4,000 Units  As needed (PRN)         05/26/22 1702                Discharge Planning   STAR:      Code Status: DNR   Is the patient medically ready for discharge?:     Reason for patient still in hospital (select all that apply): Treatment  Discharge Plan A: Inpatient Hospice                  Obi Mustafa MD  Department of Hospital Medicine   [facility]

## 2022-05-28 NOTE — PLAN OF CARE
Problem: Adult Inpatient Plan of Care  Goal: Patient-Specific Goal (Individualized)  Outcome: Ongoing, Progressing     Problem: Palliative Care  Goal: Enhanced Quality of Life  Outcome: Ongoing, Progressing   Pt remains in bed, nonverbal. Tele monitored. Tube feeding continued. Vs monitored.  Turn q2. Oral care provided. Comfort measures maintained. Bed alarm set, call light in reach.

## 2022-05-28 NOTE — HOSPITAL COURSE
Marshall Flor is a 66 year old male with a past medical history of CHF, ESRD, DM, HTN, HLD, and gout who was initially transitioned to inpatient hospice after suffering a stroke with neurologic compromise and secondary hemorrhagic conversion. However, the patient's family requested him to be taken off of hospice in order to be monitored for another few days. Both the Palliative Medicine and Hospital Medicine had multiple conversations with the family regarding the patient's very poor prognosis, especially in light of the patient's goals which would have been to not be bed-bound, nonverbal and unable to eat. In the meantime, his home medications are being continued and an NG tube has been placed for tube feeds in the event the family wishes to proceed with medical therapy.  Patient initially agreed to hospice on 05/31, but family secondarily wanted some more time to decide.  Patient's improvement seemed to plateau, and the family decided to go with hospice on the day of discharge.  Patient was discharged to inpatient hospice in [place].

## 2022-05-28 NOTE — SUBJECTIVE & OBJECTIVE
Interval History:  Patient seen and examined.  No acute events overnight.  Continues to have wet productive cough, along with multiple secretions through his nose and mouth.  Patient is essentially nonverbal and nonresponsive.  Family has requested to take the patient off of hospice care, but no further medical care indicated at this point in time.  Managing comfort focus treatment.    Review of Systems   Unable to perform ROS: Patient nonverbal   Objective:     Vital Signs (Most Recent):  Temp: 99.2 °F (37.3 °C) (05/28/22 0748)  Pulse: 92 (05/28/22 0748)  Resp: 16 (05/28/22 0748)  BP: (!) 121/47 (05/28/22 0748)  SpO2: 100 % (05/28/22 0748)   Vital Signs (24h Range):  Temp:  [96.7 °F (35.9 °C)-99.2 °F (37.3 °C)] 99.2 °F (37.3 °C)  Pulse:  [91-98] 92  Resp:  [16-20] 16  SpO2:  [97 %-100 %] 100 %  BP: (121-182)/(47-86) 121/47     Weight: 76.4 kg (168 lb 6.9 oz)  Body mass index is 26.38 kg/m².    Intake/Output Summary (Last 24 hours) at 5/28/2022 1320  Last data filed at 5/28/2022 0400  Gross per 24 hour   Intake --   Output 1450 ml   Net -1450 ml        Physical Exam  Vitals and nursing note reviewed.   Constitutional:       General: He is not in acute distress.     Appearance: He is not ill-appearing.      Comments: Nonverbal, encephalopathic.   HENT:      Nose:      Comments: Nasogastric tube in place  Eyes:      Pupils: Pupils are equal, round, and reactive to light.   Neck:      Vascular: No JVD.   Cardiovascular:      Rate and Rhythm: Normal rate and regular rhythm.      Heart sounds: Normal heart sounds.   Pulmonary:      Effort: Pulmonary effort is normal.      Breath sounds: Normal breath sounds.   Abdominal:      General: Bowel sounds are normal. There is no distension.      Palpations: Abdomen is soft.      Tenderness: There is no abdominal tenderness.   Musculoskeletal:         General: No deformity.   Lymphadenopathy:      Cervical: No cervical adenopathy.   Skin:     Coloration: Skin is not pale.       Findings: No rash.   Neurological:      Mental Status: He is disoriented.      Comments: Encephalopathic, nonverbal at baseline.       Significant Labs: All pertinent labs within the past 24 hours have been reviewed.    Significant Imaging: I have reviewed all pertinent imaging results/findings within the past 24 hours.

## 2022-05-28 NOTE — ASSESSMENT & PLAN NOTE
Assessment/Plan:  Patient was seen and examined by myself. Plan of care reviewed with bedside nurse. Pain is controlled. SOB is controlled. Patient  Appears comfortable at this time. Continue present POC and adjust as follows-    1) Pain control continue p.r.n. morphine as needed as well as Tylenol for fever.  2) Secretions- Utilize scopolamine patch  3) Control of dyspnea continue p.r.n. morphine and Ativan  4) Control of anxiety continue p.r.n. Ativan      Advance Care plan- DNR and comfort focus treatment and place  Advance Care Planning           Disposition- home with hospice  - Currently Day 2 of inpatient care

## 2022-05-29 PROBLEM — N19 HYPERTENSIVE HEART AND RENAL DISEASE WITH RENAL FAILURE: Status: ACTIVE | Noted: 2022-05-29

## 2022-05-29 PROBLEM — U07.1 COVID-19 VIRUS INFECTION: Status: RESOLVED | Noted: 2022-02-01 | Resolved: 2022-05-29

## 2022-05-29 PROBLEM — I16.0 HYPERTENSIVE URGENCY: Status: RESOLVED | Noted: 2022-01-02 | Resolved: 2022-05-29

## 2022-05-29 PROBLEM — M10.9 GOUT: Status: ACTIVE | Noted: 2022-05-29

## 2022-05-29 PROBLEM — R79.89 TROPONIN LEVEL ELEVATED: Status: RESOLVED | Noted: 2022-01-03 | Resolved: 2022-05-29

## 2022-05-29 PROBLEM — D63.1 ANEMIA OF CHRONIC RENAL FAILURE: Status: ACTIVE | Noted: 2022-05-29

## 2022-05-29 PROBLEM — E11.9 DIABETES MELLITUS TREATED WITH INSULIN: Status: ACTIVE | Noted: 2022-05-29

## 2022-05-29 PROBLEM — K70.0 ALCOHOLIC FATTY LIVER: Status: ACTIVE | Noted: 2022-05-29

## 2022-05-29 PROBLEM — E11.21 DIABETIC NEPHROPATHY ASSOCIATED WITH TYPE 2 DIABETES MELLITUS: Status: ACTIVE | Noted: 2022-05-29

## 2022-05-29 PROBLEM — R41.0 DISORIENTATION: Status: RESOLVED | Noted: 2022-05-12 | Resolved: 2022-05-29

## 2022-05-29 PROBLEM — N30.00 ACUTE CYSTITIS WITHOUT HEMATURIA: Status: RESOLVED | Noted: 2022-01-29 | Resolved: 2022-05-29

## 2022-05-29 PROBLEM — R80.9 PROTEINURIA: Status: ACTIVE | Noted: 2022-05-29

## 2022-05-29 PROBLEM — I25.10 CORONARY ARTERY DISEASE INVOLVING NATIVE CORONARY ARTERY OF NATIVE HEART: Status: RESOLVED | Noted: 2022-01-02 | Resolved: 2022-05-29

## 2022-05-29 PROBLEM — H54.7 VISUAL IMPAIRMENT: Status: ACTIVE | Noted: 2022-05-29

## 2022-05-29 PROBLEM — E21.3 HYPERPARATHYROIDISM: Status: ACTIVE | Noted: 2022-05-29

## 2022-05-29 PROBLEM — H26.9 CATARACT: Status: ACTIVE | Noted: 2022-05-29

## 2022-05-29 PROBLEM — J30.2 SEASONAL ALLERGIC RHINITIS: Status: ACTIVE | Noted: 2022-05-29

## 2022-05-29 PROBLEM — N17.9 AKI (ACUTE KIDNEY INJURY): Status: RESOLVED | Noted: 2022-05-14 | Resolved: 2022-05-29

## 2022-05-29 PROBLEM — E55.9 VITAMIN D DEFICIENCY: Status: ACTIVE | Noted: 2022-05-29

## 2022-05-29 PROBLEM — K21.9 GASTROESOPHAGEAL REFLUX DISEASE: Status: ACTIVE | Noted: 2022-05-29

## 2022-05-29 PROBLEM — N25.0 RENAL OSTEODYSTROPHY: Status: ACTIVE | Noted: 2022-05-29

## 2022-05-29 PROBLEM — J45.909 ASTHMA: Status: ACTIVE | Noted: 2022-05-29

## 2022-05-29 PROBLEM — R97.20 HIGH PROSTATE SPECIFIC ANTIGEN (PSA): Status: ACTIVE | Noted: 2022-05-29

## 2022-05-29 PROBLEM — M19.90 OSTEOARTHRITIS: Status: ACTIVE | Noted: 2022-05-29

## 2022-05-29 PROBLEM — N18.9 ANEMIA OF CHRONIC RENAL FAILURE: Status: ACTIVE | Noted: 2022-05-29

## 2022-05-29 PROBLEM — Z79.4 DIABETES MELLITUS TREATED WITH INSULIN: Status: ACTIVE | Noted: 2022-05-29

## 2022-05-29 PROBLEM — I13.10 HYPERTENSIVE HEART AND RENAL DISEASE WITH RENAL FAILURE: Status: ACTIVE | Noted: 2022-05-29

## 2022-05-29 LAB
POCT GLUCOSE: 257 MG/DL (ref 70–110)
POCT GLUCOSE: 338 MG/DL (ref 70–110)

## 2022-05-29 PROCEDURE — 25000003 PHARM REV CODE 250: Performed by: STUDENT IN AN ORGANIZED HEALTH CARE EDUCATION/TRAINING PROGRAM

## 2022-05-29 PROCEDURE — 25000003 PHARM REV CODE 250: Performed by: HOSPITALIST

## 2022-05-29 PROCEDURE — 12000002 HC ACUTE/MED SURGE SEMI-PRIVATE ROOM

## 2022-05-29 PROCEDURE — 63600175 PHARM REV CODE 636 W HCPCS: Performed by: HOSPITALIST

## 2022-05-29 PROCEDURE — 94761 N-INVAS EAR/PLS OXIMETRY MLT: CPT

## 2022-05-29 PROCEDURE — 27000923 HC TRIALYSIS CATHETER, ANY SIZE

## 2022-05-29 RX ORDER — AMLODIPINE BESYLATE 5 MG/1
10 TABLET ORAL DAILY
Status: DISCONTINUED | OUTPATIENT
Start: 2022-05-29 | End: 2022-06-03 | Stop reason: HOSPADM

## 2022-05-29 RX ORDER — POLYETHYLENE GLYCOL 3350 17 G/17G
17 POWDER, FOR SOLUTION ORAL 2 TIMES DAILY PRN
Status: DISCONTINUED | OUTPATIENT
Start: 2022-05-29 | End: 2022-06-03 | Stop reason: HOSPADM

## 2022-05-29 RX ORDER — SODIUM BICARBONATE 650 MG/1
650 TABLET ORAL 2 TIMES DAILY
Status: DISCONTINUED | OUTPATIENT
Start: 2022-05-29 | End: 2022-06-03 | Stop reason: HOSPADM

## 2022-05-29 RX ORDER — IBUPROFEN 200 MG
16 TABLET ORAL
Status: DISCONTINUED | OUTPATIENT
Start: 2022-05-29 | End: 2022-06-03 | Stop reason: HOSPADM

## 2022-05-29 RX ORDER — HYDRALAZINE HYDROCHLORIDE 25 MG/1
50 TABLET, FILM COATED ORAL EVERY 12 HOURS
Status: DISCONTINUED | OUTPATIENT
Start: 2022-05-29 | End: 2022-06-03 | Stop reason: HOSPADM

## 2022-05-29 RX ORDER — ATORVASTATIN CALCIUM 40 MG/1
40 TABLET, FILM COATED ORAL DAILY
Status: DISCONTINUED | OUTPATIENT
Start: 2022-05-29 | End: 2022-06-03 | Stop reason: HOSPADM

## 2022-05-29 RX ORDER — FUROSEMIDE 20 MG/1
20 TABLET ORAL DAILY
Status: DISCONTINUED | OUTPATIENT
Start: 2022-05-29 | End: 2022-06-01

## 2022-05-29 RX ORDER — CARVEDILOL 3.12 MG/1
3.12 TABLET ORAL 2 TIMES DAILY
Status: DISCONTINUED | OUTPATIENT
Start: 2022-05-29 | End: 2022-06-03 | Stop reason: HOSPADM

## 2022-05-29 RX ORDER — IBUPROFEN 200 MG
24 TABLET ORAL
Status: DISCONTINUED | OUTPATIENT
Start: 2022-05-29 | End: 2022-06-03 | Stop reason: HOSPADM

## 2022-05-29 RX ADMIN — MUPIROCIN: 20 OINTMENT TOPICAL at 08:05

## 2022-05-29 RX ADMIN — SODIUM BICARBONATE 650 MG TABLET 650 MG: at 08:05

## 2022-05-29 RX ADMIN — INSULIN ASPART 8 UNITS: 100 INJECTION, SOLUTION INTRAVENOUS; SUBCUTANEOUS at 04:05

## 2022-05-29 RX ADMIN — ATORVASTATIN CALCIUM 40 MG: 40 TABLET, FILM COATED ORAL at 03:05

## 2022-05-29 RX ADMIN — AMLODIPINE BESYLATE 10 MG: 5 TABLET ORAL at 03:05

## 2022-05-29 RX ADMIN — INSULIN ASPART 3 UNITS: 100 INJECTION, SOLUTION INTRAVENOUS; SUBCUTANEOUS at 08:05

## 2022-05-29 RX ADMIN — FUROSEMIDE 20 MG: 20 TABLET ORAL at 03:05

## 2022-05-29 NOTE — CARE UPDATE
05/28/22 2013   Patient Assessment/Suction   Level of Consciousness (AVPU) responds to pain   Respiratory Effort Unlabored   PRE-TX-O2   O2 Device (Oxygen Therapy) room air   SpO2 98 %   Pulse Oximetry Type Intermittent   $ Pulse Oximetry - Multiple Charge Pulse Oximetry - Multiple   Pulse 101

## 2022-05-29 NOTE — PLAN OF CARE
Problem: Adult Inpatient Plan of Care  Goal: Patient-Specific Goal (Individualized)  Outcome: Ongoing, Progressing     Problem: Palliative Care  Goal: Enhanced Quality of Life  Outcome: Ongoing, Progressing   Pt remains in bed nonverbals, NG tube feeding tolerating well. Cough and drooling noted, suctioning and oral care done. Vs monitored. Tele monitored. Bed alarm set, call light in reach.

## 2022-05-30 PROBLEM — Z51.5 COMFORT MEASURES ONLY STATUS: Status: RESOLVED | Noted: 2022-05-26 | Resolved: 2022-05-30

## 2022-05-30 PROBLEM — N18.6 ESRD (END STAGE RENAL DISEASE): Status: ACTIVE | Noted: 2022-05-30

## 2022-05-30 LAB
POCT GLUCOSE: 292 MG/DL (ref 70–110)
POCT GLUCOSE: 319 MG/DL (ref 70–110)
POCT GLUCOSE: 330 MG/DL (ref 70–110)
POCT GLUCOSE: 337 MG/DL (ref 70–110)

## 2022-05-30 PROCEDURE — 99231 PR SUBSEQUENT HOSPITAL CARE,LEVL I: ICD-10-PCS | Mod: ,,, | Performed by: FAMILY MEDICINE

## 2022-05-30 PROCEDURE — 27000923 HC TRIALYSIS CATHETER, ANY SIZE

## 2022-05-30 PROCEDURE — 94761 N-INVAS EAR/PLS OXIMETRY MLT: CPT

## 2022-05-30 PROCEDURE — 12000002 HC ACUTE/MED SURGE SEMI-PRIVATE ROOM

## 2022-05-30 PROCEDURE — 99231 SBSQ HOSP IP/OBS SF/LOW 25: CPT | Mod: ,,, | Performed by: FAMILY MEDICINE

## 2022-05-30 PROCEDURE — 25000003 PHARM REV CODE 250: Performed by: STUDENT IN AN ORGANIZED HEALTH CARE EDUCATION/TRAINING PROGRAM

## 2022-05-30 PROCEDURE — C9399 UNCLASSIFIED DRUGS OR BIOLOG: HCPCS | Performed by: STUDENT IN AN ORGANIZED HEALTH CARE EDUCATION/TRAINING PROGRAM

## 2022-05-30 RX ADMIN — CARVEDILOL 3.12 MG: 3.12 TABLET, FILM COATED ORAL at 08:05

## 2022-05-30 RX ADMIN — ATORVASTATIN CALCIUM 40 MG: 40 TABLET, FILM COATED ORAL at 08:05

## 2022-05-30 RX ADMIN — AMLODIPINE BESYLATE 10 MG: 5 TABLET ORAL at 08:05

## 2022-05-30 RX ADMIN — MUPIROCIN: 20 OINTMENT TOPICAL at 08:05

## 2022-05-30 RX ADMIN — SODIUM BICARBONATE 650 MG TABLET 650 MG: at 08:05

## 2022-05-30 RX ADMIN — HYDRALAZINE HYDROCHLORIDE 50 MG: 25 TABLET, FILM COATED ORAL at 09:05

## 2022-05-30 RX ADMIN — INSULIN ASPART 8 UNITS: 100 INJECTION, SOLUTION INTRAVENOUS; SUBCUTANEOUS at 05:05

## 2022-05-30 RX ADMIN — CARVEDILOL 3.12 MG: 3.12 TABLET, FILM COATED ORAL at 09:05

## 2022-05-30 RX ADMIN — SODIUM BICARBONATE 650 MG TABLET 650 MG: at 09:05

## 2022-05-30 RX ADMIN — HYDRALAZINE HYDROCHLORIDE 50 MG: 25 TABLET, FILM COATED ORAL at 08:05

## 2022-05-30 RX ADMIN — MUPIROCIN: 20 OINTMENT TOPICAL at 09:05

## 2022-05-30 RX ADMIN — INSULIN DETEMIR 20 UNITS: 100 INJECTION, SOLUTION SUBCUTANEOUS at 01:05

## 2022-05-30 RX ADMIN — INSULIN ASPART 8 UNITS: 100 INJECTION, SOLUTION INTRAVENOUS; SUBCUTANEOUS at 11:05

## 2022-05-30 NOTE — CONSULTS
[facility]  Adult Nutrition  Consult Note    SUMMARY   Intervention: enteral nutrition therapy    Recommendations  Recommendation/Intervention:   1.)  Depending on goals of care, continue with Diabetisource AC advancing as tolerated to goal rate 70 mls/hr continuous.   (provides 2016 kcals, 101 g protein, 168 g CHO, and 1374 mls water)     Goals: 1.) pt will meet >50% of EEN during admit  Nutrition Goal Status: 1.) goal met/ongoing   Communication of RD Recs: other (comment) (POC)    1. End of life care      Past Medical History:   Diagnosis Date    CHF (congestive heart failure)     Diabetes mellitus     Hypertension     Stroke          Assessment and Plan  Nutrition Problem  inadequate energy intake    Related to (etiology):   Acute illness     Signs and Symptoms (as evidenced by):   NPO  TPN meeting 30% of EEN     Interventions/Recommendations (treatment strategy):  Consider custom TPN     Nutrition Diagnosis Status:   Improving        Malnutrition Assessment  Severe Weight Loss (Malnutrition): greater than 7.5% in 3 months (11.5% weight loss in 4 months)   Kain = 12, wound  Edema 1-3+  NFPE done 5/19/22, no wasting seen    Reason for Assessment  Reason for Assessment: RD follow up:   General Information Comments: admits with decreased responsiveness, pt to start dialysis today, on continuous bipap, non-verbal, central line present. RD consulted for TPN. Pending SLP asessment for diet advancement. Remains NPO.  5/18: pt receiving dialysis. TPN infusing, on 3L NC.   5/19/22 Pt continues on TPN, glucose in the 400's. Discussed ordering lower dextrose TPN today. Continues on HD, off bipap but unresponsive. Plan to consider comfort care 5/21.  5/24/22 TPN discontinued, pt now tolerating TF @ goal today with flushes as ordered. Unable to advance PO diet at this time.  5/27/22 Pt continues on TF via NG, novasource @ 40 ml/hr, NPO. Made comfort measures only today but TF continues for now. No  "plan for PEG placement.  5/30 RD re-consulted due to pt being bed bound.  Pt was comfort care measures but family rescinded their decision. Family meeting today @ 5pm on goals of care.     Nutrition Risk Screen  Nutrition Risk Screen: tube feeding or parenteral nutrition    Nutrition/Diet History       Anthropometrics  Temp: 96.5 °F (35.8 °C)  Height: 5' 7"  Height (inches): 67 in  Weight Method: Bed Scale  Weight: 76.4 kg (168 lb 6.9 oz)  Weight (lb): 168.43 lb  Ideal Body Weight (IBW), Male: 148 lb  % Ideal Body Weight, Male (lb): 113.8 %  BMI (Calculated): 26.4       Lab/Procedures/Meds  Pertinent Labs Reviewed: reviewed  BMP  Lab Results   Component Value Date     (L) 05/23/2022    K 3.6 05/23/2022     05/23/2022    CO2 21 (L) 05/23/2022    BUN 63 (H) 05/23/2022    CREATININE 2.7 (H) 05/23/2022    CALCIUM 8.8 05/23/2022    ANIONGAP 12 05/23/2022    ESTGFRAFRICA 28 (A) 05/23/2022    EGFRNONAA 24 (A) 05/23/2022     Lab Results   Component Value Date    ALBUMIN 2.4 (L) 05/22/2022     Lab Results   Component Value Date    CALCIUM 8.8 05/23/2022    PHOS 2.7 05/23/2022     Recent Labs   Lab 05/30/22  1119   POCTGLUCOSE [id number]*       Pertinent Medications Reviewed: reviewed  Scheduled Meds:   amLODIPine  10 mg Per NG tube Daily    atorvastatin  40 mg Per NG tube Daily    carvediloL  3.125 mg Per NG tube BID    furosemide  20 mg Per NG tube Daily    hydrALAZINE  50 mg Per NG tube Q12H    insulin detemir U-100  20 Units Subcutaneous Daily    mupirocin   Nasal BID    scopolamine  1 patch Transdermal Q3 Days    sodium bicarbonate  650 mg Per NG tube BID         Estimated/Assessed Needs  Needs adjusted for HD  Weight Used For Calorie Calculations: 78 kg [facility]  Energy Calorie Requirements (kcal): 2340 kcals/day  Energy Need Method: 30 kcal/kg ( overweight vs. HD)  Protein Requirements: 93g ( 1.2 g protein/kg HD)  Weight Used For Protein Calculations: 78 kg HD  Estimated Fluid Requirement Method: 1500 ml " HD       Nutrition Prescription Ordered  Current Diet Order: NPO  Current Nutrition Support Formula Ordered: Diabetisource   Current Nutrition Support Rate Ordered: 40 (ml)  Current Nutrition Support Frequency Ordered: continuous      Evaluation of Received Nutrient/Fluid Intake  Parenteral Calories (kcal): 1152  Parenteral Protein (gm): 58  Parenteral Fluid (mL): 785  I/O: -546  Tolerance: tolerating  % Intake of Estimated Energy Needs: 25 - 50 %  % Meal Intake: NPO        Nutrition Risk  Level of Risk/Frequency of Follow-up: moderate  (x2 weekly)         Monitor and Evaluation  Food and Nutrient Intake: energy intake, parenteral nutrition intake, enteral nutrition intake  Food and Nutrient Adminstration: diet order, enteral and parenteral nutrition administration  Anthropometric Measurements: weight, weight change, body mass index  Biochemical Data, Medical Tests and Procedures: electrolyte and renal panel, glucose/endocrine profile, lipid profile  Nutrition-Focused Physical Findings: overall appearance         Nutrition Follow-Up  RD Follow-up?: Yes      Nutrition Follow-Up  RD Follow-up?: Yes

## 2022-05-30 NOTE — PROGRESS NOTES
"[facility] Hospital Medicine  Progress Note    Patient Name: Marshall Flor  MRN: [id number]  Patient Class: IP- Inpatient   Admission Date: 5/26/2022  Length of Stay: 3 days  Attending Physician: Danny Maldonado MD  Primary Care Provider: Dorie Quan MD        Subjective:     Principal Problem:Comfort measures only status        HPI:  Patient was admitted for hospice care following transition from inpatient.  Please see previous discharge summary.  In short, the patient suffered a devastating stroke and was left completely dependent for all ADLs.  He remained nonverbal, unable to communicate.  Family has decided to transition to hospice care, but reversed this decision at the last minute.  The patient is still end of life.  There was nothing further medically that can be accomplished at this point in time.      Overview/Hospital Course:  Marshall Flor is a 66 year old male with a past medical history of CHF, ESRD, DM, HTN, HLD, and gout who was initially transitioned to inpatient hospice after suffering a stroke with neurologic compromise and secondary hemorrhagic conversion. However, the patient's family requested him to be taken off of hospice in order to be monitored for another few days. Both the Palliative Medicine physician and Dr. Mustafa of [facility] had multiple conversations with the family regarding the patient's very poor prognosis, especially in light of the patient's goals which would have been to not be bed-bound, nonverbal and unable to eat. Discussions with family are ongoing.       Interval History: see "Hospital Course"    Review of Systems   Unable to perform ROS: Patient nonverbal   Objective:     Vital Signs (Most Recent):  Temp: 98.9 °F (37.2 °C) (05/29/22 1938)  Pulse: 105 (05/29/22 1938)  Resp: 16 (05/29/22 1938)  BP: (!) 114/57 (05/29/22 1938)  SpO2: 99 % (05/29/22 1938)   Vital Signs (24h Range):  Temp:  [98 °F (36.7 °C)-98.9 °F (37.2 °C)] 98.9 °F (37.2 " °C)  Pulse:  [101-109] 105  Resp:  [16-18] 16  SpO2:  [96 %-100 %] 99 %  BP: (114-164)/(57-65) 114/57     Weight: 76.4 kg (168 lb 6.9 oz)  Body mass index is 26.38 kg/m².    Intake/Output Summary (Last 24 hours) at 5/29/2022 2003  Last data filed at 5/29/2022 1936  Gross per 24 hour   Intake 704 ml   Output 1450 ml   Net -746 ml      Physical Exam  Vitals and nursing note reviewed.   Constitutional:       General: He is not in acute distress.     Appearance: He is not ill-appearing.      Comments: Nonverbal, encephalopathic.   HENT:      Head: Normocephalic and atraumatic.      Right Ear: External ear normal.      Left Ear: External ear normal.      Nose: Nose normal.      Comments: Nasogastric tube in place     Mouth/Throat:      Mouth: Mucous membranes are moist.      Pharynx: Oropharynx is clear.   Eyes:      Conjunctiva/sclera: Conjunctivae normal.   Neck:      Vascular: No JVD.   Cardiovascular:      Rate and Rhythm: Normal rate and regular rhythm.      Pulses: Normal pulses.      Heart sounds: Normal heart sounds.   Pulmonary:      Effort: Pulmonary effort is normal.      Breath sounds: Normal breath sounds.   Abdominal:      General: Bowel sounds are normal. There is no distension.      Palpations: Abdomen is soft.      Tenderness: There is no abdominal tenderness.   Musculoskeletal:      Cervical back: Neck supple.   Lymphadenopathy:      Cervical: No cervical adenopathy.   Skin:     General: Skin is warm and dry.      Coloration: Skin is not pale.      Findings: No rash.   Neurological:      Mental Status: He is disoriented.      Comments: Encephalopathic. Nonverbal at baseline.   Psychiatric:      Comments: Unable to assess.       Significant Labs: All pertinent labs within the past 24 hours have been reviewed.    Significant Imaging: I have reviewed all pertinent imaging results/findings within the past 24 hours.      Assessment/Plan:      * Comfort measures only status  -PRN morphine and  Ativan  -DNR  -Scopolamine patch  -Palliative Care following                      CVA (cerebral vascular accident)  -Currently comfort care      Diabetes mellitus treated with insulin  -Tube feeding diet via NG  -SSI  -Q6H glucose checks  -Hypoglycemic precautions      Anemia of chronic renal failure  -No longer trending labs      Alcoholic fatty liver  Chronic.      Chronic combined systolic and diastolic CHF (congestive heart failure)  -Currently comfort care      Encephalopathy  Secondary to CVA.      Essential hypertension  -Continue home medications      Hyperlipidemia  -Statin        VTE Risk Mitigation (From admission, onward)         Ordered     IP VTE HIGH RISK PATIENT  Once         05/26/22 1702     heparin (porcine) injection 4,000 Units  As needed (PRN)         05/26/22 1702                Discharge Planning   STAR:      Code Status: DNR   Is the patient medically ready for discharge?:     Reason for patient still in hospital (select all that apply): Consult recommendations and Pending disposition  Discharge Plan A: Inpatient Hospice                  Danny Maldonado MD  Department of Hospital Medicine   [facility]

## 2022-05-30 NOTE — PROGRESS NOTES
"[facility] Medicine  Progress Note    Patient Name: Marshall Flor  MRN: [id number]  Patient Class: IP- Inpatient   Admission Date: 5/26/2022  Length of Stay: 4 days  Attending Physician: Danny Maldonado MD  Primary Care Provider: Dorie Quan MD        Subjective:     Principal Problem:<principal problem not specified>        HPI:  Patient was admitted for hospice care following transition from inpatient.  Please see previous discharge summary.  In short, the patient suffered a devastating stroke and was left completely dependent for all ADLs.  He remained nonverbal, unable to communicate.  Family has decided to transition to hospice care, but reversed this decision at the last minute.  The patient is still end of life.  There was nothing further medically that can be accomplished at this point in time.      Overview/Hospital Course:  Marshall Flor is a 66 year old male with a past medical history of CHF, ESRD, DM, HTN, HLD, and gout who was initially transitioned to inpatient hospice after suffering a stroke with neurologic compromise and secondary hemorrhagic conversion. However, the patient's family requested him to be taken off of hospice in order to be monitored for another few days. Both the Palliative Medicine physician and Dr. Mustafa of [facility] had multiple conversations with the family regarding the patient's very poor prognosis, especially in light of the patient's goals which would have been to not be bed-bound, nonverbal and unable to eat. Discussions with family are ongoing per Palliative care with a family meeting planned for 5/31. In the meantime, his home medications are being continued and an NG tube has been placed for tube feeds in the event, the family wishes to proceed with medical therapy. He remains DNR and HD has not been resumed.      Interval History: see "Hospital Course"    Review of Systems   Unable to perform ROS: Patient nonverbal   Objective: "     Vital Signs (Most Recent):  Temp: 96.5 °F (35.8 °C) (05/30/22 1126)  Pulse: 96 (05/30/22 1126)  Resp: 18 (05/30/22 1126)  BP: (!) 175/76 (05/30/22 1126)  SpO2: 100 % (05/30/22 1126)   Vital Signs (24h Range):  Temp:  [96.5 °F (35.8 °C)-98.9 °F (37.2 °C)] 96.5 °F (35.8 °C)  Pulse:  [] 96  Resp:  [16-18] 18  SpO2:  [97 %-100 %] 100 %  BP: (114-175)/(55-76) 175/76     Weight: 76.4 kg (168 lb 6.9 oz)  Body mass index is 26.38 kg/m².    Intake/Output Summary (Last 24 hours) at 5/30/2022 1142  Last data filed at 5/30/2022 0400  Gross per 24 hour   Intake 704 ml   Output 1250 ml   Net -546 ml      Physical Exam  Vitals and nursing note reviewed.   Constitutional:       General: He is not in acute distress.     Appearance: He is not ill-appearing.      Comments: Nonverbal, encephalopathic.   HENT:      Head: Normocephalic and atraumatic.      Right Ear: External ear normal.      Left Ear: External ear normal.      Nose: Nose normal.      Comments: Nasogastric tube in place     Mouth/Throat:      Mouth: Mucous membranes are moist.      Pharynx: Oropharynx is clear.   Eyes:      Conjunctiva/sclera: Conjunctivae normal.   Neck:      Vascular: No JVD.   Cardiovascular:      Rate and Rhythm: Normal rate and regular rhythm.      Pulses: Normal pulses.      Heart sounds: Normal heart sounds.   Pulmonary:      Effort: Pulmonary effort is normal.      Breath sounds: Normal breath sounds.   Abdominal:      General: Bowel sounds are normal. There is no distension.      Palpations: Abdomen is soft.      Tenderness: There is no abdominal tenderness.   Musculoskeletal:      Cervical back: Neck supple.   Lymphadenopathy:      Cervical: No cervical adenopathy.   Skin:     General: Skin is warm and dry.      Coloration: Skin is not pale.      Findings: No rash.   Neurological:      Mental Status: He is disoriented.      Comments: Encephalopathic. Nonverbal at baseline.   Psychiatric:      Comments: Unable to assess.        Significant Labs: All pertinent labs within the past 24 hours have been reviewed.    Significant Imaging: I have reviewed all pertinent imaging results/findings within the past 24 hours.      Assessment/Plan:      CVA (cerebral vascular accident)  -Continue statin  -Palliative Care in contact with family regarding patient's goals of care    Diabetes mellitus treated with insulin  -Tube feeding diet via NG  -SSI  -Q6H glucose checks  -Hypoglycemic precautions  -Titrate scheduled insulin    Anemia of chronic renal failure  -Trend CBC      Alcoholic fatty liver  Chronic.      Goals of care, counseling/discussion  -DNR currently  -Palliative Care consulted; family meeting planned for 5/31    Chronic combined systolic and diastolic CHF (congestive heart failure)  -Continue home medications    Encephalopathy  Secondary to CVA.      Essential hypertension  -Continue home medications      Hyperlipidemia  -Statin      CKD (chronic kidney disease) stage 4, GFR 15-29 ml/min  Chronic.  -Renally dose medications  -Avoid nephrotoxins        VTE Risk Mitigation (From admission, onward)         Ordered     IP VTE HIGH RISK PATIENT  Once         05/26/22 1702     heparin (porcine) injection 4,000 Units  As needed (PRN)         05/26/22 1702                Discharge Planning   STAR:      Code Status: DNR   Is the patient medically ready for discharge?:     Reason for patient still in hospital (select all that apply): Consult recommendations and Pending disposition  Discharge Plan A: Inpatient Hospice                  Danny Maldonado MD  Department of Hospital Medicine   [facility]

## 2022-05-30 NOTE — PLAN OF CARE
Problem: Adult Inpatient Plan of Care  Goal: Patient-Specific Goal (Individualized)  Outcome: Ongoing, Progressing     Problem: Diabetes Comorbidity  Goal: Blood Glucose Level Within Targeted Range  Outcome: Ongoing, Progressing     Problem: Palliative Care  Goal: Enhanced Quality of Life  Outcome: Ongoing, Progressing    Pt remains nonverbal, Ng tube tolerated well. Sterile dressing changes done. Coughing/drooling frequently, suction at bedside. Turned q2. Vs monitored, Tele monitored. Bed alarm set, call light in reach.

## 2022-05-30 NOTE — SUBJECTIVE & OBJECTIVE
"Interval History: see "Hospital Course"    Review of Systems   Unable to perform ROS: Patient nonverbal   Objective:     Vital Signs (Most Recent):  Temp: 96.5 °F (35.8 °C) (05/30/22 1126)  Pulse: 96 (05/30/22 1126)  Resp: 18 (05/30/22 1126)  BP: (!) 175/76 (05/30/22 1126)  SpO2: 100 % (05/30/22 1126)   Vital Signs (24h Range):  Temp:  [96.5 °F (35.8 °C)-98.9 °F (37.2 °C)] 96.5 °F (35.8 °C)  Pulse:  [] 96  Resp:  [16-18] 18  SpO2:  [97 %-100 %] 100 %  BP: (114-175)/(55-76) 175/76     Weight: 76.4 kg (168 lb 6.9 oz)  Body mass index is 26.38 kg/m².    Intake/Output Summary (Last 24 hours) at 5/30/2022 1142  Last data filed at 5/30/2022 0400  Gross per 24 hour   Intake 704 ml   Output 1250 ml   Net -546 ml      Physical Exam  Vitals and nursing note reviewed.   Constitutional:       General: He is not in acute distress.     Appearance: He is not ill-appearing.      Comments: Nonverbal, encephalopathic.   HENT:      Head: Normocephalic and atraumatic.      Right Ear: External ear normal.      Left Ear: External ear normal.      Nose: Nose normal.      Comments: Nasogastric tube in place     Mouth/Throat:      Mouth: Mucous membranes are moist.      Pharynx: Oropharynx is clear.   Eyes:      Conjunctiva/sclera: Conjunctivae normal.   Neck:      Vascular: No JVD.   Cardiovascular:      Rate and Rhythm: Normal rate and regular rhythm.      Pulses: Normal pulses.      Heart sounds: Normal heart sounds.   Pulmonary:      Effort: Pulmonary effort is normal.      Breath sounds: Normal breath sounds.   Abdominal:      General: Bowel sounds are normal. There is no distension.      Palpations: Abdomen is soft.      Tenderness: There is no abdominal tenderness.   Musculoskeletal:      Cervical back: Neck supple.   Lymphadenopathy:      Cervical: No cervical adenopathy.   Skin:     General: Skin is warm and dry.      Coloration: Skin is not pale.      Findings: No rash.   Neurological:      Mental Status: He is disoriented. "      Comments: Encephalopathic. Nonverbal at baseline.   Psychiatric:      Comments: Unable to assess.       Significant Labs: All pertinent labs within the past 24 hours have been reviewed.    Significant Imaging: I have reviewed all pertinent imaging results/findings within the past 24 hours.

## 2022-05-30 NOTE — PROGRESS NOTES
Palliative physician speaking with nephew, Kraig, and patient brother, Tony. More discussions are had about his neurological status and poor options for nutrition. They understand that a feeding tube is too risky and would not address his underlying neurological state. He is already confused and pulled out his NG tube overnight.   The next question is where patient received hospice outside the hospital. Home hospice is not an option and family requesting a hospice facility near them in [place]. They have heard of a facility in [place] that would be acceptable and convenient for them.   They understand that patient cannot go to hospice with NG tube or any for of nutrition he cannot initial himself. A very shortened life expectancy is expected which may qualify him for inpt. Family agreeable to involve case management for more information about hospice availability.

## 2022-05-30 NOTE — ASSESSMENT & PLAN NOTE
-No HD per Nephrology  -Renally dose medications  -Avoid nephrotoxic agents  -Sodium bicarbonate

## 2022-05-30 NOTE — CARE UPDATE
05/30/22 0740   PRE-TX-O2   O2 Device (Oxygen Therapy) room air   SpO2 98 %   Pulse Oximetry Type Intermittent   $ Pulse Oximetry - Multiple Charge Pulse Oximetry - Multiple

## 2022-05-30 NOTE — PROGRESS NOTES
3992- Called [facility] [phone] to confirm phone meeting with Dr. Casey today; no answer, left message. Will follow up.     1020- Received call back from Nephew; confirmed family phone meeting with Dr. Casey for 5:oopm today. PC team will continue to follow.

## 2022-05-30 NOTE — CHAPLAIN
Visited pt; awake in bed, nonverbal. When I said , in your mercy-- pt seemed to slightly nod; provided compassionate presence and said a prayer over him.  will continue to follow. , in your mercy.

## 2022-05-30 NOTE — PLAN OF CARE
Problem: Adult Inpatient Plan of Care  Goal: Plan of Care Review  Outcome: Ongoing, Progressing  Goal: Patient-Specific Goal (Individualized)  Outcome: Ongoing, Progressing  Goal: Absence of Hospital-Acquired Illness or Injury  Outcome: Ongoing, Progressing  Goal: Optimal Comfort and Wellbeing  Outcome: Ongoing, Progressing  Goal: Readiness for Transition of Care  Outcome: Ongoing, Progressing     Problem: Diabetes Comorbidity  Goal: Blood Glucose Level Within Targeted Range  Outcome: Ongoing, Progressing     Problem: Fluid and Electrolyte Imbalance (Acute Kidney Injury/Impairment)  Goal: Fluid and Electrolyte Balance  Outcome: Ongoing, Progressing     Problem: Oral Intake Inadequate (Acute Kidney Injury/Impairment)  Goal: Optimal Nutrition Intake  Outcome: Ongoing, Progressing     Problem: Renal Function Impairment (Acute Kidney Injury/Impairment)  Goal: Effective Renal Function  Outcome: Ongoing, Progressing     Problem: Coping Ineffective  Goal: Effective Coping  Outcome: Ongoing, Progressing     Problem: Palliative Care  Goal: Enhanced Quality of Life  Outcome: Ongoing, Progressing     Problem: Infection  Goal: Absence of Infection Signs and Symptoms  Outcome: Ongoing, Progressing     Problem: Impaired Wound Healing  Goal: Optimal Wound Healing  Outcome: Ongoing, Progressing     Problem: Adjustment to Illness (Delirium)  Goal: Optimal Coping  Outcome: Ongoing, Progressing     Problem: Altered Behavior (Delirium)  Goal: Improved Behavioral Control  Outcome: Ongoing, Progressing     Problem: Attention and Thought Clarity Impairment (Delirium)  Goal: Improved Attention and Thought Clarity  Outcome: Ongoing, Progressing     Problem: Sleep Disturbance (Delirium)  Goal: Improved Sleep  Outcome: Ongoing, Progressing     Problem: Adjustment to Illness (Stroke, Ischemic/Transient Ischemic Attack)  Goal: Optimal Coping  Outcome: Ongoing, Progressing     Problem: Bowel Elimination Impaired (Stroke, Ischemic/Transient  Ischemic Attack)  Goal: Effective Bowel Elimination  Outcome: Ongoing, Progressing     Problem: Cerebral Tissue Perfusion (Stroke, Ischemic/Transient Ischemic Attack)  Goal: Optimal Cerebral Tissue Perfusion  Outcome: Ongoing, Progressing     Problem: Cognitive Impairment (Stroke, Ischemic/Transient Ischemic Attack)  Goal: Optimal Cognitive Function  Outcome: Ongoing, Progressing     Problem: Communication Impairment (Stroke, Ischemic/Transient Ischemic Attack)  Goal: Improved Communication Skills  Outcome: Ongoing, Progressing     Problem: Functional Ability Impaired (Stroke, Ischemic/Transient Ischemic Attack)  Goal: Optimal Functional Ability  Outcome: Ongoing, Progressing

## 2022-05-30 NOTE — SUBJECTIVE & OBJECTIVE
"Interval History: see "Hospital Course"    Review of Systems   Unable to perform ROS: Patient nonverbal   Objective:     Vital Signs (Most Recent):  Temp: 98.9 °F (37.2 °C) (05/29/22 1938)  Pulse: 105 (05/29/22 1938)  Resp: 16 (05/29/22 1938)  BP: (!) 114/57 (05/29/22 1938)  SpO2: 99 % (05/29/22 1938)   Vital Signs (24h Range):  Temp:  [98 °F (36.7 °C)-98.9 °F (37.2 °C)] 98.9 °F (37.2 °C)  Pulse:  [101-109] 105  Resp:  [16-18] 16  SpO2:  [96 %-100 %] 99 %  BP: (114-164)/(57-65) 114/57     Weight: 76.4 kg (168 lb 6.9 oz)  Body mass index is 26.38 kg/m².    Intake/Output Summary (Last 24 hours) at 5/29/2022 2003  Last data filed at 5/29/2022 1936  Gross per 24 hour   Intake 704 ml   Output 1450 ml   Net -746 ml      Physical Exam  Vitals and nursing note reviewed.   Constitutional:       General: He is not in acute distress.     Appearance: He is not ill-appearing.      Comments: Nonverbal, encephalopathic.   HENT:      Head: Normocephalic and atraumatic.      Right Ear: External ear normal.      Left Ear: External ear normal.      Nose: Nose normal.      Comments: Nasogastric tube in place     Mouth/Throat:      Mouth: Mucous membranes are moist.      Pharynx: Oropharynx is clear.   Eyes:      Conjunctiva/sclera: Conjunctivae normal.   Neck:      Vascular: No JVD.   Cardiovascular:      Rate and Rhythm: Normal rate and regular rhythm.      Pulses: Normal pulses.      Heart sounds: Normal heart sounds.   Pulmonary:      Effort: Pulmonary effort is normal.      Breath sounds: Normal breath sounds.   Abdominal:      General: Bowel sounds are normal. There is no distension.      Palpations: Abdomen is soft.      Tenderness: There is no abdominal tenderness.   Musculoskeletal:      Cervical back: Neck supple.   Lymphadenopathy:      Cervical: No cervical adenopathy.   Skin:     General: Skin is warm and dry.      Coloration: Skin is not pale.      Findings: No rash.   Neurological:      Mental Status: He is disoriented. "      Comments: Encephalopathic. Nonverbal at baseline.   Psychiatric:      Comments: Unable to assess.       Significant Labs: All pertinent labs within the past 24 hours have been reviewed.    Significant Imaging: I have reviewed all pertinent imaging results/findings within the past 24 hours.

## 2022-05-30 NOTE — ASSESSMENT & PLAN NOTE
-Tube feeding diet via NG  -SSI  -Q6H glucose checks  -Hypoglycemic precautions  -Titrate scheduled insulin

## 2022-05-31 PROBLEM — E87.0 HYPERNATREMIA: Status: ACTIVE | Noted: 2022-05-31

## 2022-05-31 LAB
ALBUMIN SERPL BCP-MCNC: 2.3 G/DL (ref 3.5–5.2)
ALP SERPL-CCNC: 102 U/L (ref 55–135)
ALT SERPL W/O P-5'-P-CCNC: 89 U/L (ref 10–44)
ANION GAP SERPL CALC-SCNC: 14 MMOL/L (ref 8–16)
AST SERPL-CCNC: 33 U/L (ref 10–40)
BASOPHILS # BLD AUTO: 0.05 K/UL (ref 0–0.2)
BASOPHILS NFR BLD: 0.7 % (ref 0–1.9)
BILIRUB SERPL-MCNC: 0.3 MG/DL (ref 0.1–1)
BUN SERPL-MCNC: 73 MG/DL (ref 8–23)
CALCIUM SERPL-MCNC: 9.6 MG/DL (ref 8.7–10.5)
CHLORIDE SERPL-SCNC: 113 MMOL/L (ref 95–110)
CO2 SERPL-SCNC: 23 MMOL/L (ref 23–29)
CREAT SERPL-MCNC: 2.1 MG/DL (ref 0.5–1.4)
DIFFERENTIAL METHOD: ABNORMAL
EOSINOPHIL # BLD AUTO: 0.4 K/UL (ref 0–0.5)
EOSINOPHIL NFR BLD: 5.3 % (ref 0–8)
ERYTHROCYTE [DISTWIDTH] IN BLOOD BY AUTOMATED COUNT: 18.1 % (ref 11.5–14.5)
EST. GFR  (AFRICAN AMERICAN): 37 ML/MIN/1.73 M^2
EST. GFR  (NON AFRICAN AMERICAN): 32 ML/MIN/1.73 M^2
GLUCOSE SERPL-MCNC: 271 MG/DL (ref 70–110)
HCT VFR BLD AUTO: 38 % (ref 40–54)
HGB BLD-MCNC: 12.4 G/DL (ref 14–18)
IMM GRANULOCYTES # BLD AUTO: 0.02 K/UL (ref 0–0.04)
IMM GRANULOCYTES NFR BLD AUTO: 0.3 % (ref 0–0.5)
LYMPHOCYTES # BLD AUTO: 1.2 K/UL (ref 1–4.8)
LYMPHOCYTES NFR BLD: 16.9 % (ref 18–48)
MAGNESIUM SERPL-MCNC: 1.8 MG/DL (ref 1.6–2.6)
MCH RBC QN AUTO: 24.8 PG (ref 27–31)
MCHC RBC AUTO-ENTMCNC: 32.6 G/DL (ref 32–36)
MCV RBC AUTO: 76 FL (ref 82–98)
MONOCYTES # BLD AUTO: 0.5 K/UL (ref 0.3–1)
MONOCYTES NFR BLD: 7.1 % (ref 4–15)
NEUTROPHILS # BLD AUTO: 4.8 K/UL (ref 1.8–7.7)
NEUTROPHILS NFR BLD: 69.7 % (ref 38–73)
NRBC BLD-RTO: 0 /100 WBC
PHOSPHATE SERPL-MCNC: 3.6 MG/DL (ref 2.7–4.5)
PLATELET # BLD AUTO: 350 K/UL (ref 150–450)
PMV BLD AUTO: 12.4 FL (ref 9.2–12.9)
POCT GLUCOSE: 194 MG/DL (ref 70–110)
POCT GLUCOSE: 217 MG/DL (ref 70–110)
POCT GLUCOSE: 231 MG/DL (ref 70–110)
POCT GLUCOSE: 246 MG/DL (ref 70–110)
POCT GLUCOSE: 259 MG/DL (ref 70–110)
POCT GLUCOSE: 261 MG/DL (ref 70–110)
POTASSIUM SERPL-SCNC: 3.8 MMOL/L (ref 3.5–5.1)
PROT SERPL-MCNC: 7.4 G/DL (ref 6–8.4)
RBC # BLD AUTO: 4.99 M/UL (ref 4.6–6.2)
SODIUM SERPL-SCNC: 150 MMOL/L (ref 136–145)
WBC # BLD AUTO: 6.92 K/UL (ref 3.9–12.7)

## 2022-05-31 PROCEDURE — 84100 ASSAY OF PHOSPHORUS: CPT | Performed by: STUDENT IN AN ORGANIZED HEALTH CARE EDUCATION/TRAINING PROGRAM

## 2022-05-31 PROCEDURE — 85025 COMPLETE CBC W/AUTO DIFF WBC: CPT | Performed by: STUDENT IN AN ORGANIZED HEALTH CARE EDUCATION/TRAINING PROGRAM

## 2022-05-31 PROCEDURE — 94760 N-INVAS EAR/PLS OXIMETRY 1: CPT

## 2022-05-31 PROCEDURE — 25000003 PHARM REV CODE 250: Performed by: STUDENT IN AN ORGANIZED HEALTH CARE EDUCATION/TRAINING PROGRAM

## 2022-05-31 PROCEDURE — 12000002 HC ACUTE/MED SURGE SEMI-PRIVATE ROOM

## 2022-05-31 PROCEDURE — 94761 N-INVAS EAR/PLS OXIMETRY MLT: CPT

## 2022-05-31 PROCEDURE — 80053 COMPREHEN METABOLIC PANEL: CPT | Performed by: STUDENT IN AN ORGANIZED HEALTH CARE EDUCATION/TRAINING PROGRAM

## 2022-05-31 PROCEDURE — 25000003 PHARM REV CODE 250: Performed by: HOSPITALIST

## 2022-05-31 PROCEDURE — 36415 COLL VENOUS BLD VENIPUNCTURE: CPT | Performed by: STUDENT IN AN ORGANIZED HEALTH CARE EDUCATION/TRAINING PROGRAM

## 2022-05-31 PROCEDURE — 83735 ASSAY OF MAGNESIUM: CPT | Performed by: STUDENT IN AN ORGANIZED HEALTH CARE EDUCATION/TRAINING PROGRAM

## 2022-05-31 RX ADMIN — INSULIN ASPART 6 UNITS: 100 INJECTION, SOLUTION INTRAVENOUS; SUBCUTANEOUS at 05:05

## 2022-05-31 RX ADMIN — INSULIN ASPART 2 UNITS: 100 INJECTION, SOLUTION INTRAVENOUS; SUBCUTANEOUS at 12:05

## 2022-05-31 RX ADMIN — INSULIN ASPART 2 UNITS: 100 INJECTION, SOLUTION INTRAVENOUS; SUBCUTANEOUS at 08:05

## 2022-05-31 RX ADMIN — SODIUM BICARBONATE 650 MG TABLET 650 MG: at 09:05

## 2022-05-31 RX ADMIN — ATORVASTATIN CALCIUM 40 MG: 40 TABLET, FILM COATED ORAL at 09:05

## 2022-05-31 RX ADMIN — MUPIROCIN: 20 OINTMENT TOPICAL at 09:05

## 2022-05-31 RX ADMIN — CARVEDILOL 3.12 MG: 3.12 TABLET, FILM COATED ORAL at 09:05

## 2022-05-31 RX ADMIN — HYDRALAZINE HYDROCHLORIDE 50 MG: 25 TABLET, FILM COATED ORAL at 09:05

## 2022-05-31 RX ADMIN — INSULIN DETEMIR 20 UNITS: 100 INJECTION, SOLUTION SUBCUTANEOUS at 10:05

## 2022-05-31 RX ADMIN — SCOPALAMINE 1 PATCH: 1 PATCH, EXTENDED RELEASE TRANSDERMAL at 11:05

## 2022-05-31 RX ADMIN — CARVEDILOL 3.12 MG: 3.12 TABLET, FILM COATED ORAL at 08:05

## 2022-05-31 RX ADMIN — SODIUM BICARBONATE 650 MG TABLET 650 MG: at 08:05

## 2022-05-31 RX ADMIN — AMLODIPINE BESYLATE 10 MG: 5 TABLET ORAL at 09:05

## 2022-05-31 RX ADMIN — MUPIROCIN: 20 OINTMENT TOPICAL at 08:05

## 2022-05-31 RX ADMIN — HYDRALAZINE HYDROCHLORIDE 50 MG: 25 TABLET, FILM COATED ORAL at 08:05

## 2022-05-31 RX ADMIN — FUROSEMIDE 20 MG: 20 TABLET ORAL at 09:05

## 2022-05-31 NOTE — PLAN OF CARE
Janelle Resendiz [phone] with [facility] came by to see the pt and confirm that family if aware that pt is not appropriate for surgery for peg feeding tube. Stated that she is able to admit pt in am while they have beds. I updated her that both Dr. Ceja and Dr. Maldonado have spoke to family and updated them that hospice is the most appropriate goal for the pt. She will touch base in the am to see if family has made a decision regarding admit. CM following.   05/31/22 1456   Post-Acute Status   Post-Acute Authorization Hospice   Hospice Status Pending post acute provider review/more information requested

## 2022-05-31 NOTE — PLAN OF CARE
Call received from Janelle Resendiz [phone] with [facility] stating that the pt is GIP appropriate and she is going to give the nephew a call right now. CM following.    Per Janelle with [facility] inpatient hospice - she spoke to the pts nephew and he told her that he is awaiting to speak to doctors about putting a feeding tube in the pt. She asked if the pt is even strong enough to withstand the surgery. She stated that they are able to accept the pt and admit him for family to visit at their facility for the time that he has left and to keep her updated. CM following.     05/31/22 1024   Post-Acute Status   Post-Acute Authorization Hospice   Hospice Status Referrals Sent

## 2022-05-31 NOTE — SUBJECTIVE & OBJECTIVE
"Interval History: see "Hospital Course"    Review of Systems   Unable to perform ROS: Patient nonverbal   Objective:     Vital Signs (Most Recent):  Temp: 97.7 °F (36.5 °C) (05/31/22 1154)  Pulse: 98 (05/31/22 1154)  Resp: 16 (05/31/22 1154)  BP: (!) 162/82 (05/31/22 1154)  SpO2: 100 % (05/31/22 1154)   Vital Signs (24h Range):  Temp:  [97 °F (36.1 °C)-98 °F (36.7 °C)] 97.7 °F (36.5 °C)  Pulse:  [83-99] 98  Resp:  [16-18] 16  SpO2:  [92 %-100 %] 100 %  BP: (129-165)/(58-98) 162/82     Weight: 76.4 kg (168 lb 6.9 oz)  Body mass index is 26.38 kg/m².    Intake/Output Summary (Last 24 hours) at 5/31/2022 1503  Last data filed at 5/31/2022 0800  Gross per 24 hour   Intake 1620 ml   Output 1400 ml   Net 220 ml      Physical Exam  Vitals and nursing note reviewed.   Constitutional:       General: He is not in acute distress.     Appearance: He is not ill-appearing.      Comments: Nonverbal, encephalopathic.   HENT:      Head: Normocephalic and atraumatic.      Right Ear: External ear normal.      Left Ear: External ear normal.      Nose: Nose normal.      Comments: Nasogastric tube in place     Mouth/Throat:      Mouth: Mucous membranes are moist.      Pharynx: Oropharynx is clear.   Eyes:      Conjunctiva/sclera: Conjunctivae normal.   Neck:      Vascular: No JVD.   Cardiovascular:      Rate and Rhythm: Normal rate and regular rhythm.      Pulses: Normal pulses.      Heart sounds: Normal heart sounds.   Pulmonary:      Effort: Pulmonary effort is normal.      Breath sounds: Normal breath sounds.   Abdominal:      General: Bowel sounds are normal. There is no distension.      Palpations: Abdomen is soft.      Tenderness: There is no abdominal tenderness.   Musculoskeletal:      Cervical back: Neck supple.   Lymphadenopathy:      Cervical: No cervical adenopathy.   Skin:     General: Skin is warm and dry.      Coloration: Skin is not pale.      Findings: No rash.   Neurological:      Mental Status: He is disoriented.      " Comments: Encephalopathic. Able to follow commands. Able to move RUE.   Psychiatric:      Comments: Unable to assess.       Significant Labs: All pertinent labs within the past 24 hours have been reviewed.    Significant Imaging: I have reviewed all pertinent imaging results/findings within the past 24 hours.

## 2022-05-31 NOTE — CARE UPDATE
05/31/22 0745   Patient Assessment/Suction   Level of Consciousness (AVPU) responds to voice   Respiratory Effort Normal   Expansion/Accessory Muscles/Retractions expansion symmetric   Rhythm/Pattern, Respiratory shallow   PRE-TX-O2   O2 Device (Oxygen Therapy) room air   SpO2 99 %   Pulse Oximetry Type Intermittent   $ Pulse Oximetry - Single Charge Pulse Oximetry - Single   Pulse 83   Resp 18

## 2022-05-31 NOTE — PROGRESS NOTES
"[facility] Hospital Medicine  Progress Note    Patient Name: Marshall Flor  MRN: [id number]  Patient Class: IP- Inpatient   Admission Date: 5/26/2022  Length of Stay: 5 days  Attending Physician: Danny Maldonado MD  Primary Care Provider: Dorie Quan MD        Subjective:     Principal Problem:<principal problem not specified>        HPI:  Patient was admitted for hospice care following transition from inpatient.  Please see previous discharge summary.  In short, the patient suffered a devastating stroke and was left completely dependent for all ADLs.  He remained nonverbal, unable to communicate.  Family has decided to transition to hospice care, but reversed this decision at the last minute.  The patient is still end of life.  There was nothing further medically that can be accomplished at this point in time.      Overview/Hospital Course:  Marshall Flor is a 66 year old male with a past medical history of CHF, ESRD, DM, HTN, HLD, and gout who was initially transitioned to inpatient hospice after suffering a stroke with neurologic compromise and secondary hemorrhagic conversion. However, the patient's family requested him to be taken off of hospice in order to be monitored for another few days. Both the Palliative Medicine physician and Dr. Mustafa of [facility] had multiple conversations with the family regarding the patient's very poor prognosis, especially in light of the patient's goals which would have been to not be bed-bound, nonverbal and unable to eat. In the meantime, his home medications are being continued and an NG tube has been placed for tube feeds in the event the family wishes to proceed with medical therapy, although it is unclear if the patient would be candidate for G tube. He remains DNR. Discussions with family are ongoing regarding goals of care      Interval History: see "Hospital Course"    Review of Systems   Unable to perform ROS: Patient nonverbal   Objective: "     Vital Signs (Most Recent):  Temp: 97.7 °F (36.5 °C) (05/31/22 1154)  Pulse: 98 (05/31/22 1154)  Resp: 16 (05/31/22 1154)  BP: (!) 162/82 (05/31/22 1154)  SpO2: 100 % (05/31/22 1154)   Vital Signs (24h Range):  Temp:  [97 °F (36.1 °C)-98 °F (36.7 °C)] 97.7 °F (36.5 °C)  Pulse:  [83-99] 98  Resp:  [16-18] 16  SpO2:  [92 %-100 %] 100 %  BP: (129-165)/(58-98) 162/82     Weight: 76.4 kg (168 lb 6.9 oz)  Body mass index is 26.38 kg/m².    Intake/Output Summary (Last 24 hours) at 5/31/2022 1503  Last data filed at 5/31/2022 0800  Gross per 24 hour   Intake 1620 ml   Output 1400 ml   Net 220 ml      Physical Exam  Vitals and nursing note reviewed.   Constitutional:       General: He is not in acute distress.     Appearance: He is not ill-appearing.      Comments: Nonverbal, encephalopathic.   HENT:      Head: Normocephalic and atraumatic.      Right Ear: External ear normal.      Left Ear: External ear normal.      Nose: Nose normal.      Comments: Nasogastric tube in place     Mouth/Throat:      Mouth: Mucous membranes are moist.      Pharynx: Oropharynx is clear.   Eyes:      Conjunctiva/sclera: Conjunctivae normal.   Neck:      Vascular: No JVD.   Cardiovascular:      Rate and Rhythm: Normal rate and regular rhythm.      Pulses: Normal pulses.      Heart sounds: Normal heart sounds.   Pulmonary:      Effort: Pulmonary effort is normal.      Breath sounds: Normal breath sounds.   Abdominal:      General: Bowel sounds are normal. There is no distension.      Palpations: Abdomen is soft.      Tenderness: There is no abdominal tenderness.   Musculoskeletal:      Cervical back: Neck supple.   Lymphadenopathy:      Cervical: No cervical adenopathy.   Skin:     General: Skin is warm and dry.      Coloration: Skin is not pale.      Findings: No rash.   Neurological:      Mental Status: He is disoriented.      Comments: Encephalopathic. Able to follow commands. Able to move RUE.   Psychiatric:      Comments: Unable to assess.        Significant Labs: All pertinent labs within the past 24 hours have been reviewed.    Significant Imaging: I have reviewed all pertinent imaging results/findings within the past 24 hours.      Assessment/Plan:      CVA (cerebral vascular accident)  -Continue statin  -Palliative Care in contact with family regarding patient's goals of care    Hypernatremia  -Free water flushes with tube feeds  -Trend Na      Diabetes mellitus treated with insulin  -Tube feeding diet via NG  -SSI  -Q6H glucose checks  -Hypoglycemic precautions  -Titrate scheduled insulin    Anemia of chronic renal failure  Microcytic.  -Trend CBC      Alcoholic fatty liver  Chronic.      Goals of care, counseling/discussion  -DNR currently  -Palliative Care consulted; patient to view inpatient hospice facility 6/1    Chronic combined systolic and diastolic CHF (congestive heart failure)  -Continue home medications    Encephalopathy  Secondary to CVA.      Essential hypertension  -Continue home medications      Hyperlipidemia  -Statin      CKD (chronic kidney disease) stage 4, GFR 15-29 ml/min  Chronic.  -Renally dose medications  -Avoid nephrotoxins        VTE Risk Mitigation (From admission, onward)         Ordered     IP VTE HIGH RISK PATIENT  Once         05/26/22 1702     heparin (porcine) injection 4,000 Units  As needed (PRN)         05/26/22 1702                Discharge Planning   STAR: 6/1/2022    Code Status: DNR   Is the patient medically ready for discharge?:     Reason for patient still in hospital (select all that apply): Patient trending condition, Consult recommendations and Pending disposition  Discharge Plan A: Inpatient Hospice                  Danny Maldonado MD  Department of Hospital Medicine   [facility]

## 2022-05-31 NOTE — PLAN OF CARE
CM sent hospice consult, 3 day packet, current meds and palliative care notes to [facility] and [facility] for review and asked them to reach out to the pts nephew to discuss hospice care in their facility . CM following.     I called and updated admissions at [facility] at [phone] that a new referral is in [facility].   I called and updated admissions at [facility] at [phone] that a new referral was sent in [facility] for review. CM following.    Family is interested in facility in [place] for hospice care- Please reach out to pts nephew Kraig Flor at [phone] to discuss hospice care. Thank you !     Greer [phone]   05/31/22 0864   Post-Acute Status   Post-Acute Authorization Hospice   Hospice Status Referrals Sent   Patient choice form signed by patient/caregiver List with quality metrics by geographic area provided

## 2022-05-31 NOTE — PLAN OF CARE
Ppt in bed awake and calm, no distress noted and respiration is even and unlabored. Tube feeding is infusing at 40ml/hr and pt is tolerating the feeding. Residual is zero and no N/V noted.   Problem: Adult Inpatient Plan of Care  Goal: Plan of Care Review  Outcome: Ongoing, Progressing  Goal: Patient-Specific Goal (Individualized)  Outcome: Ongoing, Progressing  Goal: Optimal Comfort and Wellbeing  Outcome: Ongoing, Progressing     Problem: Diabetes Comorbidity  Goal: Blood Glucose Level Within Targeted Range  Outcome: Ongoing, Progressing     Problem: Palliative Care  Goal: Enhanced Quality of Life  Outcome: Ongoing, Progressing

## 2022-06-01 LAB
ALBUMIN SERPL BCP-MCNC: 2.3 G/DL (ref 3.5–5.2)
ALP SERPL-CCNC: 101 U/L (ref 55–135)
ALT SERPL W/O P-5'-P-CCNC: 67 U/L (ref 10–44)
ANION GAP SERPL CALC-SCNC: 11 MMOL/L (ref 8–16)
AST SERPL-CCNC: 27 U/L (ref 10–40)
BASOPHILS # BLD AUTO: 0.03 K/UL (ref 0–0.2)
BASOPHILS NFR BLD: 0.5 % (ref 0–1.9)
BILIRUB SERPL-MCNC: 0.3 MG/DL (ref 0.1–1)
BUN SERPL-MCNC: 68 MG/DL (ref 8–23)
CALCIUM SERPL-MCNC: 9.2 MG/DL (ref 8.7–10.5)
CHLORIDE SERPL-SCNC: 113 MMOL/L (ref 95–110)
CO2 SERPL-SCNC: 27 MMOL/L (ref 23–29)
CREAT SERPL-MCNC: 2.1 MG/DL (ref 0.5–1.4)
DIFFERENTIAL METHOD: ABNORMAL
EOSINOPHIL # BLD AUTO: 0.3 K/UL (ref 0–0.5)
EOSINOPHIL NFR BLD: 4.9 % (ref 0–8)
ERYTHROCYTE [DISTWIDTH] IN BLOOD BY AUTOMATED COUNT: 18 % (ref 11.5–14.5)
EST. GFR  (AFRICAN AMERICAN): 37 ML/MIN/1.73 M^2
EST. GFR  (NON AFRICAN AMERICAN): 32 ML/MIN/1.73 M^2
GLUCOSE SERPL-MCNC: 207 MG/DL (ref 70–110)
HCT VFR BLD AUTO: 37.1 % (ref 40–54)
HGB BLD-MCNC: 12.3 G/DL (ref 14–18)
IMM GRANULOCYTES # BLD AUTO: 0.01 K/UL (ref 0–0.04)
IMM GRANULOCYTES NFR BLD AUTO: 0.2 % (ref 0–0.5)
LYMPHOCYTES # BLD AUTO: 1 K/UL (ref 1–4.8)
LYMPHOCYTES NFR BLD: 16.4 % (ref 18–48)
MAGNESIUM SERPL-MCNC: 1.8 MG/DL (ref 1.6–2.6)
MCH RBC QN AUTO: 24.9 PG (ref 27–31)
MCHC RBC AUTO-ENTMCNC: 33.2 G/DL (ref 32–36)
MCV RBC AUTO: 75 FL (ref 82–98)
MONOCYTES # BLD AUTO: 0.3 K/UL (ref 0.3–1)
MONOCYTES NFR BLD: 5.5 % (ref 4–15)
NEUTROPHILS # BLD AUTO: 4.5 K/UL (ref 1.8–7.7)
NEUTROPHILS NFR BLD: 72.5 % (ref 38–73)
NRBC BLD-RTO: 0 /100 WBC
PHOSPHATE SERPL-MCNC: 4.2 MG/DL (ref 2.7–4.5)
PLATELET # BLD AUTO: 368 K/UL (ref 150–450)
PMV BLD AUTO: 12.8 FL (ref 9.2–12.9)
POCT GLUCOSE: 255 MG/DL (ref 70–110)
POCT GLUCOSE: 301 MG/DL (ref 70–110)
POTASSIUM SERPL-SCNC: 3.5 MMOL/L (ref 3.5–5.1)
PROT SERPL-MCNC: 7.2 G/DL (ref 6–8.4)
RBC # BLD AUTO: 4.93 M/UL (ref 4.6–6.2)
SODIUM SERPL-SCNC: 151 MMOL/L (ref 136–145)
WBC # BLD AUTO: 6.17 K/UL (ref 3.9–12.7)

## 2022-06-01 PROCEDURE — 80053 COMPREHEN METABOLIC PANEL: CPT | Performed by: STUDENT IN AN ORGANIZED HEALTH CARE EDUCATION/TRAINING PROGRAM

## 2022-06-01 PROCEDURE — 94761 N-INVAS EAR/PLS OXIMETRY MLT: CPT

## 2022-06-01 PROCEDURE — 83735 ASSAY OF MAGNESIUM: CPT | Performed by: STUDENT IN AN ORGANIZED HEALTH CARE EDUCATION/TRAINING PROGRAM

## 2022-06-01 PROCEDURE — 27000923 HC TRIALYSIS CATHETER, ANY SIZE

## 2022-06-01 PROCEDURE — 85025 COMPLETE CBC W/AUTO DIFF WBC: CPT | Performed by: STUDENT IN AN ORGANIZED HEALTH CARE EDUCATION/TRAINING PROGRAM

## 2022-06-01 PROCEDURE — 25000003 PHARM REV CODE 250: Performed by: STUDENT IN AN ORGANIZED HEALTH CARE EDUCATION/TRAINING PROGRAM

## 2022-06-01 PROCEDURE — 12000002 HC ACUTE/MED SURGE SEMI-PRIVATE ROOM

## 2022-06-01 PROCEDURE — 84100 ASSAY OF PHOSPHORUS: CPT | Performed by: STUDENT IN AN ORGANIZED HEALTH CARE EDUCATION/TRAINING PROGRAM

## 2022-06-01 PROCEDURE — 36415 COLL VENOUS BLD VENIPUNCTURE: CPT | Performed by: STUDENT IN AN ORGANIZED HEALTH CARE EDUCATION/TRAINING PROGRAM

## 2022-06-01 RX ORDER — DEXTROSE MONOHYDRATE 50 MG/ML
INJECTION, SOLUTION INTRAVENOUS CONTINUOUS
Status: DISCONTINUED | OUTPATIENT
Start: 2022-06-01 | End: 2022-06-03

## 2022-06-01 RX ADMIN — AMLODIPINE BESYLATE 10 MG: 5 TABLET ORAL at 11:06

## 2022-06-01 RX ADMIN — INSULIN DETEMIR 20 UNITS: 100 INJECTION, SOLUTION SUBCUTANEOUS at 12:06

## 2022-06-01 RX ADMIN — DEXTROSE: 5 SOLUTION INTRAVENOUS at 10:06

## 2022-06-01 RX ADMIN — DEXTROSE: 5 SOLUTION INTRAVENOUS at 12:06

## 2022-06-01 RX ADMIN — HYDRALAZINE HYDROCHLORIDE 50 MG: 25 TABLET, FILM COATED ORAL at 10:06

## 2022-06-01 RX ADMIN — SODIUM BICARBONATE 650 MG TABLET 650 MG: at 10:06

## 2022-06-01 RX ADMIN — FUROSEMIDE 20 MG: 20 TABLET ORAL at 11:06

## 2022-06-01 RX ADMIN — SODIUM BICARBONATE 650 MG TABLET 650 MG: at 11:06

## 2022-06-01 RX ADMIN — INSULIN ASPART 4 UNITS: 100 INJECTION, SOLUTION INTRAVENOUS; SUBCUTANEOUS at 10:06

## 2022-06-01 RX ADMIN — MUPIROCIN: 20 OINTMENT TOPICAL at 10:06

## 2022-06-01 RX ADMIN — CARVEDILOL 3.12 MG: 3.12 TABLET, FILM COATED ORAL at 10:06

## 2022-06-01 RX ADMIN — MUPIROCIN: 20 OINTMENT TOPICAL at 11:06

## 2022-06-01 RX ADMIN — HYDRALAZINE HYDROCHLORIDE 50 MG: 25 TABLET, FILM COATED ORAL at 11:06

## 2022-06-01 RX ADMIN — ATORVASTATIN CALCIUM 40 MG: 40 TABLET, FILM COATED ORAL at 11:06

## 2022-06-01 RX ADMIN — CARVEDILOL 3.12 MG: 3.12 TABLET, FILM COATED ORAL at 11:06

## 2022-06-01 NOTE — PLAN OF CARE
06/01/22 0733   Patient Assessment/Suction   Level of Consciousness (AVPU) alert   Respiratory Effort Normal;Unlabored   Expansion/Accessory Muscles/Retractions no use of accessory muscles;no retractions   Rhythm/Pattern, Respiratory depth regular;pattern regular;unlabored   Cough Frequency no cough   PRE-TX-O2   O2 Device (Oxygen Therapy) room air   SpO2 99 %   Pulse Oximetry Type Intermittent   $ Pulse Oximetry - Multiple Charge Pulse Oximetry - Multiple   Pulse 97   Resp 16

## 2022-06-01 NOTE — PROGRESS NOTES
"[facility] Hospital Medicine  Progress Note    Patient Name: Marshall Flor  MRN: [id number]  Patient Class: IP- Inpatient   Admission Date: 5/26/2022  Length of Stay: 6 days  Attending Physician: Danny Maldonado MD  Primary Care Provider: Dorie Quan MD        Subjective:     Principal Problem:<principal problem not specified>        HPI:  Patient was admitted for hospice care following transition from inpatient.  Please see previous discharge summary.  In short, the patient suffered a devastating stroke and was left completely dependent for all ADLs.  He remained nonverbal, unable to communicate.  Family has decided to transition to hospice care, but reversed this decision at the last minute.  The patient is still end of life.  There was nothing further medically that can be accomplished at this point in time.      Overview/Hospital Course:  Marshall Flor is a 66 year old male with a past medical history of CHF, ESRD, DM, HTN, HLD, and gout who was initially transitioned to inpatient hospice after suffering a stroke with neurologic compromise and secondary hemorrhagic conversion. However, the patient's family requested him to be taken off of hospice in order to be monitored for another few days. Both the Palliative Medicine physician and Dr. Mustafa of [facility] had multiple conversations with the family regarding the patient's very poor prognosis, especially in light of the patient's goals which would have been to not be bed-bound, nonverbal and unable to eat. In the meantime, his home medications are being continued and an NG tube has been placed for tube feeds in the event the family wishes to proceed with medical therapy, although it is unclear if the patient would be candidate for G tube. He remains DNR. Discussions with family are ongoing regarding goals of care and possible inpatient hospice.       Interval History: see "Hospital Course"    Review of Systems   Unable to perform ROS: " Patient nonverbal   Objective:     Vital Signs (Most Recent):  Temp: 97.2 °F (36.2 °C) (06/01/22 0730)  Pulse: 97 (06/01/22 1103)  Resp: 16 (06/01/22 0733)  BP: (!) 143/58 (06/01/22 1103)  SpO2: 99 % (06/01/22 0733)   Vital Signs (24h Range):  Temp:  [97.1 °F (36.2 °C)-98.4 °F (36.9 °C)] 97.2 °F (36.2 °C)  Pulse:  [96-98] 97  Resp:  [16-18] 16  SpO2:  [96 %-100 %] 99 %  BP: (113-162)/(58-85) 143/58     Weight: 76.4 kg (168 lb 6.9 oz)  Body mass index is 26.38 kg/m².    Intake/Output Summary (Last 24 hours) at 6/1/2022 1134  Last data filed at 6/1/2022 0600  Gross per 24 hour   Intake 480 ml   Output 1650 ml   Net -1170 ml      Physical Exam  Vitals and nursing note reviewed.   Constitutional:       General: He is not in acute distress.     Appearance: He is not ill-appearing.      Comments: Nonverbal, encephalopathic.   HENT:      Head: Normocephalic and atraumatic.      Right Ear: External ear normal.      Left Ear: External ear normal.      Nose: Nose normal.      Comments: Nasogastric tube in place.     Mouth/Throat:      Mouth: Mucous membranes are moist.      Pharynx: Oropharynx is clear.   Eyes:      Conjunctiva/sclera: Conjunctivae normal.   Neck:      Vascular: No JVD.   Cardiovascular:      Rate and Rhythm: Normal rate and regular rhythm.      Pulses: Normal pulses.      Heart sounds: Normal heart sounds.   Pulmonary:      Effort: Pulmonary effort is normal.      Breath sounds: Normal breath sounds.   Abdominal:      General: Bowel sounds are normal. There is no distension.      Palpations: Abdomen is soft.      Tenderness: There is no abdominal tenderness.   Musculoskeletal:      Cervical back: Neck supple.   Lymphadenopathy:      Cervical: No cervical adenopathy.   Skin:     General: Skin is warm and dry.      Coloration: Skin is not pale.      Findings: No rash.   Neurological:      Mental Status: He is disoriented.      Comments: Encephalopathic. Able to follow commands. Able to move RUE.   Psychiatric:       Comments: Unable to assess.       Significant Labs: All pertinent labs within the past 24 hours have been reviewed.    Significant Imaging: I have reviewed all pertinent imaging results/findings within the past 24 hours.      Assessment/Plan:      CVA (cerebral vascular accident)  -Continue statin  -Palliative Care in contact with family regarding patient's goals of care    Hypernatremia  -Free water flushes with tube feeds  -Trend Na  -Hold Lasix  -D5W 100 cc/hr      Diabetes mellitus treated with insulin  -Tube feeding diet via NG  -SSI  -Q6H glucose checks  -Hypoglycemic precautions  -Titrate scheduled insulin    Anemia of chronic renal failure  Microcytic.  -Trend CBC      Alcoholic fatty liver  Chronic.      Goals of care, counseling/discussion  -DNR currently  -Palliative Care consulted; patient to view inpatient hospice facility 6/1    Chronic combined systolic and diastolic CHF (congestive heart failure)  -Continue home medications    Encephalopathy  Secondary to CVA.      Essential hypertension  -Continue home medications      Hyperlipidemia  -Statin      CKD (chronic kidney disease) stage 4, GFR 15-29 ml/min  Chronic.  -Renally dose medications  -Avoid nephrotoxins        VTE Risk Mitigation (From admission, onward)         Ordered     IP VTE HIGH RISK PATIENT  Once         05/26/22 1702     heparin (porcine) injection 4,000 Units  As needed (PRN)         05/26/22 1702                Discharge Planning   STAR:      Code Status: DNR   Is the patient medically ready for discharge?:     Reason for patient still in hospital (select all that apply): Patient trending condition, Laboratory test, Treatment, Consult recommendations and Pending disposition  Discharge Plan A: Inpatient Hospice                  Danny Maldonado MD  Department of Hospital Medicine   [facility]

## 2022-06-01 NOTE — CHAPLAIN
Attempted to visit pt again, but he was soundly sleeping and didn't want to wake;  will continue to follow. Lord, in your mercy.

## 2022-06-01 NOTE — PROGRESS NOTES
"Attempted to visit pt. Resting with eyes closed. No family @ the bedside. Called Zuly Cornell [phone] . Asked him if he has spoke with Dr. Casey today and made a decision regarding plan of care. Nephew reports pt was able to speak this morning and feels he has "made improvements" and wants to "give him time." He stated both hospice agencies have reached out to him. [facility] is willing to accept the patient with an NG tube so he is meeting with them tomorrow. Notified Mr. Cornell that [facility] has a bed available and is willing to accept the patient as early as tomorrow, however, if this process is delayed they cannot hold the bed. Nephew verbalized understanding. PC Team will follow up tomorrow. Medical team updated.     Called Admissions coordinator @ [facility] [phone]. They clarified they would accept the patient with an NG tube and are willing to continue feedings for a 5 day trial period. If the patient does not tolerate it they will DC the tube. If pt does tolerate it family has to transfer the patient to a nursing home.   "

## 2022-06-01 NOTE — SUBJECTIVE & OBJECTIVE
"Interval History: see "Hospital Course"    Review of Systems   Unable to perform ROS: Patient nonverbal   Objective:     Vital Signs (Most Recent):  Temp: 97.2 °F (36.2 °C) (06/01/22 0730)  Pulse: 97 (06/01/22 1103)  Resp: 16 (06/01/22 0733)  BP: (!) 143/58 (06/01/22 1103)  SpO2: 99 % (06/01/22 0733)   Vital Signs (24h Range):  Temp:  [97.1 °F (36.2 °C)-98.4 °F (36.9 °C)] 97.2 °F (36.2 °C)  Pulse:  [96-98] 97  Resp:  [16-18] 16  SpO2:  [96 %-100 %] 99 %  BP: (113-162)/(58-85) 143/58     Weight: 76.4 kg (168 lb 6.9 oz)  Body mass index is 26.38 kg/m².    Intake/Output Summary (Last 24 hours) at 6/1/2022 1134  Last data filed at 6/1/2022 0600  Gross per 24 hour   Intake 480 ml   Output 1650 ml   Net -1170 ml      Physical Exam  Vitals and nursing note reviewed.   Constitutional:       General: He is not in acute distress.     Appearance: He is not ill-appearing.      Comments: Nonverbal, encephalopathic.   HENT:      Head: Normocephalic and atraumatic.      Right Ear: External ear normal.      Left Ear: External ear normal.      Nose: Nose normal.      Comments: Nasogastric tube in place.     Mouth/Throat:      Mouth: Mucous membranes are moist.      Pharynx: Oropharynx is clear.   Eyes:      Conjunctiva/sclera: Conjunctivae normal.   Neck:      Vascular: No JVD.   Cardiovascular:      Rate and Rhythm: Normal rate and regular rhythm.      Pulses: Normal pulses.      Heart sounds: Normal heart sounds.   Pulmonary:      Effort: Pulmonary effort is normal.      Breath sounds: Normal breath sounds.   Abdominal:      General: Bowel sounds are normal. There is no distension.      Palpations: Abdomen is soft.      Tenderness: There is no abdominal tenderness.   Musculoskeletal:      Cervical back: Neck supple.   Lymphadenopathy:      Cervical: No cervical adenopathy.   Skin:     General: Skin is warm and dry.      Coloration: Skin is not pale.      Findings: No rash.   Neurological:      Mental Status: He is disoriented.     "  Comments: Encephalopathic. Able to follow commands. Able to move RUE.   Psychiatric:      Comments: Unable to assess.       Significant Labs: All pertinent labs within the past 24 hours have been reviewed.    Significant Imaging: I have reviewed all pertinent imaging results/findings within the past 24 hours.

## 2022-06-01 NOTE — PLAN OF CARE
Per Janelle Pearce with [facility] hospice- pts nephew is coming to tour the [facility] in the am and she will update me afterwards. CM following.    06/01/22 4326   Post-Acute Status   Post-Acute Authorization Hospice   Hospice Status Referrals Sent

## 2022-06-01 NOTE — CARE UPDATE
05/31/22 1940   Patient Assessment/Suction   Level of Consciousness (AVPU) responds to pain   PRE-TX-O2   O2 Device (Oxygen Therapy) room air   SpO2 97 %   Pulse Oximetry Type Intermittent

## 2022-06-01 NOTE — PLAN OF CARE
Pt appears to be resting, eyes are closed, breaths are deep and even. VSS, NAD noted at this time. Discussed PoC with pt, pt unable to verbalize if they understood r/t recent stroke. No c/o or observation of pain at this time, will continue to monitor.

## 2022-06-02 PROBLEM — E87.0 HYPERNATREMIA: Status: RESOLVED | Noted: 2022-05-31 | Resolved: 2022-06-02

## 2022-06-02 LAB
ALBUMIN SERPL BCP-MCNC: 2.2 G/DL (ref 3.5–5.2)
ALP SERPL-CCNC: 96 U/L (ref 55–135)
ALT SERPL W/O P-5'-P-CCNC: 49 U/L (ref 10–44)
ANION GAP SERPL CALC-SCNC: 11 MMOL/L (ref 8–16)
AST SERPL-CCNC: 21 U/L (ref 10–40)
BASOPHILS # BLD AUTO: 0.02 K/UL (ref 0–0.2)
BASOPHILS NFR BLD: 0.2 % (ref 0–1.9)
BILIRUB SERPL-MCNC: 0.4 MG/DL (ref 0.1–1)
BUN SERPL-MCNC: 62 MG/DL (ref 8–23)
CALCIUM SERPL-MCNC: 8.6 MG/DL (ref 8.7–10.5)
CHLORIDE SERPL-SCNC: 106 MMOL/L (ref 95–110)
CO2 SERPL-SCNC: 26 MMOL/L (ref 23–29)
CREAT SERPL-MCNC: 2.1 MG/DL (ref 0.5–1.4)
DIFFERENTIAL METHOD: ABNORMAL
EOSINOPHIL # BLD AUTO: 0.1 K/UL (ref 0–0.5)
EOSINOPHIL NFR BLD: 1.3 % (ref 0–8)
ERYTHROCYTE [DISTWIDTH] IN BLOOD BY AUTOMATED COUNT: 17.9 % (ref 11.5–14.5)
EST. GFR  (AFRICAN AMERICAN): 37 ML/MIN/1.73 M^2
EST. GFR  (NON AFRICAN AMERICAN): 32 ML/MIN/1.73 M^2
GLUCOSE SERPL-MCNC: 252 MG/DL (ref 70–110)
HCT VFR BLD AUTO: 34.1 % (ref 40–54)
HGB BLD-MCNC: 11.1 G/DL (ref 14–18)
IMM GRANULOCYTES # BLD AUTO: 0.05 K/UL (ref 0–0.04)
IMM GRANULOCYTES NFR BLD AUTO: 0.5 % (ref 0–0.5)
LYMPHOCYTES # BLD AUTO: 1 K/UL (ref 1–4.8)
LYMPHOCYTES NFR BLD: 9.3 % (ref 18–48)
MAGNESIUM SERPL-MCNC: 1.6 MG/DL (ref 1.6–2.6)
MCH RBC QN AUTO: 24.5 PG (ref 27–31)
MCHC RBC AUTO-ENTMCNC: 32.6 G/DL (ref 32–36)
MCV RBC AUTO: 75 FL (ref 82–98)
MONOCYTES # BLD AUTO: 0.5 K/UL (ref 0.3–1)
MONOCYTES NFR BLD: 4.2 % (ref 4–15)
NEUTROPHILS # BLD AUTO: 9.4 K/UL (ref 1.8–7.7)
NEUTROPHILS NFR BLD: 84.5 % (ref 38–73)
NRBC BLD-RTO: 0 /100 WBC
PHOSPHATE SERPL-MCNC: 3.4 MG/DL (ref 2.7–4.5)
PLATELET # BLD AUTO: 299 K/UL (ref 150–450)
PMV BLD AUTO: 12.4 FL (ref 9.2–12.9)
POCT GLUCOSE: 275 MG/DL (ref 70–110)
POCT GLUCOSE: 290 MG/DL (ref 70–110)
POCT GLUCOSE: 293 MG/DL (ref 70–110)
POCT GLUCOSE: 312 MG/DL (ref 70–110)
POTASSIUM SERPL-SCNC: 3.7 MMOL/L (ref 3.5–5.1)
PROT SERPL-MCNC: 6.8 G/DL (ref 6–8.4)
RBC # BLD AUTO: 4.53 M/UL (ref 4.6–6.2)
SODIUM SERPL-SCNC: 143 MMOL/L (ref 136–145)
WBC # BLD AUTO: 11.06 K/UL (ref 3.9–12.7)

## 2022-06-02 PROCEDURE — 94761 N-INVAS EAR/PLS OXIMETRY MLT: CPT

## 2022-06-02 PROCEDURE — 80053 COMPREHEN METABOLIC PANEL: CPT | Performed by: STUDENT IN AN ORGANIZED HEALTH CARE EDUCATION/TRAINING PROGRAM

## 2022-06-02 PROCEDURE — 36415 COLL VENOUS BLD VENIPUNCTURE: CPT | Performed by: STUDENT IN AN ORGANIZED HEALTH CARE EDUCATION/TRAINING PROGRAM

## 2022-06-02 PROCEDURE — 85025 COMPLETE CBC W/AUTO DIFF WBC: CPT | Performed by: STUDENT IN AN ORGANIZED HEALTH CARE EDUCATION/TRAINING PROGRAM

## 2022-06-02 PROCEDURE — 99233 SBSQ HOSP IP/OBS HIGH 50: CPT | Mod: 95,,, | Performed by: FAMILY MEDICINE

## 2022-06-02 PROCEDURE — 84100 ASSAY OF PHOSPHORUS: CPT | Performed by: STUDENT IN AN ORGANIZED HEALTH CARE EDUCATION/TRAINING PROGRAM

## 2022-06-02 PROCEDURE — 99233 PR SUBSEQUENT HOSPITAL CARE,LEVL III: ICD-10-PCS | Mod: 95,,, | Performed by: FAMILY MEDICINE

## 2022-06-02 PROCEDURE — 25000003 PHARM REV CODE 250: Performed by: STUDENT IN AN ORGANIZED HEALTH CARE EDUCATION/TRAINING PROGRAM

## 2022-06-02 PROCEDURE — 83735 ASSAY OF MAGNESIUM: CPT | Performed by: STUDENT IN AN ORGANIZED HEALTH CARE EDUCATION/TRAINING PROGRAM

## 2022-06-02 PROCEDURE — 12000002 HC ACUTE/MED SURGE SEMI-PRIVATE ROOM

## 2022-06-02 PROCEDURE — 27000923 HC TRIALYSIS CATHETER, ANY SIZE

## 2022-06-02 RX ADMIN — DEXTROSE: 5 SOLUTION INTRAVENOUS at 09:06

## 2022-06-02 RX ADMIN — MUPIROCIN: 20 OINTMENT TOPICAL at 09:06

## 2022-06-02 RX ADMIN — HYDRALAZINE HYDROCHLORIDE 50 MG: 25 TABLET, FILM COATED ORAL at 09:06

## 2022-06-02 RX ADMIN — SODIUM BICARBONATE 650 MG TABLET 650 MG: at 09:06

## 2022-06-02 RX ADMIN — CARVEDILOL 3.12 MG: 3.12 TABLET, FILM COATED ORAL at 09:06

## 2022-06-02 RX ADMIN — INSULIN DETEMIR 20 UNITS: 100 INJECTION, SOLUTION SUBCUTANEOUS at 09:06

## 2022-06-02 RX ADMIN — HYDRALAZINE HYDROCHLORIDE 50 MG: 25 TABLET, FILM COATED ORAL at 08:06

## 2022-06-02 RX ADMIN — CARVEDILOL 3.12 MG: 3.12 TABLET, FILM COATED ORAL at 08:06

## 2022-06-02 RX ADMIN — SODIUM BICARBONATE 650 MG TABLET 650 MG: at 08:06

## 2022-06-02 RX ADMIN — AMLODIPINE BESYLATE 10 MG: 5 TABLET ORAL at 09:06

## 2022-06-02 RX ADMIN — ATORVASTATIN CALCIUM 40 MG: 40 TABLET, FILM COATED ORAL at 09:06

## 2022-06-02 RX ADMIN — INSULIN ASPART 3 UNITS: 100 INJECTION, SOLUTION INTRAVENOUS; SUBCUTANEOUS at 08:06

## 2022-06-02 NOTE — PROGRESS NOTES
[facility] Hospital Medicine  Progress Note    Patient Name: Marshall Flor  MRN: [id number]  Patient Class: IP- Inpatient   Admission Date: 5/26/2022  Length of Stay: 7 days  Attending Physician: Danny Maldonado MD  Primary Care Provider: Dorie Quan MD        Subjective:     Principal Problem:<principal problem not specified>        HPI:  Patient was admitted for hospice care following transition from inpatient.  Please see previous discharge summary.  In short, the patient suffered a devastating stroke and was left completely dependent for all ADLs.  He remained nonverbal, unable to communicate.  Family has decided to transition to hospice care, but reversed this decision at the last minute.  The patient is still end of life.  There was nothing further medically that can be accomplished at this point in time.      Overview/Hospital Course:  Marshall Flor is a 66 year old male with a past medical history of CHF, ESRD, DM, HTN, HLD, and gout who was initially transitioned to inpatient hospice after suffering a stroke with neurologic compromise and secondary hemorrhagic conversion. However, the patient's family requested him to be taken off of hospice in order to be monitored for another few days. Both the Palliative Medicine physician and Dr. Mustafa of [facility] had multiple conversations with the family regarding the patient's very poor prognosis, especially in light of the patient's goals which would have been to not be bed-bound, nonverbal and unable to eat. In the meantime, his home medications are being continued and an NG tube has been placed for tube feeds in the event the family wishes to proceed with medical therapy, although it is unclear if the patient would be candidate for G tube. They had once again agreed to hospice 5/31, yet had once again changed course. He remains DNR. Discussions with family are ongoing regarding goals of care and possible inpatient hospice.  "      Interval History: see "Hospital Course"    Review of Systems   Unable to perform ROS: Patient nonverbal   Objective:     Vital Signs (Most Recent):  Temp: 96.6 °F (35.9 °C) (06/02/22 0320)  Pulse: 95 (06/02/22 0939)  Resp: 16 (06/02/22 0754)  BP: 137/65 (06/02/22 0939)  SpO2: 98 % (06/02/22 0754) Vital Signs (24h Range):  Temp:  [96.6 °F (35.9 °C)-97.5 °F (36.4 °C)] 96.6 °F (35.9 °C)  Pulse:  [86-97] 95  Resp:  [16-18] 16  SpO2:  [96 %-100 %] 98 %  BP: (121-143)/(54-74) 137/65     Weight: 76.4 kg (168 lb 6.9 oz)  Body mass index is 26.38 kg/m².    Intake/Output Summary (Last 24 hours) at 6/2/2022 1039  Last data filed at 6/1/2022 2200  Gross per 24 hour   Intake --   Output 450 ml   Net -450 ml      Physical Exam  Vitals and nursing note reviewed.   Constitutional:       General: He is not in acute distress.     Appearance: He is not ill-appearing.   HENT:      Head: Normocephalic and atraumatic.      Right Ear: External ear normal.      Left Ear: External ear normal.      Nose: Nose normal.      Comments: Nasogastric tube in place.     Mouth/Throat:      Mouth: Mucous membranes are moist.      Pharynx: Oropharynx is clear.   Eyes:      Conjunctiva/sclera: Conjunctivae normal.   Neck:      Vascular: No JVD.   Cardiovascular:      Rate and Rhythm: Normal rate and regular rhythm.      Pulses: Normal pulses.      Heart sounds: Normal heart sounds.   Pulmonary:      Effort: Pulmonary effort is normal.      Breath sounds: Normal breath sounds.   Abdominal:      General: Bowel sounds are normal. There is no distension.      Palpations: Abdomen is soft.      Tenderness: There is no abdominal tenderness.   Musculoskeletal:      Cervical back: Neck supple.   Lymphadenopathy:      Cervical: No cervical adenopathy.   Skin:     General: Skin is warm and dry.      Coloration: Skin is not pale.      Findings: No rash.   Neurological:      Mental Status: He is disoriented.      Comments: Encephalopathic. Able to follow " commands. Able to move RUE.   Psychiatric:      Comments: Unable to assess.       Significant Labs: All pertinent labs within the past 24 hours have been reviewed.    Significant Imaging: I have reviewed all pertinent imaging results/findings within the past 24 hours.      Assessment/Plan:      CVA (cerebral vascular accident)  -Continue statin  -Palliative Care in contact with family regarding patient's goals of care    Diabetes mellitus treated with insulin  -Tube feeding diet via NG  -SSI  -Q6H glucose checks  -Hypoglycemic precautions  -Titrate scheduled insulin    Anemia of chronic renal failure  Microcytic.  -Trend CBC      Alcoholic fatty liver  Chronic.      Goals of care, counseling/discussion  -DNR currently  -Palliative Care consulted    Chronic combined systolic and diastolic CHF (congestive heart failure)  -Continue home medications  -Hold Lasix for hypernatremia    Encephalopathy  Minimal improvement. Patient bed-bound and non-communicative. Secondary to CVA.      Essential hypertension  -Continue home medications      Hyperlipidemia  -Statin      CKD (chronic kidney disease) stage 4, GFR 15-29 ml/min  Chronic.  -Renally dose medications  -Avoid nephrotoxins        VTE Risk Mitigation (From admission, onward)         Ordered     IP VTE HIGH RISK PATIENT  Once         05/26/22 1702     heparin (porcine) injection 4,000 Units  As needed (PRN)         05/26/22 1702                Discharge Planning   STAR:      Code Status: DNR   Is the patient medically ready for discharge?:     Reason for patient still in hospital (select all that apply): Consult recommendations and Pending disposition  Discharge Plan A: Inpatient Hospice                  Danny Maldonado MD  Department of Hospital Medicine   [facility]

## 2022-06-02 NOTE — PLAN OF CARE
Message received from Cathy Lucas ?? (Message inaudible) stating that he is from [facility] and asked to return his call to [phone]- CM returned his call and left a detailed message for a return call. CM following.    I called [facility] inpatient hosp[facility] at [phone]- and updated admissions that I left a message for Mr. Lucas- per admissions the pt is accepted to their inpatient unit also and they are awaiting family to sign consents. She will also reach out to Cathy and let him know that I returned his call. FLORINA following.     06/02/22 1414   Post-Acute Status   Post-Acute Authorization Hospice   Hospice Status Referrals Sent

## 2022-06-02 NOTE — CHAPLAIN
Visited pt again; pt nodded in affirmative when I asked if he remembered me (I've visited many times); he never said any words, but held my hand tightly as I provided compassionate presence, silence, as well as reminded him he is loved; no nods yes or no when I offered to pray again, so I just spoke a general blessing over him this visit, as we held hands. Lord, in your mercy.

## 2022-06-02 NOTE — PROGRESS NOTES
[facility]  Adult Nutrition  Progress Note    SUMMARY        Intervention: enteral nutrition therapy     Recommendations  1.) Depending on goals of care, Advance TF to Diabetisource AC advancing as tolerated to goal rate 60 mls/hr continuous + 120 ml flush q 4 hr   (provides 1728 kcals ( 95% EEN), 86 g protein ( 100% EPN), 1180 ml free water )     -If glucerna 1.5 available at facility 50 ml/hr appropriate  2.) liberalized diet if medically able per care team discretion  3.) Consider d/c D5     Goals: 1) TF meeting > 75% of needs at f/u  Communication of RD Recs: POC, sticky note, reviewed with MD     1. Stroke    2. CHF (congestive heart failure)    3. Encephalopathy    4. CVA (cerebral vascular accident)    5. MERCEDES (acute kidney injury)               Past Medical History:   Diagnosis Date    CHF (congestive heart failure)      Diabetes mellitus      Hypertension      Stroke           Assessment and Plan  Nutrition Problem  inadequate energy intake     Related to (etiology):   Acute illness      Signs and Symptoms (as evidenced by):   NPO  TPN meeting 30% of EEN      Interventions:  above     Nutrition Diagnosis Status:   Resolving     Malnutrition Assessment  Severe Weight Loss (Malnutrition): greater than 7.5% in 3 months (11.5% weight loss in 4 months)   Kain = 10, wound  Edema 1-3+  NFPE done 5/19/22, no wasting seen     Reason for Assessment  Reason For Assessment: RD follow up   Rounds: did not attend     General Information Comments: admits with decreased responsiveness, pt to start dialysis today, on continuous bipap, non-verbal, central line present. RD consulted for TPN. Pending SLP asessment for diet advancement. Remains NPO.  5/18: pt receiving dialysis. TPN infusing, on 3L NC.   5/19/22 Pt continues on TPN, glucose in the 400's. Discussed ordering lower dextrose TPN today. Continues on HD, off bipap but unresponsive. Plan to consider comfort care 5/21.  5/24/22 TPN  "discontinued, pt now tolerating TF @ goal today with flushes as ordered. Unable to advance PO diet at this time.  22 Pt continues on TF via NG, novasource @ 40 ml/hr, NPO. Made comfort measures only today but TF continues for now. No plan for PEG placement.   re-consulted due to pt being bed bound.  Pt was comfort care measures but family rescinded their decision. Family meeting today @ 5pm on goals of care.    22 Pt no longer CMO, still DNR. Plan to d/c to inpatient hospice facility today on TF via NG. Tolerating Diabetasource @ 40 ml/hr + 120 ml flush q 4 hr. Remains NPO.     Nutrition Risk Screen  Nutrition Risk Screen: dysphagia or difficulty swallowing, NPO     Nutrition/Diet History  Food Allergies: NKFA  Factors Affecting Nutritional Intake: NPO, trouble swallowing  Spiritual/cultural/Advent factors affecting PO intakes: none     Anthropometrics  Height: 5' 7"  Height (inches): 67 in  Weight Method: Bed Scale  Weight: 76.4 kg 22, 78.9 kg , 76.7 kg 22, 78.8 kg (admit)  Weight (lb): 168 lb ( edema)  Ideal Body Weight (IBW), Male: 148 lb  BMI (Calculated): 27.2 admission  BMI Grade: 25 - 29.9 - overweight  Usual Body Weight (UBW), k.27 kg (2022)     Lab/Procedures/Meds  Pertinent Labs Reviewed: reviewed     BMP  Lab Results   Component Value Date     2022    K 3.7 2022     2022    CO2 26 2022    BUN 62 (H) 2022    CREATININE 2.1 (H) 2022    CALCIUM 8.6 (L) 2022    ANIONGAP 11 2022    ESTGFRAFRICA 37 (A) 2022    EGFRNONAA 32 (A) 2022     POCT Glucose   Date Value Ref Range Status   2022 312 (H) 70 - 110 mg/dL Final   2022 293 (H) 70 - 110 mg/dL Final   2022 301 (H) 70 - 110 mg/dL Final   2022 255 (H) 70 - 110 mg/dL Final   2022 231 (H) 70 - 110 mg/dL Final   2022 217 (H) 70 - 110 mg/dL Final   2022 261 (H) 70 - 110 mg/dL Final   2022 194 (H) 70 - 110 " mg/dL Final   05/31/2022 259 (H) 70 - 110 mg/dL Final   05/31/2022 246 (H) 70 - 110 mg/dL Final   05/30/2022 319 (H) 70 - 110 mg/dL Final     Lab Results   Component Value Date    ALBUMIN 2.2 (L) 06/02/2022     Pertinent Medications Reviewed: reviewed   D5 @ 100 ml/hr, statin, insulin, polyethylene glycol, senna, zofran    Estimated/Assessed Needs  Needs adjusted as pt no longer on HD  Weight Used For Calorie Calculations: 76.4 kg  Energy Calorie Requirements (kcal): 1815-220 kcals/day  Energy Need Method: MSJ ( x 1.2-1.4) =   Protein Requirements: 76-93g ( 1-1.2 g protein/kg HD)  Weight Used For Protein Calculations: 76.4 kg  Estimated Fluid Requirement Method: 8879-6929 ml        Nutrition Prescription Ordered  Current Diet Order: NPO + TF above     Evaluation of Received Nutrient/Fluid Intake  Energy needs: meeting  Protein needs: not meeting ( 75%)  Fluid needs: exceeding needs  IVF @ 100 ml/hr + flush above  Tolerance: tolerating  % Intake of Estimated Energy Needs: 89% with D5  % Meal Intake: NPO + TFabove     Nutrition Risk  Level of Risk/Frequency of Follow-up: moderate x 2 weekly        Monitor and Evaluation  Enteral nutrition prescription  Diet order  Glucose, BMP  Overall appaearance     Nutrition Follow-Up  RD Follow-up?: Yes

## 2022-06-02 NOTE — PLAN OF CARE
I spoke to Cathy Lucas with [facility] [phone] and he confirmed that the pt is accepted for GIP at [facility] inpatient unit. He stated that he had arranged a tour for Mr. Cornell to tour the building yesterday however he did not show up and he is not answering his calls now. He offered to continue the tube feeds as long as the pt is tolerating them and he was agreeable to that. He stated that he has also reached out to the pts sons and their phones are going directly to [employer]. He stated that Mr. Cornell is in denial about the pts condition and asked that if he decides to go with [facility] to please call and let him know otherwise he will continue to do his due diligence to get in touch with family to get the pt admitted to inpatient hospice. I updated him that I will not be here tomorrow but that someone will reach out to him to see if he has made any progress with the family.  He again confirmed that the pt is accepted and they are able to admit once the family signs consents. CM following.    06/02/22 2699   Post-Acute Status   Post-Acute Authorization Hospice   Hospice Status Referrals Sent

## 2022-06-02 NOTE — PLAN OF CARE
Intervention: enteral nutrition therapy     Recommendations  1.) Depending on goals of care, Advance TF to Diabetisource AC advancing as tolerated to goal rate 60 mls/hr continuous + 120 ml flush q 4 hr   (provides 1728 kcals ( 95% EEN), 86 g protein ( 100% EPN), 1180 ml free water )      -If glucerna 1.5 available at facility 50 ml/hr appropriate  2.) liberalized diet if medically able per care team discretion  3.) Consider d/c D5     Goals: 1) TF meeting > 75% of needs at f/u  Communication of RD Recs: POC, sticky note, reviewed with MD

## 2022-06-02 NOTE — SUBJECTIVE & OBJECTIVE
"Interval history:    Events overnight. Nursing reporting one word answers. Alert but not interactive with writer today. No following commands. Non distressed.     Ongoing dialogue with nephew, Kraig, as he communicates with other family members. He voices family concerns of "starving" patient. Writer reiterates harms of forced feeding and lack of viable options with patient catastrophic brain injury. Family continues to have deirdre that he will improve.   Kraig has agreed to hospice visits but then does not show as he balances requests from family members not to "give up on him".   Writer has separate conversation with rep Erum for [facility]. He communicates with writer that [facility] is willing to take patient on hospice and NG tube for trial period. This message is communicated to Kraig who is given Cathy's number for follow up. Kraig is very pleased to hear this news and accepting of hospice at [place] under these conditions.     Medications:  Continuous Infusions:   dextrose 5 % 50 mL/hr at 06/02/22 1022     Scheduled Meds:   amLODIPine  10 mg Per NG tube Daily    atorvastatin  40 mg Per NG tube Daily    carvediloL  3.125 mg Per NG tube BID    hydrALAZINE  50 mg Per NG tube Q12H    insulin detemir U-100  20 Units Subcutaneous Daily    scopolamine  1 patch Transdermal Q3 Days    sodium bicarbonate  650 mg Per NG tube BID     PRN Meds:acetaminophen, bisacodyL, dextrose 10%, dextrose 10%, glucagon (human recombinant), glucose, glucose, heparin (porcine), insulin aspart U-100, labetaloL, lorazepam, morphine, naloxone, ondansetron, polyethylene glycol, sodium chloride 0.9%    Objective:     Vital Signs (Most Recent):  Temp: 97.6 °F (36.4 °C) (06/02/22 1217)  Pulse: 99 (06/02/22 1217)  Resp: 20 (06/02/22 1217)  BP: 133/67 (06/02/22 1217)  SpO2: 98 % (06/02/22 1217) Vital Signs (24h Range):  Temp:  [96.6 °F (35.9 °C)-98.1 °F (36.7 °C)] 97.6 °F (36.4 °C)  Pulse:  [] 99  Resp:  [16-28] " 20  SpO2:  [96 %-100 %] 98 %  BP: (121-165)/(54-82) 133/67     Weight: 76.4 kg (168 lb 6.9 oz)  Body mass index is 26.38 kg/m².    Physical Exam  Vitals and nursing note reviewed.   Constitutional:       General: He is not in acute distress.     Appearance: He is well-developed. He is ill-appearing. He is not toxic-appearing.      Comments: Ill appearing AA male with NG tube. Alert but non verbal. Non distressed.    HENT:      Head: Normocephalic and atraumatic.      Nose: Nose normal.   Eyes:      Conjunctiva/sclera: Conjunctivae normal.   Pulmonary:      Effort: Pulmonary effort is normal. No respiratory distress.   Abdominal:      General: There is no distension.      Palpations: Abdomen is soft.      Tenderness: There is no abdominal tenderness.   Neurological:      Mental Status: He is alert.      Cranial Nerves: No cranial nerve deficit.       Palliative Exam    Advance Care Planning   Advance Care Planning       Significant Labs: All pertinent labs within the past 24 hours have been reviewed.  CBC:   Recent Labs   Lab 06/02/22  0421   WBC 11.06   HGB 11.1*   HCT 34.1*   MCV 75*        BMP:  Recent Labs   Lab 06/02/22  0421   *      K 3.7      CO2 26   BUN 62*   CREATININE 2.1*   CALCIUM 8.6*   MG 1.6     LFT:  Lab Results   Component Value Date    AST 21 06/02/2022    ALKPHOS 96 06/02/2022    BILITOT 0.4 06/02/2022     Albumin:   Albumin   Date Value Ref Range Status   06/02/2022 2.2 (L) 3.5 - 5.2 g/dL Final     Protein:   Total Protein   Date Value Ref Range Status   06/02/2022 6.8 6.0 - 8.4 g/dL Final     Lactic acid:   Lab Results   Component Value Date    LACTATE 3.4 (H) 05/12/2022    LACTATE 3.5 (HH) 05/12/2022       Significant Imaging: I have reviewed all pertinent imaging results/findings within the past 24 hours.

## 2022-06-02 NOTE — CARE UPDATE
06/01/22 1955   Patient Assessment/Suction   Level of Consciousness (AVPU) alert   PRE-TX-O2   O2 Device (Oxygen Therapy) room air   SpO2 99 %   Pulse Oximetry Type Intermittent

## 2022-06-02 NOTE — PLAN OF CARE
Janelle with [facility] hospice called stating that the nephew was supposed to tour at 10 am and was not there and she was calling to make sure that the pt is still admitted. I updated her that the pt is still her and does not have any visitors at bedside. She is going to call Mr. Cornell now. FLORINA following.    14:44- Per [place]'s office - Mr. Cornell did not arrive for his scheduled 10:00 am meeting and is now not answering the phone either. CM attempted to call his cellphone and did not receive an answer either. FLORINA following.     06/02/22 1012   Post-Acute Status   Post-Acute Authorization Hospice   Hospice Status Referrals Sent

## 2022-06-02 NOTE — SUBJECTIVE & OBJECTIVE
"Interval History: see "Hospital Course"    Review of Systems   Unable to perform ROS: Patient nonverbal   Objective:     Vital Signs (Most Recent):  Temp: 96.6 °F (35.9 °C) (06/02/22 0320)  Pulse: 95 (06/02/22 0939)  Resp: 16 (06/02/22 0754)  BP: 137/65 (06/02/22 0939)  SpO2: 98 % (06/02/22 0754) Vital Signs (24h Range):  Temp:  [96.6 °F (35.9 °C)-97.5 °F (36.4 °C)] 96.6 °F (35.9 °C)  Pulse:  [86-97] 95  Resp:  [16-18] 16  SpO2:  [96 %-100 %] 98 %  BP: (121-143)/(54-74) 137/65     Weight: 76.4 kg (168 lb 6.9 oz)  Body mass index is 26.38 kg/m².    Intake/Output Summary (Last 24 hours) at 6/2/2022 1039  Last data filed at 6/1/2022 2200  Gross per 24 hour   Intake --   Output 450 ml   Net -450 ml      Physical Exam  Vitals and nursing note reviewed.   Constitutional:       General: He is not in acute distress.     Appearance: He is not ill-appearing.   HENT:      Head: Normocephalic and atraumatic.      Right Ear: External ear normal.      Left Ear: External ear normal.      Nose: Nose normal.      Comments: Nasogastric tube in place.     Mouth/Throat:      Mouth: Mucous membranes are moist.      Pharynx: Oropharynx is clear.   Eyes:      Conjunctiva/sclera: Conjunctivae normal.   Neck:      Vascular: No JVD.   Cardiovascular:      Rate and Rhythm: Normal rate and regular rhythm.      Pulses: Normal pulses.      Heart sounds: Normal heart sounds.   Pulmonary:      Effort: Pulmonary effort is normal.      Breath sounds: Normal breath sounds.   Abdominal:      General: Bowel sounds are normal. There is no distension.      Palpations: Abdomen is soft.      Tenderness: There is no abdominal tenderness.   Musculoskeletal:      Cervical back: Neck supple.   Lymphadenopathy:      Cervical: No cervical adenopathy.   Skin:     General: Skin is warm and dry.      Coloration: Skin is not pale.      Findings: No rash.   Neurological:      Mental Status: He is disoriented.      Comments: Encephalopathic. Able to follow commands. " Able to move RUE.   Psychiatric:      Comments: Unable to assess.       Significant Labs: All pertinent labs within the past 24 hours have been reviewed.    Significant Imaging: I have reviewed all pertinent imaging results/findings within the past 24 hours.

## 2022-06-02 NOTE — CARE UPDATE
06/02/22 0754   Patient Assessment/Suction   Level of Consciousness (AVPU) alert   Respiratory Effort Normal;Unlabored   Expansion/Accessory Muscles/Retractions no use of accessory muscles   PRE-TX-O2   O2 Device (Oxygen Therapy) room air   SpO2 98 %   Pulse Oximetry Type Intermittent   $ Pulse Oximetry - Multiple Charge Pulse Oximetry - Multiple   Pulse 95   Resp 16

## 2022-06-02 NOTE — PROGRESS NOTES
"[facility]  Palliative Medicine  Progress Note    Patient Name: Marshall Flor  MRN: [id number]  Admission Date: 5/26/2022  Hospital Length of Stay: 7 days  Code Status: DNR   Attending Provider: Danny Maldonado MD  Consulting Provider: Russell Casey MD  Primary Care Physician: Dorie Quan MD  Principal Problem:<principal problem not specified>        Assessment/Plan:     Goals of care, counseling/discussion  Advance Care Planning     6/2/2022    Ongoing dialogue with toshiaKraig farooq, as he communicates with other family members. He voices family concerns of "starving" patient. Writer reiterates harms of forced feeding and lack of viable options with patient catastrophic brain injury. Family continues to have deirdre that he will improve.   Kraig has agreed to hospice visits but then does not show as he balances requests from family members not to "give up on him".   Writer has separate conversation with rep Erum for [facility]. He communicates with writer that [facility] is willing to take patient on hospice and NG tube for trial period. This message is communicated to Kraig who is given Cathy's number for follow up. Kraig is very pleased to hear this news and accepting of hospice at [place] under these conditions.             Subjective:     Chief Complaint: No chief complaint on file.        Interval history:    Events overnight. Nursing reporting one word answers. Alert but not interactive with writer today. No following commands. Non distressed.     Ongoing dialogue with nephoseas Kraig, as he communicates with other family members. He voices family concerns of "starving" patient. Writer reiterates harms of forced feeding and lack of viable options with patient catastrophic brain injury. Family continues to have deirdre that he will improve.   Kraig has agreed to hospice visits but then does not show as he balances requests from family members not to "give up on " "him".   Writer has separate conversation with rep Erum for [facility]. He communicates with writer that [facility] is willing to take patient on hospice and NG tube for trial period. This message is communicated to Kraig who is given Cathy's number for follow up. Kraig is very pleased to hear this news and accepting of hospice at [place] under these conditions.     Medications:  Continuous Infusions:   dextrose 5 % 50 mL/hr at 06/02/22 1022     Scheduled Meds:   amLODIPine  10 mg Per NG tube Daily    atorvastatin  40 mg Per NG tube Daily    carvediloL  3.125 mg Per NG tube BID    hydrALAZINE  50 mg Per NG tube Q12H    insulin detemir U-100  20 Units Subcutaneous Daily    scopolamine  1 patch Transdermal Q3 Days    sodium bicarbonate  650 mg Per NG tube BID     PRN Meds:acetaminophen, bisacodyL, dextrose 10%, dextrose 10%, glucagon (human recombinant), glucose, glucose, heparin (porcine), insulin aspart U-100, labetaloL, lorazepam, morphine, naloxone, ondansetron, polyethylene glycol, sodium chloride 0.9%    Objective:     Vital Signs (Most Recent):  Temp: 97.6 °F (36.4 °C) (06/02/22 1217)  Pulse: 99 (06/02/22 1217)  Resp: 20 (06/02/22 1217)  BP: 133/67 (06/02/22 1217)  SpO2: 98 % (06/02/22 1217) Vital Signs (24h Range):  Temp:  [96.6 °F (35.9 °C)-98.1 °F (36.7 °C)] 97.6 °F (36.4 °C)  Pulse:  [] 99  Resp:  [16-28] 20  SpO2:  [96 %-100 %] 98 %  BP: (121-165)/(54-82) 133/67     Weight: 76.4 kg (168 lb 6.9 oz)  Body mass index is 26.38 kg/m².    Physical Exam  Vitals and nursing note reviewed.   Constitutional:       General: He is not in acute distress.     Appearance: He is well-developed. He is ill-appearing. He is not toxic-appearing.      Comments: Ill appearing AA male with NG tube. Alert but non verbal. Non distressed.    HENT:      Head: Normocephalic and atraumatic.      Nose: Nose normal.   Eyes:      Conjunctiva/sclera: Conjunctivae normal.   Pulmonary:      Effort: Pulmonary " effort is normal. No respiratory distress.   Abdominal:      General: There is no distension.      Palpations: Abdomen is soft.      Tenderness: There is no abdominal tenderness.   Neurological:      Mental Status: He is alert.      Cranial Nerves: No cranial nerve deficit.       Palliative Exam    Advance Care Planning   Advance Care Planning       Significant Labs: All pertinent labs within the past 24 hours have been reviewed.  CBC:   Recent Labs   Lab 06/02/22 0421   WBC 11.06   HGB 11.1*   HCT 34.1*   MCV 75*        BMP:  Recent Labs   Lab 06/02/22 0421   *      K 3.7      CO2 26   BUN 62*   CREATININE 2.1*   CALCIUM 8.6*   MG 1.6     LFT:  Lab Results   Component Value Date    AST 21 06/02/2022    ALKPHOS 96 06/02/2022    BILITOT 0.4 06/02/2022     Albumin:   Albumin   Date Value Ref Range Status   06/02/2022 2.2 (L) 3.5 - 5.2 g/dL Final     Protein:   Total Protein   Date Value Ref Range Status   06/02/2022 6.8 6.0 - 8.4 g/dL Final     Lactic acid:   Lab Results   Component Value Date    LACTATE 3.4 (H) 05/12/2022    LACTATE 3.5 (HH) 05/12/2022       Significant Imaging: I have reviewed all pertinent imaging results/findings within the past 24 hours.  Thank you for the consult. Palliative medicine and  services will continue to follow. A total of 45 minutes spent in evaluation of patient and discussions with family.      Russell Casey MD  Palliative Medicine  [facility]

## 2022-06-03 VITALS
RESPIRATION RATE: 16 BRPM | BODY MASS INDEX: 26.44 KG/M2 | DIASTOLIC BLOOD PRESSURE: 77 MMHG | WEIGHT: 168.44 LBS | TEMPERATURE: 99 F | HEIGHT: 67 IN | SYSTOLIC BLOOD PRESSURE: 167 MMHG | HEART RATE: 104 BPM | OXYGEN SATURATION: 99 %

## 2022-06-03 LAB
ALBUMIN SERPL BCP-MCNC: 2.1 G/DL (ref 3.5–5.2)
ALP SERPL-CCNC: 93 U/L (ref 55–135)
ALT SERPL W/O P-5'-P-CCNC: 38 U/L (ref 10–44)
ANION GAP SERPL CALC-SCNC: 14 MMOL/L (ref 8–16)
AST SERPL-CCNC: 18 U/L (ref 10–40)
BASOPHILS # BLD AUTO: 0.02 K/UL (ref 0–0.2)
BASOPHILS NFR BLD: 0.2 % (ref 0–1.9)
BILIRUB SERPL-MCNC: 0.4 MG/DL (ref 0.1–1)
BUN SERPL-MCNC: 58 MG/DL (ref 8–23)
CALCIUM SERPL-MCNC: 8.6 MG/DL (ref 8.7–10.5)
CHLORIDE SERPL-SCNC: 105 MMOL/L (ref 95–110)
CO2 SERPL-SCNC: 26 MMOL/L (ref 23–29)
CREAT SERPL-MCNC: 2.2 MG/DL (ref 0.5–1.4)
DIFFERENTIAL METHOD: ABNORMAL
EOSINOPHIL # BLD AUTO: 0.1 K/UL (ref 0–0.5)
EOSINOPHIL NFR BLD: 0.6 % (ref 0–8)
ERYTHROCYTE [DISTWIDTH] IN BLOOD BY AUTOMATED COUNT: 17.5 % (ref 11.5–14.5)
EST. GFR  (AFRICAN AMERICAN): 35 ML/MIN/1.73 M^2
EST. GFR  (NON AFRICAN AMERICAN): 31 ML/MIN/1.73 M^2
GLUCOSE SERPL-MCNC: 286 MG/DL (ref 70–110)
HCT VFR BLD AUTO: 35.1 % (ref 40–54)
HGB BLD-MCNC: 11.7 G/DL (ref 14–18)
IMM GRANULOCYTES # BLD AUTO: 0.05 K/UL (ref 0–0.04)
IMM GRANULOCYTES NFR BLD AUTO: 0.4 % (ref 0–0.5)
LYMPHOCYTES # BLD AUTO: 0.9 K/UL (ref 1–4.8)
LYMPHOCYTES NFR BLD: 7.2 % (ref 18–48)
MAGNESIUM SERPL-MCNC: 1.7 MG/DL (ref 1.6–2.6)
MCH RBC QN AUTO: 24.9 PG (ref 27–31)
MCHC RBC AUTO-ENTMCNC: 33.3 G/DL (ref 32–36)
MCV RBC AUTO: 75 FL (ref 82–98)
MONOCYTES # BLD AUTO: 0.7 K/UL (ref 0.3–1)
MONOCYTES NFR BLD: 5.2 % (ref 4–15)
NEUTROPHILS # BLD AUTO: 11.3 K/UL (ref 1.8–7.7)
NEUTROPHILS NFR BLD: 86.4 % (ref 38–73)
NRBC BLD-RTO: 0 /100 WBC
PHOSPHATE SERPL-MCNC: 2.8 MG/DL (ref 2.7–4.5)
PLATELET # BLD AUTO: 293 K/UL (ref 150–450)
PMV BLD AUTO: 12.5 FL (ref 9.2–12.9)
POCT GLUCOSE: 240 MG/DL (ref 70–110)
POCT GLUCOSE: 315 MG/DL (ref 70–110)
POTASSIUM SERPL-SCNC: 3.7 MMOL/L (ref 3.5–5.1)
PROT SERPL-MCNC: 6.9 G/DL (ref 6–8.4)
RBC # BLD AUTO: 4.7 M/UL (ref 4.6–6.2)
SODIUM SERPL-SCNC: 145 MMOL/L (ref 136–145)
WBC # BLD AUTO: 13.07 K/UL (ref 3.9–12.7)

## 2022-06-03 PROCEDURE — 36415 COLL VENOUS BLD VENIPUNCTURE: CPT | Performed by: STUDENT IN AN ORGANIZED HEALTH CARE EDUCATION/TRAINING PROGRAM

## 2022-06-03 PROCEDURE — 63600175 PHARM REV CODE 636 W HCPCS: Performed by: HOSPITALIST

## 2022-06-03 PROCEDURE — 83735 ASSAY OF MAGNESIUM: CPT | Performed by: STUDENT IN AN ORGANIZED HEALTH CARE EDUCATION/TRAINING PROGRAM

## 2022-06-03 PROCEDURE — 94761 N-INVAS EAR/PLS OXIMETRY MLT: CPT

## 2022-06-03 PROCEDURE — C9399 UNCLASSIFIED DRUGS OR BIOLOG: HCPCS | Performed by: STUDENT IN AN ORGANIZED HEALTH CARE EDUCATION/TRAINING PROGRAM

## 2022-06-03 PROCEDURE — 80053 COMPREHEN METABOLIC PANEL: CPT | Performed by: STUDENT IN AN ORGANIZED HEALTH CARE EDUCATION/TRAINING PROGRAM

## 2022-06-03 PROCEDURE — 84100 ASSAY OF PHOSPHORUS: CPT | Performed by: STUDENT IN AN ORGANIZED HEALTH CARE EDUCATION/TRAINING PROGRAM

## 2022-06-03 PROCEDURE — 85025 COMPLETE CBC W/AUTO DIFF WBC: CPT | Performed by: STUDENT IN AN ORGANIZED HEALTH CARE EDUCATION/TRAINING PROGRAM

## 2022-06-03 PROCEDURE — 25000003 PHARM REV CODE 250: Performed by: STUDENT IN AN ORGANIZED HEALTH CARE EDUCATION/TRAINING PROGRAM

## 2022-06-03 RX ORDER — SCOLOPAMINE TRANSDERMAL SYSTEM 1 MG/1
1 PATCH, EXTENDED RELEASE TRANSDERMAL
Qty: 30 PATCH | Refills: 0 | Status: SHIPPED | OUTPATIENT
Start: 2022-06-03

## 2022-06-03 RX ADMIN — HYDRALAZINE HYDROCHLORIDE 50 MG: 25 TABLET, FILM COATED ORAL at 08:06

## 2022-06-03 RX ADMIN — SODIUM BICARBONATE 650 MG TABLET 650 MG: at 08:06

## 2022-06-03 RX ADMIN — INSULIN ASPART 8 UNITS: 100 INJECTION, SOLUTION INTRAVENOUS; SUBCUTANEOUS at 08:06

## 2022-06-03 RX ADMIN — ATORVASTATIN CALCIUM 40 MG: 40 TABLET, FILM COATED ORAL at 08:06

## 2022-06-03 RX ADMIN — INSULIN ASPART 4 UNITS: 100 INJECTION, SOLUTION INTRAVENOUS; SUBCUTANEOUS at 12:06

## 2022-06-03 RX ADMIN — INSULIN DETEMIR 20 UNITS: 100 INJECTION, SOLUTION SUBCUTANEOUS at 08:06

## 2022-06-03 RX ADMIN — CARVEDILOL 3.12 MG: 3.12 TABLET, FILM COATED ORAL at 08:06

## 2022-06-03 RX ADMIN — AMLODIPINE BESYLATE 10 MG: 5 TABLET ORAL at 08:06

## 2022-06-03 NOTE — PLAN OF CARE
Per Akshat with [facility], report can be called to [phone]. Requesting for midline to be left in place    CM placed adt 30 for ambulance transportation. Requested  time 3 pm      's [address] : [id number] [address] [facility] [id number]       06/03/22 1420   Post-Acute Status   Post-Acute Authorization Hospice   Hospice Status Set-up Complete/Auth obtained

## 2022-06-03 NOTE — PLAN OF CARE
DC orders faxed to [facility]. Pt will need ambulance for transportation     06/03/22 4268   Post-Acute Status   Post-Acute Authorization Hospice   Hospice Status Set-up Complete/Auth obtained

## 2022-06-03 NOTE — PLAN OF CARE
Per Cathy Lucas with [facility] - he spoke to Mr. Cornell and has a meeting at 9:30 to hopefully admit to inpatient hospice. He stated that he will touch base with the  assigned to the patient in the morning. CM following.    06/02/22 0400   Post-Acute Status   Post-Acute Authorization Hospice   Hospice Status Referrals Sent

## 2022-06-03 NOTE — PLAN OF CARE
[facility] ambulance here for patient transport to [place]. Notified patient's nephew, Kraig. NAD noted.

## 2022-06-03 NOTE — PLAN OF CARE
Plan of care continued. Patient nonverbal. L. Arm midline intact. R.Sc trialysis intact. Patient NPO. Dibetasource @ 50cc/hr to EFREM murcia. HS bg 275, covered with ss insulin as per orders. Turned during night for comfort. Remains free from falls/injury. Frequent rounds made for safety.

## 2022-06-03 NOTE — PLAN OF CARE
Pt clear for DC from case management standpoint. Discharging to [facility]       06/03/22 1430   Final Note   Assessment Type Final Discharge Note   Anticipated Discharge Disposition [facility]

## 2022-06-03 NOTE — PLAN OF CARE
Received call from Cathy Lucas with [facility] [phone]. States pt's nephew came to view facility and signed consents. States they can admit today- will need DC orders and med rec faxed to them at [phone].     Updated Dr. Mustafa via secure chat     06/03/22 1235   Post-Acute Status   Post-Acute Authorization Hospice   Hospice Status Set-up Complete/Auth obtained

## 2022-06-06 NOTE — DISCHARGE SUMMARY
[facility] Hospital Medicine  Discharge Summary      Patient Name: Marshall Flor  MRN: [id number]  Patient Class: IP- Inpatient  Admission Date: 5/26/2022  Hospital Length of Stay: 8 days  Discharge Date and Time: 6/3/2022  4:38 PM  Attending Physician: No att. providers found   Discharging Provider: Obi Mustafa MD  Primary Care Provider: Dorie Quan MD      HPI:   Patient was admitted for hospice care following transition from inpatient.  Please see previous discharge summary.  In short, the patient suffered a devastating stroke and was left completely dependent for all ADLs.  He remained nonverbal, unable to communicate.  Family has decided to transition to hospice care, but reversed this decision at the last minute.  The patient is still end of life.  There was nothing further medically that can be accomplished at this point in time.      * No surgery found *      Hospital Course:   Marshall Flor is a 66 year old male with a past medical history of CHF, ESRD, DM, HTN, HLD, and gout who was initially transitioned to inpatient hospice after suffering a stroke with neurologic compromise and secondary hemorrhagic conversion. However, the patient's family requested him to be taken off of hospice in order to be monitored for another few days. Both the Palliative Medicine and Hospital Medicine had multiple conversations with the family regarding the patient's very poor prognosis, especially in light of the patient's goals which would have been to not be bed-bound, nonverbal and unable to eat. In the meantime, his home medications are being continued and an NG tube has been placed for tube feeds in the event the family wishes to proceed with medical therapy.  Patient initially agreed to hospice on 05/31, but family secondarily wanted some more time to decide.  Patient's improvement seemed to plateau, and the family decided to go with hospice on the day of discharge.  Patient was discharged to  inpatient hospice in [place].       Goals of Care Treatment Preferences:  Code Status: DNR    Health care agent: Kraig  Health care agent number: No value filed.          What is most important right now is to focus on symptom/pain control, quality of life, even if it means sacrificing a little time.  Accordingly, we have decided that the best plan to meet the patient's goals includes discontinuing treatment.      Consults:   Consults (From admission, onward)        Status Ordering Provider     Inpatient consult to Social Work/Case Management  Once        Provider:  (Not yet assigned)    Completed MISTY HILL     Inpatient consult to Registered Dietitian/Nutritionist  Once        Provider:  (Not yet assigned)    Completed MISTY HILL     Palliative Care  Once        Provider:  Russell Casey MD    Completed TORY SAMPSON     Consult to Spiritual Care  Once        Provider:  (Not yet assigned)    Completed TORY SAMPSON          No new Assessment & Plan notes have been filed under this hospital service since the last note was generated.  Service: Hospital Medicine    Final Active Diagnoses:    Diagnosis Date Noted POA    PRINCIPAL PROBLEM:  Encephalopathy [G93.40]  Yes    CVA (cerebral vascular accident) [I63.9] 05/12/2022 Yes    Alcoholic fatty liver [K70.0] 05/29/2022 Yes    Anemia of chronic renal failure [N18.9, D63.1] 05/29/2022 Yes    Diabetes mellitus treated with insulin [E11.9, Z79.4] 05/29/2022 Not Applicable    Goals of care, counseling/discussion [Z71.89] 05/18/2022 Not Applicable    Chronic combined systolic and diastolic CHF (congestive heart failure) [I50.42] 05/13/2022 Yes    Essential hypertension [I10] 01/25/2022 Yes    Hyperlipidemia [E78.5] 01/02/2022 Yes    CKD (chronic kidney disease) stage 4, GFR 15-29 ml/min [N18.4] 01/02/2022 Yes      Problems Resolved During this Admission:    Diagnosis Date Noted Date Resolved POA    Hypernatremia [E87.0] 05/31/2022  06/02/2022 No    Comfort measures only status [Z51.5] 05/26/2022 05/30/2022 Not Applicable       Discharged Condition: poor    Disposition: Hospice/Medical Facility    Follow Up:    Patient Instructions:      Diet Adult Regular     Notify your health care provider if you experience any of the following:  temperature >100.4     Notify your health care provider if you experience any of the following:  persistent nausea and vomiting or diarrhea     Notify your health care provider if you experience any of the following:  severe uncontrolled pain       Significant Diagnostic Studies:     Pending Diagnostic Studies:     None         Medications:  Reconciled Home Medications:      Medication List      START taking these medications    scopolamine 1.3-1.5 mg (1 mg over 3 days)  Commonly known as: TRANSDERM-SCOP  Place 1 patch onto the skin Every 3 (three) days.        CONTINUE taking these medications    carvediloL 3.125 MG tablet  Commonly known as: COREG  Take 1 tablet (3.125 mg total) by mouth once daily.        STOP taking these medications    allopurinoL 100 MG tablet  Commonly known as: ZYLOPRIM     amLODIPine 10 MG tablet  Commonly known as: NORVASC     aspirin 81 MG EC tablet  Commonly known as: ECOTRIN     atorvastatin 40 MG tablet  Commonly known as: LIPITOR     cloNIDine 0.1 MG tablet  Commonly known as: CATAPRES     clopidogreL 75 mg tablet  Commonly known as: PLAVIX     furosemide 20 MG tablet  Commonly known as: LASIX     hydrALAZINE 50 MG tablet  Commonly known as: APRESOLINE     linaGLIPtin 5 mg Tab tablet  Commonly known as: TRADJENTA     NovoLOG Mix 70-30FlexPen U-100 100 unit/mL (70-30) Inpn pen  Generic drug: insulin aspart protamine-insulin aspart     sodium bicarbonate 650 MG tablet            Indwelling Lines/Drains at time of discharge:   Lines/Drains/Airways     Drain  Duration           Male External Urinary Catheter 05/22/22 2045 Large 14 days         NG/OG Tube 05/28/22 0450 nasogastric 16 Fr.  Right nostril 9 days                Time spent on the discharge of patient: 36 minutes         Obi Mustafa MD  Department of Hospital Medicine  [facility]

## 2022-06-07 ENCOUNTER — TELEPHONE (OUTPATIENT)
Dept: MEDSURG UNIT | Facility: HOSPITAL | Age: 67
End: 2022-06-07
Payer: MEDICARE

## 2022-07-02 LAB
ACID FAST MOD KINY STN SPEC: NORMAL
MYCOBACTERIUM SPEC QL CULT: NORMAL

## 2023-04-10 NOTE — PROGRESS NOTES
Consult Note  Infectious Disease    Reason for Consult:  Rule out meningitis/Acute stroke    HPI: Marshall Flor is a 66 y.o. male with past medical history of HTN, diabetes, CHF, prior stroke presented to [facility] for altered mental status and fever.  No family at bedside.  Patient seen and examined in ICU.  Unable to obtain further history, patient breathing heavily, nonverbal, staring, febrile.    In the ER, hypertensive 179/109, febrile 101.2  Labs on admission reviewed, white count 6.8, PMN 84.5%, H&H 13.3/40.3, platelet count 311  Creatinine 2.5, MERCEDES 3.3 today  AST 83/ALT 58  BNP 3064  Lactic acid 3.5, procalcitonin 6.9  U tox negative  UA 3+ protein, 2+ glucose, WBC 3, moderate bacteria  Chest x-ray suggestive of pneumonia right upper lobe  MRI brain nonhemorrhagic infarction in the right anterolateral frontal lobe.    Admitted for severe sepsis with altered mental status, found to have acute stroke, rule out meningitis.    ID consult for meningitis.    INTERVAL HISTORY:  5/13: interim reviewed, patient seen and examined at bedside. Unable to perform LP since he is on Plavix, need to wait 5 days. Currently on bipap, alert and awake but dysarthric. Hemodynamically stable, permissive HTN for acute stroke, afebrile in the last 24h.  Micro reviwed blood cultures 1/4 bottles grew GPC, ID and sensitivities pending. Labs reviewed, WBC: 8, PMN: 76.7%. Creatinine 3.7. HbA1c: 9%.    5/14:  Consult in data reviewed, discussed with Dr. Moore.  Afebrile,   Renal function worsening, urine output is poor.  No BMP today, therefore ordered and his creatinine has increased to 5.9 Repeat chest x-ray ordered and read as no acute process but he does have some vascular fullness right perihilar.  He is unable to attend our interact.  LP is pending washout of Plavix.  1/4 blood cultures with coagulase-negative Staph, drawn at nursing home 5/11.  5/15:  Interim reviewed.  Renal function continues to deteriorate.   Afebrile.  Blood pressure high and labile.  Plans for dialysis tomorrow noted as well as T and lumbar puncture.  Urine output is about the same and in adequate. Requiring bipap for prolonged periods of apnea. Moving right arm occasionally. Will not attend.    5/16: Interim reviewed, discussed with Dr Ivory. Patient seen and examined at bedside, nephew present. States he was very active before admission, fishing and riding his bike. Hypertensive, tachycardic, afebrile. Labs reviewed, WBC: 6.5, PMN: 76.6%. H/H and plt count stable. Awaiting RAJ and LP today.     5/17: Interim reviewed, patient seen and examined at bedside. Remains hypertensive, afebrile. Started on HD yesterday, LP performed yesterday, WBC 1, negative for meningitis. Vanco and ampicillin discontinued, ceftriaxone adjusted to 2g IV daily. Discussed with Pulmonary, patient with episodes of apnea, on bipap. High risk for RAJ. Labs reviewed, WBC: 5.3, no left shift. Cr 4.0    EXAM & DIAGNOSTICS REVIEWED:   Vitals:     Temp:  [97 °F (36.1 °C)-98.1 °F (36.7 °C)]   Temp: 98 °F (36.7 °C) (05/17/22 0400)  Pulse: 62 (05/17/22 0500)  Resp: 18 (05/17/22 0500)  BP: (!) 180/73 (05/17/22 0500)  SpO2: 100 % (05/17/22 0500)    Intake/Output Summary (Last 24 hours) at 5/17/2022 0753  Last data filed at 5/17/2022 0634  Gross per 24 hour   Intake 1096.25 ml   Output 2078 ml   Net -981.75 ml       General:  Somnolent and intermittent apnea noted, with BiPaP in place FiO2 30% - distinct odor likely from acute stroke  Eyes:  Anicteric, PERRL  ENT:  Unable to fully assess, bipap in place  Neck:  Supple to flexion and rotation   Lungs: Clear  Heart:  S1/S2+, regular rhythm, systolic murmur+  Abd:  +BS, soft, non tender, non distended, no rebound  :  Colmenares, urine clear  Musc:  Joints without effusion, swelling,  erythema, synovitis  Skin:  Warm, no rash  Wound:   Neuro: Eyes intermittently open, moves left arm, b/l LE weakness   Psych:  Unable to assess  Lymphatic:        Extrem: No LE edema b/l  VAD:  Peripheral IVs       Isolation: None      General Labs reviewed:  Recent Labs   Lab 05/13/22  0341 05/16/22  0427 05/17/22 0312   WBC 8.07 6.58 5.30   HGB 12.8* 13.5* 12.6*   HCT 38.5* 42.6 38.9*    264 222       Recent Labs   Lab 05/12/22  1257 05/13/22  0341 05/14/22  1309 05/15/22  1359 05/16/22  0427 05/17/22 0312    142   < > 147* 152* 145   K 4.6 4.4   < > 4.2 4.4 4.1    108   < > 112* 111* 108   CO2 16* 17*   < > 17* 19* 20*   BUN 27* 30*   < > 46* 49* 30*   CREATININE 3.3* 3.7*   < > 6.7* 6.7* 4.0*   CALCIUM 8.3* 7.9*   < > 8.1* 8.2* 8.0*   PROT 7.2 6.2  --   --   --  5.9*   BILITOT 0.7 0.5  --   --   --  0.4   ALKPHOS 139* 107  --   --   --  77   ALT 58* 52*  --   --   --  59*   AST 83* 49*  --   --   --  25    < > = values in this interval not displayed.     No results for input(s): CRP in the last 168 hours.  No results for input(s): SEDRATE in the last 168 hours.    Estimated Creatinine Clearance: 19.1 mL/min (A) (based on SCr of 4 mg/dL (H)).     Micro:  Microbiology Results (last 7 days)     Procedure Component Value Units Date/Time    Blood culture [[id number]] Collected: 05/13/22 2007    Order Status: Completed Specimen: Blood from Peripheral, Left Arm Updated: 05/17/22 0612     Blood Culture, Routine No Growth to date      No Growth to date      No Growth to date      No Growth to date    Narrative:      Collection has been rescheduled by ACD at 05/13/2022 15:13 Reason:   Unable to collect, no place to stick pt  Collection has been rescheduled by ACD at 05/13/2022 15:13 Reason:   Unable to collect, no place to stick pt    Cryptococcal antigen, CSF [[id number]] Collected: 05/16/22 1404    Order Status: Sent Specimen: CSF (Spinal Fluid) from CSF Tap, Tube 2 Updated: 05/17/22 0232    Direct AFB stain [[id number]] Collected: 05/16/22 1404    Order Status: Sent Specimen: CSF (Spinal Fluid) from CSF Tap, Tube 2 Updated: 05/17/22 0232    CSF culture  [[id number]] Collected: 05/16/22 1404    Order Status: Completed Specimen: CSF (Spinal Fluid) from CSF Tap, Tube 2 Updated: 05/16/22 1707     Gram Stain Result No WBC's, epithelial cells or organisms seen    AFB Culture & Smear [[id number]] Collected: 05/13/22 1508    Order Status: Completed Specimen: CSF (Spinal Fluid) from Nares, Right Updated: 05/16/22 1446     AFB Culture & Smear Culture in progress     AFB CULTURE STAIN No acid fast bacilli seen.    Gram stain [[id number]] Collected: 05/16/22 1404    Order Status: Canceled Specimen: CSF (Spinal Fluid) from CSF Tap, Tube 2     Culture, MRSA [[id number]] Collected: 05/15/22 2300    Order Status: Sent Specimen: MRSA source from Nares, Left Updated: 05/16/22 1042    Culture, MRSA [[id number]]     Order Status: Canceled Specimen: MRSA source from Nares, Left     Gram stain [[id number]]     Order Status: Canceled Specimen: CSF (Spinal Fluid)     Direct AFB stain [[id number]]     Order Status: Canceled Specimen: CSF (Spinal Fluid)     Cryptococcal antigen, CSF [[id number]]     Order Status: Canceled Specimen: CSF (Spinal Fluid)     CSF culture [[id number]]     Order Status: Canceled Specimen: CSF (Spinal Fluid)           Imaging Reviewed:  CXR  CT and CTA head negative for acute ischemic stroke   MRI brain - acute ischemic stroke  1. This is a very limited evaluation but the study is abnormal.  There is a nonhemorrhagic infarction in the anterior lateral left frontal lobe.  2. There is a remote infarction with encephalomalacia and gliosis in the medial right occipital lobe.  There are also remote infarctions in the cerebellar hemispheres as well as in the right thalamus.    Cardiology:   ECHO 5/12/22:  · The left ventricle is moderately enlarged with eccentric hypertrophy and severely decreased systolic function.  · Grade II left ventricular diastolic dysfunction.  · The estimated PA systolic pressure is 59 mmHg.  · Severe right ventricular enlargement with mildly to  moderately reduced right ventricular systolic function.  · Moderate left atrial enlargement.  · There is moderate pulmonary hypertension.  · Intermediate central venous pressure (8 mmHg).  · Mild aortic regurgitation.  · Moderate right atrial enlargement.  · Mild to moderate tricuspid regurgitation.  · Mild pulmonic regurgitation.  · The estimated ejection fraction is 20%.  · Moderate mitral regurgitation.          IMPRESSION & PLAN     1. Sepsis resolved, fever on admission likely from acute stroke - remains afebrile    CSF WBC: 1 , negative for meningitis - elevated glucose and protein   Blood cultures 1/4 bottles coag-negative staph likely contaminant   Procal elevated due to acute renal failure   Strep urine Ag pending      2. Acute stroke which can also contribute to fever on admission   3. Kym, cr 2.5--3.7--5.9--6.7-->4.0   On HD, nephrology following  3. PMHx, uncontrolled diabetes, HTN, prior stroke, congestive heart failure    Recommendations:  Awaiting RAJ  Ceftriaxone 2g IV q24, last dose today   Follow CSF studies including VDRL, WNV and CSF PCR  Aspiration precautions   Neurology recommendation appreciated     Will follow     D/w nursing, Dr Correa      Medical Decision Making during this encounter was  [_] Low Complexity  [_] Moderate Complexity  [xx] High Complexity     1 = Total assistance

## 2025-04-23 NOTE — ASSESSMENT & PLAN NOTE
Chronic, at goal (stable), continue current treatment plan    Orders:    gabapentin (NEURONTIN) 100 MG capsule; Take 1-2 capsules by mouth 3 times daily as needed (chronic pain) for up to 360 days.    meloxicam (MOBIC) 15 MG tablet; Take 1 tablet by mouth daily as needed for Pain    metaxalone (SKELAXIN) 800 MG tablet; Take 1 tablet by mouth 3 times daily as needed for Pain     Chronic.  Stable.